# Patient Record
Sex: MALE | Race: BLACK OR AFRICAN AMERICAN | NOT HISPANIC OR LATINO | Employment: FULL TIME | URBAN - METROPOLITAN AREA
[De-identification: names, ages, dates, MRNs, and addresses within clinical notes are randomized per-mention and may not be internally consistent; named-entity substitution may affect disease eponyms.]

---

## 2017-06-02 ENCOUNTER — APPOINTMENT (EMERGENCY)
Dept: RADIOLOGY | Facility: HOSPITAL | Age: 52
End: 2017-06-02
Payer: COMMERCIAL

## 2017-06-02 ENCOUNTER — HOSPITAL ENCOUNTER (EMERGENCY)
Facility: HOSPITAL | Age: 52
Discharge: HOME | End: 2017-06-02
Attending: EMERGENCY MEDICINE | Admitting: EMERGENCY MEDICINE
Payer: COMMERCIAL

## 2017-06-02 VITALS
BODY MASS INDEX: 42.14 KG/M2 | WEIGHT: 301 LBS | TEMPERATURE: 100.1 F | RESPIRATION RATE: 18 BRPM | OXYGEN SATURATION: 95 % | SYSTOLIC BLOOD PRESSURE: 213 MMHG | HEART RATE: 83 BPM | HEIGHT: 71 IN | DIASTOLIC BLOOD PRESSURE: 102 MMHG

## 2017-06-02 DIAGNOSIS — H10.9 CONJUNCTIVITIS: ICD-10-CM

## 2017-06-02 DIAGNOSIS — J20.9 ACUTE BRONCHITIS: Primary | ICD-10-CM

## 2017-06-02 LAB
ALBUMIN SERPL BCP-MCNC: 3.5 G/DL (ref 3.5–5)
ALP SERPL-CCNC: 96 U/L (ref 46–116)
ALT SERPL W P-5'-P-CCNC: 31 U/L (ref 12–78)
ANION GAP SERPL CALCULATED.3IONS-SCNC: 6 MMOL/L (ref 4–13)
APTT PPP: 30 SECONDS (ref 23–35)
AST SERPL W P-5'-P-CCNC: 31 U/L (ref 5–45)
BILIRUB SERPL-MCNC: 0.8 MG/DL (ref 0.2–1)
BUN SERPL-MCNC: 9 MG/DL (ref 5–25)
CALCIUM SERPL-MCNC: 8.9 MG/DL (ref 8.3–10.1)
CHLORIDE SERPL-SCNC: 103 MMOL/L (ref 100–108)
CO2 SERPL-SCNC: 30 MMOL/L (ref 21–32)
CREAT SERPL-MCNC: 1.06 MG/DL (ref 0.6–1.3)
ERYTHROCYTE [DISTWIDTH] IN BLOOD BY AUTOMATED COUNT: 13.1 % (ref 11.6–15.1)
GFR SERPL CREATININE-BSD FRML MDRD: >60 ML/MIN/1.73SQ M
GLUCOSE SERPL-MCNC: 101 MG/DL (ref 65–140)
HCT VFR BLD AUTO: 40 % (ref 42–52)
HGB BLD-MCNC: 13.1 G/DL (ref 14–18)
INR PPP: 1.02 (ref 0.86–1.16)
LYMPHOCYTES # BLD AUTO: 2.38 THOUSAND/UL (ref 0.6–4.47)
LYMPHOCYTES # BLD AUTO: 24 %
MCH RBC QN AUTO: 28.1 PG (ref 27–31)
MCHC RBC AUTO-ENTMCNC: 32.6 G/DL (ref 31.4–37.4)
MCV RBC AUTO: 86 FL (ref 82–98)
MONOCYTES # BLD AUTO: 0.89 THOUSAND/UL (ref 0–1.22)
MONOCYTES NFR BLD AUTO: 9 % (ref 4–12)
NEUTS BAND NFR BLD MANUAL: 0 % (ref 0–8)
NEUTS SEG # BLD: 6.63 THOUSAND/UL (ref 1.81–6.82)
NEUTS SEG NFR BLD AUTO: 67 %
PLATELET # BLD AUTO: 198 THOUSANDS/UL (ref 130–400)
PLATELET BLD QL SMEAR: ADEQUATE
PMV BLD AUTO: 9.7 FL (ref 8.9–12.7)
POTASSIUM SERPL-SCNC: 4.1 MMOL/L (ref 3.5–5.3)
PROT SERPL-MCNC: 8.1 G/DL (ref 6.4–8.2)
PROTHROMBIN TIME: 10.7 SECONDS (ref 9.4–11.7)
RBC # BLD AUTO: 4.64 MILLION/UL (ref 4.7–6.1)
SODIUM SERPL-SCNC: 139 MMOL/L (ref 136–145)
TOTAL CELLS COUNTED SPEC: 100
TROPONIN I SERPL-MCNC: 0.02 NG/ML
WBC # BLD AUTO: 9.9 THOUSAND/UL (ref 4.8–10.8)

## 2017-06-02 PROCEDURE — 36415 COLL VENOUS BLD VENIPUNCTURE: CPT | Performed by: PHYSICIAN ASSISTANT

## 2017-06-02 PROCEDURE — 96361 HYDRATE IV INFUSION ADD-ON: CPT

## 2017-06-02 PROCEDURE — 85007 BL SMEAR W/DIFF WBC COUNT: CPT | Performed by: PHYSICIAN ASSISTANT

## 2017-06-02 PROCEDURE — 93005 ELECTROCARDIOGRAM TRACING: CPT | Performed by: PHYSICIAN ASSISTANT

## 2017-06-02 PROCEDURE — 84484 ASSAY OF TROPONIN QUANT: CPT | Performed by: PHYSICIAN ASSISTANT

## 2017-06-02 PROCEDURE — 85027 COMPLETE CBC AUTOMATED: CPT | Performed by: PHYSICIAN ASSISTANT

## 2017-06-02 PROCEDURE — 80053 COMPREHEN METABOLIC PANEL: CPT | Performed by: PHYSICIAN ASSISTANT

## 2017-06-02 PROCEDURE — A9270 NON-COVERED ITEM OR SERVICE: HCPCS | Performed by: EMERGENCY MEDICINE

## 2017-06-02 PROCEDURE — 85610 PROTHROMBIN TIME: CPT | Performed by: PHYSICIAN ASSISTANT

## 2017-06-02 PROCEDURE — 99284 EMERGENCY DEPT VISIT MOD MDM: CPT

## 2017-06-02 PROCEDURE — A9270 NON-COVERED ITEM OR SERVICE: HCPCS | Performed by: PHYSICIAN ASSISTANT

## 2017-06-02 PROCEDURE — 71020 HB CHEST X-RAY 2VW FRONTAL&LATL: CPT

## 2017-06-02 PROCEDURE — 85730 THROMBOPLASTIN TIME PARTIAL: CPT | Performed by: PHYSICIAN ASSISTANT

## 2017-06-02 PROCEDURE — 96360 HYDRATION IV INFUSION INIT: CPT

## 2017-06-02 RX ORDER — PREDNISONE 20 MG/1
60 TABLET ORAL ONCE
Status: COMPLETED | OUTPATIENT
Start: 2017-06-02 | End: 2017-06-02

## 2017-06-02 RX ORDER — AMLODIPINE BESYLATE 5 MG/1
10 TABLET ORAL ONCE
Status: COMPLETED | OUTPATIENT
Start: 2017-06-02 | End: 2017-06-02

## 2017-06-02 RX ORDER — HYDROCHLOROTHIAZIDE 25 MG/1
25 TABLET ORAL DAILY
Status: DISCONTINUED | OUTPATIENT
Start: 2017-06-02 | End: 2017-06-02 | Stop reason: HOSPADM

## 2017-06-02 RX ORDER — PREDNISONE 20 MG/1
40 TABLET ORAL DAILY
Qty: 8 TABLET | Refills: 0 | Status: SHIPPED | OUTPATIENT
Start: 2017-06-02 | End: 2017-06-06

## 2017-06-02 RX ORDER — HYDROCHLOROTHIAZIDE 25 MG/1
25 TABLET ORAL DAILY
Qty: 30 TABLET | Refills: 0 | Status: SHIPPED | OUTPATIENT
Start: 2017-06-02 | End: 2017-06-02

## 2017-06-02 RX ORDER — HYDROCHLOROTHIAZIDE 25 MG/1
25 TABLET ORAL DAILY
Status: DISCONTINUED | OUTPATIENT
Start: 2017-06-03 | End: 2017-06-02

## 2017-06-02 RX ORDER — AMLODIPINE BESYLATE 10 MG/1
10 TABLET ORAL DAILY
Qty: 7 TABLET | Refills: 0 | Status: SHIPPED | OUTPATIENT
Start: 2017-06-02 | End: 2017-08-10 | Stop reason: ALTCHOICE

## 2017-06-02 RX ORDER — IPRATROPIUM BROMIDE AND ALBUTEROL SULFATE 2.5; .5 MG/3ML; MG/3ML
3 SOLUTION RESPIRATORY (INHALATION)
Status: DISCONTINUED | OUTPATIENT
Start: 2017-06-02 | End: 2017-06-02 | Stop reason: HOSPADM

## 2017-06-02 RX ORDER — TOBRAMYCIN 3 MG/ML
1 SOLUTION/ DROPS OPHTHALMIC
Qty: 1 BOTTLE | Refills: 0 | Status: SHIPPED | OUTPATIENT
Start: 2017-06-02 | End: 2017-06-09

## 2017-06-02 RX ORDER — ALBUTEROL SULFATE 2.5 MG/3ML
2.5 SOLUTION RESPIRATORY (INHALATION) ONCE
Status: COMPLETED | OUTPATIENT
Start: 2017-06-02 | End: 2017-06-02

## 2017-06-02 RX ORDER — HYDROCHLOROTHIAZIDE 12.5 MG/1
12.5 TABLET ORAL DAILY
Qty: 30 TABLET | Refills: 0 | Status: SHIPPED | OUTPATIENT
Start: 2017-06-02 | End: 2017-06-02

## 2017-06-02 RX ORDER — ALBUTEROL SULFATE 90 UG/1
2 AEROSOL, METERED RESPIRATORY (INHALATION) EVERY 6 HOURS PRN
Qty: 1 INHALER | Refills: 0 | Status: SHIPPED | OUTPATIENT
Start: 2017-06-02 | End: 2017-07-02

## 2017-06-02 RX ORDER — AZITHROMYCIN 250 MG/1
TABLET, FILM COATED ORAL
Qty: 6 TABLET | Refills: 0 | Status: SHIPPED | OUTPATIENT
Start: 2017-06-02 | End: 2017-08-10 | Stop reason: ALTCHOICE

## 2017-06-02 RX ADMIN — IPRATROPIUM BROMIDE AND ALBUTEROL SULFATE 3 ML: .5; 3 SOLUTION RESPIRATORY (INHALATION) at 16:46

## 2017-06-02 RX ADMIN — AMLODIPINE BESYLATE 10 MG: 5 TABLET ORAL at 18:49

## 2017-06-02 RX ADMIN — HYDROCHLOROTHIAZIDE 25 MG: 25 TABLET ORAL at 18:17

## 2017-06-02 RX ADMIN — PREDNISONE 60 MG: 20 TABLET ORAL at 15:22

## 2017-06-02 RX ADMIN — SODIUM CHLORIDE 1000 ML: 0.9 INJECTION, SOLUTION INTRAVENOUS at 15:22

## 2017-06-02 RX ADMIN — ALBUTEROL SULFATE 2.5 MG: 2.5 SOLUTION RESPIRATORY (INHALATION) at 16:47

## 2017-06-02 RX ADMIN — IPRATROPIUM BROMIDE AND ALBUTEROL SULFATE 3 ML: .5; 3 SOLUTION RESPIRATORY (INHALATION) at 15:22

## 2017-06-07 LAB
ATRIAL RATE: 78 BPM
P AXIS: 29 DEGREES
PR INTERVAL: 146 MS
QRS AXIS: 31 DEGREES
QRSD INTERVAL: 98 MS
QT INTERVAL: 382 MS
QTC INTERVAL: 435 MS
T WAVE AXIS: 30 DEGREES
VENTRICULAR RATE: 78 BPM

## 2017-08-10 ENCOUNTER — HOSPITAL ENCOUNTER (EMERGENCY)
Facility: HOSPITAL | Age: 52
Discharge: HOME | End: 2017-08-10
Admitting: EMERGENCY MEDICINE
Payer: COMMERCIAL

## 2017-08-10 VITALS
WEIGHT: 280 LBS | RESPIRATION RATE: 16 BRPM | OXYGEN SATURATION: 97 % | TEMPERATURE: 98.2 F | HEIGHT: 71 IN | BODY MASS INDEX: 39.2 KG/M2 | DIASTOLIC BLOOD PRESSURE: 91 MMHG | SYSTOLIC BLOOD PRESSURE: 145 MMHG | HEART RATE: 73 BPM

## 2017-08-10 DIAGNOSIS — J06.9 UPPER RESPIRATORY INFECTION: Primary | ICD-10-CM

## 2017-08-10 PROCEDURE — 99283 EMERGENCY DEPT VISIT LOW MDM: CPT

## 2017-08-10 RX ORDER — DEXAMETHASONE 4 MG/1
10 TABLET ORAL ONCE
Status: COMPLETED | OUTPATIENT
Start: 2017-08-10 | End: 2017-08-10

## 2017-08-10 RX ORDER — TESTOSTERONE 16.2 MG/G
40.5 GEL TRANSDERMAL DAILY
COMMUNITY
End: 2020-11-23 | Stop reason: HOSPADM

## 2017-08-10 RX ORDER — LISINOPRIL AND HYDROCHLOROTHIAZIDE 20; 12.5 MG/1; MG/1
1 TABLET ORAL DAILY
COMMUNITY
End: 2019-11-07 | Stop reason: HOSPADM

## 2017-08-10 RX ADMIN — DEXAMETHASONE 10 MG: 4 TABLET ORAL at 16:25

## 2019-08-25 ENCOUNTER — APPOINTMENT (EMERGENCY)
Dept: RADIOLOGY | Facility: HOSPITAL | Age: 54
End: 2019-08-25
Attending: EMERGENCY MEDICINE
Payer: COMMERCIAL

## 2019-08-25 ENCOUNTER — HOSPITAL ENCOUNTER (EMERGENCY)
Facility: HOSPITAL | Age: 54
Discharge: HOME | End: 2019-08-25
Attending: EMERGENCY MEDICINE | Admitting: EMERGENCY MEDICINE
Payer: COMMERCIAL

## 2019-08-25 ENCOUNTER — APPOINTMENT (EMERGENCY)
Dept: RADIOLOGY | Facility: HOSPITAL | Age: 54
End: 2019-08-25
Payer: COMMERCIAL

## 2019-08-25 VITALS
OXYGEN SATURATION: 97 % | RESPIRATION RATE: 16 BRPM | DIASTOLIC BLOOD PRESSURE: 79 MMHG | SYSTOLIC BLOOD PRESSURE: 176 MMHG | BODY MASS INDEX: 41.42 KG/M2 | TEMPERATURE: 97.9 F | HEART RATE: 48 BPM | WEIGHT: 297 LBS

## 2019-08-25 DIAGNOSIS — R10.13 EPIGASTRIC PAIN: Primary | ICD-10-CM

## 2019-08-25 DIAGNOSIS — R93.89 OPACITY NOTED ON IMAGING STUDY: ICD-10-CM

## 2019-08-25 LAB
ALBUMIN SERPL BCP-MCNC: 3.6 G/DL (ref 3.5–5)
ALP SERPL-CCNC: 71 U/L (ref 46–116)
ALT SERPL W P-5'-P-CCNC: 40 U/L (ref 12–78)
ANION GAP SERPL CALCULATED.3IONS-SCNC: 7 MMOL/L (ref 4–13)
AST SERPL W P-5'-P-CCNC: 26 U/L (ref 5–45)
BASOPHILS # BLD AUTO: 0.05 THOUSANDS/ΜL (ref 0–0.1)
BASOPHILS NFR BLD AUTO: 1 % (ref 0–1)
BILIRUB SERPL-MCNC: 0.5 MG/DL (ref 0.2–1)
BUN SERPL-MCNC: 22 MG/DL (ref 5–25)
CALCIUM SERPL-MCNC: 8.9 MG/DL (ref 8.3–10.1)
CHLORIDE SERPL-SCNC: 101 MMOL/L (ref 100–108)
CO2 SERPL-SCNC: 28 MMOL/L (ref 21–32)
CREAT SERPL-MCNC: 1.25 MG/DL (ref 0.6–1.3)
EOSINOPHIL # BLD AUTO: 0.1 THOUSAND/ΜL (ref 0–0.61)
EOSINOPHIL NFR BLD AUTO: 1 % (ref 0–6)
ERYTHROCYTE [DISTWIDTH] IN BLOOD BY AUTOMATED COUNT: 13.6 % (ref 11.6–15.1)
GFR SERPL CREATININE-BSD FRML MDRD: 74 ML/MIN/1.73SQ M
GLUCOSE SERPL-MCNC: 112 MG/DL (ref 65–140)
HCT VFR BLD AUTO: 47.9 % (ref 36.5–49.3)
HGB BLD-MCNC: 15.6 G/DL (ref 12–17)
IMM GRANULOCYTES # BLD AUTO: 0.03 THOUSAND/UL (ref 0–0.2)
IMM GRANULOCYTES NFR BLD AUTO: 0 % (ref 0–2)
LIPASE SERPL-CCNC: 92 U/L (ref 73–393)
LYMPHOCYTES # BLD AUTO: 2.73 THOUSANDS/ΜL (ref 0.6–4.47)
LYMPHOCYTES NFR BLD AUTO: 32 % (ref 14–44)
MCH RBC QN AUTO: 29.8 PG (ref 26.8–34.3)
MCHC RBC AUTO-ENTMCNC: 32.6 G/DL (ref 31.4–37.4)
MCV RBC AUTO: 92 FL (ref 82–98)
MONOCYTES # BLD AUTO: 0.62 THOUSAND/ΜL (ref 0.17–1.22)
MONOCYTES NFR BLD AUTO: 7 % (ref 4–12)
NEUTROPHILS # BLD AUTO: 4.98 THOUSANDS/ΜL (ref 1.85–7.62)
NEUTS SEG NFR BLD AUTO: 59 % (ref 43–75)
NRBC BLD AUTO-RTO: 0 /100 WBCS
PLATELET # BLD AUTO: 185 THOUSANDS/UL (ref 149–390)
PMV BLD AUTO: 11.6 FL (ref 8.9–12.7)
POTASSIUM SERPL-SCNC: 4.2 MMOL/L (ref 3.5–5.3)
PROT SERPL-MCNC: 7.5 G/DL (ref 6.4–8.2)
RBC # BLD AUTO: 5.23 MILLION/UL (ref 3.88–5.62)
SODIUM SERPL-SCNC: 136 MMOL/L (ref 136–145)
TROPONIN I SERPL-MCNC: <0.02 NG/ML
TROPONIN I SERPL-MCNC: <0.02 NG/ML
WBC # BLD AUTO: 8.51 THOUSAND/UL (ref 4.31–10.16)

## 2019-08-25 PROCEDURE — 99285 EMERGENCY DEPT VISIT HI MDM: CPT

## 2019-08-25 PROCEDURE — 36415 COLL VENOUS BLD VENIPUNCTURE: CPT | Performed by: EMERGENCY MEDICINE

## 2019-08-25 PROCEDURE — 83690 ASSAY OF LIPASE: CPT | Performed by: EMERGENCY MEDICINE

## 2019-08-25 PROCEDURE — 96374 THER/PROPH/DIAG INJ IV PUSH: CPT

## 2019-08-25 PROCEDURE — 74177 CT ABD & PELVIS W/CONTRAST: CPT

## 2019-08-25 PROCEDURE — 71045 X-RAY EXAM CHEST 1 VIEW: CPT

## 2019-08-25 PROCEDURE — 80053 COMPREHEN METABOLIC PANEL: CPT | Performed by: EMERGENCY MEDICINE

## 2019-08-25 PROCEDURE — 96375 TX/PRO/DX INJ NEW DRUG ADDON: CPT

## 2019-08-25 PROCEDURE — 93005 ELECTROCARDIOGRAM TRACING: CPT

## 2019-08-25 PROCEDURE — 71260 CT THORAX DX C+: CPT

## 2019-08-25 PROCEDURE — 85025 COMPLETE CBC W/AUTO DIFF WBC: CPT | Performed by: EMERGENCY MEDICINE

## 2019-08-25 PROCEDURE — 84484 ASSAY OF TROPONIN QUANT: CPT | Performed by: EMERGENCY MEDICINE

## 2019-08-25 RX ORDER — MORPHINE SULFATE 4 MG/ML
4 INJECTION, SOLUTION INTRAMUSCULAR; INTRAVENOUS ONCE
Status: COMPLETED | OUTPATIENT
Start: 2019-08-25 | End: 2019-08-25

## 2019-08-25 RX ORDER — ONDANSETRON 2 MG/ML
4 INJECTION INTRAMUSCULAR; INTRAVENOUS ONCE
Status: COMPLETED | OUTPATIENT
Start: 2019-08-25 | End: 2019-08-25

## 2019-08-25 RX ADMIN — ONDANSETRON 4 MG: 2 INJECTION INTRAMUSCULAR; INTRAVENOUS at 01:48

## 2019-08-25 RX ADMIN — MORPHINE SULFATE 4 MG: 4 INJECTION INTRAVENOUS at 01:48

## 2019-08-25 RX ADMIN — IOHEXOL 100 ML: 350 INJECTION, SOLUTION INTRAVENOUS at 03:26

## 2019-08-25 NOTE — DISCHARGE INSTRUCTIONS
Call to schedule your outpatient stress test  Follow up with your primary care physician and Cardiologist as provided  Return to the ER for further concerns  Follow up with the pulmonologist concerning the opacity found in your right lung  You may require further testing

## 2019-08-25 NOTE — ED PROVIDER NOTES
History  Chief Complaint   Patient presents with    Abdominal Pain     pt states mid upper gastric pain that started last week  Also complains of nausea     Pt in ER with c/o intermittent epigastric/substernal pain x 1wk  He has been taking tylenol for pain relief, without success  Pt describes the pain as a "vice" at this time, and he states that the pain doesn't radiate  Pain is not exacerbated by eating or drinking  Pain is not exacerbated with exertion  He denies n/v/f/c  History provided by:  Patient   used: No        Prior to Admission Medications   Prescriptions Last Dose Informant Patient Reported? Taking? Testosterone (ANDROGEL) 40 5 MG/2 5GM (1 62%) GEL   Yes No   Sig: Place 40 5 mg on the skin daily   lisinopril-hydrochlorothiazide (PRINZIDE,ZESTORETIC) 20-12 5 MG per tablet   Yes No   Sig: Take 1 tablet by mouth daily      Facility-Administered Medications: None       Past Medical History:   Diagnosis Date    Hypertension     stopped his meds when ran out several years ago       History reviewed  No pertinent surgical history  History reviewed  No pertinent family history  I have reviewed and agree with the history as documented  Social History     Tobacco Use    Smoking status: Never Smoker   Substance Use Topics    Alcohol use: No    Drug use: No        Review of Systems   Constitutional: Negative for chills and fever  Respiratory: Negative for cough, shortness of breath and wheezing  Cardiovascular: Positive for chest pain  Negative for palpitations  Gastrointestinal: Positive for abdominal pain  Negative for constipation, diarrhea, nausea and vomiting  Genitourinary: Negative for dysuria, flank pain, hematuria and urgency  Musculoskeletal: Negative for back pain  Skin: Negative for color change and rash  All other systems reviewed and are negative  Physical Exam  Physical Exam   Constitutional: He is oriented to person, place, and time   He appears well-developed and well-nourished  HENT:   Head: Normocephalic and atraumatic  Eyes: Pupils are equal, round, and reactive to light  EOM are normal    Cardiovascular: Normal rate, regular rhythm and normal heart sounds  Pulmonary/Chest: Effort normal and breath sounds normal    Abdominal: Soft  Bowel sounds are normal  He exhibits no distension and no mass  There is tenderness in the epigastric area  There is no rigidity, no rebound, no guarding and negative Montes's sign  No hernia  Neurological: He is alert and oriented to person, place, and time  Skin: Skin is warm and dry  Psychiatric: He has a normal mood and affect  His behavior is normal  Judgment and thought content normal    Nursing note and vitals reviewed        Vital Signs  ED Triage Vitals   Temperature Pulse Respirations Blood Pressure SpO2   08/25/19 0117 08/25/19 0118 08/25/19 0118 08/25/19 0118 08/25/19 0118   97 9 °F (36 6 °C) 62 16 (!) 255/120 100 %      Temp Source Heart Rate Source Patient Position - Orthostatic VS BP Location FiO2 (%)   08/25/19 0117 08/25/19 0118 08/25/19 0118 08/25/19 0118 --   Tympanic Monitor Lying Right arm       Pain Score       08/25/19 0117       7           Vitals:    08/25/19 0118 08/25/19 0145 08/25/19 0230 08/25/19 0433   BP: (!) 255/120 (!) 200/93 (!) 177/86 (!) 176/79   Pulse: 62 (!) 53 (!) 52 (!) 48   Patient Position - Orthostatic VS: Lying            Visual Acuity      ED Medications  Medications   morphine (PF) 4 mg/mL injection 4 mg (4 mg Intravenous Given 8/25/19 0148)   ondansetron (ZOFRAN) injection 4 mg (4 mg Intravenous Given 8/25/19 0148)   iohexol (OMNIPAQUE) 350 MG/ML injection (MULTI-DOSE) 100 mL (100 mL Intravenous Given 8/25/19 0326)       Diagnostic Studies  Results Reviewed     Procedure Component Value Units Date/Time    Troponin I [[id number]]  (Normal) Collected:  08/25/19 0432    Lab Status:  Final result Specimen:  Blood from Arm, Right Updated:  08/25/19 0511     Troponin I <0 02 ng/mL     Lipase [[id number]]  (Normal) Collected:  08/25/19 0130    Lab Status:  Final result Specimen:  Blood from Arm, Right Updated:  08/25/19 0221     Lipase 92 u/L     Troponin I [[id number]]  (Normal) Collected:  08/25/19 0130    Lab Status:  Final result Specimen:  Blood from Arm, Right Updated:  08/25/19 0216     Troponin I <0 02 ng/mL     Comprehensive metabolic panel [[id number]] Collected:  08/25/19 0130    Lab Status:  Final result Specimen:  Blood from Arm, Right Updated:  08/25/19 4713     Sodium 136 mmol/L      Potassium 4 2 mmol/L      Chloride 101 mmol/L      CO2 28 mmol/L      ANION GAP 7 mmol/L      BUN 22 mg/dL      Creatinine 1 25 mg/dL      Glucose 112 mg/dL      Calcium 8 9 mg/dL      AST 26 U/L      ALT 40 U/L      Alkaline Phosphatase 71 U/L      Total Protein 7 5 g/dL      Albumin 3 6 g/dL      Total Bilirubin 0 50 mg/dL      eGFR 74 ml/min/1 73sq m     Narrative:       [place] guidelines for Chronic Kidney Disease (CKD):     Stage 1 with normal or high GFR (GFR > 90 mL/min/1 73 square meters)    Stage 2 Mild CKD (GFR = 60-89 mL/min/1 73 square meters)    Stage 3A Moderate CKD (GFR = 45-59 mL/min/1 73 square meters)    Stage 3B Moderate CKD (GFR = 30-44 mL/min/1 73 square meters)    Stage 4 Severe CKD (GFR = 15-29 mL/min/1 73 square meters)    Stage 5 End Stage CKD (GFR <15 mL/min/1 73 square meters)  Note: GFR calculation is accurate only with a steady state creatinine    CBC and differential [[id number]] Collected:  08/25/19 0130    Lab Status:  Final result Specimen:  Blood from Arm, Right Updated:  08/25/19 0135     WBC 8 51 Thousand/uL      RBC 5 23 Million/uL      Hemoglobin 15 6 g/dL      Hematocrit 47 9 %      MCV 92 fL      MCH 29 8 pg      MCHC 32 6 g/dL      RDW 13 6 %      MPV 11 6 fL      Platelets 100 Thousands/uL      nRBC 0 /100 WBCs      Neutrophils Relative 59 %      Immat GRANS % 0 %      Lymphocytes Relative 32 %      Monocytes Relative 7 %      Eosinophils Relative 1 %      Basophils Relative 1 %      Neutrophils Absolute 4 98 Thousands/µL      Immature Grans Absolute 0 03 Thousand/uL      Lymphocytes Absolute 2 73 Thousands/µL      Monocytes Absolute 0 62 Thousand/µL      Eosinophils Absolute 0 10 Thousand/µL      Basophils Absolute 0 05 Thousands/µL                  CT chest abdomen pelvis w contrast   Final Result by Lainey Wright DO (08/25 1626)      Nodular airspace opacity within the right upper lobe which may represent infectious versus inflammatory causes              Workstation performed: [id number]         XR chest 1 view portable   ED Interpretation by Onur Reveles DO (08/25 0211)   Unchanged from previous                 Procedures  ECG 12 Lead Documentation Only  Date/Time: 8/25/2019 1:12 AM  Performed by: Onur Reveles DO  Authorized by: Onur Reveles DO     Indications / Diagnosis:  Epigastric pain  ECG reviewed by me, the ED Provider: yes    Patient location:  ED  Previous ECG:     Previous ECG:  Unavailable    Comparison to cardiac monitor: Yes    Interpretation:     Interpretation: abnormal    Rate:     ECG rate:  51bpm    ECG rate assessment: normal    Rhythm:     Rhythm comment:  Sinus arrhythmia  Ectopy:     Ectopy: none    QRS:     QRS axis:  Normal  Conduction:     Conduction: normal    ST segments:     ST segments:  Normal           ED Course         HEART Risk Score      Most Recent Value   History  0 Filed at: 08/25/2019 0541   ECG  0 Filed at: 08/25/2019 0541   Age  1 Filed at: 08/25/2019 0541   Risk Factors  1 Filed at: 08/25/2019 0541   Troponin  0 Filed at: 08/25/2019 0541   Heart Score Risk Calculator   History  0 Filed at: 08/25/2019 0541   ECG  0 Filed at: 08/25/2019 0541   Age  1 Filed at: 08/25/2019 0541   Risk Factors  1 Filed at: 08/25/2019 0541   Troponin  0 Filed at: 08/25/2019 0541   HEART Score  2 Filed at: 08/25/2019 0541   HEART Score  2 Filed at: 08/25/2019 2343 MDM  Number of Diagnoses or Management Options  Diagnosis management comments: Pt is pain free after meds  Amount and/or Complexity of Data Reviewed  Clinical lab tests: ordered and reviewed  Tests in the radiology section of CPT®: ordered and reviewed    Risk of Complications, Morbidity, and/or Mortality  Presenting problems: moderate  Diagnostic procedures: moderate  Management options: moderate    Patient Progress  Patient progress: improved      Disposition  Final diagnoses:   Epigastric pain   Opacity noted on imaging study     Time reflects when diagnosis was documented in both MDM as applicable and the Disposition within this note     Time User Action Codes Description Comment    8/25/2019  5:27 AM Charlie DE LOS SANTOS Add [R10 13] Epigastric pain     8/25/2019  5:38 AM Charlie Cramer Add [R93 89] Opacity noted on imaging study       ED Disposition     ED Disposition Condition Date/Time Comment    Discharge Stable Sun Aug 25, 2019  5:27 AM Ulis Gowers discharge to home/self care              Follow-up Information     Follow up With Specialties Details Why Contact Info Additional Information    Niru Patterson DO General Practice Schedule an appointment as soon as possible for a visit in 1 day for follow up [address]       Marquise Dominguez MD Cardiology Schedule an appointment as soon as possible for a visit in 1 day for follow up [address] [facility]  [address] [facility] [address] [facility] Cardiology Schedule an appointment as soon as possible for a visit in 1 day for follow up [address] [facility], [place] [address] [facility], [address] [facility], [place] [address], [place], [address]    Ellyn Day MD Pulmonary Disease Schedule an appointment as soon as possible for a visit in 1 day for follow up concerning lung opacity [address]  [phone]             Patient's Medications   Discharge Prescriptions    No medications on file     Outpatient Discharge Orders   Stress test only, exercise   Standing Status: Future Standing Exp   Date: 08/25/23       ED Provider  Electronically Signed by           Sunshine Vera DO  08/25/19 7177

## 2019-08-26 LAB
ATRIAL RATE: 51 BPM
P AXIS: 27 DEGREES
PR INTERVAL: 156 MS
QRS AXIS: 40 DEGREES
QRSD INTERVAL: 98 MS
QT INTERVAL: 392 MS
QTC INTERVAL: 361 MS
T WAVE AXIS: 20 DEGREES
VENTRICULAR RATE: 51 BPM

## 2019-08-26 PROCEDURE — 93010 ELECTROCARDIOGRAM REPORT: CPT | Performed by: INTERNAL MEDICINE

## 2019-10-27 ENCOUNTER — APPOINTMENT (EMERGENCY)
Dept: RADIOLOGY | Facility: HOSPITAL | Age: 54
End: 2019-10-27
Attending: EMERGENCY MEDICINE
Payer: COMMERCIAL

## 2019-10-27 ENCOUNTER — HOSPITAL ENCOUNTER (EMERGENCY)
Facility: HOSPITAL | Age: 54
Discharge: HOME | End: 2019-10-27
Attending: EMERGENCY MEDICINE | Admitting: EMERGENCY MEDICINE
Payer: COMMERCIAL

## 2019-10-27 VITALS — OXYGEN SATURATION: 98 % | HEART RATE: 89 BPM | TEMPERATURE: 99.9 F | RESPIRATION RATE: 18 BRPM

## 2019-10-27 DIAGNOSIS — S93.402A LEFT ANKLE SPRAIN: Primary | ICD-10-CM

## 2019-10-27 PROCEDURE — 73610 X-RAY EXAM OF ANKLE: CPT

## 2019-10-27 PROCEDURE — 73630 X-RAY EXAM OF FOOT: CPT

## 2019-10-27 PROCEDURE — 99283 EMERGENCY DEPT VISIT LOW MDM: CPT

## 2019-10-27 RX ORDER — TRAMADOL HYDROCHLORIDE 50 MG/1
TABLET ORAL
Qty: 12 TABLET | Refills: 0 | Status: SHIPPED | OUTPATIENT
Start: 2019-10-27 | End: 2019-11-07 | Stop reason: HOSPADM

## 2019-10-27 RX ORDER — NAPROXEN 500 MG/1
500 TABLET ORAL ONCE
Status: COMPLETED | OUTPATIENT
Start: 2019-10-27 | End: 2019-10-27

## 2019-10-27 RX ORDER — NAPROXEN 500 MG/1
500 TABLET ORAL 2 TIMES DAILY WITH MEALS
Qty: 14 TABLET | Refills: 0 | Status: SHIPPED | OUTPATIENT
Start: 2019-10-27 | End: 2019-11-07 | Stop reason: HOSPADM

## 2019-10-27 RX ORDER — OXYCODONE HYDROCHLORIDE AND ACETAMINOPHEN 5; 325 MG/1; MG/1
1 TABLET ORAL ONCE
Status: COMPLETED | OUTPATIENT
Start: 2019-10-27 | End: 2019-10-27

## 2019-10-27 RX ADMIN — NAPROXEN 500 MG: 500 TABLET ORAL at 20:19

## 2019-10-27 RX ADMIN — OXYCODONE HYDROCHLORIDE AND ACETAMINOPHEN 1 TABLET: 5; 325 TABLET ORAL at 20:19

## 2019-10-28 NOTE — ED PROVIDER NOTES
History  Chief Complaint   Patient presents with    Ankle Injury     pt states he was roller skating last night and " pushed it a little too hard" pt presents with a swollen and tender left lower extremity x 1 day  Patient took Tylenol prior to coming to ER  51 yo male was roller skating yesterday which he does often  Afterwards he had a little pain in left foot and ankle  Today pain continued and got worse, especially when driving he says - when asked if he drives with his left foot he said no  Hurts to put any weight on it  No associated symptoms  Non-radiating  History provided by:  Patient   used: No    Ankle Injury       Prior to Admission Medications   Prescriptions Last Dose Informant Patient Reported? Taking? Testosterone (ANDROGEL) 40 5 MG/2 5GM (1 62%) GEL   Yes No   Sig: Place 40 5 mg on the skin daily   lisinopril-hydrochlorothiazide (PRINZIDE,ZESTORETIC) 20-12 5 MG per tablet   Yes No   Sig: Take 1 tablet by mouth daily      Facility-Administered Medications: None       Past Medical History:   Diagnosis Date    Hypertension     stopped his meds when ran out several years ago       History reviewed  No pertinent surgical history  History reviewed  No pertinent family history  I have reviewed and agree with the history as documented  Social History     Tobacco Use    Smoking status: Never Smoker   Substance Use Topics    Alcohol use: No    Drug use: No        Review of Systems    Physical Exam  Physical Exam   Constitutional: He is oriented to person, place, and time  He appears well-developed and well-nourished  No distress  HENT:   Head: Normocephalic and atraumatic  Neck: Neck supple  Pulmonary/Chest: Effort normal    Musculoskeletal: Normal range of motion  He exhibits tenderness  He exhibits no edema or deformity  + lateral mall and bottom of heel ttp  Top of foot not tender    Medial mall not tender; DP palp; achilles mech intact Neurological: He is alert and oriented to person, place, and time  He exhibits normal muscle tone  Skin: Skin is warm and dry  Capillary refill takes less than 2 seconds  He is not diaphoretic  No erythema  No pallor  Psychiatric: He has a normal mood and affect  His behavior is normal    Nursing note and vitals reviewed  Vital Signs  ED Triage Vitals   Temperature Pulse Respirations BP SpO2   10/27/19 1937 10/27/19 1937 10/27/19 1937 -- 10/27/19 1937   99 9 °F (37 7 °C) 89 18  98 %      Temp Source Heart Rate Source Patient Position - Orthostatic VS BP Location FiO2 (%)   10/27/19 1937 10/27/19 1937 -- -- --   Tympanic Monitor         Pain Score       10/27/19 2019       Worst Possible Pain           Vitals:    10/27/19 1937   Pulse: 89         Visual Acuity      ED Medications  Medications   oxyCODONE-acetaminophen (PERCOCET) 5-325 mg per tablet 1 tablet (1 tablet Oral Given 10/27/19 2019)   naproxen (NAPROSYN) tablet 500 mg (500 mg Oral Given 10/27/19 2019)       Diagnostic Studies  Results Reviewed     None                 XR ankle 3+ vw left   ED Interpretation by Elvi Howell MD (31/82 6329)   No fx      XR foot 3+ vw left   ED Interpretation by Evli Howell MD (23/35 6205)   No fx                 Procedures  Procedures       ED Course                               [facility]  Number of Diagnoses or Management Options  Left ankle sprain:   Diagnosis management comments: Advised RICE, ultram and naprosyn for pain, follow up ortho  Disposition  Final diagnoses:   Left ankle sprain     Time reflects when diagnosis was documented in both MDM as applicable and the Disposition within this note     Time User Action Codes Description Comment    62/61/4111  7:15 PM Stevo Ricardo Add [D37 353B] Left ankle sprain       ED Disposition     ED Disposition Condition Date/Time Comment    Discharge Stable Sun Oct 27, 2019  8:14 PM Rich Code discharge to home/self care              Follow-up Information Follow up With Specialties Details Why Contact Info    Eliane Jean Baptiste MD Orthopedic Surgery Schedule an appointment as soon as possible for a visit   [address]  [phone]            Patient's Medications   Discharge Prescriptions    NAPROXEN (NAPROSYN) 500 MG TABLET    Take 1 tablet (500 mg total) by mouth 2 (two) times a day with meals       Start Date: 10/27/2019End Date: --       Order Dose: 500 mg       Quantity: 14 tablet    Refills: 0    TRAMADOL (ULTRAM) 50 MG TABLET    1-2 po q 6 hours prn pain       Start Date: 10/27/2019End Date: --       Order Dose: --       Quantity: 12 tablet    Refills: 0     No discharge procedures on file      ED Provider  Electronically Signed by           Elvi Howell MD  93/61/61 4618

## 2019-11-03 ENCOUNTER — HOSPITAL ENCOUNTER (INPATIENT)
Facility: HOSPITAL | Age: 54
LOS: 4 days | Discharge: HOME | DRG: 377 | End: 2019-11-07
Attending: INTERNAL MEDICINE | Admitting: INTERNAL MEDICINE
Payer: COMMERCIAL

## 2019-11-03 DIAGNOSIS — K92.2 GASTROINTESTINAL HEMORRHAGE, UNSPECIFIED GASTROINTESTINAL HEMORRHAGE TYPE: ICD-10-CM

## 2019-11-03 DIAGNOSIS — M25.572 ACUTE LEFT ANKLE PAIN: ICD-10-CM

## 2019-11-03 DIAGNOSIS — M25.512 ACUTE PAIN OF LEFT SHOULDER: ICD-10-CM

## 2019-11-03 DIAGNOSIS — I21.4 NSTEMI (NON-ST ELEVATED MYOCARDIAL INFARCTION) (HCC): Primary | ICD-10-CM

## 2019-11-03 DIAGNOSIS — S93.402A LEFT ANKLE SPRAIN: ICD-10-CM

## 2019-11-03 PROCEDURE — 93005 ELECTROCARDIOGRAM TRACING: CPT

## 2019-11-03 PROCEDURE — 99223 1ST HOSP IP/OBS HIGH 75: CPT | Performed by: INTERNAL MEDICINE

## 2019-11-04 ENCOUNTER — APPOINTMENT (INPATIENT)
Dept: NON INVASIVE DIAGNOSTICS | Facility: HOSPITAL | Age: 54
DRG: 377 | End: 2019-11-04
Payer: COMMERCIAL

## 2019-11-04 PROBLEM — K92.2 GI BLEED: Status: ACTIVE | Noted: 2019-11-04

## 2019-11-04 PROBLEM — R77.8 ELEVATED TROPONIN: Status: ACTIVE | Noted: 2019-11-04

## 2019-11-04 PROBLEM — R79.89 ELEVATED TROPONIN: Status: ACTIVE | Noted: 2019-11-04

## 2019-11-04 LAB
ABO GROUP BLD: NORMAL
ALBUMIN SERPL BCP-MCNC: 2.4 G/DL (ref 3.5–5)
ALP SERPL-CCNC: 104 U/L (ref 46–116)
ALT SERPL W P-5'-P-CCNC: 37 U/L (ref 12–78)
ANION GAP SERPL CALCULATED.3IONS-SCNC: 6 MMOL/L (ref 4–13)
AST SERPL W P-5'-P-CCNC: 120 U/L (ref 5–45)
ATRIAL RATE: 63 BPM
ATRIAL RATE: 69 BPM
ATRIAL RATE: 74 BPM
BASOPHILS # BLD AUTO: 0.1 THOUSANDS/ΜL (ref 0–0.1)
BASOPHILS # BLD MANUAL: 0 THOUSAND/UL (ref 0–0.1)
BASOPHILS NFR BLD AUTO: 0 % (ref 0–1)
BASOPHILS NFR MAR MANUAL: 0 % (ref 0–1)
BILIRUB SERPL-MCNC: 0.52 MG/DL (ref 0.2–1)
BLD GP AB SCN SERPL QL: NEGATIVE
BUN SERPL-MCNC: 21 MG/DL (ref 5–25)
CALCIUM SERPL-MCNC: 8.6 MG/DL (ref 8.3–10.1)
CHLORIDE SERPL-SCNC: 108 MMOL/L (ref 100–108)
CHOLEST SERPL-MCNC: 107 MG/DL (ref 50–200)
CO2 SERPL-SCNC: 24 MMOL/L (ref 21–32)
CREAT SERPL-MCNC: 1.02 MG/DL (ref 0.6–1.3)
EOSINOPHIL # BLD AUTO: 0.1 THOUSAND/ΜL (ref 0–0.61)
EOSINOPHIL # BLD MANUAL: 0 THOUSAND/UL (ref 0–0.4)
EOSINOPHIL NFR BLD AUTO: 0 % (ref 0–6)
EOSINOPHIL NFR BLD MANUAL: 0 % (ref 0–6)
ERYTHROCYTE [DISTWIDTH] IN BLOOD BY AUTOMATED COUNT: 15.2 % (ref 11.6–15.1)
ERYTHROCYTE [DISTWIDTH] IN BLOOD BY AUTOMATED COUNT: 15.2 % (ref 11.6–15.1)
EST. AVERAGE GLUCOSE BLD GHB EST-MCNC: 111 MG/DL
GFR SERPL CREATININE-BSD FRML MDRD: 95 ML/MIN/1.73SQ M
GLUCOSE SERPL-MCNC: 202 MG/DL (ref 65–140)
HBA1C MFR BLD: 5.5 % (ref 4.2–6.3)
HCT VFR BLD AUTO: 22.7 % (ref 36.5–49.3)
HCT VFR BLD AUTO: 24.9 % (ref 36.5–49.3)
HCT VFR BLD AUTO: 31.3 % (ref 36.5–49.3)
HDLC SERPL-MCNC: 10 MG/DL
HGB BLD-MCNC: 10.4 G/DL (ref 12–17)
HGB BLD-MCNC: 7.4 G/DL (ref 12–17)
HGB BLD-MCNC: 8.3 G/DL (ref 12–17)
HGB BLD-MCNC: 8.3 G/DL (ref 12–17)
IMM GRANULOCYTES # BLD AUTO: >0.5 THOUSAND/UL (ref 0–0.2)
IMM GRANULOCYTES NFR BLD AUTO: 6 % (ref 0–2)
INR PPP: 1.35 (ref 0.84–1.19)
LDLC SERPL CALC-MCNC: 69 MG/DL (ref 0–100)
LYMPHOCYTES # BLD AUTO: 10 % (ref 14–44)
LYMPHOCYTES # BLD AUTO: 2.45 THOUSANDS/ΜL (ref 0.6–4.47)
LYMPHOCYTES # BLD AUTO: 2.73 THOUSAND/UL (ref 0.6–4.47)
LYMPHOCYTES NFR BLD AUTO: 10 % (ref 14–44)
MAGNESIUM SERPL-MCNC: 1.8 MG/DL (ref 1.6–2.6)
MCH RBC QN AUTO: 30 PG (ref 26.8–34.3)
MCH RBC QN AUTO: 30.1 PG (ref 26.8–34.3)
MCHC RBC AUTO-ENTMCNC: 32.6 G/DL (ref 31.4–37.4)
MCHC RBC AUTO-ENTMCNC: 33.3 G/DL (ref 31.4–37.4)
MCV RBC AUTO: 90 FL (ref 82–98)
MCV RBC AUTO: 92 FL (ref 82–98)
MONOCYTES # BLD AUTO: 1.59 THOUSAND/ΜL (ref 0.17–1.22)
MONOCYTES # BLD AUTO: 2.73 THOUSAND/UL (ref 0–1.22)
MONOCYTES NFR BLD AUTO: 7 % (ref 4–12)
MONOCYTES NFR BLD: 10 % (ref 4–12)
NEUTROPHILS # BLD AUTO: 18.66 THOUSANDS/ΜL (ref 1.85–7.62)
NEUTROPHILS # BLD MANUAL: 21.84 THOUSAND/UL (ref 1.85–7.62)
NEUTS SEG NFR BLD AUTO: 77 % (ref 43–75)
NEUTS SEG NFR BLD AUTO: 80 % (ref 43–75)
NRBC BLD AUTO-RTO: 0 /100 WBCS
NRBC BLD AUTO-RTO: 0 /100 WBCS
NRBC BLD AUTO-RTO: 1 /100 WBC (ref 0–2)
NT-PROBNP SERPL-MCNC: 544 PG/ML
P AXIS: -18 DEGREES
P AXIS: 5 DEGREES
P AXIS: 53 DEGREES
PHOSPHATE SERPL-MCNC: 3.3 MG/DL (ref 2.7–4.5)
PLATELET # BLD AUTO: 244 THOUSANDS/UL (ref 149–390)
PLATELET # BLD AUTO: 296 THOUSANDS/UL (ref 149–390)
PLATELET BLD QL SMEAR: ADEQUATE
PMV BLD AUTO: 11.3 FL (ref 8.9–12.7)
PMV BLD AUTO: 9.8 FL (ref 8.9–12.7)
POLYCHROMASIA BLD QL SMEAR: PRESENT
POTASSIUM SERPL-SCNC: 4.6 MMOL/L (ref 3.5–5.3)
PR INTERVAL: 121 MS
PR INTERVAL: 138 MS
PR INTERVAL: 150 MS
PROT SERPL-MCNC: 7.1 G/DL (ref 6.4–8.2)
PROTHROMBIN TIME: 16.2 SECONDS (ref 11.6–14.5)
QRS AXIS: 53 DEGREES
QRS AXIS: 56 DEGREES
QRS AXIS: 65 DEGREES
QRSD INTERVAL: 100 MS
QRSD INTERVAL: 104 MS
QRSD INTERVAL: 96 MS
QT INTERVAL: 371 MS
QT INTERVAL: 392 MS
QT INTERVAL: 408 MS
QTC INTERVAL: 412 MS
QTC INTERVAL: 418 MS
QTC INTERVAL: 420 MS
RBC # BLD AUTO: 2.47 MILLION/UL (ref 3.88–5.62)
RBC # BLD AUTO: 2.76 MILLION/UL (ref 3.88–5.62)
RH BLD: POSITIVE
SODIUM SERPL-SCNC: 138 MMOL/L (ref 136–145)
SPECIMEN EXPIRATION DATE: NORMAL
T WAVE AXIS: 35 DEGREES
T WAVE AXIS: 39 DEGREES
T WAVE AXIS: 60 DEGREES
TOTAL CELLS COUNTED SPEC: 100
TRIGL SERPL-MCNC: 139 MG/DL
TROPONIN I SERPL-MCNC: 19.5 NG/ML
TROPONIN I SERPL-MCNC: 21.1 NG/ML
TROPONIN I SERPL-MCNC: 22.1 NG/ML
VENTRICULAR RATE: 63 BPM
VENTRICULAR RATE: 69 BPM
VENTRICULAR RATE: 74 BPM
WBC # BLD AUTO: 24.35 THOUSAND/UL (ref 4.31–10.16)
WBC # BLD AUTO: 27.3 THOUSAND/UL (ref 4.31–10.16)

## 2019-11-04 PROCEDURE — 86920 COMPATIBILITY TEST SPIN: CPT

## 2019-11-04 PROCEDURE — 83880 ASSAY OF NATRIURETIC PEPTIDE: CPT | Performed by: INTERNAL MEDICINE

## 2019-11-04 PROCEDURE — 85025 COMPLETE CBC W/AUTO DIFF WBC: CPT | Performed by: PHYSICIAN ASSISTANT

## 2019-11-04 PROCEDURE — 85610 PROTHROMBIN TIME: CPT | Performed by: INTERNAL MEDICINE

## 2019-11-04 PROCEDURE — 80061 LIPID PANEL: CPT | Performed by: INTERNAL MEDICINE

## 2019-11-04 PROCEDURE — 93005 ELECTROCARDIOGRAM TRACING: CPT

## 2019-11-04 PROCEDURE — 93010 ELECTROCARDIOGRAM REPORT: CPT | Performed by: INTERNAL MEDICINE

## 2019-11-04 PROCEDURE — 85018 HEMOGLOBIN: CPT | Performed by: PHYSICIAN ASSISTANT

## 2019-11-04 PROCEDURE — C9113 INJ PANTOPRAZOLE SODIUM, VIA: HCPCS | Performed by: INTERNAL MEDICINE

## 2019-11-04 PROCEDURE — 84100 ASSAY OF PHOSPHORUS: CPT | Performed by: INTERNAL MEDICINE

## 2019-11-04 PROCEDURE — 30233N1 TRANSFUSION OF NONAUTOLOGOUS RED BLOOD CELLS INTO PERIPHERAL VEIN, PERCUTANEOUS APPROACH: ICD-10-PCS | Performed by: INTERNAL MEDICINE

## 2019-11-04 PROCEDURE — 0DB68ZX EXCISION OF STOMACH, VIA NATURAL OR ARTIFICIAL OPENING ENDOSCOPIC, DIAGNOSTIC: ICD-10-PCS | Performed by: INTERNAL MEDICINE

## 2019-11-04 PROCEDURE — 85018 HEMOGLOBIN: CPT | Performed by: NURSE PRACTITIONER

## 2019-11-04 PROCEDURE — 93306 TTE W/DOPPLER COMPLETE: CPT

## 2019-11-04 PROCEDURE — 83735 ASSAY OF MAGNESIUM: CPT | Performed by: INTERNAL MEDICINE

## 2019-11-04 PROCEDURE — 80307 DRUG TEST PRSMV CHEM ANLYZR: CPT | Performed by: INTERNAL MEDICINE

## 2019-11-04 PROCEDURE — 86850 RBC ANTIBODY SCREEN: CPT | Performed by: INTERNAL MEDICINE

## 2019-11-04 PROCEDURE — 93306 TTE W/DOPPLER COMPLETE: CPT | Performed by: INTERNAL MEDICINE

## 2019-11-04 PROCEDURE — 99291 CRITICAL CARE FIRST HOUR: CPT | Performed by: INTERNAL MEDICINE

## 2019-11-04 PROCEDURE — 83036 HEMOGLOBIN GLYCOSYLATED A1C: CPT | Performed by: INTERNAL MEDICINE

## 2019-11-04 PROCEDURE — 85014 HEMATOCRIT: CPT | Performed by: NURSE PRACTITIONER

## 2019-11-04 PROCEDURE — 84484 ASSAY OF TROPONIN QUANT: CPT | Performed by: INTERNAL MEDICINE

## 2019-11-04 PROCEDURE — 87040 BLOOD CULTURE FOR BACTERIA: CPT | Performed by: INTERNAL MEDICINE

## 2019-11-04 PROCEDURE — 80053 COMPREHEN METABOLIC PANEL: CPT | Performed by: INTERNAL MEDICINE

## 2019-11-04 PROCEDURE — 99223 1ST HOSP IP/OBS HIGH 75: CPT | Performed by: INTERNAL MEDICINE

## 2019-11-04 PROCEDURE — 86900 BLOOD TYPING SEROLOGIC ABO: CPT | Performed by: INTERNAL MEDICINE

## 2019-11-04 PROCEDURE — C9113 INJ PANTOPRAZOLE SODIUM, VIA: HCPCS | Performed by: NURSE PRACTITIONER

## 2019-11-04 PROCEDURE — 99232 SBSQ HOSP IP/OBS MODERATE 35: CPT | Performed by: INTERNAL MEDICINE

## 2019-11-04 PROCEDURE — P9016 RBC LEUKOCYTES REDUCED: HCPCS

## 2019-11-04 PROCEDURE — 85027 COMPLETE CBC AUTOMATED: CPT | Performed by: INTERNAL MEDICINE

## 2019-11-04 PROCEDURE — 86901 BLOOD TYPING SEROLOGIC RH(D): CPT | Performed by: INTERNAL MEDICINE

## 2019-11-04 PROCEDURE — 0W3P8ZZ CONTROL BLEEDING IN GASTROINTESTINAL TRACT, VIA NATURAL OR ARTIFICIAL OPENING ENDOSCOPIC: ICD-10-PCS | Performed by: INTERNAL MEDICINE

## 2019-11-04 PROCEDURE — 85007 BL SMEAR W/DIFF WBC COUNT: CPT | Performed by: INTERNAL MEDICINE

## 2019-11-04 RX ORDER — MAGNESIUM SULFATE HEPTAHYDRATE 40 MG/ML
2 INJECTION, SOLUTION INTRAVENOUS ONCE
Status: COMPLETED | OUTPATIENT
Start: 2019-11-04 | End: 2019-11-04

## 2019-11-04 RX ORDER — NITROGLYCERIN 20 MG/100ML
5-200 INJECTION INTRAVENOUS
Status: DISCONTINUED | OUTPATIENT
Start: 2019-11-04 | End: 2019-11-04

## 2019-11-04 RX ORDER — CARVEDILOL 3.12 MG/1
3.12 TABLET ORAL 2 TIMES DAILY WITH MEALS
Status: DISCONTINUED | OUTPATIENT
Start: 2019-11-04 | End: 2019-11-04

## 2019-11-04 RX ORDER — ASPIRIN 81 MG/1
324 TABLET, CHEWABLE ORAL ONCE
Status: COMPLETED | OUTPATIENT
Start: 2019-11-04 | End: 2019-11-04

## 2019-11-04 RX ORDER — CARVEDILOL 6.25 MG/1
6.25 TABLET ORAL 2 TIMES DAILY WITH MEALS
Status: DISCONTINUED | OUTPATIENT
Start: 2019-11-04 | End: 2019-11-06

## 2019-11-04 RX ORDER — CARVEDILOL 3.12 MG/1
3.12 TABLET ORAL ONCE
Status: COMPLETED | OUTPATIENT
Start: 2019-11-04 | End: 2019-11-04

## 2019-11-04 RX ORDER — AMLODIPINE BESYLATE 5 MG/1
5 TABLET ORAL DAILY
Status: DISCONTINUED | OUTPATIENT
Start: 2019-11-04 | End: 2019-11-06

## 2019-11-04 RX ORDER — ATORVASTATIN CALCIUM 80 MG/1
80 TABLET, FILM COATED ORAL
Status: DISCONTINUED | OUTPATIENT
Start: 2019-11-04 | End: 2019-11-07 | Stop reason: HOSPADM

## 2019-11-04 RX ORDER — LIDOCAINE 50 MG/G
1 PATCH TOPICAL DAILY
Status: DISCONTINUED | OUTPATIENT
Start: 2019-11-04 | End: 2019-11-07 | Stop reason: HOSPADM

## 2019-11-04 RX ORDER — ASPIRIN 81 MG/1
81 TABLET ORAL DAILY
Status: DISCONTINUED | OUTPATIENT
Start: 2019-11-04 | End: 2019-11-04

## 2019-11-04 RX ORDER — ACETAMINOPHEN 325 MG/1
650 TABLET ORAL EVERY 6 HOURS PRN
Status: DISCONTINUED | OUTPATIENT
Start: 2019-11-04 | End: 2019-11-07 | Stop reason: HOSPADM

## 2019-11-04 RX ORDER — ACETAMINOPHEN 325 MG/1
650 TABLET ORAL EVERY 8 HOURS PRN
Status: DISCONTINUED | OUTPATIENT
Start: 2019-11-04 | End: 2019-11-04

## 2019-11-04 RX ORDER — ACETAMINOPHEN 325 MG/1
650 TABLET ORAL EVERY 6 HOURS PRN
Status: DISCONTINUED | OUTPATIENT
Start: 2019-11-04 | End: 2019-11-04

## 2019-11-04 RX ADMIN — ASPIRIN 81 MG 324 MG: 81 TABLET ORAL at 00:50

## 2019-11-04 RX ADMIN — MAGNESIUM SULFATE HEPTAHYDRATE 2 G: 40 INJECTION, SOLUTION INTRAVENOUS at 02:22

## 2019-11-04 RX ADMIN — CARVEDILOL 3.12 MG: 3.12 TABLET, FILM COATED ORAL at 02:21

## 2019-11-04 RX ADMIN — CARVEDILOL 3.12 MG: 3.12 TABLET, FILM COATED ORAL at 08:11

## 2019-11-04 RX ADMIN — ATORVASTATIN CALCIUM 80 MG: 80 TABLET, FILM COATED ORAL at 16:20

## 2019-11-04 RX ADMIN — CARVEDILOL 3.12 MG: 3.12 TABLET, FILM COATED ORAL at 09:41

## 2019-11-04 RX ADMIN — ACETAMINOPHEN 650 MG: 325 TABLET ORAL at 23:37

## 2019-11-04 RX ADMIN — SODIUM CHLORIDE 8 MG/HR: 9 INJECTION, SOLUTION INTRAVENOUS at 21:09

## 2019-11-04 RX ADMIN — ASPIRIN 81 MG: 81 TABLET, COATED ORAL at 08:11

## 2019-11-04 RX ADMIN — ACETAMINOPHEN 650 MG: 325 TABLET ORAL at 18:00

## 2019-11-04 RX ADMIN — CARVEDILOL 6.25 MG: 6.25 TABLET, FILM COATED ORAL at 16:20

## 2019-11-04 RX ADMIN — AMLODIPINE BESYLATE 5 MG: 5 TABLET ORAL at 12:36

## 2019-11-04 RX ADMIN — SODIUM CHLORIDE 8 MG/HR: 9 INJECTION, SOLUTION INTRAVENOUS at 02:14

## 2019-11-04 RX ADMIN — SODIUM CHLORIDE 8 MG/HR: 9 INJECTION, SOLUTION INTRAVENOUS at 10:59

## 2019-11-04 RX ADMIN — LIDOCAINE 1 PATCH: 50 PATCH TOPICAL at 23:38

## 2019-11-04 NOTE — CONSULTS
Consult Service:  Gastroenterology      PATIENT INFORMATION      Nestor Huerta 52 y o  male MRN: [id number]  Unit/Bed#: [facility] 859-75 Encounter: [id number]  PCP: Vivian Galicia DO  Date of Admission:  11/3/2019  Date of Consultation: 11/04/19  Requesting Physician: Reji Lopez MD       ASSESSMENTS & PLAN   Nestor Huerta is a 52 y o  male with a past medical history of low testosterone, hypertension who transferred from [place] on 11/03/2019 for cardiac catheterization evaluation after initially presenting on 11/02/2019 with pre syncope symptoms and chest pain  Gastroenterology is consulted for melena prior to onset symptoms  1   Melena with acute blood loss normocytic anemia  Suspect upper GI bleed in the setting of one-week history of NSAID use  Patient reports 2 episodes of black, tarry stool on 11/02/2019  Since this time, he has not had a single bowel movement  He is currently hemodynamically stable without any symptoms aside from musculoskeletal pain  LESTER shows liquid melanotic stool  No abdominal tenderness or pain  Not on anticoagulation  No history of EGD/colonoscopy  No previous abdominal surgeries  His hemoglobin at [place] was initially 9 and recieved 2 units of PRBCs but still arrived to [facility] with Hb 7 4  He is currently receiving 1 unit of PRBCs  Previous hemoglobin was 15 6 on 08/25/2019  · Protonix drip  · Plan for EGD tomorrow  Can start clear liquid diet from a GI standpoint  NPO at midnight  · H&H Q 8 hours, transfuse for hemoglobin greater than 9  · Check INR tomorrow AM  · Will need outpt colonoscopy given no hx of previous colonosocopy    2  NSTEMI  Cardiology following  They believe this is suspicious for a type 2 NSTEMI 2/2 GIB  No ischemic changes on EKG  Strong temporal relationship b/w ABLA and chest pain    · Plan for cardiac catheterization after GI evaluation  · On nitro drip but not on heparin drip  · Cont ASA           REASON FOR CONSULTATION Melena      HISTORY OF PRESENT ILLNESS      Geoffrey Shoemaker is a 52 y o  male with a past medical history of low testosterone, hypertension who transferred from [place] on 11/03/2019 for cardiac catheterization evaluation after initially presenting on 11/02/2019 with pre syncope symptoms and chest pain  Gastroenterology is consulted for melena prior to onset symptoms  Patient states early in the a m  Of 11/02/2019 he had 2 episodes of black tarry stools occurring at 1:00 a m  And 3:00 a m  with associated mild lower abdominal discomfort with defecation  For the week prior, patient was taking Naprosyn 4 times per day for musculoskeletal pain  He had no other symptoms at that time until approximately noon when he was walking he experienced pre-syncope type episode with initial lightheadedness, dizziness, diaphoresis and minimal responsiveness  He went to the ED for evaluation and started experiencing substernal chest pain with rapidly rising troponins so was transferred to [facility] for cardiac catheterization evaluation  He has never had a colonoscopy or EGD in his life  He denies any smoking, alcohol history  He is not on any anticoagulation  Vital signs during this admission relatively unremarkable with heart rate between 60s to 85F and systolic blood pressure between 150-200  His hemoglobin at OS was initially 9 and dropped to 8 4  He was then given 2 units of PRBCs as next hemoglobin was approximately 8 3 and then 7 4  He is currently receiving 1 unit of PRBCs  Baseline hemoglobin was 15 6 on 08/25/2019  CMP was relatively unremarkable besides AST of 120  BUN was 21 and creatinine is 1 02   Currently patient is relatively asymptomatic and hemodynamically stable  REVIEW OF SYSTEMS     A thorough 12-point review of systems has been conducted  Pertinent positives and negatives are mentioned in the history of present illness         PAST MEDICAL & SURGICAL HISTORY      Past Medical History:   Diagnosis Date    Hypertension     stopped his meds when ran out several years ago       No past surgical history on file  MEDICATIONS & ALLERGIES       Medications:   Prior to Admission medications    Medication Sig Start Date End Date Taking? Authorizing Provider   lisinopril-hydrochlorothiazide (PRINZIDE,ZESTORETIC) 20-12 5 MG per tablet Take 1 tablet by mouth daily    Historical Provider, MD   naproxen (NAPROSYN) 500 mg tablet Take 1 tablet (500 mg total) by mouth 2 (two) times a day with meals 73/65/48   Lynne Franco MD   Testosterone (ANDROGEL) 40 5 MG/2 5GM (1 62%) GEL Place 40 5 mg on the skin daily     Historical Provider, MD   traMADol Perry Moat) 50 mg tablet 1-2 po q 6 hours prn pain 80/74/91   Lynne Franco MD       Allergies: No Known Allergies      SOCIAL HISTORY      Marital Status: Single    Substance Use History:   Social History     Substance and Sexual Activity   Alcohol Use Never    Alcohol/week: 0 0 standard drinks    Frequency: Never    Drinks per session: 1 or 2    Binge frequency: Never     Social History     Tobacco Use   Smoking Status Never Smoker     Social History     Substance and Sexual Activity   Drug Use No         FAMILY HISTORY      Non-Contributory      PHYSICAL EXAM     Vitals:   Blood Pressure: 161/84 (11/04/19 1100)  Pulse: 75 (11/04/19 1100)  Temperature: 98 4 °F (36 9 °C) (11/04/19 1100)  Temp Source: Oral (11/04/19 1100)  Respirations: 18 (11/04/19 1100)  Height: 5' 11" (180 3 cm) (11/03/19 2310)  Weight - Scale: 123 kg (271 lb 2 7 oz) (11/04/19 0600)  SpO2: 97 % (11/04/19 1100)    Physical Exam:   GENERAL: NAD  HEENT:  NC/AT  CARDIAC:  RRR, +S1/S2, no S3/S4 heard, no m/g/r  PULMONARY:  CTA B/L, no wheezing/rales/rhonci, non-labored breathing  ABDOMEN:  Soft, NT/ND, +BS, no rebound/guarding/rigidity  Extremities:  2+ Pulses in DP/PT  No edema, cyanosis, or clubbing  NEUROLOGIC:  Alert/oriented x3   No motor or sensory deficits  SKIN:  No rashes or erythema          ADDITIONAL DATA     Lab Results:     Results from last 7 days   Lab Units 11/04/19  0556   WBC Thousand/uL 24 35*   HEMOGLOBIN g/dL 7 4*   HEMATOCRIT % 22 7*   PLATELETS Thousands/uL 244   NEUTROS PCT % 77*   LYMPHS PCT % 10*   MONOS PCT % 7   EOS PCT % 0     Results from last 7 days   Lab Units 11/04/19  0008   POTASSIUM mmol/L 4 6   CHLORIDE mmol/L 108   CO2 mmol/L 24   BUN mg/dL 21   CREATININE mg/dL 1 02   CALCIUM mg/dL 8 6   ALK PHOS U/L 104   ALT U/L 37   AST U/L 120*           Imaging:    Xr Ankle 3+ Vw Left    Result Date: 10/28/2019  Narrative: LEFT ANKLE INDICATION:   injury, roller skating, pain, swelling  COMPARISON:  None VIEWS:  XR ANKLE 3+ VW LEFT Images: 3 FINDINGS: There is no acute fracture or dislocation  There is a tiny inferior calcaneal spur present  No lytic or blastic lesions seen  Soft tissue swelling is present  Scattered soft tissue calcifications are present  Impression: No acute osseous abnormality  Soft tissue swelling  Workstation performed: [id number]     Xr Foot 3+ Vw Left    Result Date: 10/28/2019  Narrative: LEFT FOOT INDICATION:   pain, swelling, roller skating  COMPARISON:  None VIEWS:  XR FOOT 3+ VW LEFT Images: 3 FINDINGS: There is no acute fracture or dislocation  No significant degenerative changes  Hammertoe deformity in the 4th toe  No lytic or blastic lesions seen  Soft tissues are unremarkable  Impression: No acute osseous abnormality   Workstation performed: [id number]     Ct Abdomen W Wo Contrast    Result Date: 11/4/2019  Narrative: 2 6 29 [id number] 30 0 [id number]    Xr Outside Images    Result Date: 11/4/2019  Narrative: 1 2 840 [id number] 98 9 2115 8288 [id number] 26 [id number]    Xr Outside Images    Result Date: 11/4/2019  Narrative: 1 2 840 [id number] 98 9 2115 8288 [id number] 26 [id number]    Ct Outside Images    Result Date: 11/4/2019  Narrative: 1  5 40 [id number] 58 8 [id number]      EKG, Pathology, and Other Studies Reviewed on Admission:   · EKG: Reviewed      Counseling / Coordination of Care Time: 30 total mins spent n consult  Greater than 50% of total time spent on patient counseling and coordination of care  MAKAYLA Ferreira  Internal Medicine PGY-3  11/4/2019 11:21 AM         ** Please Note: This note is constructed using a voice recognition dictation system   **

## 2019-11-04 NOTE — OCCUPATIONAL THERAPY NOTE
Occupational Therapy Cancellation Note    Orders received and chart reviewed  OT to hold on initial eval - Pt currently planned for cardiac cath lab this day  Will continue to follow to be seen for OT evaluation as appropriate/when medically cleared           Clary Hernandez MS, OTR/L

## 2019-11-04 NOTE — PLAN OF CARE
Problem: Prexisting or High Potential for Compromised Skin Integrity  Goal: Skin integrity is maintained or improved  Description  INTERVENTIONS:  - Identify patients at risk for skin breakdown  - Assess and monitor skin integrity  - Assess and monitor nutrition and hydration status  - Monitor labs   - Assess for incontinence   - Turn and reposition patient  - Assist with mobility/ambulation  - Relieve pressure over bony prominences  - Avoid friction and shearing  - Provide appropriate hygiene as needed including keeping skin clean and dry  - Evaluate need for skin moisturizer/barrier cream  - Collaborate with interdisciplinary team   - Patient/family teaching  - Consider wound care consult   Outcome: Progressing     Problem: Potential for Falls  Goal: Patient will remain free of falls  Description  INTERVENTIONS:  - Assess patient frequently for physical needs  -  Identify cognitive and physical deficits and behaviors that affect risk of falls    -  [place] fall precautions as indicated by assessment   - Educate patient/family on patient safety including physical limitations  - Instruct patient to call for assistance with activity based on assessment  - Modify environment to reduce risk of injury  - Consider OT/PT consult to assist with strengthening/mobility  Outcome: Progressing     Problem: CARDIOVASCULAR - ADULT  Goal: Maintains optimal cardiac output and hemodynamic stability  Description  INTERVENTIONS:  - Monitor I/O, vital signs and rhythm  - Monitor for S/S and trends of decreased cardiac output  - Administer and titrate ordered vasoactive medications to optimize hemodynamic stability  - Assess quality of pulses, skin color and temperature  - Assess for signs of decreased coronary artery perfusion  - Instruct patient to report change in severity of symptoms  Outcome: Progressing  Goal: Absence of cardiac dysrhythmias or at baseline rhythm  Description  INTERVENTIONS:  - Continuous cardiac monitoring, vital signs, obtain 12 lead EKG if ordered  - Administer antiarrhythmic and heart rate control medications as ordered  - Monitor electrolytes and administer replacement therapy as ordered  Outcome: Progressing     Problem: PAIN - ADULT  Goal: Verbalizes/displays adequate comfort level or baseline comfort level  Description  Interventions:  - Encourage patient to monitor pain and request assistance  - Assess pain using appropriate pain scale  - Administer analgesics based on type and severity of pain and evaluate response  - Implement non-pharmacological measures as appropriate and evaluate response  - Consider cultural and social influences on pain and pain management  - Notify physician/advanced practitioner if interventions unsuccessful or patient reports new pain  Outcome: Progressing     Problem: Knowledge Deficit  Goal: Patient/family/caregiver demonstrates understanding of disease process, treatment plan, medications, and discharge instructions  Description  Complete learning assessment and assess knowledge base    Interventions:  - Provide teaching at level of understanding  - Provide teaching via preferred learning methods  Outcome: Progressing

## 2019-11-04 NOTE — H&P
Cardiology H&P  Suki Turpin 52 y o  male MRN: [id number]  Unit/Bed#: [facility] 515-01 Encounter: [id number]      Physician Requesting Consult: Naman Sosa MD  Reason for transfer: transferred from [facility] for cardiac evaluation    HPI  52 y o  male with PMH of HTN and low testosterone (on testosterone injection managed by outpatient urology) who was transferred from [facility] due to initial presentation with chest pain and troponin trending up  Patient initially had episode of ankle pain for which he went to ED last Monday  Was told no fractures  Then this past Saturday had episode of hematochezia twice at home  No lightheadedness, dizziness, palpitations, chest pain, history of recent travel outside the [place], and no OTC NSAID use  He was going to the bank being driven by his son when he walked to the car and felt sudden onset generalized weakness  Family describes what seems to be convulsive syncope with shaking, weakness, eyes rolling back with minimal responsiveness, but no LOC or loss of bowel or bladder control  Went to ED for evaluation where he experienced his first episode of substernal chest pain, stabbing in nature, lasting roughly 3 seconds, that would come and go every few seconds, with radiation up his jaw  This has never happened before  No known history of MI  Unknown FH given adopted  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  Notes that he has previously been told he has HTN and seems poorly controlled given he states   typically runs in the 170's on lisinopril/HCTZ  Non-smoker, not diabetic  Has LE edema chronic that he says has been worsening over past few years but is stable acutely  Patient also has severe lower back pain and shoulder pain  Paperwork from [facility] indicates Hb of 9 then to 8 4 and given 2 units pRBC and leukocytosis of 21 to 24 9   Prolactin of 20 5  CT head negative  UDS negative  Trop was 1 4 to 5 6  Now 14 tonight  PCP  Wally Middleton,     Prior Cardiac Imaging  - EKG (11/3/19): sinus rhythm, no ischemic changes noted  - EKG (8/25/19): sinus bradycardia, QW and TWI in lead III    Physical exam  Gen: resting comfortably  Psych:AOx3  Skin: intact  Cardiac: S1, S2, regular rate, no S3 or S4 appreciated  No murmurs  +2 PT, radial pulses  No peripheral edema No carotid bruits  Resp: CTABL  No crackles  MSK: 5/5 strength throughout muscle groups  Neuro: CN grossly intact  Sensory to light touch, pain, proprioception intact BL LE, UE  LN: no cervical LAD  Rheum: no joint deformities in UE or LE    A&P  52 y o  male with PMH of HTN and possible low testosterone? who was transferred from [facility] due to chest pain concerning for NSTEMI    1  Cardiac chest pain, likely NSTEMI  - chest pain free on NTg gtt  - f/u TTE, lipid panel, A1C  - aspirin 324mg chewed now  81mg daily, atorvastatin 80mg  - NPO for LHC  - heparin gtt on hold for now given acute GIB  - f/u CBC  Transfuse if <8 given setting of active myocardial ischemia  - beta blocker okay to start given not in cardiogenic shock  Still hypertensive on NTg  - will hold off on ACE for now given plan for LHC with contrast dye exposure  Gentle IVF pre-cath as doesn't appear in heart failure  - DVT ruled out at [facility] on 10/29/19  - SCD's for VTE ppx    2  UGIB  - last Hb at [facility] 7 9  Transfuse to Hb >8 given active ischemia  - s/p 2 units pRBC at [facility]  - PPI IV gtt  - needs EGD after cath  GI consult placed      Please await attending physician final attestation  Pedro Moya MD  - PGY-4 Cardiology Fellow  - Tiger text enabled    ----              Review of Systems  ROS as noted above, otherwise 12 point review of systems was performed and is negative  Historical Information   Past Medical History:   Diagnosis Date    Hypertension     stopped his meds when ran out several years ago     No past surgical history on file    Social History     Substance and Sexual Activity   Alcohol Use No     Social History     Substance and Sexual Activity   Drug Use No     Social History     Tobacco Use   Smoking Status Never Smoker     Family History: No family history on file  Meds/Allergies   Hospital Medications:   No current facility-administered medications for this encounter  Home Medications:   Medications Prior to Admission   Medication    lisinopril-hydrochlorothiazide (PRINZIDE,ZESTORETIC) 20-12 5 MG per tablet    naproxen (NAPROSYN) 500 mg tablet    Testosterone (ANDROGEL) 40 5 MG/2 5GM (1 62%) GEL    traMADol (ULTRAM) 50 mg tablet       No Known Allergies    Objective   Vitals: Blood pressure 139/57, pulse 64, temperature 98 3 °F (36 8 °C), temperature source Oral, resp  rate (!) 30, height 5' 11" (1 803 m), weight 112 kg (246 lb 4 1 oz), SpO2 98 %  Orthostatic Blood Pressures      Most Recent Value   Blood Pressure  139/57 filed at 11/03/2019 2350          No intake or output data in the 24 hours ending 11/04/19 0000    Invasive Devices     Peripheral Intravenous Line            Peripheral IV 11/02/19 Left Hand 1 day    Peripheral IV 11/03/19 Left Forearm less than 1 day                Physical Exam    Lab Results: I have personally reviewed pertinent lab results                Invalid input(s): LABGLOM

## 2019-11-04 NOTE — PROGRESS NOTES
Spoke to [employer] from cardiology about level of care status to MST and diet  Awaiting new orders   Nitro gtt is on hold since 1300, sbp's 130-150's

## 2019-11-04 NOTE — CONSULTS
Yue 52 y o  male MRN: [id number]  Unit/Bed#: [facility] 356-15 Encounter: [id number]      -------------------------------------------------------------------------------------------------------------  Chief Complaint: Left shoulder pain    History of Present Illness   Johnathan Hernandez is a 52 y o  male who presents as a transfer from [facility] for cardiac catheterization  He was brought in to [facility] about 3 days ago after having 2 "black and tarry" bowel movements and a subsequent near syncopal episode associated with shaking, weakness, and minimal responsiveness  He was taken by ambulance to [place], where colonoscopy was planned for today  Patient describes having some chest discomfort yesterday, and subsequently troponin was checked and noted to be elevated  He does state that he has been having some left shoulder/back discomfort for several days  Currently, his only complaint is left shoulder discomfort  He denies shortness of breath, chest pain, abdominal pain, changes in vision, or headache   His last melanotic stool was 3am Saturday at home but he has not had a bowel movement since being admitted to the hospital       History obtained from chart review and the patient   -------------------------------------------------------------------------------------------------------------  Assessment and Plan:    Neuro:    Diagnosis: N/A   Plan:   o Pain control with tylenol prn  o Daily CAM-ICU, delirium precautions      CV:    Diagnosis: NSTEMI, Hypertension   Plan:   o Management per cardiology   o ASA, beta blocker, statin  o Check echo  o Needs cardiac catheterization  o Nitro gtt      Pulm:   Diagnosis: N/A   Plan:   o Encourage incentive spirometry, pulmonary toilet      GI:    Diagnosis: GI bleed, suspect upper   Plan:   o GI consult given need for cardiac catheterization  o Protonix - would dose BID   o Bowel regimen      :    Diagnosis: N/A   Plan: o Monitor I/Os      F/E/N:    Plan:   o Replete electrolytes as needed for goal Mag >2 0, Phos >3 0, K >4 0  o NPO for cath      Heme/Onc:    Diagnosis: ABLA   Plan:   o Check hgb, trend Q6 hours      Endo:    Diagnosis: N/A   Plan:   o Goal -180  o Check Hgb A1C      ID:    Diagnosis: N/A   Plan:   o Trend fever curve/white count      MSK/Skin:    Diagnosis: Back pain, shoulder pain   Plan:   o Suspect cardiac etiology  o Tylenol for pain  o Can offer lidoderm patch if persistent pain    Disposition: Continue Critical Care   Code Status: Level 1 - Full Code  --------------------------------------------------------------------------------------------------------------  Review of Systems   Constitutional: Positive for appetite change and fatigue  Negative for chills, diaphoresis and fever  HENT: Negative for congestion, nosebleeds, postnasal drip, rhinorrhea, sinus pressure, sinus pain, sneezing and sore throat  Eyes: Negative for pain, discharge, redness and itching  Respiratory: Negative for cough, choking, chest tightness, shortness of breath, wheezing and stridor  Cardiovascular: Negative for chest pain, palpitations and leg swelling  Gastrointestinal: Positive for blood in stool  Negative for abdominal distention, abdominal pain, constipation, diarrhea, nausea and vomiting  Genitourinary: Negative for dysuria, enuresis, flank pain and frequency  Musculoskeletal: Positive for back pain and myalgias  Negative for neck pain and neck stiffness  Skin: Negative for color change, pallor, rash and wound  Neurological: Negative for dizziness, tremors, speech difficulty, weakness, light-headedness, numbness and headaches  Physical Exam   Constitutional: He is oriented to person, place, and time  He appears well-developed and well-nourished  No distress  HENT:   Head: Normocephalic and atraumatic  Eyes: Pupils are equal, round, and reactive to light  Neck: Neck supple  Cardiovascular: Normal rate, regular rhythm, normal heart sounds and intact distal pulses  Exam reveals no gallop and no friction rub  No murmur heard  Pulmonary/Chest: Effort normal  No respiratory distress  He has wheezes (slight inspiratory wheeze in RUL)  Abdominal: Soft  He exhibits no distension  There is no tenderness  Musculoskeletal: Normal range of motion  Neurological: He is alert and oriented to person, place, and time  Skin: Skin is warm and dry  He is not diaphoretic  Psychiatric: He has a normal mood and affect      --------------------------------------------------------------------------------------------------------------  Historical Information   Past Medical History:   Diagnosis Date    Hypertension     stopped his meds when ran out several years ago     No past surgical history on file  Social History   Social History     Substance and Sexual Activity   Alcohol Use Never    Alcohol/week: 0 0 standard drinks    Frequency: Never    Drinks per session: 1 or 2    Binge frequency: Never     Social History     Substance and Sexual Activity   Drug Use No     Social History     Tobacco Use   Smoking Status Never Smoker     Family History: I have reviewed this patient's family history and commented on sigificant items within the HPI    Vitals:   Vitals:    11/03/19 2330 11/03/19 2340 11/03/19 2350 11/04/19 0000   BP: 118/57 140/63 139/57 148/63   Pulse: 62 64 64 66   Resp: (!) 23 (!) 37 (!) 30 (!) 31   Temp:       TempSrc:       SpO2: 99% 99% 98% 98%   Weight:       Height:         Temp  Min: 98 3 °F (36 8 °C)  Max: 98 3 °F (36 8 °C)  IBW: 75 3 kg  Height: 5' 11" (180 3 cm)  Body mass index is 34 35 kg/m²      Medications:  Current Facility-Administered Medications   Medication Dose Route Frequency    acetaminophen (TYLENOL) tablet 650 mg  650 mg Oral Q8H PRN    aspirin (ECOTRIN LOW STRENGTH) EC tablet 81 mg  81 mg Oral Daily    aspirin chewable tablet 324 mg  324 mg Oral Once    atorvastatin (LIPITOR) tablet 80 mg  80 mg Oral Daily With Dinner     Home medications:  Prior to Admission Medications   Prescriptions Last Dose Informant Patient Reported? Taking? Testosterone (ANDROGEL) 40 5 MG/2 5GM (1 62%) GEL   Yes No   Sig: Place 40 5 mg on the skin daily    lisinopril-hydrochlorothiazide (PRINZIDE,ZESTORETIC) 20-12 5 MG per tablet   Yes No   Sig: Take 1 tablet by mouth daily   naproxen (NAPROSYN) 500 mg tablet   No No   Sig: Take 1 tablet (500 mg total) by mouth 2 (two) times a day with meals   traMADol (ULTRAM) 50 mg tablet   No No   Si-2 po q 6 hours prn pain      Facility-Administered Medications: None     Allergies:  No Known Allergies      Laboratory and Diagnostics:        Invalid input(s):  EOSPCT                Results from last 7 days   Lab Units 19  0008   TROPONIN I ng/mL 19 50*       ------------------------------------------------------------------------------------------------------------  Advance Directive and Living Will:      Power of :    POLST:    ------------------------------------------------------------------------------------------------------------  Anticipated Length of Stay is > 2 midnights    Counseling / Coordination of Care  Total Critical Care time spent 0 minutes excluding procedures, teaching and family updates          Matt Kuo PA-C

## 2019-11-04 NOTE — PHYSICAL THERAPY NOTE
Pt chart reviewed  PT orders received  Will hold PT due to cardiac cath planned for today  Will continue to follow    Wilfred Barnard, Pt, DPT

## 2019-11-04 NOTE — PROGRESS NOTES
General Cardiology   Progress Note -  [facility] [employer]   Jennie Robledo 52 y o  male MRN: [id number]    Unit/Bed#: [facility] 560-14 Encounter: [id number]    Assessment/ Plan  1  Elevated troponin; represents a type II MI in the setting of GIB/anemia, w/likely underlying CAD  -Currently w/o c/o chest pain  -Troponin elevation: 19 50-21 1-22 1; proBNP 544  -No ischemic changes on 12 Lead ECG (11/3-11/4)  -On statin, and BB    2  GIB, likely upper  -pt report of (x2 dark tarry BM's on 11/2) and near syncopal episodes  -GI consulted; currently NPO  -hgb (8 3---now 7 4)  -On IV Protonix infusion    3  HTN  -avg 156/70, last recorded at 151/64, HR 68  -on coreg 3 125 mg BID    Plan  -NPO, for GI workup (needs EGD)  -Eventual ischemic workup w/cardiac cath once cleared by GI   -Pt to receive 1 unit of PRBC today; closely monitor hgb  -Hold aspirin  -Continue statin and BB----uptitrate coreg to 6 25 mg BID, add Norvasc 5 mg daily  -Wean IV nitroglycerin drip to off  -Follow-up 2D echo  -Continue to monitor volume status, strict I&O's  -Continue to monitor renal function and electrolytes  -Continue to monitor on telemetry      Subjective  Review of Systems   Constitution: Negative for chills, fever and malaise/fatigue  HENT: Negative for congestion  Eyes: Negative for visual disturbance  Cardiovascular: Positive for leg swelling  Negative for chest pain, cyanosis, dyspnea on exertion, irregular heartbeat, near-syncope, orthopnea, palpitations and syncope  Respiratory: Negative for cough and shortness of breath  Musculoskeletal: Positive for back pain, joint pain and joint swelling  Negative for arthritis  Left ankle   Gastrointestinal: Negative for abdominal pain, constipation, diarrhea, nausea and vomiting  Genitourinary: Negative for dysuria  Neurological: Negative for dizziness, focal weakness, headaches, light-headedness and weakness  All other systems reviewed and are negative        Objective:   Vitals: Blood pressure 151/64, pulse 68, temperature 98 7 °F (37 1 °C), temperature source Oral, resp  rate 19, height 5' 11" (1 803 m), weight 123 kg (271 lb 2 7 oz), SpO2 97 %  ,       Body mass index is 37 82 kg/m²  ,     Systolic (77QJA), YCX:477 , Min:118 , ZEK:536     Diastolic (64IRN), AVZ:44, Min:54, Max:99      Intake/Output Summary (Last 24 hours) at 11/4/2019 0844  Last data filed at 11/4/2019 0801  Gross per 24 hour   Intake 259 63 ml   Output 890 ml   Net -630 37 ml     Weight (last 2 days)     Date/Time   Weight    11/04/19 0600   123 (271 17)    11/03/19 2310   112 (246 25)            Telemetry Review: No significant arrhythmias seen on telemetry review    EKG personally reviewed by MAX Snell    Physical Exam   Constitutional: He is oriented to person, place, and time  He appears well-developed and well-nourished  No distress  HENT:   Head: Normocephalic and atraumatic  Mouth/Throat: Oropharynx is clear and moist    Eyes: No scleral icterus  Neck: No JVD present  Cardiovascular: Normal rate, regular rhythm, normal heart sounds and intact distal pulses  No murmur heard  Pulmonary/Chest: Effort normal and breath sounds normal    Abdominal: Soft  Bowel sounds are normal  There is no tenderness  Musculoskeletal: He exhibits edema  Left ankle/foot   Neurological: He is alert and oriented to person, place, and time  Skin: Skin is warm and dry  Capillary refill takes less than 2 seconds  He is not diaphoretic  Psychiatric: He has a normal mood and affect         LABORATORY RESULTS  Results from last 7 days   Lab Units 11/04/19  0556 11/04/19  0317 11/04/19  0008   TROPONIN I ng/mL 22 10* 21 10* 19 50*     CBC with diff: Results from last 7 days   Lab Units 11/04/19  0556 11/04/19  0011   WBC Thousand/uL 24 35* 27 30*   HEMOGLOBIN g/dL 7 4* 8 3*   HEMATOCRIT % 22 7* 24 9*   MCV fL 92 90   PLATELETS Thousands/uL 244 296   MCH pg 30 0 30 1   MCHC g/dL 32 6 33 3   RDW % 15 2* 15 2*   MPV fL 9 8 11 3   NRBC AUTO /100 WBCs 0 0   NRBC /100 WBC  --  1       CMP:  Results from last 7 days   Lab Units 11/04/19  0008   POTASSIUM mmol/L 4 6   CHLORIDE mmol/L 108   CO2 mmol/L 24   BUN mg/dL 21   CREATININE mg/dL 1 02   CALCIUM mg/dL 8 6   AST U/L 120*   ALT U/L 37   ALK PHOS U/L 104   EGFR ml/min/1 73sq m 95       BMP:  Results from last 7 days   Lab Units 11/04/19  0008   POTASSIUM mmol/L 4 6   CHLORIDE mmol/L 108   CO2 mmol/L 24   BUN mg/dL 21   CREATININE mg/dL 1 02   CALCIUM mg/dL 8 6       Lab Results   Component Value Date    NTBNP 544 (H) 11/04/2019             Results from last 7 days   Lab Units 11/04/19  0008   MAGNESIUM mg/dL 1 8          Results from last 7 days   Lab Units 11/04/19  0011   HEMOGLOBIN A1C % 5 5                    Lipid Profile:   No results found for: CHOL  Lab Results   Component Value Date    HDL 10 (L) 11/04/2019     Lab Results   Component Value Date    LDLCALC 69 11/04/2019     Lab Results   Component Value Date    TRIG 139 11/04/2019       Cardiac testing:   No results found for this or any previous visit  No results found for this or any previous visit  No results found for this or any previous visit  No procedure found  No results found for this or any previous visit        Meds/Allergies   all current active meds have been reviewed, current meds:   Current Facility-Administered Medications   Medication Dose Route Frequency    acetaminophen (TYLENOL) tablet 650 mg  650 mg Oral Q8H PRN    aspirin (ECOTRIN LOW STRENGTH) EC tablet 81 mg  81 mg Oral Daily    atorvastatin (LIPITOR) tablet 80 mg  80 mg Oral Daily With Dinner    carvedilol (COREG) tablet 3 125 mg  3 125 mg Oral BID With Meals    nitroGLYcerin (TRIDIL) 50 mg in 250 mL infusion (premix)  5-200 mcg/min Intravenous Titrated    pantoprazole (PROTONIX) 80 mg in sodium chloride 0 9 % 100 mL infusion  8 mg/hr Intravenous Continuous    and PTA meds:   Prior to Admission Medications   Prescriptions Last Dose Informant Patient Reported? Taking? Testosterone (ANDROGEL) 40 5 MG/2 5GM (1 62%) GEL   Yes No   Sig: Place 40 5 mg on the skin daily    lisinopril-hydrochlorothiazide (PRINZIDE,ZESTORETIC) 20-12 5 MG per tablet   Yes No   Sig: Take 1 tablet by mouth daily   naproxen (NAPROSYN) 500 mg tablet   No No   Sig: Take 1 tablet (500 mg total) by mouth 2 (two) times a day with meals   traMADol (ULTRAM) 50 mg tablet   No No   Si-2 po q 6 hours prn pain      Facility-Administered Medications: None     Medications Prior to Admission   Medication    lisinopril-hydrochlorothiazide (PRINZIDE,ZESTORETIC) 20-12 5 MG per tablet    naproxen (NAPROSYN) 500 mg tablet    Testosterone (ANDROGEL) 40 5 MG/2 5GM (1 62%) GEL    traMADol (ULTRAM) 50 mg tablet         nitroGLYcerin 5-200 mcg/min Last Rate: 60 mcg/min (19 0100)   pantoprozole (PROTONIX) infusion (Continuous) 8 mg/hr Last Rate: 8 mg/hr (19 0214)     Assessment:  Principal Problem:    Elevated troponin  Active Problems:    GI bleed    Counseling / Coordination of Care  Total floor / unit time spent today 20 minutes  Greater than 50% of total time was spent with the patient and / or family counseling and / or coordination of care  ** Please Note: Dragon 360 Dictation voice to text software may have been used in the creation of this document   **

## 2019-11-04 NOTE — PROGRESS NOTES
Paged cardiologyx2 to give update on HGB as well as ask for a diet , GI note states ok with them for clear liquid NPO at midnight for EGD tomorrow  Spoke to [employer] from [facility] she put cardiac clear liquid diet in at this time

## 2019-11-05 ENCOUNTER — APPOINTMENT (INPATIENT)
Dept: GASTROENTEROLOGY | Facility: HOSPITAL | Age: 54
DRG: 377 | End: 2019-11-05
Payer: COMMERCIAL

## 2019-11-05 ENCOUNTER — ANESTHESIA (INPATIENT)
Dept: GASTROENTEROLOGY | Facility: HOSPITAL | Age: 54
DRG: 377 | End: 2019-11-05
Payer: COMMERCIAL

## 2019-11-05 ENCOUNTER — ANESTHESIA EVENT (INPATIENT)
Dept: GASTROENTEROLOGY | Facility: HOSPITAL | Age: 54
DRG: 377 | End: 2019-11-05
Payer: COMMERCIAL

## 2019-11-05 LAB
ABO GROUP BLD BPU: NORMAL
ABO GROUP BLD BPU: NORMAL
AMPHETAMINES UR QL SCN: NEGATIVE NG/ML
ANION GAP SERPL CALCULATED.3IONS-SCNC: 7 MMOL/L (ref 4–13)
APTT PPP: 38 SECONDS (ref 23–37)
BACTERIA UR QL AUTO: ABNORMAL /HPF
BARBITURATES UR QL SCN: NEGATIVE NG/ML
BASOPHILS # BLD MANUAL: 0 THOUSAND/UL (ref 0–0.1)
BASOPHILS NFR MAR MANUAL: 0 % (ref 0–1)
BENZODIAZ UR QL: NEGATIVE NG/ML
BILIRUB UR QL STRIP: NEGATIVE
BPU ID: NORMAL
BPU ID: NORMAL
BUN SERPL-MCNC: 15 MG/DL (ref 5–25)
BZE UR QL: NEGATIVE NG/ML
CALCIUM SERPL-MCNC: 9.1 MG/DL (ref 8.3–10.1)
CANNABINOIDS UR QL SCN: NEGATIVE NG/ML
CHLORIDE SERPL-SCNC: 104 MMOL/L (ref 100–108)
CLARITY UR: CLEAR
CO2 SERPL-SCNC: 24 MMOL/L (ref 21–32)
COLOR UR: ABNORMAL
CREAT SERPL-MCNC: 0.96 MG/DL (ref 0.6–1.3)
CROSSMATCH: NORMAL
CROSSMATCH: NORMAL
EOSINOPHIL # BLD MANUAL: 0.21 THOUSAND/UL (ref 0–0.4)
EOSINOPHIL NFR BLD MANUAL: 1 % (ref 0–6)
ERYTHROCYTE [DISTWIDTH] IN BLOOD BY AUTOMATED COUNT: 15.1 % (ref 11.6–15.1)
GFR SERPL CREATININE-BSD FRML MDRD: 102 ML/MIN/1.73SQ M
GLUCOSE SERPL-MCNC: 127 MG/DL (ref 65–140)
GLUCOSE UR STRIP-MCNC: NEGATIVE MG/DL
HCT VFR BLD AUTO: 28.7 % (ref 36.5–49.3)
HGB BLD-MCNC: 9.1 G/DL (ref 12–17)
HGB BLD-MCNC: 9.3 G/DL (ref 12–17)
HGB BLD-MCNC: 9.8 G/DL (ref 12–17)
HGB UR QL STRIP.AUTO: ABNORMAL
HYALINE CASTS #/AREA URNS LPF: ABNORMAL /LPF
INR PPP: 1.25 (ref 0.84–1.19)
KETONES UR STRIP-MCNC: NEGATIVE MG/DL
LEUKOCYTE ESTERASE UR QL STRIP: NEGATIVE
LYMPHOCYTES # BLD AUTO: 1.68 THOUSAND/UL (ref 0.6–4.47)
LYMPHOCYTES # BLD AUTO: 8 % (ref 14–44)
MAGNESIUM SERPL-MCNC: 1.9 MG/DL (ref 1.6–2.6)
MCH RBC QN AUTO: 29.9 PG (ref 26.8–34.3)
MCHC RBC AUTO-ENTMCNC: 32.4 G/DL (ref 31.4–37.4)
MCV RBC AUTO: 92 FL (ref 82–98)
METAMYELOCYTES NFR BLD MANUAL: 1 % (ref 0–1)
METHADONE UR QL SCN: NEGATIVE NG/ML
MONOCYTES # BLD AUTO: 0.84 THOUSAND/UL (ref 0–1.22)
MONOCYTES NFR BLD: 4 % (ref 4–12)
MYELOCYTES NFR BLD MANUAL: 3 % (ref 0–1)
NEUTROPHILS # BLD MANUAL: 17.26 THOUSAND/UL (ref 1.85–7.62)
NEUTS SEG NFR BLD AUTO: 82 % (ref 43–75)
NITRITE UR QL STRIP: NEGATIVE
NON-SQ EPI CELLS URNS QL MICRO: ABNORMAL /HPF
NRBC BLD AUTO-RTO: 0 /100 WBCS
OPIATES UR QL: NEGATIVE NG/ML
PCP UR QL: NEGATIVE NG/ML
PH UR STRIP.AUTO: 6 [PH]
PLATELET # BLD AUTO: 304 THOUSANDS/UL (ref 149–390)
PLATELET BLD QL SMEAR: ADEQUATE
PMV BLD AUTO: 9.9 FL (ref 8.9–12.7)
POLYCHROMASIA BLD QL SMEAR: PRESENT
POTASSIUM SERPL-SCNC: 4.2 MMOL/L (ref 3.5–5.3)
PROPOXYPH UR QL SCN: NEGATIVE NG/ML
PROT UR STRIP-MCNC: NEGATIVE MG/DL
PROTHROMBIN TIME: 15.3 SECONDS (ref 11.6–14.5)
RBC # BLD AUTO: 3.11 MILLION/UL (ref 3.88–5.62)
RBC #/AREA URNS AUTO: ABNORMAL /HPF
RBC MORPH BLD: PRESENT
SODIUM SERPL-SCNC: 135 MMOL/L (ref 136–145)
SP GR UR STRIP.AUTO: 1.02 (ref 1–1.03)
UNIT DISPENSE STATUS: NORMAL
UNIT DISPENSE STATUS: NORMAL
UNIT PRODUCT CODE: NORMAL
UNIT PRODUCT CODE: NORMAL
UNIT RH: NORMAL
UNIT RH: NORMAL
UROBILINOGEN UR QL STRIP.AUTO: 1 E.U./DL
VARIANT LYMPHS # BLD AUTO: 1 %
WBC # BLD AUTO: 21.05 THOUSAND/UL (ref 4.31–10.16)
WBC #/AREA URNS AUTO: ABNORMAL /HPF

## 2019-11-05 PROCEDURE — 81001 URINALYSIS AUTO W/SCOPE: CPT | Performed by: STUDENT IN AN ORGANIZED HEALTH CARE EDUCATION/TRAINING PROGRAM

## 2019-11-05 PROCEDURE — B2111ZZ FLUOROSCOPY OF MULTIPLE CORONARY ARTERIES USING LOW OSMOLAR CONTRAST: ICD-10-PCS | Performed by: INTERNAL MEDICINE

## 2019-11-05 PROCEDURE — 85610 PROTHROMBIN TIME: CPT | Performed by: INTERNAL MEDICINE

## 2019-11-05 PROCEDURE — 80048 BASIC METABOLIC PNL TOTAL CA: CPT | Performed by: NURSE PRACTITIONER

## 2019-11-05 PROCEDURE — 88341 IMHCHEM/IMCYTCHM EA ADD ANTB: CPT | Performed by: PATHOLOGY

## 2019-11-05 PROCEDURE — 88305 TISSUE EXAM BY PATHOLOGIST: CPT | Performed by: PATHOLOGY

## 2019-11-05 PROCEDURE — 85007 BL SMEAR W/DIFF WBC COUNT: CPT | Performed by: NURSE PRACTITIONER

## 2019-11-05 PROCEDURE — 85027 COMPLETE CBC AUTOMATED: CPT | Performed by: NURSE PRACTITIONER

## 2019-11-05 PROCEDURE — 88342 IMHCHEM/IMCYTCHM 1ST ANTB: CPT | Performed by: PATHOLOGY

## 2019-11-05 PROCEDURE — 85730 THROMBOPLASTIN TIME PARTIAL: CPT | Performed by: INTERNAL MEDICINE

## 2019-11-05 PROCEDURE — 83735 ASSAY OF MAGNESIUM: CPT | Performed by: NURSE PRACTITIONER

## 2019-11-05 PROCEDURE — 93005 ELECTROCARDIOGRAM TRACING: CPT

## 2019-11-05 PROCEDURE — C9113 INJ PANTOPRAZOLE SODIUM, VIA: HCPCS | Performed by: NURSE PRACTITIONER

## 2019-11-05 PROCEDURE — 85018 HEMOGLOBIN: CPT | Performed by: NURSE PRACTITIONER

## 2019-11-05 PROCEDURE — NC001 PR NO CHARGE: Performed by: INTERNAL MEDICINE

## 2019-11-05 PROCEDURE — 43239 EGD BIOPSY SINGLE/MULTIPLE: CPT | Performed by: INTERNAL MEDICINE

## 2019-11-05 PROCEDURE — 99232 SBSQ HOSP IP/OBS MODERATE 35: CPT | Performed by: INTERNAL MEDICINE

## 2019-11-05 RX ORDER — SODIUM CHLORIDE 9 MG/ML
INJECTION, SOLUTION INTRAVENOUS CONTINUOUS PRN
Status: DISCONTINUED | OUTPATIENT
Start: 2019-11-05 | End: 2019-11-05 | Stop reason: SURG

## 2019-11-05 RX ORDER — KETAMINE HYDROCHLORIDE 50 MG/ML
INJECTION, SOLUTION, CONCENTRATE INTRAMUSCULAR; INTRAVENOUS AS NEEDED
Status: DISCONTINUED | OUTPATIENT
Start: 2019-11-05 | End: 2019-11-05 | Stop reason: SURG

## 2019-11-05 RX ORDER — PROPOFOL 10 MG/ML
INJECTION, EMULSION INTRAVENOUS CONTINUOUS PRN
Status: DISCONTINUED | OUTPATIENT
Start: 2019-11-05 | End: 2019-11-05 | Stop reason: SURG

## 2019-11-05 RX ORDER — NITROGLYCERIN 0.4 MG/1
0.4 TABLET SUBLINGUAL
Status: DISCONTINUED | OUTPATIENT
Start: 2019-11-05 | End: 2019-11-07 | Stop reason: HOSPADM

## 2019-11-05 RX ORDER — PROPOFOL 10 MG/ML
INJECTION, EMULSION INTRAVENOUS AS NEEDED
Status: DISCONTINUED | OUTPATIENT
Start: 2019-11-05 | End: 2019-11-05 | Stop reason: SURG

## 2019-11-05 RX ADMIN — ACETAMINOPHEN 650 MG: 325 TABLET ORAL at 12:30

## 2019-11-05 RX ADMIN — PROPOFOL 100 MCG/KG/MIN: 10 INJECTION, EMULSION INTRAVENOUS at 10:14

## 2019-11-05 RX ADMIN — KETAMINE HYDROCHLORIDE 30 MG: 50 INJECTION, SOLUTION INTRAMUSCULAR; INTRAVENOUS at 10:08

## 2019-11-05 RX ADMIN — SODIUM CHLORIDE: 0.9 INJECTION, SOLUTION INTRAVENOUS at 09:52

## 2019-11-05 RX ADMIN — PROPOFOL 100 MG: 10 INJECTION, EMULSION INTRAVENOUS at 10:14

## 2019-11-05 RX ADMIN — CARVEDILOL 6.25 MG: 6.25 TABLET, FILM COATED ORAL at 08:17

## 2019-11-05 RX ADMIN — NITROGLYCERIN 0.4 MG: 0.4 TABLET SUBLINGUAL at 16:10

## 2019-11-05 RX ADMIN — CARVEDILOL 6.25 MG: 6.25 TABLET, FILM COATED ORAL at 17:43

## 2019-11-05 RX ADMIN — LIDOCAINE 1 PATCH: 50 PATCH TOPICAL at 08:18

## 2019-11-05 RX ADMIN — PROPOFOL 150 MG: 10 INJECTION, EMULSION INTRAVENOUS at 10:10

## 2019-11-05 RX ADMIN — SODIUM CHLORIDE 8 MG/HR: 9 INJECTION, SOLUTION INTRAVENOUS at 17:43

## 2019-11-05 RX ADMIN — AMLODIPINE BESYLATE 5 MG: 5 TABLET ORAL at 08:17

## 2019-11-05 RX ADMIN — SODIUM CHLORIDE 8 MG/HR: 9 INJECTION, SOLUTION INTRAVENOUS at 06:45

## 2019-11-05 RX ADMIN — ATORVASTATIN CALCIUM 80 MG: 80 TABLET, FILM COATED ORAL at 17:43

## 2019-11-05 RX ADMIN — ACETAMINOPHEN 650 MG: 325 TABLET ORAL at 05:41

## 2019-11-05 NOTE — PROGRESS NOTES
General Cardiology   Progress Note -  Team One   Geoffrey Shoemaker 52 y o  male MRN: [id number]    Unit/Bed#: [facility] 530-01 Encounter: [id number]    Assessment/ Plan  1  Elevated troponin; likely represents a type II MI in the setting of GIB/anemia, w/possible underlying CAD   -Currently w/o c/o chest pain  -Troponin elevation: 19 50-21 1-22 1; proBNP 544  -No ischemic changes on 12 Lead ECG (11/3-11/4)  -2D echo: preserved LVEF, no significant valve abnormalities  -On statin, and BB; aspirin on hold 2/2 #2     2  GIB, likely upper  -pt report of (x2 dark tarry BM's on 11/2 and a near syncopal episode)   -GI following; currently NPO  -hgb baseline 15; 7 4 on admission, s/p x 2 units of PRBC's  -hgb now 9 3--stable, no further abnormal BM's  -On IV Protonix infusion     3  HTN  -avg 149/76, last recorded at 137/62, HR 78  -on coreg 6 125 mg BID + novasc 5 mg daily     Plan  -NPO, for EGD today  -Eventual ischemic workup w/cardiac cath once cleared by GI   -Hold aspirin  -Continue statin and BB (coreg 6 25 mg BID, + norvasc 5 mg daily)--will reassess uptitration of meds tomorrow following pt's procedure today  -Continue to monitor volume status, strict I&O's  -Continue to monitor renal function and electrolytes  -Continue to monitor on telemetry    Subjective  Review of Systems   Constitution: Negative for chills, fever and malaise/fatigue  HENT: Negative for congestion  Eyes: Negative for visual disturbance  Cardiovascular: Negative for chest pain, dyspnea on exertion, irregular heartbeat, leg swelling, orthopnea, palpitations and syncope  Left ankle swelling   Respiratory: Negative for cough and shortness of breath  Musculoskeletal: Positive for back pain, joint pain and joint swelling  Negative for arthritis  Left ankle swelling  Left shoulder pain   Gastrointestinal: Negative for bloating, abdominal pain, constipation, diarrhea, nausea and vomiting  Genitourinary: Negative for dysuria  Neurological: Negative for dizziness, focal weakness, headaches, light-headedness and weakness  All other systems reviewed and are negative  Objective:   Vitals: Blood pressure 137/82, pulse 78, temperature 98 1 °F (36 7 °C), temperature source Oral, resp  rate 20, height 5' 11" (1 803 m), weight 121 kg (267 lb), SpO2 98 %  ,       Body mass index is 37 24 kg/m²  ,     Systolic (51QXA), NKV:285 , Min:136 , BIM:156     Diastolic (14HLM), XVT:55, Min:55, Max:90      Intake/Output Summary (Last 24 hours) at 11/5/2019 0844  Last data filed at 11/5/2019 9739  Gross per 24 hour   Intake 1158 02 ml   Output 2200 ml   Net -1041 98 ml     Weight (last 2 days)     Date/Time   Weight    11/05/19 0600   121 (267)    11/04/19 0600   123 (271 17)    11/03/19 2310   112 (246 25)            Telemetry Review: No significant arrhythmias seen on telemetry review    EKG personally reviewed by MAX Lanza    Physical Exam   Constitutional: He is oriented to person, place, and time  He appears well-developed and well-nourished  No distress  HENT:   Head: Normocephalic and atraumatic  Mouth/Throat: Oropharynx is clear and moist    Eyes: No scleral icterus  Neck: Normal range of motion  Neck supple  No JVD present  Cardiovascular: Normal rate, regular rhythm, normal heart sounds and intact distal pulses  No murmur heard  Pulmonary/Chest: Effort normal and breath sounds normal    Abdominal: Soft  Bowel sounds are normal    Obese   Musculoskeletal: He exhibits edema  Left ankle swelling   Neurological: He is alert and oriented to person, place, and time  Skin: Skin is warm and dry  Capillary refill takes less than 2 seconds  He is not diaphoretic  Psychiatric: He has a normal mood and affect  Nursing note and vitals reviewed        LABORATORY RESULTS  Results from last 7 days   Lab Units 11/04/19  0556 11/04/19  0317 11/04/19  0008   TROPONIN I ng/mL 22 10* 21 10* 19 50*     CBC with diff: Results from last 7 days   Lab Units 11/05/19  0611 11/05/19  0008 11/04/19  1701 11/04/19  1204 11/04/19  0556 11/04/19  0011   WBC Thousand/uL 21 05*  --   --   --  24 35* 27 30*   HEMOGLOBIN g/dL 9 3* 9 1* 10 4* 8 3* 7 4* 8 3*   HEMATOCRIT % 28 7*  --  31 3*  --  22 7* 24 9*   MCV fL 92  --   --   --  92 90   PLATELETS Thousands/uL 304  --   --   --  244 296   MCH pg 29 9  --   --   --  30 0 30 1   MCHC g/dL 32 4  --   --   --  32 6 33 3   RDW % 15 1  --   --   --  15 2* 15 2*   MPV fL 9 9  --   --   --  9 8 11 3   NRBC AUTO /100 WBCs  --   --   --   --  0 0   NRBC /100 WBC  --   --   --   --   --  1       CMP:  Results from last 7 days   Lab Units 11/05/19  0611 11/04/19  0008   POTASSIUM mmol/L 4 2 4 6   CHLORIDE mmol/L 104 108   CO2 mmol/L 24 24   BUN mg/dL 15 21   CREATININE mg/dL 0 96 1 02   CALCIUM mg/dL 9 1 8 6   AST U/L  --  120*   ALT U/L  --  37   ALK PHOS U/L  --  104   EGFR ml/min/1 73sq m 102 95       BMP:  Results from last 7 days   Lab Units 11/05/19  0611 11/04/19  0008   POTASSIUM mmol/L 4 2 4 6   CHLORIDE mmol/L 104 108   CO2 mmol/L 24 24   BUN mg/dL 15 21   CREATININE mg/dL 0 96 1 02   CALCIUM mg/dL 9 1 8 6       Lab Results   Component Value Date    NTBNP 544 (H) 11/04/2019             Results from last 7 days   Lab Units 11/05/19  0611 11/04/19  0008   MAGNESIUM mg/dL 1 9 1 8          Results from last 7 days   Lab Units 11/04/19  0011   HEMOGLOBIN A1C % 5 5              Results from last 7 days   Lab Units 11/05/19  0611 11/04/19  1204   INR  1 25* 1 35*       Lipid Profile:   No results found for: CHOL  Lab Results   Component Value Date    HDL 10 (L) 11/04/2019     Lab Results   Component Value Date    LDLCALC 69 11/04/2019     Lab Results   Component Value Date    TRIG 139 11/04/2019       Cardiac testing:   Results for orders placed during the hospital encounter of 11/03/19   Echo complete with contrast if indicated    [address]  [place], [place] [address]  [phone]    Transthoracic Echocardiogram  2D, M-mode, Doppler, and Color Doppler    Study date:  2019    [facility]  [facility] number: [id number]  Account number: [de-identified]  : 1965  Age: 52 years  Gender: Male  Status: Inpatient  Location: Bedside  Height: 71 in  Weight: 270 4 lb  BP: 152/ 74 mmHg    Indications: Acute MI    Diagnoses: I21 4 - Non-ST elevation (NSTEMI) myocardial infarction    Sonographer:  Je Layton RDCS  Interpreting Physician:  Vera Roman DO  Primary Physician:  Sebastian Haider DO  Referring Physician:  Neha Cleaning MD  Group:  [facility]  Cardiology Fellow:  Amy Gillis MD    SUMMARY    LEFT VENTRICLE:  Systolic function was normal  Ejection fraction was estimated to be 60 %  There were no regional wall motion abnormalities  Wall thickness was mildly increased  The changes were consistent with concentric remodeling (increased wall thickness with normal wall mass)  RIGHT VENTRICLE:  The ventricle was mildly dilated  Systolic function was normal     LEFT ATRIUM:  The atrium was mildly dilated  HISTORY: PRIOR HISTORY: Hypertension    PROCEDURE: The procedure was performed at the bedside  This was a routine study  The transthoracic approach was used  The study included complete 2D imaging, M-mode, complete spectral Doppler, and color Doppler  The heart rate was 67 bpm,  at the start of the study  Images were obtained from the parasternal, apical, subcostal, and suprasternal notch acoustic windows  Image quality was adequate  LEFT VENTRICLE: Size was normal  Systolic function was normal  Ejection fraction was estimated to be 60 %  There were no regional wall motion abnormalities  Wall thickness was mildly increased  The changes were consistent with concentric  remodeling (increased wall thickness with normal wall mass)  DOPPLER: Left ventricular diastolic function parameters were normal for the patient's age      RIGHT VENTRICLE: The ventricle was mildly dilated  Systolic function was normal     LEFT ATRIUM: The atrium was mildly dilated  RIGHT ATRIUM: Size was normal     MITRAL VALVE: Valve structure was normal  There was normal leaflet separation  DOPPLER: The transmitral velocity was within the normal range  There was no evidence for stenosis  There was no significant regurgitation  AORTIC VALVE: The valve was trileaflet  Leaflets exhibited calcification and sclerosis  DOPPLER: Transaortic velocity was within the normal range  There was no evidence for stenosis  There was no significant regurgitation  TRICUSPID VALVE: The valve structure was normal  There was normal leaflet separation  DOPPLER: The transtricuspid velocity was within the normal range  There was no evidence for stenosis  There was trace regurgitation  The tricuspid jet  envelope definition was inadequate for estimation of RV systolic pressure  There are no indirect findings (abnormal RV volume or geometry, altered pulmonary flow velocity profile, or leftward septal displacement) which would suggest  moderate or severe pulmonary hypertension  PULMONIC VALVE: Not well visualized  DOPPLER: The transpulmonic velocity was within the normal range  There was no regurgitation  PERICARDIUM: There was no pericardial effusion  The pericardium was normal in appearance  AORTA: The root exhibited normal size  SYSTEMIC VEINS: IVC: The inferior vena cava was normal in size and course   Respirophasic changes were normal     SYSTEM MEASUREMENT TABLES    2D  %FS: 31 22 %  Ao Diam: 3 05 cm  EDV(Teich): 129 15 ml  EF(Teich): 58 64 %  ESV(Teich): 53 42 ml  IVSd: 1 41 cm  LA Area: 23 36 cm2  LA Diam: 3 6 cm  LVEDV MOD A4C: 223 38 ml  LVEF MOD A4C: 61 25 %  LVESV MOD A4C: 86 56 ml  LVIDd: 5 19 cm  LVIDs: 3 57 cm  LVLd A4C: 10 17 cm  LVLs A4C: 8 7 cm  LVPWd: 1 22 cm  RA Area: 16 69 cm2  RVIDd: 4 67 cm  SV MOD A4C: 136 82 ml  SV(Teich): 75 72 ml    MM  TAPSE: 1 72 cm    PW  E': 0 08 m/s  E/E': 10 68  MV A Srinivasa: 0 76 m/s  MV Dec Hernando: 3 38 m/s2  MV DecT: 252 82 ms  MV E Srinivasa: 0 86 m/s  MV E/A Ratio: 1 13  MV PHT: 73 32 ms  MVA By PHT: 3 cm2    [employer] Accredited Echocardiography Laboratory    Prepared and electronically signed by    Maggy Christensen DO  Signed 2019 13:08:15       No results found for this or any previous visit  No results found for this or any previous visit  No procedure found  No results found for this or any previous visit  Meds/Allergies   all current active meds have been reviewed, current meds:   Current Facility-Administered Medications   Medication Dose Route Frequency    acetaminophen (TYLENOL) tablet 650 mg  650 mg Oral Q6H PRN    amLODIPine (NORVASC) tablet 5 mg  5 mg Oral Daily    atorvastatin (LIPITOR) tablet 80 mg  80 mg Oral Daily With Dinner    carvedilol (COREG) tablet 6 25 mg  6 25 mg Oral BID With Meals    lidocaine (LIDODERM) 5 % patch 1 patch  1 patch Topical Daily    pantoprazole (PROTONIX) 80 mg in sodium chloride 0 9 % 100 mL infusion  8 mg/hr Intravenous Continuous    and PTA meds:   Prior to Admission Medications   Prescriptions Last Dose Informant Patient Reported? Taking?    Testosterone (ANDROGEL) 40 5 MG/2 5GM (1 62%) GEL   Yes No   Sig: Place 40 5 mg on the skin daily    lisinopril-hydrochlorothiazide (PRINZIDE,ZESTORETIC) 20-12 5 MG per tablet   Yes No   Sig: Take 1 tablet by mouth daily   naproxen (NAPROSYN) 500 mg tablet   No No   Sig: Take 1 tablet (500 mg total) by mouth 2 (two) times a day with meals   traMADol (ULTRAM) 50 mg tablet   No No   Si-2 po q 6 hours prn pain      Facility-Administered Medications: None     Medications Prior to Admission   Medication    lisinopril-hydrochlorothiazide (PRINZIDE,ZESTORETIC) 20-12 5 MG per tablet    naproxen (NAPROSYN) 500 mg tablet    Testosterone (ANDROGEL) 40 5 MG/2 5GM (1 62%) GEL    traMADol (ULTRAM) 50 mg tablet       pantoprozole (PROTONIX) infusion (Continuous) 8 mg/hr Last Rate: 8 mg/hr (11/05/19 0645)     Assessment:  Principal Problem:    Elevated troponin  Active Problems:    GI bleed    Counseling / Coordination of Care  Total floor / unit time spent today 20 minutes  Greater than 50% of total time was spent with the patient and / or family counseling and / or coordination of care  ** Please Note: Dragon 360 Dictation voice to text software may have been used in the creation of this document   **

## 2019-11-05 NOTE — RESTORATIVE TECHNICIAN NOTE
Restorative Specialist Mobility Note       Activity: Stand at bedside, Turn, Dangle, Other (Comment)(OOB to strecher stand pivot)     Assistive Device: Other (Comment)(HHA x 2)

## 2019-11-05 NOTE — UTILIZATION REVIEW
Initial Clinical Review    Admission: Date/Time/Statement: Inpatient Admission Orders (From admission, onward)     Ordered        11/04/19 0003  Inpatient Admission  Once                   Orders Placed This Encounter   Procedures    Inpatient Admission     Standing Status:   Standing     Number of Occurrences:   1     Order Specific Question:   Admitting Physician     Answer:   Diane Rascon [[id number]]     Order Specific Question:   Level of Care     Answer:   Critical Care [15]     Order Specific Question:   Estimated length of stay     Answer:   More than 2 Midnights     Order Specific Question:   Certification     Answer:   I certify that inpatient services are medically necessary for this patient for a duration of greater than two midnights  See H&P and MD Progress Notes for additional information about the patient's course of treatment  Assessment/Plan:  52year old male directly admitted to critical care bed from [facility] for evaluation of chest pain, concern for NSTEMI and UGIB  Admitted to inpatient critical care fore chest pain and UGI    3 days prior had 2 episodes of blood in his stool  A day later, he started with weakness, had a syncopal episode and had chest pain  He went to the ED and was found to have increasing troponins, dopping hgb for which he had 2 units PRBCs at other facility for a hgb of 7 9   He was transferred to [address] [place] [address]  Hold IV heparin given GI bleed  Start betablocker, hold on ACE for planned LHC with contrast dye exposure  Getle IVfluids prior to cath  SCds for DVT prophy  Check q 6 hour h/h  Plan for EGD after cardiac cath  He has a slight expiratory wheeze  Currently pain free  Cardiology consult 11/4: Elevated troponin from supply demand mismatch from severe anemia  Transfuse to a goal of 8 5-9  To receive 1 unit PRBCs  Hold aspirin, contine Bb and Statin, wean IV ntg, Check ECHO  He has leg swelling  No EKG changes  GI consult 11/4:   For the week prior, patient was taking Naprosyn 4 times per day for musculoskeletal pain  Upper GI bleeding likely secondary to peptic ulcer disease in the setting of NSAID use -he had significant drop in hemoglobin from baseline of 15 down to 7 4  NPO for EGD, continue protonix gtt  His abdomien is soft, nontender, good bowel sounds     ED Triage Vitals [11/03/19 2310]   Temperature Pulse Respirations Blood Pressure SpO2   98 3 °F (36 8 °C) 76 (!) 8 (!) 201/93 95 %      Temp Source Heart Rate Source Patient Position - Orthostatic VS BP Location FiO2 (%)   Oral Monitor -- -- --      Pain Score       6        Wt Readings from Last 1 Encounters:   11/05/19 121 kg (267 lb)     Additional Vital Signs:  Date/Time Temp Pulse Resp BP MAP (mmHg) SpO2 O2 Device   11/04/19 1430 -- 84 27Abnormal  137/71 92 -- --   11/04/19 1423 98 4 °F (36 9 °C) 80 22 137/71 -- -- --   11/04/19 1300 -- 74 25Abnormal  138/76 -- 100 % --   11/04/19 1230 -- 68 0Abnormal  159/72 93 97 % --   11/04/19 1200 -- 78 14 140/55 75 97 % --   11/04/19 1100 98 4 °F (36 9 °C) 75 18 161/84 -- 97 % None (Room air)   11/04/19 1045 98 6 °F (37 °C) 74 16 154/84 110 99 % --   11/04/19 1000 -- 74 22 152/74 104 97 % --   11/04/19 0915 -- 76 19 157/90 -- 100 % None (Room air)   11/04/19 0904 98 4 °F (36 9 °C) 72 16 162/80 -- -- --   11/04/19 0700 98 7 °F (37 1 °C) 75 17 136/54 -- 99 % --   11/04/19 0300 -- -- 13 -- -- 98 % --   11/04/19 0100 -- 72 22 157/59 87 99 % --   11/04/19 0000 -- 66 31Abnormal  148/63 93 98 % --   11/03/19 2350 -- 64 30Abnormal  139/57 87 98 % --   11/03/19 2340 -- 64 37Abnormal  140/63 89 99 % --   11/03/19 2330 -- 62 23Abnormal  118/57 77 99 % --   11/03/19 2320 -- 72 22 212/97Abnormal  130 100 % --   11/03/19 2315 -- 74 20 -- -- 98 % Nasal cannula   11/03/19 2310 98 3 °F (36 8 °C) 76 8Abnormal  201/93Abnormal  125 95 % None (Room air)        Pertinent Labs/Diagnostic Test Results:   EKG 11/3:  Normal sinus rhythm  Nonspecific ST abnormality  Abnormal ECG  When compared with ECG of 25-AUG-2019 01:11,  Non-specific change in ST segment in Lateral leads  QT has lengthened  ECHO 11/4: SUMMARY     LEFT VENTRICLE:  Systolic function was normal  Ejection fraction was estimated to be 60 %  There were no regional wall motion abnormalities  Wall thickness was mildly increased  The changes were consistent with concentric remodeling (increased wall thickness with normal wall mass)      RIGHT VENTRICLE:  The ventricle was mildly dilated  Systolic function was normal      LEFT ATRIUM:  The atrium was mildly dilated    Outside images not available to add to review including, CT A/ and xrays from 11/4  Results from last 7 days   Lab Units 11/05/19  0611 11/05/19  0008 11/04/19  1701 11/04/19  1204 11/04/19  0556 11/04/19  0011   WBC Thousand/uL 21 05*  --   --   --  24 35* 27 30*   HEMOGLOBIN g/dL 9 3* 9 1* 10 4* 8 3* 7 4* 8 3*   HEMATOCRIT % 28 7*  --  31 3*  --  22 7* 24 9*   PLATELETS Thousands/uL 304  --   --   --  244 296   NEUTROS ABS Thousands/µL  --   --   --   --  18 66*  --          Results from last 7 days   Lab Units 11/05/19  0611 11/04/19  0556 11/04/19  0008   SODIUM mmol/L 135*  --  138   POTASSIUM mmol/L 4 2  --  4 6   CHLORIDE mmol/L 104  --  108   CO2 mmol/L 24  --  24   ANION GAP mmol/L 7  --  6   BUN mg/dL 15  --  21   CREATININE mg/dL 0 96  --  1 02   EGFR ml/min/1 73sq m 102  --  95   CALCIUM mg/dL 9 1  --  8 6   MAGNESIUM mg/dL 1 9  --  1 8   PHOSPHORUS mg/dL  --  3 3  --      Results from last 7 days   Lab Units 11/04/19  0008   AST U/L 120*   ALT U/L 37   ALK PHOS U/L 104   TOTAL PROTEIN g/dL 7 1   ALBUMIN g/dL 2 4*   TOTAL BILIRUBIN mg/dL 0 52         Results from last 7 days   Lab Units 11/05/19  0611 11/04/19  0008   GLUCOSE RANDOM mg/dL 127 202*         Results from last 7 days   Lab Units 11/04/19  0011   HEMOGLOBIN A1C % 5 5   EAG mg/dl 111     Results from last 7 days   Lab Units 11/04/19  0556 11/04/19  0312 11/04/19  0008   TROPONIN I ng/mL 22 10* 21 10* 19 50*         Results from last 7 days   Lab Units 11/05/19  0611 11/04/19  1204   PROTIME seconds 15 3* 16 2*   INR  1 25* 1 35*   PTT seconds 38*  --      Results from last 7 days   Lab Units 11/04/19  0008   NT-PRO BNP pg/mL 544*     Results from last 7 days   Lab Units 11/05/19  0351   CLARITY UA  Clear   COLOR UA  Dk Yellow   SPEC GRAV UA  1 017   PH UA  6 0   GLUCOSE UA mg/dl Negative   KETONES UA mg/dl Negative   BLOOD UA  Small*   PROTEIN UA mg/dl Negative   NITRITE UA  Negative   BILIRUBIN UA  Negative   UROBILINOGEN UA E U /dl 1 0   LEUKOCYTES UA  Negative   WBC UA /hpf 2-4*   RBC UA /hpf 2-4*   BACTERIA UA /hpf Occasional   EPITHELIAL CELLS WET PREP /hpf None Seen     Results from last 7 days   Lab Units 11/04/19  0011   TOTAL COUNTED  100       Past Medical History:   Diagnosis Date    Hypertension     stopped his meds when ran out several years ago     Admitting Diagnosis: Elevated troponin [R79 89]  Age/Sex: 52 y o  male  Admission Orders:  Tele  Hgb every 6 hours  NPO  Card cath    Scheduled Medications:    Medications:  amLODIPine 5 mg Oral Daily   atorvastatin 80 mg Oral Daily With Dinner   carvedilol 6 25 mg Oral BID With Meals   lidocaine 1 patch Topical Daily     Continuous IV Infusions:    pantoprozole (PROTONIX) infusion (Continuous) 8 mg/hr Intravenous Continuous     PRN Meds:    acetaminophen 650 mg Oral Q6H PRN       IP CONSULT TO MEDICAL CRITICAL CARE  IP CONSULT TO CASE MANAGEMENT  IP CONSULT TO GASTROENTEROLOGY    Network Utilization Review Department  [email or web address]  ATTENTION: Please call with any questions or concerns to [phone] and carefully listen to the prompts so that you are directed to the right person   All voicemails are confidential   Jessica Vital all requests for admission clinical reviews, approved or denied determinations and any other requests to dedicated fax number below belonging to the campus where the patient is receiving treatment    FACILITY NAME UR FAX NUMBER   ADMISSION DENIALS (Administrative/Medical Necessity) [address] [facility] (Maternity/NICU/Pediatrics) [phone]   [facility] [address] [facility] [address] [facility] [phone]   [address] [place] [address] [address] [place] [address] [phone]   Colby Padilla [address] [facility] [address] [place] [address] [phone]

## 2019-11-05 NOTE — PHYSICAL THERAPY NOTE
PT orders received  Chart reviewed  Will hold PT due to chest pain  Will continue to follow at a later time    Bessie Hill, Pt, DPT

## 2019-11-05 NOTE — OCCUPATIONAL THERAPY NOTE
Occupational Therapy Cancellation Note    Orders received and chart reviewed  OT attempted to see x2 this day, however refusing OT eval 2' pain on 1st attempt and RN requested to hold on initial eval 2' Pt experiencing chest pain on 2nd attempt  Will continue to follow to be seen for OT evaluation as appropriate/when medically cleared         Ileana Sorto MS, OTR/L

## 2019-11-05 NOTE — PROGRESS NOTES
Pt's IV in right forearm infiltrated  Pt reported no discomfort at the site  IV removed  Warm compress applied  Will continue to monitor site

## 2019-11-05 NOTE — ANESTHESIA PREPROCEDURE EVALUATION
Review of Systems/Medical History  Patient summary reviewed  Chart reviewed  No history of anesthetic complications     Cardiovascular  Exercise tolerance (METS): >4,  Hypertension , Past MI 0-3 months, CAD , Angina with exertion,    Pulmonary       GI/Hepatic  Negative GI/hepatic ROS   GI bleeding , active,        Negative  ROS        Endo/Other  Negative endo/other ROS      GYN  Negative gynecology ROS          Hematology  Anemia acute blood loss anemia,     Musculoskeletal  Negative musculoskeletal ROS        Neurology  Negative neurology ROS      Psychology   Negative psychology ROS          Extensive discussion with GI and cardiology concerning EGD vs  Cardiac Cath at this time  Concern for active bleeding in setting of demand ischemia and need for possible source control prior to anticoagulation for cardiac cath  ECHO 11/4/19:    SUMMARY     LEFT VENTRICLE:  Systolic function was normal  Ejection fraction was estimated to be 60 %  There were no regional wall motion abnormalities  Wall thickness was mildly increased  The changes were consistent with concentric remodeling (increased wall thickness with normal wall mass)      RIGHT VENTRICLE:  The ventricle was mildly dilated  Systolic function was normal      LEFT ATRIUM:  The atrium was mildly dilated      HISTORY: PRIOR HISTORY: Hypertension     PROCEDURE: The procedure was performed at the bedside  This was a routine study  The transthoracic approach was used  The study included complete 2D imaging, M-mode, complete spectral Doppler, and color Doppler  The heart rate was 67 bpm,  at the start of the study  Images were obtained from the parasternal, apical, subcostal, and suprasternal notch acoustic windows  Image quality was adequate      LEFT VENTRICLE: Size was normal  Systolic function was normal  Ejection fraction was estimated to be 60 %  There were no regional wall motion abnormalities  Wall thickness was mildly increased   The changes were consistent with concentric  remodeling (increased wall thickness with normal wall mass)  DOPPLER: Left ventricular diastolic function parameters were normal for the patient's age      RIGHT VENTRICLE: The ventricle was mildly dilated  Systolic function was normal      LEFT ATRIUM: The atrium was mildly dilated      RIGHT ATRIUM: Size was normal      MITRAL VALVE: Valve structure was normal  There was normal leaflet separation  DOPPLER: The transmitral velocity was within the normal range  There was no evidence for stenosis  There was no significant regurgitation      AORTIC VALVE: The valve was trileaflet  Leaflets exhibited calcification and sclerosis  DOPPLER: Transaortic velocity was within the normal range  There was no evidence for stenosis  There was no significant regurgitation      TRICUSPID VALVE: The valve structure was normal  There was normal leaflet separation  DOPPLER: The transtricuspid velocity was within the normal range  There was no evidence for stenosis  There was trace regurgitation  The tricuspid jet  envelope definition was inadequate for estimation of RV systolic pressure  There are no indirect findings (abnormal RV volume or geometry, altered pulmonary flow velocity profile, or leftward septal displacement) which would suggest  moderate or severe pulmonary hypertension      PULMONIC VALVE: Not well visualized  DOPPLER: The transpulmonic velocity was within the normal range  There was no regurgitation      PERICARDIUM: There was no pericardial effusion  The pericardium was normal in appearance      AORTA: The root exhibited normal size      SYSTEMIC VEINS: IVC: The inferior vena cava was normal in size and course   Respirophasic changes were normal      SYSTEM MEASUREMENT TABLES     2D  %FS: 31 22 %  Ao Diam: 3 05 cm  EDV(Teich): 129 15 ml  EF(Teich): 58 64 %  ESV(Teich): 53 42 ml  IVSd: 1 41 cm  LA Area: 23 36 cm2  LA Diam: 3 6 cm  LVEDV MOD A4C: 223 38 ml  LVEF MOD A4C: 61 25 %  LVESV MOD A4C: 86 56 ml  LVIDd: 5 19 cm  LVIDs: 3 57 cm  LVLd A4C: 10 17 cm  LVLs A4C: 8 7 cm  LVPWd: 1 22 cm  RA Area: 16 69 cm2  RVIDd: 4 67 cm  SV MOD A4C: 136 82 ml  SV(Teich): 75 72 ml     MM  TAPSE: 1 72 cm     PW  E': 0 08 m/s  E/E': 10 68  MV A Srinivasa: 0 76 m/s  MV Dec Kern: 3 38 m/s2  MV DecT: 252 82 ms  MV E Srinivasa: 0 86 m/s  MV E/A Ratio: 1 13  MV PHT: 73 32 ms  MVA By PHT: 3 cm2     [facility] Accredited [facility]chocardiography [facility]     Prepared and electronically signed by  Milagros Gill DO  Signed 04-Nov-2019 13:08:15       Physical Exam    Airway    Mallampati score: I  TM Distance: >3 FB  Neck ROM: full     Dental   No notable dental hx     Cardiovascular  Rhythm: regular, Rate: normal, Cardiovascular exam normal    Pulmonary  Pulmonary exam normal Breath sounds clear to auscultation,     Other Findings        Anesthesia Plan  ASA Score- 3     Anesthesia Type- general with ASA Monitors  Additional Monitors:   Airway Plan:         Plan Factors-  Patient did not smoke on day of surgery  Induction- intravenous  Postoperative Plan-     Informed Consent- Anesthetic plan and risks discussed with patient  I personally reviewed this patient with the CRNA  Discussed and agreed on the Anesthesia Plan with the CRNA  Siva Washington

## 2019-11-05 NOTE — PROGRESS NOTES
HAD AN EPISODE OF CHEST PAINS, NOW RESOLVED, GIVEN SUBLINGUAL NITROGLYCERIN  CAN ADD NITROGLYCERIN PASTE IF HE HAS ANY FURTHER CHEST PAINS  CURRENTLY ONLY COMPLAINING OF BACK PAIN  HE IS SET UP FOR CORONARY ANGIOGRAPHY TOMORROW   NPO AFTER MIDNIGHT

## 2019-11-06 ENCOUNTER — APPOINTMENT (INPATIENT)
Dept: NON INVASIVE DIAGNOSTICS | Facility: HOSPITAL | Age: 54
DRG: 377 | End: 2019-11-06
Payer: COMMERCIAL

## 2019-11-06 ENCOUNTER — APPOINTMENT (INPATIENT)
Dept: RADIOLOGY | Facility: HOSPITAL | Age: 54
DRG: 377 | End: 2019-11-06
Payer: COMMERCIAL

## 2019-11-06 ENCOUNTER — TELEPHONE (OUTPATIENT)
Dept: NON INVASIVE DIAGNOSTICS | Facility: HOSPITAL | Age: 54
End: 2019-11-06

## 2019-11-06 PROBLEM — M25.512 ACUTE PAIN OF LEFT SHOULDER: Status: ACTIVE | Noted: 2019-11-06

## 2019-11-06 PROBLEM — M25.572 ACUTE LEFT ANKLE PAIN: Status: ACTIVE | Noted: 2019-11-06

## 2019-11-06 PROBLEM — I10 HTN (HYPERTENSION): Status: ACTIVE | Noted: 2019-11-06

## 2019-11-06 PROBLEM — S93.402A LEFT ANKLE SPRAIN: Status: ACTIVE | Noted: 2019-11-06

## 2019-11-06 LAB
ANION GAP SERPL CALCULATED.3IONS-SCNC: 8 MMOL/L (ref 4–13)
APTT PPP: 37 SECONDS (ref 23–37)
ATRIAL RATE: 76 BPM
ATRIAL RATE: 78 BPM
ATRIAL RATE: 79 BPM
BUN SERPL-MCNC: 19 MG/DL (ref 5–25)
CALCIUM SERPL-MCNC: 9.2 MG/DL (ref 8.3–10.1)
CHLORIDE SERPL-SCNC: 103 MMOL/L (ref 100–108)
CO2 SERPL-SCNC: 24 MMOL/L (ref 21–32)
CREAT SERPL-MCNC: 1.09 MG/DL (ref 0.6–1.3)
ERYTHROCYTE [DISTWIDTH] IN BLOOD BY AUTOMATED COUNT: 15.2 % (ref 11.6–15.1)
GFR SERPL CREATININE-BSD FRML MDRD: 88 ML/MIN/1.73SQ M
GLUCOSE SERPL-MCNC: 139 MG/DL (ref 65–140)
HCT VFR BLD AUTO: 28.8 % (ref 36.5–49.3)
HGB BLD-MCNC: 9.5 G/DL (ref 12–17)
INR PPP: 1.19 (ref 0.84–1.19)
MCH RBC QN AUTO: 30.3 PG (ref 26.8–34.3)
MCHC RBC AUTO-ENTMCNC: 33 G/DL (ref 31.4–37.4)
MCV RBC AUTO: 92 FL (ref 82–98)
P AXIS: -21 DEGREES
P AXIS: -25 DEGREES
P AXIS: -37 DEGREES
PLATELET # BLD AUTO: 350 THOUSANDS/UL (ref 149–390)
PMV BLD AUTO: 9.7 FL (ref 8.9–12.7)
POTASSIUM SERPL-SCNC: 4.2 MMOL/L (ref 3.5–5.3)
PR INTERVAL: 136 MS
PR INTERVAL: 144 MS
PR INTERVAL: 148 MS
PROTHROMBIN TIME: 14.7 SECONDS (ref 11.6–14.5)
QRS AXIS: 17 DEGREES
QRS AXIS: 19 DEGREES
QRS AXIS: 34 DEGREES
QRSD INTERVAL: 94 MS
QT INTERVAL: 364 MS
QT INTERVAL: 372 MS
QT INTERVAL: 374 MS
QTC INTERVAL: 414 MS
QTC INTERVAL: 418 MS
QTC INTERVAL: 428 MS
RBC # BLD AUTO: 3.14 MILLION/UL (ref 3.88–5.62)
SODIUM SERPL-SCNC: 135 MMOL/L (ref 136–145)
T WAVE AXIS: 0 DEGREES
T WAVE AXIS: 1 DEGREES
T WAVE AXIS: 6 DEGREES
VENTRICULAR RATE: 76 BPM
VENTRICULAR RATE: 78 BPM
VENTRICULAR RATE: 79 BPM
WBC # BLD AUTO: 19.81 THOUSAND/UL (ref 4.31–10.16)

## 2019-11-06 PROCEDURE — C9113 INJ PANTOPRAZOLE SODIUM, VIA: HCPCS | Performed by: NURSE PRACTITIONER

## 2019-11-06 PROCEDURE — 93010 ELECTROCARDIOGRAM REPORT: CPT | Performed by: INTERNAL MEDICINE

## 2019-11-06 PROCEDURE — C1769 GUIDE WIRE: HCPCS | Performed by: NURSE PRACTITIONER

## 2019-11-06 PROCEDURE — 93454 CORONARY ARTERY ANGIO S&I: CPT | Performed by: INTERNAL MEDICINE

## 2019-11-06 PROCEDURE — C1894 INTRO/SHEATH, NON-LASER: HCPCS | Performed by: NURSE PRACTITIONER

## 2019-11-06 PROCEDURE — 85730 THROMBOPLASTIN TIME PARTIAL: CPT | Performed by: NURSE PRACTITIONER

## 2019-11-06 PROCEDURE — 85027 COMPLETE CBC AUTOMATED: CPT | Performed by: NURSE PRACTITIONER

## 2019-11-06 PROCEDURE — 73030 X-RAY EXAM OF SHOULDER: CPT

## 2019-11-06 PROCEDURE — C1760 CLOSURE DEV, VASC: HCPCS | Performed by: NURSE PRACTITIONER

## 2019-11-06 PROCEDURE — NC001 PR NO CHARGE: Performed by: INTERNAL MEDICINE

## 2019-11-06 PROCEDURE — 99232 SBSQ HOSP IP/OBS MODERATE 35: CPT | Performed by: INTERNAL MEDICINE

## 2019-11-06 PROCEDURE — 93454 CORONARY ARTERY ANGIO S&I: CPT | Performed by: NURSE PRACTITIONER

## 2019-11-06 PROCEDURE — 99152 MOD SED SAME PHYS/QHP 5/>YRS: CPT | Performed by: INTERNAL MEDICINE

## 2019-11-06 PROCEDURE — 99153 MOD SED SAME PHYS/QHP EA: CPT | Performed by: NURSE PRACTITIONER

## 2019-11-06 PROCEDURE — 85610 PROTHROMBIN TIME: CPT | Performed by: NURSE PRACTITIONER

## 2019-11-06 PROCEDURE — C1887 CATHETER, GUIDING: HCPCS | Performed by: NURSE PRACTITIONER

## 2019-11-06 PROCEDURE — 99152 MOD SED SAME PHYS/QHP 5/>YRS: CPT | Performed by: NURSE PRACTITIONER

## 2019-11-06 PROCEDURE — 93005 ELECTROCARDIOGRAM TRACING: CPT

## 2019-11-06 PROCEDURE — 80048 BASIC METABOLIC PNL TOTAL CA: CPT | Performed by: NURSE PRACTITIONER

## 2019-11-06 RX ORDER — FENTANYL CITRATE 50 UG/ML
INJECTION, SOLUTION INTRAMUSCULAR; INTRAVENOUS CODE/TRAUMA/SEDATION MEDICATION
Status: COMPLETED | OUTPATIENT
Start: 2019-11-06 | End: 2019-11-06

## 2019-11-06 RX ORDER — METHYLPREDNISOLONE SODIUM SUCCINATE 40 MG/ML
40 INJECTION, POWDER, LYOPHILIZED, FOR SOLUTION INTRAMUSCULAR; INTRAVENOUS EVERY 6 HOURS SCHEDULED
Status: DISCONTINUED | OUTPATIENT
Start: 2019-11-06 | End: 2019-11-07

## 2019-11-06 RX ORDER — NITROGLYCERIN 20 MG/100ML
INJECTION INTRAVENOUS CODE/TRAUMA/SEDATION MEDICATION
Status: COMPLETED | OUTPATIENT
Start: 2019-11-06 | End: 2019-11-06

## 2019-11-06 RX ORDER — MIDAZOLAM HYDROCHLORIDE 2 MG/2ML
INJECTION, SOLUTION INTRAMUSCULAR; INTRAVENOUS CODE/TRAUMA/SEDATION MEDICATION
Status: COMPLETED | OUTPATIENT
Start: 2019-11-06 | End: 2019-11-06

## 2019-11-06 RX ORDER — SODIUM CHLORIDE 9 MG/ML
125 INJECTION, SOLUTION INTRAVENOUS CONTINUOUS
Status: DISCONTINUED | OUTPATIENT
Start: 2019-11-06 | End: 2019-11-07

## 2019-11-06 RX ORDER — HEPARIN SODIUM 1000 [USP'U]/ML
INJECTION, SOLUTION INTRAVENOUS; SUBCUTANEOUS CODE/TRAUMA/SEDATION MEDICATION
Status: COMPLETED | OUTPATIENT
Start: 2019-11-06 | End: 2019-11-06

## 2019-11-06 RX ORDER — ASPIRIN 81 MG/1
324 TABLET, CHEWABLE ORAL ONCE
Status: COMPLETED | OUTPATIENT
Start: 2019-11-06 | End: 2019-11-06

## 2019-11-06 RX ORDER — LIDOCAINE 50 MG/G
1 PATCH TOPICAL DAILY
Status: DISCONTINUED | OUTPATIENT
Start: 2019-11-07 | End: 2019-11-07 | Stop reason: HOSPADM

## 2019-11-06 RX ORDER — CARVEDILOL 12.5 MG/1
12.5 TABLET ORAL 2 TIMES DAILY WITH MEALS
Status: DISCONTINUED | OUTPATIENT
Start: 2019-11-06 | End: 2019-11-07

## 2019-11-06 RX ORDER — DIPHENHYDRAMINE HYDROCHLORIDE 50 MG/ML
25 INJECTION INTRAMUSCULAR; INTRAVENOUS ONCE
Status: COMPLETED | OUTPATIENT
Start: 2019-11-06 | End: 2019-11-06

## 2019-11-06 RX ORDER — SODIUM CHLORIDE 9 MG/ML
125 INJECTION, SOLUTION INTRAVENOUS CONTINUOUS
Status: DISCONTINUED | OUTPATIENT
Start: 2019-11-06 | End: 2019-11-06

## 2019-11-06 RX ORDER — METHYLPREDNISOLONE SODIUM SUCCINATE 125 MG/2ML
125 INJECTION, POWDER, LYOPHILIZED, FOR SOLUTION INTRAMUSCULAR; INTRAVENOUS ONCE
Status: COMPLETED | OUTPATIENT
Start: 2019-11-06 | End: 2019-11-06

## 2019-11-06 RX ORDER — ISOSORBIDE MONONITRATE 30 MG/1
30 TABLET, EXTENDED RELEASE ORAL DAILY
Status: DISCONTINUED | OUTPATIENT
Start: 2019-11-06 | End: 2019-11-07 | Stop reason: HOSPADM

## 2019-11-06 RX ORDER — LIDOCAINE HYDROCHLORIDE 10 MG/ML
INJECTION, SOLUTION EPIDURAL; INFILTRATION; INTRACAUDAL; PERINEURAL CODE/TRAUMA/SEDATION MEDICATION
Status: COMPLETED | OUTPATIENT
Start: 2019-11-06 | End: 2019-11-06

## 2019-11-06 RX ORDER — VERAPAMIL HYDROCHLORIDE 2.5 MG/ML
INJECTION, SOLUTION INTRAVENOUS CODE/TRAUMA/SEDATION MEDICATION
Status: COMPLETED | OUTPATIENT
Start: 2019-11-06 | End: 2019-11-06

## 2019-11-06 RX ADMIN — CARVEDILOL 6.25 MG: 6.25 TABLET, FILM COATED ORAL at 09:04

## 2019-11-06 RX ADMIN — SODIUM CHLORIDE 125 ML/HR: 0.9 INJECTION, SOLUTION INTRAVENOUS at 05:19

## 2019-11-06 RX ADMIN — FENTANYL CITRATE 25 MCG: 50 INJECTION, SOLUTION INTRAMUSCULAR; INTRAVENOUS at 15:31

## 2019-11-06 RX ADMIN — METHYLPREDNISOLONE SODIUM SUCCINATE 125 MG: 125 INJECTION, POWDER, FOR SOLUTION INTRAMUSCULAR; INTRAVENOUS at 11:53

## 2019-11-06 RX ADMIN — LIDOCAINE 1 PATCH: 50 PATCH TOPICAL at 09:05

## 2019-11-06 RX ADMIN — MIDAZOLAM 1 MG: 1 INJECTION INTRAMUSCULAR; INTRAVENOUS at 15:31

## 2019-11-06 RX ADMIN — AMLODIPINE BESYLATE 5 MG: 5 TABLET ORAL at 09:04

## 2019-11-06 RX ADMIN — NITROGLYCERIN 0.4 MG: 0.4 TABLET SUBLINGUAL at 13:00

## 2019-11-06 RX ADMIN — NITROGLYCERIN 0.4 MG: 0.4 TABLET SUBLINGUAL at 13:06

## 2019-11-06 RX ADMIN — LIDOCAINE HYDROCHLORIDE 13 ML: 10 INJECTION, SOLUTION EPIDURAL; INFILTRATION; INTRACAUDAL; PERINEURAL at 15:29

## 2019-11-06 RX ADMIN — METHYLPREDNISOLONE SODIUM SUCCINATE 40 MG: 40 INJECTION, POWDER, FOR SOLUTION INTRAMUSCULAR; INTRAVENOUS at 17:28

## 2019-11-06 RX ADMIN — FAMOTIDINE 20 MG: 10 INJECTION, SOLUTION INTRAVENOUS at 11:59

## 2019-11-06 RX ADMIN — DIPHENHYDRAMINE HYDROCHLORIDE 25 MG: 50 INJECTION, SOLUTION INTRAMUSCULAR; INTRAVENOUS at 11:53

## 2019-11-06 RX ADMIN — ASPIRIN 81 MG 324 MG: 81 TABLET ORAL at 05:19

## 2019-11-06 RX ADMIN — NITROGLYCERIN 200 MCG: 20 INJECTION INTRAVENOUS at 15:11

## 2019-11-06 RX ADMIN — VERAPAMIL HYDROCHLORIDE 2.5 MG: 2.5 INJECTION INTRAVENOUS at 15:11

## 2019-11-06 RX ADMIN — HEPARIN SODIUM 4000 UNITS: 1000 INJECTION INTRAVENOUS; SUBCUTANEOUS at 15:10

## 2019-11-06 RX ADMIN — MIDAZOLAM 1 MG: 1 INJECTION INTRAMUSCULAR; INTRAVENOUS at 15:07

## 2019-11-06 RX ADMIN — IOHEXOL 180 ML: 350 INJECTION, SOLUTION INTRAVENOUS at 15:50

## 2019-11-06 RX ADMIN — SODIUM CHLORIDE 8 MG/HR: 9 INJECTION, SOLUTION INTRAVENOUS at 18:25

## 2019-11-06 RX ADMIN — CARVEDILOL 12.5 MG: 12.5 TABLET, FILM COATED ORAL at 17:26

## 2019-11-06 RX ADMIN — FENTANYL CITRATE 25 MCG: 50 INJECTION, SOLUTION INTRAMUSCULAR; INTRAVENOUS at 15:06

## 2019-11-06 RX ADMIN — MIDAZOLAM 1 MG: 1 INJECTION INTRAMUSCULAR; INTRAVENOUS at 15:08

## 2019-11-06 RX ADMIN — ISOSORBIDE MONONITRATE 30 MG: 30 TABLET, EXTENDED RELEASE ORAL at 17:26

## 2019-11-06 RX ADMIN — LIDOCAINE HYDROCHLORIDE 1 ML: 10 INJECTION, SOLUTION EPIDURAL; INFILTRATION; INTRACAUDAL; PERINEURAL at 15:07

## 2019-11-06 RX ADMIN — ATORVASTATIN CALCIUM 80 MG: 80 TABLET, FILM COATED ORAL at 17:27

## 2019-11-06 RX ADMIN — SODIUM CHLORIDE 125 ML/HR: 0.9 INJECTION, SOLUTION INTRAVENOUS at 17:28

## 2019-11-06 RX ADMIN — FENTANYL CITRATE 25 MCG: 50 INJECTION, SOLUTION INTRAMUSCULAR; INTRAVENOUS at 15:08

## 2019-11-06 RX ADMIN — FAMOTIDINE 20 MG: 10 INJECTION, SOLUTION INTRAVENOUS at 20:53

## 2019-11-06 RX ADMIN — MIDAZOLAM 1 MG: 1 INJECTION INTRAMUSCULAR; INTRAVENOUS at 15:44

## 2019-11-06 RX ADMIN — SODIUM CHLORIDE 8 MG/HR: 9 INJECTION, SOLUTION INTRAVENOUS at 04:01

## 2019-11-06 RX ADMIN — FENTANYL CITRATE 25 MCG: 50 INJECTION, SOLUTION INTRAMUSCULAR; INTRAVENOUS at 15:44

## 2019-11-06 NOTE — OCCUPATIONAL THERAPY NOTE
Occupational Therapy Cancellation Note    Orders received and chart reviewed  OT to hold on initial eval as Pt currently planned for cardiac cath this day  Will continue to follow to be seen for OT evaluation as appropriate/when medically cleared           Sarita Sethi MS, OTR/L

## 2019-11-06 NOTE — BRIEF OP NOTE (RAD/CATH)
Cardiac catheterization:  Right radial artery    Angiography:  The coronary anatomy is a right-dominant system  The left main coronary artery is unobstructed  It bifurcates into left anterior descending left circumflex branch  The left anterior descending artery is a large vessel that reaches the apex  It contains luminal irregularity without high-grade obstruction  The circumflex is a large vessel and gives off a number of marginals and a posterolateral branch  The circumflex contains 30% obstruction in its proximal portion  There is a thin 1st marginal that has a 70% ostial obstruction  The right coronary artery is a dominant vessel that bifurcates at the crux into a PDA and right poster lateral segment branch  The right coronary artery has a diffuse 30% obstruction in its mid segment  Comment:  Mr Trinh Bhatia as  Has peptic ulcer disease with GI bleeding and no high-grade obstruction of any of his major epicardial coronary vessels  He does have a marginal branch that has  Borderline obstructive disease  In the setting of his recent GI bleed I feel a trial of medical therapy is warranted and would include the addition of an oral nitrate and up titrate of his beta-blockade  For the degree of disease I would expect this to render him angina free

## 2019-11-06 NOTE — PROGRESS NOTES
Was called to the bedside by the primary RN Meredith Milligan who reported the patient developed left upper lip and left facial swelling  The patient hemodynamically stable, no evidence of respiratory compromise, pt denies complaints of throat swelling or dysphagia  There have been 4 new medications added this admission which include aspirin, Coreg, amlodipine, and protonix  Pt reports no prior food or drug allergies  Received a full-dose aspirin on 11/04 with no evidence of allergic reaction at that time  In discussion with clinical pharmacist rare reports of angioedema from beta-blockers, or on Protonix  This may be an acute hypersensitivity reaction to the amlodipine in which there have been reports of delayed angioedema from calcium channel blocker use  Ordered x1 dose of IV Benadryl 25 mg, IV methylprednisone 125 mg, and IV Pepcid 20 mg x1  Amlodipine was discontinued  Will re-evaluate the patient after he receives these medication interventions  He was informed to notify the nursing staff if he has any acute change in his respiratory status or worsening of his symptoms      [address] [place] [address]

## 2019-11-06 NOTE — PROGRESS NOTES
Was called to the bedside by the primary RN, patient reports new onset of midsternal 7/10 chest pain/pressure after getting out of his chair going back to bed  No other associated symptoms  Patient had received x2 doses of sublingual nitroglycerin with complete resolution in his chest pain  Hemodynamics remained stable  A 12 lead ECG was performed, which showed normal sinus rhythm, with new/developing Q-waves in the inferior leads, and hyperacute T-waves in the septal leads  Pt states his left facial swelling/numbness has improved after prior medication interventions  Still does have evidence of left upper lip swelling  Still denies any respiratory issues, throat swelling, or dysphagia  Will plan for patient to undergo cardiac catheterization this afternoon  Patient is agreeable to this plan discussed this plan with the attending physician

## 2019-11-06 NOTE — PLAN OF CARE
Problem: Prexisting or High Potential for Compromised Skin Integrity  Goal: Skin integrity is maintained or improved  Description  INTERVENTIONS:  - Identify patients at risk for skin breakdown  - Assess and monitor skin integrity  - Assess and monitor nutrition and hydration status  - Monitor labs   - Assess for incontinence   - Turn and reposition patient  - Assist with mobility/ambulation  - Relieve pressure over bony prominences  - Avoid friction and shearing  - Provide appropriate hygiene as needed including keeping skin clean and dry  - Evaluate need for skin moisturizer/barrier cream  - Collaborate with interdisciplinary team   - Patient/family teaching  - Consider wound care consult   Outcome: Progressing     Problem: Potential for Falls  Goal: Patient will remain free of falls  Description  INTERVENTIONS:  - Assess patient frequently for physical needs  -  Identify cognitive and physical deficits and behaviors that affect risk of falls    -  [place] fall precautions as indicated by assessment   - Educate patient/family on patient safety including physical limitations  - Instruct patient to call for assistance with activity based on assessment  - Modify environment to reduce risk of injury  - Consider OT/PT consult to assist with strengthening/mobility  Outcome: Progressing     Problem: CARDIOVASCULAR - ADULT  Goal: Maintains optimal cardiac output and hemodynamic stability  Description  INTERVENTIONS:  - Monitor I/O, vital signs and rhythm  - Monitor for S/S and trends of decreased cardiac output  - Administer and titrate ordered vasoactive medications to optimize hemodynamic stability  - Assess quality of pulses, skin color and temperature  - Assess for signs of decreased coronary artery perfusion  - Instruct patient to report change in severity of symptoms  Outcome: Progressing  Goal: Absence of cardiac dysrhythmias or at baseline rhythm  Description  INTERVENTIONS:  - Continuous cardiac monitoring, vital signs, obtain 12 lead EKG if ordered  - Administer antiarrhythmic and heart rate control medications as ordered  - Monitor electrolytes and administer replacement therapy as ordered  Outcome: Progressing     Problem: PAIN - ADULT  Goal: Verbalizes/displays adequate comfort level or baseline comfort level  Description  Interventions:  - Encourage patient to monitor pain and request assistance  - Assess pain using appropriate pain scale  - Administer analgesics based on type and severity of pain and evaluate response  - Implement non-pharmacological measures as appropriate and evaluate response  - Consider cultural and social influences on pain and pain management  - Notify physician/advanced practitioner if interventions unsuccessful or patient reports new pain  Outcome: Progressing     Problem: Knowledge Deficit  Goal: Patient/family/caregiver demonstrates understanding of disease process, treatment plan, medications, and discharge instructions  Description  Complete learning assessment and assess knowledge base    Interventions:  - Provide teaching at level of understanding  - Provide teaching via preferred learning methods  Outcome: Progressing

## 2019-11-06 NOTE — ORTHOTIC NOTE
Orthotic Note            Date: 11/6/2019      Patient Name: Jean Schultz        Time: 12:00pm    Reason for Consult:  Patient Active Problem List   Diagnosis    Elevated troponin    GI bleed    HTN (hypertension)    Acute left ankle pain    Acute pain of left shoulder    Left ankle sprain   XL Airsport Aircast LLE   Per Orthopedics  Shoe Size 13E+    I measured, fit, and donned XL Airsport Aircast to patient's LLE while he was sitting up in chair at bedside  Patient tolerated well and daughter present during time of fitting and education  My contact information and instructions at bedside  I will continue daily follow up with patient  Recommendations:  Please call Mobility Coordinator at ext  [phone] in regards to bracing instruction and/or adjustment  shelton Spina Mobility Coordinator LCFo, LCOF, ASOP R  O T, O B T

## 2019-11-06 NOTE — PROGRESS NOTES
General Cardiology   Progress Note -  [employer]   Tessy Milligan 52 y o  male MRN: [id number]    Unit/Bed#: [facility] 530-01 Encounter: [id number]    Assessment  1  Elevated troponin; likely represents a type II MI in the setting of GIB/anemia, w/possible underlying CAD   -Currently w/o c/o chest pain  -Troponin elevation: 19 50-21 1-22 1; proBNP 544  -No ischemic changes on 12 Lead ECG (11/3-11/4)  -2D echo 11/4: preserved LVEF, no significant valve abnormalities  -Episode of chest pain EGD postprocedure 11/5; received x1 dose of sublingual nitro, resolution of CP, no acute ischemic changes on ECG  -On aspirin, statin, and BB     2  GIB  -Underwent EGD on 11/04--which revealed multiple small, superficial ulcers in the antrum, duodenal bulb and 1st part of the duodenum, there was a single large crater ulcer in the antrum with no hemorrhage status post clipping x2; multiple biopsies performed in the fundus of the stomach and body of the stomach  -HGB stable at 9 5  -On IV Protonix infusion     3  HTN  -avg 143/73, last recorded at 151/86, HR 71  -on coreg 6 125 mg BID + novasc 5 mg daily    4  Left ankle swelling/pain  -Was previously diagnosed with a left ankle sprain at [address] [place] [address] still having residual discomfort/tenderness and swelling        Plan  -Tentative plan for cardiac catheterization today; maintain NPO status  -Order aspirin 324 mg x1  -Continue statin and BB--will up titrate Coreg to 12 5 mg b i d--for improved BP control  -Will consult Orthopedics in regards to left ankle pain, and left shoulder pain  -Continue to closely monitor volume status, strict I&Os  -Continue to closely monitor renal function and electrolytes--all of which are stable   -Continue to closely monitor on telemetry      Subjective  Review of Systems   Constitution: Negative for chills, fever and malaise/fatigue  HENT: Negative for congestion  Eyes: Negative for visual disturbance     Cardiovascular: Negative for chest pain, Guilherme Breaux was [facility] to [facility] from [facility] via wheelchair accompanied by RN.   Reason for hospitalization is TIA.   Upon arrival, patient is stable.  Patient oriented to bed, call light, , room and unit.  Patient provided with the following educational materials upon admission:safety.   Level of understanding patient verbalized understanding.   Admission orders received at this time.   Charge RN notified of patient arrival.   See Epic documentation for patient individualized nursing care plan.   cyanosis, dyspnea on exertion, irregular heartbeat, leg swelling, near-syncope, orthopnea, palpitations and syncope  Respiratory: Negative for cough and shortness of breath  Musculoskeletal: Positive for back pain, joint pain and joint swelling  Left shoulder pain  Left ankle pain  Left lower back pain improved   Gastrointestinal: Negative for bloating, abdominal pain, constipation, diarrhea, nausea and vomiting  Genitourinary: Negative for dysuria  Neurological: Negative for dizziness, focal weakness, headaches, light-headedness and weakness  All other systems reviewed and are negative  Objective:   Physical Exam   Constitutional: He is oriented to person, place, and time  He appears well-developed and well-nourished  No distress  HENT:   Head: Normocephalic and atraumatic  Mouth/Throat: Oropharynx is clear and moist    Eyes: No scleral icterus  Neck: No JVD present  Cardiovascular: Normal rate, regular rhythm, normal heart sounds and intact distal pulses  No murmur heard  Pulmonary/Chest: Effort normal and breath sounds normal    Abdominal: Soft  Bowel sounds are normal  There is no tenderness  Obese   Musculoskeletal: Normal range of motion  He exhibits edema  Left ankle swelling   Neurological: He is alert and oriented to person, place, and time  Skin: Skin is warm and dry  Capillary refill takes less than 2 seconds  He is not diaphoretic  Psychiatric: He has a normal mood and affect  Nursing note and vitals reviewed  Vitals: Blood pressure 151/86, pulse 71, temperature 98 8 °F (37 1 °C), resp  rate 18, height 5' 11" (1 803 m), weight 120 kg (264 lb 1 8 oz), SpO2 98 %  ,     Body mass index is 36 84 kg/m²  ,   Systolic (43AMX), OV , Min:129 , OEC:331     Diastolic (59IYD), RBE:03, Min:62, Max:86      Intake/Output Summary (Last 24 hours) at 2019 0858  Last data filed at 2019 0700  Gross per 24 hour   Intake 457 58 ml   Output 400 ml   Net 57 58 ml Weight (last 2 days)     Date/Time   Weight    11/06/19 0500   120 (264 11)    11/05/19 0600   121 (267)    11/04/19 0600   123 (271 17)              LABORATORY RESULTS  Results from last 7 days   Lab Units 11/04/19  0556 11/04/19  0317 11/04/19  0008   TROPONIN I ng/mL 22 10* 21 10* 19 50*     CBC with diff: Results from last 7 days   Lab Units 11/06/19  0551 11/05/19  1233 11/05/19  0611 11/05/19  0008 11/04/19  1701 11/04/19  1204 11/04/19  0556 11/04/19  0011   WBC Thousand/uL 19 81*  --  21 05*  --   --   --  24 35* 27 30*   HEMOGLOBIN g/dL 9 5* 9 8* 9 3* 9 1* 10 4* 8 3* 7 4* 8 3*   HEMATOCRIT % 28 8*  --  28 7*  --  31 3*  --  22 7* 24 9*   MCV fL 92  --  92  --   --   --  92 90   PLATELETS Thousands/uL 350  --  304  --   --   --  244 296   MCH pg 30 3  --  29 9  --   --   --  30 0 30 1   MCHC g/dL 33 0  --  32 4  --   --   --  32 6 33 3   RDW % 15 2*  --  15 1  --   --   --  15 2* 15 2*   MPV fL 9 7  --  9 9  --   --   --  9 8 11 3   NRBC AUTO /100 WBCs  --   --  0  --   --   --  0 0   NRBC /100 WBC  --   --   --   --   --   --   --  1       CMP:  Results from last 7 days   Lab Units 11/06/19  0551 11/05/19  0611 11/04/19  0008   POTASSIUM mmol/L 4 2 4 2 4 6   CHLORIDE mmol/L 103 104 108   CO2 mmol/L 24 24 24   BUN mg/dL 19 15 21   CREATININE mg/dL 1 09 0 96 1 02   CALCIUM mg/dL 9 2 9 1 8 6   AST U/L  --   --  120*   ALT U/L  --   --  37   ALK PHOS U/L  --   --  104   EGFR ml/min/1 73sq m 88 102 95       BMP:  Results from last 7 days   Lab Units 11/06/19  0551 11/05/19  0611 11/04/19  0008   POTASSIUM mmol/L 4 2 4 2 4 6   CHLORIDE mmol/L 103 104 108   CO2 mmol/L 24 24 24   BUN mg/dL 19 15 21   CREATININE mg/dL 1 09 0 96 1 02   CALCIUM mg/dL 9 2 9 1 8 6       Lab Results   Component Value Date    NTBNP 544 (H) 11/04/2019        Results from last 7 days   Lab Units 11/05/19  0611 11/04/19  0008   MAGNESIUM mg/dL 1 9 1 8       Results from last 7 days   Lab Units 11/04/19  0011   HEMOGLOBIN A1C % 5 5 Results from last 7 days   Lab Units 19  0551 19  0611 19  1204   INR  1 19 1 25* 1 35*       Lipid Profile:   No results found for: CHOL  Lab Results   Component Value Date    HDL 10 (L) 2019     Lab Results   Component Value Date    LDLCALC 69 2019     Lab Results   Component Value Date    TRIG 139 2019       Cardiac testing:   Results for orders placed during the hospital encounter of 19   Echo complete with contrast if indicated    [address]ias [address]  [address] [facility], [address] [facility]  [phone]    Transthoracic Echocardiogram  2D, M-mode, Doppler, and Color Doppler    Study date:  2019    Patient: Freedom Lind  MR number: [id number]  Account number: [de-identified]  : 1965  Age: 52 years  Gender: Male  Status: Inpatient  Location: Bedside  Height: 71 in  Weight: 270 4 lb  BP: 152/ 74 mmHg    Indications: Acute MI    Diagnoses: I21 4 - Non-ST elevation (NSTEMI) myocardial infarction    Sonographer:  Rach Carlin RDCS  Interpreting Physician:  Guerline Da Silva DO  Primary Physician:  Marti Ramirez DO  Referring Physician:  Maryellen Boyle MD  Group:  [facility]  Cardiology Fellow:  Rob Smart MD    SUMMARY    LEFT VENTRICLE:  Systolic function was normal  Ejection fraction was estimated to be 60 %  There were no regional wall motion abnormalities  Wall thickness was mildly increased  The changes were consistent with concentric remodeling (increased wall thickness with normal wall mass)  RIGHT VENTRICLE:  The ventricle was mildly dilated  Systolic function was normal     LEFT ATRIUM:  The atrium was mildly dilated  HISTORY: PRIOR HISTORY: Hypertension    PROCEDURE: The procedure was performed at the bedside  This was a routine study  The transthoracic approach was used  The study included complete 2D imaging, M-mode, complete spectral Doppler, and color Doppler   The heart rate was 67 bpm,  at the start of the study  Images were obtained from the parasternal, apical, subcostal, and suprasternal notch acoustic windows  Image quality was adequate  LEFT VENTRICLE: Size was normal  Systolic function was normal  Ejection fraction was estimated to be 60 %  There were no regional wall motion abnormalities  Wall thickness was mildly increased  The changes were consistent with concentric  remodeling (increased wall thickness with normal wall mass)  DOPPLER: Left ventricular diastolic function parameters were normal for the patient's age  RIGHT VENTRICLE: The ventricle was mildly dilated  Systolic function was normal     LEFT ATRIUM: The atrium was mildly dilated  RIGHT ATRIUM: Size was normal     MITRAL VALVE: Valve structure was normal  There was normal leaflet separation  DOPPLER: The transmitral velocity was within the normal range  There was no evidence for stenosis  There was no significant regurgitation  AORTIC VALVE: The valve was trileaflet  Leaflets exhibited calcification and sclerosis  DOPPLER: Transaortic velocity was within the normal range  There was no evidence for stenosis  There was no significant regurgitation  TRICUSPID VALVE: The valve structure was normal  There was normal leaflet separation  DOPPLER: The transtricuspid velocity was within the normal range  There was no evidence for stenosis  There was trace regurgitation  The tricuspid jet  envelope definition was inadequate for estimation of RV systolic pressure  There are no indirect findings (abnormal RV volume or geometry, altered pulmonary flow velocity profile, or leftward septal displacement) which would suggest  moderate or severe pulmonary hypertension  PULMONIC VALVE: Not well visualized  DOPPLER: The transpulmonic velocity was within the normal range  There was no regurgitation  PERICARDIUM: There was no pericardial effusion  The pericardium was normal in appearance      AORTA: The root exhibited normal size  SYSTEMIC VEINS: IVC: The inferior vena cava was normal in size and course  Respirophasic changes were normal     SYSTEM MEASUREMENT TABLES    2D  %FS: 31 22 %  Ao Diam: 3 05 cm  EDV(Teich): 129 15 ml  EF(Teich): 58 64 %  ESV(Teich): 53 42 ml  IVSd: 1 41 cm  LA Area: 23 36 cm2  LA Diam: 3 6 cm  LVEDV MOD A4C: 223 38 ml  LVEF MOD A4C: 61 25 %  LVESV MOD A4C: 86 56 ml  LVIDd: 5 19 cm  LVIDs: 3 57 cm  LVLd A4C: 10 17 cm  LVLs A4C: 8 7 cm  LVPWd: 1 22 cm  RA Area: 16 69 cm2  RVIDd: 4 67 cm  SV MOD A4C: 136 82 ml  SV(Teich): 75 72 ml    MM  TAPSE: 1 72 cm    PW  E': 0 08 m/s  E/E': 10 68  MV A Srinivasa: 0 76 m/s  MV Dec [place]: 3 38 m/s2  MV DecT: 252 82 ms  MV E Srinivasa: 0 86 m/s  MV E/A Ratio: 1 13  MV PHT: 73 32 ms  MVA By PHT: 3 cm2    [facility] Accredited Echocardiography Laboratory    Prepared and electronically signed by    Astrid Richards DO  Signed 04-Nov-2019 13:08:15       No results found for this or any previous visit  No results found for this or any previous visit  No procedure found  No results found for this or any previous visit  Meds/Allergies   all current active meds have been reviewed, current meds:   Current Facility-Administered Medications   Medication Dose Route Frequency    acetaminophen (TYLENOL) tablet 650 mg  650 mg Oral Q6H PRN    amLODIPine (NORVASC) tablet 5 mg  5 mg Oral Daily    atorvastatin (LIPITOR) tablet 80 mg  80 mg Oral Daily With Dinner    carvedilol (COREG) tablet 6 25 mg  6 25 mg Oral BID With Meals    lidocaine (LIDODERM) 5 % patch 1 patch  1 patch Topical Daily    nitroglycerin (NITROSTAT) SL tablet 0 4 mg  0 4 mg Sublingual Q5 Min PRN    pantoprazole (PROTONIX) 80 mg in sodium chloride 0 9 % 100 mL infusion  8 mg/hr Intravenous Continuous    sodium chloride 0 9 % infusion  125 mL/hr Intravenous Continuous    and PTA meds:   Prior to Admission Medications   Prescriptions Last Dose Informant Patient Reported? Taking?    Testosterone (ANDROGEL) 40 5 MG/2 5GM (1 62%) GEL   Yes No   Sig: Place 40 5 mg on the skin daily    lisinopril-hydrochlorothiazide (PRINZIDE,ZESTORETIC) 20-12 5 MG per tablet   Yes No   Sig: Take 1 tablet by mouth daily   naproxen (NAPROSYN) 500 mg tablet   No No   Sig: Take 1 tablet (500 mg total) by mouth 2 (two) times a day with meals   traMADol (ULTRAM) 50 mg tablet   No No   Si-2 po q 6 hours prn pain      Facility-Administered Medications: None       pantoprozole (PROTONIX) infusion (Continuous) 8 mg/hr Last Rate: 8 mg/hr (19 0401)   sodium chloride 125 mL/hr Last Rate: 125 mL/hr (19 0519)       Telemetry Review: No significant arrhythmias seen on telemetry review  EKG personally reviewed by MAX Bolton  Assessment:  Principal Problem:    Elevated troponin  Active Problems:    GI bleed    Counseling / Coordination of Care  Total floor / unit time spent today 20 minutes  Greater than 50% of total time was spent with the patient and / or family counseling and / or coordination of care  ** Please Note: Dragon 360 Dictation voice to text software may have been used in the creation of this document   **

## 2019-11-06 NOTE — RESTORATIVE TECHNICIAN NOTE
Restorative Specialist Mobility Note       Activity: Stand at bedside, Ambulate in room, Turn, Other (Comment)(OOB to [employer] in room)     Assistive Device: Other (Comment)(HHA x 2)

## 2019-11-07 ENCOUNTER — HOSPITAL ENCOUNTER (INPATIENT)
Facility: HOSPITAL | Age: 54
LOS: 2 days | Discharge: HOME HEALTH | DRG: 552 | End: 2019-11-10
Attending: EMERGENCY MEDICINE | Admitting: INTERNAL MEDICINE
Payer: COMMERCIAL

## 2019-11-07 ENCOUNTER — APPOINTMENT (EMERGENCY)
Dept: CT IMAGING | Facility: HOSPITAL | Age: 54
DRG: 552 | End: 2019-11-07
Payer: COMMERCIAL

## 2019-11-07 VITALS
OXYGEN SATURATION: 98 % | WEIGHT: 264.99 LBS | HEART RATE: 70 BPM | TEMPERATURE: 98.1 F | RESPIRATION RATE: 18 BRPM | BODY MASS INDEX: 37.1 KG/M2 | HEIGHT: 71 IN | SYSTOLIC BLOOD PRESSURE: 143 MMHG | DIASTOLIC BLOOD PRESSURE: 68 MMHG

## 2019-11-07 DIAGNOSIS — M54.50 ACUTE LEFT-SIDED LOW BACK PAIN WITHOUT SCIATICA: Primary | ICD-10-CM

## 2019-11-07 LAB
ANION GAP SERPL CALCULATED.3IONS-SCNC: 10 MMOL/L (ref 4–13)
ANION GAP SERPL CALCULATED.3IONS-SCNC: 8 MMOL/L (ref 4–13)
BASOPHILS # BLD AUTO: 0.04 THOUSANDS/ΜL (ref 0–0.1)
BASOPHILS NFR BLD AUTO: 0 % (ref 0–1)
BUN SERPL-MCNC: 26 MG/DL (ref 5–25)
BUN SERPL-MCNC: 32 MG/DL (ref 5–25)
CALCIUM SERPL-MCNC: 8.7 MG/DL (ref 8.3–10.1)
CALCIUM SERPL-MCNC: 8.9 MG/DL (ref 8.3–10.1)
CHLORIDE SERPL-SCNC: 100 MMOL/L (ref 100–108)
CHLORIDE SERPL-SCNC: 104 MMOL/L (ref 100–108)
CO2 SERPL-SCNC: 20 MMOL/L (ref 21–32)
CO2 SERPL-SCNC: 25 MMOL/L (ref 21–32)
CREAT SERPL-MCNC: 1.21 MG/DL (ref 0.6–1.3)
CREAT SERPL-MCNC: 1.3 MG/DL (ref 0.6–1.3)
EOSINOPHIL # BLD AUTO: 0.02 THOUSAND/ΜL (ref 0–0.61)
EOSINOPHIL NFR BLD AUTO: 0 % (ref 0–6)
ERYTHROCYTE [DISTWIDTH] IN BLOOD BY AUTOMATED COUNT: 14.6 % (ref 11.6–15.1)
ERYTHROCYTE [DISTWIDTH] IN BLOOD BY AUTOMATED COUNT: 14.9 % (ref 11.6–15.1)
GFR SERPL CREATININE-BSD FRML MDRD: 71 ML/MIN/1.73SQ M
GFR SERPL CREATININE-BSD FRML MDRD: 77 ML/MIN/1.73SQ M
GLUCOSE SERPL-MCNC: 110 MG/DL (ref 65–140)
GLUCOSE SERPL-MCNC: 214 MG/DL (ref 65–140)
HCT VFR BLD AUTO: 26.3 % (ref 36.5–49.3)
HCT VFR BLD AUTO: 27.9 % (ref 36.5–49.3)
HGB BLD-MCNC: 8.6 G/DL (ref 12–17)
HGB BLD-MCNC: 9 G/DL (ref 12–17)
IMM GRANULOCYTES # BLD AUTO: 0.48 THOUSAND/UL (ref 0–0.2)
IMM GRANULOCYTES NFR BLD AUTO: 2 % (ref 0–2)
LYMPHOCYTES # BLD AUTO: 1.84 THOUSANDS/ΜL (ref 0.6–4.47)
LYMPHOCYTES NFR BLD AUTO: 7 % (ref 14–44)
MCH RBC QN AUTO: 30.1 PG (ref 26.8–34.3)
MCH RBC QN AUTO: 30.3 PG (ref 26.8–34.3)
MCHC RBC AUTO-ENTMCNC: 32.3 G/DL (ref 31.4–37.4)
MCHC RBC AUTO-ENTMCNC: 32.7 G/DL (ref 31.4–37.4)
MCV RBC AUTO: 93 FL (ref 82–98)
MCV RBC AUTO: 93 FL (ref 82–98)
MONOCYTES # BLD AUTO: 1.41 THOUSAND/ΜL (ref 0.17–1.22)
MONOCYTES NFR BLD AUTO: 6 % (ref 4–12)
NEUTROPHILS # BLD AUTO: 21.98 THOUSANDS/ΜL (ref 1.85–7.62)
NEUTS SEG NFR BLD AUTO: 85 % (ref 43–75)
NRBC BLD AUTO-RTO: 0 /100 WBCS
PLATELET # BLD AUTO: 404 THOUSANDS/UL (ref 149–390)
PLATELET # BLD AUTO: 450 THOUSANDS/UL (ref 149–390)
PMV BLD AUTO: 10.1 FL (ref 8.9–12.7)
PMV BLD AUTO: 9.6 FL (ref 8.9–12.7)
POTASSIUM SERPL-SCNC: 4 MMOL/L (ref 3.5–5.3)
POTASSIUM SERPL-SCNC: 4.2 MMOL/L (ref 3.5–5.3)
RBC # BLD AUTO: 2.84 MILLION/UL (ref 3.88–5.62)
RBC # BLD AUTO: 2.99 MILLION/UL (ref 3.88–5.62)
SODIUM SERPL-SCNC: 133 MMOL/L (ref 136–145)
SODIUM SERPL-SCNC: 134 MMOL/L (ref 136–145)
WBC # BLD AUTO: 21.08 THOUSAND/UL (ref 4.31–10.16)
WBC # BLD AUTO: 25.77 THOUSAND/UL (ref 4.31–10.16)

## 2019-11-07 PROCEDURE — 85027 COMPLETE CBC AUTOMATED: CPT | Performed by: STUDENT IN AN ORGANIZED HEALTH CARE EDUCATION/TRAINING PROGRAM

## 2019-11-07 PROCEDURE — 85025 COMPLETE CBC W/AUTO DIFF WBC: CPT | Performed by: FAMILY MEDICINE

## 2019-11-07 PROCEDURE — 97166 OT EVAL MOD COMPLEX 45 MIN: CPT

## 2019-11-07 PROCEDURE — 86140 C-REACTIVE PROTEIN: CPT | Performed by: PHYSICIAN ASSISTANT

## 2019-11-07 PROCEDURE — C9113 INJ PANTOPRAZOLE SODIUM, VIA: HCPCS | Performed by: NURSE PRACTITIONER

## 2019-11-07 PROCEDURE — 80048 BASIC METABOLIC PNL TOTAL CA: CPT | Performed by: STUDENT IN AN ORGANIZED HEALTH CARE EDUCATION/TRAINING PROGRAM

## 2019-11-07 PROCEDURE — NC001 PR NO CHARGE: Performed by: INTERNAL MEDICINE

## 2019-11-07 PROCEDURE — 99285 EMERGENCY DEPT VISIT HI MDM: CPT

## 2019-11-07 PROCEDURE — 80048 BASIC METABOLIC PNL TOTAL CA: CPT | Performed by: FAMILY MEDICINE

## 2019-11-07 PROCEDURE — 82550 ASSAY OF CK (CPK): CPT | Performed by: PHYSICIAN ASSISTANT

## 2019-11-07 PROCEDURE — G8987 SELF CARE CURRENT STATUS: HCPCS

## 2019-11-07 PROCEDURE — 36415 COLL VENOUS BLD VENIPUNCTURE: CPT | Performed by: FAMILY MEDICINE

## 2019-11-07 PROCEDURE — 99239 HOSP IP/OBS DSCHRG MGMT >30: CPT | Performed by: INTERNAL MEDICINE

## 2019-11-07 PROCEDURE — G8978 MOBILITY CURRENT STATUS: HCPCS

## 2019-11-07 PROCEDURE — 93005 ELECTROCARDIOGRAM TRACING: CPT

## 2019-11-07 PROCEDURE — G8989 SELF CARE D/C STATUS: HCPCS

## 2019-11-07 PROCEDURE — 72131 CT LUMBAR SPINE W/O DYE: CPT

## 2019-11-07 PROCEDURE — 97163 PT EVAL HIGH COMPLEX 45 MIN: CPT

## 2019-11-07 PROCEDURE — 96374 THER/PROPH/DIAG INJ IV PUSH: CPT

## 2019-11-07 PROCEDURE — G8979 MOBILITY GOAL STATUS: HCPCS

## 2019-11-07 PROCEDURE — G8988 SELF CARE GOAL STATUS: HCPCS

## 2019-11-07 RX ORDER — ISOSORBIDE MONONITRATE 30 MG/1
30 TABLET, EXTENDED RELEASE ORAL DAILY
Qty: 30 TABLET | Refills: 2 | Status: SHIPPED | OUTPATIENT
Start: 2019-11-08 | End: 2020-02-05 | Stop reason: SDUPTHER

## 2019-11-07 RX ORDER — CARVEDILOL 25 MG/1
25 TABLET ORAL 2 TIMES DAILY WITH MEALS
Status: DISCONTINUED | OUTPATIENT
Start: 2019-11-07 | End: 2019-11-07 | Stop reason: HOSPADM

## 2019-11-07 RX ORDER — DIAZEPAM 5 MG/ML
5 INJECTION, SOLUTION INTRAMUSCULAR; INTRAVENOUS ONCE
Status: COMPLETED | OUTPATIENT
Start: 2019-11-07 | End: 2019-11-08

## 2019-11-07 RX ORDER — NITROGLYCERIN 0.4 MG/1
0.4 TABLET SUBLINGUAL
Qty: 30 TABLET | Refills: 0 | Status: SHIPPED | OUTPATIENT
Start: 2019-11-07 | End: 2020-06-29 | Stop reason: HOSPADM

## 2019-11-07 RX ORDER — CLOPIDOGREL BISULFATE 75 MG/1
75 TABLET ORAL DAILY
Status: DISCONTINUED | OUTPATIENT
Start: 2019-11-07 | End: 2019-11-07 | Stop reason: HOSPADM

## 2019-11-07 RX ORDER — MORPHINE SULFATE 10 MG/ML
8 INJECTION, SOLUTION INTRAMUSCULAR; INTRAVENOUS ONCE
Status: COMPLETED | OUTPATIENT
Start: 2019-11-07 | End: 2019-11-07

## 2019-11-07 RX ORDER — LIDOCAINE 50 MG/G
1 PATCH TOPICAL ONCE
Status: COMPLETED | OUTPATIENT
Start: 2019-11-07 | End: 2019-11-08

## 2019-11-07 RX ORDER — FENTANYL CITRATE 50 UG/ML
1 INJECTION, SOLUTION INTRAMUSCULAR; INTRAVENOUS ONCE
Status: COMPLETED | OUTPATIENT
Start: 2019-11-07 | End: 2019-11-07

## 2019-11-07 RX ORDER — CLOPIDOGREL BISULFATE 75 MG/1
75 TABLET ORAL DAILY
Qty: 30 TABLET | Refills: 2 | Status: SHIPPED | OUTPATIENT
Start: 2019-11-08 | End: 2020-02-13 | Stop reason: SDUPTHER

## 2019-11-07 RX ORDER — LIDOCAINE 50 MG/G
1 PATCH TOPICAL DAILY
Qty: 7 PATCH | Refills: 0 | Status: SHIPPED | OUTPATIENT
Start: 2019-11-08 | End: 2020-06-29 | Stop reason: HOSPADM

## 2019-11-07 RX ORDER — CARVEDILOL 25 MG/1
25 TABLET ORAL 2 TIMES DAILY WITH MEALS
Qty: 60 TABLET | Refills: 2 | Status: SHIPPED | OUTPATIENT
Start: 2019-11-07 | End: 2020-02-13 | Stop reason: SDUPTHER

## 2019-11-07 RX ORDER — ATORVASTATIN CALCIUM 80 MG/1
80 TABLET, FILM COATED ORAL
Qty: 30 TABLET | Refills: 2 | Status: SHIPPED | OUTPATIENT
Start: 2019-11-07 | End: 2020-02-13 | Stop reason: SDUPTHER

## 2019-11-07 RX ORDER — PANTOPRAZOLE SODIUM 40 MG/1
40 TABLET, DELAYED RELEASE ORAL
Qty: 120 TABLET | Refills: 0 | Status: SHIPPED | OUTPATIENT
Start: 2019-11-07 | End: 2020-01-20

## 2019-11-07 RX ORDER — ACETAMINOPHEN 325 MG/1
650 TABLET ORAL EVERY 6 HOURS PRN
Qty: 30 TABLET | Refills: 0 | Status: SHIPPED | OUTPATIENT
Start: 2019-11-07 | End: 2019-11-10 | Stop reason: HOSPADM

## 2019-11-07 RX ORDER — PANTOPRAZOLE SODIUM 40 MG/1
40 TABLET, DELAYED RELEASE ORAL
Status: DISCONTINUED | OUTPATIENT
Start: 2019-11-07 | End: 2019-11-07 | Stop reason: HOSPADM

## 2019-11-07 RX ADMIN — MORPHINE SULFATE 8 MG: 10 INJECTION INTRAVENOUS at 23:16

## 2019-11-07 RX ADMIN — FAMOTIDINE 20 MG: 10 INJECTION, SOLUTION INTRAVENOUS at 09:15

## 2019-11-07 RX ADMIN — LIDOCAINE 1 PATCH: 50 PATCH TOPICAL at 23:15

## 2019-11-07 RX ADMIN — ISOSORBIDE MONONITRATE 30 MG: 30 TABLET, EXTENDED RELEASE ORAL at 09:06

## 2019-11-07 RX ADMIN — LIDOCAINE 1 PATCH: 50 PATCH TOPICAL at 09:06

## 2019-11-07 RX ADMIN — METHYLPREDNISOLONE SODIUM SUCCINATE 40 MG: 40 INJECTION, POWDER, FOR SOLUTION INTRAMUSCULAR; INTRAVENOUS at 05:37

## 2019-11-07 RX ADMIN — PANTOPRAZOLE SODIUM 40 MG: 40 TABLET, DELAYED RELEASE ORAL at 09:28

## 2019-11-07 RX ADMIN — CARVEDILOL 12.5 MG: 12.5 TABLET, FILM COATED ORAL at 09:05

## 2019-11-07 RX ADMIN — CLOPIDOGREL 75 MG: 75 TABLET, FILM COATED ORAL at 09:28

## 2019-11-07 RX ADMIN — SODIUM CHLORIDE 8 MG/HR: 9 INJECTION, SOLUTION INTRAVENOUS at 05:37

## 2019-11-07 RX ADMIN — METHYLPREDNISOLONE SODIUM SUCCINATE 40 MG: 40 INJECTION, POWDER, FOR SOLUTION INTRAMUSCULAR; INTRAVENOUS at 00:35

## 2019-11-07 NOTE — DISCHARGE SUMMARY
Discharge Summary - Tessy Milligan 52 y o  male MRN: [id number]    Unit/Bed#: [facility] 530-01 Encounter: [id number]    Admission Date: 11/3/2019   Discharge Date:  11/07/2019    Disposition: Home    Discharge Diagnosis:   1  Type 2 MI  2  CAD  3  Acute blood loss anemia/GIB  4   Acute angioedema    Secondary Diagnoses:  1  Hypertension  2  Left ankle sprain    Consultants:   1  Gastroenterology  2  Orthopedic surgery    HPI and Hospital Course: Tessy Milligan is a 57-year-old gentleman with a past medical history of hypertension, and low testosterone who initially presented to the [facility] on on 11/02/2019 with report of x2 episodes of dark tarry/black stool, chest pain and a near syncopal episode  He was found to have an acute drop in his hemoglobin level to 9  He received x2 units of PRBCs  Additional lab work revealed a troponin level initially at 1 4 to 5 6  Continued to have reports of chest pain  Initial 12 lead ECG with no acute ischemic changes  He was started on a IV nitroglycerin infusion  Decision was made to transfer the patient to [address] on 11/03 for further management by the Cardiology and GI services  He arrived to the [facility] ICU for further management/hemodynamic monitoring  Repeat HGB level of 7 4, he had received an additional x2 units of PRBC's  As his hemoglobin stabilized, he had no additional reports of chest pains  His IV nitroglycerin infusion was weaned off  His troponins continued to trend up reaching a level of 22 1  ECGs still without ischemic changes  Cardiology had evaluated the patient and ultimately recommended cardiac catheterization for for further ischemic eval, however opted for GI to 1st clear the patient with further workup      GI evaluated the patient, recommended EGD, in which the patient had undergone on 11/05/2019, which demonstrated multiple ulcers in the stomach and 1st part of the duodenum, a single large crater ulcer was found in the antrum of the stomach, with no evidence of acute hemorrhage; this ulcer underwent clipping x 2  He remained on a IV Protonix infusion  His hemoglobin level stabilized up to 9 8  Following this procedure on 11/05 the patient had an episode of chest pain which was relieved with x2 doses of sublingual nitroglycerin  Again there were no acute ischemic changes on his ECG  On 11/06, the patient suddenly developed angioedema with left upper lip and left facial swelling/numbness  His cardiac catheterization was placed on hold for the time being  After careful review of his medications it was presumed that newly started amlodipine was likely the cause of this hypersensitivity reaction  This medication was discontinued  He was previously taking lisinopril however had not been taking during this admission, and had aspirin in the past with no problems  He received treatment for his angioedema with IV ppi, Benadryl, and methylprednisone  He had improvement in his symptoms  He developed chest pain the afternoon of 11/6, ECG demonstrated an old Q-wave in lead 3, and peaked T-waves in the septal leads, he received x2 doses of sublingual nitroglycerin with complete resolution of his symptoms  Decision was made for the patient to undergo cardiac catheterization in the afternoon on 11/06 after these complaints of chest pain and new ECG changes  Cardiac catheterization demonstrated right circulation dominant, 70% ostial OM1 stenosis--small vessel, and 30% stenosis of the proximal circumflex and 30% stenosis of the mid RCA  No intervention was performed, recommended medical management of the OM1 lesion  On 11/07, patient had complete resolution of his left upper lip/left facial swelling  In light of recent discovery of gastric ulcers/GIB, decision was made to start the patient on clopidogrel 75 mg daily as the single anti-platelet agent, and not use aspirin    He was continued on carvedilol (started this admission and up titrated  to max dose of 25 mg b i d  He was also continued on high-intensity statin therapy, and initiated on Imdur 30 mg daily  In discussion with the GI service, the patient will continue oral Protonix 40 mg b i d  for the next 30 days  He will be re-evaluated by GI as an outpatient and undergo a repeat EGD in approximately 1 month  During this hospitalization patient was also seen by the orthopedic surgery service for a recent diagnosis of a left ankle sprain  He received a left ankle brace and boot for his left foot  He will follow up with the Sports Medicine Service as an outpatient  He will follow up with Ruben Torres with the cardiology service on 11/20/2019  He will be ordered outpatient lab work including a BMP and CBC for 11/15/2019  A referral for cardiac rehab has been placed  Procedures/cardiac testing  1  2D echocardiogram 11/04/2019:  LVEF 60%, no regional wall motion abnormalities, wall thickness was mildly increased, RV mildly dilated systolic function was normal, LA mildly dilated, RA normal size, trace TR    2  Cardiac catheterization 11/06/2019:  Report per Dr Jacinto Marcos darkened  ---Coronary circulation  -Left main: Normal   -LAD: Angiography showed minor luminal irregularities   -Proximal circumflex: There was a 30 % stenosis  -1st obtuse marginal: There was a 70 % stenosis at the ostium of the vessel segment  -Mid RCA: There was a diffuse 30 % stenosis    3  X-ray left shoulder 11/06/2019:  No acute osseous abnormality    4  X-ray left foot 10/27/2019:  No acute osseous abnormality    Pertinent Labs  Results for Jakub Bledsoe (MRN [id number]) as of 11/7/2019 13:24   Ref   Range 11/7/2019 05:23   Sodium Latest Ref Range: 136 - 145 mmol/L 134 (L)   Potassium Latest Ref Range: 3 5 - 5 3 mmol/L 4 2   Chloride Latest Ref Range: 100 - 108 mmol/L 104   CO2 Latest Ref Range: 21 - 32 mmol/L 20 (L)   Anion Gap Latest Ref Range: 4 - 13 mmol/L 10   BUN Latest Ref Range: 5 - 25 mg/dL 26 (H)   Creatinine Latest Ref Range: 0 60 - 1 30 mg/dL 1 21   Glucose, Random Latest Ref Range: 65 - 140 mg/dL 214 (H)   Calcium Latest Ref Range: 8 3 - 10 1 mg/dL 8 9   eGFR Latest Units: ml/min/1 73sq m 77   WBC Latest Ref Range: 4 31 - 10 16 Thousand/uL 21 08 (H)   Red Blood Cell Count Latest Ref Range: 3 88 - 5 62 Million/uL 2 99 (L)   Hemoglobin Latest Ref Range: 12 0 - 17 0 g/dL 9 0 (L)   HCT Latest Ref Range: 36 5 - 49 3 % 27 9 (L)   MCV Latest Ref Range: 82 - 98 fL 93   MCH Latest Ref Range: 26 8 - 34 3 pg 30 1   MCHC Latest Ref Range: 31 4 - 37 4 g/dL 32 3   RDW Latest Ref Range: 11 6 - 15 1 % 14 9   Platelet Count Latest Ref Range: 149 - 390 Thousands/uL 404 (H)   MPV Latest Ref Range: 8 9 - 12 7 fL 10 1     Results for Zeenat Sosa (MRN [id number]) as of 11/7/2019 13:24   Ref  Range 8/25/2019 01:30 8/25/2019 04:32 11/4/2019 00:08 11/4/2019 03:17 11/4/2019 05:56   Troponin I Latest Ref Range: <=0 04 ng/mL <0 02 <0 02 19 50 (H) 21 10 (H) 22 10 (H)     Results for Zeenat Sosa (MRN [id number]) as of 11/7/2019 13:24   Ref  Range 11/4/2019 00:08   NT-proBNP Latest Ref Range: <125 pg/mL 544 (H)     Results for Zeenat Sosa (MRN [id number]) as of 11/7/2019 13:24   Ref  Range 11/4/2019 05:56   Cholesterol Latest Ref Range: 50 - 200 mg/dL 107   Triglycerides Latest Ref Range: <=150 mg/dL 139   HDL Latest Ref Range: >=40 mg/dL 10 (L)   LDL Direct Latest Ref Range: 0 - 100 mg/dL 69     Results for Zeenat Sosa (MRN [id number]) as of 11/7/2019 13:24   Ref  Range 11/4/2019 00:11   Hemoglobin A1C Latest Ref Range: 4 2 - 6 3 % 5 5     Vital signs:  11/07/19 1043  98 3 °F (36 8 °C)  71  18  133/68  98 %       Condition at Discharge: good     Discharge Medications:  See after visit summary for reconciled discharge medications provided to patient and family        Discharge instructions/Information to patient and family:   See after visit summary for information provided to patient and family  Provisions for Follow-Up Care:  See after visit summary for information related to follow-up care and any pertinent home health orders  Planned Readmission: No    Discharge Statement   I spent 45 minutes minutes discharging the patient  This time was spent on the day of discharge  I had direct contact with the patient on the day of discharge  Additional documentation is required if more than 30 minutes were spent on discharge

## 2019-11-07 NOTE — PHYSICAL THERAPY NOTE
Physical Therapy Evaluation     Patient's Name: Florence Maki    Admitting Diagnosis  Elevated troponin [R79 89]    Problem List  Patient Active Problem List   Diagnosis    Elevated troponin    GI bleed    HTN (hypertension)    Acute left ankle pain    Acute pain of left shoulder    Left ankle sprain       Past Medical History  Past Medical History:   Diagnosis Date    Hypertension     stopped his meds when ran out several years ago       Past Surgical History  Past Surgical History:   Procedure Laterality Date    NO PAST SURGERIES          11/07/19 0936   Note Type   Note type Eval only   Pain Assessment   Pain Assessment 0-10   Pain Score 2   Pain Type Acute pain   Pain Location Ankle   Home Living   Type of [address] [place] [address] One level   Bathroom Shower/Tub Tub/shower unit   Bathroom Toilet Standard   Bathroom Equipment Grab bars in shower   Bathroom Accessibility Accessible   Home Equipment Crutches   Additional Comments  PRIOR TO THIS ADMISSION PATIENT RESIDED ALONE IN ONE LEVEL HOME (2 BURAK, 2 STEPS INSIDE HOUSE) WHERE HE REPORTS PRIOR I WITH MOBILITY (WAS USING CRUTCHES MOST RECENTLY S/P ANKLE INJURY HOWEVER NORMALLY IS I), ADLS, AND IADLS  Prior Function   Level of [place] Independent with ADLs and functional mobility   Lives With Alone   Receives Help From Family   ADL Assistance Independent   IADLs Independent   Falls in the last 6 months 0   Vocational Full time employment   Restrictions/Precautions   Weight Bearing Precautions Per Order No   Braces or Orthoses LE Braces  (ANKLE SPLINT L LE )   Other Precautions Pain; Fall Risk   General   Family/Caregiver Present Yes   Cognition   Overall Cognitive Status WFL   RUE Assessment   RUE Assessment WFL   LUE Assessment   LUE Assessment X  (SHOULDER ROM ONLY TO 90 DEGREES (REPORTS PAIN) )   RLE Assessment   RLE Assessment WFL   LLE Assessment   LLE Assessment WFL   Bed Mobility   Supine to Sit 5  Supervision   Sit to Supine Unable to assess Transfers   Sit to Stand 5  Supervision   Stand to Sit 5  Supervision   Ambulation/Elevation   Gait pattern Excessively slow; Antalgic   Gait Assistance 5  Supervision   Assistive Device Rolling walker   Distance 30 FEET X2   Stair Management Assistance Not tested   Balance   Static Sitting Good   Static Standing Fair +   Ambulatory Fair   Endurance Deficit   Endurance Deficit Yes   Endurance Deficit Description PAIN IN L ANKLE UPON WB   Activity Tolerance   Activity Tolerance Patient tolerated treatment well   Medical Staff Made Aware JANICE CH    Nurse Made Aware GABRIEL TO SEE PER RN DAVID    Assessment   Prognosis Good   Problem List Decreased mobility; Impaired balance;Pain   Assessment PT COMPLETED EVALUATION OF 52YEAR OLD MALE AD[facility]ITTED [facility] ON 11/3/[facility] AS TRANSFER FROM [facility] WITH 2 EPISODES OF DARK/TARRY STOOL, CHEST PAIN, AND SYNCOPAL EPISODE  DIAGNOSIS INCLUDES GIB, TYPE II MI, L SHOULDER PAIN (XRAY L SHOULDER 11/6 NEGATIVE FOR ACUTE ABNORMALITIES), AND L ANKLE SPRAIN (XRAY 10/27/10 NEGATIVE FOR ACUTE ABNORMALITIES, PROVIDED WITH SPLINT THIS ADMISSION, FOLLOW UP OUTPT WITH ORTHOPEDICS)  PATIENT REPORTS HE IS AVID ROLLERSCATING AND LIKELY INJURED ANKLE WHILE SKATING  HE IS S/P CARDIAC CATH ON 11/6/19  CURRENT STATUS INSTABILITIES INCLUDE PAIN, MULTIPLE LINES, FALLS RISK, AND A REGRESSION IN FUNCTIONAL STATUS FROM BASELINE  PMH IS SIGNIFICANT FOR HTN, LE EDEMA, AND BACK PAIN  PRIOR TO THIS ADMISSION PATIENT RESIDED ALONE IN ONE LEVEL HOME (2 BURAK, 2 STEPS INSIDE HOUSE) WHERE HE REPORTS PRIOR I WITH MOBILITY (WAS USING CRUTCHES MOST RECENTLY S/P ANKLE INJURY HOWEVER NORMALLY IS I), ADLS, AND IADLS  CURRENT IMPAIRMENTS INCLUDE PAIN, DECREASED ACTIVITY TOLERANCE, FALLS RISK, AND GAIT DEVIATIONS  DURING PT EVALUATION PATIENT REQUIRED S FOR BED MOBILITY, SIT<-->STAND TRANSFER AND AMBULATION  HE AMBULATED 30 FEET X 2 W/ RW PRESENTING WITH MILDLY ANTALGIC GAIT AND REDUCED SPEED   PT D/C RECOMMENDATION IS FOR HOME WITH FAMILY ASSISTANCE PRN (REPORTS DGHT WILL BE STAYING WITH HIM UPON D/C), USE OF RW PRN, AND FOLLOW UP WITH OUTPATIENT PT TO ADDRESS BACK, L SHOULDER, AND ANKLE PAIN  PATIENT WILL BENEFIT FROM CONTINUED SKILLED PT THIS ADMISSION TO ACHIEVE MAXIMAL FUNCTION AND SAFETY  Goals   Patient Goals TO GO HOME    STG Expiration Date 11/14/19   Short Term Goal #1 1) PERFORM BED MOBILITY MOD-I TO PARTICIPATE IN FREQUENT REPOSITIONING AND IMPROVE SKIN INTEGRITY; 2) PERFORM SIT<-->STAND TRANSFER MOD-I TO PROMOTE I WITH TOILETING AND OOB MOBILITY; 3) AMBULATE 200 FEET MOD-I W/ LEAST RESTRICTIVE DEVICE TO PARTICIPATE IN HOUSEHOLD AND COMMUNITY LEVEL AMBULATION; 4) NAVIGATE 2 STEPS S LEVEL IN ORDER TO SAFELY NAVIGATE MULTIPLE FLOORS AT HOME  PT Treatment Day 0   Plan   Treatment/Interventions Functional transfer training;Elevations; Endurance training;Patient/family training;Equipment eval/education; Bed mobility;Gait training;OT;Spoke to nursing   PT Frequency Other (Comment)  (3-5X/WK)   Recommendation   Recommendation Home with family support; Outpatient PT   Equipment Recommended Walker  (WILL REQUIRE RW FOR D/C HOME)   PT - OK to Discharge Yes  (HOME W/ USE OF RW PRN; FAMILY ASSIST PRN; OUTPT PT )   Barthel Index   Feeding 10   Bathing 0   Grooming Score 5   Dressing Score 5   Bladder Score 10   Bowels Score 10   Toilet Use Score 10   Transfers (Bed/Chair) Score 10   Mobility (Level Surface) Score 10   Stairs Score 0   Barthel Index Score 70       Radha Paz, PT

## 2019-11-07 NOTE — PROGRESS NOTES
General Cardiology   Progress Note -  Team One   Piedad Olsen 52 y o  male MRN: [id number]    Unit/Bed#: [facility] 530-01 Encounter: [id number]    Assessment/ Plan  1  Type II MI--in the setting of anemia/GIB, superimposed on underlying CAD  -Troponin elevation: 19 50-21 1-22 1; proBNP 544  -No ischemic changes on 12 Lead ECG (11/3-11/4)   -2D echo 11/4: preserved LVEF, no significant valve abnormalities  -Episode of chest pain EGD postprocedure 11/5; received x1 dose of sublingual nitro, resolution of CP, no acute ischemic changes on ECG  -Episode of CP with exertion 11/6, relieved w/ x 2 doses of sublingual nitro  -Underwent cardiac catheterization on 11/7:  right dominant, RCA has a diffuse 30% obstruction in its mid segment, 30% proximal circumflex stenosis, thin 1st obtuse marginal 70% ostial stenosis---no intervention was performed  -On statin, and BB     2  Angioedema; resolved  -Acute onset yesterday morning; left upper lip/facial swelling, no hemodynamic or respiratory compromise  -After reviewing medication list, presumed to be r/t amlodipine--which was dc'd;   -Received x 1 dose of IV methlyprednisone 125 mg, 20 mg of IV pepcid, and 25 mg of IV benadry  -Was started on IV methlyprednisone 40 mg q6h, and IV pepcid 20 mg BID    3  GIB  -GI following  -Underwent EGD on 11/04--which revealed multiple small, superficial ulcers in the antrum, duodenal bulb and 1st part of the duodenum, there was a single large crater ulcer in the antrum with no hemorrhage status post clipping x2; multiple biopsies performed in the fundus of the stomach and body of the stomach  -HGB 9 this am (down from 9 5 yesterday)  -On IV Protonix infusion     4  HTN  -Avg 146/75, last recorded at 158/86, HR 73  -On coreg 12 5 mg BID     5  Left ankle sprain  -Orthopedics following  -Ankle brace applied to left ankle    6   Left shoulder pain  -X-ray left shoulder 11/6: No osseous abnormality  -Lidocaine patch   -PRN analgesics     Plan  -Optimize medical therapies:    ---Will order clopidogrel 75 mg daily (as a single anti-platelet agent); continue statin and BB  ---Uptitrate coreg to 12 5 mg BID  ---Continue Imdur 30 mg daily  ---Continue atorvastatin 80 mg daily  -Discussed with GI; will order oral Protonix 40 mg BID, will see pt in office and recommend repeat EGD in 1 month  -Continue to closely monitor volume status, strict I&Os  -Continue to closely monitor renal function and electrolytes--all of which are stable   -Continue to closely monitor on telemetry    Subjective  Review of Systems   Constitution: Negative for chills, fever and malaise/fatigue  HENT: Negative for congestion  Eyes: Negative for visual disturbance  Cardiovascular: Negative for chest pain, dyspnea on exertion, irregular heartbeat, leg swelling, near-syncope, orthopnea, palpitations and syncope  Respiratory: Negative for cough and shortness of breath  Musculoskeletal: Positive for joint pain and joint swelling  Negative for arthritis  Left ankle swelling  Left shoulder pain/stiffness---improved   Gastrointestinal: Negative for bloating, abdominal pain, constipation, diarrhea, nausea and vomiting  Genitourinary: Negative for dysuria  Neurological: Negative for dizziness, focal weakness, headaches, light-headedness and weakness  All other systems reviewed and are negative  Objective:   Vitals: Blood pressure 158/86, pulse 73, temperature 98 3 °F (36 8 °C), resp  rate 18, height 5' 11" (1 803 m), weight 120 kg (264 lb 1 8 oz), SpO2 96 %  ,     Body mass index is 36 84 kg/m²  ,     Systolic (51TVQ), WXN:884 , Min:132 , LUT:741     Diastolic (87RNE), XIW:48, Min:66, Max:87      Intake/Output Summary (Last 24 hours) at 11/7/2019 0857  Last data filed at 11/7/2019 0251  Gross per 24 hour   Intake 1412 92 ml   Output 600 ml   Net 812 92 ml     Weight (last 2 days)     Date/Time   Weight    11/06/19 0500   120 (264 11)    11/05/19 0600   121 (267)            Telemetry Review: No significant arrhythmias seen on telemetry review    EKG personally reviewed by MAX Martines    Physical Exam   Constitutional: He is oriented to person, place, and time  He appears well-developed and well-nourished  No distress  HENT:   Head: Normocephalic and atraumatic  Mouth/Throat: Oropharynx is clear and moist    Eyes: Pupils are equal, round, and reactive to light  No scleral icterus  Neck: Normal range of motion  Neck supple  No JVD present  Cardiovascular: Normal rate, regular rhythm, normal heart sounds and intact distal pulses  Pulmonary/Chest: Effort normal and breath sounds normal    Abdominal: Soft  Bowel sounds are normal    Obese     Musculoskeletal: Normal range of motion  He exhibits edema  Left ankle swelling  Left ankle brace intact   Neurological: He is alert and oriented to person, place, and time  Skin: Skin is warm and dry  Capillary refill takes less than 2 seconds  He is not diaphoretic  Psychiatric: He has a normal mood and affect  Nursing note and vitals reviewed        LABORATORY RESULTS  Results from last 7 days   Lab Units 11/04/19  0556 11/04/19  0317 11/04/19  0008   TROPONIN I ng/mL 22 10* 21 10* 19 50*     CBC with diff: Results from last 7 days   Lab Units 11/07/19  0523 11/06/19  0551 11/05/19  1233 11/05/19  0611 11/05/19  0008 11/04/19  1701 11/04/19  1204 11/04/19  0556 11/04/19  0011   WBC Thousand/uL 21 08* 19 81*  --  21 05*  --   --   --  24 35* 27 30*   HEMOGLOBIN g/dL 9 0* 9 5* 9 8* 9 3* 9 1* 10 4* 8 3* 7 4* 8 3*   HEMATOCRIT % 27 9* 28 8*  --  28 7*  --  31 3*  --  22 7* 24 9*   MCV fL 93 92  --  92  --   --   --  92 90   PLATELETS Thousands/uL 404* 350  --  304  --   --   --  244 296   MCH pg 30 1 30 3  --  29 9  --   --   --  30 0 30 1   MCHC g/dL 32 3 33 0  --  32 4  --   --   --  32 6 33 3   RDW % 14 9 15 2*  --  15 1  --   --   --  15 2* 15 2*   MPV fL 10 1 9 7  --  9 9  --   --   --  9 8 11 3   NRBC AUTO /100 WBCs  --   --   --  0  --   --   --  0 0   NRBC /100 WBC  --   --   --   --   --   --   --   --  1       CMP:  Results from last 7 days   Lab Units 19  0519  0551 19  0611 19  0008   POTASSIUM mmol/L 4 2 4 2 4 2 4 6   CHLORIDE mmol/L 104 103 104 108   CO2 mmol/L 20* 24 24 24   BUN mg/dL 26* 19 15 21   CREATININE mg/dL 1 21 1 09 0 96 1 02   CALCIUM mg/dL 8 9 9 2 9 1 8 6   AST U/L  --   --   --  120*   ALT U/L  --   --   --  37   ALK PHOS U/L  --   --   --  104   EGFR ml/min/1 73sq m 77 88 102 95       BMP:  Results from last 7 days   Lab Units 19  0519  0551 19  0611 19  0008   POTASSIUM mmol/L 4 2 4 2 4 2 4 6   CHLORIDE mmol/L 104 103 104 108   CO2 mmol/L 20* 24 24 24   BUN mg/dL 26* 19 15 21   CREATININE mg/dL 1 21 1 09 0 96 1 02   CALCIUM mg/dL 8 9 9 2 9 1 8 6       Lab Results   Component Value Date    NTBNP 544 (H) 2019             Results from last 7 days   Lab Units 19  0611 19  0008   MAGNESIUM mg/dL 1 9 1 8          Results from last 7 days   Lab Units 19  0011   HEMOGLOBIN A1C % 5 5              Results from last 7 days   Lab Units 19  0551 19  0611 19  1204   INR  1 19 1 25* 1 35*       Lipid Profile:   No results found for: CHOL  Lab Results   Component Value Date    HDL 10 (L) 2019     Lab Results   Component Value Date    LDLCALC 69 2019     Lab Results   Component Value Date    TRIG 139 2019       Cardiac testing:   Results for orders placed during the hospital encounter of 19   Echo complete with contrast if indicated    Narrative [address]  [address], [address] [facility]  [phone]    Transthoracic Echocardiogram  2D, M-mode, Doppler, and Color Doppler    Study date:  2019    Patient: Ibis Parisi  MR number: [id number]  Account number: [de-identified]  : 1965  Age: 52 years  Gender: Male  Status: Inpatient  Location: Bedside  Height: 71 in  Weight: 270 4 lb  BP: 152/ 74 mmHg    Indications: Acute MI    Diagnoses: I21 4 - Non-ST elevation (NSTEMI) myocardial infarction    Sonographer:  Daxa Kapoor RDCS  Interpreting Physician:  Darlin Krause DO  Primary Physician:  Shantal Zarate DO  Referring Physician:  Abram Da Silva MD  Group:  [facility]  Cardiology Fellow:  Edgar Koenig MD    SUMMARY    LEFT VENTRICLE:  Systolic function was normal  Ejection fraction was estimated to be 60 %  There were no regional wall motion abnormalities  Wall thickness was mildly increased  The changes were consistent with concentric remodeling (increased wall thickness with normal wall mass)  RIGHT VENTRICLE:  The ventricle was mildly dilated  Systolic function was normal     LEFT ATRIUM:  The atrium was mildly dilated  HISTORY: PRIOR HISTORY: Hypertension    PROCEDURE: The procedure was performed at the bedside  This was a routine study  The transthoracic approach was used  The study included complete 2D imaging, M-mode, complete spectral Doppler, and color Doppler  The heart rate was 67 bpm,  at the start of the study  Images were obtained from the parasternal, apical, subcostal, and suprasternal notch acoustic windows  Image quality was adequate  LEFT VENTRICLE: Size was normal  Systolic function was normal  Ejection fraction was estimated to be 60 %  There were no regional wall motion abnormalities  Wall thickness was mildly increased  The changes were consistent with concentric  remodeling (increased wall thickness with normal wall mass)  DOPPLER: Left ventricular diastolic function parameters were normal for the patient's age  RIGHT VENTRICLE: The ventricle was mildly dilated  Systolic function was normal     LEFT ATRIUM: The atrium was mildly dilated  RIGHT ATRIUM: Size was normal     MITRAL VALVE: Valve structure was normal  There was normal leaflet separation   DOPPLER: The transmitral velocity was within the normal range  There was no evidence for stenosis  There was no significant regurgitation  AORTIC VALVE: The valve was trileaflet  Leaflets exhibited calcification and sclerosis  DOPPLER: Transaortic velocity was within the normal range  There was no evidence for stenosis  There was no significant regurgitation  TRICUSPID VALVE: The valve structure was normal  There was normal leaflet separation  DOPPLER: The transtricuspid velocity was within the normal range  There was no evidence for stenosis  There was trace regurgitation  The tricuspid jet  envelope definition was inadequate for estimation of RV systolic pressure  There are no indirect findings (abnormal RV volume or geometry, altered pulmonary flow velocity profile, or leftward septal displacement) which would suggest  moderate or severe pulmonary hypertension  PULMONIC VALVE: Not well visualized  DOPPLER: The transpulmonic velocity was within the normal range  There was no regurgitation  PERICARDIUM: There was no pericardial effusion  The pericardium was normal in appearance  AORTA: The root exhibited normal size  SYSTEMIC VEINS: IVC: The inferior vena cava was normal in size and course   Respirophasic changes were normal     SYSTEM MEASUREMENT TABLES    2D  %FS: 31 22 %  Ao Diam: 3 05 cm  EDV(Teich): 129 15 ml  EF(Teich): 58 64 %  ESV(Teich): 53 42 ml  IVSd: 1 41 cm  LA Area: 23 36 cm2  LA Diam: 3 6 cm  LVEDV MOD A4C: 223 38 ml  LVEF MOD A4C: 61 25 %  LVESV MOD A4C: 86 56 ml  LVIDd: 5 19 cm  LVIDs: 3 57 cm  LVLd A4C: 10 17 cm  LVLs A4C: 8 7 cm  LVPWd: 1 22 cm  RA Area: 16 69 cm2  RVIDd: 4 67 cm  SV MOD A4C: 136 82 ml  SV(Teich): 75 72 ml    MM  TAPSE: 1 72 cm    PW  E': 0 08 m/s  E/E': 10 68  MV A Srinivasa: 0 76 m/s  MV Dec [place]: 3 38 m/s2  MV DecT: 252 82 ms  MV E Srinivasa: 0 86 m/s  MV E/A Ratio: 1 13  MV PHT: 73 32 ms  MVA By PHT: 3 cm2    [facility]    Prepared and electronically signed by    Elke Tate DO  Signed 2019 13:08:15       No results found for this or any previous visit  No results found for this or any previous visit  No procedure found  No results found for this or any previous visit  Meds/Allergies   all current active meds have been reviewed, current meds:   Current Facility-Administered Medications   Medication Dose Route Frequency    acetaminophen (TYLENOL) tablet 650 mg  650 mg Oral Q6H PRN    atorvastatin (LIPITOR) tablet 80 mg  80 mg Oral Daily With Dinner    carvedilol (COREG) tablet 12 5 mg  12 5 mg Oral BID With Meals    famotidine (PEPCID) injection 20 mg  20 mg Intravenous Q12H [address]    isosorbide mononitrate (IMDUR) 24 hr tablet 30 mg  30 mg Oral Daily    lidocaine (LIDODERM) 5 % patch 1 patch  1 patch Topical Daily    lidocaine (LIDODERM) 5 % patch 1 patch  1 patch Topical Daily    methylPREDNISolone sodium succinate (Solu-MEDROL) injection 40 mg  40 mg Intravenous Q6H [address]    nitroglycerin (NITROSTAT) SL tablet 0 4 mg  0 4 mg Sublingual Q5 Min PRN    pantoprazole (PROTONIX) 80 mg in sodium chloride 0 9 % 100 mL infusion  8 mg/hr Intravenous Continuous    and PTA meds:   Prior to Admission Medications   Prescriptions Last Dose Informant Patient Reported? Taking?    Testosterone (ANDROGEL) 40 5 MG/2 5GM (1 62%) GEL   Yes No   Sig: Place 40 5 mg on the skin daily    lisinopril-hydrochlorothiazide (PRINZIDE,ZESTORETIC) 20-12 5 MG per tablet   Yes No   Sig: Take 1 tablet by mouth daily   naproxen (NAPROSYN) 500 mg tablet   No No   Sig: Take 1 tablet (500 mg total) by mouth 2 (two) times a day with meals   traMADol (ULTRAM) 50 mg tablet   No No   Si-2 po q 6 hours prn pain      Facility-Administered Medications: None     Medications Prior to Admission   Medication    lisinopril-hydrochlorothiazide (PRINZIDE,ZESTORETIC) 20-12 5 MG per tablet    naproxen (NAPROSYN) 500 mg tablet    Testosterone (ANDROGEL) 40 5 MG/2 5GM (1 62%) GEL    traMADol (ULTRAM) 50 mg tablet         pantoprozole (PROTONIX) infusion (Continuous) 8 mg/hr Last Rate: 8 mg/hr (11/07/19 0537)     Assessment:  Principal Problem:    Elevated troponin  Active Problems:    HTN (hypertension)    Acute left ankle pain    Acute pain of left shoulder    Left ankle sprain    GI bleed    Counseling / Coordination of Care  Total floor / unit time spent today 20 minutes  Greater than 50% of total time was spent with the patient and / or family counseling and / or coordination of care  ** Please Note: Dragon 360 Dictation voice to text software may have been used in the creation of this document   **

## 2019-11-07 NOTE — OCCUPATIONAL THERAPY NOTE
[address] Evaluation     Patient Name: Suki Turpin  Today's Date: 11/7/2019  Problem List  Principal Problem:    Elevated troponin  Active Problems:    GI bleed    HTN (hypertension)    Acute left ankle pain    Acute pain of left shoulder    Left ankle sprain    Past Medical History  Past Medical History:   Diagnosis Date    Hypertension     stopped his meds when ran out several years ago     Past Surgical History  Past Surgical History:   Procedure Laterality Date    NO PAST SURGERIES             11/07/19 0937   Note Type   Note type Eval only   Restrictions/Precautions   Braces or Orthoses LE Braces  (LLE )   Other Precautions Pain; Fall Risk;Multiple lines   Pain Assessment   Pain Assessment 0-10   Pain Score 2   Pain Type Acute pain   Pain Location Ankle   Pain Orientation Left   Hospital Pain Intervention(s) Repositioned; Ambulation/increased activity; Emotional support   Response to Interventions Tolerated   Home Living   Type of [address] One level   Bathroom Shower/Tub Tub/shower unit   Bathroom Toilet Standard   Bathroom Equipment Grab bars in shower;Grab bars around toilet   [address]   Additional Comments Pt lives in a 1st floor apartment with 2 BURAK     Prior Function   Level of [place] Independent with ADLs and functional mobility   Lives With Alone   Receives Help From Family   ADL Assistance Independent   IADLs Independent   Vocational Full time employment   Lifestyle   Autonomy Pt reports being I with ADLS and IADLS PTA   Reciprocal Relationships Pt reports that his daughter will be staying with him upon d/c    Service to Others Pt is a    Intrinsic Gratification Enjoys roller blading    Psychosocial   Psychosocial (WDL) WDL   ADL   Where Assessed Chair   Eating Assistance 7  Independent   Grooming Assistance 5  Supervision/Setup   UB Bathing Assistance 5  Supervision/Setup   LB Bathing Assistance 5  Supervision/Setup   UB Dressing Assistance 5  Supervision/Setup   LB Dressing Assistance 5  Supervision/Setup   Toileting Assistance  5  Supervision/Setup   Bed Mobility   Supine to Sit 5  Supervision   Additional items Assist x 1   Sit to Supine 5  Supervision   Additional items Assist x 1   Transfers   Sit to Stand 5  Supervision   Additional items Assist x 1; Increased time required   Stand to Sit 5  Supervision   Additional items Assist x 1   Functional Mobility   Functional Mobility 5  Supervision   Additional Comments Pt walked a household distance in the forman and back to room with no rest breaks   Additional items Rolling walker   Balance   Static Sitting Good   Dynamic Sitting Fair +   Static Standing Fair +   Dynamic Standing Fair   Ambulatory Fair   Activity Tolerance   Activity Tolerance Patient limited by pain   Medical Staff Made Aware PT Mattie   Nurse Made Aware RN confirmed okay to see pt    RUE Assessment   RUE Assessment WFL   LUE Assessment   LUE Assessment X   Cognition   Overall Cognitive Status WFL   Arousal/Participation Alert; Cooperative   Attention Within functional limits   Orientation Level Oriented X4   Memory Within functional limits   Following Commands Follows one step commands without difficulty   Comments Pt is very pleasant and cooperative to work with therapy  Assessment   Limitation Decreased ADL status; Decreased endurance;Decreased high-level ADLs   Prognosis Good   Assessment Pt is a 52 y o  male admitted to [facility] on 11/3/2019 w/ elevated troponin  Comorbidities include a h/o GI bleed, left ankle sprain, HTN and acute pain of L shoulder  Pt with active OT orders and up and OOB as tolerated orders  Pt resides alone in a first floor apartment with 2 BURAK  Pt reports that his daughter will be staying with him upon d/c and is able to help as needed  Pt was I w/  ADLS and IADLS, (+) drove, & required no use of DME PTA   Currently pt is supervision for functional mobility, functional transfers, and ADLS  Pt is limited at this time 2*: pain, endurance, activity tolerance, functional mobility and decreased I w/ ADLS/IADLS  The following Occupational Performance Areas to address include: bathing/shower, dressing and functional mobility  Pt scored overall  70/100 on the Barthel Index  Based on the aforementioned OT evaluation, functional performance deficits, and assessments, pt has been identified as a moderate complexity evaluation  From OT standpoint, anticipate d/c home with family support  Recommend continued participation in [facility] and functional mobility with staff  No further acute OT needs, d/c OT      Goals   Patient Goals To return home    Recommendation   OT Discharge Recommendation Home with family support   OT - OK to Discharge Yes  (When medically appropriate)   Barthel Index   Feeding 10   Bathing 0   Grooming Score 5   Dressing Score 5   Bladder Score 10   Bowels Score 10   Toilet Use Score 10   Transfers (Bed/Chair) Score 10   Mobility (Level Surface) Score 10   Stairs Score 0   Barthel Index Score 70   Modified Hammond Scale   Modified Mark Scale 3     Brittny Pinto, MOT, OTR/L

## 2019-11-07 NOTE — DISCHARGE INSTRUCTIONS
1  Please see the post cardiac catheterization dishcarge instructions  No heavy lifting, greater than 10 lbs  or strenuous  activity for 48 hrs  2 Remove band aid tomorrow  Shower and wash area- wrist and groin, gently with soap and water- beginning tomorrow  Rinse and pat dry  Apply new water seal band aid  Repeat this process for 5 days  No powders, creams lotions or antibiotic ointments  for 5 days  No tub baths, hot tubs or swimming for 5 days  3  Please call our office ([phone]) if you have any fever, redness, swelling, discharge from your wrist or groin access site  4 No driving for 2 days     Discharge Instructions - Orthopedics  Piedad Olsen 52 y o  male MRN: [id number]  Unit/Bed#: [facility] 530-01    Weight Bearing Status:                                           As tolerated to left leg and left arm    Pain:  Continue analgesics as directed    PT/OT:  Continue PT/OT on outpatient basis as directed    Appt Instructions:    please call the clinic at [phone] to follow up with our non operative sports medicine group as needed     Contact the office sooner if you experience any increased numbness/tingling in the extremities  Miscellaneous:      Hypertension   WHAT YOU NEED TO KNOW:   Hypertension is high blood pressure (BP)  Your BP is the force of your blood moving against the walls of your arteries  Normal BP is less than 120/80  Prehypertension is between 120/80 and 139/89  Hypertension is 140/90 or higher  Hypertension causes your BP to get so high that your heart has to work much harder than normal  This can damage your heart  You can control hypertension with a healthy lifestyle or medicines  A controlled blood pressure helps protect your organs, such as your heart, lungs, brain, and kidneys  DISCHARGE INSTRUCTIONS:   Call 130 for any of the following:   · You have discomfort in your chest that feels like squeezing, pressure, fullness, or pain       · You become confused or have difficulty speaking  · You suddenly feel lightheaded or have trouble breathing  · You have pain or discomfort in your back, neck, jaw, stomach, or arm  Seek care immediately if:   · You have a severe headache or vision loss  · You have weakness in an arm or leg  Contact your healthcare provider if:   · You feel faint, dizzy, confused, or drowsy  · You have been taking your BP medicine and your BP is still higher than your healthcare provider says it should be  · You have questions or concerns about your condition or care  Medicines: You may  need any of the following:  · Medicine  may be used to help lower your BP  You may need more than one type of medicine  Take the medicine exactly as directed  · Diuretics  help decrease extra fluid that collects in your body  This will help lower your BP  You may urinate more often while you take this medicine  · Cholesterol medicine  helps lower your cholesterol level  A low cholesterol level helps prevent heart disease and makes it easier to control your blood pressure  · Take your medicine as directed  Contact your healthcare provider if you think your medicine is not helping or if you have side effects  Tell him or her if you are allergic to any medicine  Keep a list of the medicines, vitamins, and herbs you take  Include the amounts, and when and why you take them  Bring the list or the pill bottles to follow-up visits  Carry your medicine list with you in case of an emergency  Follow up with your healthcare provider as directed: You will need to return to have your BP checked and to have other lab tests done  Write down your questions so you remember to ask them during your visits  Manage hypertension:  Talk with your healthcare provider about these and other ways to manage hypertension:  · Check your BP at home  Sit and rest for 5 minutes before you take your BP  Extend your arm and support it on a flat surface   Your arm should be at the same level as your heart  Follow the directions that came with your BP monitor  If possible, take at least 2 BP readings each time  Take your BP at least twice a day at the same times each day, such as morning and evening  Keep a record of your BP readings and bring it to your follow-up visits  Ask your healthcare provider what your BP should be  · Limit sodium (salt) as directed  Too much sodium can affect your fluid balance  Check labels to find low-sodium or no-salt-added foods  Some low-sodium foods use potassium salts for flavor  Too much potassium can also cause health problems  Your healthcare provider will tell you how much sodium and potassium are safe for you to have in a day  He or she may recommend that you limit sodium to 2,300 mg a day  · Follow the meal plan recommended by your healthcare provider  A dietitian or your provider can give you more information on low-sodium plans or the DASH (Dietary Approaches to Stop Hypertension) eating plan  The DASH plan is low in sodium, unhealthy fats, and total fat  It is high in potassium, calcium, and fiber  · Exercise to maintain a healthy weight  Exercise at least 30 minutes per day, on most days of the week  This will help decrease your blood pressure  Ask your healthcare provider about the best exercise plan for you  · Decrease stress  This may help lower your BP  Learn ways to relax, such as deep breathing or listening to music  · Limit alcohol  Women should limit alcohol to 1 drink a day  Men should limit alcohol to 2 drinks a day  A drink of alcohol is 12 ounces of beer, 5 ounces of wine, or 1½ ounces of liquor  · Do not smoke  Nicotine and other chemicals in cigarettes and cigars can increase your BP and also cause lung damage  Ask your healthcare provider for information if you currently smoke and need help to quit  E-cigarettes or smokeless tobacco still contain nicotine   Talk to your healthcare provider before you use these products  · Manage any other health conditions you have  Health conditions such as diabetes can increase your risk for hypertension  Follow your healthcare provider's instructions and take all your medicines as directed  © 2017 [address]  Information is for End User's use only and may not be sold, redistributed or otherwise used for commercial purposes  All illustrations and images included in CareNotes® are the copyrighted property of [employer]  or [employer]  The above information is an  only  It is not intended as medical advice for individual conditions or treatments  Talk to your doctor, nurse or pharmacist before following any medical regimen to see if it is safe and effective for you  Ankle Sprain   WHAT YOU NEED TO KNOW:   An ankle sprain happens when 1 or more ligaments in your ankle joint stretch or tear  Ligaments are tough tissues that connect bones  Ligaments support your joints and keep your bones in place  DISCHARGE INSTRUCTIONS:   Return to the emergency department if:   · You have severe pain in your ankle  · Your foot or toes are cold or numb  · Your ankle becomes more weak or unstable (wobbly)  · You are unable to put any weight on your ankle or foot  · Your swelling has increased or returned  Contact your healthcare provider if:   · Your pain does not go away, even after treatment  · You have questions or concerns about your condition or care  Medicines: You may need any of the following:  · NSAIDs , such as ibuprofen, help decrease swelling, pain, and fever  This medicine is available with or without a doctor's order  NSAIDs can cause stomach bleeding or kidney problems in certain people  If you take blood thinner medicine, always ask your healthcare provider if NSAIDs are safe for you  Always read the medicine label and follow directions  · Acetaminophen  decreases pain   It is available without a doctor's order  Ask how much to take and how often to take it  Follow directions  Acetaminophen can cause liver damage if not taken correctly  · Prescription pain medicine  may be given  Ask how to take this medicine safely  · Take your medicine as directed  Contact your healthcare provider if you think your medicine is not helping or if you have side effects  Tell him or her if you are allergic to any medicine  Keep a list of the medicines, vitamins, and herbs you take  Include the amounts, and when and why you take them  Bring the list or the pill bottles to follow-up visits  Carry your medicine list with you in case of an emergency  Self care:   · Use support devices,  such as a brace, cast, or splint, may be needed to limit your movement and protect your joint  You may need to use crutches to decrease your pain as you move around  · Go to physical therapy as directed  A physical therapist teaches you exercises to help improve movement and strength, and to decrease pain  · Rest  your ankle so that it can heal  Return to normal activities as directed  · Apply ice on your ankle for 15 to 20 minutes every hour or as directed  Use an ice pack, or put crushed ice in a plastic bag  Cover it with a towel  Ice helps prevent tissue damage and decreases swelling and pain  · Compress  your ankle  Ask if you should wrap an elastic bandage around your injured ligament  An elastic bandage provides support and helps decrease swelling and movement so your joint can heal  Wear as long as directed  · Elevate  your ankle above the level of your heart as often as you can  This will help decrease swelling and pain  Prop your ankle on pillows or blankets to keep it elevated comfortably  Prevent another ankle sprain:   · Let your ankle heal   Find out how long your ligament needs to heal  Do not do any physical activity until your healthcare provider says it is okay   If you start activity too soon, you may develop a more serious injury  · Always warm up and stretch  before you exercise or play sports  · Use the right equipment  Always wear shoes that fit well and are made for the activity that you are doing  You may also need ankle supports, elbow and knee pads, or braces  Follow up with your healthcare provider as directed:  Write down your questions so you remember to ask them during your visits  © 2017 [address] [employer] [employer] Information is for End User's use only and may not be sold, redistributed or otherwise used for commercial purposes  All illustrations and images included in CareNotes® are the copyrighted property of [employer]  or [facility]  The above information is an  only  It is not intended as medical advice for individual conditions or treatments  Talk to your doctor, nurse or pharmacist before following any medical regimen to see if it is safe and effective for you  Gastrointestinal Bleeding   WHAT YOU NEED TO KNOW:   Gastrointestinal (GI) bleeding may occur in any part of your digestive tract  This includes your esophagus, stomach, intestines, rectum, or anus  Bleeding may be mild to severe  Your bleeding may begin suddenly, or start slowly and last for a longer period of time  Bleeding that lasts for a longer period of time is called chronic GI bleeding  DISCHARGE INSTRUCTIONS:   Seek care immediately if:   · Your symptoms return  Contact your healthcare provider if:   · You have nausea or are vomiting  · You have heartburn  · You have questions or concerns about your condition or care  Activity:  Rest as directed  Ask when you can return to your usual activities, such as work  Slowly do more each day  Nutrition:  Ask if you need to be on a special diet  A special diet can help treat GI conditions and prevent problems such as GI bleeding  Eat small meals more often while your digestive system heals   Avoid or limit caffeine and spicy foods  Also avoid foods that cause heartburn, nausea, or diarrhea  Prevent GI bleeding:   · Manage GI conditions as directed  Examples of GI conditions include gastroesophageal reflux, peptic ulcer disease, and ulcerative colitis  Take all medicines for these conditions as directed  · Limit or do not take NSAIDs  Ask your healthcare provider if it is safe for you to take NSAIDs  NSAIDs can increase your risk for ulcers and GI bleeding  · Do not drink alcohol  Alcohol can cause ulcers and esophageal varices  Esophageal varices are swollen blood vessels in your esophagus  Over time the blood vessels become weak and may bleed  · Do not smoke  Nicotine and other chemicals in cigarettes and cigars can increase your risk for ulcers  Ask your healthcare provider for information if you currently smoke and need help to quit  E-cigarettes or smokeless tobacco still contain nicotine  Talk to your healthcare provider before you use these products  Follow up with your healthcare provider as directed: You may need to return for a colonoscopy, endoscopy, or other tests  These tests can make sure you do not have more bleeding  Write down your questions so you remember to ask them during your visits  © 2017 [facility] [facility] Information is for End User's use only and may not be sold, redistributed or otherwise used for commercial purposes  All illustrations and images included in CareNotes® are the copyrighted property of [employer]  or [employer]  The above information is an  only  It is not intended as medical advice for individual conditions or treatments  Talk to your doctor, nurse or pharmacist before following any medical regimen to see if it is safe and effective for you

## 2019-11-08 ENCOUNTER — APPOINTMENT (INPATIENT)
Dept: MRI IMAGING | Facility: HOSPITAL | Age: 54
DRG: 552 | End: 2019-11-08
Payer: COMMERCIAL

## 2019-11-08 PROBLEM — N17.9 AKI (ACUTE KIDNEY INJURY) (HCC): Status: ACTIVE | Noted: 2019-11-08

## 2019-11-08 PROBLEM — K25.9 STOMACH ULCER: Status: ACTIVE | Noted: 2019-11-08

## 2019-11-08 PROBLEM — M54.50 ACUTE LEFT-SIDED LOW BACK PAIN WITHOUT SCIATICA: Status: ACTIVE | Noted: 2019-11-08

## 2019-11-08 PROBLEM — D50.0 IRON DEFICIENCY ANEMIA DUE TO CHRONIC BLOOD LOSS: Status: ACTIVE | Noted: 2019-11-08

## 2019-11-08 PROBLEM — Z98.890 S/P CARDIAC CATH: Status: ACTIVE | Noted: 2019-11-08

## 2019-11-08 LAB
ANION GAP SERPL CALCULATED.3IONS-SCNC: 7 MMOL/L (ref 4–13)
ATRIAL RATE: 73 BPM
BASOPHILS # BLD AUTO: 0.06 THOUSANDS/ΜL (ref 0–0.1)
BASOPHILS NFR BLD AUTO: 0 % (ref 0–1)
BILIRUB UR QL STRIP: NEGATIVE
BUN SERPL-MCNC: 26 MG/DL (ref 5–25)
CALCIUM SERPL-MCNC: 8.5 MG/DL (ref 8.3–10.1)
CHLORIDE SERPL-SCNC: 100 MMOL/L (ref 100–108)
CK SERPL-CCNC: 107 U/L (ref 39–308)
CLARITY UR: CLEAR
CO2 SERPL-SCNC: 26 MMOL/L (ref 21–32)
COLOR UR: YELLOW
CREAT SERPL-MCNC: 1.14 MG/DL (ref 0.6–1.3)
CRP SERPL QL: >90 MG/L
EOSINOPHIL # BLD AUTO: 0.04 THOUSAND/ΜL (ref 0–0.61)
EOSINOPHIL NFR BLD AUTO: 0 % (ref 0–6)
ERYTHROCYTE [DISTWIDTH] IN BLOOD BY AUTOMATED COUNT: 14.6 % (ref 11.6–15.1)
GFR SERPL CREATININE-BSD FRML MDRD: 83 ML/MIN/1.73SQ M
GLUCOSE SERPL-MCNC: 128 MG/DL (ref 65–140)
GLUCOSE UR STRIP-MCNC: NEGATIVE MG/DL
HCT VFR BLD AUTO: 27.4 % (ref 36.5–49.3)
HGB BLD-MCNC: 8.7 G/DL (ref 12–17)
HGB UR QL STRIP.AUTO: NEGATIVE
IMM GRANULOCYTES # BLD AUTO: 0.33 THOUSAND/UL (ref 0–0.2)
IMM GRANULOCYTES NFR BLD AUTO: 2 % (ref 0–2)
KETONES UR STRIP-MCNC: NEGATIVE MG/DL
LEUKOCYTE ESTERASE UR QL STRIP: NEGATIVE
LYMPHOCYTES # BLD AUTO: 1.99 THOUSANDS/ΜL (ref 0.6–4.47)
LYMPHOCYTES NFR BLD AUTO: 9 % (ref 14–44)
MCH RBC QN AUTO: 30 PG (ref 26.8–34.3)
MCHC RBC AUTO-ENTMCNC: 31.8 G/DL (ref 31.4–37.4)
MCV RBC AUTO: 95 FL (ref 82–98)
MONOCYTES # BLD AUTO: 1.17 THOUSAND/ΜL (ref 0.17–1.22)
MONOCYTES NFR BLD AUTO: 5 % (ref 4–12)
NEUTROPHILS # BLD AUTO: 18.79 THOUSANDS/ΜL (ref 1.85–7.62)
NEUTS SEG NFR BLD AUTO: 84 % (ref 43–75)
NITRITE UR QL STRIP: NEGATIVE
NRBC BLD AUTO-RTO: 0 /100 WBCS
P AXIS: 45 DEGREES
PH UR STRIP.AUTO: 5.5 [PH]
PLATELET # BLD AUTO: 465 THOUSANDS/UL (ref 149–390)
PMV BLD AUTO: 9.5 FL (ref 8.9–12.7)
POTASSIUM SERPL-SCNC: 3.9 MMOL/L (ref 3.5–5.3)
PR INTERVAL: 144 MS
PROT UR STRIP-MCNC: NEGATIVE MG/DL
QRS AXIS: 58 DEGREES
QRSD INTERVAL: 92 MS
QT INTERVAL: 370 MS
QTC INTERVAL: 407 MS
RBC # BLD AUTO: 2.9 MILLION/UL (ref 3.88–5.62)
SODIUM SERPL-SCNC: 133 MMOL/L (ref 136–145)
SP GR UR STRIP.AUTO: 1.02 (ref 1–1.03)
T WAVE AXIS: 0 DEGREES
UROBILINOGEN UR QL STRIP.AUTO: 0.2 E.U./DL
VENTRICULAR RATE: 73 BPM
WBC # BLD AUTO: 22.38 THOUSAND/UL (ref 4.31–10.16)

## 2019-11-08 PROCEDURE — 87040 BLOOD CULTURE FOR BACTERIA: CPT | Performed by: FAMILY MEDICINE

## 2019-11-08 PROCEDURE — 81003 URINALYSIS AUTO W/O SCOPE: CPT | Performed by: FAMILY MEDICINE

## 2019-11-08 PROCEDURE — 87040 BLOOD CULTURE FOR BACTERIA: CPT | Performed by: PHYSICIAN ASSISTANT

## 2019-11-08 PROCEDURE — 85025 COMPLETE CBC W/AUTO DIFF WBC: CPT | Performed by: PHYSICIAN ASSISTANT

## 2019-11-08 PROCEDURE — 99223 1ST HOSP IP/OBS HIGH 75: CPT | Performed by: HOSPITALIST

## 2019-11-08 PROCEDURE — 80048 BASIC METABOLIC PNL TOTAL CA: CPT | Performed by: PHYSICIAN ASSISTANT

## 2019-11-08 PROCEDURE — 36415 COLL VENOUS BLD VENIPUNCTURE: CPT | Performed by: FAMILY MEDICINE

## 2019-11-08 PROCEDURE — 93010 ELECTROCARDIOGRAM REPORT: CPT | Performed by: INTERNAL MEDICINE

## 2019-11-08 PROCEDURE — 99285 EMERGENCY DEPT VISIT HI MDM: CPT | Performed by: EMERGENCY MEDICINE

## 2019-11-08 PROCEDURE — 96375 TX/PRO/DX INJ NEW DRUG ADDON: CPT

## 2019-11-08 PROCEDURE — A9585 GADOBUTROL INJECTION: HCPCS | Performed by: PHYSICIAN ASSISTANT

## 2019-11-08 PROCEDURE — 72158 MRI LUMBAR SPINE W/O & W/DYE: CPT

## 2019-11-08 RX ORDER — TRAMADOL HYDROCHLORIDE 50 MG/1
50 TABLET ORAL EVERY 4 HOURS PRN
Status: DISCONTINUED | OUTPATIENT
Start: 2019-11-08 | End: 2019-11-08

## 2019-11-08 RX ORDER — METHOCARBAMOL 750 MG/1
750 TABLET, FILM COATED ORAL EVERY 6 HOURS SCHEDULED
Status: DISCONTINUED | OUTPATIENT
Start: 2019-11-08 | End: 2019-11-10 | Stop reason: HOSPADM

## 2019-11-08 RX ORDER — ACETAMINOPHEN 325 MG/1
650 TABLET ORAL EVERY 6 HOURS PRN
Status: DISCONTINUED | OUTPATIENT
Start: 2019-11-08 | End: 2019-11-10 | Stop reason: HOSPADM

## 2019-11-08 RX ORDER — OXYCODONE HYDROCHLORIDE 5 MG/1
5 TABLET ORAL EVERY 6 HOURS PRN
Status: DISCONTINUED | OUTPATIENT
Start: 2019-11-08 | End: 2019-11-10 | Stop reason: HOSPADM

## 2019-11-08 RX ORDER — HEPARIN SODIUM 5000 [USP'U]/ML
5000 INJECTION, SOLUTION INTRAVENOUS; SUBCUTANEOUS EVERY 8 HOURS SCHEDULED
Status: DISCONTINUED | OUTPATIENT
Start: 2019-11-08 | End: 2019-11-10 | Stop reason: HOSPADM

## 2019-11-08 RX ORDER — OXYCODONE HYDROCHLORIDE 10 MG/1
10 TABLET ORAL EVERY 6 HOURS PRN
Status: DISCONTINUED | OUTPATIENT
Start: 2019-11-08 | End: 2019-11-10 | Stop reason: HOSPADM

## 2019-11-08 RX ORDER — ATORVASTATIN CALCIUM 40 MG/1
80 TABLET, FILM COATED ORAL
Status: DISCONTINUED | OUTPATIENT
Start: 2019-11-08 | End: 2019-11-10 | Stop reason: HOSPADM

## 2019-11-08 RX ORDER — HYDROMORPHONE HCL/PF 1 MG/ML
1 SYRINGE (ML) INJECTION EVERY 4 HOURS PRN
Status: DISCONTINUED | OUTPATIENT
Start: 2019-11-08 | End: 2019-11-08

## 2019-11-08 RX ORDER — LIDOCAINE 50 MG/G
2 PATCH TOPICAL DAILY
Status: DISCONTINUED | OUTPATIENT
Start: 2019-11-08 | End: 2019-11-10 | Stop reason: HOSPADM

## 2019-11-08 RX ORDER — HYDROMORPHONE HCL/PF 1 MG/ML
1 SYRINGE (ML) INJECTION EVERY 6 HOURS PRN
Status: DISCONTINUED | OUTPATIENT
Start: 2019-11-08 | End: 2019-11-10 | Stop reason: HOSPADM

## 2019-11-08 RX ORDER — PANTOPRAZOLE SODIUM 40 MG/1
40 TABLET, DELAYED RELEASE ORAL
Status: DISCONTINUED | OUTPATIENT
Start: 2019-11-08 | End: 2019-11-10 | Stop reason: HOSPADM

## 2019-11-08 RX ORDER — METOPROLOL TARTRATE 5 MG/5ML
5 INJECTION INTRAVENOUS EVERY 6 HOURS PRN
Status: DISCONTINUED | OUTPATIENT
Start: 2019-11-08 | End: 2019-11-10 | Stop reason: HOSPADM

## 2019-11-08 RX ORDER — CLOPIDOGREL BISULFATE 75 MG/1
75 TABLET ORAL DAILY
Status: DISCONTINUED | OUTPATIENT
Start: 2019-11-08 | End: 2019-11-10 | Stop reason: HOSPADM

## 2019-11-08 RX ORDER — CARVEDILOL 12.5 MG/1
25 TABLET ORAL 2 TIMES DAILY WITH MEALS
Status: DISCONTINUED | OUTPATIENT
Start: 2019-11-08 | End: 2019-11-10 | Stop reason: HOSPADM

## 2019-11-08 RX ORDER — METHOCARBAMOL 750 MG/1
TABLET, FILM COATED ORAL
Status: COMPLETED
Start: 2019-11-08 | End: 2019-11-08

## 2019-11-08 RX ORDER — DOCUSATE SODIUM 100 MG/1
100 CAPSULE, LIQUID FILLED ORAL 2 TIMES DAILY
Status: DISCONTINUED | OUTPATIENT
Start: 2019-11-08 | End: 2019-11-10 | Stop reason: HOSPADM

## 2019-11-08 RX ORDER — METHOCARBAMOL 500 MG/1
TABLET, FILM COATED ORAL
Status: DISCONTINUED
Start: 2019-11-08 | End: 2019-11-08 | Stop reason: WASHOUT

## 2019-11-08 RX ORDER — ONDANSETRON 2 MG/ML
4 INJECTION INTRAMUSCULAR; INTRAVENOUS EVERY 6 HOURS PRN
Status: DISCONTINUED | OUTPATIENT
Start: 2019-11-08 | End: 2019-11-10 | Stop reason: HOSPADM

## 2019-11-08 RX ORDER — ISOSORBIDE MONONITRATE 30 MG/1
30 TABLET, EXTENDED RELEASE ORAL DAILY
Status: DISCONTINUED | OUTPATIENT
Start: 2019-11-08 | End: 2019-11-10 | Stop reason: HOSPADM

## 2019-11-08 RX ORDER — ACETAMINOPHEN 325 MG/1
975 TABLET ORAL EVERY 8 HOURS
Status: DISCONTINUED | OUTPATIENT
Start: 2019-11-08 | End: 2019-11-10 | Stop reason: HOSPADM

## 2019-11-08 RX ADMIN — METHOCARBAMOL TABLETS 750 MG: 750 TABLET, COATED ORAL at 13:35

## 2019-11-08 RX ADMIN — SODIUM CHLORIDE 1000 ML: 0.9 INJECTION, SOLUTION INTRAVENOUS at 00:29

## 2019-11-08 RX ADMIN — METHOCARBAMOL 750 MG: 750 TABLET ORAL at 13:35

## 2019-11-08 RX ADMIN — CLOPIDOGREL BISULFATE 75 MG: 75 TABLET ORAL at 08:14

## 2019-11-08 RX ADMIN — HEPARIN SODIUM 5000 UNITS: 5000 INJECTION INTRAVENOUS; SUBCUTANEOUS at 06:22

## 2019-11-08 RX ADMIN — LIDOCAINE 1 PATCH: 50 PATCH TOPICAL at 00:14

## 2019-11-08 RX ADMIN — METHOCARBAMOL TABLETS 750 MG: 750 TABLET, COATED ORAL at 18:13

## 2019-11-08 RX ADMIN — CARVEDILOL 25 MG: 12.5 TABLET, FILM COATED ORAL at 08:14

## 2019-11-08 RX ADMIN — HEPARIN SODIUM 5000 UNITS: 5000 INJECTION INTRAVENOUS; SUBCUTANEOUS at 22:32

## 2019-11-08 RX ADMIN — OXYCODONE HYDROCHLORIDE 5 MG: 5 TABLET ORAL at 20:55

## 2019-11-08 RX ADMIN — CARVEDILOL 25 MG: 12.5 TABLET, FILM COATED ORAL at 18:12

## 2019-11-08 RX ADMIN — GADOBUTROL 12 ML: 604.72 INJECTION INTRAVENOUS at 11:15

## 2019-11-08 RX ADMIN — METHOCARBAMOL TABLETS 750 MG: 750 TABLET, COATED ORAL at 23:53

## 2019-11-08 RX ADMIN — ACETAMINOPHEN 975 MG: 325 TABLET, FILM COATED ORAL at 22:31

## 2019-11-08 RX ADMIN — OXYCODONE HYDROCHLORIDE 5 MG: 5 TABLET ORAL at 02:12

## 2019-11-08 RX ADMIN — HYDROMORPHONE HYDROCHLORIDE 1 MG: 1 INJECTION, SOLUTION INTRAMUSCULAR; INTRAVENOUS; SUBCUTANEOUS at 22:32

## 2019-11-08 RX ADMIN — METHOCARBAMOL TABLETS 750 MG: 750 TABLET, COATED ORAL at 06:22

## 2019-11-08 RX ADMIN — PANTOPRAZOLE SODIUM 40 MG: 40 TABLET, DELAYED RELEASE ORAL at 06:22

## 2019-11-08 RX ADMIN — ISOSORBIDE MONONITRATE 30 MG: 30 TABLET, EXTENDED RELEASE ORAL at 08:14

## 2019-11-08 RX ADMIN — DIAZEPAM 5 MG: 10 INJECTION, SOLUTION INTRAMUSCULAR; INTRAVENOUS at 00:14

## 2019-11-08 RX ADMIN — PANTOPRAZOLE SODIUM 40 MG: 40 TABLET, DELAYED RELEASE ORAL at 18:13

## 2019-11-08 RX ADMIN — METOPROLOL TARTRATE 5 MG: 1 INJECTION, SOLUTION INTRAVENOUS at 02:13

## 2019-11-08 RX ADMIN — HEPARIN SODIUM 5000 UNITS: 5000 INJECTION INTRAVENOUS; SUBCUTANEOUS at 14:15

## 2019-11-08 RX ADMIN — ACETAMINOPHEN 975 MG: 325 TABLET, FILM COATED ORAL at 06:22

## 2019-11-08 RX ADMIN — ACETAMINOPHEN 650 MG: 325 TABLET, FILM COATED ORAL at 12:45

## 2019-11-08 NOTE — ASSESSMENT & PLAN NOTE
· Patient sustained roller blading blading injury 11/1/19  · Was evaluated by Orthopedic service and PT/OT this week during [facility] admission    · Was given left ankle brace and recommendation for outpatient PT

## 2019-11-08 NOTE — ED PROVIDER NOTES
History  Chief Complaint   Patient presents with    Back Pain     pt  comes in via EMS with c/o lower left sided back pain  He states it is a 7/10 pain at this time  Given 100mcg of fentanyl from EMS  He states he has a hard time walking  Recently discharged just this morning from [facility]; was admitted there for an MI and a GI bleed  for a few days  57-year-old male with a past medical history of MI, GI bleed, and radiculopathy presents to ED today with intractable left lower back pain  The back pain began on 10/26/2019 while at home  Patient states he was lying in bed and upon getting out of bed he felt his back lock up    His children took him to [facility]   While there he was diagnosed with radiculopathy and was discharged home  Patient's daughter states that 2 days later, on the 28th, he began to have chest pain and was taken again to [facility], where he was diagnosed with a heart attack    Also while there it was discovered that he had a GI bleed and was treated for that  According to his daughter, on the 29th or 30th patient was transferred to [facility]  During his whole hospital stay he states that he had had back pain  Though it resolved somewhat with lidocaine and steroids patient states that it was still quite painful  Patient was discharged from [facility] today  A short while after arriving home patient began to have significant left lower back pain  The pain has gotten so bad that he asked his chosen to bring him to the emergency department  Patient states pain is pulsating in quality and is a 12/10 on the pain scale  He denies radiation of pain  It has not been getting worse but has been constant  Prior to Admission Medications   Prescriptions Last Dose Informant Patient Reported? Taking?    Testosterone (ANDROGEL) 40 5 MG/2 5GM (1 62%) GEL   Yes No   Sig: Place 40 5 mg on the skin daily    acetaminophen (TYLENOL) 325 mg tablet   No No   Sig: Take 2 tablets (650 mg total) by mouth every 6 (six) hours as needed for mild pain, moderate pain or severe pain   atorvastatin (LIPITOR) 80 mg tablet   No No   Sig: Take 1 tablet (80 mg total) by mouth daily with dinner   carvedilol (COREG) 25 mg tablet   No No   Sig: Take 1 tablet (25 mg total) by mouth 2 (two) times a day with meals   clopidogrel (PLAVIX) 75 mg tablet   No No   Sig: Take 1 tablet (75 mg total) by mouth daily   isosorbide mononitrate (IMDUR) 30 mg 24 hr tablet   No No   Sig: Take 1 tablet (30 mg total) by mouth daily   lidocaine (LIDODERM) 5 %   No No   Sig: Apply 1 patch topically daily for 7 days Apply daily to left shoulder    Remove & Discard patch within 12 hours or as directed by MD   nitroglycerin (NITROSTAT) 0 4 mg SL tablet   No No   Sig: Place 1 tablet (0 4 mg total) under the tongue every 5 (five) minutes as needed for chest pain   pantoprazole (PROTONIX) 40 mg tablet   No No   Sig: Take 1 tablet (40 mg total) by mouth 2 (two) times a day before meals      Facility-Administered Medications: None       Past Medical History:   Diagnosis Date    GI bleed     Hypertension     stopped his meds when ran out several years ago    STEMI (ST elevation myocardial infarction) ([facility] [address] )        Past Surgical History:   Procedure Laterality Date    NO PAST SURGERIES         History reviewed  No pertinent family history  I have reviewed and agree with the history as documented  Social History     Tobacco Use    Smoking status: Never Smoker    Smokeless tobacco: Never Used   Substance Use Topics    Alcohol use: Never     Alcohol/week: 0 0 standard drinks     Frequency: Never     Drinks per session: 1 or 2     Binge frequency: Never    Drug use: No        Review of Systems   Constitutional: Positive for activity change  Negative for appetite change, chills, diaphoresis, fatigue, fever and unexpected weight change  HENT: Negative for trouble swallowing      Eyes: Negative for visual disturbance  Respiratory: Negative for shortness of breath (Though he takes shallow breaths because of the pain)  Cardiovascular: Negative for chest pain  Gastrointestinal: Negative for abdominal pain, constipation, diarrhea, nausea and vomiting  Patient denies bowel incontinence   Genitourinary:        Patient denies bladder incontinence   Musculoskeletal:        Severe lower back pain   Skin: Negative for color change  Neurological: Negative for dizziness, syncope, weakness, light-headedness and headaches  Denies radiation of pain to lower extremities  Denies paresthesias in lower extremities  All other systems reviewed and are negative  Physical Exam  ED Triage Vitals   Temperature Pulse Respirations Blood Pressure SpO2   11/07/19 2124 11/07/19 2124 11/07/19 2124 11/07/19 2124 11/07/19 2124   98 6 °F (37 °C) 76 18 133/84 97 %      Temp Source Heart Rate Source Patient Position - Orthostatic VS BP Location FiO2 (%)   11/07/19 2124 11/07/19 2124 11/07/19 2124 11/07/19 2124 --   Oral Monitor Lying Right arm       Pain Score       11/08/19 0015       6             Orthostatic Vital Signs  Vitals:    11/07/19 2124 11/08/19 0014   BP: 133/84 (!) 178/80   Pulse: 76 86   Patient Position - Orthostatic VS: Lying Lying       Physical Exam   Constitutional: He is oriented to person, place, and time  He appears well-developed and well-nourished  He appears distressed  Examination limited due to pain with change of position  Patient in acute distress due to severe pain  Patient having difficulty speaking due to the back pain   HENT:   Head: Normocephalic and atraumatic  Nose: Nose normal    Eyes: EOM are normal  Right eye exhibits no discharge  Left eye exhibits no discharge  No scleral icterus  Neck: Neck supple  Cardiovascular: Normal rate, regular rhythm, normal heart sounds and intact distal pulses  Exam reveals no gallop and no friction rub     No murmur heard   Pulmonary/Chest: Effort normal and breath sounds normal  No stridor  No respiratory distress  He has no wheezes  He has no rales  He exhibits no tenderness  Patient taking shallow breaths and states it is due to his back pain  Abdominal: Soft  Bowel sounds are normal  He exhibits no distension  There is no tenderness  There is no guarding  Musculoskeletal: He exhibits no edema (No lower extremity edema bilaterally)  Straight leg test positive at approximately 40-45 degrees left leg elevation   Neurological: He is alert and oriented to person, place, and time  No cranial nerve deficit or sensory deficit (in lower extremities)  Skin: Skin is warm and dry  He is not diaphoretic  Psychiatric: He has a normal mood and affect  ED Medications  Medications   sodium chloride 0 9 % bolus 1,000 mL (1,000 mL Intravenous New Bag 11/8/19 0029)   fentanyl citrate (PF) (FOR EMS ONLY) 100 mcg/2 mL injection 100 mcg (0 mcg Does not apply Given to EMS 11/7/19 2138)   morphine (PF) 10 mg/mL injection 8 mg (8 mg Intravenous Given 11/7/19 2316)   diazepam (VALIUM) injection 5 mg (5 mg Intravenous Given 11/8/19 0014)   lidocaine (LIDODERM) 5 % patch 1 patch (1 patch Topical Medication Applied 11/8/19 0014)       Diagnostic Studies  Results Reviewed     Procedure Component Value Units Date/Time    UA w Reflex to Microscopic w Reflex to Culture [[id number]] Collected:  11/08/19 0015    Lab Status:  Final result Specimen:  Urine, Clean Catch Updated:  11/08/19 0032     Color, UA Yellow     Clarity, UA Clear     Specific Gravity, UA 1 025     pH, UA 5 5     Leukocytes, UA Negative     Nitrite, UA Negative     Protein, UA Negative mg/dl      Glucose, UA Negative mg/dl      Ketones, UA Negative mg/dl      Urobilinogen, UA 0 2 E U /dl      Bilirubin, UA Negative     Blood, UA Negative    Blood culture [[id number]] Collected:  11/08/19 0001    Lab Status:   In process Specimen:  Blood from Arm, Right Updated:  11/08/19 0004 Basic metabolic panel [[id number]]  (Abnormal) Collected:  11/07/19 2311    Lab Status:  Final result Specimen:  Blood from Arm, Right Updated:  11/07/19 2327     Sodium 133 mmol/L      Potassium 4 0 mmol/L      Chloride 100 mmol/L      CO2 25 mmol/L      ANION GAP 8 mmol/L      BUN 32 mg/dL      Creatinine 1 30 mg/dL      Glucose 110 mg/dL      Calcium 8 7 mg/dL      eGFR 71 ml/min/1 73sq m     Narrative:       [place] guidelines for Chronic Kidney Disease (CKD):     Stage 1 with normal or high GFR (GFR > 90 mL/min/1 73 square meters)    Stage 2 Mild CKD (GFR = 60-89 mL/min/1 73 square meters)    Stage 3A Moderate CKD (GFR = 45-59 mL/min/1 73 square meters)    Stage 3B Moderate CKD (GFR = 30-44 mL/min/1 73 square meters)    Stage 4 Severe CKD (GFR = 15-29 mL/min/1 73 square meters)    Stage 5 End Stage CKD (GFR <15 mL/min/1 73 square meters)  Note: GFR calculation is accurate only with a steady state creatinine    CBC and differential [[id number]]  (Abnormal) Collected:  11/07/19 2314    Lab Status:  Final result Specimen:  Blood from Arm, Right Updated:  11/07/19 2319     WBC 25 77 Thousand/uL      RBC 2 84 Million/uL      Hemoglobin 8 6 g/dL      Hematocrit 26 3 %      MCV 93 fL      MCH 30 3 pg      MCHC 32 7 g/dL      RDW 14 6 %      MPV 9 6 fL      Platelets 868 Thousands/uL      nRBC 0 /100 WBCs      Neutrophils Relative 85 %      Immat GRANS % 2 %      Lymphocytes Relative 7 %      Monocytes Relative 6 %      Eosinophils Relative 0 %      Basophils Relative 0 %      Neutrophils Absolute 21 98 Thousands/µL      Immature Grans Absolute 0 48 Thousand/uL      Lymphocytes Absolute 1 84 Thousands/µL      Monocytes Absolute 1 41 Thousand/µL      Eosinophils Absolute 0 02 Thousand/µL      Basophils Absolute 0 04 Thousands/µL                  CT lumbar spine without contrast    (Results Pending)         Procedures  ECG 12 Lead Documentation Only  Date/Time: 11/7/2019 11:56 PM  Performed by: Colie Hatchet, MD  Authorized by: Colie Hatchet, MD     ECG reviewed by me, the ED Provider: yes    Patient location:  ED  Previous ECG:     Previous ECG:  Compared to current    Comparison ECG info:  No significant change  Interpretation:     Interpretation: normal    Rate:     ECG rate assessment: normal    Rhythm:     Rhythm: sinus rhythm    QRS:     QRS axis:  Normal  ST segments:     ST segments:  Normal            ED Course                               MDM  Number of Diagnoses or Management Options  Diagnosis management comments: Patient was clearly in distress upon arrival to the ED  Administered a dose of IV morphine and IV Valium  Blood work revealed a white count of 25,000, creatinine of 1 3 up from 0 9   CT, UA, blood culture pending  Discussed with patient these findings and likelihood of admission considering past medical history  Patient agrees and states I would rather be observed for little while longer than after come back again    Patient states after morphine and Valium some improvement in pain but has not resolved  Pain now 7/10, down from 12/10  Disposition  Final diagnoses:   Acute left-sided low back pain without sciatica     Time reflects when diagnosis was documented in both MDM as applicable and the Disposition within this note     Time User Action Codes Description Comment    11/8/2019 12:43 AM Vaughn Barnes Add [M54 5] Acute left-sided low back pain without sciatica       ED Disposition     ED Disposition Condition Date/Time Comment    Admit Stable Fri Nov 8, 2019 12:43 AM Case was discussed with Dav Taylor and the patient's admission status was agreed to be Admission Status: observation status to the service of  Dav Taylor   Follow-up Information    None         Patient's Medications   Discharge Prescriptions    No medications on file     No discharge procedures on file      ED Provider  Attending physically available and hiro Robledo I managed the patient along with the ED Attending      Electronically Signed by         Jf Stinson MD  11/08/19 0889

## 2019-11-08 NOTE — ASSESSMENT & PLAN NOTE
· Patient with anemia 2/2 bleeding ulcer  Discovered on EGD 11/05/2019  Treated with clipping  · Monitor patient's hemoglobin here  · He currently denies hematochezia/melena or other sources of bleeding

## 2019-11-08 NOTE — ASSESSMENT & PLAN NOTE
· POA  Setting of recent cardiac catheterization on 11/06  · Presents with creatinine 1 30  · bsCr appears 0 9-1 1 - may not qualify as true ZAKIA however slightly elevated from baseline  · Recommend avoid further nephrotoxic agents  · Received 1L bolus in ED  · Patient will likely tolerate imaging with contrast if this is necessary, however hydrate before and after

## 2019-11-08 NOTE — H&P
H&P- Piedad Olsen 1965, 52 y o  male MRN: [id number]  Unit/Bed#: S -01 Encounter: [id number]  Primary Care Provider: Pravin Maravilla DO   Date and time admitted to hospital: 11/7/2019  9:22 PM    * Acute left-sided low back pain without sciatica  Assessment & Plan  · Patient "pulled" L ankle roller blading on 10/25/19; the next morning, noticed lower L back soreness  · Presented to [facility] 10/27/2019, diagnosed with radiculopathy and referred for outpatient therapy  · Presented again to [facility] 11/01/19 for unrelated weakness - transferred to [facility] for MI concern  · GI, Cardiology, Orthopedic assessed patient at [facility]  Details delineated below  Patient states his back pain was not addressed as it was masked by medications he received  Discharged from [facility] 11/7/19  Acute worsening of pain  Called EMS  · CT Lumbar spine w/o contrast - possible degenerative change vs septic arthritis at L4-5 facet joint  · Order MRI  · Check CRP, CK, blood cultures  · Patient does have leukocytosis; (did received steroid @ [facility])  He is afebrile  · Low suspicion for aortic dissection given pain is reproducible on neuromuscular exam   Check blood pressure in both arms  · Pain control  Withhold further steroid until infectious process ruled out  · Eventual PT/OT eval    Stomach ulcer  Assessment & Plan  · Patient evaluated by GI service during THE [facility] hospital stay where he required transfusion  · Bleeding stomach ulcer clipped 11/05/2019 EGD  · GI recommends Protonix 40 mg b i d  For 30 days  · Repeat EGD outpatient 1 month  · Non-ulcerogenic, cardiac diet  · Monitor patient's hemoglobin    S/P cardiac cath 11/06/19  Assessment & Plan  · Patient evaluated by Cardiology service during [facility] hospitalization  He was discharged earlier today    · During prior admission, he had elevated troponins likely 2/2 blood loss however cardiology proceeded with catheterization 11/06/2019  · On catheterization done 11/06/2019, patient had multivessel disease however Cardiology opted for medical management  · Continue immature, statin, carvedilol 25 mg b i d   Patient started on Plavix 75 mg  Decision made to discontinue aspirin given concurrent stomach ulcer  · Currently, patient denies cardiac symptoms  ZAKIA (acute kidney injury) ([facility] [address] )  Assessment & Plan  · POA  Setting of recent cardiac catheterization on 11/06  · Presents with creatinine 1 30  · bsCr appears 0 9-1 1 - may not qualify as true ZAKIA however slightly elevated from baseline  · Recommend avoid further nephrotoxic agents  · Received 1L bolus in ED  · Patient will likely tolerate imaging with contrast if this is necessary, however hydrate before and after  Iron deficiency anemia due to chronic blood loss  Assessment & Plan  · Patient with anemia 2/2 bleeding ulcer  Discovered on EGD 11/05/2019  Treated with clipping  · Monitor patient's hemoglobin here  · He currently denies hematochezia/melena or other sources of bleeding  Left ankle sprain  Assessment & Plan  · Patient sustained roller blading blading injury 11/1/19  · Was evaluated by Orthopedic service and PT/OT this week during [facility] admission  · Was given left ankle brace and recommendation for outpatient PT     HTN (hypertension)  Assessment & Plan  Patient takes carvedilol 25 mg b i d   Order p r n  Agent  Elevation here likely 2/2 pain  Of note, he was trialed on amlodipine on prior hospitalization and had angioedema thought 2/2 this  VTE Prophylaxis: Heparin  / sequential compression device   Code Status:  Full code  POLST: There is no POLST form on file for this patient (pre-hospital)  Discussion with family:  Discussed with patient  Daughter at bedside  Anticipated Length of Stay:  Patient will be admitted on an Inpatient basis with an anticipated length of stay of  Greater than 2 midnights     Justification for Hospital Stay:  Rule out septic arthritis    Total Time for Visit, including Counseling / Coordination of Care: 1 hour  Greater than 50% of this total time spent on direct patient counseling and coordination of care  Chief Complaint:   Intractable low back pain    History of Present Illness: Ailin Herrera is a 52 y o  male with a history of recent GI bleed 2/2 stomach ulcer and CAD s/p cath, hypertension, and recent left ankle sprain who presents with intractable low back pain after being discharged from [facility] today  Generally, patient states he is active  His job requires physical labor and his past time hobby is rollerblading  He states he was rollerblading 10/25/2019 when he strained his left ankle  The next morning, he felt tenderness of the left lower back  This was evaluated at [facility] with diagnosis of radiculopathy inpatient discharge for outpatient PT  Shortly after, patient developed with weakness with shakiness apart from the pain and presented again [facility] hospital   He was found to have elevated troponin, however was also found to be anemic  His case was discussed between GI and Cardiology  Decision made to transfer to [facility] for further care  Cardiology hoped to proceed with catheterization given patient's risk factors however recommended patient undergo GI workup 1st as he required transfusion to maintain hemoglobin and some element of troponin elevation thought likely secondary to blood loss  GI performed EGD on 11/05/2019 and found gastric ulcers which were treated with clipping  Patient's catheterization deferred a day further as he had unexpected lip swelling which resolved after discontinuing amlodipine  Catheterization done 11/6 - positive for coronary artery disease however decision made for medical management  His final regimen per Cardiology was carvedilol 25 mg b i d , Imdur 30 mg daily, and high-intensity statin  Patient placed on Plavix 75 mg daily  No aspirin due to stomach ulcers    GI also recommend Protonix 40 mg b i d  X 30 days then outpatient follow-up with repeat EGD  PT/OT assessed patient prior to his discharge on 11/07/2019  He was recommended for outpatient therapy/family support with rolling walker  Patient states his pain was adequately controlled at [facility]  At home, while he waited for his children to fill his prescriptions from the pharmacy, the pain acutely worsened  He took 2 Tylenol with no relief  Due to immobilizing pain, he requested his children call ambulance and presented to [facility]  Patient denies chest pain or shortness of breath  He denies abdominal pain or constitutional symptoms  Upon presentation, patient with leukocytosis  Blood cultures taken  He is afebrile  He is hypertensive  Maintaining saturations on room air  Left lower back acutely tender to palpation  CT lumbar region shows L4-L5 facet joint prominence, question degenerative vs septic arthritis  Patient to be admitted on inpatient status for workup of above finding and pain control  Will need eventual PT/OT evaluation  Review of Systems:    Review of Systems   Constitutional: Negative for activity change, appetite change, chills, fatigue and fever  HENT: Negative for congestion, rhinorrhea, sinus pressure, sinus pain and sore throat  Eyes: Negative for discharge and visual disturbance  Respiratory: Negative for cough, chest tightness, shortness of breath ( denies), wheezing and stridor  Cardiovascular: Negative for chest pain (Denies) and palpitations  Gastrointestinal: Negative for abdominal distention, abdominal pain, constipation, diarrhea, nausea and vomiting  Endocrine: Negative for cold intolerance and heat intolerance  Genitourinary: Negative for difficulty urinating  Musculoskeletal: Positive for arthralgias, back pain, gait problem and myalgias  Negative for joint swelling  Skin: Negative for color change, pallor and rash  Allergic/Immunologic: Negative for environmental allergies and food allergies  Neurological: Negative for dizziness, syncope, facial asymmetry, speech difficulty, weakness, light-headedness, numbness and headaches  Psychiatric/Behavioral: Negative for agitation, behavioral problems and confusion  Past Medical and Surgical History:     Past Medical History:   Diagnosis Date    GI bleed     Hypertension     stopped his meds when ran out several years ago    STEMI (ST elevation myocardial infarction) ([facility] [address] )        Past Surgical History:   Procedure Laterality Date    NO PAST SURGERIES         Meds/Allergies:    Prior to Admission medications    Medication Sig Start Date End Date Taking?  Authorizing Provider   acetaminophen (TYLENOL) 325 mg tablet Take 2 tablets (650 mg total) by mouth every 6 (six) hours as needed for mild pain, moderate pain or severe pain 11/7/19  Yes MAX Doll   atorvastatin (LIPITOR) 80 mg tablet Take 1 tablet (80 mg total) by mouth daily with dinner 11/7/19  Yes MAX Doll   carvedilol (COREG) 25 mg tablet Take 1 tablet (25 mg total) by mouth 2 (two) times a day with meals 11/7/19  Yes MAX Doll   clopidogrel (PLAVIX) 75 mg tablet Take 1 tablet (75 mg total) by mouth daily 11/8/19  Yes MAX Doll   isosorbide mononitrate (IMDUR) 30 mg 24 hr tablet Take 1 tablet (30 mg total) by mouth daily 11/8/19  Yes MAX Doll   lidocaine (LIDODERM) 5 % Apply 1 patch topically daily for 7 days Apply daily to left shoulder    Remove & Discard patch within 12 hours or as directed by MD 11/8/19 11/15/19 Yes MAX Doll   nitroglycerin (NITROSTAT) 0 4 mg SL tablet Place 1 tablet (0 4 mg total) under the tongue every 5 (five) minutes as needed for chest pain 11/7/19  Yes MAX Doll   pantoprazole (PROTONIX) 40 mg tablet Take 1 tablet (40 mg total) by mouth 2 (two) times a day before meals 11/7/19 Yes MAX Guevara   Testosterone (ANDROGEL) 40 5 MG/2 5GM (1 62%) GEL Place 40 5 mg on the skin daily    Yes Historical Provider, MD     I have reviewed home medications with patient personally  Allergies: No Known Allergies    Social History:     Marital Status: Single   Occupation:  Occupation requires physical labor  Patient Pre-hospital Level of Mobility:  Was fully mobile before rollerblading injury    Substance Use History:   Social History     Substance and Sexual Activity   Alcohol Use Never    Alcohol/week: 0 0 standard drinks    Frequency: Never    Drinks per session: 1 or 2    Binge frequency: Never     Social History     Tobacco Use   Smoking Status Never Smoker   Smokeless Tobacco Never Used     Social History     Substance and Sexual Activity   Drug Use No       Family History:    non-contributory    Physical Exam:     Vitals:   Blood Pressure: (!) 178/89 (11/08/19 0123)  Pulse: 68 (11/08/19 0123)  Temperature: 97 5 °F (36 4 °C) (11/08/19 0123)  Temp Source: Oral (11/08/19 0123)  Respirations: 18 (11/08/19 0123)  Height: 5' 11" (180 3 cm) (11/08/19 0123)  Weight - Scale: 120 kg (264 lb) (11/08/19 0123)  SpO2: 97 % (11/08/19 0123)    Physical Exam   Constitutional: He appears well-developed and well-nourished  No distress  Patient is pleasant and fully conversational   Appears uncomfortable, especially with movement  HENT:   Head: Normocephalic and atraumatic  Mouth/Throat: No oropharyngeal exudate  Eyes: Right eye exhibits no discharge  Left eye exhibits no discharge  No scleral icterus  Neck: No JVD present  No tracheal deviation present  No thyromegaly present  Cardiovascular: Regular rhythm  Exam reveals no gallop and no friction rub  No murmur heard  Pulmonary/Chest: Effort normal and breath sounds normal  No respiratory distress  He has no wheezes  He has no rales  He exhibits no tenderness  Abdominal: Soft  Bowel sounds are normal  He exhibits no distension  There is no tenderness  There is no rebound  Musculoskeletal: He exhibits tenderness (Acute tenderness to left low back)  He exhibits no edema or deformity  Skin: Skin is warm and dry  He is not diaphoretic  No erythema  No pallor  Bandage to right groin catheterization site  Psychiatric: He has a normal mood and affect  His behavior is normal    Nursing note and vitals reviewed  Additional Data:     Lab Results: I have personally reviewed pertinent reports  Results from last 7 days   Lab Units 11/07/19  2314   WBC Thousand/uL 25 77*   HEMOGLOBIN g/dL 8 6*   HEMATOCRIT % 26 3*   PLATELETS Thousands/uL 450*   NEUTROS PCT % 85*   LYMPHS PCT % 7*   MONOS PCT % 6   EOS PCT % 0     Results from last 7 days   Lab Units 11/07/19  2311  11/04/19  0008   SODIUM mmol/L 133*   < > 138   POTASSIUM mmol/L 4 0   < > 4 6   CHLORIDE mmol/L 100   < > 108   CO2 mmol/L 25   < > 24   BUN mg/dL 32*   < > 21   CREATININE mg/dL 1 30   < > 1 02   ANION GAP mmol/L 8   < > 6   CALCIUM mg/dL 8 7   < > 8 6   ALBUMIN g/dL  --   --  2 4*   TOTAL BILIRUBIN mg/dL  --   --  0 52   ALK PHOS U/L  --   --  104   ALT U/L  --   --  37   AST U/L  --   --  120*   GLUCOSE RANDOM mg/dL 110   < > 202*    < > = values in this interval not displayed  Results from last 7 days   Lab Units 11/06/19  0551   INR  1 19         Results from last 7 days   Lab Units 11/04/19  0011   HEMOGLOBIN A1C % 5 5           Imaging: I have personally reviewed pertinent reports  CT lumbar spine without contrast   Final Result by Rafa Dos Santos MD (11/08 0050)      Demineralization/osteolysis is noted about left L4-5 facet joint with prominence of the joint capsule; this may be degenerative but considering the history this may be concerning for septic arthritis  Similar but less severe changes are seen in the left    L5-S1 facet joints  Recommend MRI for further evaluation                I personally discussed this study with Dr Lilian Garcia on 11/8/2019 at 12:46 AM                Workstation performed: [id number]         MRI inpatient order    (Results Pending)     Allscripts / Epic Records Reviewed: Yes     ** Please Note: This note has been constructed using a voice recognition system   **

## 2019-11-08 NOTE — PLAN OF CARE
Problem: Potential for Falls  Goal: Patient will remain free of falls  Description  INTERVENTIONS:  - Assess patient frequently for physical needs  -  Identify cognitive and physical deficits and behaviors that affect risk of falls    -  [place] fall precautions as indicated by assessment   - Educate patient/family on patient safety including physical limitations  - Instruct patient to call for assistance with activity based on assessment  - Modify environment to reduce risk of injury  - Consider OT/PT consult to assist with strengthening/mobility  Outcome: Progressing     Problem: PAIN - ADULT  Goal: Verbalizes/displays adequate comfort level or baseline comfort level  Description  Interventions:  - Encourage patient to monitor pain and request assistance  - Assess pain using appropriate pain scale  - Administer analgesics based on type and severity of pain and evaluate response  - Implement non-pharmacological measures as appropriate and evaluate response  - Consider cultural and social influences on pain and pain management  - Notify physician/advanced practitioner if interventions unsuccessful or patient reports new pain  Outcome: Progressing     Problem: INFECTION - ADULT  Goal: Absence or prevention of progression during hospitalization  Description  INTERVENTIONS:  - Assess and monitor for signs and symptoms of infection  - Monitor lab/diagnostic results  - Monitor all insertion sites, i e  indwelling lines, tubes, and drains  - Monitor endotracheal if appropriate and nasal secretions for changes in amount and color  - [place] appropriate cooling/warming therapies per order  - Administer medications as ordered  - Instruct and encourage patient and family to use good hand hygiene technique  - Identify and instruct in appropriate isolation precautions for identified infection/condition  Outcome: Progressing  Goal: Absence of fever/infection during neutropenic period  Description  INTERVENTIONS:  - Monitor WBC    Outcome: Progressing     Problem: SAFETY ADULT  Goal: Patient will remain free of falls  Description  INTERVENTIONS:  - Assess patient frequently for physical needs  -  Identify cognitive and physical deficits and behaviors that affect risk of falls  -  [place] fall precautions as indicated by assessment   - Educate patient/family on patient safety including physical limitations  - Instruct patient to call for assistance with activity based on assessment  - Modify environment to reduce risk of injury  - Consider OT/PT consult to assist with strengthening/mobility  Outcome: Progressing  Goal: Maintain or return to baseline ADL function  Description  INTERVENTIONS:  - Assess patient frequently for physical needs  -  Identify cognitive and physical deficits and behaviors that affect risk of falls    -  [place] fall precautions as indicated by assessment   - Educate patient/family on patient safety including physical limitations  - Instruct patient to call for assistance with activity based on assessment  - Modify environment to reduce risk of injury  - Consider OT/PT consult to assist with strengthening/mobility  Outcome: Progressing  Goal: Maintain or return mobility status to optimal level  Description  INTERVENTIONS:  -  Assess patient's ability to carry out ADLs; assess patient's baseline for ADL function and identify physical deficits which impact ability to perform ADLs (bathing, care of mouth/teeth, toileting, grooming, dressing, etc )  - Assess/evaluate cause of self-care deficits   - Assess range of motion  - Assess patient's mobility; develop plan if impaired  - Assess patient's need for assistive devices and provide as appropriate  - Encourage maximum independence but intervene and supervise when necessary  - Involve family in performance of ADLs  - Assess for home care needs following discharge   - Consider OT consult to assist with ADL evaluation and planning for discharge  - Provide patient education as appropriate  Outcome: Progressing     Problem: DISCHARGE PLANNING  Goal: Discharge to home or other facility with appropriate resources  Description  INTERVENTIONS:  - Identify barriers to discharge w/patient and caregiver  - Arrange for needed discharge resources and transportation as appropriate  - Identify discharge learning needs (meds, wound care, etc )  - Arrange for interpretive services to assist at discharge as needed  - Refer to Case Management Department for coordinating discharge planning if the patient needs post-hospital services based on physician/advanced practitioner order or complex needs related to functional status, cognitive ability, or social support system  Outcome: Progressing     Problem: Knowledge Deficit  Goal: Patient/family/caregiver demonstrates understanding of disease process, treatment plan, medications, and discharge instructions  Description  Complete learning assessment and assess knowledge base    Interventions:  - Provide teaching at level of understanding  - Provide teaching via preferred learning methods  Outcome: Progressing

## 2019-11-08 NOTE — ASSESSMENT & PLAN NOTE
· Patient evaluated by Cardiology service during [facility] hospitalization  He was discharged earlier today  · During prior admission, he had elevated troponins likely 2/2 blood loss however cardiology proceeded with catheterization 11/06/2019  · On catheterization done 11/06/2019, patient had multivessel disease however Cardiology opted for medical management  · Continue immature, statin, carvedilol 25 mg b i d   Patient started on Plavix 75 mg  Decision made to discontinue aspirin given concurrent stomach ulcer  · Currently, patient denies cardiac symptoms

## 2019-11-08 NOTE — ASSESSMENT & PLAN NOTE
· Patient evaluated by GI service during THE [facility] hospital stay where he required transfusion  · Bleeding stomach ulcer clipped 11/05/2019 EGD  · GI recommends Protonix 40 mg b i d  For 30 days  · Repeat EGD outpatient 1 month  · Non-ulcerogenic, cardiac diet    · Monitor patient's hemoglobin

## 2019-11-08 NOTE — ASSESSMENT & PLAN NOTE
Patient takes carvedilol 25 mg b i d   Order p r n  Agent  Elevation here likely 2/2 pain  Of note, he was trialed on amlodipine on prior hospitalization and had angioedema thought 2/2 this

## 2019-11-08 NOTE — UTILIZATION REVIEW
Initial Clinical Review  11/8/2019  0045 OBSERVATION and CHANGED 11/8/2019  0133 INPATIENT RE: anticipate > 2 midnight stay for evaluation of septic arthritis  Admission: Date/Time/Statement: Inpatient Admission Orders (From admission, onward)     Ordered        11/08/19 0133  Inpatient Admission  Once                   Orders Placed This Encounter   Procedures    Inpatient Admission     Standing Status:   Standing     Number of Occurrences:   1     Order Specific Question:   Admitting Physician     Answer:   Reji Hart     Order Specific Question:   Level of Care     Answer:   Med Surg [16]     Order Specific Question:   Estimated length of stay     Answer:   More than 2 Midnights     Order Specific Question:   Certification     Answer:   I certify that inpatient services are medically necessary for this patient for a duration of greater than two midnights  See H&P and MD Progress Notes for additional information about the patient's course of treatment  ED Arrival Information     Expected Arrival Acuity Means of Arrival Escorted By Service Admission Type    - 11/7/2019 21:21 Urgent Ambulance [facility] Hospitalist Urgent    Arrival Complaint    BACK PAIN        Chief Complaint   Patient presents with    Back Pain     pt  comes in via EMS with c/o lower left sided back pain  He states it is a 7/10 pain at this time  Given 100mcg of fentanyl from EMS  He states he has a hard time walking  Recently discharged just this morning from [place]; was admitted there for an MI and a GI bleed  for a few days  Assessment/Plan:  this is a 52year old male from home to ED via EMS admitted inpatient due to acute left sided low back pain, concern for septic arthritis  Recent admit 11/3 to 11/7/2019 due to elevated troponin with type 2 MI, acute blood loss anemia/acute angioedema  Patient presents with worsening low back pain    Initially started 10/26/2019 and during hospital stay resolved somewhat with lidocaine and "steroids"  But after getting home pain became significantly worse, pain 12/10  On exam pain with any change of position  Straight leg test positive at approximately at 40 - 45 degrees left leg elevation  WBC 25 77,  hgb 8 6, hct 26 3  Creatinine 1 3 up from 0 9  Blood cultures done  Ct lumbar spine showed concern for septic arthritis  CRP >90 Pain control in progress  MRI to be done  On 11/9/2019 MRI  Showing synovitis, joint effusion  CRP is elevated  Continue pain control, patient wants to avoid narcotics  creatinine remains elevated at 1 14 and hydrate with imaging  Per PT - home with family support, home PT; outpatient PT    ED Triage Vitals   Temperature Pulse Respirations Blood Pressure SpO2   11/07/19 2124 11/07/19 2124 11/07/19 2124 11/07/19 2124 11/07/19 2124   98 6 °F (37 °C) 76 18 133/84 97 %      Temp Source Heart Rate Source Patient Position - Orthostatic VS BP Location FiO2 (%)   11/07/19 2124 11/07/19 2124 11/07/19 2124 11/07/19 2124 --   Oral Monitor Lying Right arm       Pain Score       11/08/19 0015       6        Wt Readings from Last 1 Encounters:   11/08/19 120 kg (264 lb)     Additional Vital Signs:   11/08/19 0700  98 5 °F (36 9 °C)  64  18  159/84    97 %  None (Room air)  Lying   11/08/19 0123  97 5 °F (36 4 °C)  68  18  178/89Abnormal   126  97 %  None (Room air)         Pertinent Labs/Diagnostic Test Results:   11/7/2019  EKG - sinus  Normal ST and T    11/8/2019 CT lumbar spine - Demineralization/osteolysis is noted about left L4-5 facet joint with prominence of the joint capsule; this may be degenerative but considering the history this may be concerning for septic arthritis   Similar but less severe changes are seen in the left   L5-S1 facet joints      11/8/2019 MRI lumbar spine - There are erosive changes in the left facet joint at L4-5, L5-S1 edema in the posterior back muscles/   This is associated with synovitis, joint effusion at L4-5 and L5-S1 and synovial cyst formation at the level of L4-5   These findings probably sequela  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   However correlate clinically, if there is concern for infection evaluate inflammatory markers including ESR and CRP    Aspiration may be performed as clinically needed  No evidence of discitis  Epidural lipomatosis with the moderate to severe central canal narrowing at L4-5  Results from last 7 days   Lab Units 11/08/19  0557 11/07/19  2314 11/07/19  0523 11/06/19  0551 11/05/19  1233 11/05/19  0611  11/04/19  0556   WBC Thousand/uL 22 38* 25 77* 21 08* 19 81*  --  21 05*  --  24 35*   HEMOGLOBIN g/dL 8 7* 8 6* 9 0* 9 5* 9 8* 9 3*   < > 7 4*   HEMATOCRIT % 27 4* 26 3* 27 9* 28 8*  --  28 7*   < > 22 7*   PLATELETS Thousands/uL 465* 450* 404* 350  --  304  --  244   NEUTROS ABS Thousands/µL 18 79* 21 98*  --   --   --   --   --  18 66*    < > = values in this interval not displayed       Results from last 7 days   Lab Units 11/08/19  0557 11/07/19  2311 11/07/19  0523 11/06/19  0551 11/05/19  0611 11/04/19  0556 11/04/19  0008   SODIUM mmol/L 133* 133* 134* 135* 135*  --  138   POTASSIUM mmol/L 3 9 4 0 4 2 4 2 4 2  --  4 6   CHLORIDE mmol/L 100 100 104 103 104  --  108   CO2 mmol/L 26 25 20* 24 24  --  24   ANION GAP mmol/L 7 8 10 8 7  --  6   BUN mg/dL 26* 32* 26* 19 15  --  21   CREATININE mg/dL 1 14 1 30 1 21 1 09 0 96  --  1 02   EGFR ml/min/1 73sq m 83 71 77 88 102  --  95   CALCIUM mg/dL 8 5 8 7 8 9 9 2 9 1  --  8 6   MAGNESIUM mg/dL  --   --   --   --  1 9  --  1 8   PHOSPHORUS mg/dL  --   --   --   --   --  3 3  --      Results from last 7 days   Lab Units 11/04/19  0008   AST U/L 120*   ALT U/L 37   ALK PHOS U/L 104   TOTAL PROTEIN g/dL 7 1   ALBUMIN g/dL 2 4*   TOTAL BILIRUBIN mg/dL 0 52     Results from last 7 days   Lab Units 11/08/19  0557 11/07/19  2311 11/07/19  0523 11/06/19  0551 11/05/19  0611 11/04/19  0008   GLUCOSE RANDOM mg/dL 128 110 214* 139 127 202*     Results from last 7 days   Lab Units 11/04/19  0011   HEMOGLOBIN A1C % 5 5   EAG mg/dl 111     Results from last 7 days   Lab Units 11/07/19  2311   CK TOTAL U/L 107     Results from last 7 days   Lab Units 11/04/19  0556 11/04/19  0317 11/04/19  0008   TROPONIN I ng/mL 22 10* 21 10* 19 50*     Results from last 7 days   Lab Units 11/06/19  0551 11/05/19  0611 11/04/19  1204   PROTIME seconds 14 7* 15 3* 16 2*   INR  1 19 1 25* 1 35*   PTT seconds 37 38*  --      Results from last 7 days   Lab Units 11/04/19  0008   NT-PRO BNP pg/mL 544*     Results from last 7 days   Lab Units 11/07/19  2311   CRP mg/L >90 0*     Results from last 7 days   Lab Units 11/08/19  0015 11/05/19  0351   CLARITY UA  Clear Clear   COLOR UA  Yellow Dk Yellow   SPEC GRAV UA  1 025 1 017   PH UA  5 5 6 0   GLUCOSE UA mg/dl Negative Negative   KETONES UA mg/dl Negative Negative   BLOOD UA  Negative Small*   PROTEIN UA mg/dl Negative Negative   NITRITE UA  Negative Negative   BILIRUBIN UA  Negative Negative   UROBILINOGEN UA E U /dl 0 2 1 0   LEUKOCYTES UA  Negative Negative   WBC UA /hpf  --  2-4*   RBC UA /hpf  --  2-4*   BACTERIA UA /hpf  --  Occasional   EPITHELIAL CELLS WET PREP /hpf  --  None Seen     Results from last 7 days   Lab Units 11/08/19  0557 11/08/19  0001 11/04/19  0742   BLOOD CULTURE  Received in Microbiology Lab  Culture in Progress  Received in Microbiology Lab  Culture in Progress  No Growth After 4 Days       Results from last 7 days   Lab Units 11/04/19  0011   TOTAL COUNTED  100       ED Treatment:   Medication Administration from 11/07/2019 2121 to 11/08/2019 0123       Date/Time Order Dose Route Action Comments     11/07/2019 2138 fentanyl citrate (PF) (FOR EMS ONLY) 100 mcg/2 mL injection 100 mcg 0 mcg Does not apply Given to EMS      11/07/2019 2316 morphine (PF) 10 mg/mL injection 8 mg 8 mg Intravenous Given      11/08/2019 0014 diazepam (VALIUM) injection 5 mg 5 mg Intravenous Given      11/08/2019 0014 lidocaine (LIDODERM) 5 % patch 1 patch 1 patch Topical Medication Applied      11/07/2019 2335 lidocaine (LIDODERM) 5 % patch 1 patch 0 patch Topical Hold      11/07/2019 2315 lidocaine (LIDODERM) 5 % patch 1 patch 1 patch Topical Medication Applied      11/08/2019 0029 sodium chloride 0 9 % bolus 1,000 mL 1,000 mL Intravenous New Bag         Past Medical History:   Diagnosis Date    GI bleed     Hypertension     stopped his meds when ran out several years ago    STEMI (ST elevation myocardial infarction) ([address] )      Present on Admission:   HTN (hypertension)   Left ankle sprain   ZAKIA (acute kidney injury) ([address] )      Admitting Diagnosis: Back pain [M54 9]  Acute left-sided low back pain without sciatica [M54 5]  Age/Sex: 52 y o  male  Admission Orders: 11/8/2019  0045 OBSERVATION and CHANGED 11/8/2019  0133 INPATIENT   Scheduled Medications:    Medications:  acetaminophen 975 mg Oral Q8H   atorvastatin 80 mg Oral Daily With Dinner   carvedilol 25 mg Oral BID With Meals   clopidogrel 75 mg Oral Daily   docusate sodium 100 mg Oral BID   heparin (porcine) 5,000 Units Subcutaneous Q8H [address]   isosorbide mononitrate 30 mg Oral Daily   lidocaine 2 patch Topical Daily   methocarbamol 750 mg Oral Q6H EROS   pantoprazole 40 mg Oral BID AC     PRN Meds:  Acetaminophen - used x 1  650 mg Oral Q6H PRN   HYDROmorphone - used x 1 (2332) 1 mg Intravenous Q6H PRN   Metoprolol - used x 1 (0213) 5 mg Intravenous Q6H PRN   ondansetron 4 mg Intravenous Q6H PRN   oxyCODONE 10 mg Oral Q6H PRN   oxyCODONE - used x 2 5 mg Oral Q6H PRN       Network Utilization Review Department  [email or web address]  ATTENTION: Please call with any questions or concerns to [phone] and carefully listen to the prompts so that you are directed to the right person   All voicemails are confidential   Marcie Tracy all requests for admission clinical reviews, approved or denied determinations and any other requests to dedicated fax number below belonging to the campus where the patient is receiving treatment    FACILITY NAME UR FAX NUMBER   ADMISSION DENIALS (Administrative/Medical Necessity) [address] [facility] (Maternity/NICU/Pediatrics) [phone]   [facility] [address] [facility] [address] [facility] [phone]   [address] [facility] [address] [place] [address] [phone]   [facility] [address] [phone]

## 2019-11-08 NOTE — ASSESSMENT & PLAN NOTE
· Patient "pulled" L ankle roller blading on 10/25/19; the next morning, noticed lower L back soreness  · Presented to [facility] 10/27/2019, diagnosed with radiculopathy and referred for outpatient therapy  · Presented again to [facility] 11/01/19 for unrelated weakness - transferred to [facility] for MI concern  · GI, Cardiology, Orthopedic assessed patient at [facility]  Details delineated below  Patient states his back pain was not addressed as it was masked by medications he received  Discharged from [facility] 11/7/19  Acute worsening of pain  Called EMS  · CT Lumbar spine w/o contrast - possible degenerative change vs septic arthritis at L4-5 facet joint  · Order MRI  · Check CRP, CK, blood cultures  · Patient does have leukocytosis; (did received steroid @ [facility])  He is afebrile  · Low suspicion for aortic dissection given pain is reproducible on neuromuscular exam   Check blood pressure in both arms  · Pain control  Withhold further steroid until infectious process ruled out    · Eventual PT/OT eval

## 2019-11-09 LAB
BACTERIA BLD CULT: NORMAL
CRP SERPL QL: >90 MG/L
ERYTHROCYTE [SEDIMENTATION RATE] IN BLOOD: 127 MM/HOUR (ref 0–10)

## 2019-11-09 PROCEDURE — G8979 MOBILITY GOAL STATUS: HCPCS

## 2019-11-09 PROCEDURE — 97116 GAIT TRAINING THERAPY: CPT

## 2019-11-09 PROCEDURE — 99232 SBSQ HOSP IP/OBS MODERATE 35: CPT | Performed by: NURSE PRACTITIONER

## 2019-11-09 PROCEDURE — 86140 C-REACTIVE PROTEIN: CPT | Performed by: PHYSICIAN ASSISTANT

## 2019-11-09 PROCEDURE — 97163 PT EVAL HIGH COMPLEX 45 MIN: CPT

## 2019-11-09 PROCEDURE — 97530 THERAPEUTIC ACTIVITIES: CPT

## 2019-11-09 PROCEDURE — G8978 MOBILITY CURRENT STATUS: HCPCS

## 2019-11-09 PROCEDURE — 85652 RBC SED RATE AUTOMATED: CPT | Performed by: PHYSICIAN ASSISTANT

## 2019-11-09 RX ADMIN — OXYCODONE HYDROCHLORIDE 10 MG: 10 TABLET ORAL at 17:58

## 2019-11-09 RX ADMIN — METHOCARBAMOL TABLETS 750 MG: 750 TABLET, COATED ORAL at 23:28

## 2019-11-09 RX ADMIN — HEPARIN SODIUM 5000 UNITS: 5000 INJECTION INTRAVENOUS; SUBCUTANEOUS at 05:48

## 2019-11-09 RX ADMIN — METHOCARBAMOL TABLETS 750 MG: 750 TABLET, COATED ORAL at 17:56

## 2019-11-09 RX ADMIN — ACETAMINOPHEN 975 MG: 325 TABLET, FILM COATED ORAL at 13:35

## 2019-11-09 RX ADMIN — PANTOPRAZOLE SODIUM 40 MG: 40 TABLET, DELAYED RELEASE ORAL at 05:47

## 2019-11-09 RX ADMIN — LIDOCAINE 2 PATCH: 50 PATCH TOPICAL at 08:56

## 2019-11-09 RX ADMIN — METHOCARBAMOL TABLETS 750 MG: 750 TABLET, COATED ORAL at 05:47

## 2019-11-09 RX ADMIN — ACETAMINOPHEN 975 MG: 325 TABLET, FILM COATED ORAL at 23:27

## 2019-11-09 RX ADMIN — CLOPIDOGREL BISULFATE 75 MG: 75 TABLET ORAL at 08:56

## 2019-11-09 RX ADMIN — CARVEDILOL 25 MG: 12.5 TABLET, FILM COATED ORAL at 08:56

## 2019-11-09 RX ADMIN — ISOSORBIDE MONONITRATE 30 MG: 30 TABLET, EXTENDED RELEASE ORAL at 08:56

## 2019-11-09 RX ADMIN — HEPARIN SODIUM 5000 UNITS: 5000 INJECTION INTRAVENOUS; SUBCUTANEOUS at 13:35

## 2019-11-09 RX ADMIN — CARVEDILOL 25 MG: 12.5 TABLET, FILM COATED ORAL at 17:56

## 2019-11-09 RX ADMIN — METHOCARBAMOL TABLETS 750 MG: 750 TABLET, COATED ORAL at 13:34

## 2019-11-09 RX ADMIN — PANTOPRAZOLE SODIUM 40 MG: 40 TABLET, DELAYED RELEASE ORAL at 17:56

## 2019-11-09 RX ADMIN — HEPARIN SODIUM 5000 UNITS: 5000 INJECTION INTRAVENOUS; SUBCUTANEOUS at 23:28

## 2019-11-09 RX ADMIN — ACETAMINOPHEN 975 MG: 325 TABLET, FILM COATED ORAL at 05:47

## 2019-11-09 NOTE — ASSESSMENT & PLAN NOTE
· Patient sustained roller blading blading injury 11/1/19  · Was evaluated by Orthopedic service and PT/OT this week during [facility] admission  · Was given left ankle brace and recommendation for outpatient PT    · Appreciate PT evaluation and treatment in [facility]

## 2019-11-09 NOTE — UTILIZATION REVIEW
Notification of Inpatient Admission/Inpatient Authorization Request   This is a Notification of Inpatient Admission for [address]  Be advised that this patient was admitted to our facility under Inpatient Status  Please contact the Adilene Billy at [phone] for additional admission information  Patient Name:   Lynn Tripathi   YOB: 1965       [address]   [address]:   Chaz Mendoza  Tax ID: [id number]  NPI: [id number] Attending Provider/NPI: Charlena Hodgkins, [address]il[address] [[id number]]   Place of Service Code: 24     Place of Service Name:  [address] [place] [address]   Start Date: 11/8/19 0133     Discharge Date & Time: No discharge date for patient encounter  Type of Admission: Inpatient Status Discharge Disposition   (if discharged): Home/Self Care   Patient Diagnoses: Back pain [M54 9]  Acute left-sided low back pain without sciatica [M54 5]     Orders: Admission Orders (From admission, onward)     Ordered        11/08/19 0133  Inpatient Admission  Once         11/08/19 0045  Place in Observation  Once                    Assigned Utilization Review Contact: Adilene Billy  Utilization   Network Utilization Review Department  Phone: [phone]; Fax [phone]  Email: [email or web address]   ATTENTION PAYERS: Please call the assigned Utilization  directly with any questions or concerns ALL voicemails in the department are confidential  Send all requests for admission clinical reviews, approved or denied determinations and any other requests to dedicated fax number belonging to the campus where the patient is receiving treatment

## 2019-11-09 NOTE — ASSESSMENT & PLAN NOTE
· Patient evaluated by Cardiology service during [facility] hospitalization  He was discharged recently,actualyl on the day of admission to [facility]  · During prior admission, he had elevated troponins likely 2/2 blood loss however cardiology proceeded with catheterization 11/06/2019  · On catheterization done 11/06/2019, patient had multivessel disease however Cardiology opted for medical management  · Continue imdur, statin, carvedilol 25 mg b i d   Patient started on Plavix 75 mg  Decision made to discontinue aspirin given concurrent stomach ulcer  · Currently, patient denies cardiac symptoms

## 2019-11-09 NOTE — PLAN OF CARE
Problem: Potential for Falls  Goal: Patient will remain free of falls  Description  INTERVENTIONS:  - Assess patient frequently for physical needs  -  Identify cognitive and physical deficits and behaviors that affect risk of falls    -  [place] fall precautions as indicated by assessment   - Educate patient/family on patient safety including physical limitations  - Instruct patient to call for assistance with activity based on assessment  - Modify environment to reduce risk of injury  - Consider OT/PT consult to assist with strengthening/mobility  Outcome: Progressing     Problem: PAIN - ADULT  Goal: Verbalizes/displays adequate comfort level or baseline comfort level  Description  Interventions:  - Encourage patient to monitor pain and request assistance  - Assess pain using appropriate pain scale  - Administer analgesics based on type and severity of pain and evaluate response  - Implement non-pharmacological measures as appropriate and evaluate response  - Consider cultural and social influences on pain and pain management  - Notify physician/advanced practitioner if interventions unsuccessful or patient reports new pain  Outcome: Progressing     Problem: INFECTION - ADULT  Goal: Absence or prevention of progression during hospitalization  Description  INTERVENTIONS:  - Assess and monitor for signs and symptoms of infection  - Monitor lab/diagnostic results  - Monitor all insertion sites, i e  indwelling lines, tubes, and drains  - Monitor endotracheal if appropriate and nasal secretions for changes in amount and color  - [place] appropriate cooling/warming therapies per order  - Administer medications as ordered  - Instruct and encourage patient and family to use good hand hygiene technique  - Identify and instruct in appropriate isolation precautions for identified infection/condition  Outcome: Progressing  Goal: Absence of fever/infection during neutropenic period  Description  INTERVENTIONS:  - Monitor WBC    Outcome: Progressing     Problem: SAFETY ADULT  Goal: Patient will remain free of falls  Description  INTERVENTIONS:  - Assess patient frequently for physical needs  -  Identify cognitive and physical deficits and behaviors that affect risk of falls    -  [place] fall precautions as indicated by assessment   - Educate patient/family on patient safety including physical limitations  - Instruct patient to call for assistance with activity based on assessment  - Modify environment to reduce risk of injury  - Consider OT/PT consult to assist with strengthening/mobility  Outcome: Progressing  Goal: Maintain or return to baseline ADL function  Description  INTERVENTIONS:  -  Assess patient's ability to carry out ADLs; assess patient's baseline for ADL function and identify physical deficits which impact ability to perform ADLs (bathing, care of mouth/teeth, toileting, grooming, dressing, etc )  - Assess/evaluate cause of self-care deficits   - Assess range of motion  - Assess patient's mobility; develop plan if impaired  - Assess patient's need for assistive devices and provide as appropriate  - Encourage maximum independence but intervene and supervise when necessary  - Involve family in performance of ADLs  - Assess for home care needs following discharge   - Consider OT consult to assist with ADL evaluation and planning for discharge  - Provide patient education as appropriate  Outcome: Progressing  Goal: Maintain or return mobility status to optimal level  Description  INTERVENTIONS:  - Assess patient's baseline mobility status (ambulation, transfers, stairs, etc )    - Identify cognitive and physical deficits and behaviors that affect mobility  - Identify mobility aids required to assist with transfers and/or ambulation (gait belt, sit-to-stand, lift, walker, cane, etc )  - [place] fall precautions as indicated by assessment  - Record patient progress and toleration of activity level on Mobility SBAR; progress patient to next Phase/Stage  - Instruct patient to call for assistance with activity based on assessment  - Consider rehabilitation consult to assist with strengthening/weightbearing, etc   Outcome: Progressing     Problem: DISCHARGE PLANNING  Goal: Discharge to home or other facility with appropriate resources  Description  INTERVENTIONS:  - Identify barriers to discharge w/patient and caregiver  - Arrange for needed discharge resources and transportation as appropriate  - Identify discharge learning needs (meds, wound care, etc )  - Arrange for interpretive services to assist at discharge as needed  - Refer to Case Management Department for coordinating discharge planning if the patient needs post-hospital services based on physician/advanced practitioner order or complex needs related to functional status, cognitive ability, or social support system  Outcome: Progressing     Problem: Knowledge Deficit  Goal: Patient/family/caregiver demonstrates understanding of disease process, treatment plan, medications, and discharge instructions  Description  Complete learning assessment and assess knowledge base    Interventions:  - Provide teaching at level of understanding  - Provide teaching via preferred learning methods  Outcome: Progressing

## 2019-11-09 NOTE — ASSESSMENT & PLAN NOTE
· Patient with anemia 2/2 bleeding ulcer  Discovered on EGD 11/05/2019  Treated with clipping  · Monitor patient's hemoglobin here  · He currently denies hematochezia/melena or other sources of bleeding    · HD stable

## 2019-11-09 NOTE — PLAN OF CARE
Problem: PHYSICAL THERAPY ADULT  Goal: Performs mobility at highest level of function for planned discharge setting  See evaluation for individualized goals  Description  Treatment/Interventions: Functional transfer training, LE strengthening/ROM, Elevations, Therapeutic exercise, Endurance training, Patient/family training, Equipment eval/education, Bed mobility, Gait training, Cognitive reorientation, Spoke to nursing, Spoke to advanced practitioner  Equipment Recommended: Archie Negrete walker trial)       See flowsheet documentation for full assessment, interventions and recommendations  11/9/2019 1411 by Luiza Mullen PT  Outcome: Adequate for Discharge  Note:   Prognosis: Good  Problem List: Decreased mobility, Impaired balance, Pain  Pt was able to participate with between S and min A for bed mobility, transfers and gait tasks w/ walker; Pt declined stairs performance but verbalized good understanding of technique/sequencing  Goals updated below if pt is not DCed:Goals to be met 11/16/2019[de-identified] Pt will: Perform bed mobility tasks with Supervision to prepare for transfers and reposition in bed  Perform transfers with Supervision to increase Indep in home environment  Perform ambulation with least restrictive device for >50' with  Supervision  to increase Indep in home environment, improve gait quality and promote proper use of assistive device  If not DCed Perform 2 stairs w/DME and /or railing w/Supervision to return to home with BURAK and return to multilevel home  Skilled PT recommended upon DC (more likley HHPT--> progression to OPPT) to progress pt with multiple impairments including @ back, L shoulder , and L ankle  Barriers to Discharge: (BURAK and stairs inside)  Barriers to Discharge Comments: Comorbidities affecting pt's physical performance at time of assessment include: obesity and STEMI, HTN   Personal factors affecting pt at time of IE include: steps to enter environment, multi-level environment, inability to perform current job functions, inability to perform IADLs, inability to perform ADLs, inability to ambulate household distances and inability to navigate community distances  Recommendation: Home with family support, Home PT, Outpatient PT          See flowsheet documentation for full assessment

## 2019-11-09 NOTE — ASSESSMENT & PLAN NOTE
· POA  Setting of recent cardiac catheterization on 11/06  · Presents with creatinine 1 30 and today creatinine 1  14   · bsCr appears 0 9-1 1 - Patient will likely tolerate imaging with contrast if this is necessary, however hydrate before and after

## 2019-11-09 NOTE — PLAN OF CARE
Problem: PHYSICAL THERAPY ADULT  Goal: Performs mobility at highest level of function for planned discharge setting  See evaluation for individualized goals  Description  Treatment/Interventions: Functional transfer training, LE strengthening/ROM, Elevations, Therapeutic exercise, Endurance training, Patient/family training, Equipment eval/education, Bed mobility, Gait training, Cognitive reorientation, Spoke to nursing, Spoke to advanced practitioner  Equipment Recommended: Archie Negrete walker trial)       See flowsheet documentation for full assessment, interventions and recommendations  Note:   Prognosis: Good  Problem List: Decreased mobility, Impaired balance, Pain  Assessment: Pt is a 52 y o  male seen for PT evaluation s/p admit to [facility] on 11/7/2019 w/ Acute left-sided low back pain without sciatica  Pt recently DC from [facility] <24 hrs prior where pt had been fitted for aircast@ LLE per notes, and for sling per pt for LUE Order placed for PT  Upon evaluation: Bedlevel assessment performed until further clarification for mobility, however Pt's clinical presentation is currently unstable/unpredictable given the functional mobility deficits above, especially weakness, decreased ROM, pain, decreased activity tolerance, decreased functional mobility tolerance and orthopedic restrictions, coupled with fall risks including impaired balance and impaired coordination, and combined with medical complications of multiple readmissions  Pt to benefit from continued skilled PT tx while in hospital and upon DC to address deficits as defined above and maximize level of functional mobility  From PT/mobility standpoint, recommendation at time of d/c would be home PT vs OPPT and home with family support pending progress in order to maximize pt's functional independence and consistency w/ mobility in order to facilitate return to PLOF    Recommend trial w/rolling vs platform walker next session and completion of mobility assessment  Barriers to Discharge: (BURAK and stairs inside)  Barriers to Discharge Comments: Comorbidities affecting pt's physical performance at time of assessment include: obesity and STEMI, HTN  Personal factors affecting pt at time of IE include: steps to enter environment, multi-level environment, inability to perform current job functions, inability to perform IADLs, inability to perform ADLs, inability to ambulate household distances and inability to navigate community distances  Recommendation: Home with family support, Home PT, Outpatient PT          See flowsheet documentation for full assessment

## 2019-11-09 NOTE — ASSESSMENT & PLAN NOTE
· Patient "pulled" L ankle roller blading on 10/25/19; the next morning, noticed lower L back soreness  · Presented to [facility] 10/27/2019, diagnosed with radiculopathy and referred for outpatient therapy  · Presented again to [facility] 11/01/19 for unrelated weakness - transferred to [facility] for MI concern  · GI, Cardiology, Orthopedic assessed patient at [facility]  Details delineated below  Patient states his back pain was not addressed as it was masked by medications he received  Discharged from 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

## 2019-11-09 NOTE — PROGRESS NOTES
Progress Note - Lucrecia Sanchez 1965, 52 y o  male MRN: [id number]    Unit/Bed#: S -01 Encounter: [id number]    Primary Care Provider: Aj Valles DO   Date and time admitted to hospital: 11/7/2019  9:22 PM        * Acute left-sided low back pain without sciatica  Assessment & Plan  · Patient "pulled" L ankle roller blading on 10/25/19; the next morning, noticed lower L back soreness  · Presented to [facility] 10/27/2019, diagnosed with radiculopathy and referred for outpatient therapy  · Presented again to [facility] 11/01/19 for unrelated weakness - transferred to [facility] for MI concern  · GI, Cardiology, Orthopedic assessed patient at \[facility]\""  Details delineated below  Patient states his back pain was not addressed as it was masked by medications he received  Discharged from [facility] 11/7/19  Acute worsening of pain  Called EMS  · CT Lumbar spine w/o contrast - possible degenerative change vs septic arthritis at L4-5 facet joint  · MRI noted- There are erosive changes in the left facet joint at L4-5, L5-S1 edema in the posterior back muscles/   This is associated with synovitis, joint effusion at L4-5 and L5-S1 and synovial cyst formation at the level of L4-5   These findings probably sequela 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  No discitis, L4-5 foraminal narrowing  · CRP elevated but blood cultures are negative  · Patient does have leukocytosis; (did received steroid @ B)  He is afebrile and does not appear clinically toxic     · Pain control, patient wants to avoid narcotics  · PT/OT evals pending  · Discussed with physical therapy, okay to proceed to ambulate patient out of bed  ZAKIA (acute kidney injury) ([facility] [address] )  Assessment & Plan  · POA  Setting of recent cardiac catheterization on 11/06  · Presents with creatinine 1 30 and today creatinine 1  14   · bsCr appears 0 9-1 1 - Patient will likely tolerate imaging with contrast if this is necessary, however hydrate before and after  Iron deficiency anemia due to chronic blood loss  Assessment & Plan  · Patient with anemia 2/2 bleeding ulcer  Discovered on EGD 11/05/2019  Treated with clipping  · Monitor patient's hemoglobin here  · He currently denies hematochezia/melena or other sources of bleeding  · HD stable     HTN (hypertension)  Assessment & Plan  · Patient takes carvedilol 25 mg b i d  · Order p r n  Agent  · Elevation here likely 2/2 pain  · Of note, he was trialed on amlodipine on prior hospitalization and had angioedema thought 2/2 this  S/P cardiac cath 11/06/19  Assessment & Plan  · Patient evaluated by Cardiology service during [facility] [place] hospitalization  He was discharged recently,[place]yl on the day of admission to [facility]  · During prior admission, he had elevated troponins likely 2/2 blood loss however cardiology proceeded with catheterization 11/06/2019  · On catheterization done 11/06/2019, patient had multivessel disease however Cardiology opted for medical management  · Continue imdur, statin, carvedilol 25 mg b i d   Patient started on Plavix 75 mg  Decision made to discontinue aspirin given concurrent stomach ulcer  · Currently, patient denies cardiac symptoms  Stomach ulcer  Assessment & Plan  · Patient evaluated by GI service during [facility] [place] hospital stay where he required transfusion  · Bleeding stomach ulcer clipped 11/05/2019 EGD  · GI recommends Protonix 40 mg b i d  For 30 days  · Repeat EGD outpatient 1 month  · Non-ulcerogenic, cardiac diet  · Monitor patient's hemoglobin--stable currently     Left ankle sprain  Assessment & Plan  · Patient sustained roller blading blading injury 11/1/19  · Was evaluated by Orthopedic service and PT/OT this week during [facility] [place] admission  · Was given left ankle brace and recommendation for outpatient PT    · Appreciate PT evaluation and treatment in [facility]         VTE Pharmacologic Prophylaxis:   Pharmacologic: Heparin  Mechanical VTE Prophylaxis in Place: Yes    Patient Centered Rounds: I have performed bedside rounds with nursing staff today  Discussions with Specialists or Other Care Team Provider:  Primary RN    Education and Discussions with Family / Patient:  Patient    Time Spent for Care: 30 minutes  More than 50% of total time spent on counseling and coordination of care as described above  Current Length of Stay: 1 day(s)    Current Patient Status: Inpatient   Certification Statement: The patient will continue to require additional inpatient hospital stay due to Acute back pain    Discharge Plan / Estimated Discharge Date:  Tomorrow if pain continues to improve      Code Status: Level 1 - Full Code      Subjective:   Pain is much improved  He would like to go home however he is concerned about going home today as he required narcotics last night and does not want to go home on narcotics  Discussed with patient that he should get out of bed and ambulate today while taking the prescribed Tylenol and Robaxin and avoid narcotics if possible  He is aware that there there is he is in excruciating pain  He has no incontinence of bowel or bladder and he was able to  the bathroom and wash himself today  He is making arrangements with his family for extra help at home and is reassured that there is no bacteria present in his blood  Objective:     Vitals:   Temp (24hrs), Av 1 °F (36 7 °C), Min:97 7 °F (36 5 °C), Max:98 6 °F (37 °C)    Temp:  [97 7 °F (36 5 °C)-98 6 °F (37 °C)] 97 7 °F (36 5 °C)  HR:  [70-85] 70  Resp:  [18-19] 18  BP: (152-169)/(75-84) 169/84  SpO2:  [95 %-100 %] 100 %  Body mass index is 36 82 kg/m²  Input and Output Summary (last 24 hours):        Intake/Output Summary (Last 24 hours) at 2019 1259  Last data filed at 2019 0539  Gross per 24 hour   Intake 200 ml   Output 1100 ml   Net -900 ml       Physical Exam:     Physical Exam   Constitutional: He is oriented to person, place, and time  He appears well-nourished  No distress  HENT:   Head: Normocephalic  Eyes: Pupils are equal, round, and reactive to light  EOM are normal    Neck: Normal range of motion  Neck supple  Cardiovascular: Normal rate and regular rhythm  Pulmonary/Chest: Effort normal and breath sounds normal    Abdominal: Soft  Bowel sounds are normal    Musculoskeletal: Normal range of motion  Able to move legs and with them in bed,   Equal strength bilaterally   Neurological: He is alert and oriented to person, place, and time  Skin: Skin is warm and dry  Capillary refill takes less than 2 seconds  Psychiatric: He has a normal mood and affect  Nursing note and vitals reviewed  Additional Data:     Labs:    Results from last 7 days   Lab Units 11/08/19  0557   WBC Thousand/uL 22 38*   HEMOGLOBIN g/dL 8 7*   HEMATOCRIT % 27 4*   PLATELETS Thousands/uL 465*   NEUTROS PCT % 84*   LYMPHS PCT % 9*   MONOS PCT % 5   EOS PCT % 0     Results from last 7 days   Lab Units 11/08/19  0557  11/04/19  0008   POTASSIUM mmol/L 3 9   < > 4 6   CHLORIDE mmol/L 100   < > 108   CO2 mmol/L 26   < > 24   BUN mg/dL 26*   < > 21   CREATININE mg/dL 1 14   < > 1 02   CALCIUM mg/dL 8 5   < > 8 6   ALK PHOS U/L  --   --  104   ALT U/L  --   --  37   AST U/L  --   --  120*    < > = values in this interval not displayed  Results from last 7 days   Lab Units 11/06/19  0551   INR  1 19       * I Have Reviewed All Lab Data Listed Above  Imaging:    Imaging Reports Reviewed Today Include:  MRI reviewed and reviewed with patient    Recent Cultures (last 7 days):     Results from last 7 days   Lab Units 11/08/19  0557 11/08/19  0001 11/04/19  0742   BLOOD CULTURE  No Growth at 24 hrs  No Growth at 24 hrs  No Growth After 5 Days         Last 24 Hours Medication List:     Current Facility-Administered Medications:  acetaminophen 650 mg Oral Q6H PRN Kiana Suazo PA-C   acetaminophen 975 mg Oral Q8H Kiana Suazo PA-C atorvastatin 80 mg Oral Daily With [employer] FINESSE Suazo PA-C   carvedilol 25 mg Oral BID With Meals Kiana Suazo PA-C   clopidogrel 75 mg Oral Daily Kiana Suazo PA-C   docusate sodium 100 mg Oral BID Kiana Suazo PA-C   heparin (porcine) 5,000 Units Subcutaneous Q8H [address] Kiana Suazo PA-C   HYDROmorphone 1 mg Intravenous Q6H PRN Lupillo Morelos MD   isosorbide mononitrate 30 mg Oral Daily Kiana Suazo PA-C   lidocaine 2 patch Topical Daily Kiana Suazo PA-C   methocarbamol 750 mg Oral Q6H [address] Kiana Suazo PA-C   metoprolol 5 mg Intravenous Q6H PRN Kiana Suazo PA-C   ondansetron 4 mg Intravenous Q6H PRN Kiana Suazo PA-C   oxyCODONE 10 mg Oral Q6H PRN Lupillo Morelos MD   oxyCODONE 5 mg Oral Q6H PRN Kiana Suazo PA-C   pantoprazole 40 mg Oral BID AC Kiana Suazo PA-C        Today, Patient Was Seen By: MAX Larson    ** Please Note: Dragon 360 Dictation voice to text software may have been used in the creation of this document   **

## 2019-11-09 NOTE — ASSESSMENT & PLAN NOTE
· Patient evaluated by GI service during [facility] hospital stay where he required transfusion  · Bleeding stomach ulcer clipped 11/05/2019 EGD  · GI recommends Protonix 40 mg b i d  For 30 days  · Repeat EGD outpatient 1 month  · Non-ulcerogenic, cardiac diet    · Monitor patient's hemoglobin--stable currently

## 2019-11-09 NOTE — ASSESSMENT & PLAN NOTE
· Patient takes carvedilol 25 mg b i d  · Order p r n  Agent  · Elevation here likely 2/2 pain  · Of note, he was trialed on amlodipine on prior hospitalization and had angioedema thought 2/2 this

## 2019-11-09 NOTE — PHYSICAL THERAPY NOTE
PHYSICAL THERAPY EVALUATION  NAME:  Ailin Herrera  DATE: 11/09/19    AGE:   52 y o  Mrn:   [id number]  ADMIT DX:  Back pain [M54 9]  Acute left-sided low back pain without sciatica [M54 5]    Past Medical History:   Diagnosis Date    GI bleed     Hypertension     stopped his meds when ran out several years ago    STEMI (ST elevation myocardial infarction) ([facility] [address] )      Length Of Stay: 1  Performed at least 2 patient identifiers during session: Name and Birthday  PHYSICAL THERAPY EVALUATION :    11/09/19 1100   Note Type   Note type Eval only   Pain Assessment   Pain Assessment FLACC   Pain Type Acute pain;Chronic pain   Pain Location Back   Pain Orientation Left; Lower   Patient's Stated Pain Goal No pain   Hospital Pain Intervention(s) Repositioned;Cold applied; Ambulation/increased activity; Emotional support   Multiple Pain Sites Yes  (L ankle and L shoulder)   Pain Rating: FLACC (Rest) - Face 1   Pain Rating: FLACC (Rest) - Legs 1   Pain Rating: FLACC (Rest) - Activity 1   Pain Rating: FLACC (Rest) - Cry 1   Pain Rating: FLACC (Rest) - Consolability 1   Score: FLACC (Rest) 5   Pain Rating: FLACC (Activity) - Face 1   Pain Rating: FLACC (Activity) - Legs 1   Pain Rating: FLACC (Activity) - Activity 2   Pain Rating: FLACC (Activity) - Cry 1   Pain Rating: FLACC (Activity) - Consolability 1   Score: FLACC (Activity) 6   Home Living   Type of Home House   Home Layout One level  (few BURAK And steps inside)   Home Equipment Walker;Crutches   Additional Comments Pt lives in a 1st floor apartment with 2 BURAK   Prior Function   Level of [place] Independent with ADLs and functional mobility   Lives With Alone   Receives Help From Family   ADL Assistance Independent   IADLs Independent   Falls in the last 6 months 0   Vocational Full time employment   Comments aircast fitted LLE   Restrictions/Precautions   Weight Bearing Precautions Per Order No   Braces or Orthoses LE Braces  (ANKLE SPLINT L LE )   Other Precautions Pain;Fall Risk   General   Family/Caregiver Present Yes   Cognition   Overall Cognitive Status WFL   Arousal/Participation Alert   Orientation Level Oriented X4   RUE Assessment   RUE Assessment WFL   LUE Assessment   LUE Assessment X  (L shoulder positioned in sh IR/add due to pain)   RLE Assessment   RLE Assessment   (WFL; - SLR)   Strength RLE   R Hip Flexion 4/5   R Knee Extension 4/5   R Ankle Dorsiflexion 4/5   LLE Assessment   LLE Assessment X  (+ SLR)   LLE Overall AROM   L Ankle Dorsiflexion limited AROM/PROM inv/eversion, TTP @ lateral ankle   Strength LLE   L Hip Flexion   (tested to 3)   L Knee Extension   (tested to 3, limited by pain)   L Ankle Dorsiflexion   (tested to 3 limited by ankle pain)   Coordination   Movements are Fluid and Coordinated 0   Coordination and Movement Description antalgic   Sensation   (vision and hearing WFL)   Light Touch   RLE Light Touch Grossly intact   LLE Light Touch Grossly intact   Bed Mobility   Supine to Sit Unable to assess  (awaiting clarification)   Transfers   Sit to Stand   (awaiting clarification)   Ambulation/Elevation   Gait pattern   (awaiting clarification)   Endurance Deficit   Endurance Deficit Yes   Endurance Deficit Description needs rests between mobility assessment trials in bed   Activity Tolerance   Activity Tolerance Patient limited by pain; Patient limited by fatigue   Medical Staff Made Aware jeison Chen from [address] spoke to [facility] RN   Assessment   Prognosis Good   Problem List Decreased mobility; Impaired balance;Pain   Barriers to Discharge   (BURAK and stairs inside)   Goals   Patient Goals to have less pain throughout, and get back to rollerskating eventually   STG Expiration Date 11/10/19   PT Treatment Day 1  (treatment documented in note)   Plan   Treatment/Interventions Functional transfer training;LE strengthening/ROM; Elevations; Therapeutic exercise; Endurance training;Patient/family training;Equipment eval/education; Bed mobility;Gait training;Cognitive reorientation;Spoke to nursing;Spoke to advanced practitioner   PT Frequency Other (Comment)  (BID today, 5x/wk after that PRN)   Recommendation   Recommendation Home with family support;Home PT;Outpatient PT   Equipment Recommended Walker  (vs platform walker trial)   Barthel Index   Feeding 10   Bathing 0   Grooming Score 5   Dressing Score 5   Bladder Score 10   Bowels Score 10   Toilet Use Score 10   Transfers (Bed/Chair) Score 0   Mobility (Level Surface) Score 0   Stairs Score 0   Barthel Index Score 50   Addendum: Pt education in progression of mobility in future including AROM L ankle, benefits /caution for ice @ L neck and L ankle  (Please find full objective findings from PT assessment regarding body systems outlined above)  Assessment: Pt is a 52 y o  male seen for PT evaluation s/p admit to [facility] on 11/7/2019 w/ Acute left-sided low back pain without sciatica  Pt recently DC from [facility] <24 hrs prior where pt had been fitted for aircast@ LLE per notes, and for sling per pt for LUE Order placed for PT  Upon evaluation: Bedlevel assessment performed until further clarification for mobility, however Pt's clinical presentation is currently unstable/unpredictable given the functional mobility deficits above, especially weakness, decreased ROM, pain, decreased activity tolerance, decreased functional mobility tolerance and orthopedic restrictions, coupled with fall risks including impaired balance and impaired coordination, and combined with medical complications of multiple readmissions  Pt to benefit from continued skilled PT tx while in hospital and upon DC to address deficits as defined above and maximize level of functional mobility   From PT/mobility standpoint, recommendation at time of d/c would be home PT vs OPPT and home with family support pending progress in order to maximize pt's functional independence and consistency w/ mobility in order to facilitate return to PLOF  Recommend trial w/rolling vs platofrm walker next session and completion of mobility assessment  Goals: Complete mobility assessment and set goals    Comorbidities affecting pt's physical performance at time of assessment include: obesity and STEMI, HTN  Personal factors affecting pt at time of IE include: steps to enter environment, multi-level environment, inability to perform current job functions, inability to perform IADLs, inability to perform ADLs, inability to ambulate household distances and inability to navigate community distances  PHYSICAL THERAPY TREATMENT NOTE  Time In: 9076  Time [id number]  Total Time:  37 min  S:  Care coordination via phone w/ Jennefer Scheuermann from [facility] re: progression of care  Pt agreeable to participate w/ OOB Mobility, and reports that he was told that he may be [facility] tomorrow  O:  Transfers S w/ increased time, but WB partially on LUE and LLE to perform task  Bed mobility min A supine<>sit w/ taking more then reasonable time  Amb w/ rolling walker for 40' taking more than 5 min to perform task w/verbal instruction for stepping to back 1/2 of walker, sequencing, minimizing twisting  Pt stated that he is not concerned with performing BURAK onto steps inside of home--instructed that based on R sided railing and LUE/LLE pain, would recommend forward ascending and backward descending w/ S/A upon DC  A:  Pt was able to participate with between S and min A for bed mobility, transfers and gait tasks w/ walker; Pt declined stairs performance but verbalized good understanding of technique/sequencing  Goals updated below if pt is not DCed:  Goals to be met 11/16/2019: Pt will: Perform bed mobility tasks with Supervision to prepare for transfers and reposition in bed  Perform transfers with Supervision to increase Indep in home environment   Perform ambulation with least restrictive device for >50' with  Supervision  to increase Indep in home environment, improve gait quality and promote proper use of assistive device  If not DCed Perform 2 stairs w/DME and /or railing w/Supervision to return to home with BURAK and return to multilevel home  Skilled PT recommended upon DC (more priscila HHPT--> progression to OPPT) to progress pt with multiple impairments including @ back, L shoulder , and L ankle      P:  Recommend continued mobility while here with rolling walker     Wyatt Valencia, PT, DPT

## 2019-11-10 VITALS
SYSTOLIC BLOOD PRESSURE: 138 MMHG | HEART RATE: 67 BPM | RESPIRATION RATE: 18 BRPM | DIASTOLIC BLOOD PRESSURE: 66 MMHG | TEMPERATURE: 98.1 F | OXYGEN SATURATION: 100 % | WEIGHT: 264 LBS | BODY MASS INDEX: 36.96 KG/M2 | HEIGHT: 71 IN

## 2019-11-10 PROCEDURE — 99239 HOSP IP/OBS DSCHRG MGMT >30: CPT | Performed by: NURSE PRACTITIONER

## 2019-11-10 RX ORDER — METHOCARBAMOL 750 MG/1
750 TABLET, FILM COATED ORAL EVERY 6 HOURS SCHEDULED
Qty: 120 TABLET | Refills: 0 | Status: SHIPPED | OUTPATIENT
Start: 2019-11-10 | End: 2020-01-16

## 2019-11-10 RX ORDER — ACETAMINOPHEN 325 MG/1
975 TABLET ORAL EVERY 8 HOURS
Qty: 30 TABLET | Refills: 0 | Status: SHIPPED | OUTPATIENT
Start: 2019-11-10 | End: 2020-10-26 | Stop reason: ALTCHOICE

## 2019-11-10 RX ORDER — OXYCODONE HYDROCHLORIDE 10 MG/1
10 TABLET ORAL EVERY 6 HOURS PRN
Qty: 20 TABLET | Refills: 0 | Status: SHIPPED | OUTPATIENT
Start: 2019-11-10 | End: 2019-11-20

## 2019-11-10 RX ORDER — DOCUSATE SODIUM 100 MG/1
100 CAPSULE, LIQUID FILLED ORAL 2 TIMES DAILY
Qty: 10 CAPSULE | Refills: 0 | Status: SHIPPED | OUTPATIENT
Start: 2019-11-10 | End: 2020-06-29 | Stop reason: HOSPADM

## 2019-11-10 RX ADMIN — PANTOPRAZOLE SODIUM 40 MG: 40 TABLET, DELAYED RELEASE ORAL at 06:26

## 2019-11-10 RX ADMIN — ACETAMINOPHEN 975 MG: 325 TABLET, FILM COATED ORAL at 06:26

## 2019-11-10 RX ADMIN — HEPARIN SODIUM 5000 UNITS: 5000 INJECTION INTRAVENOUS; SUBCUTANEOUS at 06:26

## 2019-11-10 RX ADMIN — METHOCARBAMOL TABLETS 750 MG: 750 TABLET, COATED ORAL at 06:26

## 2019-11-10 RX ADMIN — CARVEDILOL 25 MG: 12.5 TABLET, FILM COATED ORAL at 08:43

## 2019-11-10 RX ADMIN — ISOSORBIDE MONONITRATE 30 MG: 30 TABLET, EXTENDED RELEASE ORAL at 08:43

## 2019-11-10 RX ADMIN — LIDOCAINE 2 PATCH: 50 PATCH TOPICAL at 08:43

## 2019-11-10 RX ADMIN — OXYCODONE HYDROCHLORIDE 10 MG: 10 TABLET ORAL at 11:41

## 2019-11-10 RX ADMIN — CLOPIDOGREL BISULFATE 75 MG: 75 TABLET ORAL at 08:43

## 2019-11-10 NOTE — SOCIAL WORK
CM made aware by Shakir Brenner DNP that patient is medically clear for DC today  Patient is recommended for home PT  CM met with patient to discuss recommendation and freedom of choice  [facility]  CM made patient aware there is 5-7 day delay in start of care and patient confirmed that he is still would like the referral  CM made referral per preference  CM will follow up

## 2019-11-10 NOTE — ASSESSMENT & PLAN NOTE
· Patient sustained roller blading blading injury 11/1/19  · Was evaluated by Orthopedic service and PT/OT this week during [facility] admission  · Was given left ankle brace and recommendation for outpatient PT    · Appreciate PT evaluation and treatment in [facility]   · Patient needs to follow up with Orthopedic service

## 2019-11-10 NOTE — ASSESSMENT & PLAN NOTE
· POA  Setting of recent cardiac catheterization on 11/06  · Presents with creatinine 1 30 and today creatinine 1  14   · bsCr appears 0 9-1 1

## 2019-11-10 NOTE — SOCIAL WORK
[facility] stated they are willing to accept pending insurance verification which can not be done until tomorrow, Monday 11/11  CM spoke to patient and provided list of additional home health agencies  Patient feels comfortable going home with the list so he has it but would like to stick with [facility]  Patient aware his acceptance is pending insurance verification and that [facility] would be in touch with him  CM will follow up with [facility] and patient tomorrow as well  CM made Pansy Dials, DNP aware of same

## 2019-11-10 NOTE — SOCIAL WORK
[facility] able to accept for start of care Tuesday  [facility]CI faxed to [phone]  CM called patient and made him aware of same

## 2019-11-10 NOTE — SOCIAL WORK
[facility] unable to accept  CM called and spoke to patient regarding referral, patient requesting referrals to additional [facility] agencies  [facility] referrals made to [facility] and [facility] per preference  CM will follow up

## 2019-11-10 NOTE — DISCHARGE SUMMARY
Discharge- Geoffrey Shoemaker 1965, 52 y o  male MRN: [id number]    Unit/Bed#: S -01 Encounter: [id number]    Primary Care Provider: Jf Shukla DO   Date and time admitted to hospital: 11/7/2019  9:22 PM        * Acute left-sided low back pain without sciatica  Assessment & Plan  · Patient "pulled" L ankle roller blading on 10/25/19; the next morning, noticed lower L back soreness  · Presented to [facility] 10/27/2019, diagnosed with radiculopathy and referred for outpatient therapy  · Presented again to [facility] 11/01/19 for unrelated weakness - transferred to [facility] for MI concern  · GI, Cardiology, Orthopedic assessed patient at [facility]  Details delineated below  Patient states his back pain was not addressed as it was masked by medications he received  Discharged from [facility] 11/7/19  Acute worsening of pain  Called EMS  · CT Lumbar spine w/o contrast - possible degenerative change vs septic arthritis at L4-5 facet joint  · MRI noted- There are erosive changes in the left facet joint at L4-5, L5-S1 edema in the posterior back muscles/   This is associated with synovitis, joint effusion at L4-5 and L5-S1 and synovial cyst formation at the level of L4-5   These findings probably sequela 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     ZAKIA (acute kidney injury) ([facility] [address] )  [address] [place] [address]  · POA  Setting of recent cardiac catheterization on 11/06  · Presents with creatinine 1 30 and today creatinine 1  14   · bsCr appears 0 9-1 1     Iron deficiency anemia due to chronic blood loss  Assessment & Plan  · Patient with anemia 2/2 bleeding ulcer  Discovered on EGD 11/05/2019  Treated with clipping  · Monitor patient's hemoglobin here  · He currently denies hematochezia/melena or other sources of bleeding  · HD stable     HTN (hypertension)  Assessment & Plan  · Patient takes carvedilol 25 mg b i d  · Order p r n  Agent  · Elevation here likely 2/2 pain  · Of note, he was trialed on amlodipine on prior hospitalization and had angioedema thought 2/2 this  S/P cardiac cath 11/06/19  Assessment & Plan  · Patient evaluated by Cardiology service during [facility] hospitalization  He was discharged recently,actualyl on the day of admission to [facility]  · During prior admission, he had elevated troponins likely 2/2 blood loss however cardiology proceeded with catheterization 11/06/2019  · On catheterization done 11/06/2019, patient had multivessel disease however Cardiology opted for medical management  · Continue imdur, statin, carvedilol 25 mg b i d   Patient started on Plavix 75 mg  Decision made to discontinue aspirin given concurrent stomach ulcer  · Currently, patient denies cardiac symptoms  Stomach ulcer  Assessment & Plan  · Patient evaluated by GI service during [facility] hospital stay where he required transfusion  · Bleeding stomach ulcer clipped 11/05/2019 EGD  · GI recommends Protonix 40 mg b i d  For 30 days  · Repeat EGD outpatient 1 month  · Non-ulcerogenic, cardiac diet  · Monitor patient's hemoglobin--stable currently   · GI office will schedule outpatient follow-up    Left ankle sprain  Assessment & Plan  · Patient sustained roller blading blading injury 11/1/19  · Was evaluated by Orthopedic service and PT/OT this week during [facility] admission  · Was given left ankle brace and recommendation for outpatient PT    · Appreciate PT evaluation and treatment in [facility]   · Patient needs to follow up with Orthopedic service        Discharging Physician / Practitioner: Edvin Sanchez, [address]  PCP: Dony Shaw DO  Admission Date:   Admission Orders (From admission, onward)     Ordered        11/08/19 0133  Inpatient Admission  Once         11/08/19 0045  Place in Observation  Once                   Discharge Date: 11/10/19    Resolved Problems  Date Reviewed: 11/10/2019    None          Consultations During Hospital Stay:  · Physical therapy    Procedures Performed:   · MRI of the lumbar spine: There are erosive changes in the left facet joint at L4-5, L5-S1 edema in the posterior back muscles/   This is associated with synovitis, joint effusion at L4-5 and L5-S1 and synovial cyst formation at the level of L4-5   These findings probably sequela   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  No evidence of discitis  Epidural lipomatosis with the moderate to severe central canal narrowing at L4-5    Significant Findings / Test Results:   · Blood cultures remain negative    Incidental Findings:   · None     Test Results Pending at Discharge (will require follow up): · None     Outpatient Tests Requested:  · Non    Complications:  None    Reason for Admission:  Severe back pain    Hospital Course: Stephen Staton is a 52 y o  male patient who originally presented to the hospital on 11/7/2019 due to severe left-sided back pain  Patient fell during a rollerblading accident a couple of weeks ago and sprained his left ankle  He did have back pain at that time however the focus was on his ankle sprain and then soon after the patient started with a massive GI bleed and an MI  He was admitted to the [facility] for several days and stabilized in that regard  He continued to have back pain during that hospitalization however again the focus was on the critical medical issues  Patient was discharged home as he was ambulatory, however, he got home and had such severe pain and difficulty walking he called the ambulance    The ambulance brought him to [facility]  A CT scan of the lumbar spine was performed and due to concern for septic arthritis, an MRI was pursued  The MRI did not confirm an infection  Blood cultures were done were negative  CRP was done was elevated however the patient also had other lab abnormality secondary to his recent critical illness including leukocytosis from recent steroid use, acute kidney injury secondary to massive GI bleed and hypotension, and anemia  Patient remained clinically well  He does continue to have back pain however it is improved greatly since he has been in the hospital   He is ambulating with a roller walker and assistance of 1  Physical therapy did evaluate him and recommended we change him from outpatient physical therapy to home physical therapy which will be done on discharge  Patient is very apprehensive to take narcotics at home however he did require 1 dose of 10 mg of oxycodone last 24 hours for extreme pain  He will be given a 20 tablets supply of oxycodone 10 mg to be taken every 6 hours as needed for pain  He will be given Robaxin take every 6 hours around the clock and was advised to taper that off for stop taking it once his pain is improved  He has a multitude of follow-up appointments being planned from his last hospitalization including Dr Mohit tabor with GI, Millie Dixon with Cardiology, Dr Cherrie Sanabria with sports medicine, and Dr Devon Kowalski his PCP  Advised him to continue this follow-up  Please see above list of diagnoses and related plan for additional information  Condition at Discharge: stable     Discharge Day Visit / Exam:     Subjective:  Patient states he feels much better today  Offers no complaints of chest pain or shortness of breath  Did require oxycodone in the middle of the night and while he is apprehensive to ask for pain medications for home, he thinks he should take some oxycodone in case the pain is so severe    He asked his daughter to go get a mattress overlay to make the bed more firm  He is willing to do physical therapy in the home  He is out of work right now and will resume work once his pain is better  He has been advised to follow up with all of the specialists from his last admission including Cardiology, Gastroenterology and Sports Medicine  He can firmed that he understands those instructions  Vitals: Blood Pressure: 138/66 (11/10/19 0900)  Pulse: 67 (11/10/19 0900)  Temperature: 98 1 °F (36 7 °C) (11/10/19 0900)  Temp Source: Oral (11/10/19 0900)  Respirations: 18 (11/10/19 0900)  Height: 5' 11" (180 3 cm) (11/08/19 0123)  Weight - Scale: 120 kg (264 lb) (11/08/19 0123)  SpO2: 100 % (11/10/19 0900)  Exam:   Physical Exam   Constitutional: He is oriented to person, place, and time  He appears well-nourished  No distress  HENT:   Head: Normocephalic and atraumatic  Eyes: Pupils are equal, round, and reactive to light  EOM are normal    Neck: Normal range of motion  Neck supple  Cardiovascular: Normal rate and regular rhythm  Murmur heard  Pulmonary/Chest: Effort normal and breath sounds normal    Abdominal: Soft  Musculoskeletal: Normal range of motion  Neurological: He is alert and oriented to person, place, and time  Skin: Skin is warm and dry  Psychiatric: He has a normal mood and affect  His behavior is normal    Nursing note reviewed  Discussion with Family:  Offered to call patient's daughter, he declined    Discharge instructions/Information to patient and family:   See after visit summary for information provided to patient and family  Provisions for Follow-Up Care:  See after visit summary for information related to follow-up care and any pertinent home health orders        Disposition:     Home    For Discharges to [facility]F:   · Not Applicable to this Patient - Not Applicable to this Patient    Planned Readmission:  Not anticipated     Discharge Statement:  I spent 42 minutes discharging the patient  This time was spent on the day of discharge  I had direct contact with the patient on the day of discharge  Greater than 50% of the total time was spent examining patient, answering all patient questions, arranging and discussing plan of care with patient as well as directly providing post-discharge instructions  Additional time then spent on discharge activities  Discharge Medications:  See after visit summary for reconciled discharge medications provided to patient and family        ** Please Note: This note has been constructed using a voice recognition system **

## 2019-11-10 NOTE — ASSESSMENT & PLAN NOTE
· Patient evaluated by GI service during [facility] hospital stay where he required transfusion  · Bleeding stomach ulcer clipped 11/05/2019 EGD  · GI recommends Protonix 40 mg b i d  For 30 days  · Repeat EGD outpatient 1 month  · Non-ulcerogenic, cardiac diet    · Monitor patient's hemoglobin--stable currently   · GI office will schedule outpatient follow-up

## 2019-11-10 NOTE — ASSESSMENT & PLAN NOTE
· Patient "pulled" L ankle roller blading on 10/25/19; the next morning, noticed lower L back soreness  · Presented to [facility] 10/27/2019, diagnosed with radiculopathy and referred for outpatient therapy  · Presented again to [facility] 11/01/19 for unrelated weakness - transferred to [facility] for MI concern  · GI, Cardiology, Orthopedic assessed patient at [facility]  Details delineated below  Patient states his back pain was not addressed as it was masked by medications he received  Discharged from [facility] 11/7/19  Acute worsening of pain  Called EMS  · CT Lumbar spine w/o contrast - possible degenerative change vs septic arthritis at L4-5 facet joint  · MRI noted- There are erosive changes in the left facet joint at L4-5, L5-S1 edema in the posterior back muscles/   This is associated with synovitis, joint effusion at L4-5 and L5-S1 and synovial cyst formation at the level of L4-5   These findings probably sequela 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

## 2019-11-10 NOTE — ASSESSMENT & PLAN NOTE
· Patient evaluated by Cardiology service during [facility] hospitalization  He was discharged recently,actualyl on the day of admission to [facility]  · During prior admission, he had elevated troponins likely 2/2 blood loss however cardiology proceeded with catheterization 11/06/2019  · On catheterization done 11/06/2019, patient had multivessel disease however Cardiology opted for medical management  · Continue imdur, statin, carvedilol 25 mg b i d   Patient started on Plavix 75 mg  Decision made to discontinue aspirin given concurrent stomach ulcer  · Currently, patient denies cardiac symptoms

## 2019-11-12 NOTE — UTILIZATION REVIEW
Notification of Discharge  This is a Notification of Discharge from our facility [address]  Please be advised that this patient has been discharge from our facility  Below you will find the admission and discharge date and time including the patients disposition  PRESENTATION DATE: 11/7/2019  9:22 PM  OBS ADMISSION DATE: 11/8/19 1245AM  IP ADMISSION DATE: 11/8/19 0133   DISCHARGE DATE: 11/10/2019  2:32 PM  DISPOSITION: Home with [facility] with [facility]   Admission Orders listed below:  Admission Orders (From admission, onward)     Ordered        11/08/19 0133  Inpatient Admission  Once         11/08/19 0045  Place in Observation  Once                   Please contact the UR Department if additional information is required to close this patient's authorization/case  [address] [facility] Utilization Review Department  Main: [phone] x carefully listen to the prompts  All voicemails are confidential   [email or web address]  Send all requests for admission clinical reviews, approved or denied determinations and any other requests to dedicated fax number below belonging to the campus where the patient is receiving treatment    List of dedicated fax numbers:  [address] [place] [address] DENIALS (Administrative/Medical Necessity) [phone]   [address]  (Maternity/NICU/Pediatrics) [phone]   Alex Mendoza [phone]   Joana Nagy [phone]   [place] Yu [phone]   [address] [phone]   [place] [address] [phone]

## 2019-11-13 DIAGNOSIS — A04.8 H. PYLORI INFECTION: Primary | ICD-10-CM

## 2019-11-13 LAB
BACTERIA BLD CULT: NORMAL
BACTERIA BLD CULT: NORMAL

## 2019-11-13 RX ORDER — CLARITHROMYCIN 500 MG/1
500 TABLET, COATED ORAL EVERY 12 HOURS SCHEDULED
Qty: 28 TABLET | Refills: 0 | Status: SHIPPED | OUTPATIENT
Start: 2019-11-13 | End: 2019-11-27

## 2019-11-13 RX ORDER — AMOXICILLIN 500 MG/1
1000 CAPSULE ORAL EVERY 12 HOURS SCHEDULED
Qty: 56 CAPSULE | Refills: 0 | Status: SHIPPED | OUTPATIENT
Start: 2019-11-13 | End: 2019-11-27

## 2019-11-14 ENCOUNTER — TELEPHONE (OUTPATIENT)
Dept: GASTROENTEROLOGY | Facility: CLINIC | Age: 54
End: 2019-11-14

## 2019-11-14 ENCOUNTER — TELEPHONE (OUTPATIENT)
Dept: CARDIOLOGY CLINIC | Facility: CLINIC | Age: 54
End: 2019-11-14

## 2019-11-14 NOTE — TELEPHONE ENCOUNTER
I usually just have the patient hold the atorvastatin while they are taking the clarithromycin, as long as the patient is okay with that  If not, let me know and we can use quadruple therapy

## 2019-11-14 NOTE — TELEPHONE ENCOUNTER
PRIYANKA Nunes is wanting to know if pt could stop Lipitor 80 mg for 14 days to have H- pylori treated with Biaxin?     You saw pt in the hospital on 11/8/19 for NSTEMI      Please advise

## 2019-11-14 NOTE — TELEPHONE ENCOUNTER
Called patient's cardiologist to request hold- atorvastatin during 14 day treatment for h pylori- clarithromycin

## 2019-11-14 NOTE — TELEPHONE ENCOUNTER
Patients GI provider:  Dr Jacqueline Vidal    Number to return call: [phone]    Reason for call: Pharmacy called stating there is a drug interaction w/clarithromycin   Pharmacist can be reached at the above number    Scheduled procedure/appointment date if applicable: NA

## 2019-11-14 NOTE — TELEPHONE ENCOUNTER
Dr Augustine Other patient EGD 11/5/19- positive h  pylori    Spoke to [facility] pharmacist- clarithromycin increases atorvastatin levels and patient is on highest dose of medication  Recommends ordering alternative

## 2019-11-18 NOTE — TELEPHONE ENCOUNTER
Called patient's cardiologist- Samreen Odonnell cardiology to follow up on my message left on their voice mail 11/14/19 to confirm it is ok to hold atorvastatin for h pylori treatment  Left message on voice mail again for follow up

## 2019-11-18 NOTE — TELEPHONE ENCOUNTER
Called patient to update him -waiting on response from cardiology for atorvastatin hold  He states he started treatment for h pylori Wednesday  He said the doctor called him and told him to hold atorvastatin during treatment and then restart when finished

## 2019-11-29 PROBLEM — Z09 HOSPITAL DISCHARGE FOLLOW-UP: Status: ACTIVE | Noted: 2019-11-29

## 2019-11-29 NOTE — PROGRESS NOTES
Follow Up   Office Visit Note  Dana Fisher   52 y o    male   MRN: [id number]  [address]  [address]  [place] [address]  [address] [address] [phone]    PCP: Geena Hunter DO  Cardiologist: Will be Dr Kee Rodriguez          Assessment/plan  Recent type 2 MI, trop to 25, from demand mismatch secondary to marked anemia, admitted November 7 through 11/10/19  EGD which showed ulcers, nonbleeding, 1 of which was large and clipped  Medical management  CAD;  angiography  11/19 showed 90% occlusion of a long OM 1-long vessel but overall small caliber vessel  --Anti anginal therapy enhanced  Is on Plavix, statin, beta-blocker, nitrate  Recent angioedema, likely secondary to calcium channel blocker-amlodipine  Recent GI bleed, secondary to gastric ulcers,S/P clipping   Hemoglobin stable around 9  On a b i d  proton pump inhibitor  Follow-up GI/EGD  Hypertension  /78 today, on carvedilol 25 b i d  nitrate  Prior to admission was on lisinopril 20/HCTZ 25  These are in separate tablets  He had angioedema on a calcium channel blocker  --> add lisinopril 10 mg daily  -->periodic home blood pressure monitoring with a large cuff  Bring the blood pressure monitor and the readings at the next visit  --> low-sodium diet  -->BMP prior to  --> Avoid NSAIDS  Hyperlipidemia, on atorvastatin 80 mg daily  It was held upon request 14 days per GI, for H pylori bx   Leukocytosis-this may be secondary to his H pylori infection  He will be seeing GI tomorrow  CBC will be done today  Adopted  F Hx unknown  Low testosterone, on a testosterone gel  Cardiac testing  --TTE  11/4/19   EF 60  No RWMA  RV mildly dilated  Normal function  --cardiac catheterization 11/6/19  Left main: Normal   LAD: Angiography showed minor luminal irregularities  Proximal circumflex: There was a 30 % stenosis  1st obtuse marginal: There was a 70 % stenosis at the ostium of the vessel segment    Mid RCA: There was a diffuse 30 % stenosis          Summary of recommendations  CBC, BMP today  Add lisinopril 10 mg daily  Follow up will be scheduled with Dr Ayah Hartley 6-8 weeks; BMP prior to            HPI  Saint Batters is a 57-year-old male who was recently hospitalized at [facility] with chest pain, near syncope and found to have several episodes of dark tarry/black stool  He was found to be anemic, hemoglobin of 9 for which he received packed cells  In this context his troponin was elevated, to 5 6  His EKG suggested no acute ischemic changes  He was subsequently transferred to [facility] November 3rd for further evaluation by Cardiology and GI  His troponin increased to 22; hemoglobin to 7 4 for which he received more packed cells     His EKG was unchanged  Cardiology recommended cardiac catheterization, after GI evaluation  EGD November 5th demonstrated multiple gastric ulcers in the 1st part of the duodenum and a single large greater in the antrum of the stomach  There was no acute hemorrhage  He underwent clipping x2  His hemoglobin improved to 9 8  He then developed angioedema of the left upper lipid left facial swelling and numbness  Cardiac catheterization was delayed  It was felt most likely his  angioedema was related to amlodipine, which was discontinued  Previously he was on lisinopril but had not been taking it during this admission  He apparently had been on aspirin in the past without problems  He was treated with IV steroids for his angioedema  He did develop chest pain on November 6 and underwent cardiac catheterization  This demonstrated nonobstructive disease for which medical management was recommended  Further interventionalist, he has no high-grade obstruction of any major epicardial vessels  He has a marginal branch that has borderline obstructive disease  For the degree of coronary disease, medical therapy would likely render him angina free   Given his recent GI bleed he was placed on Plavix 75 mg daily, as a single antiplatelet agent  His antianginal therapy was enhanced:  He was started on Coreg, and Imdur  He was continued on a high-intensity statin   He was also placed on a b i d  Proton pump inhibitor times 30 days  Repeat EGD was recommended in a month  12/2/19 He presents for hospital follow-up  He denies chest pain or shortness of breath  He does have left ankle swelling from a sprain  He is currently taking no NSAID  His blood pressure is elevated  His medications were changed in the hospital   He was previously on lisinopril 20/HCTZ 25; separate tablets  These were held given an ZAKIA  His white count has been elevated over 20,000  This was suspected to be related to an H pylori infection  Will repeat CBC and BMP given he is on new medications  He be seeing GI tomorrow  Would like to see him back in Cardiology in about 6 weeks a with a close eye on his blood pressure  Currently denies angina  Is on carvedilol which is new, at max dose  Will add back lisinopril 10 mg daily today  Low-sodium diet      Assessment  Diagnoses and all orders for this visit:    Hospital discharge follow-up    Coronary artery disease involving native heart without angina pectoris, unspecified vessel or lesion type  -     Basic metabolic panel; Future  -     CBC and differential; Future    Essential hypertension  -     Basic metabolic panel; Future  -     Basic metabolic panel; Future  -     lisinopril (ZESTRIL) 10 mg tablet; Take 1 tablet (10 mg total) by mouth daily    Hyperlipidemia, unspecified hyperlipidemia type    Other orders  -     Cancel: lisinopril (ZESTRIL) 5 mg tablet; Take 1 tablet (5 mg total) by mouth daily          Past Medical History:   Diagnosis Date    GI bleed     Hypertension     stopped his meds when ran out several years ago    STEMI (ST elevation myocardial infarction) ([facility] [address] )        Review of Systems   Constitution: Negative for chills  Cardiovascular: Negative for chest pain, claudication, cyanosis, dyspnea on exertion, irregular heartbeat, leg swelling, near-syncope, orthopnea, palpitations, paroxysmal nocturnal dyspnea and syncope  Respiratory: Negative for cough and shortness of breath  Gastrointestinal: Negative for heartburn and nausea  Neurological: Negative for dizziness, focal weakness, headaches, light-headedness and weakness  All other systems reviewed and are negative  Allergies   Allergen Reactions    Norvasc [Amlodipine] Angioedema    Lipitor [Atorvastatin] Facial Swelling           Current Outpatient Medications:     acetaminophen (TYLENOL) 325 mg tablet, Take 3 tablets (975 mg total) by mouth every 8 (eight) hours, Disp: 30 tablet, Rfl: 0    atorvastatin (LIPITOR) 80 mg tablet, Take 1 tablet (80 mg total) by mouth daily with dinner, Disp: 30 tablet, Rfl: 2    carvedilol (COREG) 25 mg tablet, Take 1 tablet (25 mg total) by mouth 2 (two) times a day with meals, Disp: 60 tablet, Rfl: 2    clopidogrel (PLAVIX) 75 mg tablet, Take 1 tablet (75 mg total) by mouth daily, Disp: 30 tablet, Rfl: 2    docusate sodium (COLACE) 100 mg capsule, Take 1 capsule (100 mg total) by mouth 2 (two) times a day, Disp: 10 capsule, Rfl: 0    isosorbide mononitrate (IMDUR) 30 mg 24 hr tablet, Take 1 tablet (30 mg total) by mouth daily, Disp: 30 tablet, Rfl: 2    methocarbamol (ROBAXIN) 750 mg tablet, Take 1 tablet (750 mg total) by mouth every 6 (six) hours, Disp: 120 tablet, Rfl: 0    nitroglycerin (NITROSTAT) 0 4 mg SL tablet, Place 1 tablet (0 4 mg total) under the tongue every 5 (five) minutes as needed for chest pain, Disp: 30 tablet, Rfl: 0    pantoprazole (PROTONIX) 40 mg tablet, Take 1 tablet (40 mg total) by mouth 2 (two) times a day before meals, Disp: 120 tablet, Rfl: 0    lidocaine (LIDODERM) 5 %, Apply 1 patch topically daily for 7 days Apply daily to left shoulder  Remove & Discard patch within 12 hours or as directed by MD, Disp: 7 patch, Rfl: 0    lisinopril (ZESTRIL) 10 mg tablet, Take 1 tablet (10 mg total) by mouth daily, Disp: , Rfl:     Testosterone (ANDROGEL) 40 5 MG/2 5GM (1 62%) GEL, Place 40 5 mg on the skin daily , Disp: , Rfl:         Social History     Socioeconomic History    Marital status: Single     Spouse name: Not on file    Number of children: Not on file    Years of education: Not on file    Highest education level: Not on file   Occupational History    Not on file   Social Needs    Financial resource strain: Not on file    Food insecurity:     Worry: Not on file     Inability: Not on file    Transportation needs:     Medical: Not on file     Non-medical: Not on file   Tobacco Use    Smoking status: Never Smoker    Smokeless tobacco: Never Used   Substance and Sexual Activity    Alcohol use: Never     Alcohol/week: 0 0 standard drinks     Frequency: Never     Drinks per session: 1 or 2     Binge frequency: Never    Drug use: No    Sexual activity: Not on file     Comment: not asked   Lifestyle    Physical activity:     Days per week: Not on file     Minutes per session: Not on file    Stress: Not on file   Relationships    Social connections:     Talks on phone: Not on file     Gets together: Not on file     Attends Yarsani service: Not on file     Active member of club or organization: Not on file     Attends meetings of clubs or organizations: Not on file     Relationship status: Not on file    Intimate partner violence:     Fear of current or ex partner: Not on file     Emotionally abused: Not on file     Physically abused: Not on file     Forced sexual activity: Not on file   Other Topics Concern    Not on file   Social History Narrative    Not on file       Family History   Adopted: Yes       Physical Exam   Constitutional: He is oriented to person, place, and time  No distress  HENT:   Head: Normocephalic and atraumatic     Eyes: Conjunctivae and EOM are normal    Neck: Normal range of motion  Neck supple  Cardiovascular: Normal rate, regular rhythm, normal heart sounds and intact distal pulses  Pulmonary/Chest: Effort normal and breath sounds normal    Abdominal: Soft  Bowel sounds are normal    Musculoskeletal: Normal range of motion  He exhibits no edema    + LLE edema-about the ankle with an antalgic gait   Neurological: He is alert and oriented to person, place, and time  Skin: Skin is warm and dry  Psychiatric: He has a normal mood and affect  Nursing note and vitals reviewed  Vitals: Blood pressure 154/78, pulse 63, height 5' 11" (1 803 m), weight 127 kg (281 lb), SpO2 98 %  Wt Readings from Last 3 Encounters:   12/02/19 127 kg (281 lb)   11/08/19 120 kg (264 lb)   11/08/19 120 kg (264 lb)         Labs & Results:  Lab Results   Component Value Date    WBC 22 38 (H) 11/08/2019    HGB 8 7 (L) 11/08/2019    HCT 27 4 (L) 11/08/2019    MCV 95 11/08/2019     (H) 11/08/2019     No results found for: BNP  No components found for: CHEM  Total CK   Date Value Ref Range Status   11/07/2019 107 39 - 308 U/L Final     Troponin I   Date Value Ref Range Status   11/04/2019 22 10 (H) <=0 04 ng/mL Final     Comment:       Siemens Chemistry analyzer 99% cutoff is > 0 04 ng/mL in network labs     o cTnI 99% cutoff is useful only when applied to patients in the clinical setting of myocardial ischemia   o cTnI 99% cutoff should be interpreted in the context of clinical history, ECG findings and possibly cardiac imaging to establish correct diagnosis  o cTnI 99% cutoff may be suggestive but clearly not indicative of a coronary event without the clinical setting of myocardial ischemia       11/04/2019 21 10 (H) <=0 04 ng/mL Final     Comment:       Siemens Chemistry analyzer 99% cutoff is > 0 04 ng/mL in network labs     o cTnI 99% cutoff is useful only when applied to patients in the clinical setting of myocardial ischemia   o cTnI 99% cutoff should be interpreted in the context of clinical history, ECG findings and possibly cardiac imaging to establish correct diagnosis  o cTnI 99% cutoff may be suggestive but clearly not indicative of a coronary event without the clinical setting of myocardial ischemia  2019 19 50 (H) <=0 04 ng/mL Final     Comment:       Siemens Chemistry analyzer 99% cutoff is > 0 04 ng/mL in network labs     o cTnI 99% cutoff is useful only when applied to patients in the clinical setting of myocardial ischemia   o cTnI 99% cutoff should be interpreted in the context of clinical history, ECG findings and possibly cardiac imaging to establish correct diagnosis  o cTnI 99% cutoff may be suggestive but clearly not indicative of a coronary event without the clinical setting of myocardial ischemia  Results for orders placed during the hospital encounter of 19   Echo complete with contrast if indicated    [facility]ative [facility]  [address]  [phone]    Transthoracic Echocardiogram  2D, M-mode, Doppler, and Color Doppler    Study date:  2019    Patient: Stevenson Burton  MR number: [id number]  Account number: [de-identified]  : 1965  Age: 52 years  Gender: Male  Status: Inpatient  Location: Bedside  Height: 71 in  Weight: 270 4 lb  BP: 152/ 74 mmHg    Indications: Acute MI    Diagnoses: I21 4 - Non-ST elevation (NSTEMI) myocardial infarction    Sonographer:  Annie Benavides RDCS  Interpreting Physician:  Mike Donohue DO  Primary Physician:  Ted Cardona DO  Referring Physician:  Arely Recio MD  Group:  [facility]  Cardiology Fellow:  Stacey Cleaning MD    SUMMARY    LEFT VENTRICLE:  Systolic function was normal  Ejection fraction was estimated to be 60 %  There were no regional wall motion abnormalities  Wall thickness was mildly increased  The changes were consistent with concentric remodeling (increased wall thickness with normal wall mass)      RIGHT VENTRICLE:  The ventricle was mildly dilated  Systolic function was normal     LEFT ATRIUM:  The atrium was mildly dilated  HISTORY: PRIOR HISTORY: Hypertension    PROCEDURE: The procedure was performed at the bedside  This was a routine study  The transthoracic approach was used  The study included complete 2D imaging, M-mode, complete spectral Doppler, and color Doppler  The heart rate was 67 bpm,  at the start of the study  Images were obtained from the parasternal, apical, subcostal, and suprasternal notch acoustic windows  Image quality was adequate  LEFT VENTRICLE: Size was normal  Systolic function was normal  Ejection fraction was estimated to be 60 %  There were no regional wall motion abnormalities  Wall thickness was mildly increased  The changes were consistent with concentric  remodeling (increased wall thickness with normal wall mass)  DOPPLER: Left ventricular diastolic function parameters were normal for the patient's age  RIGHT VENTRICLE: The ventricle was mildly dilated  Systolic function was normal     LEFT ATRIUM: The atrium was mildly dilated  RIGHT ATRIUM: Size was normal     MITRAL VALVE: Valve structure was normal  There was normal leaflet separation  DOPPLER: The transmitral velocity was within the normal range  There was no evidence for stenosis  There was no significant regurgitation  AORTIC VALVE: The valve was trileaflet  Leaflets exhibited calcification and sclerosis  DOPPLER: Transaortic velocity was within the normal range  There was no evidence for stenosis  There was no significant regurgitation  TRICUSPID VALVE: The valve structure was normal  There was normal leaflet separation  DOPPLER: The transtricuspid velocity was within the normal range  There was no evidence for stenosis  There was trace regurgitation  The tricuspid jet  envelope definition was inadequate for estimation of RV systolic pressure   There are no indirect findings (abnormal RV volume or geometry, altered pulmonary flow velocity profile, or leftward septal displacement) which would suggest  moderate or severe pulmonary hypertension  PULMONIC VALVE: Not well visualized  DOPPLER: The transpulmonic velocity was within the normal range  There was no regurgitation  PERICARDIUM: There was no pericardial effusion  The pericardium was normal in appearance  AORTA: The root exhibited normal size  SYSTEMIC VEINS: IVC: The inferior vena cava was normal in size and course  Respirophasic changes were normal     SYSTEM MEASUREMENT TABLES    2D  %FS: 31 22 %  Ao Diam: 3 05 cm  EDV(Teich): 129 15 ml  EF(Teich): 58 64 %  ESV(Teich): 53 42 ml  IVSd: 1 41 cm  LA Area: 23 36 cm2  LA Diam: 3 6 cm  LVEDV MOD A4C: 223 38 ml  LVEF MOD A4C: 61 25 %  LVESV MOD A4C: 86 56 ml  LVIDd: 5 19 cm  LVIDs: 3 57 cm  LVLd A4C: 10 17 cm  LVLs A4C: 8 7 cm  LVPWd: 1 22 cm  RA Area: 16 69 cm2  RVIDd: 4 67 cm  SV MOD A4C: 136 82 ml  SV(Teich): 75 72 ml    MM  TAPSE: 1 72 cm    PW  E': 0 08 m/s  E/E': 10 68  MV A Srinivasa: 0 76 m/s  MV Dec [place]: 3 38 m/s2  MV DecT: 252 82 ms  MV E Srinivasa: 0 86 m/s  MV E/A Ratio: 1 13  MV PHT: 73 32 ms  MVA By PHT: 3 cm2    [facility]    Prepared and electronically signed by    David Aleman DO  Signed 04-Nov-2019 13:08:15       No results found for this or any previous visit  This note was completed in part utilizing [employer] fluency direct voice recognition software  Grammatical errors, random word insertion, spelling mistakes, and incomplete sentences may be an occasional consequence of the system secondary to software limitations, ambient noise and hardware issues  At the time of dictation, efforts were made to edit, clarify and /or correct errors  Please read the chart carefully and recognize, using context, where substitutions have occurred    If you have any questions or concerns about the context, text or information contained within the body of this dictation, please contact myself, the provider, for further clarification

## 2019-12-02 ENCOUNTER — APPOINTMENT (OUTPATIENT)
Dept: LAB | Facility: CLINIC | Age: 54
End: 2019-12-02
Payer: COMMERCIAL

## 2019-12-02 ENCOUNTER — OFFICE VISIT (OUTPATIENT)
Dept: CARDIOLOGY CLINIC | Facility: CLINIC | Age: 54
End: 2019-12-02
Payer: COMMERCIAL

## 2019-12-02 ENCOUNTER — TRANSCRIBE ORDERS (OUTPATIENT)
Dept: LAB | Facility: CLINIC | Age: 54
End: 2019-12-02

## 2019-12-02 VITALS
SYSTOLIC BLOOD PRESSURE: 154 MMHG | WEIGHT: 281 LBS | DIASTOLIC BLOOD PRESSURE: 78 MMHG | BODY MASS INDEX: 39.34 KG/M2 | HEIGHT: 71 IN | HEART RATE: 63 BPM | OXYGEN SATURATION: 98 %

## 2019-12-02 DIAGNOSIS — I10 ESSENTIAL HYPERTENSION: ICD-10-CM

## 2019-12-02 DIAGNOSIS — I25.10 CORONARY ARTERY DISEASE INVOLVING NATIVE HEART WITHOUT ANGINA PECTORIS, UNSPECIFIED VESSEL OR LESION TYPE: ICD-10-CM

## 2019-12-02 DIAGNOSIS — E78.5 HYPERLIPIDEMIA, UNSPECIFIED HYPERLIPIDEMIA TYPE: ICD-10-CM

## 2019-12-02 DIAGNOSIS — Z09 HOSPITAL DISCHARGE FOLLOW-UP: Primary | ICD-10-CM

## 2019-12-02 LAB
ANION GAP SERPL CALCULATED.3IONS-SCNC: 7 MMOL/L (ref 4–13)
BASOPHILS # BLD AUTO: 0.08 THOUSANDS/ΜL (ref 0–0.1)
BASOPHILS NFR BLD AUTO: 1 % (ref 0–1)
BUN SERPL-MCNC: 15 MG/DL (ref 5–25)
CALCIUM SERPL-MCNC: 9.2 MG/DL (ref 8.3–10.1)
CHLORIDE SERPL-SCNC: 105 MMOL/L (ref 100–108)
CO2 SERPL-SCNC: 30 MMOL/L (ref 21–32)
CREAT SERPL-MCNC: 1 MG/DL (ref 0.6–1.3)
EOSINOPHIL # BLD AUTO: 0.22 THOUSAND/ΜL (ref 0–0.61)
EOSINOPHIL NFR BLD AUTO: 3 % (ref 0–6)
ERYTHROCYTE [DISTWIDTH] IN BLOOD BY AUTOMATED COUNT: 14 % (ref 11.6–15.1)
GFR SERPL CREATININE-BSD FRML MDRD: 98 ML/MIN/1.73SQ M
GLUCOSE SERPL-MCNC: 88 MG/DL (ref 65–140)
HCT VFR BLD AUTO: 33.7 % (ref 36.5–49.3)
HGB BLD-MCNC: 10.5 G/DL (ref 12–17)
IMM GRANULOCYTES # BLD AUTO: 0.02 THOUSAND/UL (ref 0–0.2)
IMM GRANULOCYTES NFR BLD AUTO: 0 % (ref 0–2)
LYMPHOCYTES # BLD AUTO: 1.93 THOUSANDS/ΜL (ref 0.6–4.47)
LYMPHOCYTES NFR BLD AUTO: 28 % (ref 14–44)
MCH RBC QN AUTO: 29.2 PG (ref 26.8–34.3)
MCHC RBC AUTO-ENTMCNC: 31.2 G/DL (ref 31.4–37.4)
MCV RBC AUTO: 94 FL (ref 82–98)
MONOCYTES # BLD AUTO: 0.83 THOUSAND/ΜL (ref 0.17–1.22)
MONOCYTES NFR BLD AUTO: 12 % (ref 4–12)
NEUTROPHILS # BLD AUTO: 3.79 THOUSANDS/ΜL (ref 1.85–7.62)
NEUTS SEG NFR BLD AUTO: 56 % (ref 43–75)
NRBC BLD AUTO-RTO: 0 /100 WBCS
PLATELET # BLD AUTO: 237 THOUSANDS/UL (ref 149–390)
PMV BLD AUTO: 9.8 FL (ref 8.9–12.7)
POTASSIUM SERPL-SCNC: 4.2 MMOL/L (ref 3.5–5.3)
RBC # BLD AUTO: 3.6 MILLION/UL (ref 3.88–5.62)
SODIUM SERPL-SCNC: 142 MMOL/L (ref 136–145)
WBC # BLD AUTO: 6.87 THOUSAND/UL (ref 4.31–10.16)

## 2019-12-02 PROCEDURE — 85025 COMPLETE CBC W/AUTO DIFF WBC: CPT

## 2019-12-02 PROCEDURE — 99214 OFFICE O/P EST MOD 30 MIN: CPT | Performed by: NURSE PRACTITIONER

## 2019-12-02 PROCEDURE — 36415 COLL VENOUS BLD VENIPUNCTURE: CPT

## 2019-12-02 PROCEDURE — 80048 BASIC METABOLIC PNL TOTAL CA: CPT

## 2019-12-02 RX ORDER — LISINOPRIL 10 MG/1
10 TABLET ORAL DAILY
Start: 2019-12-02 | End: 2020-01-10 | Stop reason: ALTCHOICE

## 2019-12-02 RX ORDER — LISINOPRIL 5 MG/1
5 TABLET ORAL DAILY
Qty: 30 TABLET | Refills: 1 | Status: CANCELLED | OUTPATIENT
Start: 2019-12-02

## 2019-12-02 NOTE — LETTER
December 2, 2019     Jf Shukla, [address]    Patient: Geoffrey Shoemaker   YOB: 1965   Date of Visit: 12/2/2019     Dear Dr May Adjutant      Thank you for referring Geoffrey Shoemaker to me for evaluation  Below are the relevant portions of my assessment and plan of care  If you have questions, please do not hesitate to call me  I look forward to following Moo Ing along with you           Sincerely,        MAX Willis        CC: Prabha Vargas MD    Progress Notes:

## 2019-12-03 ENCOUNTER — OFFICE VISIT (OUTPATIENT)
Dept: GASTROENTEROLOGY | Facility: CLINIC | Age: 54
End: 2019-12-03
Payer: COMMERCIAL

## 2019-12-03 VITALS
BODY MASS INDEX: 38.92 KG/M2 | TEMPERATURE: 97.5 F | DIASTOLIC BLOOD PRESSURE: 71 MMHG | HEART RATE: 51 BPM | WEIGHT: 278 LBS | HEIGHT: 71 IN | SYSTOLIC BLOOD PRESSURE: 142 MMHG

## 2019-12-03 DIAGNOSIS — D50.0 IRON DEFICIENCY ANEMIA DUE TO CHRONIC BLOOD LOSS: ICD-10-CM

## 2019-12-03 DIAGNOSIS — Z12.11 COLON CANCER SCREENING: ICD-10-CM

## 2019-12-03 DIAGNOSIS — K25.0 ACUTE GASTRIC ULCER WITH HEMORRHAGE: Primary | ICD-10-CM

## 2019-12-03 DIAGNOSIS — Z98.890 S/P CARDIAC CATH: ICD-10-CM

## 2019-12-03 PROBLEM — K92.2 GI BLEED: Status: RESOLVED | Noted: 2019-11-04 | Resolved: 2019-12-03

## 2019-12-03 PROCEDURE — 99214 OFFICE O/P EST MOD 30 MIN: CPT | Performed by: INTERNAL MEDICINE

## 2019-12-03 NOTE — PATIENT INSTRUCTIONS
EGD/COLON scheduled with Dr Cordero at [facility] on 1/16/20  Dyllan Thomason gave patient verbal instructions/paper work given  Medication Clearance faxed to [phone] [facility] office    [facility]

## 2019-12-03 NOTE — PROGRESS NOTES
[facility] - Outpatient Follow-up Note  Brock Tesfaye 52 y o  male MRN: [id number]  Encounter: [id number]          ASSESSMENT AND PLAN:  Dawood Dunbar is a 53 yo male currently on Plavix here regarding gastric ulcers and colon cancer screening    1  Gastric Ulcer secondary to H  Pylori - Pt was recently admitted to ED for ankle sprain, chest pain, black/ tarry stools  In the ED, Multiple ulcers in the stomach and 1st part of the duodenum, a single large crater ulcer was found in the antrum of the stomach, with no evidence of acute hemorrhage; this ulcer underwent clipping x 2  Today he is doing well and denies any heart burn, abdominal pain, or any reflux  He is currently on pantoprazole 40 mg for symptom control  He follows up with his cardiologist Kezia Kerr who manages his Plavix  Due to the multiple ulcers I would like to schedule an EGD to see how the ulcers are healing, although due to the Plavix I advised pt to follow up with Dr Olivia Bridges and ask him when it would be safe to stop Plavix for EGD  There is no urgency in procedure and I gave pt EGD prep information  2  Colon cancer screening- Following guidelines pt is currently at age for a colon cancer screening  He is currently asymptomatic without any known GI family malignancy  He is currently on Plavix which would needed to be stopped in order to do the colonoscopy  I will schedule pt for procedure when clearance from Cardiology is given  Risks and benefits of the procedure were discussed  Risks include but are not limited to bleeding, perforation, missed lesion  The patient is agreeable to terms  Bowel prep instructions given         ______________________________________________________________________    SUBJECTIVE:  Brock Tesfaye is a 52 y o  male was recently admitted to ED in November regarding a gastric ulcer secondary to H pylori    Multiple ulcers in the stomach and 1st part of the duodenum, a single large crater ulcer was found in the antrum of the stomach, with no evidence of acute hemorrhage; this ulcer underwent clipping x 2  He was initially hospitalized for black stools and chest pain  He now follows up with his Cardiologist Dr Kee Rodriguez who managed his Plavix  Today patient is doing well and denies any heart burn, abdominal pain, or any reflux  He is currently on pantoprazole 40 mg for symptom control  REVIEW OF SYSTEMS IS OTHERWISE NEGATIVE        Historical Information   Past Medical History:   Diagnosis Date    GI bleed     Hypertension     stopped his meds when ran out several years ago    STEMI (ST elevation myocardial infarction) ([facility] [address] )      Past Surgical History:   Procedure Laterality Date    NO PAST SURGERIES      UPPER GASTROINTESTINAL ENDOSCOPY       Social History   Social History     Substance and Sexual Activity   Alcohol Use Never    Alcohol/week: 0 0 standard drinks    Frequency: Never    Drinks per session: 1 or 2    Binge frequency: Never     Social History     Substance and Sexual Activity   Drug Use No     Social History     Tobacco Use   Smoking Status Never Smoker   Smokeless Tobacco Never Used     Family History   Adopted: Yes       Meds/Allergies       Current Outpatient Medications:     acetaminophen (TYLENOL) 325 mg tablet    atorvastatin (LIPITOR) 80 mg tablet    carvedilol (COREG) 25 mg tablet    clopidogrel (PLAVIX) 75 mg tablet    docusate sodium (COLACE) 100 mg capsule    isosorbide mononitrate (IMDUR) 30 mg 24 hr tablet    lisinopril (ZESTRIL) 10 mg tablet    methocarbamol (ROBAXIN) 750 mg tablet    nitroglycerin (NITROSTAT) 0 4 mg SL tablet    pantoprazole (PROTONIX) 40 mg tablet    Testosterone (ANDROGEL) 40 5 MG/2 5GM (1 62%) GEL    lidocaine (LIDODERM) 5 %    Allergies   Allergen Reactions    Norvasc [Amlodipine] Angioedema    Lipitor [Atorvastatin] Facial Swelling           Objective     Blood pressure 142/71, pulse (!) 51, temperature 97 5 °F (36 4 °C), temperature source Tympanic, height 5' 11" (1 803 m), weight 126 kg (278 lb)  Body mass index is 38 77 kg/m²  PHYSICAL EXAM:      General Appearance:   Alert, cooperative, no distress   HEENT:   Normocephalic, atraumatic, anicteric      Neck:  Supple, symmetrical, trachea midline   Lungs:   Clear to auscultation bilaterally; no rales, rhonchi or wheezing; respirations unlabored    Heart[de-identified]   Regular rate and rhythm; no murmur, rub, or gallop  Abdomen:   Soft, non-tender, non-distended; normal bowel sounds; no masses, no organomegaly    Genitalia:   Deferred    Rectal:   Deferred    Extremities:  No cyanosis, clubbing or edema    Pulses:  2+ and symmetric    Skin:  No jaundice, rashes, or lesions    Lymph nodes:  No palpable cervical lymphadenopathy        Lab Results:   No visits with results within 1 Day(s) from this visit     Latest known visit with results is:   Appointment on 12/02/2019   Component Date Value    Sodium 12/02/2019 142     Potassium 12/02/2019 4 2     Chloride 12/02/2019 105     CO2 12/02/2019 30     ANION GAP 12/02/2019 7     BUN 12/02/2019 15     Creatinine 12/02/2019 1 00     Glucose 12/02/2019 88     Calcium 12/02/2019 9 2     eGFR 12/02/2019 98     WBC 12/02/2019 6 87     RBC 12/02/2019 3 60*    Hemoglobin 12/02/2019 10 5*    Hematocrit 12/02/2019 33 7*    MCV 12/02/2019 94     MCH 12/02/2019 29 2     MCHC 12/02/2019 31 2*    RDW 12/02/2019 14 0     MPV 12/02/2019 9 8     Platelets 43/78/8602 237     nRBC 12/02/2019 0     Neutrophils Relative 12/02/2019 56     Immat GRANS % 12/02/2019 0     Lymphocytes Relative 12/02/2019 28     Monocytes Relative 12/02/2019 12     Eosinophils Relative 12/02/2019 3     Basophils Relative 12/02/2019 1     Neutrophils Absolute 12/02/2019 3 79     Immature Grans Absolute 12/02/2019 0 02     Lymphocytes Absolute 12/02/2019 1 93     Monocytes Absolute 12/02/2019 0 83     Eosinophils Absolute 12/02/2019 0 22     Basophils Absolute 12/02/2019 0 08          Radiology Results:   Xr Shoulder 2+ Vw Left    Result Date: 11/7/2019  Narrative: LEFT SHOULDER INDICATION:   pain  COMPARISON:  None VIEWS:  XR SHOULDER 2+ VW LEFT FINDINGS: There is no acute fracture or dislocation  No significant degenerative changes  No lytic or blastic lesions are seen  Soft tissues are unremarkable  Impression: No acute osseous abnormality  Workstation performed: [id number]     Ct Lumbar Spine Without Contrast    Result Date: 11/8/2019  Narrative: CT LUMBAR SPINE INDICATION:   Back pain or radiculopathy, < 6 wks, uncomplicated  COMPARISON: None  TECHNIQUE:  Contiguous axial images through the lumbar spine were obtained  Sagittal and coronal reconstructions were performed  Radiation dose length product (DLP) for this visit:  2033 mGy-cm   This examination, like all CT scans performed in the [facility], was performed utilizing techniques to minimize radiation dose exposure, including the use of iterative reconstruction and automated exposure control  IMAGE QUALITY:  Diagnostic  FINDINGS: ALIGNMENT:  There are 5 lumbar type vertebral bodies  Normal alignment of the lumbar spine  No spondylolisthesis or spondylolysis  VERTEBRAL BODIES:  No fracture  Demineralization/osteolysis is noted about left L4-5 facet joint with prominence of the joint capsule; this may be degenerative but considering the history this may be concerning for septic arthritis  Similar but less severe changes are seen in the left L5-S1 facet joints  Recommend MRI for further evaluation    DEGENERATIVE CHANGES: Age-appropriate degenerative changes are noted  PARASPINAL SOFT TISSUES:   Normal      Impression: Demineralization/osteolysis is noted about left L4-5 facet joint with prominence of the joint capsule; this may be degenerative but considering the history this may be concerning for septic arthritis    Similar but less severe changes are seen in the left  L5-S1 facet joints  Recommend MRI for further evaluation     I personally discussed this study with Dr Holly Gatica on 11/8/2019 at 12:46 AM  Workstation performed: [id number]     Mri Lumbar Spine W Wo Contrast    Result Date: 11/8/2019  Narrative: MRI LUMBAR SPINE WITH AND WITHOUT CONTRAST INDICATION: septic arthritis  COMPARISON:  None  TECHNIQUE:  Sagittal T1, sagittal T2, sagittal inversion recovery, axial T1 and axial T2, coronal T2  Sagittal and axial T1 postcontrast  IV Contrast:  12 mL of gadobutrol injection (MULTI-DOSE) IMAGE QUALITY:  Diagnostic FINDINGS: VERTEBRAL BODIES:  VERTEBRAL BODIES:  There are 5 lumbar type vertebral bodies  Normal alignment of the lumbar spine  No spondylolysis or spondylolisthesis  No scoliosis  No compression fracture  Normal marrow signal is identified within the visualized bony structures  No discrete marrow lesion  SACRUM:  Normal signal within the sacrum  No evidence of insufficiency or stress fracture  DISTAL CORD AND CONUS:  Normal size and signal within the distal cord and conus  PARASPINAL SOFT TISSUES:  There are erosive changes noted at the left L4-5 facet joint  There is associated edema in the left posterior paraspinous back muscles  There is distention of the capsule with the joint effusion and/synovial cyst which is seen  in the posterior soft tissue  On the postcontrast images enhancing the synovium noted along with the erosive changes  Similar changes are noted within the facet joint at L5-S1 on the left side LOWER THORACIC DISC SPACES:  Normal disc height and signal   No disc herniation, canal stenosis or foraminal narrowing  LUMBAR DISC SPACES: L1-L2:  Normal  L2-L3:  Appears unremarkable L3-L4:  Demonstrates mild disc bulge with no significant central canal narrowing of foraminal narrowing L4-L5:  Demonstrates disc bulge  There is epidural lipomatosis with the Central canal narrowing    There is no significant foraminal narrowing L5-S1:  Demonstrates mild disc bulge with no significant central canal narrowing  There is no significant foraminal narrowing Erosive changes with synovitis and effusion noted in the left facet joint  There are mild degenerative changes are seen within the right facet joint POSTCONTRAST IMAGING:  No abnormal enhancement  Impression: There are erosive changes in the left facet joint at L4-5, L5-S1 edema in the posterior back muscles/   This is associated with synovitis, joint effusion at L4-5 and L5-S1 and synovial cyst formation at the level of L4-5  These findings probably sequela  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  However correlate clinically, if there is concern for infection evaluate inflammatory markers including ESR and CRP   Aspiration may be performed as clinically needed No evidence of discitis Epidural lipomatosis with the moderate to severe central canal narrowing at L4-5  I personally discussed this study with Dr Faustino Sanabria on 11/8/2019 at 1:34 PM  Workstation performed: Gaby Arzate:   By signing my name below, Rock Titi, attest that this documentation has been prepared under the direction and in the presence of German Zendejas MD  Electronically Signed: Anil Perez  12/3/19      I, German Zendejas, personally performed the services described in this documentation  All medical record entries made by the alisiaibsofiya were at my direction and in my presence  I have reviewed the chart and discharge instructions and agree that the record reflects my personal performance and is accurate and complete   German Zendejas MD  12/3/19

## 2019-12-10 ENCOUNTER — TELEPHONE (OUTPATIENT)
Dept: GASTROENTEROLOGY | Facility: AMBULARY SURGERY CENTER | Age: 54
End: 2019-12-10

## 2019-12-10 NOTE — TELEPHONE ENCOUNTER
Patients GI provider:  Elo Geller     Number to return call: [phone] [phone]    Reason for call:  called requesting pt office notes from 12/03 to the above fax#     Scheduled procedure/appointment date if applicable: 81/28/49 procedure

## 2019-12-17 ENCOUNTER — TELEPHONE (OUTPATIENT)
Dept: CARDIOLOGY CLINIC | Facility: CLINIC | Age: 54
End: 2019-12-17

## 2019-12-17 ENCOUNTER — TELEPHONE (OUTPATIENT)
Dept: GASTROENTEROLOGY | Facility: CLINIC | Age: 54
End: 2019-12-17

## 2019-12-17 ENCOUNTER — HOSPITAL ENCOUNTER (EMERGENCY)
Facility: HOSPITAL | Age: 54
Discharge: HOME | End: 2019-12-17
Attending: EMERGENCY MEDICINE | Admitting: EMERGENCY MEDICINE
Payer: COMMERCIAL

## 2019-12-17 VITALS
SYSTOLIC BLOOD PRESSURE: 198 MMHG | OXYGEN SATURATION: 99 % | TEMPERATURE: 98.4 F | BODY MASS INDEX: 38.8 KG/M2 | RESPIRATION RATE: 19 BRPM | HEART RATE: 50 BPM | WEIGHT: 278.2 LBS | DIASTOLIC BLOOD PRESSURE: 89 MMHG

## 2019-12-17 DIAGNOSIS — I10 HYPERTENSION: ICD-10-CM

## 2019-12-17 DIAGNOSIS — R03.0 ELEVATED BLOOD PRESSURE READING: Primary | ICD-10-CM

## 2019-12-17 LAB
ALBUMIN SERPL BCP-MCNC: 3.6 G/DL (ref 3.5–5)
ALP SERPL-CCNC: 104 U/L (ref 46–116)
ALT SERPL W P-5'-P-CCNC: 25 U/L (ref 12–78)
ANION GAP SERPL CALCULATED.3IONS-SCNC: 9 MMOL/L (ref 4–13)
AST SERPL W P-5'-P-CCNC: 22 U/L (ref 5–45)
ATRIAL RATE: 48 BPM
BASOPHILS # BLD AUTO: 0.06 THOUSANDS/ΜL (ref 0–0.1)
BASOPHILS NFR BLD AUTO: 1 % (ref 0–1)
BILIRUB SERPL-MCNC: 0.5 MG/DL (ref 0.2–1)
BUN SERPL-MCNC: 15 MG/DL (ref 5–25)
CALCIUM SERPL-MCNC: 9.1 MG/DL (ref 8.3–10.1)
CHLORIDE SERPL-SCNC: 103 MMOL/L (ref 100–108)
CO2 SERPL-SCNC: 28 MMOL/L (ref 21–32)
CREAT SERPL-MCNC: 0.96 MG/DL (ref 0.6–1.3)
EOSINOPHIL # BLD AUTO: 0.22 THOUSAND/ΜL (ref 0–0.61)
EOSINOPHIL NFR BLD AUTO: 3 % (ref 0–6)
ERYTHROCYTE [DISTWIDTH] IN BLOOD BY AUTOMATED COUNT: 13.7 % (ref 11.6–15.1)
GFR SERPL CREATININE-BSD FRML MDRD: 102 ML/MIN/1.73SQ M
GLUCOSE SERPL-MCNC: 88 MG/DL (ref 65–140)
HCT VFR BLD AUTO: 37.5 % (ref 36.5–49.3)
HGB BLD-MCNC: 11.6 G/DL (ref 12–17)
IMM GRANULOCYTES # BLD AUTO: 0.01 THOUSAND/UL (ref 0–0.2)
IMM GRANULOCYTES NFR BLD AUTO: 0 % (ref 0–2)
LYMPHOCYTES # BLD AUTO: 1.98 THOUSANDS/ΜL (ref 0.6–4.47)
LYMPHOCYTES NFR BLD AUTO: 30 % (ref 14–44)
MCH RBC QN AUTO: 28.9 PG (ref 26.8–34.3)
MCHC RBC AUTO-ENTMCNC: 30.9 G/DL (ref 31.4–37.4)
MCV RBC AUTO: 93 FL (ref 82–98)
MONOCYTES # BLD AUTO: 0.63 THOUSAND/ΜL (ref 0.17–1.22)
MONOCYTES NFR BLD AUTO: 10 % (ref 4–12)
NEUTROPHILS # BLD AUTO: 3.75 THOUSANDS/ΜL (ref 1.85–7.62)
NEUTS SEG NFR BLD AUTO: 56 % (ref 43–75)
NRBC BLD AUTO-RTO: 0 /100 WBCS
P AXIS: 90 DEGREES
PLATELET # BLD AUTO: 262 THOUSANDS/UL (ref 149–390)
PMV BLD AUTO: 10.9 FL (ref 8.9–12.7)
POTASSIUM SERPL-SCNC: 4.1 MMOL/L (ref 3.5–5.3)
PR INTERVAL: 176 MS
PROT SERPL-MCNC: 7.9 G/DL (ref 6.4–8.2)
QRS AXIS: 21 DEGREES
QRSD INTERVAL: 90 MS
QT INTERVAL: 482 MS
QTC INTERVAL: 430 MS
RBC # BLD AUTO: 4.02 MILLION/UL (ref 3.88–5.62)
SODIUM SERPL-SCNC: 140 MMOL/L (ref 136–145)
T WAVE AXIS: 34 DEGREES
TROPONIN I SERPL-MCNC: <0.02 NG/ML
VENTRICULAR RATE: 48 BPM
WBC # BLD AUTO: 6.65 THOUSAND/UL (ref 4.31–10.16)

## 2019-12-17 PROCEDURE — 99283 EMERGENCY DEPT VISIT LOW MDM: CPT

## 2019-12-17 PROCEDURE — 93010 ELECTROCARDIOGRAM REPORT: CPT | Performed by: INTERNAL MEDICINE

## 2019-12-17 PROCEDURE — 84484 ASSAY OF TROPONIN QUANT: CPT | Performed by: EMERGENCY MEDICINE

## 2019-12-17 PROCEDURE — 99283 EMERGENCY DEPT VISIT LOW MDM: CPT | Performed by: EMERGENCY MEDICINE

## 2019-12-17 PROCEDURE — 36415 COLL VENOUS BLD VENIPUNCTURE: CPT | Performed by: EMERGENCY MEDICINE

## 2019-12-17 PROCEDURE — 80053 COMPREHEN METABOLIC PANEL: CPT | Performed by: EMERGENCY MEDICINE

## 2019-12-17 PROCEDURE — 93005 ELECTROCARDIOGRAM TRACING: CPT

## 2019-12-17 PROCEDURE — 85025 COMPLETE CBC W/AUTO DIFF WBC: CPT | Performed by: EMERGENCY MEDICINE

## 2019-12-17 NOTE — ED NOTES
Pt continues to deny CP, SOB, HA, visual disturbances or any complaints at present time       Juan Pablo Diaz RN  12/17/19 [id number]

## 2019-12-17 NOTE — ED PROVIDER NOTES
History  Chief Complaint   Patient presents with    Hypertension     Pt sent to ER by PCP for evaluation/tx of HTN  Pt denies CP, SOB, visual disturbances, dizziness, or any complaints at present time  53-year-old male presents today for evaluation for high blood pressure  He had physical therapy at his house, they took his blood pressure, noted it to be elevated, called his PCP who told him he needs to come to the emergency department for evaluation  Patient has no symptoms  He states that he took his medications later than normal   He is currently on lisinopril 40 mg once a day, Coreg 25 mg b i d , and isosorbide 30 mg once a day  Reports his blood pressure normally runs between 613 and 583 systolic  Also admits to increased salt intake over the last week  History provided by:  Patient  Hypertension   Severity:  Unable to specify  Timing:  Unable to specify  Progression:  Unable to specify  Notable PTA blood pressures:  200/110  Relieved by:  None tried  Worsened by:  Nothing  Ineffective treatments:  None tried  Associated symptoms: no abdominal pain, no blurred vision, no chest pain, no confusion, no dizziness, no fatigue, no fever, no headaches, no loss of consciousness, no peripheral edema and no shortness of breath    Risk factors: cardiac disease and family hx of HTN    Risk factors: no decongestant use and no tobacco use        Prior to Admission Medications   Prescriptions Last Dose Informant Patient Reported? Taking?    Testosterone (ANDROGEL) 40 5 MG/2 5GM (1 62%) GEL  Self Yes No   Sig: Place 40 5 mg on the skin daily    acetaminophen (TYLENOL) 325 mg tablet  Self No No   Sig: Take 3 tablets (975 mg total) by mouth every 8 (eight) hours   atorvastatin (LIPITOR) 80 mg tablet  Self No No   Sig: Take 1 tablet (80 mg total) by mouth daily with dinner   carvedilol (COREG) 25 mg tablet  Self No No   Sig: Take 1 tablet (25 mg total) by mouth 2 (two) times a day with meals   clopidogrel (PLAVIX) 75 mg tablet  Self No No   Sig: Take 1 tablet (75 mg total) by mouth daily   docusate sodium (COLACE) 100 mg capsule  Self No No   Sig: Take 1 capsule (100 mg total) by mouth 2 (two) times a day   isosorbide mononitrate (IMDUR) 30 mg 24 hr tablet  Self No No   Sig: Take 1 tablet (30 mg total) by mouth daily   lidocaine (LIDODERM) 5 %   No No   Sig: Apply 1 patch topically daily for 7 days Apply daily to left shoulder    Remove & Discard patch within 12 hours or as directed by MD   lisinopril (ZESTRIL) 10 mg tablet  Self No No   Sig: Take 1 tablet (10 mg total) by mouth daily   methocarbamol (ROBAXIN) 750 mg tablet  Self No No   Sig: Take 1 tablet (750 mg total) by mouth every 6 (six) hours   nitroglycerin (NITROSTAT) 0 4 mg SL tablet  Self No No   Sig: Place 1 tablet (0 4 mg total) under the tongue every 5 (five) minutes as needed for chest pain   pantoprazole (PROTONIX) 40 mg tablet  Self No No   Sig: Take 1 tablet (40 mg total) by mouth 2 (two) times a day before meals      Facility-Administered Medications: None       Past Medical History:   Diagnosis Date    Angioedema     Cardiac disease     GI bleed     Hypertension     stopped his meds when ran out several years ago    STEMI (ST elevation myocardial infarction) ([facility] [address] )        Past Surgical History:   Procedure Laterality Date    NO PAST SURGERIES      UPPER GASTROINTESTINAL ENDOSCOPY         Family History   Adopted: Yes     I have reviewed and agree with the history as documented  Social History     Tobacco Use    Smoking status: Never Smoker    Smokeless tobacco: Never Used   Substance Use Topics    Alcohol use: Never     Alcohol/week: 0 0 standard drinks     Frequency: Never     Drinks per session: 1 or 2     Binge frequency: Never    Drug use: No        Review of Systems   Constitutional: Negative for chills, fatigue and fever  HENT: Negative for postnasal drip, sore throat and trouble swallowing  Eyes: Negative for blurred vision  Respiratory: Negative for chest tightness and shortness of breath  Cardiovascular: Negative for chest pain  Gastrointestinal: Negative for abdominal pain  Genitourinary: Negative for dysuria  Musculoskeletal: Negative for back pain  Skin: Negative for rash  Allergic/Immunologic: Negative for immunocompromised state  Neurological: Negative for dizziness, loss of consciousness, light-headedness and headaches  Psychiatric/Behavioral: Negative for confusion  Physical Exam  Physical Exam   Constitutional: He is oriented to person, place, and time  He appears well-developed and well-nourished  HENT:   Head: Normocephalic and atraumatic  Mouth/Throat: Uvula is midline, oropharynx is clear and moist and mucous membranes are normal  No tonsillar exudate  Eyes: Pupils are equal, round, and reactive to light  Neck: Normal range of motion  Neck supple  Cardiovascular: Normal rate and regular rhythm  Pulmonary/Chest: Effort normal and breath sounds normal    Abdominal: Soft  Bowel sounds are normal  There is no tenderness  There is no rebound and no guarding  Musculoskeletal: He exhibits no edema, tenderness or deformity  Neurological: He is alert and oriented to person, place, and time  Patient moving all extremities equally, no focal neuro deficits noted  Skin: Skin is warm and dry  Capillary refill takes less than 2 seconds  Psychiatric: He has a normal mood and affect  Nursing note and vitals reviewed        Vital Signs  ED Triage Vitals [12/17/19 1345]   Temperature Pulse Respirations Blood Pressure SpO2   98 4 °F (36 9 °C) (!) 52 20 (!) 235/105 100 %      Temp Source Heart Rate Source Patient Position - Orthostatic VS BP Location FiO2 (%)   Oral Monitor Sitting Left arm --      Pain Score       No Pain           Vitals:    12/17/19 1345 12/17/19 1350 12/17/19 1445   BP: (!) 235/105 (!) 220/91 (!) 198/89   Pulse: (!) 52 (!) 50 (!) 50   Patient Position - Orthostatic VS: Sitting Sitting Sitting         Visual Acuity      ED Medications  Medications - No data to display    Diagnostic Studies  Results Reviewed     Procedure Component Value Units Date/Time    Troponin I [[id number]]  (Normal) Collected:  12/17/19 1601    Lab Status:  Final result Specimen:  Blood from Arm, Left Updated:  12/17/19 1640     Troponin I <0 02 ng/mL     Comprehensive metabolic panel [[id number]] Collected:  12/17/19 1601    Lab Status:  Final result Specimen:  Blood from Arm, Left Updated:  12/17/19 1638     Sodium 140 mmol/L      Potassium 4 1 mmol/L      Chloride 103 mmol/L      CO2 28 mmol/L      ANION GAP 9 mmol/L      BUN 15 mg/dL      Creatinine 0 96 mg/dL      Glucose 88 mg/dL      Calcium 9 1 mg/dL      AST 22 U/L      ALT 25 U/L      Alkaline Phosphatase 104 U/L      Total Protein 7 9 g/dL      Albumin 3 6 g/dL      Total Bilirubin 0 50 mg/dL      eGFR 102 ml/min/1 73sq m     Narrative:       [place] guidelines for Chronic Kidney Disease (CKD):     Stage 1 with normal or high GFR (GFR > 90 mL/min/1 73 square meters)    Stage 2 Mild CKD (GFR = 60-89 mL/min/1 73 square meters)    Stage 3A Moderate CKD (GFR = 45-59 mL/min/1 73 square meters)    Stage 3B Moderate CKD (GFR = 30-44 mL/min/1 73 square meters)    Stage 4 Severe CKD (GFR = 15-29 mL/min/1 73 square meters)    Stage 5 End Stage CKD (GFR <15 mL/min/1 73 square meters)  Note: GFR calculation is accurate only with a steady state creatinine    CBC and differential [[id number]]  (Abnormal) Collected:  12/17/19 1601    Lab Status:  Final result Specimen:  Blood from Arm, Left Updated:  12/17/19 1610     WBC 6 65 Thousand/uL      RBC 4 02 Million/uL      Hemoglobin 11 6 g/dL      Hematocrit 37 5 %      MCV 93 fL      MCH 28 9 pg      MCHC 30 9 g/dL      RDW 13 7 %      MPV 10 9 fL      Platelets 449 Thousands/uL      nRBC 0 /100 WBCs      Neutrophils Relative 56 %      Immat GRANS % 0 %      Lymphocytes Relative 30 % Monocytes Relative 10 %      Eosinophils Relative 3 %      Basophils Relative 1 %      Neutrophils Absolute 3 75 Thousands/µL      Immature Grans Absolute 0 01 Thousand/uL      Lymphocytes Absolute 1 98 Thousands/µL      Monocytes Absolute 0 63 Thousand/µL      Eosinophils Absolute 0 22 Thousand/µL      Basophils Absolute 0 06 Thousands/µL                  No orders to display              Procedures  ECG 12 Lead Documentation Only  Date/Time: 12/17/2019 3:32 PM  Performed by: Kelton Andrade DO  Authorized by: Merian Fabry Dewar, DO     Indications / Diagnosis:  Elevated blood pressure  ECG reviewed by me, the ED Provider: yes    Patient location:  ED  Previous ECG:     Previous ECG:  Compared to current  Comments:      Sinus bradycardia at 48 beats per minute  Normal axis, normal intervals, no ST T wave abnormalities suggestive of ischemia  QTC is normal   Bradycardia is new when compared to prior from 11/07/2019  ED Course                               MDM  Number of Diagnoses or Management Options  Elevated blood pressure reading: new and requires workup  Hypertension:   Diagnosis management comments: No evidence of end organ damage  BP now 180/80  Recommend DASH diet and medication compliance  F/u with PCP for BP check within one week  RTED instructions reviewed          Amount and/or Complexity of Data Reviewed  Clinical lab tests: ordered and reviewed  Tests in the medicine section of CPT®: ordered and reviewed  Review and summarize past medical records: yes  Independent visualization of images, tracings, or specimens: yes    Risk of Complications, Morbidity, and/or Mortality  Presenting problems: high  Diagnostic procedures: high  Management options: moderate    Patient Progress  Patient progress: stable        Disposition  Final diagnoses:   Elevated blood pressure reading   Hypertension     Time reflects when diagnosis was documented in both MDM as applicable and the Disposition within this note     Time User Action Codes Description Comment    12/17/2019  3:32 PM Jessica Galaviz Back Add [R03 0] Elevated blood pressure reading     12/17/2019  5:12 PM Jessica Galaviz Back Add [I10] Hypertension       ED Disposition     ED Disposition Condition Date/Time Comment    Discharge Stable Tue Dec 17, 2019  5:12 PM Jean Schultz discharge to home/self care              Follow-up Information     Follow up With Specialties Details Why Contact Info Additional Information    Mara Moore,  General Practice Schedule an appointment as soon as possible for a visit in 1 week for blood pressure check [address] [employer] [address] [facility] Emergency Department Emergency Medicine  If symptoms worsen [address] [facility] [address], [address], [place], [place], [address]          Discharge Medication List as of 12/17/2019  5:13 PM      CONTINUE these medications which have NOT CHANGED    Details   acetaminophen (TYLENOL) 325 mg tablet Take 3 tablets (975 mg total) by mouth every 8 (eight) hours, Starting Sun 11/10/2019, Normal      atorvastatin (LIPITOR) 80 mg tablet Take 1 tablet (80 mg total) by mouth daily with dinner, Starting Thu 11/7/2019, Normal      carvedilol (COREG) 25 mg tablet Take 1 tablet (25 mg total) by mouth 2 (two) times a day with meals, Starting Thu 11/7/2019, Normal      clopidogrel (PLAVIX) 75 mg tablet Take 1 tablet (75 mg total) by mouth daily, Starting Fri 11/8/2019, Normal      docusate sodium (COLACE) 100 mg capsule Take 1 capsule (100 mg total) by mouth 2 (two) times a day, Starting Sun 11/10/2019, Normal      isosorbide mononitrate (IMDUR) 30 mg 24 hr tablet Take 1 tablet (30 mg total) by mouth daily, Starting Fri 11/8/2019, Normal      lidocaine (LIDODERM) 5 % Apply 1 patch topically daily for 7 days Apply daily to left shoulder    Remove & Discard patch within 12 hours or as directed by MD, Starting 2019, Until Fri 11/15/2019, Print      lisinopril (ZESTRIL) 10 mg tablet Take 1 tablet (10 mg total) by mouth daily, Starting 2019, No Print      methocarbamol (ROBAXIN) 750 mg tablet Take 1 tablet (750 mg total) by mouth every 6 (six) hours, Starting Sun 11/10/2019, Print      nitroglycerin (NITROSTAT) 0 4 mg SL tablet Place 1 tablet (0 4 mg total) under the tongue every 5 (five) minutes as needed for chest pain, Starting u 2019, Normal      pantoprazole (PROTONIX) 40 mg tablet Take 1 tablet (40 mg total) by mouth 2 (two) times a day before meals, Starting Thu 2019, Normal      Testosterone (ANDROGEL) 40 5 MG/2 5GM (1 62%) GEL Place 40 5 mg on the skin daily , Historical Med           No discharge procedures on file      ED Provider  Electronically Signed by           Cuauhtemoc Rivera DO  [phone]

## 2019-12-17 NOTE — TELEPHONE ENCOUNTER
Patients GI provider:  Dr Maira Donnelly    Number to return call: [phone]    Reason for call: Pt calling wanting know if he can now continue to take his naproxen? Pt states when he was admitted to the hospital he was instructed to stop taking his medications   Please advise    Scheduled procedure/appointment date if applicable: NA

## 2019-12-17 NOTE — TELEPHONE ENCOUNTER
Dr David Burgos patient Hx- gastric ulcer with hemorrhage, anemia    Patient states he was told to stop taking naproxen when he was in the hospital in November for bleeding stomach ulcer  He called to ask if he should resume taking naproxen because his left leg and ankle are swollen/ pain subsided  He is currently in the ER for elevated blood pressure  I recommended he continues to avoid NSAIDS and follow up with PCP or orthopedic for left leg /ankle swelling  He will call back with any additional concerns

## 2019-12-17 NOTE — TELEPHONE ENCOUNTER
Patient called and would like to know if it is safe to take naproxin 500mg every 12 hours for his lower back pain       Please advise    Thank you

## 2019-12-18 NOTE — TELEPHONE ENCOUNTER
Try to avoid NSAIDS if possible and try to take Tylenol which is much safer from a heart standpoint

## 2019-12-20 ENCOUNTER — TELEPHONE (OUTPATIENT)
Dept: CARDIOLOGY CLINIC | Facility: CLINIC | Age: 54
End: 2019-12-20

## 2019-12-20 NOTE — TELEPHONE ENCOUNTER
Faxed med hold request to [facility] office   Pt has a hosp f/u appt scheduled for 1/10/20

## 2019-12-31 ENCOUNTER — APPOINTMENT (OUTPATIENT)
Dept: LAB | Facility: CLINIC | Age: 54
End: 2019-12-31
Payer: COMMERCIAL

## 2019-12-31 DIAGNOSIS — I10 ESSENTIAL HYPERTENSION: ICD-10-CM

## 2019-12-31 LAB
ANION GAP SERPL CALCULATED.3IONS-SCNC: 9 MMOL/L (ref 4–13)
BUN SERPL-MCNC: 23 MG/DL (ref 5–25)
CALCIUM SERPL-MCNC: 9.5 MG/DL (ref 8.3–10.1)
CHLORIDE SERPL-SCNC: 104 MMOL/L (ref 100–108)
CO2 SERPL-SCNC: 27 MMOL/L (ref 21–32)
CREAT SERPL-MCNC: 1.17 MG/DL (ref 0.6–1.3)
GFR SERPL CREATININE-BSD FRML MDRD: 80 ML/MIN/1.73SQ M
GLUCOSE P FAST SERPL-MCNC: 96 MG/DL (ref 65–99)
POTASSIUM SERPL-SCNC: 4.3 MMOL/L (ref 3.5–5.3)
SODIUM SERPL-SCNC: 140 MMOL/L (ref 136–145)

## 2019-12-31 PROCEDURE — 80048 BASIC METABOLIC PNL TOTAL CA: CPT

## 2019-12-31 PROCEDURE — 36415 COLL VENOUS BLD VENIPUNCTURE: CPT

## 2020-01-02 ENCOUNTER — ANESTHESIA (OUTPATIENT)
Dept: ANESTHESIOLOGY | Facility: HOSPITAL | Age: 55
End: 2020-01-02

## 2020-01-02 ENCOUNTER — ANESTHESIA EVENT (OUTPATIENT)
Dept: ANESTHESIOLOGY | Facility: HOSPITAL | Age: 55
End: 2020-01-02

## 2020-01-06 ENCOUNTER — ANESTHESIA EVENT (OUTPATIENT)
Dept: GASTROENTEROLOGY | Facility: AMBULARY SURGERY CENTER | Age: 55
End: 2020-01-06

## 2020-01-09 ENCOUNTER — TELEPHONE (OUTPATIENT)
Dept: CARDIOLOGY CLINIC | Facility: CLINIC | Age: 55
End: 2020-01-09

## 2020-01-09 ENCOUNTER — DOCUMENTATION (OUTPATIENT)
Dept: CARDIOLOGY CLINIC | Facility: CLINIC | Age: 55
End: 2020-01-09

## 2020-01-09 NOTE — PROGRESS NOTES
HEART & VASCULAR [address] [facility] [place] [address] [place] [address]      PATIENT NAME: Geoffrey Shoemaker; [id number]    YOB: 1965    DATE LAST EVALUATED:  12/2/2019    DETAILS OF SURGERY:  EGD    DATE OF SURGERY:  To be decided    ANESTHESIA: Choice     CARDIAC RISK ASSESSMENT:    Can proceed at Low risk without further testing which is unlikely to change the management  ANTIPLATELET/ANTICOAGULANT AGENTS:  Okay to discontinue Plavix 5 days prior to the procedure    Please call us with any questions or concerns regarding his management  COMMENTS: Please continue Beta blocker and statin perioperatively

## 2020-01-09 NOTE — TELEPHONE ENCOUNTER
Patient is have EGD on 1/16/2020 and  Gastroenterology is requesting cardiac clearance due to Plavix

## 2020-01-10 ENCOUNTER — OFFICE VISIT (OUTPATIENT)
Dept: CARDIOLOGY CLINIC | Facility: CLINIC | Age: 55
End: 2020-01-10
Payer: COMMERCIAL

## 2020-01-10 VITALS
DIASTOLIC BLOOD PRESSURE: 92 MMHG | HEART RATE: 54 BPM | HEIGHT: 71 IN | BODY MASS INDEX: 38.93 KG/M2 | RESPIRATION RATE: 98 BRPM | WEIGHT: 278.1 LBS | SYSTOLIC BLOOD PRESSURE: 162 MMHG

## 2020-01-10 DIAGNOSIS — E78.5 HYPERLIPIDEMIA, UNSPECIFIED HYPERLIPIDEMIA TYPE: ICD-10-CM

## 2020-01-10 DIAGNOSIS — I21.4 NSTEMI (NON-ST ELEVATED MYOCARDIAL INFARCTION) (HCC): ICD-10-CM

## 2020-01-10 DIAGNOSIS — I10 ESSENTIAL HYPERTENSION: Primary | ICD-10-CM

## 2020-01-10 DIAGNOSIS — I25.10 CORONARY ARTERY DISEASE INVOLVING NATIVE HEART WITHOUT ANGINA PECTORIS, UNSPECIFIED VESSEL OR LESION TYPE: ICD-10-CM

## 2020-01-10 DIAGNOSIS — Z98.890 S/P CARDIAC CATH: ICD-10-CM

## 2020-01-10 DIAGNOSIS — D64.9 ANEMIA, UNSPECIFIED TYPE: ICD-10-CM

## 2020-01-10 PROCEDURE — 99214 OFFICE O/P EST MOD 30 MIN: CPT | Performed by: INTERNAL MEDICINE

## 2020-01-10 RX ORDER — HYDROCHLOROTHIAZIDE 25 MG/1
25 TABLET ORAL DAILY
Qty: 90 TABLET | Refills: 3 | Status: ON HOLD | OUTPATIENT
Start: 2020-01-10 | End: 2020-08-16 | Stop reason: SDUPTHER

## 2020-01-10 NOTE — PROGRESS NOTES
Cardiology Follow Up    Ольга Smith  1965  [id number]  [facility] [address]  [phone]  [phone]    1  Essential hypertension     2  S/P cardiac cath 11/06/19     3  NSTEMI (non-ST elevated myocardial infarction) ([facility] [address] )     4  Hyperlipidemia, unspecified hyperlipidemia type     5  Anemia, unspecified type         Diagnoses and all orders for this visit:    Essential hypertension    S/P cardiac cath 11/06/19    NSTEMI (non-ST elevated myocardial infarction) ([facility] )    Hyperlipidemia, unspecified hyperlipidemia type    Anemia, unspecified type      I had the pleasure of seeing Ольга Smith for a follow up visit  INTERVAL HISTORY: none    History of the presenting illness, Discussion/Summary and My Plan are as follows:::    He is a 15-year-old gentleman with history of hypertension, low testosterone, no history of diabetes or tobacco use, not aware of his family history, 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, also roller skates regularly without any symptoms, presented to [facility] for 2 episodes of melena with a hemoglobin of 7, compared to a baseline around 13-15, subsequently transferred to [facility] due to troponin elevation, peaking at 22  Underwent an EGD that showed multiple small ulcers in the antrum as well as a clean based ulcer in the antrum that was clipped without any active bleeding  Had been having intermittent chest pains and underwent coronary angiography that did not demonstrate any significant obstructive disease, warranting intervention  He was initiated on Plavix and not aspirin due to his gastric ulcers  He was also treated with antibiotics for H pylori  During his hospitalization, also developed angioedema    He was on an ACE-inhibitor at home that had not been continued in the hospital, was started on a calcium channel blocker-amlodipine for hypertension  He denies any symptoms at this visit, has been physically active, has not been back to work but is actively roller skating without any cardiac symptoms  Also has a lot of arthritis issues involving the ankle and the left shoulder  Plan:    NSTEMI type 2:  Peak troponin of 22, occurred in the setting of supply demand mismatch from severe anemia,  EGD showed multiple ulcers one of which was large in clip  Coronary angiography showed moderate disease, for medical management-single antiplatelet agent-Plavix alone, considering also history, no aspirin, continue beta-blocker and statin  Echo with preserved LV systolic function  Dyslipidemia:  Now on high intensity statin  Anemia/GI bleeding:  Had melena, status post EGD with multiple ulcers, 1 of which was clipped, status post treatment for H pylori, on b i d  PPI  For a colonoscopy soon  Can discontinue Plavix 5 days prior and proceed  Clearance h as been faxed rhythm already  Hypertension:  One more presentation to the ER with elevated blood pressures, Still elevated, here as well as at home, will discontinue ACE-inhibitor entirely ( he was on lisinopril at home, which was not continued in the hospital, developed angioedema in the hospital) angioedema often occurs in  [place] to Ace inhibitors and is an idiosyncratic reaction  I will not use an ACE-inhibitor in him or amlodipine anymore  Will restart hydrochlorothiazide at 25 mg  He will continue to keep an eye on it at home  Right ventricle was mildly dilated, also possibly snores, but feels rested in the morning, need to rule out sleep apnea - wants to hold off  No renal artery stenosis-CT chest abdomen pelvis-August 2019  Normal potassium levels    Follow-up in 6 months  Cardiac catheterization 11/06/2019:   ---Coronary circulation  -Left main: Normal   -LAD: Angiography showed minor luminal irregularities   -Proximal circumflex:  There was a 30 % stenosis  -1st obtuse marginal: There was a 70 % stenosis at the ostium of the vessel segment  -Mid RCA: There was a diffuse 30 % stenosis    Results for Adela Verdugo (MRN [id number]) as of 1/10/2020 08:20   Ref   Range 11/4/2019 00:11 11/4/2019 05:56   Cholesterol Latest Ref Range: 50 - 200 mg/dL  107   Triglycerides Latest Ref Range: <=150 mg/dL  139   HDL Latest Ref Range: >=40 mg/dL  10 (L)   LDL Direct Latest Ref Range: 0 - 100 mg/dL  69   Hemoglobin A1C Latest Ref Range: 4 2 - 6 3 % 5 5        Patient Active Problem List   Diagnosis    Elevated troponin    HTN (hypertension)    Acute left ankle pain    Acute pain of left shoulder    Left ankle sprain    Acute left-sided low back pain without sciatica    S/P cardiac cath 11/06/19    Stomach ulcer    Iron deficiency anemia due to chronic blood loss    ZAKIA (acute kidney injury) ([facility] )   [facility] discharge follow-up    Colon cancer screening    NSTEMI (non-ST elevated myocardial infarction) ([facility] )    Hyperlipidemia    Anemia     Past Medical History:   Diagnosis Date    Angioedema     Cardiac disease     GI bleed     Hypertension     stopped his meds when ran out several years ago    STEMI (ST elevation myocardial infarction) ([facility] )      Social History     Socioeconomic History    Marital status: Single     Spouse name: Not on file    Number of children: Not on file    Years of education: Not on file    Highest education level: Not on file   Occupational History    Not on file   Social Needs    Financial resource strain: Not on file    Food insecurity:     Worry: Not on file     Inability: Not on file    Transportation needs:     Medical: Not on file     Non-medical: Not on file   Tobacco Use    Smoking status: Never Smoker    Smokeless tobacco: Never Used   Substance and Sexual Activity    Alcohol use: Never     Alcohol/week: 0 0 standard drinks     Frequency: Never     Drinks per session: 1 or 2     Binge frequency: Never  Drug use: No    Sexual activity: Not on file     Comment: not asked   Lifestyle    Physical activity:     Days per week: Not on file     Minutes per session: Not on file    Stress: Not on file   Relationships    Social connections:     Talks on phone: Not on file     Gets together: Not on file     Attends Jainism service: Not on file     Active member of club or organization: Not on file     Attends meetings of clubs or organizations: Not on file     Relationship status: Not on file    Intimate partner violence:     Fear of current or ex partner: Not on file     Emotionally abused: Not on file     Physically abused: Not on file     Forced sexual activity: Not on file   Other Topics Concern    Not on file   Social History Narrative    Not on file      Family History   Adopted: Yes     Past Surgical History:   Procedure Laterality Date    NO PAST SURGERIES      UPPER GASTROINTESTINAL ENDOSCOPY         Current Outpatient Medications:     acetaminophen (TYLENOL) 325 mg tablet, Take 3 tablets (975 mg total) by mouth every 8 (eight) hours, Disp: 30 tablet, Rfl: 0    atorvastatin (LIPITOR) 80 mg tablet, Take 1 tablet (80 mg total) by mouth daily with dinner, Disp: 30 tablet, Rfl: 2    carvedilol (COREG) 25 mg tablet, Take 1 tablet (25 mg total) by mouth 2 (two) times a day with meals, Disp: 60 tablet, Rfl: 2    clopidogrel (PLAVIX) 75 mg tablet, Take 1 tablet (75 mg total) by mouth daily, Disp: 30 tablet, Rfl: 2    docusate sodium (COLACE) 100 mg capsule, Take 1 capsule (100 mg total) by mouth 2 (two) times a day, Disp: 10 capsule, Rfl: 0    isosorbide mononitrate (IMDUR) 30 mg 24 hr tablet, Take 1 tablet (30 mg total) by mouth daily, Disp: 30 tablet, Rfl: 2    lisinopril (ZESTRIL) 10 mg tablet, Take 1 tablet (10 mg total) by mouth daily, Disp: , Rfl:     methocarbamol (ROBAXIN) 750 mg tablet, Take 1 tablet (750 mg total) by mouth every 6 (six) hours, Disp: 120 tablet, Rfl: 0    nitroglycerin (NITROSTAT) 0 4 mg SL tablet, Place 1 tablet (0 4 mg total) under the tongue every 5 (five) minutes as needed for chest pain, Disp: 30 tablet, Rfl: 0    pantoprazole (PROTONIX) 40 mg tablet, Take 1 tablet (40 mg total) by mouth 2 (two) times a day before meals, Disp: 120 tablet, Rfl: 0    Testosterone (ANDROGEL) 40 5 MG/2 5GM (1 62%) GEL, Place 40 5 mg on the skin daily , Disp: , Rfl:     lidocaine (LIDODERM) 5 %, Apply 1 patch topically daily for 7 days Apply daily to left shoulder  Remove & Discard patch within 12 hours or as directed by MD (Patient not taking: Reported on 1/10/2020), Disp: 7 patch, Rfl: 0  Allergies   Allergen Reactions    Norvasc [Amlodipine] Angioedema    Lipitor [Atorvastatin] Facial Swelling       Imaging: No results found  Review of Systems:  Review of Systems   Constitutional: Negative  HENT: Negative  Eyes: Negative  Respiratory: Negative  Cardiovascular: Negative  Endocrine: Negative  Musculoskeletal: Positive for arthralgias and back pain  Neurological: Negative  Physical Exam:  /92 (BP Location: Left arm, Patient Position: Sitting, Cuff Size: Large)   Pulse (!) 54   Resp (!) 98   Ht 5' 11" (1 803 m)   Wt 126 kg (278 lb 1 6 oz)   BMI 38 79 kg/m²   Physical Exam   Constitutional: He appears well-developed and well-nourished  No distress  HENT:   Head: Normocephalic and atraumatic  Eyes: Pupils are equal, round, and reactive to light  Conjunctivae are normal  Right eye exhibits no discharge  Left eye exhibits no discharge  No scleral icterus  Neck: Normal range of motion  No JVD present  No tracheal deviation present  No thyromegaly present  Cardiovascular: Normal rate and regular rhythm  Exam reveals no friction rub  No murmur heard  Pulmonary/Chest: Effort normal and breath sounds normal  No stridor  No respiratory distress  He has no wheezes  Abdominal: Soft  Bowel sounds are normal  He exhibits no distension   There is no tenderness  There is no guarding  Musculoskeletal: Normal range of motion  Skin: Skin is warm  No rash noted  He is not diaphoretic  No erythema  No pallor

## 2020-01-15 ENCOUNTER — TELEPHONE (OUTPATIENT)
Dept: GASTROENTEROLOGY | Facility: CLINIC | Age: 55
End: 2020-01-15

## 2020-01-15 NOTE — TELEPHONE ENCOUNTER
Pt called our office with prep questions  He stated that he had not yet picked up the prep  I let him know that I would call Skip May in to his pharmacy  He demonstrated understanding  I called Suprep in to his pharmacy

## 2020-01-16 ENCOUNTER — ANESTHESIA (OUTPATIENT)
Dept: GASTROENTEROLOGY | Facility: AMBULARY SURGERY CENTER | Age: 55
End: 2020-01-16

## 2020-01-16 ENCOUNTER — HOSPITAL ENCOUNTER (OUTPATIENT)
Dept: GASTROENTEROLOGY | Facility: AMBULARY SURGERY CENTER | Age: 55
Setting detail: OUTPATIENT SURGERY
Discharge: HOME | End: 2020-01-16
Attending: INTERNAL MEDICINE | Admitting: INTERNAL MEDICINE
Payer: COMMERCIAL

## 2020-01-16 VITALS
OXYGEN SATURATION: 99 % | HEART RATE: 61 BPM | DIASTOLIC BLOOD PRESSURE: 99 MMHG | TEMPERATURE: 97 F | RESPIRATION RATE: 18 BRPM | HEIGHT: 71 IN | WEIGHT: 270 LBS | SYSTOLIC BLOOD PRESSURE: 236 MMHG | BODY MASS INDEX: 37.8 KG/M2

## 2020-01-16 DIAGNOSIS — K92.2 GASTROINTESTINAL HEMORRHAGE, UNSPECIFIED GASTROINTESTINAL HEMORRHAGE TYPE: ICD-10-CM

## 2020-01-16 DIAGNOSIS — D64.9 ANEMIA, UNSPECIFIED TYPE: ICD-10-CM

## 2020-01-16 PROCEDURE — 88305 TISSUE EXAM BY PATHOLOGIST: CPT | Performed by: PATHOLOGY

## 2020-01-16 PROCEDURE — 43239 EGD BIOPSY SINGLE/MULTIPLE: CPT | Performed by: INTERNAL MEDICINE

## 2020-01-16 PROCEDURE — 88313 SPECIAL STAINS GROUP 2: CPT | Performed by: PATHOLOGY

## 2020-01-16 PROCEDURE — 45380 COLONOSCOPY AND BIOPSY: CPT | Performed by: INTERNAL MEDICINE

## 2020-01-16 PROCEDURE — 45385 COLONOSCOPY W/LESION REMOVAL: CPT | Performed by: INTERNAL MEDICINE

## 2020-01-16 RX ORDER — PROPOFOL 10 MG/ML
INJECTION, EMULSION INTRAVENOUS AS NEEDED
Status: DISCONTINUED | OUTPATIENT
Start: 2020-01-16 | End: 2020-01-16 | Stop reason: SURG

## 2020-01-16 RX ORDER — SODIUM CHLORIDE, SODIUM LACTATE, POTASSIUM CHLORIDE, CALCIUM CHLORIDE 600; 310; 30; 20 MG/100ML; MG/100ML; MG/100ML; MG/100ML
100 INJECTION, SOLUTION INTRAVENOUS CONTINUOUS
Status: DISCONTINUED | OUTPATIENT
Start: 2020-01-16 | End: 2020-01-20 | Stop reason: HOSPADM

## 2020-01-16 RX ORDER — GLYCOPYRROLATE 0.2 MG/ML
INJECTION INTRAMUSCULAR; INTRAVENOUS AS NEEDED
Status: DISCONTINUED | OUTPATIENT
Start: 2020-01-16 | End: 2020-01-16 | Stop reason: SURG

## 2020-01-16 RX ORDER — LISINOPRIL 40 MG/1
40 TABLET ORAL DAILY
COMMUNITY
End: 2020-04-28

## 2020-01-16 RX ORDER — LIDOCAINE HYDROCHLORIDE 20 MG/ML
INJECTION, SOLUTION EPIDURAL; INFILTRATION; INTRACAUDAL; PERINEURAL AS NEEDED
Status: DISCONTINUED | OUTPATIENT
Start: 2020-01-16 | End: 2020-01-16 | Stop reason: SURG

## 2020-01-16 RX ADMIN — PROPOFOL 40 MG: 10 INJECTION, EMULSION INTRAVENOUS at 13:39

## 2020-01-16 RX ADMIN — PROPOFOL 40 MG: 10 INJECTION, EMULSION INTRAVENOUS at 13:36

## 2020-01-16 RX ADMIN — PROPOFOL 30 MG: 10 INJECTION, EMULSION INTRAVENOUS at 13:43

## 2020-01-16 RX ADMIN — PROPOFOL 70 MG: 10 INJECTION, EMULSION INTRAVENOUS at 13:31

## 2020-01-16 RX ADMIN — SODIUM CHLORIDE, SODIUM LACTATE, POTASSIUM CHLORIDE, AND CALCIUM CHLORIDE: .6; .31; .03; .02 INJECTION, SOLUTION INTRAVENOUS at 12:35

## 2020-01-16 RX ADMIN — PROPOFOL 40 MG: 10 INJECTION, EMULSION INTRAVENOUS at 13:27

## 2020-01-16 RX ADMIN — LIDOCAINE HYDROCHLORIDE 60 MG: 20 INJECTION, SOLUTION EPIDURAL; INFILTRATION; INTRACAUDAL; PERINEURAL at 13:24

## 2020-01-16 RX ADMIN — PROPOFOL 40 MG: 10 INJECTION, EMULSION INTRAVENOUS at 13:38

## 2020-01-16 RX ADMIN — GLYCOPYRROLATE 0.3 MG: 0.2 INJECTION, SOLUTION INTRAMUSCULAR; INTRAVENOUS at 13:24

## 2020-01-16 RX ADMIN — PROPOFOL 150 MG: 10 INJECTION, EMULSION INTRAVENOUS at 13:24

## 2020-01-16 RX ADMIN — PROPOFOL 50 MG: 10 INJECTION, EMULSION INTRAVENOUS at 13:34

## 2020-01-16 RX ADMIN — PROPOFOL 30 MG: 10 INJECTION, EMULSION INTRAVENOUS at 13:47

## 2020-01-16 RX ADMIN — PROPOFOL 30 MG: 10 INJECTION, EMULSION INTRAVENOUS at 13:52

## 2020-01-16 RX ADMIN — PROPOFOL 40 MG: 10 INJECTION, EMULSION INTRAVENOUS at 13:41

## 2020-01-16 RX ADMIN — PROPOFOL 20 MG: 10 INJECTION, EMULSION INTRAVENOUS at 13:25

## 2020-01-16 RX ADMIN — PROPOFOL 40 MG: 10 INJECTION, EMULSION INTRAVENOUS at 13:29

## 2020-01-16 NOTE — ANESTHESIA PREPROCEDURE EVALUATION
Review of Systems/Medical History  Patient summary reviewed  Chart reviewed  No history of anesthetic complications     Cardiovascular  Exercise tolerance (METS): >4,  Hyperlipidemia, Hypertension , Past MI (T2 NSTEMI 11/2019 in setting of bleeding ulcer; cath with nonobstructive CAD, normal echo) 0-3 months, CAD , CAD status: non-obstructive,    Pulmonary       GI/Hepatic    PUD, Bowel prep       Negative  ROS        Endo/Other    Obesity (BMI 37)    GYN  Negative gynecology ROS          Hematology  Negative hematology ROS      Musculoskeletal  Negative musculoskeletal ROS        Neurology  Negative neurology ROS      Psychology   Negative psychology ROS            Lab Results   Component Value Date    WBC 6 65 12/17/2019    HGB 11 6 (L) 12/17/2019     12/17/2019     Lab Results   Component Value Date    SODIUM 140 12/31/2019    K 4 3 12/31/2019    BUN 23 12/31/2019    CREATININE 1 17 12/31/2019    EGFR 80 12/31/2019     Lab Results   Component Value Date    HGBA1C 5 5 11/04/2019     ECHO 11/4/19:    SUMMARY     LEFT VENTRICLE:  Systolic function was normal  Ejection fraction was estimated to be 60 %  There were no regional wall motion abnormalities  Wall thickness was mildly increased  The changes were consistent with concentric remodeling (increased wall thickness with normal wall mass)      RIGHT VENTRICLE:  The ventricle was mildly dilated  Systolic function was normal      LEFT ATRIUM:  The atrium was mildly dilated      Physical Exam    Airway    Mallampati score: I  TM Distance: >3 FB  Neck ROM: full     Dental   No notable dental hx     Cardiovascular  Rhythm: regular, Rate: normal, Cardiovascular exam normal    Pulmonary  Pulmonary exam normal Breath sounds clear to auscultation,     Other Findings      Anesthesia Plan  ASA Score- 3     Anesthesia Type- IV sedation with anesthesia with ASA Monitors  Additional Monitors:   Airway Plan:         Plan Factors-   Patient did not smoke on day of surgery  Induction- intravenous  Postoperative Plan-     Informed Consent- Anesthetic plan and risks discussed with patient  I personally reviewed this patient with the CRNA  Discussed and agreed on the Anesthesia Plan with the CRNA  Jg Jean Baptiste

## 2020-01-16 NOTE — ANESTHESIA POSTPROCEDURE EVALUATION
Post-Op Assessment Note    CV Status:  Stable  Pain Score: 0    Pain management: adequate     Mental Status:  Sleepy   Hydration Status:  Euvolemic   PONV Controlled:  Controlled   Airway Patency:  Patent   Post Op Vitals Reviewed: Yes      Staff: CRNA, Anesthesiologist           BP (!) 185/117 (01/16/20 1359)    Temp     Pulse 69 (01/16/20 1359)   Resp 18 (01/16/20 1359)    SpO2 98 % (01/16/20 1359)

## 2020-01-16 NOTE — H&P
History and Physical - [facility]  Anum Gandhi 62 y o  male MRN: [id number]    HPI: Anum Gandhi is a 62y o  year old male who presents for EGD and colonoscopy  He had history of H pylori and NSAIDs induced gastric ulcer EGDs to confirm healing  Colonoscopy for colon cancer screening      Review of Systems    Historical Information   Past Medical History:   Diagnosis Date    Angioedema     Cardiac disease     GI bleed     Hypertension     stopped his meds when ran out several years ago    STEMI (ST elevation myocardial infarction) ([facility] [address] )      Past Surgical History:   Procedure Laterality Date    NO PAST SURGERIES      UPPER GASTROINTESTINAL ENDOSCOPY       Social History   Social History     Substance and Sexual Activity   Alcohol Use Never    Alcohol/week: 0 0 standard drinks    Frequency: Never    Drinks per session: 1 or 2    Binge frequency: Never     Social History     Substance and Sexual Activity   Drug Use No     Social History     Tobacco Use   Smoking Status Never Smoker   Smokeless Tobacco Never Used     Family History   Adopted: Yes       Meds/Allergies       (Not in a hospital admission)    Allergies   Allergen Reactions    Norvasc [Amlodipine] Angioedema    Lipitor [Atorvastatin] Facial Swelling       Objective     BP (!) 205/93   Pulse (!) 54   Temp (!) 97 3 °F (36 3 °C) (Temporal)   Resp 18   Ht 5' 11" (1 803 m)   Wt 122 kg (270 lb)   SpO2 98%   BMI 37 66 kg/m²       PHYSICAL EXAM    Gen: NAD  CV: RRR  CHEST: Clear  ABD: soft, NT/ND  EXT: no edema  Neuro: AAO      ASSESSMENT/PLAN:  This is a 62y o  year old male here for EGD and colonoscopy  EGDs to confirm healing of gastric ulcer  Colonoscopy for colon cancer screening      PLAN:   Procedure:  EGD and colonoscopy with biopsy and possible polypectomy

## 2020-01-20 DIAGNOSIS — A04.8 H. PYLORI INFECTION: Primary | ICD-10-CM

## 2020-01-20 DIAGNOSIS — I21.4 NSTEMI (NON-ST ELEVATED MYOCARDIAL INFARCTION) (HCC): ICD-10-CM

## 2020-01-20 RX ORDER — PANTOPRAZOLE SODIUM 40 MG/1
40 TABLET, DELAYED RELEASE ORAL
Qty: 28 TABLET | Refills: 0 | Status: SHIPPED | OUTPATIENT
Start: 2020-01-20 | End: 2020-06-27

## 2020-01-20 NOTE — RESULT ENCOUNTER NOTE
I called patient and discussed results which show H pylori still present  He is agreeable to get treatment again and to get H pylori stool antigen test done at least 2 weeks after completion of treatment  I have ordered Pylera and PPI for 2 weeks and H pylori stool antigen to be done in 4 weeks  Patient will collect the medication from pharmacy later today  Please kindly mail order of lab test to patient  Thank you

## 2020-01-21 ENCOUNTER — TELEPHONE (OUTPATIENT)
Dept: GASTROENTEROLOGY | Facility: CLINIC | Age: 55
End: 2020-01-21

## 2020-01-21 NOTE — TELEPHONE ENCOUNTER
Patients GI provider:  Dr Bee Holley    Number to return call: [phone]    Reason for call: Dio Doran from [facility] called requesting pt's results from his colon/egd of 01/16/20 be faxed to their office      Scheduled procedure/appointment date if applicable: NA

## 2020-02-05 DIAGNOSIS — I21.4 NSTEMI (NON-ST ELEVATED MYOCARDIAL INFARCTION) (HCC): ICD-10-CM

## 2020-02-06 RX ORDER — ISOSORBIDE MONONITRATE 30 MG/1
30 TABLET, EXTENDED RELEASE ORAL DAILY
Qty: 30 TABLET | Refills: 11 | Status: SHIPPED | OUTPATIENT
Start: 2020-02-06 | End: 2020-07-13 | Stop reason: SDUPTHER

## 2020-02-13 DIAGNOSIS — I21.4 NSTEMI (NON-ST ELEVATED MYOCARDIAL INFARCTION) (HCC): ICD-10-CM

## 2020-02-13 DIAGNOSIS — S93.402A LEFT ANKLE SPRAIN: ICD-10-CM

## 2020-02-14 ENCOUNTER — TELEPHONE (OUTPATIENT)
Dept: GASTROENTEROLOGY | Facility: AMBULARY SURGERY CENTER | Age: 55
End: 2020-02-14

## 2020-02-14 RX ORDER — CARVEDILOL 25 MG/1
25 TABLET ORAL 2 TIMES DAILY WITH MEALS
Qty: 60 TABLET | Refills: 11 | Status: SHIPPED | OUTPATIENT
Start: 2020-02-14 | End: 2021-02-17

## 2020-02-14 RX ORDER — CLOPIDOGREL BISULFATE 75 MG/1
75 TABLET ORAL DAILY
Qty: 30 TABLET | Refills: 11 | Status: SHIPPED | OUTPATIENT
Start: 2020-02-14 | End: 2020-07-05 | Stop reason: HOSPADM

## 2020-02-14 RX ORDER — ATORVASTATIN CALCIUM 80 MG/1
80 TABLET, FILM COATED ORAL
Qty: 30 TABLET | Refills: 11 | Status: SHIPPED | OUTPATIENT
Start: 2020-02-14 | End: 2020-06-29 | Stop reason: HOSPADM

## 2020-02-14 NOTE — TELEPHONE ENCOUNTER
Patients GI provider:  Dr Rizo Memory    Number to return call: ( [phone]    Reason for call: Pt calling to have omeprazole called into [facility]  pharmacy    Scheduled procedure/appointment date if applicable: Apt/procedure  na

## 2020-02-14 NOTE — TELEPHONE ENCOUNTER
Giovanna PAC[phone] confirmed patient picked up pylera and pantoprazole script  Unable to leave message for patient to call back

## 2020-04-28 DIAGNOSIS — I10 ESSENTIAL HYPERTENSION: Primary | ICD-10-CM

## 2020-04-28 RX ORDER — LISINOPRIL 40 MG/1
TABLET ORAL
Qty: 30 TABLET | Refills: 1 | Status: SHIPPED | OUTPATIENT
Start: 2020-04-28 | End: 2020-06-01

## 2020-05-31 DIAGNOSIS — I10 ESSENTIAL HYPERTENSION: ICD-10-CM

## 2020-06-01 RX ORDER — LISINOPRIL 40 MG/1
TABLET ORAL
Qty: 30 TABLET | Refills: 1 | Status: SHIPPED | OUTPATIENT
Start: 2020-06-01 | End: 2020-06-27

## 2020-06-02 ENCOUNTER — TELEPHONE (OUTPATIENT)
Dept: FAMILY MEDICINE CLINIC | Facility: CLINIC | Age: 55
End: 2020-06-02

## 2020-06-27 ENCOUNTER — APPOINTMENT (EMERGENCY)
Dept: RADIOLOGY | Facility: HOSPITAL | Age: 55
DRG: 916 | End: 2020-06-27
Payer: COMMERCIAL

## 2020-06-27 ENCOUNTER — HOSPITAL ENCOUNTER (INPATIENT)
Facility: HOSPITAL | Age: 55
LOS: 2 days | Discharge: HOME | DRG: 916 | End: 2020-06-29
Attending: EMERGENCY MEDICINE | Admitting: INTERNAL MEDICINE
Payer: COMMERCIAL

## 2020-06-27 DIAGNOSIS — N17.9 AKI (ACUTE KIDNEY INJURY) (HCC): ICD-10-CM

## 2020-06-27 DIAGNOSIS — T78.3XXA ANGIOEDEMA OF LIPS, INITIAL ENCOUNTER: Primary | ICD-10-CM

## 2020-06-27 LAB
ALBUMIN SERPL BCP-MCNC: 3.7 G/DL (ref 3.5–5)
ALP SERPL-CCNC: 100 U/L (ref 46–116)
ALT SERPL W P-5'-P-CCNC: 30 U/L (ref 12–78)
ANION GAP SERPL CALCULATED.3IONS-SCNC: 7 MMOL/L (ref 4–13)
APTT PPP: 33 SECONDS (ref 23–37)
AST SERPL W P-5'-P-CCNC: 26 U/L (ref 5–45)
BASOPHILS # BLD AUTO: 0.03 THOUSANDS/ΜL (ref 0–0.1)
BASOPHILS NFR BLD AUTO: 1 % (ref 0–1)
BILIRUB SERPL-MCNC: 0.48 MG/DL (ref 0.2–1)
BUN SERPL-MCNC: 31 MG/DL (ref 5–25)
CALCIUM SERPL-MCNC: 8.8 MG/DL (ref 8.3–10.1)
CHLORIDE SERPL-SCNC: 107 MMOL/L (ref 100–108)
CO2 SERPL-SCNC: 26 MMOL/L (ref 21–32)
CREAT SERPL-MCNC: 1.41 MG/DL (ref 0.6–1.3)
EOSINOPHIL # BLD AUTO: 0.16 THOUSAND/ΜL (ref 0–0.61)
EOSINOPHIL NFR BLD AUTO: 3 % (ref 0–6)
ERYTHROCYTE [DISTWIDTH] IN BLOOD BY AUTOMATED COUNT: 13 % (ref 11.6–15.1)
GFR SERPL CREATININE-BSD FRML MDRD: 64 ML/MIN/1.73SQ M
GLUCOSE SERPL-MCNC: 104 MG/DL (ref 65–140)
HCT VFR BLD AUTO: 39 % (ref 36.5–49.3)
HGB BLD-MCNC: 12.8 G/DL (ref 12–17)
IMM GRANULOCYTES # BLD AUTO: 0.02 THOUSAND/UL (ref 0–0.2)
IMM GRANULOCYTES NFR BLD AUTO: 0 % (ref 0–2)
INR PPP: 1.13 (ref 0.84–1.19)
LYMPHOCYTES # BLD AUTO: 1.96 THOUSANDS/ΜL (ref 0.6–4.47)
LYMPHOCYTES NFR BLD AUTO: 31 % (ref 14–44)
MCH RBC QN AUTO: 30.5 PG (ref 26.8–34.3)
MCHC RBC AUTO-ENTMCNC: 32.8 G/DL (ref 31.4–37.4)
MCV RBC AUTO: 93 FL (ref 82–98)
MONOCYTES # BLD AUTO: 0.76 THOUSAND/ΜL (ref 0.17–1.22)
MONOCYTES NFR BLD AUTO: 12 % (ref 4–12)
NEUTROPHILS # BLD AUTO: 3.37 THOUSANDS/ΜL (ref 1.85–7.62)
NEUTS SEG NFR BLD AUTO: 53 % (ref 43–75)
NRBC BLD AUTO-RTO: 0 /100 WBCS
PLATELET # BLD AUTO: 143 THOUSANDS/UL (ref 149–390)
PMV BLD AUTO: 11.6 FL (ref 8.9–12.7)
POTASSIUM SERPL-SCNC: 4.1 MMOL/L (ref 3.5–5.3)
PROT SERPL-MCNC: 7.6 G/DL (ref 6.4–8.2)
PROTHROMBIN TIME: 13.9 SECONDS (ref 11.6–14.5)
RBC # BLD AUTO: 4.2 MILLION/UL (ref 3.88–5.62)
SODIUM SERPL-SCNC: 140 MMOL/L (ref 136–145)
WBC # BLD AUTO: 6.3 THOUSAND/UL (ref 4.31–10.16)

## 2020-06-27 PROCEDURE — 96374 THER/PROPH/DIAG INJ IV PUSH: CPT

## 2020-06-27 PROCEDURE — 36415 COLL VENOUS BLD VENIPUNCTURE: CPT | Performed by: EMERGENCY MEDICINE

## 2020-06-27 PROCEDURE — 99291 CRITICAL CARE FIRST HOUR: CPT | Performed by: INTERNAL MEDICINE

## 2020-06-27 PROCEDURE — 93005 ELECTROCARDIOGRAM TRACING: CPT

## 2020-06-27 PROCEDURE — 99291 CRITICAL CARE FIRST HOUR: CPT | Performed by: EMERGENCY MEDICINE

## 2020-06-27 PROCEDURE — 99285 EMERGENCY DEPT VISIT HI MDM: CPT

## 2020-06-27 PROCEDURE — 85610 PROTHROMBIN TIME: CPT | Performed by: EMERGENCY MEDICINE

## 2020-06-27 PROCEDURE — 96375 TX/PRO/DX INJ NEW DRUG ADDON: CPT

## 2020-06-27 PROCEDURE — 85025 COMPLETE CBC W/AUTO DIFF WBC: CPT | Performed by: EMERGENCY MEDICINE

## 2020-06-27 PROCEDURE — 86160 COMPLEMENT ANTIGEN: CPT | Performed by: NURSE PRACTITIONER

## 2020-06-27 PROCEDURE — 85730 THROMBOPLASTIN TIME PARTIAL: CPT | Performed by: EMERGENCY MEDICINE

## 2020-06-27 PROCEDURE — 86161 COMPLEMENT/FUNCTION ACTIVITY: CPT | Performed by: NURSE PRACTITIONER

## 2020-06-27 PROCEDURE — 71045 X-RAY EXAM CHEST 1 VIEW: CPT

## 2020-06-27 PROCEDURE — 80053 COMPREHEN METABOLIC PANEL: CPT | Performed by: EMERGENCY MEDICINE

## 2020-06-27 RX ORDER — SENNOSIDES 8.6 MG
1 TABLET ORAL
Status: DISCONTINUED | OUTPATIENT
Start: 2020-06-27 | End: 2020-06-29 | Stop reason: HOSPADM

## 2020-06-27 RX ORDER — ONDANSETRON 2 MG/ML
4 INJECTION INTRAMUSCULAR; INTRAVENOUS EVERY 6 HOURS PRN
Status: DISCONTINUED | OUTPATIENT
Start: 2020-06-27 | End: 2020-06-29 | Stop reason: HOSPADM

## 2020-06-27 RX ORDER — LISINOPRIL 40 MG/1
40 TABLET ORAL DAILY
COMMUNITY
End: 2020-06-29 | Stop reason: HOSPADM

## 2020-06-27 RX ORDER — ISOSORBIDE MONONITRATE 30 MG/1
30 TABLET, EXTENDED RELEASE ORAL DAILY
Status: DISCONTINUED | OUTPATIENT
Start: 2020-06-27 | End: 2020-06-28

## 2020-06-27 RX ORDER — CARVEDILOL 12.5 MG/1
25 TABLET ORAL 2 TIMES DAILY WITH MEALS
Status: DISCONTINUED | OUTPATIENT
Start: 2020-06-27 | End: 2020-06-29 | Stop reason: HOSPADM

## 2020-06-27 RX ORDER — DOCUSATE SODIUM 100 MG/1
100 CAPSULE, LIQUID FILLED ORAL 2 TIMES DAILY PRN
Status: DISCONTINUED | OUTPATIENT
Start: 2020-06-27 | End: 2020-06-29 | Stop reason: HOSPADM

## 2020-06-27 RX ORDER — HYDRALAZINE HYDROCHLORIDE 20 MG/ML
10 INJECTION INTRAMUSCULAR; INTRAVENOUS EVERY 6 HOURS PRN
Status: DISCONTINUED | OUTPATIENT
Start: 2020-06-27 | End: 2020-06-29 | Stop reason: HOSPADM

## 2020-06-27 RX ORDER — CLOPIDOGREL BISULFATE 75 MG/1
75 TABLET ORAL DAILY
Status: DISCONTINUED | OUTPATIENT
Start: 2020-06-27 | End: 2020-06-29 | Stop reason: HOSPADM

## 2020-06-27 RX ORDER — DIPHENHYDRAMINE HYDROCHLORIDE 50 MG/ML
50 INJECTION INTRAMUSCULAR; INTRAVENOUS ONCE
Status: COMPLETED | OUTPATIENT
Start: 2020-06-27 | End: 2020-06-27

## 2020-06-27 RX ORDER — HYDROCHLOROTHIAZIDE 25 MG/1
25 TABLET ORAL DAILY
Status: DISCONTINUED | OUTPATIENT
Start: 2020-06-27 | End: 2020-06-28

## 2020-06-27 RX ORDER — METHYLPREDNISOLONE SODIUM SUCCINATE 125 MG/2ML
125 INJECTION, POWDER, LYOPHILIZED, FOR SOLUTION INTRAMUSCULAR; INTRAVENOUS ONCE
Status: COMPLETED | OUTPATIENT
Start: 2020-06-27 | End: 2020-06-27

## 2020-06-27 RX ORDER — ACETAMINOPHEN 325 MG/1
650 TABLET ORAL EVERY 6 HOURS PRN
Status: DISCONTINUED | OUTPATIENT
Start: 2020-06-27 | End: 2020-06-29 | Stop reason: HOSPADM

## 2020-06-27 RX ADMIN — METHYLPREDNISOLONE SODIUM SUCCINATE 125 MG: 125 INJECTION, POWDER, FOR SOLUTION INTRAMUSCULAR; INTRAVENOUS at 04:44

## 2020-06-27 RX ADMIN — HYDRALAZINE HYDROCHLORIDE 10 MG: 20 INJECTION INTRAMUSCULAR; INTRAVENOUS at 10:25

## 2020-06-27 RX ADMIN — ENOXAPARIN SODIUM 40 MG: 40 INJECTION SUBCUTANEOUS at 10:21

## 2020-06-27 RX ADMIN — FAMOTIDINE 20 MG: 10 INJECTION, SOLUTION INTRAVENOUS at 04:50

## 2020-06-27 RX ADMIN — DIPHENHYDRAMINE HYDROCHLORIDE 50 MG: 50 INJECTION, SOLUTION INTRAMUSCULAR; INTRAVENOUS at 04:48

## 2020-06-27 RX ADMIN — CARVEDILOL 25 MG: 12.5 TABLET, FILM COATED ORAL at 18:09

## 2020-06-27 RX ADMIN — HYDROCHLOROTHIAZIDE 25 MG: 25 TABLET ORAL at 10:21

## 2020-06-27 RX ADMIN — CLOPIDOGREL BISULFATE 75 MG: 75 TABLET ORAL at 10:21

## 2020-06-27 RX ADMIN — ISOSORBIDE MONONITRATE 30 MG: 30 TABLET, EXTENDED RELEASE ORAL at 10:21

## 2020-06-27 NOTE — ED PROVIDER NOTES
History  Chief Complaint   Patient presents with    Facial Swelling     Pt felt minor lip swelling last evening and woke up today with major lip and facial swelling down to left neck  Denies sob, hoarseness, no tongue swelling and no problems swallowing  Nausea x 1 week  Patient is a 62year old male with increased lower lip swelling since last night  No fever or cough  No N/V  No sob  Had prior nausea earlier this week  No new foods, soaps, detergents or medications  Patient is on lisinopril  Has had angioedema from norvasc in the past  Was last seen in this ED on 12/17/19 for HTN  [facility] website checked on this patient and last Rx filled was on 4/14/20 for testosterone for 30 day supply  Patient needed my urgent attention  History provided by:  Patient and relative (son)   used: No        Prior to Admission Medications   Prescriptions Last Dose Informant Patient Reported? Taking?    Testosterone (ANDROGEL) 40 5 MG/2 5GM (1 62%) GEL  Self Yes No   Sig: Place 40 5 mg on the skin daily    acetaminophen (TYLENOL) 325 mg tablet  Self No No   Sig: Take 3 tablets (975 mg total) by mouth every 8 (eight) hours   atorvastatin (LIPITOR) 80 mg tablet   No No   Sig: Take 1 tablet (80 mg total) by mouth daily with dinner   bismuth-metronidazole-tetracycline (PYLERA) 140-125-125 MG per capsule   No No   Sig: Take 3 capsules by mouth 4 (four) times a day (before meals and at bedtime) for 14 days   carvedilol (COREG) 25 mg tablet   No No   Sig: Take 1 tablet (25 mg total) by mouth 2 (two) times a day with meals   clopidogrel (PLAVIX) 75 mg tablet   No No   Sig: Take 1 tablet (75 mg total) by mouth daily   docusate sodium (COLACE) 100 mg capsule  Self No No   Sig: Take 1 capsule (100 mg total) by mouth 2 (two) times a day   hydrochlorothiazide (HYDRODIURIL) 25 mg tablet   No No   Sig: Take 1 tablet (25 mg total) by mouth daily   isosorbide mononitrate (IMDUR) 30 mg 24 hr tablet   No No   Sig: Take 1 tablet (30 mg total) by mouth daily   lidocaine (LIDODERM) 5 %   No No   Sig: Apply 1 patch topically daily for 7 days Apply daily to left shoulder    Remove & Discard patch within 12 hours or as directed by MD   Patient not taking: Reported on 1/10/2020   lisinopril (ZESTRIL) 40 mg tablet   No No   Sig: TAKE 1 TABLET BY MOUTH EVERY DAY   nitroglycerin (NITROSTAT) 0 4 mg SL tablet  Self No No   Sig: Place 1 tablet (0 4 mg total) under the tongue every 5 (five) minutes as needed for chest pain   pantoprazole (PROTONIX) 40 mg tablet   No No   Sig: Take 1 tablet (40 mg total) by mouth 2 (two) times a day before meals for 14 days      Facility-Administered Medications: None       Past Medical History:   Diagnosis Date    Angioedema     Cardiac disease     GI bleed     High blood pressure     Hypertension     stopped his meds when ran out several years ago    STEMI (ST elevation myocardial infarction) ([facility] [address] )        Past Surgical History:   Procedure Laterality Date    NO PAST SURGERIES      UPPER GASTROINTESTINAL ENDOSCOPY      Clamped large stomach ulcer       Family History   Adopted: Yes   Family history unknown: Yes     I have reviewed and agree with the history as documented  E-Cigarette/Vaping    E-Cigarette Use Never User      E-Cigarette/Vaping Substances     Social History     Tobacco Use    Smoking status: Never Smoker    Smokeless tobacco: Never Used   Substance Use Topics    Alcohol use: Never     Alcohol/week: 0 0 standard drinks     Frequency: Never     Drinks per session: 1 or 2     Binge frequency: Never    Drug use: No       Review of Systems   Constitutional: Negative for fever  HENT: Positive for facial swelling  Respiratory: Negative for cough and shortness of breath  Cardiovascular: Negative for chest pain  Gastrointestinal: Positive for nausea (prior)  Negative for vomiting  All other systems reviewed and are negative        Physical Exam  Physical Exam   Constitutional: He is oriented to person, place, and time  He appears well-developed and well-nourished  He appears distressed (moderate)  HENT:   Head: Normocephalic and atraumatic  Mouth/Throat: Oropharynx is clear and moist    (+) large angioedema noted of lower lip bilaterally  No tongue or intraoral angioedema noted  Eyes: No scleral icterus  Neck: No JVD present  No tracheal deviation present  Cardiovascular: Regular rhythm and normal heart sounds  No murmur heard  Bradycardia  Pulmonary/Chest: Effort normal and breath sounds normal  No stridor  No respiratory distress  He has no wheezes  He has no rales  Abdominal: Soft  Bowel sounds are normal  There is no tenderness  Musculoskeletal: He exhibits no edema or deformity  Neurological: He is alert and oriented to person, place, and time  Skin: Skin is warm and dry  No rash noted  No erythema  Psychiatric: He has a normal mood and affect  Nursing note and vitals reviewed        Vital Signs  ED Triage Vitals [06/27/20 0414]   Temperature Pulse Respirations Blood Pressure SpO2   97 9 °F (36 6 °C) (!) 47 18 (!) 189/86 98 %      Temp Source Heart Rate Source Patient Position - Orthostatic VS BP Location FiO2 (%)   Oral Monitor Lying Right arm --      Pain Score       No Pain           Vitals:    06/27/20 0414   BP: (!) 189/86   Pulse: (!) 47   Patient Position - Orthostatic VS: Lying         Visual Acuity      ED Medications  Medications   diphenhydrAMINE (BENADRYL) injection 50 mg (50 mg Intravenous Given 6/27/20 0448)   methylPREDNISolone sodium succinate (Solu-MEDROL) injection 125 mg (125 mg Intravenous Given 6/27/20 0444)   famotidine (PEPCID) injection 20 mg (20 mg Intravenous Given 6/27/20 0450)       Diagnostic Studies  Results Reviewed     Procedure Component Value Units Date/Time    C1 esterase inhibitor [[id number]] Collected:  06/27/20 0556    Lab Status:  No result Specimen:  Blood from Arm, Right     C1 esterase inhibitor, functional [[id number]] Collected:  06/27/20 0556    Lab Status:  No result Specimen:  Blood from Arm, Right     Comprehensive metabolic panel [[id number]]  (Abnormal) Collected:  06/27/20 0442    Lab Status:  Final result Specimen:  Blood from Arm, Right Updated:  06/27/20 0533     Sodium 140 mmol/L      Potassium 4 1 mmol/L      Chloride 107 mmol/L      CO2 26 mmol/L      ANION GAP 7 mmol/L      BUN 31 mg/dL      Creatinine 1 41 mg/dL      Glucose 104 mg/dL      Calcium 8 8 mg/dL      AST 26 U/L      ALT 30 U/L      Alkaline Phosphatase 100 U/L      Total Protein 7 6 g/dL      Albumin 3 7 g/dL      Total Bilirubin 0 48 mg/dL      eGFR 64 ml/min/1 73sq m     Narrative:       [place] guidelines for Chronic Kidney Disease (CKD):     Stage 1 with normal or high GFR (GFR > 90 mL/min/1 73 square meters)    Stage 2 Mild CKD (GFR = 60-89 mL/min/1 73 square meters)    Stage 3A Moderate CKD (GFR = 45-59 mL/min/1 73 square meters)    Stage 3B Moderate CKD (GFR = 30-44 mL/min/1 73 square meters)    Stage 4 Severe CKD (GFR = 15-29 mL/min/1 73 square meters)    Stage 5 End Stage CKD (GFR <15 mL/min/1 73 square meters)  Note: GFR calculation is accurate only with a steady state creatinine    Protime-INR [[id number]]  (Normal) Collected:  06/27/20 0442    Lab Status:  Final result Specimen:  Blood from Arm, Right Updated:  06/27/20 0515     Protime 13 9 seconds      INR 1 13    APTT [[id number]]  (Normal) Collected:  06/27/20 0442    Lab Status:  Final result Specimen:  Blood from Arm, Right Updated:  06/27/20 0515     PTT 33 seconds     CBC and differential [[id number]]  (Abnormal) Collected:  06/27/20 0442    Lab Status:  Final result Specimen:  Blood from Arm, Right Updated:  06/27/20 0505     WBC 6 30 Thousand/uL      RBC 4 20 Million/uL      Hemoglobin 12 8 g/dL      Hematocrit 39 0 %      MCV 93 fL      MCH 30 5 pg      MCHC 32 8 g/dL      RDW 13 0 %      MPV 11 6 fL      Platelets 172 Thousands/uL nRBC 0 /100 WBCs      Neutrophils Relative 53 %      Immat GRANS % 0 %      Lymphocytes Relative 31 %      Monocytes Relative 12 %      Eosinophils Relative 3 %      Basophils Relative 1 %      Neutrophils Absolute 3 37 Thousands/µL      Immature Grans Absolute 0 02 Thousand/uL      Lymphocytes Absolute 1 96 Thousands/µL      Monocytes Absolute 0 76 Thousand/µL      Eosinophils Absolute 0 16 Thousand/µL      Basophils Absolute 0 03 Thousands/µL                  XR chest 1 view portable   ED Interpretation by Marcia Dos Santos MD (06/27 9333)   Borderline cardiomegaly read by me  Procedures  ECG 12 Lead Documentation Only  Date/Time: 6/27/2020 4:50 AM  Performed by: Marcia Dos Santos MD  Authorized by: Marcia Dos Santos MD     Indications / Diagnosis:  Angioedema  ECG reviewed by me, the ED Provider: yes    Patient location:  ED  Previous ECG:     Previous ECG:  Compared to current    Comparison ECG info:  12/17/19    Similarity:  Changes noted (no PAC now)  Quality:     Tracing quality:  Limited by artifact  Rate:     ECG rate:  50    ECG rate assessment: bradycardic    Rhythm:     Rhythm: sinus bradycardia      Rhythm comment:  S  arrhythmia  Ectopy:     Ectopy: none    QRS:     QRS axis:  Normal    QRS intervals:  Normal  Conduction:     Conduction: normal    ST segments:     ST segments:  Normal  T waves:     T waves: normal      CriticalCare Time  Performed by: Marcia Dos Santos MD  Authorized by: Marcia Dos Santos MD     Critical care provider statement:     Critical care time (minutes):  35    Critical care time was exclusive of:  Separately billable procedures and treating other patients    Critical care was necessary to treat or prevent imminent or life-threatening deterioration of the following conditions: angioedema      Critical care was time spent personally by me on the following activities:  Obtaining history from patient or surrogate, development of treatment plan with patient or surrogate, evaluation of patient's response to treatment, examination of patient, ordering and performing treatments and interventions, ordering and review of laboratory studies, ordering and review of radiographic studies, re-evaluation of patient's condition and review of old charts    I assumed direction of critical care for this patient from another provider in my specialty: no               ED Course  ED Course as of Jun 27 0556   Sat Jun 27, 2020   0527 Labs, CXR, EKG d/w patient  Angioedema unchanged  0730 D/w critical care AP who will see patient in ED        5464 Patient told to stop lisinopril and never take this again or medications similar to this  7929 D/w critical care AP who wants patient admitted to level one step down under Dr Karla Strickland and critical care AP will contact Dr Karla Strickland  US AUDIT      Most Recent Value   Initial Alcohol Screen: US AUDIT-C    1  How often do you have a drink containing alcohol?  0 Filed at: 06/27/2020 0415   3a  Male UNDER 65: How often do you have five or more drinks on one occasion? 0 Filed at: 06/27/2020 0415   Audit-C Score  0 Filed at: 06/27/2020 0415                  MICHELLE/DAST-10      Most Recent Value   How many times in the past year have you    Used an illegal drug or used a prescription medication for non-medical reasons? Never Filed at: 06/27/2020 0415                                MDM  Number of Diagnoses or Management Options  Diagnosis management comments: DDx including but not limited to: angioedema, allergic reaction, anaphylaxis, airway compromise, cellulitis          Amount and/or Complexity of Data Reviewed  Clinical lab tests: ordered and reviewed  Tests in the radiology section of CPT®: ordered and reviewed  Decide to obtain previous medical records or to obtain history from someone other than the patient: yes  Review and summarize past medical records: yes  Discuss the patient with other providers: yes  Independent visualization of images, tracings, or specimens: yes          Disposition  Final diagnoses:   Angioedema of lips, initial encounter   ZAKIA (acute kidney injury) ([facility] [address] )     Time reflects when diagnosis was documented in both MDM as applicable and the Disposition within this note     Time User Action Codes Description Comment    6/27/2020  5:34 AM ChuckLaura pinto [facility]  3XXA] Angioedema of lips, initial encounter     6/27/2020  5:34 AM Roger Mackey Add [N17 9] ZAKIA (acute kidney injury) [facility])       ED Disposition     ED Disposition Condition Date/Time Comment    Admit Fair Sat Jun 27, 2020  5:56 AM Case was discussed with critical care AP and the patient's admission status was agreed to be Admission Status: inpatient status to the service of Dr Vincenzo Duverney   Follow-up Information    None         Patient's Medications   Discharge Prescriptions    No medications on file     No discharge procedures on file      PDMP Review       Value Time User    PDMP Reviewed  Yes 6/27/2020  4:07 AM Maksim Gerber MD          ED Provider  Electronically Signed by           Maksim Gerber MD  06/27/20 0436

## 2020-06-27 NOTE — H&P
H&P Exam - Critical Care   Anastasiia Mcgill 62 y o  male MRN: [id number]  Unit/Bed#: ICU 04 Encounter: [id number]      -------------------------------------------------------------------------------------------------------------  Chief Complaint: Facial Swelling     History of Present Illness   HX and PE limited by: nothing  Anastasiia Mcgill is a 62 y o  male w/ PMHx hypertension, HLD, and CAD who now presents with likely angioedema  The patient states that he developed R facial swelling last PM that spread across his neck, over the course of the evening he also developed lower lip swelling prompting ED evaluation  Of note, patient had recent admission in December of last year with NSTEMI during which he had an episode of angioedema after ACEi inhibitor therapy  During that admission he was transitioned to imdur, HCTZ and coreg although he states that he has still been taking lisinopril and was not told to stop it  Denies SOB, dysphonia, dysphagia       History obtained from chart review and the patient   -------------------------------------------------------------------------------------------------------------  Assessment and Plan:    Neuro:    Analgesia:PRN tylenol    Sedation: none    Delirium PPX: CAM-ICU, sleep hygiene         CV:     Hx HPTN: continue coreg, HCTZ, imdur as HR allows   o No ACEi, ARB  o Appreciate cards recs  o PRN hydralazine   Hx HLD: statin    Sinus Bradycardia: likely BB induced      Pulm:    Angioedema: on RA, no dysphagia/dysphonia  o c1 esterase inhibitor pending        GI:     NPO sips of clear liquids pending clearance of above   Bowel Reg: PRN colace/senna   GI PPX: not indicated          :     ZAKIA on CKD: baseline Cr appears to be 1 17 to 1 2  o Likely 2/2 poorly controlled hypertension   o Trend, avoid nephrotoxins         F/E/N:    Electrolytes - Monitor/trend - replete based on deficiencies       Heme/Onc:     Hgb stable   DVT PPX: lovenox, SCDs        Endo:  No active issues      ID:    No active issues        MSK/Skin:     OOB to chair as toelrated        Disposition: Admit to Stepdown Level 1  Code Status: Level 1 - Full Code  --------------------------------------------------------------------------------------------------------------  Review of Systems   HENT: Positive for facial swelling  Negative for drooling and trouble swallowing  A 12-point, complete review of systems was reviewed and negative except as stated above     Physical Exam   Constitutional: He is oriented to person, place, and time  He appears well-developed and well-nourished  HENT:   Lower lip edematous, shiny  Full neck   Eyes: Pupils are equal, round, and reactive to light  EOM are normal    Neck: Normal range of motion  Neck supple  No JVD present  No tracheal deviation present  Cardiovascular: Normal rate, regular rhythm and normal heart sounds  Pulmonary/Chest: Effort normal and breath sounds normal    Abdominal: Soft  Bowel sounds are normal  He exhibits no distension  There is no tenderness  Musculoskeletal: Normal range of motion  Neurological: He is alert and oriented to person, place, and time  Skin: Skin is warm and dry  Capillary refill takes less than 2 seconds       --------------------------------------------------------------------------------------------------------------  Historical Information   Past Medical History:   Diagnosis Date    Angioedema     Cardiac disease     GI bleed     High blood pressure     Hypertension     stopped his meds when ran out several years ago    STEMI (ST elevation myocardial infarction) ([facility] [address] )      Past Surgical History:   Procedure Laterality Date    NO PAST SURGERIES      UPPER GASTROINTESTINAL ENDOSCOPY      Clamped large stomach ulcer     Social History   Social History     Substance and Sexual Activity   Alcohol Use Never    Alcohol/week: 0 0 standard drinks    Frequency: Never    Drinks per session: 1 or 2    Binge frequency: Never     Social History     Substance and Sexual Activity   Drug Use No     Social History     Tobacco Use   Smoking Status Never Smoker   Smokeless Tobacco Never Used     Exercise History: active  Family History:   Family History   Adopted: Yes   Family history unknown: Yes     I have reviewed this patient's family history and commented on sigificant items within the HPI    Vitals:   Vitals:    06/27/20 0414 06/27/20 0638 06/27/20 0710   BP: (!) 189/86 (!) 185/89 (!) 187/91   BP Location: Right arm Left arm    Pulse: (!) 47 (!) 46 (!) 48   Resp: 18 18 18   Temp: 97 9 °F (36 6 °C)  97 6 °F (36 4 °C)   TempSrc: Oral  Oral   SpO2: 98% 98% 97%   Weight: 127 kg (280 lb)       Temp  Min: 97 6 °F (36 4 °C)  Max: 97 9 °F (36 6 °C)  IBW: -88 kg     Body mass index is 39 05 kg/m²  N/A    Medications:  Current Facility-Administered Medications   Medication Dose Route Frequency    carvedilol (COREG) tablet 25 mg  25 mg Oral BID With Meals    clopidogrel (PLAVIX) tablet 75 mg  75 mg Oral Daily    enoxaparin (LOVENOX) subcutaneous injection 40 mg  40 mg Subcutaneous Daily    hydrochlorothiazide (HYDRODIURIL) tablet 25 mg  25 mg Oral Daily    isosorbide mononitrate (IMDUR) 24 hr tablet 30 mg  30 mg Oral Daily     Home medications:  Prior to Admission Medications   Prescriptions Last Dose Informant Patient Reported? Taking?    Testosterone (ANDROGEL) 40 5 MG/2 5GM (1 62%) GEL Not Taking at Unknown time Self Yes No   Sig: Place 40 5 mg on the skin daily    acetaminophen (TYLENOL) 325 mg tablet  Self No Yes   Sig: Take 3 tablets (975 mg total) by mouth every 8 (eight) hours   atorvastatin (LIPITOR) 80 mg tablet 6/26/2020 at Unknown time  No Yes   Sig: Take 1 tablet (80 mg total) by mouth daily with dinner   carvedilol (COREG) 25 mg tablet   No Yes   Sig: Take 1 tablet (25 mg total) by mouth 2 (two) times a day with meals   clopidogrel (PLAVIX) 75 mg tablet   No Yes   Sig: Take 1 tablet (75 mg total) by mouth daily   docusate sodium (COLACE) 100 mg capsule Not Taking at Unknown time Self No No   Sig: Take 1 capsule (100 mg total) by mouth 2 (two) times a day   Patient not taking: Reported on 6/27/2020   hydrochlorothiazide (HYDRODIURIL) 25 mg tablet   No Yes   Sig: Take 1 tablet (25 mg total) by mouth daily   isosorbide mononitrate (IMDUR) 30 mg 24 hr tablet   No Yes   Sig: Take 1 tablet (30 mg total) by mouth daily   lidocaine (LIDODERM) 5 %   No No   Sig: Apply 1 patch topically daily for 7 days Apply daily to left shoulder    Remove & Discard patch within 12 hours or as directed by MD   Patient not taking: Reported on 1/10/2020   lisinopril (ZESTRIL) 40 mg tablet 6/26/2020 at Unknown time  Yes Yes   Sig: Take 40 mg by mouth daily   nitroglycerin (NITROSTAT) 0 4 mg SL tablet Not Taking at Unknown time Self No No   Sig: Place 1 tablet (0 4 mg total) under the tongue every 5 (five) minutes as needed for chest pain   Patient not taking: Reported on 6/27/2020      Facility-Administered Medications: None     Allergies:   Allergies   Allergen Reactions    Norvasc [Amlodipine] Angioedema    Lipitor [Atorvastatin] Facial Swelling         Laboratory and Diagnostics:  Results from last 7 days   Lab Units 06/27/20  0442   WBC Thousand/uL 6 30   HEMOGLOBIN g/dL 12 8   HEMATOCRIT % 39 0   PLATELETS Thousands/uL 143*   NEUTROS PCT % 53   MONOS PCT % 12     Results from last 7 days   Lab Units 06/27/20  0442   SODIUM mmol/L 140   POTASSIUM mmol/L 4 1   CHLORIDE mmol/L 107   CO2 mmol/L 26   ANION GAP mmol/L 7   BUN mg/dL 31*   CREATININE mg/dL 1 41*   CALCIUM mg/dL 8 8   GLUCOSE RANDOM mg/dL 104   ALT U/L 30   AST U/L 26   ALK PHOS U/L 100   ALBUMIN g/dL 3 7   TOTAL BILIRUBIN mg/dL 0 48          Results from last 7 days   Lab Units 06/27/20  0442   INR  1 13   PTT seconds 33              ABG:    VBG:          Micro:          EKG: Sinus bradycardia      ------------------------------------------------------------------------------------------------------------  Advance Directive and Living Will:      Power of :    POLST:    ------------------------------------------------------------------------------------------------------------  Anticipated Length of Stay is > 2 midnights    Counseling / Coordination of Care  Total Critical Care time spent 20 minutes excluding procedures, teaching and family updates  Michael Hammond PA-C        Portions of the record may have been created with voice recognition software  Occasional wrong word or "sound a like" substitutions may have occurred due to the inherent limitations of voice recognition software    Read the chart carefully and recognize, using context, where substitutions have occurred

## 2020-06-28 LAB
ANION GAP SERPL CALCULATED.3IONS-SCNC: 9 MMOL/L (ref 4–13)
BASOPHILS # BLD AUTO: 0.01 THOUSANDS/ΜL (ref 0–0.1)
BASOPHILS NFR BLD AUTO: 0 % (ref 0–1)
BUN SERPL-MCNC: 30 MG/DL (ref 5–25)
CALCIUM SERPL-MCNC: 9.4 MG/DL (ref 8.3–10.1)
CHLORIDE SERPL-SCNC: 101 MMOL/L (ref 100–108)
CO2 SERPL-SCNC: 26 MMOL/L (ref 21–32)
CREAT SERPL-MCNC: 1.24 MG/DL (ref 0.6–1.3)
EOSINOPHIL # BLD AUTO: 0 THOUSAND/ΜL (ref 0–0.61)
EOSINOPHIL NFR BLD AUTO: 0 % (ref 0–6)
ERYTHROCYTE [DISTWIDTH] IN BLOOD BY AUTOMATED COUNT: 12.6 % (ref 11.6–15.1)
GFR SERPL CREATININE-BSD FRML MDRD: 75 ML/MIN/1.73SQ M
GLUCOSE SERPL-MCNC: 132 MG/DL (ref 65–140)
HCT VFR BLD AUTO: 40.4 % (ref 36.5–49.3)
HGB BLD-MCNC: 13.8 G/DL (ref 12–17)
IMM GRANULOCYTES # BLD AUTO: 0.03 THOUSAND/UL (ref 0–0.2)
IMM GRANULOCYTES NFR BLD AUTO: 0 % (ref 0–2)
LYMPHOCYTES # BLD AUTO: 0.97 THOUSANDS/ΜL (ref 0.6–4.47)
LYMPHOCYTES NFR BLD AUTO: 12 % (ref 14–44)
MAGNESIUM SERPL-MCNC: 1.7 MG/DL (ref 1.6–2.6)
MCH RBC QN AUTO: 30.9 PG (ref 26.8–34.3)
MCHC RBC AUTO-ENTMCNC: 34.2 G/DL (ref 31.4–37.4)
MCV RBC AUTO: 90 FL (ref 82–98)
MONOCYTES # BLD AUTO: 0.56 THOUSAND/ΜL (ref 0.17–1.22)
MONOCYTES NFR BLD AUTO: 7 % (ref 4–12)
NEUTROPHILS # BLD AUTO: 6.83 THOUSANDS/ΜL (ref 1.85–7.62)
NEUTS SEG NFR BLD AUTO: 81 % (ref 43–75)
NRBC BLD AUTO-RTO: 0 /100 WBCS
PHOSPHATE SERPL-MCNC: 3.8 MG/DL (ref 2.7–4.5)
PLATELET # BLD AUTO: 175 THOUSANDS/UL (ref 149–390)
PMV BLD AUTO: 11.9 FL (ref 8.9–12.7)
POTASSIUM SERPL-SCNC: 4.1 MMOL/L (ref 3.5–5.3)
RBC # BLD AUTO: 4.47 MILLION/UL (ref 3.88–5.62)
SODIUM SERPL-SCNC: 136 MMOL/L (ref 136–145)
WBC # BLD AUTO: 8.4 THOUSAND/UL (ref 4.31–10.16)

## 2020-06-28 PROCEDURE — 80048 BASIC METABOLIC PNL TOTAL CA: CPT | Performed by: NURSE PRACTITIONER

## 2020-06-28 PROCEDURE — 84100 ASSAY OF PHOSPHORUS: CPT | Performed by: NURSE PRACTITIONER

## 2020-06-28 PROCEDURE — 85025 COMPLETE CBC W/AUTO DIFF WBC: CPT | Performed by: NURSE PRACTITIONER

## 2020-06-28 PROCEDURE — 99232 SBSQ HOSP IP/OBS MODERATE 35: CPT | Performed by: INTERNAL MEDICINE

## 2020-06-28 PROCEDURE — 83735 ASSAY OF MAGNESIUM: CPT | Performed by: NURSE PRACTITIONER

## 2020-06-28 RX ORDER — ISOSORBIDE MONONITRATE 60 MG/1
60 TABLET, EXTENDED RELEASE ORAL DAILY
Status: DISCONTINUED | OUTPATIENT
Start: 2020-06-28 | End: 2020-06-29 | Stop reason: HOSPADM

## 2020-06-28 RX ORDER — HYDROCHLOROTHIAZIDE 25 MG/1
50 TABLET ORAL DAILY
Status: DISCONTINUED | OUTPATIENT
Start: 2020-06-28 | End: 2020-06-29 | Stop reason: HOSPADM

## 2020-06-28 RX ORDER — MAGNESIUM SULFATE HEPTAHYDRATE 40 MG/ML
4 INJECTION, SOLUTION INTRAVENOUS ONCE
Status: COMPLETED | OUTPATIENT
Start: 2020-06-28 | End: 2020-06-28

## 2020-06-28 RX ADMIN — ISOSORBIDE MONONITRATE 60 MG: 30 TABLET, EXTENDED RELEASE ORAL at 08:41

## 2020-06-28 RX ADMIN — MAGNESIUM SULFATE IN WATER 4 G: 40 INJECTION, SOLUTION INTRAVENOUS at 06:37

## 2020-06-28 RX ADMIN — CARVEDILOL 25 MG: 12.5 TABLET, FILM COATED ORAL at 08:41

## 2020-06-28 RX ADMIN — HYDROCHLOROTHIAZIDE 50 MG: 25 TABLET ORAL at 08:41

## 2020-06-28 RX ADMIN — CARVEDILOL 25 MG: 12.5 TABLET, FILM COATED ORAL at 16:49

## 2020-06-28 RX ADMIN — CLOPIDOGREL BISULFATE 75 MG: 75 TABLET ORAL at 08:41

## 2020-06-28 RX ADMIN — ENOXAPARIN SODIUM 40 MG: 40 INJECTION SUBCUTANEOUS at 08:41

## 2020-06-28 NOTE — PROGRESS NOTES
ICU transfer note/Progress Note - Norma Jackson 1965, 62 y o  male MRN: [id number]    Unit/Bed#: ICU 04 Encounter: [id number]    Primary Care Provider: MAX Osuna   Date and time admitted to hospital: 6/27/2020  4:11 AM         * Angioedema  Assessment & Plan  Patient is a 63 yo m w/pmhx of HTN, HLD, and CAD who originally presented on 6/27 due to angioedema likely secondary to medication use (lisinopril)  Symptoms resolving  Plan  · Follow up C1 esterase  · Continue to monitor for s/sx recurrence    HTN (hypertension)  Assessment & Plan  Blood pressures continue to be elevated 160-180s/80-90s  Patient asymptomatic denies headaches, visual changes, or chest pain  Plan  · Will continue to monitor blood pressures,and increase home regimen doses for optimal control   · Continue carvedilol 25mg ( home dose)  · Imdur increased to 60mg ( home dose 30mg)  · HCTZ increased to 50 mg ( home dose 25mg)  · Discontinue home lisinopril  · Avoid ACEI/ARB  · PRN hydralazine    Coronary artery disease involving native heart without angina pectoris  Assessment & Plan  · Continue ASA and plavix  · Hold statin    Hyperlipidemia  Assessment & Plan  · Patient reports facial swelling with stain therapy  · Continue to hold for now  · Consider starting alternate therapy, follow up with PCP    ZAKIA (acute kidney injury) ([facility] )  Assessment & Plan  · POA, Cr 1 41  · Now improving, at baseline (1 1-1 2), this am 1 24      Code Status: Level 1 - Full Code  POA:    POLST:      Reason for ICU admission:   Angioedema    Active problems:   Principal Problem:    Angioedema  Active Problems:    HTN (hypertension)    ZAKIA (acute kidney injury) ([facility] )    Hyperlipidemia    Coronary artery disease involving native heart without angina pectoris  Resolved Problems:    * No resolved hospital problems  *      Consultants:   None    History of Present Illness:    Norma Jackson is a 63 yo male with pmhx of CAD, HTN and HLD who originally presented on 06/27 with angioedema secondary likely to lisinopril therapy  The patient reported he had developed R facial swelling, the evening prior which spread to this neck and worsened overnight  The patient reported that he awoke in the am and had also developed lower lip swelling which prompted him to seek ED evaluation  Importantly, the patient had a recent admission in Dec 2019 at [facility] during which he was found to have an NSTEMI, started on ACEI therapy and also developed angioedema at that time  Patient reports, that during that admission his lisinopril was discontinued but believed it was okay to resume after discharge  Patient states he was taking lisinopril, imdur, HCTZ and coreg at home  Patient denied SOB, dysphonia, or dysphagia on presentation  Summary of clinical course:   Upon evaluation in the ED, patient was found to be afebrile, bradycardic 47, hypertensive to 189/86, and satting 98% on RA  Patient was treated with benadryl, methylprednisolone, and pepcid and transferred to the ICU for further management and observation  Overnight, patient was noted to have elevated blood pressures 160-180s/80-90s thus his imdur and HCTZ medications were increased  Discussed with patient that lisinopril and Lipitor will be discontinued at this time  Patient to follow up with PCP and cardiologist regarding lipid lowering therapy      Recent or scheduled procedures:   CXR- No acute cardiopulmonary disease    Outstanding/pending diagnostics:   C1 esterase     Cultures:   None       Mobilization Plan:   OOB to chair    Nutrition Plan:   Cardiac Diet    Invasive Devices Review  Invasive Devices     Peripheral Intravenous Line            Peripheral IV 06/28/20 Right;Ventral (anterior) Forearm less than 1 day                Rationale for remaining devices: N/A    VTE Pharmacologic Prophylaxis: Enoxaparin (Lovenox)  VTE Mechanical Prophylaxis: sequential compression device    Discharge Plan: Patient should be ready for discharge to [address] [place] [address] on 6/28/20 after 12pm    Initial Physical Therapy Recommendations: N/A  Initial Occupational Therapy Recommendations: N/A  Initial /Plan: N/A    Home medications that are not reordered and reason why:   Lisinopril-Angioedema  Lipitor-Facial swelling, not full angioedema      Spoke with Dr Denver Chin regarding transfer  Please call with any questions or concerns  Portions of the record may have been created with voice recognition software  Occasional wrong word or "sound a like" substitutions may have occurred due to the inherent limitations of voice recognition software  Read the chart carefully and recognize, using context, where substitutions have occurred

## 2020-06-28 NOTE — ASSESSMENT & PLAN NOTE
· Patient reports facial swelling with stain therapy  · Continue to hold for now  · Consider starting alternate therapy, follow up with PCP

## 2020-06-28 NOTE — ASSESSMENT & PLAN NOTE
Patient is a 63 yo m w/pmhx of HTN, HLD, and CAD who originally presented on 6/27 due to angioedema likely secondary to medication use (lisinopril)  Symptoms resolving      Plan  · Follow up C1 esterase  · Continue to monitor for s/sx recurrence

## 2020-06-28 NOTE — PROGRESS NOTES
Daily Progress Note - Critical Care   Monty Stagecorine 62 y o  male MRN: [id number]  Unit/Bed#: ICU 04 Encounter: [id number]        ----------------------------------------------------------------------------------------  HPI/24hr events:     Patient is a 63 yo m w/pmhx of HTN, HLD, and CAD who originally presented on 6/27 due to likely angioedema secondary to medication use (lisinopril)  No overnight events    ---------------------------------------------------------------------------------------  SUBJECTIVE  Feeling well this morning  States he has a residual tinginling sensation in his mouth and swelling of this left cheek  Review of Systems   Constitutional: Negative for chills and fatigue  HENT: Positive for facial swelling  Negative for trouble swallowing  Eyes: Negative for visual disturbance  Respiratory: Negative for chest tightness and shortness of breath  Cardiovascular: Negative for chest pain and palpitations  Gastrointestinal: Negative for nausea and vomiting  Skin: Negative for rash  Neurological: Negative for dizziness and headaches       Review of systems was reviewed and negative unless stated above in HPI/24-hour events   ---------------------------------------------------------------------------------------  Assessment and Plan:    Neuro:    Analgesia: PRN tylenol  o CAM-ICU daily      CV:    Diagnosis: Hx HTN, CAD  o Plan: Continue Carvedilol, Hctz, Isosorbide mononitrate  o Avoid ACEI and ARB  o PRN hydralazine  o Sinus bradycardia      Pulm:   Diagnosis: Angioedema  o Plan: C1 esterase inhibitor      GI:     Cardiovascular diet   Continue PRN colace/senna      :    Diagnosis: ZAKIA on CKD  o Plan: Continue to monitor renal indices  o Baseline Cr appears to be 1 1-1 2  o Monitor I/O, UOP      F/E/N:    Plan: Replete lytes as needed      Heme/Onc:    Diagnosis: No active issues  o Plan: Continue DVT ppx w/ lovenox, SCDs      Endo:    Diagnosis: HLD   Patient reports facial swelling with statin therapy, will continue to hold      ID:    Diagnosis: No active issues      MSK/Skin:    Diagnosis: OOB to chair      Disposition: Transfer to Med-Surg   Code Status: Level 1 - Full Code  ---------------------------------------------------------------------------------------  ICU CORE MEASURES    Prophylaxis   VTE Pharmacologic Prophylaxis: Enoxaparin (Lovenox)  VTE Mechanical Prophylaxis: sequential compression device  Stress Ulcer Prophylaxis: Prophylaxis Not Indicated     ABCDE Protocol (if indicated)  Plan to perform spontaneous awakening trial today? Not applicable  Plan to perform spontaneous breathing trial today? Not applicable  Obvious barriers to extubation? Not applicable  CAM-ICU: Negative    Invasive Devices Review  Invasive Devices     Peripheral Intravenous Line            Peripheral IV 20 Right;Ventral (anterior) Forearm less than 1 day              Can any invasive devices be discontinued today? Not applicable  ---------------------------------------------------------------------------------------  OBJECTIVE    Vitals   Vitals:    20 1700 20 1810 20 1841 20   BP: 162/74 (!) 184/87  160/84   BP Location:    Right arm   Pulse: (!) 54 (!) 53 (!) 53    Resp: 16 18 18    Temp:   97 9 °F (36 6 °C)    TempSrc:   Oral    SpO2: 99% 98% 98%    Weight:         Temp (24hrs), Av 7 °F (36 5 °C), Min:97 5 °F (36 4 °C), Max:97 9 °F (36 6 °C)  Current: Temperature: 97 9 °F (36 6 °C)  HR: 53  BP: 160/84  RR: 18  SpO2: 98% on RA    Respiratory:  SpO2 Activity: SpO2 Activity: At Rest       Invasive/non-invasive ventilation settings   Respiratory    Lab Data (Last 4 hours)    None         O2/Vent Data (Last 4 hours)    None                Physical Exam   Constitutional: He is oriented to person, place, and time  He appears well-developed and well-nourished  HENT:   Head: Normocephalic and atraumatic     Mouth/Throat: Oropharynx is clear and moist    Labial swelling    Eyes: Conjunctivae are normal    Neck: Neck supple  Cardiovascular: Normal rate, regular rhythm and normal heart sounds  No murmur heard  Pulmonary/Chest: Effort normal and breath sounds normal  He has no wheezes  Abdominal: Soft  Bowel sounds are normal  He exhibits no distension  Lymphadenopathy:     He has no cervical adenopathy  Neurological: He is alert and oriented to person, place, and time  Skin: Skin is warm  Psychiatric: He has a normal mood and affect  Vitals reviewed  Laboratory and Diagnostics:  Results from last 7 days   Lab Units 06/28/20 0457 06/27/20 0442   WBC Thousand/uL 8 40 6 30   HEMOGLOBIN g/dL 13 8 12 8   HEMATOCRIT % 40 4 39 0   PLATELETS Thousands/uL 175 143*   NEUTROS PCT % 81* 53   MONOS PCT % 7 12     Results from last 7 days   Lab Units 06/28/20 0457 06/27/20  0442   SODIUM mmol/L 136 140   POTASSIUM mmol/L 4 1 4 1   CHLORIDE mmol/L 101 107   CO2 mmol/L 26 26   ANION GAP mmol/L 9 7   BUN mg/dL 30* 31*   CREATININE mg/dL 1 24 1 41*   CALCIUM mg/dL 9 4 8 8   GLUCOSE RANDOM mg/dL 132 104   ALT U/L  --  30   AST U/L  --  26   ALK PHOS U/L  --  100   ALBUMIN g/dL  --  3 7   TOTAL BILIRUBIN mg/dL  --  0 48     Results from last 7 days   Lab Units 06/28/20  0457   MAGNESIUM mg/dL 1 7   PHOSPHORUS mg/dL 3 8      Results from last 7 days   Lab Units 06/27/20  0442   INR  1 13   PTT seconds 33              ABG:    VBG:          Micro        EKG: telemetry  Imaging:  No new imaging    Intake and Output  I/O       06/26 0701 - 06/27 0700 06/27 0701 - 06/28 0700    Urine (mL/kg/hr)  2300 (0 8)    Total Output  2300    Net  -2300              Height and Weights      IBW: -88 kg  Body mass index is 39 05 kg/m²    Weight (last 2 days)     Date/Time   Weight    06/27/20 0414   127 (280)                Nutrition       Diet Orders   (From admission, onward)             Start     Ordered    06/27/20 1326  Diet Cardiovascular; Cardiac  Diet effective now Question Answer Comment   Diet Type Cardiovascular    Cardiac Cardiac    RD to adjust diet per protocol? Yes        06/27/20 1325    06/27/20 0752  Room Service  Once     Question:  Type of Service  Answer:  Room Service - Appropriate with Assistance    06/27/20 0751                  Active Medications  Scheduled Meds:  Current Facility-Administered Medications:  acetaminophen 650 mg Oral Q6H PRN Patrick Izquierdo PA-C   carvedilol 25 mg Oral BID With Meals Merlin Mac, CRNP   clopidogrel 75 mg Oral Daily Merlin Mac, CRNP   docusate sodium 100 mg Oral BID PRN Jay Rosales PA-C   enoxaparin 40 mg Subcutaneous Daily Merlin Mac, CRDESIRE   hydrALAZINE 10 mg Intravenous Q6H PRN Patrick Izquierdo PA-C   hydrochlorothiazide 25 mg Oral Daily Merlin Mac, CRDESIRE   isosorbide mononitrate 30 mg Oral Daily Merlin Mac, CRNP   magnesium sulfate 4 g Intravenous Once , CRNP   ondansetron 4 mg Intravenous Q6H PRN Jay Rosales PA-C   senna 1 tablet Oral HS PRN Jay Rosales PA-C     Continuous Infusions:     PRN Meds:     acetaminophen 650 mg Q6H PRN   docusate sodium 100 mg BID PRN   hydrALAZINE 10 mg Q6H PRN   ondansetron 4 mg Q6H PRN   senna 1 tablet HS PRN       Allergies   Allergies   Allergen Reactions    Lisinopril Angioedema    Norvasc [Amlodipine] Angioedema    Lipitor [Atorvastatin] Facial Swelling     ---------------------------------------------------------------------------------------  Advance Directive and Living Will:      Power of :    POLST:    ---------------------------------------------------------------------------------------  Care Time Delivered:   Upon my evaluation, this patient had a high probability of imminent or life-threatening deterioration due to Angioedema, which required my direct attention, intervention, and personal management    I have personally provided 30 minutes (9 to 9:30) of critical care time, exclusive of procedures, teaching, family meetings, and any prior time recorded by providers other than myself  Melissa Odell MD      Portions of the record may have been created with voice recognition software  Occasional wrong word or "sound a like" substitutions may have occurred due to the inherent limitations of voice recognition software    Read the chart carefully and recognize, using context, where substitutions have occurred

## 2020-06-28 NOTE — UTILIZATION REVIEW
Initial Clinical Review    Admission: Date/Time/Statement: Admission Orders (From admission, onward)     Ordered        06/27/20 0556  Inpatient Admission  Once                   Orders Placed This Encounter   Procedures    Inpatient Admission     Standing Status:   Standing     Number of Occurrences:   1     Order Specific Question:   Admitting Physician     Answer:   Brendon Weiner     Order Specific Question:   Level of Care     Answer:   Level 1 Stepdown [13]     Order Specific Question:   Estimated length of stay     Answer:   More than 2 Midnights     Order Specific Question:   Certification     Answer:   I certify that inpatient services are medically necessary for this patient for a duration of greater than two midnights  See H&P and MD Progress Notes for additional information about the patient's course of treatment  ED Arrival Information     Expected Arrival Acuity Means of Arrival Escorted By Service Admission Type    - 6/27/2020 04:03 Urgent Walk-In Self Hospitalist Urgent    Arrival Complaint    Facial swelling         Chief Complaint   Patient presents with    Facial Swelling     Pt felt minor lip swelling last evening and woke up today with major lip and facial swelling down to left neck  Denies sob, hoarseness, no tongue swelling and no problems swallowing  Nausea x 1 week  Assessment/Plan: 62year old male, presented to the ED @ [facility], from home via walk in  Admitted as Inpatient due to Angioedema  The patient states that he developed R facial swelling last PM that spread across his neck, over the course of the evening he also developed lower lip swelling prompting ED evaluation  Of note, patient had recent admission in December of last year with NSTEMI during which he had an episode of angioedema after ACEi inhibitor therapy   During that admission he was transitioned to imdur, HCTZ and coreg although he states that he has still been taking lisinopril and was not told to stop it  Denies SOB, dysphonia, dysphagia      2020 Angioedema secondary to medication use (lisinopril)  Follow up C1 esterase  Continue to monitor for S/SX recurrence  DC lisinopril, Avoid ACEi/ARB  Continue ASA / plavix  Hold Statin  ED Triage Vitals [20 0414]   Temperature Pulse Respirations Blood Pressure SpO2   97 9 °F (36 6 °C) (!) 47 18 (!) 189/86 98 %      Temp Source Heart Rate Source Patient Position - Orthostatic VS BP Location FiO2 (%)   Oral Monitor Lying Right arm --      Pain Score       No Pain        Wt Readings from Last 1 Encounters:   20 129 kg (284 lb 6 3 oz)     Additional Vital Signs:   Date/Time  Temp  Pulse  Resp  BP  MAP (mmHg)  SpO2  O2 Device  Patient Position - Orthostatic VS   20 1529  97 6 °F (36 4 °C)  49Abnormal   16  140/85  107  100 %       20 0654  97 7 °F (36 5 °C)  46Abnormal   15  166/79  109  99 %  None (Room air)  Lying   20 2000        160/84        Lying   20 1841  97 9 °F (36 6 °C)  53Abnormal   18      98 %  None (Room air)  Lying   20 1810    53Abnormal   18  184/87Abnormal    125  98 %       BP: coreg given at 20 1810   20 1700    54Abnormal   16  162/74  107  99 %       20 1500  97 5 °F (36 4 °C)  59  19  171/79Abnormal   114  100 %  None (Room air)  Lying   20 1030    53Abnormal   25Abnormal   170/81  116  97 %       20 0915    51Abnormal   19  180/87Abnormal   125  98 %       20 0710  97 6 °F (36 4 °C)  48Abnormal   18  187/91Abnormal   128  97 %  None (Room air)     20 0638    46Abnormal   18  185/89Abnormal   128  98 %  None (Room air)  Lying     2020 @ 0716  Chest X:  No acute cardiopulmonary disease    2020 @ 0450  EC S  Bradycardia    Pertinent Labs/Diagnostic Test Results:     Results from last 7 days   Lab Units 20  0457 20  0442   WBC Thousand/uL 8 40 6 30   HEMOGLOBIN g/dL 13 8 12 8   HEMATOCRIT % 40 4 39 0   PLATELETS Thousands/uL 175 143*   NEUTROS ABS Thousands/µL 6 83 3 37     Results from last 7 days   Lab Units 06/28/20  0457 06/27/20  0442   SODIUM mmol/L 136 140   POTASSIUM mmol/L 4 1 4 1   CHLORIDE mmol/L 101 107   CO2 mmol/L 26 26   ANION GAP mmol/L 9 7   BUN mg/dL 30* 31*   CREATININE mg/dL 1 24 1 41*   EGFR ml/min/1 73sq m 75 64   CALCIUM mg/dL 9 4 8 8   MAGNESIUM mg/dL 1 7  --    PHOSPHORUS mg/dL 3 8  --      Results from last 7 days   Lab Units 06/27/20  0442   AST U/L 26   ALT U/L 30   ALK PHOS U/L 100   TOTAL PROTEIN g/dL 7 6   ALBUMIN g/dL 3 7   TOTAL BILIRUBIN mg/dL 0 48     Results from last 7 days   Lab Units 06/28/20  0457 06/27/20  0442   GLUCOSE RANDOM mg/dL 132 104     Results from last 7 days   Lab Units 06/27/20  0442   PROTIME seconds 13 9   INR  1 13   PTT seconds 33     ED Treatment:   Medication Administration from 06/27/2020 0403 to 06/27/2020 5372       Date/Time Order Dose Route Action     06/27/2020 0448 diphenhydrAMINE (BENADRYL) injection 50 mg 50 mg Intravenous Given     06/27/2020 0444 methylPREDNISolone sodium succinate (Solu-MEDROL) injection 125 mg 125 mg Intravenous Given     06/27/2020 0450 famotidine (PEPCID) injection 20 mg 20 mg Intravenous Given        Past Medical History:   Diagnosis Date    Angioedema     Cardiac disease     GI bleed     High blood pressure     Hypertension     stopped his meds when ran out several years ago    STEMI (ST elevation myocardial infarction) ([facility] )      Present on Admission:   HTN (hypertension)   Hyperlipidemia   Coronary artery disease involving native heart without angina pectoris   ZAKIA (acute kidney injury) ([facility] )    Admitting Diagnosis: Facial swelling [R22 0]  ZAKIA (acute kidney injury) ([facility] ) [N17 9]  Angioedema of lips, initial encounter [T78  3XXA]  Age/Sex: 62 y o  male  Admission Orders:  Scheduled Medications:  Medications:  carvedilol 25 mg Oral BID With Meals   clopidogrel 75 mg Oral Daily   enoxaparin 40 mg Subcutaneous Daily   hydrochlorothiazide 50 mg Oral Daily   isosorbide mononitrate 60 mg Oral Daily   Continuous IV Infusions:   PRN Meds:  acetaminophen 650 mg Oral Q6H PRN   docusate sodium 100 mg Oral BID PRN   hydrALAZINE 10 mg Intravenous Q6H PRN   ondansetron 4 mg Intravenous Q6H PRN   senna 1 tablet Oral HS PRN     Jovanny SCDs      Network Utilization Review Department  [email or web address]  ATTENTION: Please call with any questions or concerns to [phone] and carefully listen to the prompts so that you are directed to the right person  All voicemails are confidential   Summer Schofield all requests for admission clinical reviews, approved or denied determinations and any other requests to dedicated fax number below belonging to the campus where the patient is receiving treatment   List of dedicated fax numbers for the Facilities:  [address] DENIALS (Administrative/Medical Necessity) [phone]   [address] (Maternity/NICU/Pediatrics) [phone]   Lacy Ang [phone]   Emy Pleasant [phone]   [address] [place] [address] [phone]   145 [facility]  [phone]   1205 [facility] [address] [place] [address] [phone]   [facility]  [phone]   [address] [facility] [address]  2401 [facility] [address] [facility] [phone]

## 2020-06-28 NOTE — ASSESSMENT & PLAN NOTE
Blood pressures continue to be elevated 160-180s/80-90s  Patient asymptomatic denies headaches, visual changes, or chest pain      Plan  · Will continue to monitor blood pressures,and increase home regimen doses for optimal control   · Continue carvedilol 25mg ( home dose)  · Imdur increased to 60mg ( home dose 30mg)  · HCTZ increased to 50 mg ( home dose 25mg)  · Discontinue home lisinopril  · Avoid ACEI/ARB  · PRN hydralazine

## 2020-06-29 ENCOUNTER — APPOINTMENT (INPATIENT)
Dept: ULTRASOUND IMAGING | Facility: HOSPITAL | Age: 55
DRG: 300 | End: 2020-06-29
Payer: COMMERCIAL

## 2020-06-29 ENCOUNTER — HOSPITAL ENCOUNTER (INPATIENT)
Facility: HOSPITAL | Age: 55
LOS: 2 days | Discharge: HOME | DRG: 300 | End: 2020-07-01
Attending: EMERGENCY MEDICINE | Admitting: INTERNAL MEDICINE
Payer: COMMERCIAL

## 2020-06-29 VITALS
BODY MASS INDEX: 39.76 KG/M2 | SYSTOLIC BLOOD PRESSURE: 147 MMHG | DIASTOLIC BLOOD PRESSURE: 79 MMHG | HEIGHT: 71 IN | OXYGEN SATURATION: 98 % | RESPIRATION RATE: 18 BRPM | TEMPERATURE: 98.2 F | WEIGHT: 284 LBS | HEART RATE: 50 BPM

## 2020-06-29 DIAGNOSIS — L03.115 CELLULITIS OF RIGHT LEG: ICD-10-CM

## 2020-06-29 DIAGNOSIS — I82.441 ACUTE DEEP VEIN THROMBOSIS (DVT) OF TIBIAL VEIN OF RIGHT LOWER EXTREMITY (HCC): ICD-10-CM

## 2020-06-29 DIAGNOSIS — A41.9 SEPSIS, DUE TO UNSPECIFIED ORGANISM, UNSPECIFIED WHETHER ACUTE ORGAN DYSFUNCTION PRESENT (HCC): Primary | ICD-10-CM

## 2020-06-29 PROBLEM — R65.20 SEVERE SEPSIS WITH ACUTE ORGAN DYSFUNCTION (HCC): Status: ACTIVE | Noted: 2020-06-29

## 2020-06-29 PROBLEM — I10 ESSENTIAL HYPERTENSION: Chronic | Status: ACTIVE | Noted: 2019-11-06

## 2020-06-29 LAB
ALBUMIN SERPL BCP-MCNC: 4.2 G/DL (ref 3.5–5)
ALP SERPL-CCNC: 100 U/L (ref 46–116)
ALT SERPL W P-5'-P-CCNC: 26 U/L (ref 12–78)
ANION GAP SERPL CALCULATED.3IONS-SCNC: 4 MMOL/L (ref 4–13)
ANION GAP SERPL CALCULATED.3IONS-SCNC: 7 MMOL/L (ref 4–13)
APTT PPP: 31 SECONDS (ref 23–37)
APTT PPP: 32 SECONDS (ref 23–37)
AST SERPL W P-5'-P-CCNC: 17 U/L (ref 5–45)
BASOPHILS # BLD AUTO: 0.06 THOUSANDS/ΜL (ref 0–0.1)
BASOPHILS NFR BLD AUTO: 0 % (ref 0–1)
BILIRUB SERPL-MCNC: 0.78 MG/DL (ref 0.2–1)
BUN SERPL-MCNC: 26 MG/DL (ref 5–25)
BUN SERPL-MCNC: 27 MG/DL (ref 5–25)
C1INH SERPL-MCNC: 30 MG/DL (ref 21–39)
CALCIUM SERPL-MCNC: 9.4 MG/DL (ref 8.3–10.1)
CALCIUM SERPL-MCNC: 9.5 MG/DL (ref 8.3–10.1)
CHLORIDE SERPL-SCNC: 100 MMOL/L (ref 100–108)
CHLORIDE SERPL-SCNC: 101 MMOL/L (ref 100–108)
CO2 SERPL-SCNC: 30 MMOL/L (ref 21–32)
CO2 SERPL-SCNC: 30 MMOL/L (ref 21–32)
CREAT SERPL-MCNC: 1.37 MG/DL (ref 0.6–1.3)
CREAT SERPL-MCNC: 1.53 MG/DL (ref 0.6–1.3)
EOSINOPHIL # BLD AUTO: 0.03 THOUSAND/ΜL (ref 0–0.61)
EOSINOPHIL NFR BLD AUTO: 0 % (ref 0–6)
ERYTHROCYTE [DISTWIDTH] IN BLOOD BY AUTOMATED COUNT: 12.7 % (ref 11.6–15.1)
ERYTHROCYTE [DISTWIDTH] IN BLOOD BY AUTOMATED COUNT: 12.9 % (ref 11.6–15.1)
GFR SERPL CREATININE-BSD FRML MDRD: 58 ML/MIN/1.73SQ M
GFR SERPL CREATININE-BSD FRML MDRD: 66 ML/MIN/1.73SQ M
GLUCOSE SERPL-MCNC: 101 MG/DL (ref 65–140)
GLUCOSE SERPL-MCNC: 106 MG/DL (ref 65–140)
HCT VFR BLD AUTO: 42.5 % (ref 36.5–49.3)
HCT VFR BLD AUTO: 43.5 % (ref 36.5–49.3)
HGB BLD-MCNC: 14 G/DL (ref 12–17)
HGB BLD-MCNC: 14.6 G/DL (ref 12–17)
IMM GRANULOCYTES # BLD AUTO: 0.06 THOUSAND/UL (ref 0–0.2)
IMM GRANULOCYTES NFR BLD AUTO: 0 % (ref 0–2)
INR PPP: 1.09 (ref 0.84–1.19)
LACTATE SERPL-SCNC: 1.7 MMOL/L (ref 0.5–2)
LYMPHOCYTES # BLD AUTO: 1.13 THOUSANDS/ΜL (ref 0.6–4.47)
LYMPHOCYTES NFR BLD AUTO: 7 % (ref 14–44)
MAGNESIUM SERPL-MCNC: 2 MG/DL (ref 1.6–2.6)
MCH RBC QN AUTO: 29.9 PG (ref 26.8–34.3)
MCH RBC QN AUTO: 30.6 PG (ref 26.8–34.3)
MCHC RBC AUTO-ENTMCNC: 32.9 G/DL (ref 31.4–37.4)
MCHC RBC AUTO-ENTMCNC: 33.6 G/DL (ref 31.4–37.4)
MCV RBC AUTO: 91 FL (ref 82–98)
MCV RBC AUTO: 91 FL (ref 82–98)
MONOCYTES # BLD AUTO: 1.24 THOUSAND/ΜL (ref 0.17–1.22)
MONOCYTES NFR BLD AUTO: 8 % (ref 4–12)
NEUTROPHILS # BLD AUTO: 13.46 THOUSANDS/ΜL (ref 1.85–7.62)
NEUTS SEG NFR BLD AUTO: 85 % (ref 43–75)
NRBC BLD AUTO-RTO: 0 /100 WBCS
PHOSPHATE SERPL-MCNC: 3.3 MG/DL (ref 2.7–4.5)
PLATELET # BLD AUTO: 154 THOUSANDS/UL (ref 149–390)
PLATELET # BLD AUTO: 173 THOUSANDS/UL (ref 149–390)
PMV BLD AUTO: 11.6 FL (ref 8.9–12.7)
PMV BLD AUTO: 11.8 FL (ref 8.9–12.7)
POTASSIUM SERPL-SCNC: 3.6 MMOL/L (ref 3.5–5.3)
POTASSIUM SERPL-SCNC: 4 MMOL/L (ref 3.5–5.3)
PROT SERPL-MCNC: 8.7 G/DL (ref 6.4–8.2)
PROTHROMBIN TIME: 13.5 SECONDS (ref 11.6–14.5)
RBC # BLD AUTO: 4.68 MILLION/UL (ref 3.88–5.62)
RBC # BLD AUTO: 4.77 MILLION/UL (ref 3.88–5.62)
SODIUM SERPL-SCNC: 135 MMOL/L (ref 136–145)
SODIUM SERPL-SCNC: 137 MMOL/L (ref 136–145)
WBC # BLD AUTO: 15.98 THOUSAND/UL (ref 4.31–10.16)
WBC # BLD AUTO: 8.35 THOUSAND/UL (ref 4.31–10.16)

## 2020-06-29 PROCEDURE — 81240 F2 GENE: CPT | Performed by: PHYSICIAN ASSISTANT

## 2020-06-29 PROCEDURE — 86146 BETA-2 GLYCOPROTEIN ANTIBODY: CPT | Performed by: PHYSICIAN ASSISTANT

## 2020-06-29 PROCEDURE — 83735 ASSAY OF MAGNESIUM: CPT | Performed by: FAMILY MEDICINE

## 2020-06-29 PROCEDURE — 85670 THROMBIN TIME PLASMA: CPT | Performed by: PHYSICIAN ASSISTANT

## 2020-06-29 PROCEDURE — 80053 COMPREHEN METABOLIC PANEL: CPT | Performed by: EMERGENCY MEDICINE

## 2020-06-29 PROCEDURE — 96365 THER/PROPH/DIAG IV INF INIT: CPT

## 2020-06-29 PROCEDURE — 99239 HOSP IP/OBS DSCHRG MGMT >30: CPT | Performed by: INTERNAL MEDICINE

## 2020-06-29 PROCEDURE — 86147 CARDIOLIPIN ANTIBODY EA IG: CPT | Performed by: PHYSICIAN ASSISTANT

## 2020-06-29 PROCEDURE — 85027 COMPLETE CBC AUTOMATED: CPT | Performed by: FAMILY MEDICINE

## 2020-06-29 PROCEDURE — 85306 CLOT INHIBIT PROT S FREE: CPT | Performed by: PHYSICIAN ASSISTANT

## 2020-06-29 PROCEDURE — 80048 BASIC METABOLIC PNL TOTAL CA: CPT | Performed by: FAMILY MEDICINE

## 2020-06-29 PROCEDURE — 81241 F5 GENE: CPT | Performed by: PHYSICIAN ASSISTANT

## 2020-06-29 PROCEDURE — 36415 COLL VENOUS BLD VENIPUNCTURE: CPT | Performed by: EMERGENCY MEDICINE

## 2020-06-29 PROCEDURE — 99285 EMERGENCY DEPT VISIT HI MDM: CPT

## 2020-06-29 PROCEDURE — 93970 EXTREMITY STUDY: CPT

## 2020-06-29 PROCEDURE — 85305 CLOT INHIBIT PROT S TOTAL: CPT | Performed by: PHYSICIAN ASSISTANT

## 2020-06-29 PROCEDURE — 85300 ANTITHROMBIN III ACTIVITY: CPT | Performed by: PHYSICIAN ASSISTANT

## 2020-06-29 PROCEDURE — 85730 THROMBOPLASTIN TIME PARTIAL: CPT | Performed by: EMERGENCY MEDICINE

## 2020-06-29 PROCEDURE — 85705 THROMBOPLASTIN INHIBITION: CPT | Performed by: PHYSICIAN ASSISTANT

## 2020-06-29 PROCEDURE — 85730 THROMBOPLASTIN TIME PARTIAL: CPT | Performed by: PHYSICIAN ASSISTANT

## 2020-06-29 PROCEDURE — 83605 ASSAY OF LACTIC ACID: CPT | Performed by: EMERGENCY MEDICINE

## 2020-06-29 PROCEDURE — 96375 TX/PRO/DX INJ NEW DRUG ADDON: CPT

## 2020-06-29 PROCEDURE — 85303 CLOT INHIBIT PROT C ACTIVITY: CPT | Performed by: PHYSICIAN ASSISTANT

## 2020-06-29 PROCEDURE — 99223 1ST HOSP IP/OBS HIGH 75: CPT | Performed by: PHYSICIAN ASSISTANT

## 2020-06-29 PROCEDURE — 84145 PROCALCITONIN (PCT): CPT | Performed by: PHYSICIAN ASSISTANT

## 2020-06-29 PROCEDURE — 84100 ASSAY OF PHOSPHORUS: CPT | Performed by: FAMILY MEDICINE

## 2020-06-29 PROCEDURE — 85610 PROTHROMBIN TIME: CPT | Performed by: EMERGENCY MEDICINE

## 2020-06-29 PROCEDURE — 99285 EMERGENCY DEPT VISIT HI MDM: CPT | Performed by: EMERGENCY MEDICINE

## 2020-06-29 PROCEDURE — 85613 RUSSELL VIPER VENOM DILUTED: CPT | Performed by: PHYSICIAN ASSISTANT

## 2020-06-29 PROCEDURE — 85732 THROMBOPLASTIN TIME PARTIAL: CPT | Performed by: PHYSICIAN ASSISTANT

## 2020-06-29 PROCEDURE — 87040 BLOOD CULTURE FOR BACTERIA: CPT | Performed by: EMERGENCY MEDICINE

## 2020-06-29 PROCEDURE — 85025 COMPLETE CBC W/AUTO DIFF WBC: CPT | Performed by: EMERGENCY MEDICINE

## 2020-06-29 RX ORDER — HEPARIN SODIUM 1000 [USP'U]/ML
10000 INJECTION, SOLUTION INTRAVENOUS; SUBCUTANEOUS ONCE
Status: COMPLETED | OUTPATIENT
Start: 2020-06-29 | End: 2020-06-29

## 2020-06-29 RX ORDER — HEPARIN SODIUM 10000 [USP'U]/100ML
3-30 INJECTION, SOLUTION INTRAVENOUS
Status: DISCONTINUED | OUTPATIENT
Start: 2020-06-29 | End: 2020-06-30

## 2020-06-29 RX ORDER — ACETAMINOPHEN 325 MG/1
650 TABLET ORAL ONCE
Status: COMPLETED | OUTPATIENT
Start: 2020-06-29 | End: 2020-06-29

## 2020-06-29 RX ORDER — ACETAMINOPHEN 325 MG/1
650 TABLET ORAL EVERY 6 HOURS PRN
Status: DISCONTINUED | OUTPATIENT
Start: 2020-06-29 | End: 2020-06-30

## 2020-06-29 RX ORDER — ISOSORBIDE MONONITRATE 30 MG/1
30 TABLET, EXTENDED RELEASE ORAL DAILY
Status: DISCONTINUED | OUTPATIENT
Start: 2020-06-30 | End: 2020-07-01 | Stop reason: HOSPADM

## 2020-06-29 RX ORDER — SODIUM CHLORIDE 9 MG/ML
125 INJECTION, SOLUTION INTRAVENOUS CONTINUOUS
Status: DISCONTINUED | OUTPATIENT
Start: 2020-06-29 | End: 2020-06-30

## 2020-06-29 RX ORDER — CLOPIDOGREL BISULFATE 75 MG/1
75 TABLET ORAL DAILY
Status: DISCONTINUED | OUTPATIENT
Start: 2020-06-30 | End: 2020-07-01 | Stop reason: HOSPADM

## 2020-06-29 RX ORDER — HYDROCHLOROTHIAZIDE 25 MG/1
25 TABLET ORAL DAILY
Status: DISCONTINUED | OUTPATIENT
Start: 2020-06-30 | End: 2020-07-01 | Stop reason: HOSPADM

## 2020-06-29 RX ORDER — CARVEDILOL 12.5 MG/1
25 TABLET ORAL 2 TIMES DAILY WITH MEALS
Status: DISCONTINUED | OUTPATIENT
Start: 2020-06-30 | End: 2020-07-01 | Stop reason: HOSPADM

## 2020-06-29 RX ORDER — ONDANSETRON 2 MG/ML
4 INJECTION INTRAMUSCULAR; INTRAVENOUS EVERY 6 HOURS PRN
Status: DISCONTINUED | OUTPATIENT
Start: 2020-06-29 | End: 2020-07-01 | Stop reason: HOSPADM

## 2020-06-29 RX ORDER — CEFAZOLIN SODIUM 2 G/50ML
2000 SOLUTION INTRAVENOUS EVERY 8 HOURS
Status: DISCONTINUED | OUTPATIENT
Start: 2020-06-30 | End: 2020-06-30

## 2020-06-29 RX ADMIN — HEPARIN SODIUM 18 UNITS/KG/HR: 10000 INJECTION, SOLUTION INTRAVENOUS at 23:41

## 2020-06-29 RX ADMIN — CEFEPIME HYDROCHLORIDE 2000 MG: 2 INJECTION, POWDER, FOR SOLUTION INTRAVENOUS at 21:05

## 2020-06-29 RX ADMIN — ISOSORBIDE MONONITRATE 60 MG: 30 TABLET, EXTENDED RELEASE ORAL at 08:04

## 2020-06-29 RX ADMIN — ENOXAPARIN SODIUM 40 MG: 40 INJECTION SUBCUTANEOUS at 08:04

## 2020-06-29 RX ADMIN — VANCOMYCIN HYDROCHLORIDE 2000 MG: 1 INJECTION, POWDER, LYOPHILIZED, FOR SOLUTION INTRAVENOUS at 21:35

## 2020-06-29 RX ADMIN — HEPARIN SODIUM 10000 UNITS: 1000 INJECTION INTRAVENOUS; SUBCUTANEOUS at 23:44

## 2020-06-29 RX ADMIN — HYDROCHLOROTHIAZIDE 50 MG: 25 TABLET ORAL at 08:04

## 2020-06-29 RX ADMIN — CLOPIDOGREL BISULFATE 75 MG: 75 TABLET ORAL at 08:04

## 2020-06-29 RX ADMIN — SODIUM CHLORIDE 500 ML: 0.9 INJECTION, SOLUTION INTRAVENOUS at 21:05

## 2020-06-29 RX ADMIN — HYDRALAZINE HYDROCHLORIDE 10 MG: 20 INJECTION INTRAMUSCULAR; INTRAVENOUS at 03:18

## 2020-06-29 RX ADMIN — ACETAMINOPHEN 650 MG: 325 TABLET, FILM COATED ORAL at 21:05

## 2020-06-29 RX ADMIN — CARVEDILOL 25 MG: 12.5 TABLET, FILM COATED ORAL at 08:04

## 2020-06-29 NOTE — DISCHARGE SUMMARY
[address]      Discharge- J Carlos Domínguez 1965, 62 y o  male MRN: [id number]    Unit/Bed#: S -01 Encounter: [id number]    Primary Care Provider: MAX Davies   Date and time admitted to hospital: 6/27/2020  4:11 AM      Coronary artery disease involving native heart without angina pectoris  Assessment & Plan  · Continue ASA and plavix  · Hold statin    Hyperlipidemia  Assessment & Plan  · Patient reports facial swelling with stain therapy  · Discontinue prior to discharge  · Consider starting alternate therapy, follow up with PCP    ZAKIA (acute kidney injury) [facility])  Assessment & Plan  Recent Labs     06/27/20  0442 06/28/20  0457 06/29/20  0521   CREATININE 1 41* 1 24 1 37*     · Baseline (1 1-1 2)  · Patient stable for discharge, follow-up with PCP within 1-2 weeks after discharge  · Recommend outpatient BMP and follow-up with PCP  · Discontinue lisinopril    HTN (hypertension)  Assessment & Plan  Blood pressures continue to be elevated 160-180s/80-90s  Patient asymptomatic denies headaches, visual changes, or chest pain  Plan  · Will continue to monitor blood pressures,and increase home regimen doses for optimal control   · Continue carvedilol 25mg ( home dose)  · Imdur increased to 60mg ( home dose 30mg)  · HCTZ increased to 50 mg ( home dose 25mg)  · Discontinue home lisinopril  · Avoid ACEI/ARB    * Angioedema  Assessment & Plan  Patient is a 63 yo m w/pmhx of HTN, HLD, and CAD who originally presented on 6/27 due to angioedema likely secondary to medication use (lisinopril)  Symptoms resolving      Plan  · Follow up C1 esterase for PCP      Discharging Resident Physician: Chidi Espino DO  Attending: No att  providers found  PCP: Edwardo Davies  Admission Date: 6/27/2020  Discharge Date: 06/29/20    Disposition:     Home    Reason for Admission:  Angioedema    Consultations During Hospital Stay:  · None    Procedures Performed:     · None    Significant Findings / Test Results:     · Chest x-ray showed no acute cardiopulmonary disease    Incidental Findings:   · None     Test Results Pending at Discharge (will require follow up):   · C1 esterase     Outpatient Tests Requested:  · BMP within 1 week of discharge    Complications:  None    Hospital Course:     Per the transfer note from Bárbara Bateman MD    Lauren Roman is a 62 y o  male patient with pmhx of CAD, HTN and HLD who originally presented to the hospital on 6/27/2020 due to angioedema secondary likely to lisinopril therapy  The patient reported he had developed R facial swelling, the evening prior which spread to this neck and worsened overnight  The patient reported that he awoke in the am and had also developed lower lip swelling which prompted him to seek ED evaluation  Importantly, the patient had a recent admission in Dec 2019 at [facility] during which he was found to have an NSTEMI, started on ACEI therapy and also developed angioedema at that time  Patient reports, that during that admission his lisinopril was discontinued but believed it was okay to resume after discharge  Patient states he was taking lisinopril, imdur, HCTZ and coreg at home  Patient denied SOB, dysphonia, or dysphagia on presentation      Upon evaluation in the ED, patient was found to be afebrile, bradycardic 47, hypertensive to 189/86, and satting 98% on RA  Patient was treated with benadryl, methylprednisolone, and pepcid and transferred to the ICU for further management and observation  Overnight, patient was noted to have elevated blood pressures 160-180s/80-90s thus his imdur and HCTZ medications were increased  Discussed with patient that lisinopril and Lipitor will be discontinued at this time  Patient to follow up with PCP and cardiologist regarding lipid lowering therapy  Patient stable for discharge  Patient instructed to follow-up with PCP within 1-2 weeks after discharge    Patient also instructed to obtain BMP within discharge to assess for return and normal kidney function  Condition at Discharge: stable     Discharge Day Visit / Exam:     Subjective: Today, patient states he has no complaints  He denies any swelling on his face  He denies any shortness of breath or difficulty breathing  Denies chest pain, palpitations  Patient ready to go home  Vitals: Blood Pressure: 147/79 (06/29/20 0745)  Pulse: (!) 50 (06/29/20 0745)  Temperature: 98 2 °F (36 8 °C) (06/29/20 0745)  Temp Source: Temporal (06/29/20 0745)  Respirations: 18 (06/29/20 0745)  Height: 5' 11" (180 3 cm) (06/29/20 0100)  Weight - Scale: 129 kg (284 lb) (06/29/20 0600)  SpO2: 98 % (06/29/20 0745)  Exam:   Physical Exam   Constitutional: He is oriented to person, place, and time  He appears well-developed and well-nourished  Non-toxic appearance  No distress  HENT:   Head: Normocephalic and atraumatic  Eyes: Conjunctivae are normal  No scleral icterus  Neck: Phonation normal  Neck supple  Cardiovascular: Normal rate, regular rhythm, S1 normal, S2 normal, normal heart sounds and intact distal pulses  Exam reveals no gallop, no distant heart sounds and no friction rub  No murmur heard  Pulmonary/Chest: Effort normal and breath sounds normal  No accessory muscle usage  No respiratory distress  He has no wheezes  He has no rhonchi  He has no rales  He exhibits no mass  Abdominal: Soft  Normal appearance and bowel sounds are normal  He exhibits no distension and no mass  There is no tenderness  Musculoskeletal: He exhibits no edema  Neurological: He is alert and oriented to person, place, and time  Skin: Skin is warm, dry and intact  He is not diaphoretic  Psychiatric: He has a normal mood and affect  His behavior is normal    Nursing note and vitals reviewed        Discussion with Family:  None per the request of patient    Discharge instructions/Information to patient and family:   See after visit summary for information provided to patient and family  Provisions for Follow-Up Care:  See after visit summary for information related to follow-up care and any pertinent home health orders  Planned Readmission:  None     Discharge Medications:  See after visit summary for reconciled discharge medications provided to patient and family        ** Please Note: This note has been constructed using a voice recognition system **

## 2020-06-29 NOTE — ASSESSMENT & PLAN NOTE
Recent Labs     06/27/20  0442 06/28/20  0457 06/29/20  0521   CREATININE 1 41* 1 24 1 37*     · Baseline (1 1-1 2)  · Patient stable for discharge, follow-up with PCP within 1-2 weeks after discharge  · Recommend outpatient BMP and follow-up with PCP  · Discontinue lisinopril

## 2020-06-29 NOTE — LETTER
[place] [address]  Dept: [phone]    July 1, 2020     Patient: Katie Ramos   YOB: 1965   Date of Visit: 6/29/2020       To Whom it May Concern: Rosan Crigler is under my professional care  He was seen in the hospital from 6/29/2020   to 07/01/20  He may return to work on 7/2/2020 without limitations  If you have any questions or concerns, please don't hesitate to call           Sincerely,          Tere Vergara MD

## 2020-06-29 NOTE — ASSESSMENT & PLAN NOTE
· Patient reports facial swelling with stain therapy  · Discontinue prior to discharge  · Consider starting alternate therapy, follow up with PCP

## 2020-06-29 NOTE — ASSESSMENT & PLAN NOTE
Patient is a 63 yo m w/pmhx of HTN, HLD, and CAD who originally presented on 6/27 due to angioedema likely secondary to medication use (lisinopril)  Symptoms resolving      Plan  · Follow up C1 esterase for PCP

## 2020-06-29 NOTE — ASSESSMENT & PLAN NOTE
Blood pressures continue to be elevated 160-180s/80-90s  Patient asymptomatic denies headaches, visual changes, or chest pain      Plan  · Will continue to monitor blood pressures,and increase home regimen doses for optimal control   · Continue carvedilol 25mg ( home dose)  · Imdur increased to 60mg ( home dose 30mg)  · HCTZ increased to 50 mg ( home dose 25mg)  · Discontinue home lisinopril  · Avoid ACEI/ARB

## 2020-06-29 NOTE — DISCHARGE INSTR - AVS FIRST PAGE
Dear Bettina Freedman,     It was our pleasure to care for you here at [facility]  It is our hope that we were always able to exceed the expected standards for your care during your stay  You were hospitalized due to angioedema  You were cared for on the ICU/South 2nd floor by Emelyn Guthrie DO under the service of Amanda Gan MD with the [facility] who covers for your primary care physician (PCP), Josi David, while you were hospitalized  If you have any questions or concerns related to this hospitalization, you may contact us at [phone]  For follow up as well as any medication refills, we recommend that you follow up with your primary care physician  A registered nurse will reach out to you by phone within a few days after your discharge to answer any additional questions that you may have after going home  However, at this time we provide for you here, the most important instructions / recommendations at discharge:     · Notable Medication Adjustments -   · Discontinue Atorvastatin 80 mg  · Discontinue Lisinopril 40 mg  · Testing Required after Discharge -   · BMP blood work to be obtained outpatient within 1 week of discharge and followed up with by PCP to determine changes in kidney function  · Important follow up information -   · Follow-up with primary care physician within 1-2 weeks after discharge  · Other Instructions -   Practice social distancing  Adequate hand washing hygiene  Wear mask in public at all times  · Please review this entire after visit summary as additional general instructions including medication list, appointments, activity, diet, any pertinent wound care, and other additional recommendations from your care team that may be provided for you        Sincerely,     Emelyn Guthrie DO

## 2020-06-30 ENCOUNTER — TELEPHONE (OUTPATIENT)
Dept: FAMILY MEDICINE CLINIC | Facility: CLINIC | Age: 55
End: 2020-06-30

## 2020-06-30 PROBLEM — I82.441 ACUTE DEEP VEIN THROMBOSIS (DVT) OF TIBIAL VEIN OF RIGHT LOWER EXTREMITY (HCC): Status: ACTIVE | Noted: 2020-06-30

## 2020-06-30 PROBLEM — R65.10 SIRS (SYSTEMIC INFLAMMATORY RESPONSE SYNDROME) (HCC): Status: ACTIVE | Noted: 2020-06-29

## 2020-06-30 LAB
ANION GAP SERPL CALCULATED.3IONS-SCNC: 8 MMOL/L (ref 4–13)
APTT PPP: >210 SECONDS (ref 23–37)
ATRIAL RATE: 50 BPM
BASOPHILS # BLD AUTO: 0.05 THOUSANDS/ΜL (ref 0–0.1)
BASOPHILS NFR BLD AUTO: 0 % (ref 0–1)
BUN SERPL-MCNC: 21 MG/DL (ref 5–25)
CALCIUM SERPL-MCNC: 7.8 MG/DL (ref 8.3–10.1)
CHLORIDE SERPL-SCNC: 103 MMOL/L (ref 100–108)
CO2 SERPL-SCNC: 20 MMOL/L (ref 21–32)
CREAT SERPL-MCNC: 1.22 MG/DL (ref 0.6–1.3)
DEPRECATED AT III PPP: 92 % OF NORMAL (ref 92–136)
EOSINOPHIL # BLD AUTO: 0.01 THOUSAND/ΜL (ref 0–0.61)
EOSINOPHIL NFR BLD AUTO: 0 % (ref 0–6)
ERYTHROCYTE [DISTWIDTH] IN BLOOD BY AUTOMATED COUNT: 12.7 % (ref 11.6–15.1)
GFR SERPL CREATININE-BSD FRML MDRD: 76 ML/MIN/1.73SQ M
GLUCOSE SERPL-MCNC: 110 MG/DL (ref 65–140)
HCT VFR BLD AUTO: 39.6 % (ref 36.5–49.3)
HGB BLD-MCNC: 12.9 G/DL (ref 12–17)
IMM GRANULOCYTES # BLD AUTO: 0.07 THOUSAND/UL (ref 0–0.2)
IMM GRANULOCYTES NFR BLD AUTO: 0 % (ref 0–2)
LYMPHOCYTES # BLD AUTO: 1.55 THOUSANDS/ΜL (ref 0.6–4.47)
LYMPHOCYTES NFR BLD AUTO: 10 % (ref 14–44)
MCH RBC QN AUTO: 30.1 PG (ref 26.8–34.3)
MCHC RBC AUTO-ENTMCNC: 32.6 G/DL (ref 31.4–37.4)
MCV RBC AUTO: 93 FL (ref 82–98)
MONOCYTES # BLD AUTO: 1.29 THOUSAND/ΜL (ref 0.17–1.22)
MONOCYTES NFR BLD AUTO: 8 % (ref 4–12)
NEUTROPHILS # BLD AUTO: 13.17 THOUSANDS/ΜL (ref 1.85–7.62)
NEUTS SEG NFR BLD AUTO: 82 % (ref 43–75)
NRBC BLD AUTO-RTO: 0 /100 WBCS
P AXIS: 46 DEGREES
PLATELET # BLD AUTO: 140 THOUSANDS/UL (ref 149–390)
PMV BLD AUTO: 12 FL (ref 8.9–12.7)
POTASSIUM SERPL-SCNC: 3.6 MMOL/L (ref 3.5–5.3)
PR INTERVAL: 188 MS
PROCALCITONIN SERPL-MCNC: 0.09 NG/ML
QRS AXIS: 14 DEGREES
QRSD INTERVAL: 88 MS
QT INTERVAL: 442 MS
QTC INTERVAL: 402 MS
RBC # BLD AUTO: 4.28 MILLION/UL (ref 3.88–5.62)
SODIUM SERPL-SCNC: 131 MMOL/L (ref 136–145)
T WAVE AXIS: 17 DEGREES
VENTRICULAR RATE: 50 BPM
WBC # BLD AUTO: 16.14 THOUSAND/UL (ref 4.31–10.16)

## 2020-06-30 PROCEDURE — 80048 BASIC METABOLIC PNL TOTAL CA: CPT | Performed by: PHYSICIAN ASSISTANT

## 2020-06-30 PROCEDURE — 99232 SBSQ HOSP IP/OBS MODERATE 35: CPT | Performed by: INTERNAL MEDICINE

## 2020-06-30 PROCEDURE — 93010 ELECTROCARDIOGRAM REPORT: CPT | Performed by: INTERNAL MEDICINE

## 2020-06-30 PROCEDURE — 93970 EXTREMITY STUDY: CPT | Performed by: SURGERY

## 2020-06-30 PROCEDURE — 85730 THROMBOPLASTIN TIME PARTIAL: CPT | Performed by: INTERNAL MEDICINE

## 2020-06-30 PROCEDURE — 85025 COMPLETE CBC W/AUTO DIFF WBC: CPT | Performed by: PHYSICIAN ASSISTANT

## 2020-06-30 RX ORDER — ACETAMINOPHEN 325 MG/1
975 TABLET ORAL EVERY 8 HOURS SCHEDULED
Status: DISCONTINUED | OUTPATIENT
Start: 2020-06-30 | End: 2020-07-01 | Stop reason: HOSPADM

## 2020-06-30 RX ADMIN — CARVEDILOL 25 MG: 12.5 TABLET, FILM COATED ORAL at 15:38

## 2020-06-30 RX ADMIN — HYDROCHLOROTHIAZIDE 25 MG: 25 TABLET ORAL at 08:02

## 2020-06-30 RX ADMIN — SODIUM CHLORIDE 125 ML/HR: 0.9 INJECTION, SOLUTION INTRAVENOUS at 08:04

## 2020-06-30 RX ADMIN — CARVEDILOL 25 MG: 12.5 TABLET, FILM COATED ORAL at 08:02

## 2020-06-30 RX ADMIN — ISOSORBIDE MONONITRATE 30 MG: 30 TABLET, EXTENDED RELEASE ORAL at 08:02

## 2020-06-30 RX ADMIN — CEFAZOLIN SODIUM 2000 MG: 2 SOLUTION INTRAVENOUS at 05:32

## 2020-06-30 RX ADMIN — SODIUM CHLORIDE 125 ML/HR: 0.9 INJECTION, SOLUTION INTRAVENOUS at 00:09

## 2020-06-30 RX ADMIN — HEPARIN SODIUM 15 UNITS/KG/HR: 10000 INJECTION, SOLUTION INTRAVENOUS at 13:17

## 2020-06-30 RX ADMIN — CLOPIDOGREL BISULFATE 75 MG: 75 TABLET ORAL at 08:02

## 2020-06-30 RX ADMIN — ACETAMINOPHEN 975 MG: 325 TABLET, FILM COATED ORAL at 14:29

## 2020-06-30 RX ADMIN — ACETAMINOPHEN 975 MG: 325 TABLET, FILM COATED ORAL at 21:08

## 2020-06-30 RX ADMIN — RIVAROXABAN 15 MG: 15 TABLET, FILM COATED ORAL at 14:29

## 2020-06-30 RX ADMIN — CEFAZOLIN SODIUM 2000 MG: 2 SOLUTION INTRAVENOUS at 13:21

## 2020-06-30 NOTE — ASSESSMENT & PLAN NOTE
 SIRS criteria:  Leukocytosis, fever  Likely secondary to right lower extremity DVT   Cellulitis less likely, procalcitonin negative   Monitor off antibiotics    Lactic acid unremarkable

## 2020-06-30 NOTE — ASSESSMENT & PLAN NOTE
· Lower extremity appearing cellulitic, no signs of any trauma or wounds  · Was given IV cefepime and IV vancomycin in the emergency department  · Treat with IV Ancef q 8 hours  · Monitor for improvement of erythema, pain, swelling    · Follow-up on vascular study to rule out DVT

## 2020-06-30 NOTE — ASSESSMENT & PLAN NOTE
· POA, right lower extremity Dopplers revealed "acute occlusive deep vein thrombosis noted in the right paired posterior tibial veins "  · Patient continues with pain to his right lower extremity  · Started on heparin infusion yesterday    Will transition to Xarelto  · Elevated lower extremity, compression wraps to reduce risk of post phlebitic syndrome  · Follow-up on thrombotic panel

## 2020-06-30 NOTE — PLAN OF CARE
Problem: Potential for Falls  Goal: Patient will remain free of falls  Description  INTERVENTIONS:  - Assess patient frequently for physical needs  -  Identify cognitive and physical deficits and behaviors that affect risk of falls    -  Cecil fall precautions as indicated by assessment   - Educate patient/family on patient safety including physical limitations  - Instruct patient to call for assistance with activity based on assessment  - Modify environment to reduce risk of injury  - Consider OT/PT consult to assist with strengthening/mobility  Outcome: Progressing     Problem: PAIN - ADULT  Goal: Verbalizes/displays adequate comfort level or baseline comfort level  Description  Interventions:  - Encourage patient to monitor pain and request assistance  - Assess pain using appropriate pain scale  - Administer analgesics based on type and severity of pain and evaluate response  - Implement non-pharmacological measures as appropriate and evaluate response  - Consider cultural and social influences on pain and pain management  - Notify physician/advanced practitioner if interventions unsuccessful or patient reports new pain  Outcome: Progressing     Problem: SAFETY ADULT  Goal: Maintain or return to baseline ADL function  Description  INTERVENTIONS:  -  Assess patient's ability to carry out ADLs; assess patient's baseline for ADL function and identify physical deficits which impact ability to perform ADLs (bathing, care of mouth/teeth, toileting, grooming, dressing, etc )  - Assess/evaluate cause of self-care deficits   - Assess range of motion  - Assess patient's mobility; develop plan if impaired  - Assess patient's need for assistive devices and provide as appropriate  - Encourage maximum independence but intervene and supervise when necessary  - Involve family in performance of ADLs  - Assess for home care needs following discharge   - Consider OT consult to assist with ADL evaluation and planning for discharge  - Provide patient education as appropriate  Outcome: Progressing  Goal: Maintain or return mobility status to optimal level  Description  INTERVENTIONS:  - Assess patient's baseline mobility status (ambulation, transfers, stairs, etc )    - Identify cognitive and physical deficits and behaviors that affect mobility  - Identify mobility aids required to assist with transfers and/or ambulation (gait belt, sit-to-stand, lift, walker, cane, etc )  - Grethel fall precautions as indicated by assessment  - Record patient progress and toleration of activity level on Mobility SBAR; progress patient to next Phase/Stage  - Instruct patient to call for assistance with activity based on assessment  - Consider rehabilitation consult to assist with strengthening/weightbearing, etc   Outcome: Progressing

## 2020-06-30 NOTE — PROGRESS NOTES
Progress Note - Valencia Farrell 1965, 62 y o  male MRN: [id number]    Unit/Bed#: S -01 Encounter: [id number]    Primary Care Provider: MAX Alvarez   Date and time admitted to hospital: 6/29/2020  8:42 PM    * Acute deep vein thrombosis (DVT) of tibial vein of right lower extremity ([email or web address] 75 )  Assessment & Plan  · POA, right lower extremity Dopplers revealed "acute occlusive deep vein thrombosis noted in the right paired posterior tibial veins "  · Patient continues with pain to his right lower extremity  · Started on heparin infusion yesterday  Will transition to Xarelto  · Elevated lower extremity, compression wraps to reduce risk of post phlebitic syndrome  · Follow-up on thrombotic panel    Cellulitis of right lower extremity  Assessment & Plan  · Suspect right lower extremity findings likely secondary to DVT as above  · Procalcitonin was negative  · Will hold off on further antibiotics at this time and monitor    SIRS (systemic inflammatory response syndrome) ([facility])  Assessment & Plan   SIRS criteria:  Leukocytosis, fever  Likely secondary to right lower extremity DVT   Cellulitis less likely, procalcitonin negative   Monitor off antibiotics  Lactic acid unremarkable    Essential hypertension  Assessment & Plan  · Continue Coreg, hydrochlorothiazide  · Monitor blood pressure  Coronary artery disease involving native heart without angina pectoris  Assessment & Plan  · No acute issues at this time    Continue home medications    ZAKIA (acute kidney injury) ([facility] [[email or web address] 75 )  Assessment & Plan  · Baseline creatinine appears to be around 1  · Creatinine peaked at 1 5, currently improved to 1 2 today  · Likely pre renal   Discontinue IV fluids at this time and encourage increased oral intake  · Continue to monitor renal function closely    VTE Pharmacologic Prophylaxis:   Pharmacologic: Heparin Drip  Mechanical VTE Prophylaxis in Place: No    Patient Centered Rounds: I have performed bedside rounds with nursing staff today  Discussions with Specialists or Other Care Team Provider:  Nursing    Education and Discussions with Family / Patient:  Patient    Current Length of Stay: 1 day(s)    Current Patient Status: Inpatient   Certification Statement: The patient will continue to require additional inpatient hospital stay due to Above diagnosis and care plan    Discharge Plan:  Anticipate home next 24 hours if clinically improved    Code Status: Level 1 - Full Code      Subjective:   Patient seen and evaluated  He offers no complaints of chest pain or shortness of breath this morning  He continues to report right lower extremity pain with tenderness and swelling  He denies any fever this morning  Objective:     Vitals:   Temp (24hrs), Av 4 °F (38 °C), Min:98 9 °F (37 2 °C), Max:101 4 °F (38 6 °C)    Temp:  [98 9 °F (37 2 °C)-101 4 °F (38 6 °C)] 98 9 °F (37 2 °C)  HR:  [59-82] 59  Resp:  [18-20] 18  BP: (152-169)/(75-91) 152/83  SpO2:  [97 %-100 %] 98 %  There is no height or weight on file to calculate BMI  Input and Output Summary (last 24 hours): Intake/Output Summary (Last 24 hours) at 2020 1400  Last data filed at 2020 2135  Gross per 24 hour   Intake 550 ml   Output    Net 550 ml       Physical Exam:  General Appearance:    Alert, cooperative, no distress, appropriately responsive    Head:    Normocephalic, without obvious abnormality, atraumatic, mucous membranes moist    Eyes:    Conjunctiva/corneas clear, EOM's intact   Neck:   Supple   Lungs:     Clear to auscultation bilaterally, respirations unlabored, no crackles or wheeze     Heart:    Regular rate and rhythm, S1 and S2    Abdomen:     Soft, non-tender, bowel sounds active all four quadrants,     no masses, no organomegaly   Extremities:   Right lower extremity swelling with mild erythema, tenderness especially to the calf area, chronic dermatitis changes noted to bilateral lower extremities    No edema left lower extremity   Neurologic:  nonfocal         Additional Data:     Labs:    Results from last 7 days   Lab Units 06/30/20  0531   WBC Thousand/uL 16 14*   HEMOGLOBIN g/dL 12 9   HEMATOCRIT % 39 6   PLATELETS Thousands/uL 140*   NEUTROS PCT % 82*   LYMPHS PCT % 10*   MONOS PCT % 8   EOS PCT % 0     Results from last 7 days   Lab Units 06/30/20  0531 06/29/20  2104   POTASSIUM mmol/L 3 6 4 0   CHLORIDE mmol/L 103 100   CO2 mmol/L 20* 30   BUN mg/dL 21 26*   CREATININE mg/dL 1 22 1 53*   CALCIUM mg/dL 7 8* 9 5   ALK PHOS U/L  --  100   ALT U/L  --  26   AST U/L  --  17     Results from last 7 days   Lab Units 06/29/20  2104   INR  1 09       * I Have Reviewed All Lab Data Listed Above  * Additional Pertinent Lab Tests Reviewed: Marcelino 64 Admission Reviewed    Cultures:   Blood Culture:   Lab Results   Component Value Date    BLOODCX Received in Microbiology Lab  Culture in Progress  06/29/2020    BLOODCX Received in Microbiology Lab  Culture in Progress  06/29/2020    BLOODCX No Growth After 5 Days  11/08/2019    BLOODCX No Growth After 5 Days  11/08/2019    BLOODCX No Growth After 5 Days   11/04/2019     Urine Culture: No results found for: URINECX  Sputum Culture: No components found for: SPUTUMCX  Wound Culture: No results found for: WOUNDCULT    Last 24 Hours Medication List:     Current Facility-Administered Medications:  acetaminophen 650 mg Oral Q6H PRN Christina Flynnagi, PA-C    carvedilol 25 mg Oral BID With Meals Jule Koyanagi, PA-C    cefazolin 2,000 mg Intravenous Q8H Christina Koreginaagi, PA-C Last Rate: 2,000 mg (06/30/20 1321)   clopidogrel 75 mg Oral Daily Jule Koyanagi, PA-C    heparin (porcine) 3-30 Units/kg/hr (Order-Specific) Intravenous Titrated Christina Koyanagi, PA-C Last Rate: 15 Units/kg/hr (06/30/20 1317)   hydrochlorothiazide 25 mg Oral Daily Jule Koyanagi, PA-C    isosorbide mononitrate 30 mg Oral Daily Jule Koyanagi, PA-C    morphine injection 2 mg Intravenous Q4H PRN Carlos Mcdonald JOHANNY Morris    ondansetron 4 mg Intravenous Q6H PRN Marcelino Burciaga PA-C    sodium chloride 125 mL/hr Intravenous Continuous Marcelino Burciaga PA-C Last Rate: Stopped (06/30/20 0385)        Today, Patient Was Seen By: Darlin Smiley MD    ** Please Note: Dragon 360 Dictation voice to text software may have been used in the creation of this document   **

## 2020-06-30 NOTE — H&P
[address] - Internal Medicine Service  H&P- Elvis Casas 1965, 62 y o  male MRN: [id number]  Unit/Bed#: S -01 Encounter: [id number]  Primary Care Provider: MAX Dunne   Date and time admitted to hospital: 6/29/2020  8:42 PM    * Cellulitis of right lower extremity  Assessment & Plan  · Lower extremity appearing cellulitic, no signs of any trauma or wounds  · Was given IV cefepime and IV vancomycin in the emergency department  · Treat with IV Ancef q 8 hours  · Monitor for improvement of erythema, pain, swelling  · Follow-up on vascular study to rule out DVT    Severe sepsis with acute organ dysfunction [facility])  Assessment & Plan   SIRS criteria:  Leukocytosis, fever   Suspected source:  Right lower extremity cellulitis   Lactic acid:  Normal   End organ damage:  Creatinine greater than 1 5   IV Fluids:  Normal saline at 125 mL/hr overnight   IV antibiotics:  IV cefepime and IV vancomycin was given in the emergency department, will switch to IV Ancef q 8 hours   Follow up on culture results   Monitor vital signs, laboratory studies  Coronary artery disease involving native heart without angina pectoris  Assessment & Plan  · Denies any chest pain at this time  · Continue home medications  Essential hypertension  Assessment & Plan  · Blood pressure elevated 162/75  · Continue Coreg, hydrochlorothiazide  · Monitor blood pressure  VTE Prophylaxis: Enoxaparin (Lovenox)  / foot pump applied   Code Status: full code  POLST: POLST form is not discussed and not completed at this time  Discussion with family: patient    Anticipated Length of Stay:  Patient will be admitted on an Inpatient basis with an anticipated length of stay of  > 2 midnights  Justification for Hospital Stay: cellulitis, sepsis    Total Time for Visit, including Counseling / Coordination of Care: 1 hour    Greater than 50% of this total time spent on direct patient counseling and coordination of care  Chief Complaint:   RLE pain and redness, chills and fever    History of Present Illness: Katie Ramos is a 62 y o  male who presents with right lower extremity swelling, chills, fever for 1 day  The patient was recently admitted to the hospital discharge earlier today secondary to lower lip angioedema, likely secondary to lisinopril  His lisinopril was discontinued, he showed no signs of airway distress, and his swelling of his lips had gone away  At the time of discharge the patient did not report any erythema of his right leg, chills, or fever  He did have some mild discomfort of his right leg but did not feel the need to report this  As soon as he got home he ate dinner, was not feeling very well and had chills as well as a fever  He noticed his right leg is very painful, and starting to get red  He reported to the emergency department for evaluation  He had nausea but no vomiting  No reports of changes in stool  Denies any trauma or wounds  No history of blood clots in the past   Pain is under control at this time  Review of Systems:    Review of Systems   Constitutional: Positive for chills and fever  Negative for activity change, appetite change and fatigue  HENT: Negative for congestion, rhinorrhea, sinus pressure and sore throat  Eyes: Negative for photophobia, pain and visual disturbance  Respiratory: Negative for cough, shortness of breath and wheezing  Cardiovascular: Negative for chest pain, palpitations and leg swelling  Gastrointestinal: Negative for abdominal distention, abdominal pain, constipation, diarrhea, nausea and vomiting  Endocrine: Negative for cold intolerance, heat intolerance, polydipsia and polyuria  Genitourinary: Negative for difficulty urinating, dysuria, flank pain, frequency and hematuria  Musculoskeletal: Negative for arthralgias, back pain and joint swelling  Skin: Positive for color change   Negative for pallor and rash  Right leg erythema   Allergic/Immunologic: Negative  Neurological: Negative for dizziness, syncope, weakness, light-headedness and headaches  Hematological: Negative  Psychiatric/Behavioral: Negative  Past Medical and Surgical History:     Past Medical History:   Diagnosis Date    Angioedema     Cardiac disease     GI bleed     High blood pressure     Hypertension     stopped his meds when ran out several years ago    STEMI (ST elevation myocardial infarction) ([facility] [address] )        Past Surgical History:   Procedure Laterality Date    NO PAST SURGERIES      UPPER GASTROINTESTINAL ENDOSCOPY      Clamped large stomach ulcer       Meds/Allergies:    Prior to Admission medications    Medication Sig Start Date End Date Taking?  Authorizing Provider   carvedilol (COREG) 25 mg tablet Take 1 tablet (25 mg total) by mouth 2 (two) times a day with meals 2/14/20  Yes Eliza Coreas MD   clopidogrel (PLAVIX) 75 mg tablet Take 1 tablet (75 mg total) by mouth daily 2/14/20  Yes Eliza Coreas MD   hydrochlorothiazide (HYDRODIURIL) 25 mg tablet Take 1 tablet (25 mg total) by mouth daily 1/10/20  Yes Eliza Coreas MD   isosorbide mononitrate (IMDUR) 30 mg 24 hr tablet Take 1 tablet (30 mg total) by mouth daily 2/6/20  Yes Eliza Coreas MD   acetaminophen (TYLENOL) 325 mg tablet Take 3 tablets (975 mg total) by mouth every 8 (eight) hours 11/10/19   MAX Duque   Testosterone (ANDROGEL) 40 5 MG/2 5GM (1 62%) GEL Place 40 5 mg on the skin daily     Historical Provider, MD   atorvastatin (LIPITOR) 80 mg tablet Take 1 tablet (80 mg total) by mouth daily with dinner 2/14/20 6/29/20  Eliza Coreas MD   docusate sodium (COLACE) 100 mg capsule Take 1 capsule (100 mg total) by mouth 2 (two) times a day  Patient not taking: Reported on 6/27/2020 11/10/19 6/29/20  MAX Duque   lidocaine (LIDODERM) 5 % Apply 1 patch topically daily for 7 days Apply daily to left shoulder    Remove & Discard patch within 12 hours or as directed by MD  Patient not taking: Reported on 1/10/2020 11/8/19 6/29/20  MAX Ivey   lisinopril (ZESTRIL) 40 mg tablet Take 40 mg by mouth daily  6/29/20  Historical Provider, MD   nitroglycerin (NITROSTAT) 0 4 mg SL tablet Place 1 tablet (0 4 mg total) under the tongue every 5 (five) minutes as needed for chest pain  Patient not taking: Reported on 6/27/2020 11/7/19 6/29/20  MAX Friend     I have reviewed home medications with patient personally  Allergies: Allergies   Allergen Reactions    Lisinopril Angioedema    Norvasc [Amlodipine] Angioedema    Lipitor [Atorvastatin] Facial Swelling       Social History:     Marital Status: Single   Occupation: non-contributory  Patient Pre-hospital Living Situation: home  Patient Pre-hospital Level of Mobility: full  Patient Pre-hospital Diet Restrictions: none  Substance Use History:   Social History     Substance and Sexual Activity   Alcohol Use Never    Alcohol/week: 0 0 standard drinks    Frequency: Never    Drinks per session: 1 or 2    Binge frequency: Never     Social History     Tobacco Use   Smoking Status Never Smoker   Smokeless Tobacco Never Used     Social History     Substance and Sexual Activity   Drug Use No       Family History:    Family History   Adopted: Yes   Family history unknown: Yes       Physical Exam:     Vitals:   Blood Pressure: 162/75 (06/29/20 2222)  Pulse: 76 (06/29/20 2222)  Temperature: (!) 100 7 °F (38 2 °C) (06/29/20 2222)  Temp Source: Temporal (06/29/20 2222)  Respirations: 18 (06/29/20 2222)  SpO2: 97 % (06/29/20 2222)    Physical Exam   Constitutional: He is oriented to person, place, and time  He appears well-developed and well-nourished  No distress  HENT:   Head: Normocephalic and atraumatic  Mouth/Throat: Oropharynx is clear and moist    Eyes: Pupils are equal, round, and reactive to light  EOM are normal  No scleral icterus  Neck: Neck supple  Cardiovascular: Normal rate, regular rhythm and normal heart sounds  No murmur heard  Pulmonary/Chest: Effort normal and breath sounds normal  No respiratory distress  He has no wheezes  He has no rales  Abdominal: Soft  Bowel sounds are normal  He exhibits no distension  There is no tenderness  Musculoskeletal: He exhibits no deformity  Erythema of large area on right lower leg  No evidence of abscess  Neurological: He is alert and oriented to person, place, and time  No cranial nerve deficit  Skin: Skin is warm and dry  Capillary refill takes less than 2 seconds  No rash noted  He is not diaphoretic  No erythema  No pallor  Psychiatric: He has a normal mood and affect  Nursing note and vitals reviewed  Additional Data:     Lab Results: I have personally reviewed pertinent reports  Results from last 7 days   Lab Units 06/29/20  2104   WBC Thousand/uL 15 98*   HEMOGLOBIN g/dL 14 6   HEMATOCRIT % 43 5   PLATELETS Thousands/uL 173   NEUTROS PCT % 85*   LYMPHS PCT % 7*   MONOS PCT % 8   EOS PCT % 0     Results from last 7 days   Lab Units 06/29/20  2104   SODIUM mmol/L 137   POTASSIUM mmol/L 4 0   CHLORIDE mmol/L 100   CO2 mmol/L 30   BUN mg/dL 26*   CREATININE mg/dL 1 53*   ANION GAP mmol/L 7   CALCIUM mg/dL 9 5   ALBUMIN g/dL 4 2   TOTAL BILIRUBIN mg/dL 0 78   ALK PHOS U/L 100   ALT U/L 26   AST U/L 17   GLUCOSE RANDOM mg/dL 101     Results from last 7 days   Lab Units 06/29/20  2104   INR  1 09             Results from last 7 days   Lab Units 06/29/20  2104   LACTIC ACID mmol/L 1 7       Imaging: pending US of RLE    VAS lower limb venous duplex study, complete bilateral    (Results Pending)       EKG, Pathology, and Other Studies Reviewed on Admission:   · Prior pertinent studies and records reviewed in [facility] Records Reviewed: Yes     ** Please Note: This note has been constructed using a voice recognition system   **

## 2020-06-30 NOTE — PLAN OF CARE
Problem: Potential for Falls  Goal: Patient will remain free of falls  Description  INTERVENTIONS:  - Assess patient frequently for physical needs  -  Identify cognitive and physical deficits and behaviors that affect risk of falls    -  [place] fall precautions as indicated by assessment   - Educate patient/family on patient safety including physical limitations  - Instruct patient to call for assistance with activity based on assessment  - Modify environment to reduce risk of injury  - Consider OT/PT consult to assist with strengthening/mobility  Outcome: Progressing     Problem: PAIN - ADULT  Goal: Verbalizes/displays adequate comfort level or baseline comfort level  Description  Interventions:  - Encourage patient to monitor pain and request assistance  - Assess pain using appropriate pain scale  - Administer analgesics based on type and severity of pain and evaluate response  - Implement non-pharmacological measures as appropriate and evaluate response  - Consider cultural and social influences on pain and pain management  - Notify physician/advanced practitioner if interventions unsuccessful or patient reports new pain  Outcome: Progressing     Problem: INFECTION - ADULT  Goal: Absence or prevention of progression during hospitalization  Description  INTERVENTIONS:  - Assess and monitor for signs and symptoms of infection  - Monitor lab/diagnostic results  - Monitor all insertion sites, i e  indwelling lines, tubes, and drains  - Monitor endotracheal if appropriate and nasal secretions for changes in amount and color  - [place] appropriate cooling/warming therapies per order  - Administer medications as ordered  - Instruct and encourage patient and family to use good hand hygiene technique  - Identify and instruct in appropriate isolation precautions for identified infection/condition  Outcome: Progressing     Problem: SAFETY ADULT  Goal: Maintain or return to baseline ADL function  Description  INTERVENTIONS:  -  Assess patient's ability to carry out ADLs; assess patient's baseline for ADL function and identify physical deficits which impact ability to perform ADLs (bathing, care of mouth/teeth, toileting, grooming, dressing, etc )  - Assess/evaluate cause of self-care deficits   - Assess range of motion  - Assess patient's mobility; develop plan if impaired  - Assess patient's need for assistive devices and provide as appropriate  - Encourage maximum independence but intervene and supervise when necessary  - Involve family in performance of ADLs  - Assess for home care needs following discharge   - Consider OT consult to assist with ADL evaluation and planning for discharge  - Provide patient education as appropriate  Outcome: Progressing  Goal: Maintain or return mobility status to optimal level  Description  INTERVENTIONS:  - Assess patient's baseline mobility status (ambulation, transfers, stairs, etc )    - Identify cognitive and physical deficits and behaviors that affect mobility  - Identify mobility aids required to assist with transfers and/or ambulation (gait belt, sit-to-stand, lift, walker, cane, etc )  - [place] fall precautions as indicated by assessment  - Record patient progress and toleration of activity level on Mobility SBAR; progress patient to next Phase/Stage  - Instruct patient to call for assistance with activity based on assessment  - Consider rehabilitation consult to assist with strengthening/weightbearing, etc   Outcome: Progressing     Problem: DISCHARGE PLANNING  Goal: Discharge to home or other facility with appropriate resources  Description  INTERVENTIONS:  - Identify barriers to discharge w/patient and caregiver  - Arrange for needed discharge resources and transportation as appropriate  - Identify discharge learning needs (meds, wound care, etc )  - Arrange for interpretive services to assist at discharge as needed  - Refer to Case Management Department for coordinating discharge planning if the patient needs post-hospital services based on physician/advanced practitioner order or complex needs related to functional status, cognitive ability, or social support system  Outcome: Progressing     Problem: Knowledge Deficit  Goal: Patient/family/caregiver demonstrates understanding of disease process, treatment plan, medications, and discharge instructions  Description  Complete learning assessment and assess knowledge base    Interventions:  - Provide teaching at level of understanding  - Provide teaching via preferred learning methods  Outcome: Progressing

## 2020-06-30 NOTE — ASSESSMENT & PLAN NOTE
· Suspect right lower extremity findings likely secondary to DVT as above  · Procalcitonin was negative  · Will hold off on further antibiotics at this time and monitor

## 2020-06-30 NOTE — ASSESSMENT & PLAN NOTE
 SIRS criteria:  Leukocytosis, fever   Suspected source:  Right lower extremity cellulitis   Lactic acid:  Normal   End organ damage:  Creatinine greater than 1 5   IV Fluids:  Normal saline at 125 mL/hr overnight   IV antibiotics:  IV cefepime and IV vancomycin was given in the emergency department, will switch to IV Ancef q 8 hours   Follow up on culture results   Monitor vital signs, laboratory studies

## 2020-06-30 NOTE — UTILIZATION REVIEW
Initial Clinical Review    Admission: Date/Time/Statement: Admission Orders (From admission, onward)     Ordered        06/29/20 2141  Inpatient Admission (expected length of stay for this patient Order details is greater than two midnights)  Once                   Orders Placed This Encounter   Procedures    Inpatient Admission (expected length of stay for this patient Order details is greater than two midnights)     Standing Status:   Standing     Number of Occurrences:   1     Order Specific Question:   Admitting Physician     Answer:   Erick Hernandez [[id number]]     Order Specific Question:   Level of Care     Answer:   Med Surg [16]     Order Specific Question:   Estimated length of stay     Answer:   More than 2 Midnights     Order Specific Question:   Certification     Answer:   I certify that inpatient services are medically necessary for this patient for a duration of greater than two midnights  See H&P and MD Progress Notes for additional information about the patient's course of treatment  ED Arrival Information     Expected Arrival Acuity Means of Arrival Escorted By Service Admission Type    - 6/29/2020 20:37 Urgent Walk-In Family Member Hospitalist Urgent    Arrival Complaint    Leg Pain        Chief Complaint   Patient presents with    Leg Pain     pt was d/c earlier today for similar thing, states after he got home his right pain became progressivly worse and now is the entire right leg  pt denies swelling/redness  pt had stent placed in right groin back in january for blood clots  pt believes he takes blood thinners  Assessment/Plan:   61 yo male to ED from home admitted as Inpatient due to Sepsis with acute organ dysfunction with Cellulitis to right lower extremity in setting of acute DVT  Reports right lower extremity swelling with chills, fever for1 day  Recent admission & DC for lower lip angioedema likely to Lisinopril   At DC patient did not report any erythema of right leg, chills or fever; on this interview admits some mild discomfort of right leg but did not report symptom  When he arrived home, he felt unwell with increased chills & fever with a very painful right leg & the beginning of erythema  IN ED: MD exam; febrile, erythema (Large amount of erythema involving the right lower leg, hot warm, concerning for cellulitis  Inpatient labs obtained, he received IVF, dual IV antibiotics & tylenol  Obtain VAS study  Cont with IV ancef Q8hr; inpat labs with elevated creatinine-cont IVF  6/29  11 PM attending MD   Notified by vascular department shortly after evaluating the duplex ultrasound of his right lower extremity is positive for DVT in his right posterior tibial vein  Begin  IV heparin infusion, draw thrombosis panel & pro calcitonin  6/30 Attending MD:  Reports cont pain to right lower extremity; on heparin yesterday & now transition to Xarelto, follow up on thrombotic pane  Monitor off antibiotics for cellulitis & SIRS  Monitor renal function -DC IVF now & encourage incr PO intake      ED Triage Vitals [06/29/20 2048]   Temperature Pulse Respirations Blood Pressure SpO2   (!) 101 4 °F (38 6 °C) 79 20 169/91 97 %      Temp Source Heart Rate Source Patient Position - Orthostatic VS BP Location FiO2 (%)   Oral Monitor Sitting Right arm --      Pain Score       Worst Possible Pain        Wt Readings from Last 1 Encounters:   06/29/20 129 kg (284 lb)     Additional Vital Signs:   Date/Time  Temp  Pulse  Resp  BP  MAP (mmHg)  SpO2  O2 Device  Patient Position - Orthostatic VS   06/30/20 0900              None (Room air)     06/30/20 0714  98 9 °F (37 2 °C)  59  18  152/83  111  98 %  None (Room air)  Lying   06/29/20 2226              None (Room air)     06/29/20 2222  100 7 °F (38 2 °C)Abnormal   76  18  162/75  108  97 %  None (Room air)  Lying   06/29/20 2153  100 6 °F (38 1 °C)Abnormal   82  20  153/83    100 %  None (Room air)  Lying   06/29/20 2048  101 4 °F (38 6 °C)Abnormal   79  20  169/91    97 %  None (Room air)  Sitting       Pertinent Labs/Diagnostic Test Results:       Results from last 7 days   Lab Units 06/30/20  0531 06/29/20 2104 06/29/20 0521 06/28/20 0457 06/27/20  0442   WBC Thousand/uL 16 14* 15 98* 8 35 8 40 6 30   HEMOGLOBIN g/dL 12 9 14 6 14 0 13 8 12 8   HEMATOCRIT % 39 6 43 5 42 5 40 4 39 0   PLATELETS Thousands/uL 140* 173 154 175 143*   NEUTROS ABS Thousands/µL 13 17* 13 46*  --  6 83 3 37         Results from last 7 days   Lab Units 06/30/20  0531 06/29/20 2104 06/29/20 0521 06/28/20 0457 06/27/20 0442   SODIUM mmol/L 131* 137 135* 136 140   POTASSIUM mmol/L 3 6 4 0 3 6 4 1 4 1   CHLORIDE mmol/L 103 100 101 101 107   CO2 mmol/L 20* 30 30 26 26   ANION GAP mmol/L 8 7 4 9 7   BUN mg/dL 21 26* 27* 30* 31*   CREATININE mg/dL 1 22 1 53* 1 37* 1 24 1 41*   EGFR ml/min/1 73sq m 76 58 66 75 64   CALCIUM mg/dL 7 8* 9 5 9 4 9 4 8 8   MAGNESIUM mg/dL  --   --  2 0 1 7  --    PHOSPHORUS mg/dL  --   --  3 3 3 8  --      Results from last 7 days   Lab Units 06/29/20 2104 06/27/20 0442   AST U/L 17 26   ALT U/L 26 30   ALK PHOS U/L 100 100   TOTAL PROTEIN g/dL 8 7* 7 6   ALBUMIN g/dL 4 2 3 7   TOTAL BILIRUBIN mg/dL 0 78 0 48         Results from last 7 days   Lab Units 06/30/20  0531 06/29/20 2104 06/29/20 0521 06/28/20 0457 06/27/20  0442   GLUCOSE RANDOM mg/dL 110 101 106 132 104         Results from last 7 days   Lab Units 06/30/20  0531 06/29/20 2312 06/29/20 2104 06/27/20 0442   PROTIME seconds  --   --  13 5 13 9   INR   --   --  1 09 1 13   PTT seconds >210* 32 31 33         Results from last 7 days   Lab Units 06/29/20  2312   PROCALCITONIN ng/ml 0 09     Results from last 7 days   Lab Units 06/29/20  2104   LACTIC ACID mmol/L 1 7           Results from last 7 days   Lab Units 06/29/20 2104 06/29/20 2050   BLOOD CULTURE  Received in Microbiology Lab  Culture in Progress  Received in Microbiology Lab  Culture in Progress       6/29 VAS CONCLUSION:  Impression:  RIGHT LOWER LIMB:  There is acute occlusive deep vein thrombosis noted in the right paired  posterior tibial veins  No evidence of superficial thrombophlebitis noted  Doppler evaluation shows a normal response to augmentation maneuvers  Popliteal, posterior tibial and anterior tibial arterial Doppler waveforms are  triphasic  LEFT LOWER LIMB:  No evidence of acute or chronic deep vein thrombosis  No evidence of superficial thrombophlebitis noted  Doppler evaluation shows a normal response to augmentation maneuvers  Popliteal, posterior tibial and anterior tibial arterial Doppler waveforms are  triphasic  6/27 ekg: sinus martha with sinus arrhythmia- otherwise normal      ED Treatment:   Medication Administration from 06/29/2020 2036 to 06/29/2020 2206       Date/Time Order Dose Route Action     06/29/2020 2105 sodium chloride 0 9 % bolus 500 mL 500 mL Intravenous New Bag     06/29/2020 2135 cefepime (MAXIPIME) 2 g/50 mL dextrose IVPB 0 mg Intravenous Stopped     06/29/2020 2105 cefepime (MAXIPIME) 2 g/50 mL dextrose IVPB 2,000 mg Intravenous New Bag     06/29/2020 2135 vancomycin (VANCOCIN) 2,000 mg in sodium chloride 0 9 % 500 mL IVPB 2,000 mg Intravenous New Bag     06/29/2020 2105 acetaminophen (TYLENOL) tablet 650 mg 650 mg Oral Given        Past Medical History:   Diagnosis Date    Angioedema     Cardiac disease     GI bleed     High blood pressure     Hypertension     stopped his meds when ran out several years ago    STEMI [facility] elevation myocardial infarction) [facility]      Present on Admission:   Coronary artery disease involving native heart without angina pectoris   Essential hypertension   Severe sepsis with acute organ dysfunction ([facility] )   Cellulitis of right lower extremity    Admitting Diagnosis: Cellulitis of right leg [L03 115]  Sepsis, due to unspecified organism, unspecified whether acute organ dysfunction present ([facility] ) [A41 9]  Age/Sex: 62 y o  male  Admission Orders:  Scheduled Medications:    Medications:  carvedilol 25 mg Oral BID With Meals   cefazolin 2,000 mg Intravenous Q8H   clopidogrel 75 mg Oral Daily   hydrochlorothiazide 25 mg Oral Daily   isosorbide mononitrate 30 mg Oral Daily     Continuous IV Infusions:    heparin (porcine) 3-30 Units/kg/hr (Order-Specific) Intravenous Titrated   sodium chloride 125 mL/hr Intravenous Continuous     PRN Meds:    acetaminophen 650 mg Oral Q6H PRN   morphine injection 2 mg Intravenous Q4H PRN   ondansetron 4 mg Intravenous Q6H PRN   Up OOB  Bilateral scd  APTT  Network Utilization Review Department  [email or web address]  ATTENTION: Please call with any questions or concerns to [phone] and carefully listen to the prompts so that you are directed to the right person  All voicemails are confidential   Lui Dickerson all requests for admission clinical reviews, approved or denied determinations and any other requests to dedicated fax number below belonging to the campus where the patient is receiving treatment   List of dedicated fax numbers for the Facilities:  [address] [place] [address] DENIALS (Administrative/Medical Necessity) [phone]   [address] (Maternity/NICU/Pediatrics) [phone]   [place] [phone]   Jamas Piggs [phone]   [address] [place] [address] Bl [phone]   Levell Pleasure [phone]   [address] [facility] [address] [facility] [phone]   Ladene Manus [phone]   [address] [facility] [address] [facility]  [address] [facility] [address] [facility] [phone]

## 2020-06-30 NOTE — PROGRESS NOTES
I was notified by vascular department shortly after evaluating the patient that his duplex ultrasound of his right lower extremity is positive for DVT in his right posterior tibial vein  At this point I will place the patient on IV heparin infusion, draw thrombosis panel as well as procalcitonin level  Follow up on case management in the morning to determine anticoagulation options      Joanna Saez PA-C

## 2020-06-30 NOTE — SOCIAL WORK
CM notified that anti-coagulation at discharge  A script for Xarelto was sent to [facility] to verify coverage and cost   CM heard back from Yanira in at [facility] who reported medication would need prior authorization  CM updated [facility] provider who tried sending script for Eliquis to [facility]  CM heard back from [place] at the pharmacy who reported that his also would need prior authorization  Prior auth number [id number]  CM contacted prior authorization number with [employer] and spoke with Rach Rivera  Prior authorization case was built then CM remained on the phone to be transferred to the prior authorization department  CM spoke with Delon Maldonado and completed prior authorization over the phone  Patient will be able to use free 30 day supply coupon for the Starter Pack so a prior auth of a 90 day supply (180 pills of Eliquis) was completed for future fills  Case ID #[id number] was approved from 5/31/20 - 6/30/21  [facility] provider updated  [facility] pharmacy updated on prior auth and need for Eliquis starter pack to be filled prior to discharge

## 2020-06-30 NOTE — ASSESSMENT & PLAN NOTE
· Baseline creatinine appears to be around 1  · Creatinine peaked at 1 5, currently improved to 1 2 today  · Likely pre renal   Discontinue IV fluids at this time and encourage increased oral intake  · Continue to monitor renal function closely

## 2020-06-30 NOTE — DISCHARGE INSTR - AVS FIRST PAGE
Dear Caron Krause,     It was our pleasure to care for you here at [facility], [address] [place] [address] [place]  It is our hope that we were always able to exceed the expected standards for your care during your stay  You were hospitalized due to right lower extremity pain with fever and swelling  You were cared for on the [facility] 2nd floor under the service of Nichol Chaney MD with the [address] [place] [address] [facility] who covers for your primary care physician (PCP), MAX Branch, while you were hospitalized  If you have any questions or concerns related to this hospitalization, you may contact us at [phone]  For follow up as well as medication refills, we recommend that you follow up with your primary care physician  A registered nurse will reach out to you by phone within a few days after your discharge to answer any additional questions that you may have after going home  However, at this time we provide for you here, the most important instructions / recommendations at discharge:       · Notable Medication Adjustments -   · Continue Eliquis 10 mg twice daily for 7 days total and then decrease to 5 mg twice daily as per instructions  · Continue anticoagulation for at least 3-6 months or as otherwise instructed by your primary care provider    · Testing Required after Discharge -   · None    · Important follow up information -   · Follow-up with your primary care physician for results on thrombotic panel that was pending at the time of your discharge    · Other Instructions -   · Continue to use compression stockings to help alleviate and reduce risk of post phlebitic syndrome    · Please review this entire after visit summary as additional general instructions including medication list, appointments, activity, diet, any pertinent wound care, and other additional recommendations from your care team that may be provided for you        Sincerely,     Nichol Chaney MD

## 2020-06-30 NOTE — ED PROVIDER NOTES
History  Chief Complaint   Patient presents with    Leg Pain     pt was d/c earlier today for similar thing, states after he got home his right pain became progressivly worse and now is the entire right leg  pt denies swelling/redness  pt had stent placed in right groin back in january for blood clots  pt believes he takes blood thinners  History provided by:  Patient and relative  Leg Pain   Location:  Leg  Time since incident:  1 day  Injury: no    Leg location:  R leg and R lower leg  Pain details:     Quality:  Aching    Radiates to:  Does not radiate    Severity:  Moderate    Onset quality:  Gradual    Duration:  1 day    Timing:  Constant    Progression:  Worsening  Chronicity:  New  Relieved by:  Rest  Worsened by: Activity and bearing weight  Ineffective treatments:  None tried  Associated symptoms: fever    Associated symptoms: no neck pain    Associated symptoms comment:  Redness affecting the right lower leg      Prior to Admission Medications   Prescriptions Last Dose Informant Patient Reported? Taking?    Testosterone (ANDROGEL) 40 5 MG/2 5GM (1 62%) GEL  Self Yes No   Sig: Place 40 5 mg on the skin daily    acetaminophen (TYLENOL) 325 mg tablet  Self No No   Sig: Take 3 tablets (975 mg total) by mouth every 8 (eight) hours   carvedilol (COREG) 25 mg tablet   No No   Sig: Take 1 tablet (25 mg total) by mouth 2 (two) times a day with meals   clopidogrel (PLAVIX) 75 mg tablet   No No   Sig: Take 1 tablet (75 mg total) by mouth daily   hydrochlorothiazide (HYDRODIURIL) 25 mg tablet   No No   Sig: Take 1 tablet (25 mg total) by mouth daily   isosorbide mononitrate (IMDUR) 30 mg 24 hr tablet   No No   Sig: Take 1 tablet (30 mg total) by mouth daily      Facility-Administered Medications: None       Past Medical History:   Diagnosis Date    Angioedema     Cardiac disease     GI bleed     High blood pressure     Hypertension     stopped his meds when ran out several years ago    STEMI (ST elevation myocardial infarction) [facility])        Past Surgical History:   Procedure Laterality Date    NO PAST SURGERIES      UPPER GASTROINTESTINAL ENDOSCOPY      Clamped large stomach ulcer       Family History   Adopted: Yes   Family history unknown: Yes     I have reviewed and agree with the history as documented  E-Cigarette/Vaping    E-Cigarette Use Never User      E-Cigarette/Vaping Substances     Social History     Tobacco Use    Smoking status: Never Smoker    Smokeless tobacco: Never Used   Substance Use Topics    Alcohol use: Never     Alcohol/week: 0 0 standard drinks     Frequency: Never     Drinks per session: 1 or 2     Binge frequency: Never    Drug use: No       Review of Systems   Constitutional: Positive for chills and fever  Negative for activity change and diaphoresis  HENT: Negative for congestion, sinus pressure and sore throat  Eyes: Negative for pain and visual disturbance  Respiratory: Negative for cough, chest tightness, shortness of breath, wheezing and stridor  Cardiovascular: Negative for chest pain and palpitations  Gastrointestinal: Negative for abdominal distention, abdominal pain, constipation, diarrhea, nausea and vomiting  Genitourinary: Negative for dysuria and frequency  Musculoskeletal: Negative for neck pain and neck stiffness  Skin: Negative for rash  Neurological: Negative for dizziness, speech difficulty, light-headedness, numbness and headaches  Physical Exam  Physical Exam   Constitutional: He is oriented to person, place, and time  He appears well-developed  No distress  HENT:   Head: Normocephalic and atraumatic  Eyes: Pupils are equal, round, and reactive to light  Neck: Normal range of motion  Neck supple  No tracheal deviation present  Cardiovascular: Normal rate, regular rhythm, normal heart sounds and intact distal pulses  No murmur heard  Pulmonary/Chest: Effort normal and breath sounds normal  No stridor   No respiratory distress  Abdominal: Soft  He exhibits no distension  There is no tenderness  There is no rebound and no guarding  Musculoskeletal: Normal range of motion  Neurological: He is alert and oriented to person, place, and time  Skin: Skin is warm and dry  He is not diaphoretic  There is erythema (Large amount of erythema involving the right lower leg, hot warm, concerning for cellulitis no evidence of abscess)  No pallor  Psychiatric: He has a normal mood and affect  Vitals reviewed  Vital Signs  ED Triage Vitals [06/29/20 2048]   Temperature Pulse Respirations Blood Pressure SpO2   (!) 101 4 °F (38 6 °C) 79 20 169/91 97 %      Temp Source Heart Rate Source Patient Position - Orthostatic VS BP Location FiO2 (%)   Oral Monitor Sitting Right arm --      Pain Score       Worst Possible Pain           Vitals:    06/29/20 2048   BP: 169/91   Pulse: 79   Patient Position - Orthostatic VS: Sitting         Visual Acuity      ED Medications  Medications   vancomycin (VANCOCIN) 2,000 mg in sodium chloride 0 9 % 500 mL IVPB (2,000 mg Intravenous New Bag 6/29/20 2135)   sodium chloride 0 9 % bolus 500 mL (0 mL Intravenous Stopped 6/29/20 2131)   cefepime (MAXIPIME) 2 g/50 mL dextrose IVPB (0 mg Intravenous Stopped 6/29/20 2135)   acetaminophen (TYLENOL) tablet 650 mg (650 mg Oral Given 6/29/20 2105)       Diagnostic Studies  Results Reviewed     Procedure Component Value Units Date/Time    Lactic acid [[id number]]  (Normal) Collected:  06/29/20 2104    Lab Status:  Final result Specimen:  Blood from Arm, Right Updated:  06/29/20 2136     LACTIC ACID 1 7 mmol/L     Narrative:       Result may be elevated if tourniquet was used during collection      Rafa Rebolledo [[id number]]  (Normal) Collected:  06/29/20 2104    Lab Status:  Final result Specimen:  Blood from Arm, Right Updated:  06/29/20 2131     Protime 13 5 seconds      INR 1 09    APTT [[id number]]  (Normal) Collected:  06/29/20 2104    Lab Status:  Final result Specimen:  Blood from Arm, Right Updated:  06/29/20 2131     PTT 31 seconds     CBC and differential [[id number]]  (Abnormal) Collected:  06/29/20 2104    Lab Status:  Final result Specimen:  Blood from Arm, Right Updated:  06/29/20 2123     WBC 15 98 Thousand/uL      RBC 4 77 Million/uL      Hemoglobin 14 6 g/dL      Hematocrit 43 5 %      MCV 91 fL      MCH 30 6 pg      MCHC 33 6 g/dL      RDW 12 7 %      MPV 11 8 fL      Platelets 267 Thousands/uL      nRBC 0 /100 WBCs      Neutrophils Relative 85 %      Immat GRANS % 0 %      Lymphocytes Relative 7 %      Monocytes Relative 8 %      Eosinophils Relative 0 %      Basophils Relative 0 %      Neutrophils Absolute 13 46 Thousands/µL      Immature Grans Absolute 0 06 Thousand/uL      Lymphocytes Absolute 1 13 Thousands/µL      Monocytes Absolute 1 24 Thousand/µL      Eosinophils Absolute 0 03 Thousand/µL      Basophils Absolute 0 06 Thousands/µL     Blood culture #1 [[id number]] Collected:  06/29/20 2050    Lab Status: In process Specimen:  Blood from Arm, Left Updated:  06/29/20 2115    Blood culture #2 [[id number]] Collected:  06/29/20 2104    Lab Status: In process Specimen:  Blood from Arm, Right Updated:  06/29/20 2115    Comprehensive metabolic panel [[id number]] Collected:  06/29/20 2104    Lab Status: In process Specimen:  Blood from Arm, Right Updated:  06/29/20 2114                 No orders to display              Procedures  Procedures         ED Course                           Initial Sepsis Screening     Row Name 06/29/20 2131                Is the patient's history suggestive of a new or worsening infection? (!) Yes (Proceed)  -DA        Suspected source of infection  soft tissue  -DA        Are two or more of the following signs & symptoms of infection both present and new to the patient? (!) Yes (Proceed)  -DA        Indicate SIRS criteria  Hyperthemia > 38 3C (100 9F); Leukocytosis (WBC > 86915 IJL)  -DA        If the answer is yes to both questions, suspicion of sepsis is present          If severe sepsis is present AND tissue hypoperfusion perists in the hour after fluid resuscitation or lactate > 4, the patient meets criteria for SEPTIC SHOCK          Are any of the following organ dysfunction criteria present within 6 hours of suspected infection and SIRS criteria that are NOT considered to be chronic conditions?         Organ dysfunction          Date of presentation of severe sepsis          Time of presentation of severe sepsis          Tissue hypoperfusion persists in the hour after crystalloid fluid administration, evidenced, by either:          Was hypotension present within one hour of the conclusion of crystalloid fluid administration?         Date of presentation of septic shock          Time of presentation of septic shock            User Key  (r) = Recorded By, (t) = Taken By, (c) = Cosigned By    [address] Name Provider Type    NIRANJAN Brown DO Physician                        MDM  Number of Diagnoses or Management Options  Cellulitis of right leg: new and requires workup  Sepsis, due to unspecified organism, unspecified whether acute organ dysfunction present [facility]): new and requires workup  Diagnosis management comments: Red hot right lower extremity, febrile, consistent with cellulitis    As patient was just discharged, will cover with cefepime and vancomycin       Amount and/or Complexity of Data Reviewed  Clinical lab tests: ordered and reviewed  Decide to obtain previous medical records or to obtain history from someone other than the patient: yes  Obtain history from someone other than the patient: yes  Review and summarize past medical records: yes  Discuss the patient with other providers: yes          Disposition  Final diagnoses:   Sepsis, due to unspecified organism, unspecified whether acute organ dysfunction present ([facility] [address] )   Cellulitis of right leg     Time reflects when diagnosis was documented in both MDM as applicable and the Disposition within this note     Time User Action Codes Description Comment    6/29/2020  9:40 PM Catrachita Clifford Add [A41 9] Sepsis, due to unspecified organism, unspecified whether acute organ dysfunction present ([facility] [address] )     6/29/2020  9:41 PM Catrachita Clifford Add [L03 [id number]] Cellulitis of right leg       ED Disposition     ED Disposition Condition Date/Time Comment    Admit Stable Mon Jun 29, 2020  9:40 PM Case was discussed with [facility] and the patient's admission status was agreed to be Admission Status: inpatient status to the service of Dr Caron Ny   Follow-up Information    None         Patient's Medications   Discharge Prescriptions    No medications on file     No discharge procedures on file      PDMP Review       Value Time User    PDMP Reviewed  Yes 6/27/2020  4:07 AM Jose Alejandro Chiu MD          ED Provider  Electronically Signed by           Chad Rolon DO  06/29/20 1122

## 2020-07-01 VITALS
OXYGEN SATURATION: 98 % | HEART RATE: 56 BPM | RESPIRATION RATE: 18 BRPM | TEMPERATURE: 98.6 F | SYSTOLIC BLOOD PRESSURE: 165 MMHG | DIASTOLIC BLOOD PRESSURE: 89 MMHG

## 2020-07-01 PROBLEM — R65.10 SIRS (SYSTEMIC INFLAMMATORY RESPONSE SYNDROME) (HCC): Status: RESOLVED | Noted: 2020-06-29 | Resolved: 2020-07-01

## 2020-07-01 PROBLEM — L03.115 CELLULITIS OF RIGHT LOWER EXTREMITY: Status: RESOLVED | Noted: 2020-06-29 | Resolved: 2020-07-01

## 2020-07-01 PROBLEM — N17.9 AKI (ACUTE KIDNEY INJURY) (HCC): Status: RESOLVED | Noted: 2019-11-08 | Resolved: 2020-07-01

## 2020-07-01 LAB
ANION GAP SERPL CALCULATED.3IONS-SCNC: 8 MMOL/L (ref 4–13)
BASOPHILS # BLD AUTO: 0.06 THOUSANDS/ΜL (ref 0–0.1)
BASOPHILS NFR BLD AUTO: 1 % (ref 0–1)
BUN SERPL-MCNC: 20 MG/DL (ref 5–25)
C1INH ACT/NOR SERPL: 93 %MEAN NORMAL
CALCIUM SERPL-MCNC: 9.1 MG/DL (ref 8.3–10.1)
CARDIOLIPIN IGA SER IA-ACNC: <9 APL U/ML (ref 0–11)
CARDIOLIPIN IGG SER IA-ACNC: <9 GPL U/ML (ref 0–14)
CARDIOLIPIN IGM SER IA-ACNC: <9 MPL U/ML (ref 0–12)
CHLORIDE SERPL-SCNC: 100 MMOL/L (ref 100–108)
CO2 SERPL-SCNC: 26 MMOL/L (ref 21–32)
CREAT SERPL-MCNC: 1.25 MG/DL (ref 0.6–1.3)
EOSINOPHIL # BLD AUTO: 0.08 THOUSAND/ΜL (ref 0–0.61)
EOSINOPHIL NFR BLD AUTO: 1 % (ref 0–6)
ERYTHROCYTE [DISTWIDTH] IN BLOOD BY AUTOMATED COUNT: 12.6 % (ref 11.6–15.1)
GFR SERPL CREATININE-BSD FRML MDRD: 74 ML/MIN/1.73SQ M
GLUCOSE SERPL-MCNC: 121 MG/DL (ref 65–140)
HCT VFR BLD AUTO: 41.4 % (ref 36.5–49.3)
HGB BLD-MCNC: 13.9 G/DL (ref 12–17)
IMM GRANULOCYTES # BLD AUTO: 0.06 THOUSAND/UL (ref 0–0.2)
IMM GRANULOCYTES NFR BLD AUTO: 1 % (ref 0–2)
LYMPHOCYTES # BLD AUTO: 1.45 THOUSANDS/ΜL (ref 0.6–4.47)
LYMPHOCYTES NFR BLD AUTO: 12 % (ref 14–44)
MCH RBC QN AUTO: 30.6 PG (ref 26.8–34.3)
MCHC RBC AUTO-ENTMCNC: 33.6 G/DL (ref 31.4–37.4)
MCV RBC AUTO: 91 FL (ref 82–98)
MONOCYTES # BLD AUTO: 1.14 THOUSAND/ΜL (ref 0.17–1.22)
MONOCYTES NFR BLD AUTO: 9 % (ref 4–12)
NEUTROPHILS # BLD AUTO: 9.32 THOUSANDS/ΜL (ref 1.85–7.62)
NEUTS SEG NFR BLD AUTO: 76 % (ref 43–75)
NRBC BLD AUTO-RTO: 0 /100 WBCS
PLATELET # BLD AUTO: 146 THOUSANDS/UL (ref 149–390)
PMV BLD AUTO: 11.9 FL (ref 8.9–12.7)
POTASSIUM SERPL-SCNC: 3.8 MMOL/L (ref 3.5–5.3)
PROCALCITONIN SERPL-MCNC: 0.1 NG/ML
RBC # BLD AUTO: 4.54 MILLION/UL (ref 3.88–5.62)
SODIUM SERPL-SCNC: 134 MMOL/L (ref 136–145)
WBC # BLD AUTO: 12.11 THOUSAND/UL (ref 4.31–10.16)

## 2020-07-01 PROCEDURE — 85025 COMPLETE CBC W/AUTO DIFF WBC: CPT | Performed by: INTERNAL MEDICINE

## 2020-07-01 PROCEDURE — 99239 HOSP IP/OBS DSCHRG MGMT >30: CPT | Performed by: INTERNAL MEDICINE

## 2020-07-01 PROCEDURE — 80048 BASIC METABOLIC PNL TOTAL CA: CPT | Performed by: INTERNAL MEDICINE

## 2020-07-01 PROCEDURE — 84145 PROCALCITONIN (PCT): CPT | Performed by: INTERNAL MEDICINE

## 2020-07-01 RX ADMIN — CARVEDILOL 25 MG: 12.5 TABLET, FILM COATED ORAL at 08:24

## 2020-07-01 RX ADMIN — CLOPIDOGREL BISULFATE 75 MG: 75 TABLET ORAL at 08:24

## 2020-07-01 RX ADMIN — APIXABAN 10 MG: 5 TABLET, FILM COATED ORAL at 08:24

## 2020-07-01 RX ADMIN — ACETAMINOPHEN 975 MG: 325 TABLET, FILM COATED ORAL at 05:10

## 2020-07-01 RX ADMIN — ISOSORBIDE MONONITRATE 30 MG: 30 TABLET, EXTENDED RELEASE ORAL at 08:24

## 2020-07-01 RX ADMIN — HYDROCHLOROTHIAZIDE 25 MG: 25 TABLET ORAL at 08:24

## 2020-07-01 NOTE — PLAN OF CARE
Problem: Potential for Falls  Goal: Patient will remain free of falls  Description  INTERVENTIONS:  - Assess patient frequently for physical needs  -  Identify cognitive and physical deficits and behaviors that affect risk of falls    -  [place] fall precautions as indicated by assessment   - Educate patient/family on patient safety including physical limitations  - Instruct patient to call for assistance with activity based on assessment  - Modify environment to reduce risk of injury  - Consider OT/PT consult to assist with strengthening/mobility  Outcome: Progressing     Problem: PAIN - ADULT  Goal: Verbalizes/displays adequate comfort level or baseline comfort level  Description  Interventions:  - Encourage patient to monitor pain and request assistance  - Assess pain using appropriate pain scale  - Administer analgesics based on type and severity of pain and evaluate response  - Implement non-pharmacological measures as appropriate and evaluate response  - Consider cultural and social influences on pain and pain management  - Notify physician/advanced practitioner if interventions unsuccessful or patient reports new pain  Outcome: Progressing     Problem: INFECTION - ADULT  Goal: Absence or prevention of progression during hospitalization  Description  INTERVENTIONS:  - Assess and monitor for signs and symptoms of infection  - Monitor lab/diagnostic results  - Monitor all insertion sites, i e  indwelling lines, tubes, and drains  - Monitor endotracheal if appropriate and nasal secretions for changes in amount and color  - [place] appropriate cooling/warming therapies per order  - Administer medications as ordered  - Instruct and encourage patient and family to use good hand hygiene technique  - Identify and instruct in appropriate isolation precautions for identified infection/condition  Outcome: Progressing     Problem: SAFETY ADULT  Goal: Maintain or return to baseline ADL function  Description  INTERVENTIONS:  -  Assess patient's ability to carry out ADLs; assess patient's baseline for ADL function and identify physical deficits which impact ability to perform ADLs (bathing, care of mouth/teeth, toileting, grooming, dressing, etc )  - Assess/evaluate cause of self-care deficits   - Assess range of motion  - Assess patient's mobility; develop plan if impaired  - Assess patient's need for assistive devices and provide as appropriate  - Encourage maximum independence but intervene and supervise when necessary  - Involve family in performance of ADLs  - Assess for home care needs following discharge   - Consider OT consult to assist with ADL evaluation and planning for discharge  - Provide patient education as appropriate  Outcome: Progressing  Goal: Maintain or return mobility status to optimal level  Description  INTERVENTIONS:  - Assess patient's baseline mobility status (ambulation, transfers, stairs, etc )    - Identify cognitive and physical deficits and behaviors that affect mobility  - Identify mobility aids required to assist with transfers and/or ambulation (gait belt, sit-to-stand, lift, walker, cane, etc )  - [place] fall precautions as indicated by assessment  - Record patient progress and toleration of activity level on Mobility SBAR; progress patient to next Phase/Stage  - Instruct patient to call for assistance with activity based on assessment  - Consider rehabilitation consult to assist with strengthening/weightbearing, etc   Outcome: Progressing     Problem: Knowledge Deficit  Goal: Patient/family/caregiver demonstrates understanding of disease process, treatment plan, medications, and discharge instructions  Description  Complete learning assessment and assess knowledge base    Interventions:  - Provide teaching at level of understanding  - Provide teaching via preferred learning methods  Outcome: Progressing

## 2020-07-01 NOTE — UTILIZATION REVIEW
Notification of Inpatient Admission/Inpatient Authorization Request   This is a Notification of Inpatient Admission for [address]  Be advised that this patient was admitted to our facility under Inpatient Status  Contact Lobo Estrada at [phone] for additional admission information  [facility] DEPT  DEDICATED [phone]  Patient Name:   Savana Flores   YOB: 1965       [address]   [address]:   [address]  Tax ID: [id number]  NPI: [id number] Attending Provider/NPI:  Address:  Phone: Mohit Grijalva Md [[id number]]  Same as the facility  [phone]   Place of Service Code: 24 Place of Service Name:  [address] [place] [address]   Start Date: 6/27/20 0556     Discharge Date & Time: 6/29/2020 12:50 PM    Type of Admission: Inpatient Status Discharge Disposition   (if discharged): Home/Self Care   Patient Diagnoses: Facial swelling [R22 0]  ZAKIA (acute kidney injury) ([facility] [address] ) [N17 9]  Angioedema of lips, initial encounter Matthew Harrell  3XXA]     Orders: Admission Orders (From admission, onward)     Ordered        06/27/20 0556  Inpatient Admission  Once                    Assigned Utilization Review Contact: Lobo Estrada  Utilization   Network Utilization Review Department  Phone: [phone]; Fax [phone]  Email: [email or web address]   ATTENTION PAYERS: Please call the assigned Utilization  directly with any questions or concerns ALL voicemails in the department are confidential  Send all requests for admission clinical reviews, approved or denied determinations and any other requests to dedicated fax number belonging to the campus where the patient is receiving treatment        Initial Clinical Review    Admission: Date/Time/Statement:   Admission Orders (From admission, onward)     Ordered        06/27/20 0556  Inpatient Admission  Once                   Orders Placed This Encounter   Procedures    Inpatient Admission     Standing Status:   Standing     Number of Occurrences:   1     Order Specific Question:   Admitting Physician     Answer:   Micaela Gamino     Order Specific Question:   Level of Care     Answer:   Level 1 Stepdown [13]     Order Specific Question:   Estimated length of stay     Answer:   More than 2 Midnights     Order Specific Question:   Certification     Answer:   I certify that inpatient services are medically necessary for this patient for a duration of greater than two midnights  See H&P and MD Progress Notes for additional information about the patient's course of treatment  ED Arrival Information     Expected Arrival Acuity Means of Arrival Escorted By Service Admission Type    - 6/27/2020 04:03 Urgent Walk-In Self Hospitalist Urgent    Arrival Complaint    Facial swelling         Chief Complaint   Patient presents with    Facial Swelling     Pt felt minor lip swelling last evening and woke up today with major lip and facial swelling down to left neck  Denies sob, hoarseness, no tongue swelling and no problems swallowing  Nausea x 1 week  Assessment/Plan: 62year old male, presented to the ED @ [facility], from home via walk in  Admitted as Inpatient due to Angioedema  The patient states that he developed R facial swelling last PM that spread across his neck, over the course of the evening he also developed lower lip swelling prompting ED evaluation  Of note, patient had recent admission in December of last year with NSTEMI during which he had an episode of angioedema after ACEi inhibitor therapy  During that admission he was transitioned to imdur, HCTZ and coreg although he states that he has still been taking lisinopril and was not told to stop it  Denies SOB, dysphonia, dysphagia      06/28/2020 Angioedema secondary to medication use (lisinopril)  Follow up C1 esterase  Continue to monitor for S/SX recurrence    DC lisinopril, Avoid ACEi/ARB  Continue ASA / plavix  Hold Statin  ED Triage Vitals [20 0414]   Temperature Pulse Respirations Blood Pressure SpO2   97 9 °F (36 6 °C) (!) 47 18 (!) 189/86 98 %      Temp Source Heart Rate Source Patient Position - Orthostatic VS BP Location FiO2 (%)   Oral Monitor Lying Right arm --      Pain Score       No Pain          Wt Readings from Last 1 Encounters:   20 129 kg (284 lb)     Additional Vital Signs:   Date/Time  Temp  Pulse  Resp  BP  MAP (mmHg)  SpO2  O2 Device  Patient Position - Orthostatic VS   20 1529  97 6 °F (36 4 °C)  49Abnormal   16  140/85  107  100 %       20 0654  97 7 °F (36 5 °C)  46Abnormal   15  166/79  109  99 %  None (Room air)  Lying   20 2000        160/84        Lying   20 1841  97 9 °F (36 6 °C)  53Abnormal   18      98 %  None (Room air)  Lying   20 1810    53Abnormal   18  184/87Abnormal    125  98 %       BP: coreg given at 20 1810   20 1700    54Abnormal   16  162/74  107  99 %       20 1500  97 5 °F (36 4 °C)  59  19  171/79Abnormal   114  100 %  None (Room air)  Lying   20 1030    53Abnormal   25Abnormal   170/81  116  97 %       20 0915    51Abnormal   19  180/87Abnormal   125  98 %       20 0710  97 6 °F (36 4 °C)  48Abnormal   18  187/91Abnormal   128  97 %  None (Room air)     20 0638    46Abnormal   18  185/89Abnormal   128  98 %  None (Room air)  Lying     2020 @ 0716  Chest X:  No acute cardiopulmonary disease    2020 @ 0450  EC S  Bradycardia    Pertinent Labs/Diagnostic Test Results:     Results from last 7 days   Lab Units 20  0514 20  0531 20  2104 20  0521 20  0457   WBC Thousand/uL 12 11* 16 14* 15 98* 8 35 8 40   HEMOGLOBIN g/dL 13 9 12 9 14 6 14 0 13 8   HEMATOCRIT % 41 4 39 6 43 5 42 5 40 4   PLATELETS Thousands/uL 146* 140* 173 154 175   NEUTROS ABS Thousands/µL 9 32* 13 17* 13 46*  --  6 83     Results from last 7 days   Lab Units 07/01/20  0514 06/30/20  0531 06/29/20  2104 06/29/20  0521 06/28/20  0457   SODIUM mmol/L 134* 131* 137 135* 136   POTASSIUM mmol/L 3 8 3 6 4 0 3 6 4 1   CHLORIDE mmol/L 100 103 100 101 101   CO2 mmol/L 26 20* 30 30 26   ANION GAP mmol/L 8 8 7 4 9   BUN mg/dL 20 21 26* 27* 30*   CREATININE mg/dL 1 25 1 22 1 53* 1 37* 1 24   EGFR ml/min/1 73sq m 74 76 58 66 75   CALCIUM mg/dL 9 1 7 8* 9 5 9 4 9 4   MAGNESIUM mg/dL  --   --   --  2 0 1 7   PHOSPHORUS mg/dL  --   --   --  3 3 3 8     Results from last 7 days   Lab Units 06/29/20 2104 06/27/20  0442   AST U/L 17 26   ALT U/L 26 30   ALK PHOS U/L 100 100   TOTAL PROTEIN g/dL 8 7* 7 6   ALBUMIN g/dL 4 2 3 7   TOTAL BILIRUBIN mg/dL 0 78 0 48     Results from last 7 days   Lab Units 07/01/20  0514 06/30/20  0531 06/29/20 2104 06/29/20  0521 06/28/20  0457 06/27/20  0442   GLUCOSE RANDOM mg/dL 121 110 101 106 132 104     Results from last 7 days   Lab Units 06/30/20  0531 06/29/20  2312 06/29/20 2104 06/27/20  0442   PROTIME seconds  --   --  13 5 13 9   INR   --   --  1 09 1 13   PTT seconds >210* 32 31 33     ED Treatment:   Medication Administration from 06/27/2020 0403 to 06/27/2020 5090       Date/Time Order Dose Route Action     06/27/2020 0448 diphenhydrAMINE (BENADRYL) injection 50 mg 50 mg Intravenous Given     06/27/2020 0444 methylPREDNISolone sodium succinate (Solu-MEDROL) injection 125 mg 125 mg Intravenous Given     06/27/2020 0450 famotidine (PEPCID) injection 20 mg 20 mg Intravenous Given        Past Medical History:   Diagnosis Date    Angioedema     Coronary artery disease     GI bleed     Hypertension     stopped his meds when ran out several years ago    STEMI (ST elevation myocardial infarction) ([facility] [address] )      Present on Admission:   Essential hypertension   Hyperlipidemia   Coronary artery disease involving native heart without angina pectoris   (Resolved) ZAKIA (acute kidney injury) [facility])    Admitting Diagnosis: Facial swelling [R22 0]  ZAKIA (acute kidney injury) ([facility] [address] ) [N17 9]  Angioedema of lips, initial encounter [T78  3XXA]  Age/Sex: 62 y o  male  Admission Orders:  Scheduled Medications:  No current facility-administered medications for this encounter  Continuous IV Infusions:  No current facility-administered medications for this encounter  PRN Meds:  No current facility-administered medications for this encounter  Jovanny SCDs      Network Utilization Review Department  [email or web address]  ATTENTION: Please call with any questions or concerns to [phone] and carefully listen to the prompts so that you are directed to the right person  All voicemails are confidential   Ty Delude all requests for admission clinical reviews, approved or denied determinations and any other requests to dedicated fax number below belonging to the campus where the patient is receiving treatment  List of dedicated fax numbers for the Facilities:  [address] DENIALS (Administrative/Medical Necessity) [phone]   [address] (Maternity/NICU/Pediatrics) [phone]   Kayy Durand [phone]   [facility] [phone]   [place]  [phone]   [address]  [phone]   [facility] [facility] [phone]   [facility]  [phone]   [address] [place] [address]  [phone]   [address] [place] [address] [address] [facility] [phone]       Notification of Discharge  This is a Notification of Discharge from our facility [address]  Please be advised that this patient has been discharge from our facility  Below you will find the admission and discharge date and time including the patients disposition      PRESENTATION DATE: 6/27/2020  4:11 AM  OBS ADMISSION DATE:   Chaim Tubbs ADMISSION DATE: 6/27/20 0556   DISCHARGE DATE: 6/29/2020 12:50 PM  DISPOSITION: Home/Self Care Home/Self Care   Admission Orders listed below:  Admission Orders (From admission, onward)     Ordered        06/27/20 0556  Inpatient Admission  Once                   Please contact the UR Department if additional information is required to close this patient's authorization/case  [address] Utilization Review Department  Main: [phone] x carefully listen to the prompts  All voicemails are confidential   [email or web address]  Send all requests for admission clinical reviews, approved or denied determinations and any other requests to dedicated fax number below belonging to the campus where the patient is receiving treatment   List of dedicated fax numbers:  [address] DENIALS (Administrative/Medical Necessity) [phone]   [address] (Maternity/NICU/Pediatrics) [phone]   Zenaida Edwards [phone]   Leisa Mai [phone]   Cullen Opitz [phone]   145 [facility] 1525 [facility] [phone]   [facility]  [phone]   [address] [facility] [address] [facility]  [address]01 [facility] 1000 [address] [facility] [phone]

## 2020-07-01 NOTE — DISCHARGE SUMMARY
Discharge Summary - [facility] Internal Medicine    Patient Information: Savana Flores 62 y o  male MRN: [id number]  Unit/Bed#: S -01 Encounter: [id number]    Discharging Physician / Practitioner: Juan Pablo Verdugo MD  PCP: Edwardo Ruff  Admission Date: 6/29/2020  Discharge Date: 07/01/20    Reason for Admission:  Acute DVT right lower extremity    Discharge Diagnoses:     Principal Problem:    Acute deep vein thrombosis (DVT) of tibial vein of right lower extremity ([facility] [address] )  Active Problems:   Cellulitis ruled out    [facility]ic [facility] response [facility] )    Essential hypertension    Coronary artery disease involving native heart without angina pectoris    ZAKIA (acute kidney injury) ([facility] )    Consultations During Hospital Stay:  · None    Procedures Performed:   · None    Significant findings:  · Lower extremity venous duplex  RIGHT LOWER LIMB:  There is acute occlusive deep vein thrombosis noted in the right paired  posterior tibial veins  No evidence of superficial thrombophlebitis noted  Doppler evaluation shows a normal response to augmentation maneuvers  Popliteal, posterior tibial and anterior tibial arterial Doppler waveforms are  triphasic  LEFT LOWER LIMB:  No evidence of acute or chronic deep vein thrombosis  No evidence of superficial thrombophlebitis noted  Doppler evaluation shows a normal response to augmentation maneuvers  Popliteal, posterior tibial and anterior tibial arterial Doppler waveforms are  triphasic  Hospital Course: Savana Flores is a 62 y o  male patient who originally presented to the hospital on 6/29/2020 due to right lower extremity pain with redness, chills and fever  Patient was admitted with an initial diagnosis of right lower extremity cellulitis  Further workup however showed evidence of acute DVT in the right lower extremity  He was started on heparin infusion and subsequently transition to oral Eliquis    Antibiotics were discontinued  A thrombotic panel was drawn and results pending at the time of his discharge  He will follow-up with his primary care physician for further review  Patient was recommended for compression stockings  He had resolution of pain and significant improvement with extremity swelling  Patient remained in stable condition and was cleared for discharge home on 7/1/20  Condition at Discharge: stable     Discharge Day Visit / Exam:     Subjective:  No new complaints or acute overnight events  He is eager for discharge home today  He reports right lower extremity pain is completely resolved  Vitals: Blood Pressure: 165/89 (07/01/20 0718)  Pulse: 56 (07/01/20 0718)  Temperature: 98 6 °F (37 °C) (07/01/20 0718)  Temp Source: Temporal (07/01/20 0718)  Respirations: 18 (07/01/20 0718)  SpO2: 98 % (07/01/20 0718)    General Appearance:    Alert, cooperative, no distress, appropriately responsive    Head:    Normocephalic, mucous membranes moist    Eyes:    Conjunctiva/corneas clear   Neck:   Supple   Lungs:    Nonlabored, no accessory muscle use   Abdomen:     Nondistended   Extremities: Decreased right lower extremity edema and swelling  Nontender   Neurologic:  Alert and oriented x3, moves all extremities, speech is clear, no facial droop or asymmetry      Discharge instructions/Information to patient and family:   See after visit summary for information provided to patient and family  Provisions for Follow-Up Care:  See after visit summary for information related to follow-up care and any pertinent home health orders  Disposition: Home    Discharge Statement:  I spent >30 minutes discharging the patient  This time was spent on the day of discharge  I had direct contact with the patient on the day of discharge   Greater than 50% of the total time was spent examining patient, answering all patient questions, arranging and discussing plan of care with patient as well as directly providing post-discharge instructions  Additional time then spent on discharge activities  Discharge Medications:  See after visit summary for reconciled discharge medications provided to patient and family  ** Please Note: Dragon 360 Dictation voice to text software may have been used in the creation of this document   **

## 2020-07-01 NOTE — SOCIAL WORK
CM notified that patient is medically stable for discharge today  CM contacted [address] and Eliquis starter pack was tubed to [facility] 2   CM took medication and a $10 co-pay card to patient and reviewed everything  CM explained that the $10 co-pay card would need to be activated and that which ever provider continues to follow patient for future fills of the Eliquis should write the script out for 90 day supply as that is what the prior authorization was made for  No further CM discharge needs identified at this time

## 2020-07-02 LAB
B2 GLYCOPROT1 IGA SER-ACNC: >150 GPI IGA UNITS (ref 0–25)
B2 GLYCOPROT1 IGG SER-ACNC: 107 GPI IGG UNITS (ref 0–20)
B2 GLYCOPROT1 IGM SER-ACNC: <9 GPI IGM UNITS (ref 0–32)
PROT C AG ACT/NOR PPP IA: 85 % OF NORMAL (ref 60–150)

## 2020-07-03 ENCOUNTER — HOSPITAL ENCOUNTER (OUTPATIENT)
Facility: HOSPITAL | Age: 55
Setting detail: OBSERVATION
Discharge: HOME | End: 2020-07-05
Attending: EMERGENCY MEDICINE | Admitting: EMERGENCY MEDICINE
Payer: COMMERCIAL

## 2020-07-03 ENCOUNTER — HOSPITAL ENCOUNTER (OUTPATIENT)
Dept: ULTRASOUND IMAGING | Facility: HOSPITAL | Age: 55
Discharge: HOME | End: 2020-07-03
Payer: COMMERCIAL

## 2020-07-03 ENCOUNTER — NURSE TRIAGE (OUTPATIENT)
Dept: OTHER | Facility: OTHER | Age: 55
End: 2020-07-03

## 2020-07-03 DIAGNOSIS — T78.3XXA ANGIOEDEMA, INITIAL ENCOUNTER: Primary | ICD-10-CM

## 2020-07-03 DIAGNOSIS — I82.441 ACUTE DEEP VEIN THROMBOSIS (DVT) OF TIBIAL VEIN OF RIGHT LOWER EXTREMITY (HCC): ICD-10-CM

## 2020-07-03 LAB
APTT SCREEN TO CONFIRM RATIO: 0.91 RATIO (ref 0–1.4)
CONFIRM APTT/NORMAL: 37.3 SEC (ref 0–55)
LA PPP-IMP: NORMAL
PROT S ACT/NOR PPP: 59 % (ref 63–140)
SCREEN APTT: 37.6 SEC (ref 0–51.9)
SCREEN DRVVT: 35.9 SEC (ref 0–47)
THROMBIN TIME: 16.6 SEC (ref 0–23)

## 2020-07-03 PROCEDURE — 96374 THER/PROPH/DIAG INJ IV PUSH: CPT

## 2020-07-03 PROCEDURE — 99284 EMERGENCY DEPT VISIT MOD MDM: CPT

## 2020-07-03 PROCEDURE — 93971 EXTREMITY STUDY: CPT

## 2020-07-03 PROCEDURE — 99220 PR INITIAL OBSERVATION CARE/DAY 70 MINUTES: CPT | Performed by: HOSPITALIST

## 2020-07-03 PROCEDURE — 99285 EMERGENCY DEPT VISIT HI MDM: CPT | Performed by: EMERGENCY MEDICINE

## 2020-07-03 PROCEDURE — 96375 TX/PRO/DX INJ NEW DRUG ADDON: CPT

## 2020-07-03 PROCEDURE — 96372 THER/PROPH/DIAG INJ SC/IM: CPT

## 2020-07-03 RX ORDER — METHYLPREDNISOLONE SODIUM SUCCINATE 125 MG/2ML
125 INJECTION, POWDER, LYOPHILIZED, FOR SOLUTION INTRAMUSCULAR; INTRAVENOUS ONCE
Status: COMPLETED | OUTPATIENT
Start: 2020-07-03 | End: 2020-07-03

## 2020-07-03 RX ORDER — DIPHENHYDRAMINE HYDROCHLORIDE 50 MG/ML
25 INJECTION INTRAMUSCULAR; INTRAVENOUS EVERY 6 HOURS PRN
Status: DISCONTINUED | OUTPATIENT
Start: 2020-07-03 | End: 2020-07-05 | Stop reason: HOSPADM

## 2020-07-03 RX ORDER — DABIGATRAN ETEXILATE 150 MG/1
150 CAPSULE, COATED PELLETS ORAL EVERY 12 HOURS SCHEDULED
Status: DISCONTINUED | OUTPATIENT
Start: 2020-07-03 | End: 2020-07-04

## 2020-07-03 RX ORDER — EPINEPHRINE 1 MG/ML
0.3 INJECTION, SOLUTION, CONCENTRATE INTRAVENOUS ONCE
Status: COMPLETED | OUTPATIENT
Start: 2020-07-03 | End: 2020-07-03

## 2020-07-03 RX ORDER — DIPHENHYDRAMINE HYDROCHLORIDE 50 MG/ML
50 INJECTION INTRAMUSCULAR; INTRAVENOUS ONCE
Status: COMPLETED | OUTPATIENT
Start: 2020-07-03 | End: 2020-07-03

## 2020-07-03 RX ORDER — ISOSORBIDE MONONITRATE 30 MG/1
30 TABLET, EXTENDED RELEASE ORAL DAILY
Status: DISCONTINUED | OUTPATIENT
Start: 2020-07-03 | End: 2020-07-05 | Stop reason: HOSPADM

## 2020-07-03 RX ORDER — METHYLPREDNISOLONE SODIUM SUCCINATE 40 MG/ML
40 INJECTION, POWDER, LYOPHILIZED, FOR SOLUTION INTRAMUSCULAR; INTRAVENOUS EVERY 8 HOURS SCHEDULED
Status: DISCONTINUED | OUTPATIENT
Start: 2020-07-03 | End: 2020-07-04

## 2020-07-03 RX ORDER — HYDROCHLOROTHIAZIDE 25 MG/1
25 TABLET ORAL DAILY
Status: DISCONTINUED | OUTPATIENT
Start: 2020-07-03 | End: 2020-07-04

## 2020-07-03 RX ORDER — CARVEDILOL 12.5 MG/1
25 TABLET ORAL 2 TIMES DAILY WITH MEALS
Status: DISCONTINUED | OUTPATIENT
Start: 2020-07-03 | End: 2020-07-05 | Stop reason: HOSPADM

## 2020-07-03 RX ORDER — CLOPIDOGREL BISULFATE 75 MG/1
75 TABLET ORAL DAILY
Status: DISCONTINUED | OUTPATIENT
Start: 2020-07-03 | End: 2020-07-04

## 2020-07-03 RX ADMIN — METHYLPREDNISOLONE SODIUM SUCCINATE 125 MG: 125 INJECTION, POWDER, FOR SOLUTION INTRAMUSCULAR; INTRAVENOUS at 07:01

## 2020-07-03 RX ADMIN — ISOSORBIDE MONONITRATE 30 MG: 30 TABLET, EXTENDED RELEASE ORAL at 13:36

## 2020-07-03 RX ADMIN — FAMOTIDINE 20 MG: 10 INJECTION, SOLUTION INTRAVENOUS at 21:16

## 2020-07-03 RX ADMIN — FAMOTIDINE 20 MG: 10 INJECTION, SOLUTION INTRAVENOUS at 07:14

## 2020-07-03 RX ADMIN — EPINEPHRINE 0.3 MG: 1 INJECTION, SOLUTION, CONCENTRATE INTRAVENOUS at 07:01

## 2020-07-03 RX ADMIN — CLOPIDOGREL BISULFATE 75 MG: 75 TABLET ORAL at 13:36

## 2020-07-03 RX ADMIN — DABIGATRAN ETEXILATE MESYLATE 150 MG: 150 CAPSULE ORAL at 21:15

## 2020-07-03 RX ADMIN — DIPHENHYDRAMINE HYDROCHLORIDE 50 MG: 50 INJECTION, SOLUTION INTRAMUSCULAR; INTRAVENOUS at 07:01

## 2020-07-03 RX ADMIN — HYDROCHLOROTHIAZIDE 25 MG: 25 TABLET ORAL at 13:36

## 2020-07-03 RX ADMIN — METHYLPREDNISOLONE SODIUM SUCCINATE 40 MG: 40 INJECTION, POWDER, FOR SOLUTION INTRAMUSCULAR; INTRAVENOUS at 13:41

## 2020-07-03 RX ADMIN — METHYLPREDNISOLONE SODIUM SUCCINATE 40 MG: 40 INJECTION, POWDER, FOR SOLUTION INTRAMUSCULAR; INTRAVENOUS at 21:16

## 2020-07-03 RX ADMIN — CARVEDILOL 25 MG: 12.5 TABLET, FILM COATED ORAL at 15:57

## 2020-07-03 NOTE — ASSESSMENT & PLAN NOTE
· Recent admission for RLE pain and swelling, found to have acute DVT  · Started on heparin prior to transitioning to eliquis  · Will switch to pradaxa to change NOAC class  · Will need case management input to check cost; if prohibitively expensive pt would require either heparin to warfarin bridge in the hospital vs lovenox to warfarin bridge as outpt

## 2020-07-03 NOTE — TELEPHONE ENCOUNTER
Regarding: medication side effect/swelling throat/swelling tongue  ----- Message from [facility] sent at 7/3/2020  5:54 AM EDT -----  " I was giving the medicatioapixaban (ELIQUIS) 5 mg  I believe this medication may be causing my tongue to swell twice the size and my throat is swelling up to   This is suppose to be my second day but I am going to hold off on taking it "

## 2020-07-03 NOTE — ED PROVIDER NOTES
History  Chief Complaint   Patient presents with    Allergic Reaction     Pt presents to ED c/o swelling of tongue that started last night but got worse this morning  Pt also reports feeling like "something is stuck in my throat"  Pt started eliquis on 7/1 for DVT in right leg  Patient is a 24-year-old male with a history of coronary artery disease, DVT and hypertension who presents with angioedema  Patient states that he was started on Eliquis for a DVT that was diagnosed during a recent hospitalization  He was initially started on a heparin infusion and was transition to Eliquis and discharged on 07/01/2020  He has been taking Eliquis twice daily since discharge  Yesterday he noticed mild swelling of his right elbow and right axillary region  He thought it may be secondary to a bug bite  Last evening he developed an abnormal sensation in his throat which progressed to swelling of his tongue  He describes worsening swelling this morning as well as difficulty with his speech  It is slightly difficult to swallow but he denies any difficulty breathing  He denies rash  He had a previous reaction to lisinopril which she is no longer taking  He has no other complaints at this time  History provided by:  Patient  Allergic Reaction   Presenting symptoms: difficulty swallowing and swelling    Presenting symptoms: no rash    Severity:  Moderate  Duration:  1 day  Prior allergic episodes: Allergies to medications  Context: medications    Ineffective treatments:  None tried      Prior to Admission Medications   Prescriptions Last Dose Informant Patient Reported? Taking?    Testosterone (ANDROGEL) 40 5 MG/2 5GM (1 62%) GEL Not Taking at Unknown time Self Yes No   Sig: Place 40 5 mg on the skin daily    acetaminophen (TYLENOL) 325 mg tablet  Self No No   Sig: Take 3 tablets (975 mg total) by mouth every 8 (eight) hours   apixaban (ELIQUIS) 5 mg Not Taking at Unknown time  No No   Sig: Take 2 tablets (10 mg total) by mouth 2 (two) times a day for 7 days, THEN 1 tablet (5 mg total) 2 (two) times a day for 23 days  Patient not taking: Reported on 7/3/2020   carvedilol (COREG) 25 mg tablet   No No   Sig: Take 1 tablet (25 mg total) by mouth 2 (two) times a day with meals   clopidogrel (PLAVIX) 75 mg tablet   No No   Sig: Take 1 tablet (75 mg total) by mouth daily   hydrochlorothiazide (HYDRODIURIL) 25 mg tablet   No No   Sig: Take 1 tablet (25 mg total) by mouth daily   isosorbide mononitrate (IMDUR) 30 mg 24 hr tablet   No No   Sig: Take 1 tablet (30 mg total) by mouth daily      Facility-Administered Medications: None       Past Medical History:   Diagnosis Date    Angioedema     Coronary artery disease     GI bleed     Hypertension     stopped his meds when ran out several years ago    STEMI (ST elevation myocardial infarction) ([facility] [address] )        Past Surgical History:   Procedure Laterality Date    NO PAST SURGERIES      UPPER GASTROINTESTINAL ENDOSCOPY      Clamped large stomach ulcer       Family History   Adopted: Yes   Family history unknown: Yes     I have reviewed and agree with the history as documented  E-Cigarette/Vaping    E-Cigarette Use Never User      E-Cigarette/Vaping Substances     Social History     Tobacco Use    Smoking status: Never Smoker    Smokeless tobacco: Never Used   Substance Use Topics    Alcohol use: Never     Alcohol/week: 0 0 standard drinks     Frequency: Never     Drinks per session: 1 or 2     Binge frequency: Never    Drug use: No       Review of Systems   Constitutional: Negative for chills, diaphoresis and fever  HENT: Positive for trouble swallowing  Negative for nosebleeds and sore throat  Eyes: Negative for photophobia, pain and visual disturbance  Respiratory: Negative for cough, chest tightness and shortness of breath  Cardiovascular: Negative for chest pain, palpitations and leg swelling     Gastrointestinal: Negative for abdominal pain, constipation, diarrhea, nausea and vomiting  Endocrine: Negative for polydipsia and polyuria  Genitourinary: Negative for difficulty urinating, dysuria and hematuria  Musculoskeletal: Negative for back pain, neck pain and neck stiffness  Skin: Negative for pallor and rash  Neurological: Negative for dizziness, syncope, light-headedness and headaches  All other systems reviewed and are negative  Physical Exam  Physical Exam   Constitutional: He is oriented to person, place, and time  He appears well-developed and well-nourished  No distress  HENT:   Head: Normocephalic and atraumatic  Mouth/Throat: Oropharynx is clear and moist and mucous membranes are normal  No trismus in the jaw  Uvula swelling (Mild swelling of the uvula) present  Moderate edema of tongue  Eyes: Pupils are equal, round, and reactive to light  EOM are normal    Neck: Normal range of motion  Neck supple  Cardiovascular: Normal rate, regular rhythm, normal heart sounds, intact distal pulses and normal pulses  Pulmonary/Chest: Effort normal and breath sounds normal  No respiratory distress  He has no wheezes  Abdominal: Soft  He exhibits no distension  There is no tenderness  There is no rigidity, no rebound and no guarding  Musculoskeletal: Normal range of motion  He exhibits no edema or tenderness  Lymphadenopathy:     He has no cervical adenopathy  Neurological: He is alert and oriented to person, place, and time  He has normal strength  No cranial nerve deficit or sensory deficit  Skin: Skin is warm and dry  Capillary refill takes less than 2 seconds  Psychiatric: He has a normal mood and affect  Nursing note and vitals reviewed        Vital Signs  ED Triage Vitals   Temperature Pulse Respirations Blood Pressure SpO2   07/03/20 0643 07/03/20 0643 07/03/20 0643 07/03/20 0643 07/03/20 0643   98 3 °F (36 8 °C) 64 20 (!) 177/92 98 %      Temp Source Heart Rate Source Patient Position - Orthostatic VS BP Location FiO2 (%) 07/03/20 6670 07/03/20 0643 07/03/20 0643 07/03/20 0643 --   Oral Monitor Sitting Right arm       Pain Score       07/03/20 1500       No Pain           Vitals:    07/03/20 1032 07/03/20 1200 07/03/20 1327 07/03/20 1500   BP: 159/80  (!) 176/100 166/83   Pulse: 56 56  59   Patient Position - Orthostatic VS: Sitting Sitting Lying Lying         Visual Acuity      ED Medications  Medications   carvedilol (COREG) tablet 25 mg (25 mg Oral Given 7/3/20 1557)   clopidogrel (PLAVIX) tablet 75 mg (75 mg Oral Given 7/3/20 1336)   hydrochlorothiazide (HYDRODIURIL) tablet 25 mg (25 mg Oral Given 7/3/20 1336)   isosorbide mononitrate (IMDUR) 24 hr tablet 30 mg (30 mg Oral Given 7/3/20 1336)   methylPREDNISolone sodium succinate (Solu-MEDROL) injection 40 mg (40 mg Intravenous Given 7/3/20 1341)   famotidine (PEPCID) injection 20 mg (has no administration in time range)   diphenhydrAMINE (BENADRYL) injection 25 mg (has no administration in time range)   dabigatran etexilate (PRADAXA) capsule 150 mg (has no administration in time range)   diphenhydrAMINE (BENADRYL) injection 50 mg (50 mg Intravenous Given 7/3/20 0701)   methylPREDNISolone sodium succinate (Solu-MEDROL) injection 125 mg (125 mg Intravenous Given 7/3/20 0701)   famotidine (PEPCID) injection 20 mg (20 mg Intravenous Given 7/3/20 0714)   EPINEPHrine PF (ADRENALIN) 1 mg/mL injection 0 3 mg (0 3 mg Intramuscular Given 7/3/20 0701)       Diagnostic Studies  Results Reviewed     None                 VAS upper limb venous duplex scan, unilateral/limited    (Results Pending)              Procedures  Procedures         ED Course  ED Course as of Jul 03 1647 Fri Jul 03, 2020   9718 DVT study negative  I re-evaluated patient who states that his tongue feels slightly better but is not at baseline  US AUDIT      Most Recent Value   Initial Alcohol Screen: US AUDIT-C    1   How often do you have a drink containing alcohol?  0 Filed at: 07/03/2020 0648   Audit-C Score 0 Filed at: 07/03/2020 7069                                            [facility]  Number of Diagnoses or Management Options  Angioedema, initial encounter: new and requires workup  Diagnosis management comments: Patient presents with angioedema  He has a history of similar angioedema related to ACE-inhibitor use  However he discontinued his lisinopril but did recently start Eliquis  He was treated with antihistamines, steroids and IM epinephrine  He experienced partial relief symptoms but did not return to baseline  He continues to have swelling of the tongue and slight voice change  Will hospitalize for continued observation  Patient will also require alternate anticoagulation  Airway intact and patient is stable upon admission  Amount and/or Complexity of Data Reviewed  Review and summarize past medical records: yes  Discuss the patient with other providers: yes    Risk of Complications, Morbidity, and/or Mortality  Presenting problems: high  Diagnostic procedures: low  Management options: high    Patient Progress  Patient progress: stable        Disposition  Final diagnoses:   Angioedema, initial encounter     Time reflects when diagnosis was documented in both MDM as applicable and the Disposition within this note     Time User Action Codes Description Comment    7/3/2020 10:50 AM Faith Ford Add [T78  [id number]] Angioedema, initial encounter       ED Disposition     ED Disposition Condition Date/Time Comment    Admit Stable Fri Jul 3, 2020 10:50 AM Case was discussed with ANEUDY and the patient's admission status was agreed to be Admission Status: observation status to the service of Dr Lesa Bateman           Follow-up Information    None         Current Discharge Medication List      CONTINUE these medications which have NOT CHANGED    Details   acetaminophen (TYLENOL) 325 mg tablet Take 3 tablets (975 mg total) by mouth every 8 (eight) hours  Qty: 30 tablet, Refills: 0    Associated Diagnoses: Acute left-sided low back pain without sciatica      apixaban (ELIQUIS) 5 mg Take 2 tablets (10 mg total) by mouth 2 (two) times a day for 7 days, THEN 1 tablet (5 mg total) 2 (two) times a day for 23 days  Qty: 74 tablet, Refills: 0    Comments: Eliquis Starter Pack  Associated Diagnoses: Acute deep vein thrombosis (DVT) of tibial vein of right lower extremity (HCC)      carvedilol (COREG) 25 mg tablet Take 1 tablet (25 mg total) by mouth 2 (two) times a day with meals  Qty: 60 tablet, Refills: 11    Associated Diagnoses: NSTEMI (non-ST elevated myocardial infarction) (HCC)      clopidogrel (PLAVIX) 75 mg tablet Take 1 tablet (75 mg total) by mouth daily  Qty: 30 tablet, Refills: 11    Associated Diagnoses: NSTEMI (non-ST elevated myocardial infarction) (HCC)      hydrochlorothiazide (HYDRODIURIL) 25 mg tablet Take 1 tablet (25 mg total) by mouth daily  Qty: 90 tablet, Refills: 3    Associated Diagnoses: Essential hypertension      isosorbide mononitrate (IMDUR) 30 mg 24 hr tablet Take 1 tablet (30 mg total) by mouth daily  Qty: 30 tablet, Refills: 11    Associated Diagnoses: NSTEMI (non-ST elevated myocardial infarction) ([facility])      Testosterone (ANDROGEL) 40 5 MG/2 5GM (1 62%) GEL Place 40 5 mg on the skin daily            No discharge procedures on file      PDMP Review       Value Time User    PDMP Reviewed  Yes 6/27/2020  4:07 AM Georgie Bejarano MD          ED Provider  Electronically Signed by           Linda Valles DO  07/03/20 3394

## 2020-07-03 NOTE — TELEPHONE ENCOUNTER
Reason for Disposition   All other adults with swollen tongue   (Exception: tongue swelling is a recurrent problem AND NO swelling at present)    Additional Information   Swelling of tongue   Negative: Neck swelling   Negative: Can't swallow normal secretions (e g , drooling or spitting)    Answer Assessment - Initial Assessment Questions  1  SYMPTOM: "Are you having difficulty swallowing liquids, solids, or both?"      Have not tried, no difficulty liquids  2  ONSET: "When did the swallowing problems begin?"       Throat felt dry on 7/2  3  CAUSE: "What do you think is causing the problem?"       New medication, Eliquis  4  CHRONIC/RECURRENT: "Is this a new problem for you?"  If no, ask: "How long have you had this problem?" (e g , days, weeks, months)       No  5  OTHER SYMPTOMS: "Do you have any other symptoms?" (e g , difficulty breathing, sore throat, swollen tongue, chest pain)      Tongue swelling    Answer Assessment - Initial Assessment Questions  1  ONSET: "When did the swelling start?" (e g , minutes, hours, days)      Yesterday, 7/2  2  LOCATION: "What part of the tongue is swollen?"      Whole tongue  3  SEVERITY: "How swollen is it?"      Tongue is thicker and wider; feels 2x its size; alters speech and swallowing  4  CAUSE: "What do you think is causing the tongue swelling?" (e g , hx of angioedema, allergies)      New medication  5  RECURRENT SYMPTOM: "Have you had tongue swelling before?" If so, ask: "When was the last time?" "What happened that time?"      Denies  6   OTHER SYMPTOMS: "Do you have any other symptoms?" (e g , difficulty breathing, facial swelling)      Throat swelling, and bump on elbow    Protocols used: TONGUE SWELLING-ADULT-, SWALLOWING DIFFICULTY-ADULT-

## 2020-07-03 NOTE — PLAN OF CARE
Problem: PAIN - ADULT  Goal: Verbalizes/displays adequate comfort level or baseline comfort level  Description  Interventions:  - Encourage patient to monitor pain and request assistance  - Assess pain using appropriate pain scale  - Administer analgesics based on type and severity of pain and evaluate response  - Implement non-pharmacological measures as appropriate and evaluate response  - Consider cultural and social influences on pain and pain management  - Notify physician/advanced practitioner if interventions unsuccessful or patient reports new pain  Outcome: Progressing     Problem: INFECTION - ADULT  Goal: Absence or prevention of progression during hospitalization  Description  INTERVENTIONS:  - Assess and monitor for signs and symptoms of infection  - Monitor lab/diagnostic results  - Monitor all insertion sites, i e  indwelling lines, tubes, and drains  - Monitor endotracheal if appropriate and nasal secretions for changes in amount and color  - [place] appropriate cooling/warming therapies per order  - Administer medications as ordered  - Instruct and encourage patient and family to use good hand hygiene technique  - Identify and instruct in appropriate isolation precautions for identified infection/condition  Outcome: Progressing  Goal: Absence of fever/infection during neutropenic period  Description  INTERVENTIONS:  - Monitor WBC    Outcome: Progressing     Problem: SAFETY ADULT  Goal: Patient will remain free of falls  Description  INTERVENTIONS:  - Assess patient frequently for physical needs  -  Identify cognitive and physical deficits and behaviors that affect risk of falls    -  [place] fall precautions as indicated by assessment   - Educate patient/family on patient safety including physical limitations  - Instruct patient to call for assistance with activity based on assessment  - Modify environment to reduce risk of injury  - Consider OT/PT consult to assist with strengthening/mobility  Outcome: Progressing  Goal: Maintain or return to baseline ADL function  Description  INTERVENTIONS:  -  Assess patient's ability to carry out ADLs; assess patient's baseline for ADL function and identify physical deficits which impact ability to perform ADLs (bathing, care of mouth/teeth, toileting, grooming, dressing, etc )  - Assess/evaluate cause of self-care deficits   - Assess range of motion  - Assess patient's mobility; develop plan if impaired  - Assess patient's need for assistive devices and provide as appropriate  - Encourage maximum independence but intervene and supervise when necessary  - Involve family in performance of ADLs  - Assess for home care needs following discharge   - Consider OT consult to assist with ADL evaluation and planning for discharge  - Provide patient education as appropriate  Outcome: Progressing  Goal: Maintain or return mobility status to optimal level  Description  INTERVENTIONS:  - Assess patient's baseline mobility status (ambulation, transfers, stairs, etc )    - Identify cognitive and physical deficits and behaviors that affect mobility  - Identify mobility aids required to assist with transfers and/or ambulation (gait belt, sit-to-stand, lift, walker, cane, etc )  - [place] fall precautions as indicated by assessment  - Record patient progress and toleration of activity level on Mobility SBAR; progress patient to next Phase/Stage  - Instruct patient to call for assistance with activity based on assessment  - Consider rehabilitation consult to assist with strengthening/weightbearing, etc   Outcome: Progressing     Problem: DISCHARGE PLANNING  Goal: Discharge to home or other facility with appropriate resources  Description  INTERVENTIONS:  - Identify barriers to discharge w/patient and caregiver  - Arrange for needed discharge resources and transportation as appropriate  - Identify discharge learning needs (meds, wound care, etc )  - Arrange for interpretive services to assist at discharge as needed  - Refer to Case Management Department for coordinating discharge planning if the patient needs post-hospital services based on physician/advanced practitioner order or complex needs related to functional status, cognitive ability, or social support system  Outcome: Progressing     Problem: Knowledge Deficit  Goal: Patient/family/caregiver demonstrates understanding of disease process, treatment plan, medications, and discharge instructions  Description  Complete learning assessment and assess knowledge base    Interventions:  - Provide teaching at level of understanding  - Provide teaching via preferred learning methods  Outcome: Progressing

## 2020-07-03 NOTE — H&P
H&P- Gurwinder Heaton 1965, 62 y o  male MRN: [id number]    Unit/Bed#: S -01 Encounter: [id number]    Primary Care Provider: MAX Palacios   Date and time admitted to hospital: 7/3/2020  6:40 AM      * Angioedema  Assessment & Plan  · POA with tongue and uvula swelling  No trouble swallowing or breathing  · H/o angioedema related to lisinopril and amlodipine  · Recently started on eliquis, so will need to dc (see below)  · Received steroids, pepcid and benadryl and reports some improvement  · Will continue  · Monitor airway/breathing closely  Acute deep vein thrombosis (DVT) of tibial vein of right lower extremity (HCC)  Assessment & Plan  · Recent admission for RLE pain and swelling, found to have acute DVT  · Started on heparin prior to transitioning to eliquis  · Will switch to pradaxa to change NOAC class  · Will need case management input to check cost; if prohibitively expensive pt would require either heparin to warfarin bridge in the hospital vs lovenox to warfarin bridge as outpt  Coronary artery disease involving native heart without angina pectoris  Assessment & Plan  · Resume plavix and statin    Essential hypertension  Assessment & Plan  · Resume home meds     VTE Prophylaxis: Dabigatran (Pradaxa)    Code Status: FULL   POLST: POLST form is not discussed and not completed at this time  Discussion with family: family at bedside     Anticipated Length of Stay:  Patient will be admitted on an Observation basis with an anticipated length of stay of  < 2 midnights  Justification for Hospital Stay: angioedema possibly due to new apixaban exposure     Total Time for Visit, including Counseling / Coordination of Care: 1 hour  Greater than 50% of this total time spent on direct patient counseling and coordination of care  Chief Complaint:   Tongue swelling     History of Present Illness:     Gurwinder Heaton is a 62 y o  male history of angioedema, CAD, hypertension, recent acute right lower extremity DVT who presents with tongue swelling  Patient reports after his recent discharge for acute DVT he has been compliant with his medications  Yesterday afternoon began to notice a sensation in his throat to felt like a dry spot " This did not improve and throughout the day started noticing worsening sensation of tongue heaviness  Patient was able to eat dinner and go to bed  However he woke up in the middle of the night with worsening tongue swelling and a sensation of fullness in his neck  Denies shortness of breath  Denies trouble swallowing  Patient received IV steroids in the ED reports some improvement in his symptoms  The only new medication is Eliquis after his recent hospital discharge  Patient has a history of angioedema due to lisinopril as well as amlodipine  Review of Systems:    Review of Systems   HENT: Positive for facial swelling (tongue swelling )  Negative for trouble swallowing  Respiratory: Negative for shortness of breath and stridor  All other systems reviewed and are negative  Past Medical and Surgical History:     Past Medical History:   Diagnosis Date    Angioedema     Coronary artery disease     GI bleed     Hypertension     stopped his meds when ran out several years ago    STEMI (ST elevation myocardial infarction) ([facility] [address] )        Past Surgical History:   Procedure Laterality Date    NO PAST SURGERIES      UPPER GASTROINTESTINAL ENDOSCOPY      Clamped large stomach ulcer       Meds/Allergies:    Prior to Admission medications    Medication Sig Start Date End Date Taking? Authorizing Provider   acetaminophen (TYLENOL) 325 mg tablet Take 3 tablets (975 mg total) by mouth every 8 (eight) hours 11/10/19   MAX Henley   apixaban (ELIQUIS) 5 mg Take 2 tablets (10 mg total) by mouth 2 (two) times a day for 7 days, THEN 1 tablet (5 mg total) 2 (two) times a day for 23 days    Patient not taking: Reported on 7/3/2020 6/30/20 7/30/20  Tracy Lopez MD   carvedilol (COREG) 25 mg tablet Take 1 tablet (25 mg total) by mouth 2 (two) times a day with meals 2/14/20   Roseann Carrington MD   clopidogrel (PLAVIX) 75 mg tablet Take 1 tablet (75 mg total) by mouth daily 2/14/20   Roseann Carrington MD   hydrochlorothiazide (HYDRODIURIL) 25 mg tablet Take 1 tablet (25 mg total) by mouth daily 1/10/20   Roseann Carrington MD   isosorbide mononitrate (IMDUR) 30 mg 24 hr tablet Take 1 tablet (30 mg total) by mouth daily 2/6/20   Roseann Carrington MD   Testosterone (ANDROGEL) 40 5 MG/2 5GM (1 62%) GEL Place 40 5 mg on the skin daily     Historical Provider, MD     I have reviewed home medications using allscripts  Allergies: Allergies   Allergen Reactions    Lisinopril Angioedema    Norvasc [Amlodipine] Angioedema    Eliquis [Apixaban] Angioedema    Lipitor [Atorvastatin] Facial Swelling       Social History:     Marital Status: Single   Occupation:   Patient Pre-hospital Living Situation: independent   Patient Pre-hospital Level of Mobility: independent   Patient Pre-hospital Diet Restrictions: none   Substance Use History:   Social History     Substance and Sexual Activity   Alcohol Use Never    Alcohol/week: 0 0 standard drinks    Frequency: Never    Drinks per session: 1 or 2    Binge frequency: Never     Social History     Tobacco Use   Smoking Status Never Smoker   Smokeless Tobacco Never Used     Social History     Substance and Sexual Activity   Drug Use No       Family History:    Family History   Adopted: Yes   Family history unknown: Yes       Physical Exam:     Vitals:   Blood Pressure: (!) 176/100 (07/03/20 1327)  Pulse: 56 (07/03/20 1200)  Temperature: 97 8 °F (36 6 °C) (07/03/20 1200)  Temp Source: Oral (07/03/20 1200)  Respirations: 18 (07/03/20 1200)  Weight - Scale: 128 kg (283 lb 1 1 oz) (07/03/20 0643)  SpO2: 98 % (07/03/20 1200)    Physical Exam   Constitutional: He is oriented to person, place, and time  No distress     HENT:   Head: Normocephalic and atraumatic  Mouth/Throat: Uvula swelling present  Tongue swelling noted  Cardiovascular: Normal rate and regular rhythm  Exam reveals no friction rub  No murmur heard  Pulmonary/Chest: Effort normal  No stridor  No respiratory distress  Abdominal: Soft  Bowel sounds are normal  He exhibits no distension  There is no tenderness  Musculoskeletal: Normal range of motion  Neurological: He is alert and oriented to person, place, and time  Skin: He is not diaphoretic  Nursing note and vitals reviewed  Additional Data:     Lab Results: I have personally reviewed pertinent reports  Results from last 7 days   Lab Units 07/01/20  0514   WBC Thousand/uL 12 11*   HEMOGLOBIN g/dL 13 9   HEMATOCRIT % 41 4   PLATELETS Thousands/uL 146*   NEUTROS PCT % 76*   LYMPHS PCT % 12*   MONOS PCT % 9   EOS PCT % 1     Results from last 7 days   Lab Units 07/01/20  0514  06/29/20  2104   SODIUM mmol/L 134*   < > 137   POTASSIUM mmol/L 3 8   < > 4 0   CHLORIDE mmol/L 100   < > 100   CO2 mmol/L 26   < > 30   BUN mg/dL 20   < > 26*   CREATININE mg/dL 1 25   < > 1 53*   ANION GAP mmol/L 8   < > 7   CALCIUM mg/dL 9 1   < > 9 5   ALBUMIN g/dL  --   --  4 2   TOTAL BILIRUBIN mg/dL  --   --  0 78   ALK PHOS U/L  --   --  100   ALT U/L  --   --  26   AST U/L  --   --  17   GLUCOSE RANDOM mg/dL 121   < > 101    < > = values in this interval not displayed  Results from last 7 days   Lab Units 06/29/20  2104   INR  1 09             Results from last 7 days   Lab Units 07/01/20  0514 06/29/20  2312 06/29/20  2104   LACTIC ACID mmol/L  --   --  1 7   PROCALCITONIN ng/ml 0 10 0 09  --        Imaging: I have personally reviewed pertinent reports  VAS upper limb venous duplex scan, unilateral/limited    (Results Pending)     [facility] Yes     ** Please Note: This note has been constructed using a voice recognition system   **

## 2020-07-03 NOTE — ASSESSMENT & PLAN NOTE
· POA with tongue and uvula swelling  No trouble swallowing or breathing  · H/o angioedema related to lisinopril and amlodipine  · Recently started on eliquis, so will need to dc (see below)  · Received steroids, pepcid and benadryl and reports some improvement  · Will continue  · Monitor airway/breathing closely

## 2020-07-04 LAB
ALBUMIN SERPL BCP-MCNC: 3.7 G/DL (ref 3.5–5)
ALP SERPL-CCNC: 104 U/L (ref 46–116)
ALT SERPL W P-5'-P-CCNC: 28 U/L (ref 12–78)
ANION GAP SERPL CALCULATED.3IONS-SCNC: 7 MMOL/L (ref 4–13)
AST SERPL W P-5'-P-CCNC: 14 U/L (ref 5–45)
BASOPHILS # BLD AUTO: 0.03 THOUSANDS/ΜL (ref 0–0.1)
BASOPHILS NFR BLD AUTO: 0 % (ref 0–1)
BILIRUB SERPL-MCNC: 0.4 MG/DL (ref 0.2–1)
BUN SERPL-MCNC: 26 MG/DL (ref 5–25)
CALCIUM SERPL-MCNC: 9.2 MG/DL (ref 8.3–10.1)
CHLORIDE SERPL-SCNC: 96 MMOL/L (ref 100–108)
CO2 SERPL-SCNC: 28 MMOL/L (ref 21–32)
CREAT SERPL-MCNC: 1.31 MG/DL (ref 0.6–1.3)
EOSINOPHIL # BLD AUTO: 0 THOUSAND/ΜL (ref 0–0.61)
EOSINOPHIL NFR BLD AUTO: 0 % (ref 0–6)
ERYTHROCYTE [DISTWIDTH] IN BLOOD BY AUTOMATED COUNT: 12.6 % (ref 11.6–15.1)
GFR SERPL CREATININE-BSD FRML MDRD: 70 ML/MIN/1.73SQ M
GLUCOSE SERPL-MCNC: 138 MG/DL (ref 65–140)
HCT VFR BLD AUTO: 43.3 % (ref 36.5–49.3)
HGB BLD-MCNC: 14.4 G/DL (ref 12–17)
IMM GRANULOCYTES # BLD AUTO: 0.14 THOUSAND/UL (ref 0–0.2)
IMM GRANULOCYTES NFR BLD AUTO: 1 % (ref 0–2)
INR PPP: 1.2 (ref 0.84–1.19)
LYMPHOCYTES # BLD AUTO: 1.25 THOUSANDS/ΜL (ref 0.6–4.47)
LYMPHOCYTES NFR BLD AUTO: 9 % (ref 14–44)
MCH RBC QN AUTO: 30.3 PG (ref 26.8–34.3)
MCHC RBC AUTO-ENTMCNC: 33.3 G/DL (ref 31.4–37.4)
MCV RBC AUTO: 91 FL (ref 82–98)
MONOCYTES # BLD AUTO: 0.48 THOUSAND/ΜL (ref 0.17–1.22)
MONOCYTES NFR BLD AUTO: 3 % (ref 4–12)
NEUTROPHILS # BLD AUTO: 12.22 THOUSANDS/ΜL (ref 1.85–7.62)
NEUTS SEG NFR BLD AUTO: 87 % (ref 43–75)
NRBC BLD AUTO-RTO: 0 /100 WBCS
PLATELET # BLD AUTO: 235 THOUSANDS/UL (ref 149–390)
PMV BLD AUTO: 11.2 FL (ref 8.9–12.7)
POTASSIUM SERPL-SCNC: 4 MMOL/L (ref 3.5–5.3)
PROT S ACT/NOR PPP: 58 % (ref 57–157)
PROT S PPP-ACNC: 74 % (ref 60–150)
PROT SERPL-MCNC: 9 G/DL (ref 6.4–8.2)
PROTHROMBIN TIME: 14.6 SECONDS (ref 11.6–14.5)
RBC # BLD AUTO: 4.75 MILLION/UL (ref 3.88–5.62)
SODIUM SERPL-SCNC: 131 MMOL/L (ref 136–145)
WBC # BLD AUTO: 14.12 THOUSAND/UL (ref 4.31–10.16)

## 2020-07-04 PROCEDURE — 93971 EXTREMITY STUDY: CPT | Performed by: SURGERY

## 2020-07-04 PROCEDURE — 99225 PR SBSQ OBSERVATION CARE/DAY 25 MINUTES: CPT | Performed by: PHYSICIAN ASSISTANT

## 2020-07-04 PROCEDURE — 85610 PROTHROMBIN TIME: CPT | Performed by: PHYSICIAN ASSISTANT

## 2020-07-04 PROCEDURE — 80053 COMPREHEN METABOLIC PANEL: CPT | Performed by: HOSPITALIST

## 2020-07-04 PROCEDURE — 85025 COMPLETE CBC W/AUTO DIFF WBC: CPT | Performed by: HOSPITALIST

## 2020-07-04 RX ORDER — WARFARIN SODIUM 5 MG/1
5 TABLET ORAL
Status: DISCONTINUED | OUTPATIENT
Start: 2020-07-04 | End: 2020-07-05 | Stop reason: HOSPADM

## 2020-07-04 RX ORDER — FAMOTIDINE 20 MG/1
20 TABLET, FILM COATED ORAL 2 TIMES DAILY
Status: DISCONTINUED | OUTPATIENT
Start: 2020-07-04 | End: 2020-07-05 | Stop reason: HOSPADM

## 2020-07-04 RX ORDER — HYDRALAZINE HYDROCHLORIDE 20 MG/ML
5 INJECTION INTRAMUSCULAR; INTRAVENOUS EVERY 6 HOURS PRN
Status: DISCONTINUED | OUTPATIENT
Start: 2020-07-04 | End: 2020-07-05 | Stop reason: HOSPADM

## 2020-07-04 RX ORDER — HYDROCHLOROTHIAZIDE 25 MG/1
25 TABLET ORAL DAILY
Status: DISCONTINUED | OUTPATIENT
Start: 2020-07-05 | End: 2020-07-05 | Stop reason: HOSPADM

## 2020-07-04 RX ORDER — WARFARIN SODIUM 5 MG/1
TABLET ORAL
Qty: 30 TABLET | Refills: 0 | Status: SHIPPED | OUTPATIENT
Start: 2020-07-04 | End: 2020-07-04 | Stop reason: SDUPTHER

## 2020-07-04 RX ORDER — WARFARIN SODIUM 5 MG/1
TABLET ORAL
Qty: 30 TABLET | Refills: 0 | Status: SHIPPED | OUTPATIENT
Start: 2020-07-04 | End: 2020-09-01 | Stop reason: SDUPTHER

## 2020-07-04 RX ADMIN — ISOSORBIDE MONONITRATE 30 MG: 30 TABLET, EXTENDED RELEASE ORAL at 09:25

## 2020-07-04 RX ADMIN — DABIGATRAN ETEXILATE MESYLATE 150 MG: 150 CAPSULE ORAL at 09:26

## 2020-07-04 RX ADMIN — CLOPIDOGREL BISULFATE 75 MG: 75 TABLET ORAL at 09:25

## 2020-07-04 RX ADMIN — WARFARIN SODIUM 5 MG: 5 TABLET ORAL at 17:39

## 2020-07-04 RX ADMIN — CARVEDILOL 25 MG: 12.5 TABLET, FILM COATED ORAL at 17:39

## 2020-07-04 RX ADMIN — METHYLPREDNISOLONE SODIUM SUCCINATE 40 MG: 40 INJECTION, POWDER, FOR SOLUTION INTRAMUSCULAR; INTRAVENOUS at 07:00

## 2020-07-04 RX ADMIN — CARVEDILOL 25 MG: 12.5 TABLET, FILM COATED ORAL at 09:25

## 2020-07-04 RX ADMIN — FAMOTIDINE 20 MG: 20 TABLET, FILM COATED ORAL at 17:39

## 2020-07-04 RX ADMIN — ENOXAPARIN SODIUM 120 MG: 120 INJECTION SUBCUTANEOUS at 21:04

## 2020-07-04 RX ADMIN — FAMOTIDINE 20 MG: 10 INJECTION, SOLUTION INTRAVENOUS at 09:26

## 2020-07-04 NOTE — UTILIZATION REVIEW
Initial Clinical Review    Admission: Date/Time/Statement: Admission Orders (From admission, onward)     Ordered        07/03/20 1051  Place in Observation  Once                   Orders Placed This Encounter   Procedures    Place in Observation     Standing Status:   Standing     Number of Occurrences:   1     Order Specific Question:   Admitting Physician     Answer:   Chandrika Zarate [[id number]]     Order Specific Question:   Level of Care     Answer:   Med Surg [[id number]]     ED Arrival Information     Expected Arrival Acuity Means of Arrival Escorted By Service Admission Type    7/3/2020 07:30 7/3/2020 06:33 Emergent Walk-In Self Hospitalist Emergency    Arrival Complaint    Medication Reaction- swollen tongue and throat swelling  Chief Complaint   Patient presents with    Allergic Reaction     Pt presents to ED c/o swelling of tongue that started last night but got worse this morning  Pt also reports feeling like "something is stuck in my throat"  Pt started eliquis on 7/1 for DVT in right leg  Assessment/Plan: this is a 62year old male from home to ED admitted to observation due to angioedema  Recent admission 6/29/2020 - 7/1/2020 DVT RLE, eliza on Eliquis  History of angioedema with ace  Presented with abnormal sensation in throat with swelling of tongue starting last night and today  Worsen and has difficulty with speech, difficult to swallow  On 7/2 noticed swelling of right elbow and right axillary region  On exam has swelling of uvula, edema of tongue  Venous doppler RUE negative  In ED treated with antihistamines, steroids and IM epinephrine  Did not return to baseline  Eliquis dc  Pradaxa started  IV steroids in progress  On 7/4/2020    Observation continues: tongue and uvula swelling resolved  Recent admit DVT, unable to now take Eliquis with angioedema, Pradaxa is non formulary, started on Lovenox bridge to coumadin  Will stay today for instruction, observation after dosing       ED Triage Vitals   Temperature Pulse Respirations Blood Pressure SpO2   07/03/20 0643 07/03/20 0643 07/03/20 0643 07/03/20 0643 07/03/20 0643   98 3 °F (36 8 °C) 64 20 (!) 177/92 98 %      Temp Source Heart Rate Source Patient Position - Orthostatic VS BP Location FiO2 (%)   07/03/20 0643 07/03/20 0643 07/03/20 0643 07/03/20 0643 --   Oral Monitor Sitting Right arm       Pain Score       07/03/20 1500       No Pain        Wt Readings from Last 1 Encounters:   07/03/20 128 kg (283 lb 1 1 oz)     Additional Vital Signs:   07/03/20 2300  97 5 °F (36 4 °C)  51Abnormal   18  170/82    97 %  None (Room air)  Lying   07/03/20 1500  98 2 °F (36 8 °C)  59  18  166/83    98 %  None (Room air)  Lying   07/03/20 1327        176/100Abnormal         Lying   07/03/20 1200  97 8 °F (36 6 °C)  56  18      98 %  None (Room air)  Sitting   07/03/20 1032    56  16  159/80    97 %  None (Room air         Pertinent Labs/Diagnostic Test Results:   7/2/2020 venous duplex RUE - RIGHT UPPER LIMB:No evidence of acute or chronic deep vein thrombosis  No evidence of superficial thrombophlebitis noted  Doppler evaluation shows a normal response to augmentation maneuvers  LEFT UPPER LIMB LIMITED:Evaluation shows no evidence of thrombus in the internal jugular vein,  subclavian vein, and the brachiocephalic vein        Results from last 7 days   Lab Units 07/04/20  0556 07/01/20  0514 06/30/20  0531 06/29/20 2104 06/29/20  0521   WBC Thousand/uL 14 12* 12 11* 16 14* 15 98* 8 35   HEMOGLOBIN g/dL 14 4 13 9 12 9 14 6 14 0   HEMATOCRIT % 43 3 41 4 39 6 43 5 42 5   PLATELETS Thousands/uL 235 146* 140* 173 154   NEUTROS ABS Thousands/µL 12 22* 9 32* 13 17* 13 46*  --          Results from last 7 days   Lab Units 07/01/20  0514 06/30/20  0531 06/29/20 2104 06/29/20  0521 06/28/20  0457   SODIUM mmol/L 134* 131* 137 135* 136   POTASSIUM mmol/L 3 8 3 6 4 0 3 6 4 1   CHLORIDE mmol/L 100 103 100 101 101   CO2 mmol/L 26 20* 30 30 26   ANION GAP mmol/L 8 8 7 4 9   BUN mg/dL 20 21 26* 27* 30*   CREATININE mg/dL 1 25 1 22 1 53* 1 37* 1 24   EGFR ml/min/1 73sq m 74 76 58 66 75   CALCIUM mg/dL 9 1 7 8* 9 5 9 4 9 4   MAGNESIUM mg/dL  --   --   --  2 0 1 7   PHOSPHORUS mg/dL  --   --   --  3 3 3 8     Results from last 7 days   Lab Units 06/29/20  2312 06/29/20  2104   AST U/L  --  17   ALT U/L  --  26   ALK PHOS U/L  --  100   TOTAL PROTEIN g/dL  --  8 7*   ALBUMIN g/dL  --  4 2   TOTAL BILIRUBIN mg/dL  --  0 78   BETA 2 GLYCO 1 IGA GPI IgA units >150*  --    BETA 2 GLYCO 1 IGG GPI IgG units 107*  --    BETA 2 GLYCO 1 IGM GPI IgM units <9  --      Results from last 7 days   Lab Units 07/01/20  0514 06/30/20  0531 06/29/20 2104 06/29/20  0521 06/28/20  0457   GLUCOSE RANDOM mg/dL 121 110 101 106 132     Results from last 7 days   Lab Units 06/30/20  0531 06/29/20  2312 06/29/20  2104   PROTIME seconds  --   --  13 5   INR   --   --  1 09   PTT seconds >210* 32 31     Results from last 7 days   Lab Units 07/01/20  0514 06/29/20  2312   PROCALCITONIN ng/ml 0 10 0 09     Results from last 7 days   Lab Units 06/29/20  2104   LACTIC ACID mmol/L 1 7     Results from last 7 days   Lab Units 06/29/20 2104 06/29/20  2050   BLOOD CULTURE  No Growth After 4 Days  No Growth After 4 Days       ED Treatment:   Medication Administration from 07/03/2020 0611 to 07/03/2020 1200       Date/Time Order Dose Route Action Comments     07/03/2020 0701 diphenhydrAMINE (BENADRYL) injection 50 mg 50 mg Intravenous Given      07/03/2020 0701 methylPREDNISolone sodium succinate (Solu-MEDROL) injection 125 mg 125 mg Intravenous Given      07/03/2020 0714 famotidine (PEPCID) injection 20 mg 20 mg Intravenous Given      07/03/2020 0701 EPINEPHrine PF (ADRENALIN) 1 mg/mL injection 0 3 mg 0 3 mg Intramuscular Given         Past Medical History:   Diagnosis Date    Angioedema     Coronary artery disease     GI bleed     Hypertension     stopped his meds when ran out several years ago   [address] [place] [address] STEMI (ST elevation myocardial infarction) ([facility] [address] )      Present on Admission:   Angioedema   Acute deep vein thrombosis (DVT) of tibial vein of right lower extremity (HCC)   Essential hypertension   Coronary artery disease involving native heart without angina pectoris      Admitting Diagnosis: Allergic reaction [T78 40XA]  Angioedema, initial encounter [T78  3XXA]  Age/Sex: 62 y o  male  Admission Orders: 7/3/2020 1051 Observation   Scheduled Medications:  Medications:  carvedilol 25 mg Oral BID With Meals   clopidogrel 75 mg Oral Daily   dabigatran etexilate dc 7/4/2020  150 mg Oral Q12H [facility]   famotidine 20 mg Intravenous Q12H [facility]   hydrochlorothiazide 25 mg Oral Daily   isosorbide mononitrate 30 mg Oral Daily   methylPREDNISolone sodium succinate 40 mg Intravenous Q8H [facility]   enoxaparin (LOVENOX) subcutaneous injection 135 mg   Dose: 1 mg/kg  Weight Dosing Info: 128 kg  Freq: Every 12 hours scheduled Route: SC  Start: 07/04/20 2100    warfarin (COUMADIN) tablet 5 mg   Dose: 5 mg  Freq: Daily (warfarin) Route: PO  Start: 07/04/20 1800    Continuous IV Infusions: none     PRN Meds: not used   diphenhydrAMINE 25 mg Intravenous Q6H PRN         Network Utilization Review Department  [email or web address]  ATTENTION: Please call with any questions or concerns to [phone] and carefully listen to the prompts so that you are directed to the right person  All voicemails are confidential   [facility] all requests for admission clinical reviews, approved or denied determinations and any other requests to dedicated fax number below belonging to the campus where the patient is receiving treatment   List of dedicated fax numbers for the Facilities:  FACILITY NAME UR FAX NUMBER   ADMISSION DENIALS (Administrative/Medical Necessity) [phone]   [address] (Maternity/NICU/Pediatrics) [phone]   Gabriela Bull [address] [phone]   Jason Leavitt Torsten Seen [phone]   Emily [address] [place] [address] [facility] [phone]   [facility]  [phone]   [address] [facility]  [phone]   [address] [place] [address] [address] [facility] [phone]

## 2020-07-04 NOTE — PROGRESS NOTES
Progress Note - Elías Damico 1965, 62 y o  male MRN: [id number]  Unit/Bed#: S -01 Encounter: [id number]  Primary Care Provider: MAX Mcdermott   Date and time admitted to hospital: 7/3/2020  6:40 AM    * Angioedema  Assessment & Plan  · POA with tongue and uvula swelling  No trouble swallowing or breathing  · H/o angioedema related to lisinopril and amlodipine  · Recently started on eliquis, so discontinued on admission   · Received steroids, pepcid and benadryl and reports improvement    Resolution tongue and uvula swelling  · Airway intact    Acute deep vein thrombosis (DVT) of tibial vein of right lower extremity (HCC)  Assessment & Plan  · Recent admission for RLE pain and swelling, found to have acute DVT  · Started on heparin prior to transitioning to eliquis and patient was discharged on Eliquis, presented with angioedema therefore Eliquis discontinued on admission  · Started on Pradaxa on admission to change NOAC class however Pradaxa is non formulary  · Will start patient on Lovenox bridge to Coumadin  · Ran script for Lovenox with patient's pharmacy and will be $15   · Discussed medication administration with patient and he would prefer to stay overnight to receive education and to receive first doses of Lovenox and Coumadin this evening which I am in agreement with the patient understands Lovenox injection prior to discharge  · Check baseline PT/INR and will check PT/INR in the morning  · Plan to check PT/INR on Monday after discharge  · Patient will need close follow-up with his PCP, I will send a message to his PCP today for close follow-up  · Patient was also educated to call his primary care doctor on Monday to make sure that he is not lost to follow-up and to follow-up on INR      Essential hypertension  Assessment & Plan  · Continue home medication regimen  · Patient with episodes of accelerated hypertension while here  · Blood pressure currently 153/74  · Continue close monitoring  · IV hydralazine p r n  Coronary artery disease involving native heart without angina pectoris  Assessment & Plan  · Continue statin  · As patient will be started on Coumadin will discontinue Plavix        VTE Pharmacologic Prophylaxis:   Pharmacologic: Warfarin (Coumadin)  Mechanical VTE Prophylaxis in Place: No    Patient Centered Rounds: I have performed bedside rounds with nursing staff today  Discussions with Specialists or Other Care Team Provider:  Spoke with attending, case management, spoke with patient's home pharmacy [facility]    Education and Discussions with Family / Patient:  Spoke with patient at the bedside, he decline:  Family to update    Time Spent for Care: 20 minutes  More than 50% of total time spent on counseling and coordination of care as described above  Current Length of Stay: 0 day(s)    Current Patient Status: Observation   Certification Statement: The patient will continue to require additional inpatient hospital stay due to Angioedema    Discharge Plan:  Discharge tomorrow    Code Status: Level 1 - Full Code      Subjective:   Patient has no acute complaints today  He states his tongue and uvula swelling has resolved  No shortness of breath or chest pain  No itching  No difficulty swallowing or breathing  Objective:     Vitals:   Temp (24hrs), Av °F (36 7 °C), Min:97 5 °F (36 4 °C), Max:98 3 °F (36 8 °C)    Temp:  [97 5 °F (36 4 °C)-98 3 °F (36 8 °C)] 98 3 °F (36 8 °C)  HR:  [51-59] 53  Resp:  [18] 18  BP: (153-176)/() 153/74  SpO2:  [97 %-98 %] 98 %  Body mass index is 39 48 kg/m²  Input and Output Summary (last 24 hours): Intake/Output Summary (Last 24 hours) at 2020 1102  Last data filed at 2020 0800  Gross per 24 hour   Intake 240 ml   Output 0 ml   Net 240 ml       Physical Exam:     Physical Exam   Constitutional: No distress  Very pleasant   HENT:   Head: Normocephalic     Airway intact, no tongue or uvula swelling   Eyes: Conjunctivae are normal    Neck: Normal range of motion  Cardiovascular: Normal rate and regular rhythm  Pulmonary/Chest: Effort normal and breath sounds normal    Abdominal: Soft  Bowel sounds are normal    Neurological: He is alert  Psychiatric: He has a normal mood and affect  Nursing note and vitals reviewed  Additional Data:     Labs:    Results from last 7 days   Lab Units 07/04/20  0556   WBC Thousand/uL 14 12*   HEMOGLOBIN g/dL 14 4   HEMATOCRIT % 43 3   PLATELETS Thousands/uL 235   NEUTROS PCT % 87*   LYMPHS PCT % 9*   MONOS PCT % 3*   EOS PCT % 0     Results from last 7 days   Lab Units 07/04/20  0556   SODIUM mmol/L 131*   POTASSIUM mmol/L 4 0   CHLORIDE mmol/L 96*   CO2 mmol/L 28   BUN mg/dL 26*   CREATININE mg/dL 1 31*   ANION GAP mmol/L 7   CALCIUM mg/dL 9 2   ALBUMIN g/dL 3 7   TOTAL BILIRUBIN mg/dL 0 40   ALK PHOS U/L 104   ALT U/L 28   AST U/L 14   GLUCOSE RANDOM mg/dL 138     Results from last 7 days   Lab Units 06/29/20  2104   INR  1 09             Results from last 7 days   Lab Units 07/01/20  0514 06/29/20  2312 06/29/20 2104   LACTIC ACID mmol/L  --   --  1 7   PROCALCITONIN ng/ml 0 10 0 09  --            * I Have Reviewed All Lab Data Listed Above  * Additional Pertinent Lab Tests Reviewed: All Labs Within Last 24 Hours Reviewed    Imaging:    Imaging Reports Reviewed Today Include:   Imaging Personally Reviewed by Myself Includes:      Recent Cultures (last 7 days):     Results from last 7 days   Lab Units 06/29/20 2104 06/29/20 2050   BLOOD CULTURE  No Growth After 4 Days  No Growth After 4 Days         Last 24 Hours Medication List:     Current Facility-Administered Medications:  carvedilol 25 mg Oral BID With Meals Amrit Hernandez MD   dabigatran etexilate 150 mg Oral Q12H [facility] Agnes Lee PA-C   diphenhydrAMINE 25 mg Intravenous Q6H PRN Amrit Hernandez MD   enoxaparin 1 mg/kg Subcutaneous Q12H [facility] Agnes Lee PA-C   famotidine 20 mg Oral BID Audrey Plasencia PA-C hydrALAZINE 5 mg Intravenous Q6H PRN Gabo Mcneal PA-C   [START ON 7/5/2020] hydrochlorothiazide 25 mg Oral Daily Agnes Lee PA-C   isosorbide mononitrate 30 mg Oral Daily Camilo Mitchell MD   warfarin 5 mg Oral Daily (warfarin) Gabo Mcneal PA-C        Today, Patient Was Seen By: Gabo Mcneal PA-C    ** Please Note: Dictation voice to text software may have been used in the creation of this document   **

## 2020-07-04 NOTE — ASSESSMENT & PLAN NOTE
· Continue home medication regimen  · Patient with episodes of accelerated hypertension while here  · Blood pressure currently 153/74  · Continue close monitoring  · IV hydralazine p r n

## 2020-07-04 NOTE — ASSESSMENT & PLAN NOTE
· POA with tongue and uvula swelling  No trouble swallowing or breathing  · H/o angioedema related to lisinopril and amlodipine  · Recently started on eliquis, so discontinued on admission   · Received steroids, pepcid and benadryl and reports improvement    Resolution tongue and uvula swelling  · Airway intact

## 2020-07-04 NOTE — ASSESSMENT & PLAN NOTE
· Recent admission for RLE pain and swelling, found to have acute DVT  · Started on heparin prior to transitioning to eliquis and patient was discharged on Eliquis, presented with angioedema therefore Eliquis discontinued on admission  · Started on Pradaxa on admission to change NOAC class however Pradaxa is non formulary  · Will start patient on Lovenox bridge to Coumadin  · Ran script for Lovenox with patient's pharmacy and will be $15   · Discussed medication administration with patient and he would prefer to stay overnight to receive education and to receive first doses of Lovenox and Coumadin this evening which I am in agreement with the patient understands Lovenox injection prior to discharge  · Check baseline PT/INR and will check PT/INR in the morning  · Plan to check PT/INR on Monday after discharge  · Patient will need close follow-up with his PCP, I will send a message to his PCP today for close follow-up  · Patient was also educated to call his primary care doctor on Monday to make sure that he is not lost to follow-up and to follow-up on INR

## 2020-07-05 VITALS
BODY MASS INDEX: 39.48 KG/M2 | TEMPERATURE: 97.5 F | SYSTOLIC BLOOD PRESSURE: 154 MMHG | WEIGHT: 283.07 LBS | DIASTOLIC BLOOD PRESSURE: 97 MMHG | OXYGEN SATURATION: 98 % | RESPIRATION RATE: 18 BRPM | HEART RATE: 55 BPM

## 2020-07-05 PROBLEM — T78.3XXA ANGIOEDEMA: Status: RESOLVED | Noted: 2020-06-27 | Resolved: 2020-07-05

## 2020-07-05 LAB
BACTERIA BLD CULT: NORMAL
BACTERIA BLD CULT: NORMAL
INR PPP: 1.17 (ref 0.84–1.19)
PROTHROMBIN TIME: 14.2 SECONDS (ref 11.6–14.5)

## 2020-07-05 PROCEDURE — 99217 PR OBSERVATION CARE DISCHARGE MANAGEMENT: CPT | Performed by: PHYSICIAN ASSISTANT

## 2020-07-05 PROCEDURE — 85610 PROTHROMBIN TIME: CPT | Performed by: PHYSICIAN ASSISTANT

## 2020-07-05 RX ADMIN — ISOSORBIDE MONONITRATE 30 MG: 30 TABLET, EXTENDED RELEASE ORAL at 09:32

## 2020-07-05 RX ADMIN — ENOXAPARIN SODIUM 120 MG: 120 INJECTION SUBCUTANEOUS at 09:32

## 2020-07-05 RX ADMIN — FAMOTIDINE 20 MG: 20 TABLET, FILM COATED ORAL at 09:31

## 2020-07-05 RX ADMIN — HYDROCHLOROTHIAZIDE 25 MG: 25 TABLET ORAL at 09:31

## 2020-07-05 RX ADMIN — CARVEDILOL 25 MG: 12.5 TABLET, FILM COATED ORAL at 09:31

## 2020-07-05 NOTE — PLAN OF CARE
Problem: PAIN - ADULT  Goal: Verbalizes/displays adequate comfort level or baseline comfort level  Description  Interventions:  - Encourage patient to monitor pain and request assistance  - Assess pain using appropriate pain scale  - Administer analgesics based on type and severity of pain and evaluate response  - Implement non-pharmacological measures as appropriate and evaluate response  - Consider cultural and social influences on pain and pain management  - Notify physician/advanced practitioner if interventions unsuccessful or patient reports new pain  Outcome: Progressing     Problem: INFECTION - ADULT  Goal: Absence or prevention of progression during hospitalization  Description  INTERVENTIONS:  - Assess and monitor for signs and symptoms of infection  - Monitor lab/diagnostic results  - Monitor all insertion sites, i e  indwelling lines, tubes, and drains  - Monitor endotracheal if appropriate and nasal secretions for changes in amount and color  - [place] appropriate cooling/warming therapies per order  - Administer medications as ordered  - Instruct and encourage patient and family to use good hand hygiene technique  - Identify and instruct in appropriate isolation precautions for identified infection/condition  Outcome: Progressing  Goal: Absence of fever/infection during neutropenic period  Description  INTERVENTIONS:  - Monitor WBC    Outcome: Progressing     Problem: SAFETY ADULT  Goal: Patient will remain free of falls  Description  INTERVENTIONS:  - Assess patient frequently for physical needs  -  Identify cognitive and physical deficits and behaviors that affect risk of falls    -  [place] fall precautions as indicated by assessment   - Educate patient/family on patient safety including physical limitations  - Instruct patient to call for assistance with activity based on assessment  - Modify environment to reduce risk of injury  - Consider OT/PT consult to assist with strengthening/mobility  Outcome: Progressing  Goal: Maintain or return to baseline ADL function  Description  INTERVENTIONS:  -  Assess patient's ability to carry out ADLs; assess patient's baseline for ADL function and identify physical deficits which impact ability to perform ADLs (bathing, care of mouth/teeth, toileting, grooming, dressing, etc )  - Assess/evaluate cause of self-care deficits   - Assess range of motion  - Assess patient's mobility; develop plan if impaired  - Assess patient's need for assistive devices and provide as appropriate  - Encourage maximum independence but intervene and supervise when necessary  - Involve family in performance of ADLs  - Assess for home care needs following discharge   - Consider OT consult to assist with ADL evaluation and planning for discharge  - Provide patient education as appropriate  Outcome: Progressing  Goal: Maintain or return mobility status to optimal level  Description  INTERVENTIONS:  - Assess patient's baseline mobility status (ambulation, transfers, stairs, etc )    - Identify cognitive and physical deficits and behaviors that affect mobility  - Identify mobility aids required to assist with transfers and/or ambulation (gait belt, sit-to-stand, lift, walker, cane, etc )  - [place] fall precautions as indicated by assessment  - Record patient progress and toleration of activity level on Mobility SBAR; progress patient to next Phase/Stage  - Instruct patient to call for assistance with activity based on assessment  - Consider rehabilitation consult to assist with strengthening/weightbearing, etc   Outcome: Progressing     Problem: DISCHARGE PLANNING  Goal: Discharge to home or other facility with appropriate resources  Description  INTERVENTIONS:  - Identify barriers to discharge w/patient and caregiver  - Arrange for needed discharge resources and transportation as appropriate  - Identify discharge learning needs (meds, wound care, etc )  - Arrange for interpretive services to assist at discharge as needed  - Refer to Case Management Department for coordinating discharge planning if the patient needs post-hospital services based on physician/advanced practitioner order or complex needs related to functional status, cognitive ability, or social support system  Outcome: Progressing     Problem: Knowledge Deficit  Goal: Patient/family/caregiver demonstrates understanding of disease process, treatment plan, medications, and discharge instructions  Description  Complete learning assessment and assess knowledge base  Interventions:  - Provide teaching at level of understanding  - Provide teaching via preferred learning methods  Outcome: Progressing     Problem: Potential for Falls  Goal: Patient will remain free of falls  Description  INTERVENTIONS:  - Assess patient frequently for physical needs  -  Identify cognitive and physical deficits and behaviors that affect risk of falls    -  [place] fall precautions as indicated by assessment   - Educate patient/family on patient safety including physical limitations  - Instruct patient to call for assistance with activity based on assessment  - Modify environment to reduce risk of injury  - Consider OT/PT consult to assist with strengthening/mobility  Outcome: Progressing

## 2020-07-05 NOTE — ASSESSMENT & PLAN NOTE
· Recent admission for RLE pain and swelling, found to have acute DVT  · Started on heparin prior to transitioning to eliquis and patient was discharged on Eliquis, presented with angioedema therefore Eliquis discontinued on admission  · Started on Pradaxa on admission to change NOAC class however Pradaxa is non formulary  · Started patient on Lovenox bridge to Coumadin  · Ran script for Lovenox with patient's pharmacy and will be $15   · Patient remain in-house overnight for Lovenox injection education and patient feels comfortable with injection and medication administration prior to discharge  · I discussed and reiterated the importance close follow-up with his PCP and he will call his PCP on Monday or Tuesday to review his PT/INR which will be obtained tomorrow 07/06/2020 for further recommendations on continuing Lovenox until INR is therapeutic, patient demonstrated understanding, he will receive his morning Lovenox injection and will administer it himself under nurse or provision prior to discharge  · Patient will need close follow-up with his PCP, I sent a message to his PCP for continuity of care and close follow-up outpatient

## 2020-07-05 NOTE — DISCHARGE INSTRUCTIONS
Please repeat lab work on Monday 07/06/2020 to monitor your INR (the lab test that will monitor therapeutic levels of your Coumadin)  Please call your primary care provider on Monday or Tuesday to follow-up INR levels and for further recommendations on continuing Lovenox injections  Warfarin (By mouth)   Warfarin (WAR-far-in)  Prevents and treats blood clots  May lower the risk of serious complications after a heart attack  This medicine is a blood thinner  Brand Name(s): Coumadin, Jantoven   There may be other brand names for this medicine  When This Medicine Should Not Be Used: This medicine is not right for everyone  Do not use it if you had an allergic reaction to warfarin, if you are pregnant, or if you have health problems that could cause bleeding  How to Use This Medicine:   Tablet  · Take your medicine as directed  Your dose may need to be changed several times to find what works best for you  · This medicine should come with a Medication Guide  Ask your pharmacist for a copy if you do not have one  · Missed dose: Take a dose as soon as you remember  If it is almost time for your next dose, wait until then and take a regular dose  Do not take extra medicine to make up for a missed dose  · Store the medicine in a closed container at room temperature, away from heat, moisture, and direct light  Drugs and Foods to Avoid:   Ask your doctor or pharmacist before using any other medicine, including over-the-counter medicines, vitamins, and herbal products  · Many medicines and foods can affect how warfarin works and may affect the PT/INR test results   Tell your doctor before you start or stop any medicine, especially the following:   ¨ Ginkgo, echinacea, goldenseal, or Leslee's wort  ¨ Another blood thinner, including apixaban, argatroban, bivalirudin, cilostazol, clopidogrel, dabigatran, desirudin, dipyridamole, heparin, lepirudin, prasugrel, rivaroxaban, ticlopidine  ¨ Medicine to treat depression or anxiety, including citalopram, desvenlafaxine, duloxetine, escitalopram, fluoxetine, fluvoxamine, milnacipran, paroxetine, sertraline, venlafaxine, vilazodone  ¨ Medicine to treat an infection  ¨ NSAID pain or arthritis medicine, including aspirin, celecoxib, diclofenac, diflunisal, fenoprofen, ibuprofen, indomethacin, ketoprofen, ketorolac, mefenamic acid, naproxen, oxaprozin, piroxicam, sulindac  Check labels for over-the-counter medicines to find out if they contain an NSAID  ¨ Steroid medicine, including dexamethasone, hydrocortisone, methylprednisolone, prednisolone, prednisone  · Warfarin works best if you eat about the same amount of vitamin K every day  Foods high in vitamin K include asparagus, broccoli, brussels sprouts, cabbage, green leafy vegetables, plums, rhubarb, and canola oil  Talk to your doctor before you make changes to your normal diet  · Do not eat grapefruit or drink grapefruit juice while you are using this medicine  Warnings While Using This Medicine:   · It is not safe to take this medicine during pregnancy  It could harm an unborn baby  Tell your doctor right away if you become pregnant  Use an effective form of birth control to keep from getting pregnant during treatment and for at least 1 month after your last dose  · Tell your doctor if you are breastfeeding, or if you have kidney disease, liver disease, diabetes, heart disease, heart failure, high blood pressure, an infection, a stomach ulcer, or protein C deficiency  Also tell your doctor if you had recent surgery or an injury, or a history of stroke, anemia, severe bleeding or bruising, or problems caused by heparin use    · This medicine may cause the following problems:   ¨ Bleeding, which may be life-threatening  ¨ Gangrene (skin or tissue damage)  ¨ Calciphylaxis or calcium uremic arteriolopathy  ¨ Purple toes syndrome  · You must have a PT/INR blood test while you use this medicine to check how well your blood is clotting  Your doctor will use the test results to make sure the medicine is working properly  Keep all appointments for the PT/INR blood tests  · You may bleed or bruise more easily with warfarin  To prevent injury or cuts, do not play rough sports, be careful with sharp objects, and brush and floss your teeth gently  Kendal Yanni your nose gently, and do not pick your nose  · Carry an ID card or wear a medical alert bracelet to let emergency caregivers know that you use warfarin  · Tell any doctor or dentist who treats you that you are using this medicine  You may need to stop using this medicine several days before you have surgery or medical tests  · Keep all medicine out of the reach of children  Never share your medicine with anyone  Possible Side Effects While Using This Medicine:   Call your doctor right away if you notice any of these side effects:  · Allergic reaction: Itching or hives, swelling in your face or hands, swelling or tingling in your mouth or throat, chest tightness, trouble breathing  · Bleeding from your gums or nose, coughing up blood, heavy monthly periods  · Bleeding that does not stop, bruising, or weakness  · Dizziness, fainting, lightheadedness  · Pain, brown or black skin, or skin that is cool to the touch  · Purple toes or feet, or new pain in your leg, foot, or toes  · Purplish red, net-like, blotchy spots on the skin  · Red or dark brown urine, or red or black, tarry stools  · Vomiting blood or material that looks like coffee grounds  If you notice other side effects that you think are caused by this medicine, tell your doctor  Call your doctor for medical advice about side effects  You may report side effects to FDA at 9-046-FDA-9257  © 2017 [employer] Information is for End User's use only and may not be sold, redistributed or otherwise used for commercial purposes  The above information is an  only   It is not intended as medical advice for individual conditions or treatments  Talk to your doctor, nurse or pharmacist before following any medical regimen to see if it is safe and effective for you

## 2020-07-05 NOTE — NURSING NOTE
Pt discharged home  Reviewed all the discharged instructions with the pt  Educated pt how to give himself Sub Q injections  Pt was successful at giving himself an injection  Gathered all the belongings  Pt acknowledged all the discharged instructions  RN walked pt downstairs at the ED entrance  Pt left the floor in a stable condition

## 2020-07-05 NOTE — DISCHARGE SUMMARY
Discharge- [facility] 1965, 62 y o  male MRN: [id number]  Unit/Bed#: S -01 Encounter: [id number]  Primary Care Provider: MAX Osuna   Date and time admitted to hospital: 7/3/2020  6:40 AM    * Angioedemaresolved as of 7/5/2020  Assessment & Plan  · POA with tongue and uvula swelling  No trouble swallowing or breathing  · H/o angioedema related to lisinopril and amlodipine  · Recently started on eliquis, so discontinued on admission   · Received steroids, pepcid and benadryl and reports improvement    Resolution tongue and uvula swelling  · Airway intact    Acute deep vein thrombosis (DVT) of tibial vein of right lower extremity (HCC)  Assessment & Plan  · Recent admission for RLE pain and swelling, found to have acute DVT  · Started on heparin prior to transitioning to eliquis and patient was discharged on Eliquis, presented with angioedema therefore Eliquis discontinued on admission  · Started on Pradaxa on admission to change NOAC class however Pradaxa is non formulary  · Started patient on Lovenox bridge to Coumadin  · Ran script for Lovenox with patient's pharmacy and will be $15   · Patient remain in-house overnight for Lovenox injection education and patient feels comfortable with injection and medication administration prior to discharge  · I discussed and reiterated the importance close follow-up with his PCP and he will call his PCP on Monday or Tuesday to review his PT/INR which will be obtained tomorrow 07/06/2020 for further recommendations on continuing Lovenox until INR is therapeutic, patient demonstrated understanding, he will receive his morning Lovenox injection and will administer it himself under nurse or provision prior to discharge  · Patient will need close follow-up with his PCP, I sent a message to his PCP for continuity of care and close follow-up outpatient        Essential hypertension  Assessment & Plan  · Continue home medication regimen      Coronary artery disease involving native heart without angina pectoris  Assessment & Plan  · Continue statin  · As patient will be started on Coumadin will discontinue Plavix      Discharging Physician / Practitioner: Santhosh Morales PA-C  PCP: Edwardo Ayala  Admission Date:   Admission Orders (From admission, onward)     Ordered        07/03/20 1051  Place in Observation  Once                   Discharge Date: 07/05/20    Resolved Problems  Date Reviewed: 7/5/2020          Resolved    * (Principal) Angioedema 7/5/2020     Resolved by  Santhosh Morales PA-C          Consultations During Hospital Stay:  · none    Procedures Performed:   · none    Significant Findings / Test Results:   · none    Incidental Findings:   · none     Test Results Pending at Discharge (will require follow up):   · PT/INR on 7/6/20      Outpatient Tests Requested:  · Follow-up closely with your primary care provider outpatient within 3-5 days  · He will need to call your primary care provider on Monday if they do not reach out to you to review your INR lab results for further instructions on continuing Lovenox injections and further lab monitoring with PT/INR    Complications:  none    Reason for Admission:  Angioedema    Hospital Course: Katie Ramos is a 62 y o  male patient who originally presented to the hospital on 7/3/2020 due to tongue swelling  Patient was recently discharged for acute DVT and was started on Eliquis  He presents with angioedema with tongue and uvula swelling  He did not exhibit any trouble swallowing or breathing and airway remained intact  Of note, patient has history of angioedema related to lisinopril and amlodipine  Due to patient recently being started on Eliquis this was discontinued indefinitely on admission and patient will need to avoid all factor Xa inhibitors in the future  Patient was started on Pradaxa on admission however this is non formulary for the patient    Therefore, patient was started on Lovenox bridge to Coumadin  He remained in-house to receive appropriate Education Lovenox injections and received extensive education on PT/INR monitoring and Coumadin administration  Patient felt comfortable prior to discharge with Lovenox injection and medication administration  I discussed and reiterated the importance close follow-up with his PCP and he will call his PCP on Monday or Tuesday to review his PT/INR which will be obtained tomorrow 07/06/2020 for further recommendations on continuing Lovenox until INR is therapeutic  Patient demonstrated understanding prior to discharge  A message was sent to his primary care provider prior to discharge for close follow-up in continuity of care  Please see above list of diagnoses and related plan for additional information  Condition at Discharge: stable     Discharge Day Visit / Exam:     Subjective:  Patient without any acute complaints  No events overnight  No chest pain or shortness of breath  No swelling  Resolution of tongue and uvula swelling  Airway intact  No trouble swallowing or breathing  All questions were answered  Vitals: Blood Pressure: 168/98 (07/04/20 2300)  Pulse: (!) 52 (07/04/20 2300)  Temperature: 97 7 °F (36 5 °C) (07/04/20 2300)  Temp Source: Oral (07/04/20 2300)  Respirations: 18 (07/04/20 2300)  Weight - Scale: 128 kg (283 lb 1 1 oz) (07/03/20 0643)  SpO2: 97 % (07/04/20 2300)  Exam:   Physical Exam   Constitutional: No distress  HENT:   Head: Normocephalic  Eyes: Conjunctivae are normal    Neck: Neck supple  Cardiovascular: Normal rate and regular rhythm  Pulmonary/Chest: Effort normal and breath sounds normal    Abdominal: Soft  Bowel sounds are normal    Neurological: He is alert  Psychiatric: He has a normal mood and affect  Nursing note and vitals reviewed  Discharge instructions/Information to patient and family:   See after visit summary for information provided to patient and family  Provisions for Follow-Up Care:  See after visit summary for information related to follow-up care and any pertinent home health orders  Disposition:     Home    For Discharges to [facility] SNF:   · Not Applicable to this Patient - Not Applicable to this Patient    Planned Readmission: none     Discharge Statement:  I spent 40 minutes discharging the patient  This time was spent on the day of discharge  I had direct contact with the patient on the day of discharge  Greater than 50% of the total time was spent examining patient, answering all patient questions, arranging and discussing plan of care with patient as well as directly providing post-discharge instructions  Additional time then spent on discharge activities  Discharge Medications:  See after visit summary for reconciled discharge medications provided to patient and family        ** Please Note: This note has been constructed using a voice recognition system **

## 2020-07-07 ENCOUNTER — TRANSITIONAL CARE MANAGEMENT (OUTPATIENT)
Dept: FAMILY MEDICINE CLINIC | Facility: CLINIC | Age: 55
End: 2020-07-07

## 2020-07-07 LAB — F5 GENE MUT ANL BLD/T: NORMAL

## 2020-07-07 NOTE — TELEPHONE ENCOUNTER
This was a [facility] Triage on 7/3  Per the patient's chart he was seen at [facility] ED on 7/3 and was admitted

## 2020-07-08 PROBLEM — Z09 ENCOUNTER FOR FOLLOW-UP: Status: ACTIVE | Noted: 2019-12-03

## 2020-07-08 LAB — F2 GENE MUT ANL BLD/T: NORMAL

## 2020-07-09 ENCOUNTER — OFFICE VISIT (OUTPATIENT)
Dept: FAMILY MEDICINE CLINIC | Facility: CLINIC | Age: 55
End: 2020-07-09
Payer: COMMERCIAL

## 2020-07-09 ENCOUNTER — TELEPHONE (OUTPATIENT)
Dept: FAMILY MEDICINE CLINIC | Facility: CLINIC | Age: 55
End: 2020-07-09

## 2020-07-09 DIAGNOSIS — Z51.81 ANTICOAGULATION GOAL OF INR 2 TO 3: Primary | ICD-10-CM

## 2020-07-09 DIAGNOSIS — I82.449 ACUTE DEEP VEIN THROMBOSIS (DVT) OF TIBIAL VEIN, UNSPECIFIED LATERALITY (HCC): ICD-10-CM

## 2020-07-09 DIAGNOSIS — Z76.89 ENCOUNTER FOR SUPPORT AND COORDINATION OF TRANSITION OF CARE: ICD-10-CM

## 2020-07-09 DIAGNOSIS — Z79.01 ANTICOAGULATION GOAL OF INR 2 TO 3: Primary | ICD-10-CM

## 2020-07-09 DIAGNOSIS — T46.4X5S ACE INHIBITOR-AGGRAVATED ANGIOEDEMA, SEQUELA: ICD-10-CM

## 2020-07-09 DIAGNOSIS — T78.3XXS ACE INHIBITOR-AGGRAVATED ANGIOEDEMA, SEQUELA: ICD-10-CM

## 2020-07-09 PROCEDURE — 99496 TRANSJ CARE MGMT HIGH F2F 7D: CPT | Performed by: FAMILY MEDICINE

## 2020-07-09 RX ORDER — SILDENAFIL 100 MG/1
TABLET, FILM COATED ORAL
COMMUNITY
Start: 2020-06-11 | End: 2020-07-13

## 2020-07-09 RX ORDER — HYALURONATE SODIUM, STABILIZED 88 MG/4 ML
SYRINGE (ML) INTRAARTICULAR
COMMUNITY
Start: 2020-05-01 | End: 2020-08-26 | Stop reason: CLARIF

## 2020-07-09 NOTE — PROGRESS NOTES
Assessment/Plan:        Problem List Items Addressed This Visit        Musculoskeletal and Integument    ACE inhibitor-aggravated angioedema, sequela       Other    Encounter for support and coordination of transition of care    Anticoagulation goal of INR 2 to 3 - Primary    Relevant Orders    Protime-INR             Reason for visit is patient had been admitted to the hospital for angioedema 06/27/2020 secondary to use of Ace and arbs  His medications had been adjusted and he was discharged  However he was readmitted on 06/29/2020 for DVT of the lower extremity and started on Coumadin  He currently requires follow-up transitional care management and anticoagulation therapy  Encounter provider Tung Barba, Edwardo Garcia       Provider located at [address] [place] [address],[address]  [address] [address] [phone]      Recent Visits  No visits were found meeting these conditions  Showing recent visits within past 7 days and meeting all other requirements     Today's Visits  Date Type Provider Dept   07/09/20 Office Visit MAX Johnson [address]   07/09/20 Telephone González Carr [address]   Showing today's visits and meeting all other requirements     Future Appointments  Date Type Provider Dept   07/09/20 Office Visit Walter Johnson [facility]   Showing future appointments within next 150 days and meeting all other requirements        After connecting through [employer], the patient was identified by name and date of birth  Osmin Roca was informed that this is a telemedicine visit and that the visit is being conducted through telephone  My office door was closed  No one else was in the room  He acknowledged consent and understanding of privacy and security of the video platform  The patient has agreed to participate and understands they can discontinue the visit at any time      Patient is aware this is a billable service  Subjective:     Patient ID: Lauren Roman is a 62 y o  male  Review of Systems   Constitutional: Negative for appetite change, chills, fatigue and fever  HENT: Negative for congestion, ear pain, postnasal drip, rhinorrhea, sinus pain and sore throat  Eyes: Negative for pain, redness and visual disturbance  Respiratory: Negative for cough and shortness of breath  Cardiovascular: Negative for chest pain  Gastrointestinal: Negative for abdominal pain, diarrhea, nausea and vomiting  Genitourinary: Negative for dysuria and hematuria  Musculoskeletal: Negative for arthralgias  Skin: Negative  Allergic/Immunologic: Negative for environmental allergies and food allergies  Neurological: Negative for dizziness, weakness, light-headedness, numbness and headaches  Objective: There were no vitals filed for this visit  Physical Exam   Constitutional: He is oriented to person, place, and time  Cardiovascular:   No murmur heard  Neurological: He is alert and oriented to person, place, and time  Psychiatric: He has a normal mood and affect  Nursing note and vitals reviewed  Transitional Care Management Review: Lauren Roman is a 62 y o  male here for TCM follow up  During the TCM phone call patient stated:    TCM Call (since 6/8/2020)     Date and time call was made  7/9/2020  3:56 PM    Hospital care reviewed  Records reviewed    Patient was hospitialized at  [address] [place] [address]    Date of Admission  06/27/20    Date of discharge  06/29/20    Diagnosis  angioedema of lips, kwaku, dvt of lower R leg    Disposition  Home    Were the patients medications reviewed and updated  Yes    Current Symptoms  None      TCM Call (since 6/8/2020)     Post hospital issues  None    Should patient be enrolled in anticoag monitoring? Yes    Scheduled for follow up?   Yes    Referrals needed  no referrals per hospital    Did you obtain your prescribed medications  Yes Do you need help managing your prescriptions or medications  No    Is transportation to your appointment needed  No    I have advised the patient to call PCP with any new or worsening symptoms  [address]; Family; Friends    The type of support provided  Emotional; Physical    Do you have social support  Yes, as much as I need    Are you recieving any outpatient services  No    Are you recieving home care services  No    Are you using any community resources  No    Current waiver services  No    Have you fallen in the last 12 months  No    Interperter language line needed  No    Counseling  Patient    Counseling topics  Prognosis; Risk factor reduction; instructions for management    Comments  PATIENT WILL ROBERTA REFILL ON MEDICATONS          I spent 20 minutes with the patient during this visit      MAX Radford

## 2020-07-09 NOTE — ASSESSMENT & PLAN NOTE
Patient ordered for PT INR to be done currently on Coumadin 5 mg p o  Daily and on transitional Lovenox subcutaneous  He will stop the Lovenox once we receive the PT INR to achieve therapeutic levels between 2 and 3

## 2020-07-09 NOTE — TELEPHONE ENCOUNTER
Tried calling patient to schedule follow up visit from recent ER visit  No answer, left message for patient to call our office    Watson Benavidez

## 2020-07-13 ENCOUNTER — TRANSCRIBE ORDERS (OUTPATIENT)
Dept: LAB | Facility: CLINIC | Age: 55
End: 2020-07-13

## 2020-07-13 ENCOUNTER — OFFICE VISIT (OUTPATIENT)
Dept: FAMILY MEDICINE CLINIC | Facility: CLINIC | Age: 55
End: 2020-07-13
Payer: COMMERCIAL

## 2020-07-13 ENCOUNTER — APPOINTMENT (OUTPATIENT)
Dept: LAB | Facility: CLINIC | Age: 55
End: 2020-07-13
Payer: COMMERCIAL

## 2020-07-13 VITALS
TEMPERATURE: 98.7 F | OXYGEN SATURATION: 97 % | DIASTOLIC BLOOD PRESSURE: 120 MMHG | RESPIRATION RATE: 12 BRPM | HEIGHT: 71 IN | WEIGHT: 287 LBS | BODY MASS INDEX: 40.18 KG/M2 | HEART RATE: 65 BPM | SYSTOLIC BLOOD PRESSURE: 180 MMHG

## 2020-07-13 DIAGNOSIS — Z79.01 ANTICOAGULATION GOAL OF INR 2 TO 3: ICD-10-CM

## 2020-07-13 DIAGNOSIS — I10 ESSENTIAL HYPERTENSION: Primary | Chronic | ICD-10-CM

## 2020-07-13 DIAGNOSIS — Z51.81 ANTICOAGULATION GOAL OF INR 2 TO 3: ICD-10-CM

## 2020-07-13 DIAGNOSIS — I21.4 NSTEMI (NON-ST ELEVATED MYOCARDIAL INFARCTION) (HCC): ICD-10-CM

## 2020-07-13 DIAGNOSIS — I82.501 LEG DVT (DEEP VENOUS THROMBOEMBOLISM), CHRONIC, RIGHT (HCC): ICD-10-CM

## 2020-07-13 DIAGNOSIS — Z51.81 ENCOUNTER FOR THERAPEUTIC DRUG MONITORING: Primary | ICD-10-CM

## 2020-07-13 LAB
INR PPP: 1.47 (ref 0.84–1.19)
PROTHROMBIN TIME: 17.1 SECONDS (ref 11.6–14.5)

## 2020-07-13 PROCEDURE — 85610 PROTHROMBIN TIME: CPT

## 2020-07-13 PROCEDURE — 1036F TOBACCO NON-USER: CPT | Performed by: FAMILY MEDICINE

## 2020-07-13 PROCEDURE — 36415 COLL VENOUS BLD VENIPUNCTURE: CPT

## 2020-07-13 PROCEDURE — 3008F BODY MASS INDEX DOCD: CPT | Performed by: FAMILY MEDICINE

## 2020-07-13 PROCEDURE — 99213 OFFICE O/P EST LOW 20 MIN: CPT | Performed by: FAMILY MEDICINE

## 2020-07-13 PROCEDURE — 3077F SYST BP >= 140 MM HG: CPT | Performed by: FAMILY MEDICINE

## 2020-07-13 PROCEDURE — 3080F DIAST BP >= 90 MM HG: CPT | Performed by: FAMILY MEDICINE

## 2020-07-13 PROCEDURE — 1111F DSCHRG MED/CURRENT MED MERGE: CPT | Performed by: FAMILY MEDICINE

## 2020-07-13 RX ORDER — ISOSORBIDE MONONITRATE 30 MG/1
60 TABLET, EXTENDED RELEASE ORAL DAILY
Qty: 30 TABLET | Refills: 11 | Status: SHIPPED | OUTPATIENT
Start: 2020-07-13 | End: 2021-02-12 | Stop reason: SDUPTHER

## 2020-07-13 NOTE — PROGRESS NOTES
Patient is a 61-year-old male initially seen in the emergency room for angioedema of the face secondary to his lisinopril  He had been stopped on the medication and discharge and the readmitted due to DVT of the lower extremity  He had been bridged with Lovenox to Coumadin is currently being seen in follow-up for management of his anticoagulation therapy  BMI Counseling: Body mass index is 40 03 kg/m²  The BMI is above normal  Nutrition recommendations include decreasing portion sizes, encouraging healthy choices of fruits and vegetables, decreasing fast food intake, consuming healthier snacks, limiting drinks that contain sugar, moderation in carbohydrate intake and increasing intake of lean protein  Exercise recommendations include vigorous physical activity 75 minutes/week, exercising 3-5 times per week, obtaining a gym membership and strength training exercises  Pharmacotherapy was ordered to help aid in weight loss  Depression Screening and Follow-up Plan: Patient not screened for depression due to patient refusal         Assessment/Plan:         Problem List Items Addressed This Visit     None            Subjective:      Patient ID: Monty Gibson is a 62 y o  male  Patient is a 61-year-old male initially seen in the emergency room for angioedema secondary to his antihypertensive medication lisinopril  The medication had been stopped and he was discharged and then readmitted for DVT of the lower extremity  He had been virtual Lovenox over to warfarin is currently being managed for anticoagulation therapy  He has been sent for PT INR for monitoring of therapeutic levels  Currently is being seen in follow-up  The following portions of the patient's history were reviewed and updated as appropriate:   He has a past medical history of Angioedema, Coronary artery disease, GI bleed, Hypertension, and STEMI (ST elevation myocardial infarction) ([address] )  ,  does not have any pertinent problems on file ,   has a past surgical history that includes No past surgeries and Upper gastrointestinal endoscopy ,  He was adopted  Family history is unknown by patient  ,   reports that he has never smoked  He has never used smokeless tobacco  He reports that he does not drink alcohol or use drugs  ,  is allergic to lisinopril; norvasc [amlodipine]; eliquis [apixaban]; and lipitor [atorvastatin]     Current Outpatient Medications   Medication Sig Dispense Refill    acetaminophen (TYLENOL) 325 mg tablet Take 3 tablets (975 mg total) by mouth every 8 (eight) hours 30 tablet 0    carvedilol (COREG) 25 mg tablet Take 1 tablet (25 mg total) by mouth 2 (two) times a day with meals 60 tablet 11    enoxaparin (LOVENOX) 120 mg/0 8 mL Inject 0 8 mL (120 mg total) under the skin every 12 (twelve) hours for 5 days 8 mL 0    hydrochlorothiazide (HYDRODIURIL) 25 mg tablet Take 1 tablet (25 mg total) by mouth daily 90 tablet 3    isosorbide mononitrate (IMDUR) 30 mg 24 hr tablet Take 1 tablet (30 mg total) by mouth daily 30 tablet 11    MONOVISC injection       sildenafil (VIAGRA) 100 mg tablet TAKE 1 TABLET ONCE A DAY AS NEEDED      Testosterone (ANDROGEL) 40 5 MG/2 5GM (1 62%) GEL Place 40 5 mg on the skin daily       warfarin (COUMADIN) 5 mg tablet Take one table (5mg) by mouth once daily for goal INR 2-3 30 tablet 0     No current facility-administered medications for this visit  Review of Systems   Constitutional: Negative for appetite change, chills, fatigue and fever  HENT: Negative for congestion, ear pain, postnasal drip, rhinorrhea, sinus pain and sore throat  Eyes: Negative for pain, redness and visual disturbance  Respiratory: Negative for cough and shortness of breath  Cardiovascular: Negative for chest pain  Gastrointestinal: Negative for abdominal pain, diarrhea, nausea and vomiting  Genitourinary: Negative for dysuria and hematuria  Musculoskeletal: Negative for arthralgias  Skin: Negative  Allergic/Immunologic: Negative for environmental allergies and food allergies  Neurological: Positive for dizziness, weakness, light-headedness and headaches  Objective: There were no vitals filed for this visit  There is no height or weight on file to calculate BMI  Physical Exam   Constitutional: He is oriented to person, place, and time  He appears well-developed and well-nourished  HENT:   Head: Normocephalic and atraumatic  Eyes: Pupils are equal, round, and reactive to light  Neck: Normal range of motion  Cardiovascular: Normal rate and regular rhythm  No murmur heard  Pulmonary/Chest: Effort normal and breath sounds normal    Abdominal: Soft  Bowel sounds are normal    Musculoskeletal: Normal range of motion  Neurological: He is alert and oriented to person, place, and time  Skin: Skin is warm  Psychiatric: He has a normal mood and affect  Nursing note and vitals reviewed

## 2020-07-13 NOTE — ASSESSMENT & PLAN NOTE
Patient's blood pressure running 180/120 when checked manually on the left arm  He is currently on carvedilol 25 mg p o  Daily, hydrochlorothiazide 25 mg p o  Daily, Isorbid mononitrate 30 mg p o  Daily  We have increased his Isorbid mononitrate to 60 mg daily  He has been instructed on adjusting the medication in to monitor his blood pressure routinely  Consideration may be to increase his Coreg to 25 mg p o  B i d   No distress noted at this time patient has just recently gotten of were contributed from [place]

## 2020-07-13 NOTE — PATIENT INSTRUCTIONS
Warfarin (By mouth)   Warfarin (WAR-far-in)  Prevents and treats blood clots  May lower the risk of serious complications after a heart attack  This medicine is a blood thinner  Brand Name(s): Coumadin, Jantoven   There may be other brand names for this medicine  When This Medicine Should Not Be Used: This medicine is not right for everyone  Do not use it if you had an allergic reaction to warfarin, if you are pregnant, or if you have health problems that could cause bleeding  How to Use This Medicine:   Tablet  · Take your medicine as directed  Your dose may need to be changed several times to find what works best for you  · This medicine should come with a Medication Guide  Ask your pharmacist for a copy if you do not have one  · Missed dose: Take a dose as soon as you remember  If it is almost time for your next dose, wait until then and take a regular dose  Do not take extra medicine to make up for a missed dose  · Store the medicine in a closed container at room temperature, away from heat, moisture, and direct light  Drugs and Foods to Avoid:   Ask your doctor or pharmacist before using any other medicine, including over-the-counter medicines, vitamins, and herbal products  · Many medicines and foods can affect how warfarin works and may affect the PT/INR test results   Tell your doctor before you start or stop any medicine, especially the following:   ¨ Ginkgo, echinacea, goldenseal, or Leslee's wort  ¨ Another blood thinner, including apixaban, argatroban, bivalirudin, cilostazol, clopidogrel, dabigatran, desirudin, dipyridamole, heparin, lepirudin, prasugrel, rivaroxaban, ticlopidine  ¨ Medicine to treat depression or anxiety, including citalopram, desvenlafaxine, duloxetine, escitalopram, fluoxetine, fluvoxamine, milnacipran, paroxetine, sertraline, venlafaxine, vilazodone  ¨ Medicine to treat an infection  ¨ NSAID pain or arthritis medicine, including aspirin, celecoxib, diclofenac, diflunisal, fenoprofen, ibuprofen, indomethacin, ketoprofen, ketorolac, mefenamic acid, naproxen, oxaprozin, piroxicam, sulindac  Check labels for over-the-counter medicines to find out if they contain an NSAID  ¨ Steroid medicine, including dexamethasone, hydrocortisone, methylprednisolone, prednisolone, prednisone  · Warfarin works best if you eat about the same amount of vitamin K every day  Foods high in vitamin K include asparagus, broccoli, brussels sprouts, cabbage, green leafy vegetables, plums, rhubarb, and canola oil  Talk to your doctor before you make changes to your normal diet  · Do not eat grapefruit or drink grapefruit juice while you are using this medicine  Warnings While Using This Medicine:   · It is not safe to take this medicine during pregnancy  It could harm an unborn baby  Tell your doctor right away if you become pregnant  Use an effective form of birth control to keep from getting pregnant during treatment and for at least 1 month after your last dose  · Tell your doctor if you are breastfeeding, or if you have kidney disease, liver disease, diabetes, heart disease, heart failure, high blood pressure, an infection, a stomach ulcer, or protein C deficiency  Also tell your doctor if you had recent surgery or an injury, or a history of stroke, anemia, severe bleeding or bruising, or problems caused by heparin use  · This medicine may cause the following problems:   ¨ Bleeding, which may be life-threatening  ¨ Gangrene (skin or tissue damage)  ¨ Calciphylaxis or calcium uremic arteriolopathy  ¨ Purple toes syndrome  · You must have a PT/INR blood test while you use this medicine to check how well your blood is clotting  Your doctor will use the test results to make sure the medicine is working properly  Keep all appointments for the PT/INR blood tests  · You may bleed or bruise more easily with warfarin   To prevent injury or cuts, do not play rough sports, be careful with sharp objects, and brush and floss your teeth gently  Silvestre Gins your nose gently, and do not pick your nose  · Carry an ID card or wear a medical alert bracelet to let emergency caregivers know that you use warfarin  · Tell any doctor or dentist who treats you that you are using this medicine  You may need to stop using this medicine several days before you have surgery or medical tests  · Keep all medicine out of the reach of children  Never share your medicine with anyone  Possible Side Effects While Using This Medicine:   Call your doctor right away if you notice any of these side effects:  · Allergic reaction: Itching or hives, swelling in your face or hands, swelling or tingling in your mouth or throat, chest tightness, trouble breathing  · Bleeding from your gums or nose, coughing up blood, heavy monthly periods  · Bleeding that does not stop, bruising, or weakness  · Dizziness, fainting, lightheadedness  · Pain, brown or black skin, or skin that is cool to the touch  · Purple toes or feet, or new pain in your leg, foot, or toes  · Purplish red, net-like, blotchy spots on the skin  · Red or dark brown urine, or red or black, tarry stools  · Vomiting blood or material that looks like coffee grounds  If you notice other side effects that you think are caused by this medicine, tell your doctor  Call your doctor for medical advice about side effects  You may report side effects to FDA at 2-832-FDA-9580  © 2017 [facility] Information is for End User's use only and may not be sold, redistributed or otherwise used for commercial purposes  The above information is an  only  It is not intended as medical advice for individual conditions or treatments  Talk to your doctor, nurse or pharmacist before following any medical regimen to see if it is safe and effective for you  Hypertension   WHAT YOU NEED TO KNOW:   What is hypertension? Hypertension is high blood pressure (BP)   Your BP is the force of your blood moving against the walls of your arteries  Normal BP is less than 120/80  Prehypertension is between 120/80 and 139/89  Hypertension is 140/90 or higher  Hypertension causes your BP to get so high that your heart has to work much harder than normal  This can damage your heart  You can control hypertension with a healthy lifestyle or medicines  A controlled blood pressure helps protect your organs, such as your heart, lungs, brain, and kidneys  What causes hypertension? The cause of hypertension may not be known  This type of hypertension is called essential or primary hypertension  Hypertension can sometimes be caused by other medical conditions, such as kidney disease  This type of hypertension is called secondary hypertension  What increases my risk for hypertension? · Age older than 54 years (men)    · Age older than 72 years (women)    · A family history of hypertension or heart disease     · Obesity or lack of exercise     · Too many high-sodium foods    · Stress     · Use of tobacco, alcohol, or illegal drugs     · A medical condition, such as diabetes, kidney disease, thyroid disease, or adrenal gland disorder     · Certain medicines, such as steroids or birth control pills  What are the signs and symptoms of hypertension? You may have no signs or symptoms, or you may have any of the following:  · Headache     · Blurred vision     · Chest pain     · Dizziness or weakness     · Trouble breathing    · Nosebleeds  How is hypertension diagnosed? Your healthcare provider will ask about your symptoms and the medicines you take  He or she will also ask if you have a family history of high blood pressure and about any health conditions you have  He or she will also check your blood pressure and weight and examine your heart, lungs, and eyes  You may need any of the following tests:  · Blood tests  may help healthcare providers find the cause of your hypertension   Blood tests can also help find other health problems caused by hypertension  · Urine tests  will be done to check your kidney function  Kidney problems can increase your risk for hypertension  How is hypertension treated? Your healthcare provider will recommend lifestyle changes to lower your BP  You may also need the following medicines:  · Medicine  may be used to help lower your BP  You may need more than one type of medicine  Take the medicine exactly as directed  · Diuretics  help decrease extra fluid that collects in your body  This will help lower your BP  You may urinate more often while you take this medicine  · Cholesterol medicine  helps lower your cholesterol level  A low cholesterol level helps prevent heart disease and makes it easier to control your blood pressure  What can I do to manage hypertension? Talk with your healthcare provider about these and other ways to manage hypertension:  · Check your BP at home  Sit and rest for 5 minutes before you take your BP  Extend your arm and support it on a flat surface  Your arm should be at the same level as your heart  Follow the directions that came with your BP monitor  If possible, take at least 2 BP readings each time  Take your BP at least twice a day at the same times each day, such as morning and evening  Keep a record of your BP readings and bring it to your follow-up visits  Ask your healthcare provider what your BP should be  · Limit sodium (salt) as directed  Too much sodium can affect your fluid balance  Check labels to find low-sodium or no-salt-added foods  Some low-sodium foods use potassium salts for flavor  Too much potassium can also cause health problems  Your healthcare provider will tell you how much sodium and potassium are safe for you to have in a day  He or she may recommend that you limit sodium to 2,300 mg a day  · Follow the meal plan recommended by your healthcare provider    A dietitian or your provider can give you more information on low-sodium plans or the DASH (Dietary Approaches to Stop Hypertension) eating plan  The DASH plan is low in sodium, unhealthy fats, and total fat  It is high in potassium, calcium, and fiber  · Exercise to maintain a healthy weight  Exercise at least 30 minutes per day, on most days of the week  This will help decrease your blood pressure  Ask your healthcare provider about the best exercise plan for you  · Decrease stress  This may help lower your BP  Learn ways to relax, such as deep breathing or listening to music  · Limit alcohol  Women should limit alcohol to 1 drink a day  Men should limit alcohol to 2 drinks a day  A drink of alcohol is 12 ounces of beer, 5 ounces of wine, or 1½ ounces of liquor  · Do not smoke  Nicotine and other chemicals in cigarettes and cigars can increase your BP and also cause lung damage  Ask your healthcare provider for information if you currently smoke and need help to quit  E-cigarettes or smokeless tobacco still contain nicotine  Talk to your healthcare provider before you use these products  · Manage any other health conditions you have  Health conditions such as diabetes can increase your risk for hypertension  Follow your healthcare provider's instructions and take all your medicines as directed  Call 911 for any of the following:   · You have discomfort in your chest that feels like squeezing, pressure, fullness, or pain  · You become confused or have difficulty speaking  · You suddenly feel lightheaded or have trouble breathing  · You have pain or discomfort in your back, neck, jaw, stomach, or arm  When should I seek immediate care? · You have a severe headache or vision loss  · You have weakness in an arm or leg  When should I contact my healthcare provider? · You feel faint, dizzy, confused, or drowsy      · You have been taking your BP medicine and your BP is still higher than your healthcare provider says it should be     · You have questions or concerns about your condition or care  CARE AGREEMENT:   You have the right to help plan your care  Learn about your health condition and how it may be treated  Discuss treatment options with your caregivers to decide what care you want to receive  You always have the right to refuse treatment  The above information is an  only  It is not intended as medical advice for individual conditions or treatments  Talk to your doctor, nurse or pharmacist before following any medical regimen to see if it is safe and effective for you  © 2017 [facility] Information is for End User's use only and may not be sold, redistributed or otherwise used for commercial purposes  All illustrations and images included in CareNotes® are the copyrighted property of [employer] M [employer]  or [employer]  Deep Vein Thrombosis Prevention   WHAT YOU NEED TO KNOW:   Deep vein thrombosis (DVT) is a blood clot that forms in a deep vein of the body  The deep veins in the legs, thighs, and hips are the most common sites for DVT  DVT can also occur in your arms  The clot prevents the normal flow of blood in the vein  The blood backs up and causes pain and swelling  The DVT can break into smaller pieces and travel to your lungs and cause a blockage called a pulmonary embolism  A pulmonary embolism can become life-threatening  DISCHARGE INSTRUCTIONS:   Call 911 for any of the following:   · You feel lightheaded, short of breath, and have chest pain  · You cough up blood  Return to the emergency department if:   · Your arm or leg feels warm, tender, and painful  It may look swollen and red  Contact your healthcare provider if:   · You have questions or concerns about your condition or care  Risk factors for DVT:  A DVT can happen to anybody, but certain things can increase your risk   You may be at higher risk if you have had DVT in the past  You may also be at risk if you have a family member who has had blood clots  The following conditions also increase your risk:  · Limited activity caused by bed rest, a leg cast, or sitting for long periods    · Injury to a deep vein, or surgery    · A blood disorder that makes your blood clot faster than normal, such as factor V Leiden mutation    · Age older than 60 years    · Use of hormone replacement therapy or some types of birth control medicine    · Pregnancy, and for 6 weeks after childbirth     · Cancer or heart failure     · A catheter placed in a large vein    · Smoking    · Obesity or varicose veins  Prevent DVT:   · Guidelines for everyone:      ¨ Maintain a healthy weight  Ask your healthcare provider how much you should weigh  Ask him to help you create a weight loss plan if you are overweight  ¨ Do not smoke  Nicotine and other chemicals in cigarettes and cigars can damage blood vessels and increase your risk for a DVT  Ask your healthcare provider for information if you currently smoke and need help to quit  E-cigarettes or smokeless tobacco still contain nicotine  Talk to your healthcare provider before you use these products  ¨ Move regularly if you sit for long periods of time  If you travel by car or work at a desk, move and stretch in your seat several times each hour  In an airplane, get up and walk every hour  Exercise your legs while sitting by raising and lowering your heels  Keep your toes on the floor while you do this  You can also raise and lower your toes while keeping your heels on the floor  Also tighten and release your leg muscles while sitting  ¨ Exercise regularly  to help increase your blood flow  Walking is a good low-impact exercise  Talk to your healthcare provider about the best exercise plan for you  · Guidelines for people at high risk for DVT:      ¨ Take blood thinner medicines as directed    Your healthcare provider may recommend blood thinners and other medicines to help prevent blood clots  ¨ Wear pressure stockings as directed  The stockings are tight and put pressure on your legs  This improves blood flow and helps prevent clots  Wear the stockings during the day  Do not wear them when you sleep  ¨ Elevate your legs  above the level of your heart as often as you can  This will help decrease swelling and pain  Prop your legs on pillows or blankets to keep them elevated comfortably  ¨ Get up and move as directed after surgery or an injury, or during an illness  Early and regular movement can help decrease your risk for DVT by helping to increase your blood flow  Ask your healthcare provider what type of activity you need and how often you should do it  ¨ Change body positions often if you are bedridden  Ask for help to change your position every 1 to 2 hours  Follow up with your healthcare provider as directed:  Write down your questions so you remember to ask them during your visits  © 2017 [address] [employer] Information is for End User's use only and may not be sold, redistributed or otherwise used for commercial purposes  All illustrations and images included in CareNotes® are the copyrighted property of [employer] M [employer]  or [employer]  The above information is an  only  It is not intended as medical advice for individual conditions or treatments  Talk to your doctor, nurse or pharmacist before following any medical regimen to see if it is safe and effective for you

## 2020-07-13 NOTE — ASSESSMENT & PLAN NOTE
Patient reports mild tenderness to the right calf at this time  Pain scale 1/10  He continues to take warfarin  5 mg p o  Daily  He has been instructed to go to [facility] to have a PT INR level drawn  Will follow-up in just medications in accordance with anticoagulation

## 2020-07-14 ENCOUNTER — ANTICOAG VISIT (OUTPATIENT)
Dept: FAMILY MEDICINE CLINIC | Facility: CLINIC | Age: 55
End: 2020-07-14

## 2020-07-14 ENCOUNTER — TELEPHONE (OUTPATIENT)
Dept: FAMILY MEDICINE CLINIC | Facility: CLINIC | Age: 55
End: 2020-07-14

## 2020-07-14 LAB — INR PPP: 1.47 (ref 0.84–1.19)

## 2020-07-14 NOTE — TELEPHONE ENCOUNTER
Patient returned call in regards to warfarin/PTINR  Confirmed with patient he is taking 5 mg daily  Per provider he is to continue 5 mg and recheck in 1 week  Patient agreed    Sridhar Cole

## 2020-07-14 NOTE — PROGRESS NOTES
Called pt in regards to his PT/INR-Per Arian continue 5mg and recheck in one week   Pt does not have voicemail set up unable to L/M  [facility]

## 2020-07-17 NOTE — UTILIZATION REVIEW
May we have an update on this case please? Notification of Discharge  This is a Notification of Discharge from our facility [address]  Please be advised that this patient has been discharge from our facility  Below you will find the admission and discharge date and time including the patients disposition  PRESENTATION DATE: 6/29/2020  8:42 PM  OBS ADMISSION DATE:   IP ADMISSION DATE: 6/29/20 2141   DISCHARGE DATE: 7/1/2020 11:42 AM  DISPOSITION: Home/Self Care Home/Self Care   Admission Orders listed below:  Admission Orders (From admission, onward)     Ordered        06/29/20 2141  Inpatient Admission (expected length of stay for this patient Order details is greater than two midnights)  Once                   Please contact the UR Department if additional information is required to close this patient's authorization/case  [address] [facility] Utilization Review Department  Main: [phone] x carefully listen to the prompts  All voicemails are confidential   [email or web address]  Send all requests for admission clinical reviews, approved or denied determinations and any other requests to dedicated fax number below belonging to the campus where the patient is receiving treatment   List of dedicated fax numbers:  [address] [place] [address] DENIALS (Administrative/Medical Necessity) [phone]   [address] (Maternity/NICU/Pediatrics) [phone]   Jose Luis [phone]   Maureen Hopper [phone]   Madeleine Hong [phone]   [address] [phone]   [facility]  [phone]   [address] [facility] [address] [facility]  [address] [facility] [phone]

## 2020-07-21 ENCOUNTER — TELEPHONE (OUTPATIENT)
Dept: FAMILY MEDICINE CLINIC | Facility: CLINIC | Age: 55
End: 2020-07-21

## 2020-07-21 NOTE — TELEPHONE ENCOUNTER
Tried calling patient to see if he went for his PT/INR but his phone was not on, did not ring went directly to voicemail  Was unable to leave message, mailbox was full     Mary Umaña

## 2020-07-23 ENCOUNTER — APPOINTMENT (OUTPATIENT)
Dept: LAB | Facility: CLINIC | Age: 55
End: 2020-07-23
Payer: COMMERCIAL

## 2020-07-23 DIAGNOSIS — Z51.81 ENCOUNTER FOR THERAPEUTIC DRUG MONITORING: ICD-10-CM

## 2020-07-23 LAB
INR PPP: 1.6 (ref 0.84–1.19)
INR PPP: 1.6 (ref 0.84–1.19)
PROTHROMBIN TIME: 18.3 SECONDS (ref 11.6–14.5)

## 2020-07-23 PROCEDURE — 36415 COLL VENOUS BLD VENIPUNCTURE: CPT

## 2020-07-23 PROCEDURE — 85610 PROTHROMBIN TIME: CPT

## 2020-07-24 ENCOUNTER — ANTICOAG VISIT (OUTPATIENT)
Dept: FAMILY MEDICINE CLINIC | Facility: CLINIC | Age: 55
End: 2020-07-24

## 2020-07-24 ENCOUNTER — TELEPHONE (OUTPATIENT)
Dept: FAMILY MEDICINE CLINIC | Facility: CLINIC | Age: 55
End: 2020-07-24

## 2020-07-24 NOTE — PROGRESS NOTES
7/24/2020 Patient was called and made aware to take 7 5 mg and recheck in 1 week per [facility]

## 2020-07-24 NOTE — TELEPHONE ENCOUNTER
As per DARREN OK to call in Coumadin 5 mg #30 One daily and Coumadin 2 5 mg #30 One daily  Did call into pharmacy and spoke to [facility] and will get them filled  Did call patient to notify medication was called in     [facility]

## 2020-07-31 DIAGNOSIS — I82.501 LEG DVT (DEEP VENOUS THROMBOEMBOLISM), CHRONIC, RIGHT (HCC): Primary | ICD-10-CM

## 2020-08-04 ENCOUNTER — APPOINTMENT (OUTPATIENT)
Dept: LAB | Facility: HOSPITAL | Age: 55
End: 2020-08-04
Payer: COMMERCIAL

## 2020-08-04 LAB
INR PPP: 2.1 (ref 0.84–1.19)
INR PPP: 2.1 (ref 0.84–1.19)
PROTHROMBIN TIME: 23.2 SECONDS (ref 11.6–14.5)

## 2020-08-04 PROCEDURE — 36415 COLL VENOUS BLD VENIPUNCTURE: CPT

## 2020-08-04 PROCEDURE — 85610 PROTHROMBIN TIME: CPT

## 2020-08-05 ENCOUNTER — ANTICOAG VISIT (OUTPATIENT)
Dept: FAMILY MEDICINE CLINIC | Facility: CLINIC | Age: 55
End: 2020-08-05

## 2020-08-05 ENCOUNTER — TELEPHONE (OUTPATIENT)
Dept: FAMILY MEDICINE CLINIC | Facility: CLINIC | Age: 55
End: 2020-08-05

## 2020-08-05 LAB — INR PPP: 2.1 (ref 0.84–1.19)

## 2020-08-05 NOTE — PROGRESS NOTES
Left message for patient to continue his 7 5 mg and recheck in 1 week per [facility]    Vilma Logan

## 2020-08-05 NOTE — TELEPHONE ENCOUNTER
Patient called and stated he has his INR done yesterday and was looking for results  Can you let me know what his next instructions are so I can call him back?   Tamiko Dunaway

## 2020-08-11 ENCOUNTER — ANTICOAG VISIT (OUTPATIENT)
Dept: FAMILY MEDICINE CLINIC | Facility: CLINIC | Age: 55
End: 2020-08-11

## 2020-08-11 DIAGNOSIS — I82.501 LEG DVT (DEEP VENOUS THROMBOEMBOLISM), CHRONIC, RIGHT (HCC): Primary | ICD-10-CM

## 2020-08-13 ENCOUNTER — APPOINTMENT (EMERGENCY)
Dept: CT IMAGING | Facility: HOSPITAL | Age: 55
DRG: 872 | End: 2020-08-13
Payer: COMMERCIAL

## 2020-08-13 ENCOUNTER — HOSPITAL ENCOUNTER (INPATIENT)
Facility: HOSPITAL | Age: 55
LOS: 3 days | Discharge: HOME | DRG: 872 | End: 2020-08-16
Attending: EMERGENCY MEDICINE | Admitting: INTERNAL MEDICINE
Payer: COMMERCIAL

## 2020-08-13 ENCOUNTER — APPOINTMENT (INPATIENT)
Dept: ULTRASOUND IMAGING | Facility: HOSPITAL | Age: 55
DRG: 872 | End: 2020-08-13
Payer: COMMERCIAL

## 2020-08-13 ENCOUNTER — APPOINTMENT (INPATIENT)
Dept: CT IMAGING | Facility: HOSPITAL | Age: 55
DRG: 872 | End: 2020-08-13
Payer: COMMERCIAL

## 2020-08-13 ENCOUNTER — APPOINTMENT (EMERGENCY)
Dept: RADIOLOGY | Facility: HOSPITAL | Age: 55
DRG: 872 | End: 2020-08-13
Payer: COMMERCIAL

## 2020-08-13 DIAGNOSIS — J18.9 PNEUMONIA: Primary | ICD-10-CM

## 2020-08-13 DIAGNOSIS — I10 ESSENTIAL HYPERTENSION: ICD-10-CM

## 2020-08-13 DIAGNOSIS — R11.0 NAUSEA: ICD-10-CM

## 2020-08-13 DIAGNOSIS — L03.90 CELLULITIS: ICD-10-CM

## 2020-08-13 DIAGNOSIS — A41.9 SEPSIS (HCC): ICD-10-CM

## 2020-08-13 DIAGNOSIS — R60.0 BILATERAL LOWER EXTREMITY EDEMA: ICD-10-CM

## 2020-08-13 DIAGNOSIS — R50.9 FEVER: ICD-10-CM

## 2020-08-13 LAB
ALBUMIN SERPL BCP-MCNC: 3.7 G/DL (ref 3.5–5)
ALP SERPL-CCNC: 78 U/L (ref 46–116)
ALT SERPL W P-5'-P-CCNC: 23 U/L (ref 12–78)
ANION GAP SERPL CALCULATED.3IONS-SCNC: 9 MMOL/L (ref 4–13)
APTT PPP: 40 SECONDS (ref 23–37)
AST SERPL W P-5'-P-CCNC: 48 U/L (ref 5–45)
BASOPHILS # BLD AUTO: 0.06 THOUSANDS/ΜL (ref 0–0.1)
BASOPHILS NFR BLD AUTO: 0 % (ref 0–1)
BILIRUB SERPL-MCNC: 0.61 MG/DL (ref 0.2–1)
BILIRUB UR QL STRIP: NEGATIVE
BUN SERPL-MCNC: 20 MG/DL (ref 5–25)
CALCIUM SERPL-MCNC: 8.9 MG/DL (ref 8.3–10.1)
CHLORIDE SERPL-SCNC: 102 MMOL/L (ref 100–108)
CLARITY UR: CLEAR
CO2 SERPL-SCNC: 29 MMOL/L (ref 21–32)
COLOR UR: YELLOW
CREAT SERPL-MCNC: 1.35 MG/DL (ref 0.6–1.3)
CRP SERPL HS-MCNC: 14.1 MG/L
CRP SERPL QL: 130.8 MG/L
EOSINOPHIL # BLD AUTO: 0.12 THOUSAND/ΜL (ref 0–0.61)
EOSINOPHIL NFR BLD AUTO: 1 % (ref 0–6)
ERYTHROCYTE [DISTWIDTH] IN BLOOD BY AUTOMATED COUNT: 13.5 % (ref 11.6–15.1)
FERRITIN SERPL-MCNC: 93 NG/ML (ref 8–388)
GFR SERPL CREATININE-BSD FRML MDRD: 67 ML/MIN/1.73SQ M
GLUCOSE SERPL-MCNC: 101 MG/DL (ref 65–140)
GLUCOSE UR STRIP-MCNC: NEGATIVE MG/DL
HCT VFR BLD AUTO: 40.9 % (ref 36.5–49.3)
HGB BLD-MCNC: 13.3 G/DL (ref 12–17)
HGB UR QL STRIP.AUTO: NEGATIVE
IMM GRANULOCYTES # BLD AUTO: 0.06 THOUSAND/UL (ref 0–0.2)
IMM GRANULOCYTES NFR BLD AUTO: 0 % (ref 0–2)
INR PPP: 2.1 (ref 0.84–1.19)
INR PPP: 2.22 (ref 0.84–1.19)
KETONES UR STRIP-MCNC: NEGATIVE MG/DL
LACTATE SERPL-SCNC: 1.1 MMOL/L (ref 0.5–2)
LACTATE SERPL-SCNC: 1.4 MMOL/L (ref 0.5–2)
LEUKOCYTE ESTERASE UR QL STRIP: NEGATIVE
LIPASE SERPL-CCNC: 41 U/L (ref 73–393)
LIPASE SERPL-CCNC: 50 U/L (ref 73–393)
LYMPHOCYTES # BLD AUTO: 0.81 THOUSANDS/ΜL (ref 0.6–4.47)
LYMPHOCYTES NFR BLD AUTO: 6 % (ref 14–44)
MCH RBC QN AUTO: 29.4 PG (ref 26.8–34.3)
MCHC RBC AUTO-ENTMCNC: 32.5 G/DL (ref 31.4–37.4)
MCV RBC AUTO: 91 FL (ref 82–98)
MONOCYTES # BLD AUTO: 0.78 THOUSAND/ΜL (ref 0.17–1.22)
MONOCYTES NFR BLD AUTO: 6 % (ref 4–12)
NEUTROPHILS # BLD AUTO: 11.55 THOUSANDS/ΜL (ref 1.85–7.62)
NEUTS SEG NFR BLD AUTO: 87 % (ref 43–75)
NITRITE UR QL STRIP: NEGATIVE
NRBC BLD AUTO-RTO: 0 /100 WBCS
PH UR STRIP.AUTO: 5 [PH]
PLATELET # BLD AUTO: 172 THOUSANDS/UL (ref 149–390)
PMV BLD AUTO: 11.4 FL (ref 8.9–12.7)
POTASSIUM SERPL-SCNC: 3.7 MMOL/L (ref 3.5–5.3)
PROCALCITONIN SERPL-MCNC: 2.31 NG/ML
PROCALCITONIN SERPL-MCNC: 2.43 NG/ML
PROT SERPL-MCNC: 8.3 G/DL (ref 6.4–8.2)
PROT UR STRIP-MCNC: NEGATIVE MG/DL
PROTHROMBIN TIME: 23.6 SECONDS (ref 11.6–14.5)
PROTHROMBIN TIME: 24.6 SECONDS (ref 11.6–14.5)
RBC # BLD AUTO: 4.52 MILLION/UL (ref 3.88–5.62)
SARS-COV-2 RNA RESP QL NAA+PROBE: NEGATIVE
SODIUM SERPL-SCNC: 140 MMOL/L (ref 136–145)
SP GR UR STRIP.AUTO: 1.02 (ref 1–1.03)
UROBILINOGEN UR QL STRIP.AUTO: 0.2 E.U./DL
WBC # BLD AUTO: 13.38 THOUSAND/UL (ref 4.31–10.16)

## 2020-08-13 PROCEDURE — 84145 PROCALCITONIN (PCT): CPT | Performed by: INTERNAL MEDICINE

## 2020-08-13 PROCEDURE — 87040 BLOOD CULTURE FOR BACTERIA: CPT | Performed by: EMERGENCY MEDICINE

## 2020-08-13 PROCEDURE — 86141 C-REACTIVE PROTEIN HS: CPT | Performed by: EMERGENCY MEDICINE

## 2020-08-13 PROCEDURE — 93970 EXTREMITY STUDY: CPT

## 2020-08-13 PROCEDURE — 99223 1ST HOSP IP/OBS HIGH 75: CPT | Performed by: INTERNAL MEDICINE

## 2020-08-13 PROCEDURE — 80053 COMPREHEN METABOLIC PANEL: CPT | Performed by: EMERGENCY MEDICINE

## 2020-08-13 PROCEDURE — 83690 ASSAY OF LIPASE: CPT | Performed by: EMERGENCY MEDICINE

## 2020-08-13 PROCEDURE — 71045 X-RAY EXAM CHEST 1 VIEW: CPT

## 2020-08-13 PROCEDURE — 81003 URINALYSIS AUTO W/O SCOPE: CPT | Performed by: EMERGENCY MEDICINE

## 2020-08-13 PROCEDURE — 85730 THROMBOPLASTIN TIME PARTIAL: CPT | Performed by: EMERGENCY MEDICINE

## 2020-08-13 PROCEDURE — 85610 PROTHROMBIN TIME: CPT | Performed by: INTERNAL MEDICINE

## 2020-08-13 PROCEDURE — 99285 EMERGENCY DEPT VISIT HI MDM: CPT

## 2020-08-13 PROCEDURE — 85610 PROTHROMBIN TIME: CPT | Performed by: EMERGENCY MEDICINE

## 2020-08-13 PROCEDURE — 83605 ASSAY OF LACTIC ACID: CPT | Performed by: INTERNAL MEDICINE

## 2020-08-13 PROCEDURE — 71260 CT THORAX DX C+: CPT

## 2020-08-13 PROCEDURE — 93005 ELECTROCARDIOGRAM TRACING: CPT

## 2020-08-13 PROCEDURE — 36415 COLL VENOUS BLD VENIPUNCTURE: CPT | Performed by: EMERGENCY MEDICINE

## 2020-08-13 PROCEDURE — 82728 ASSAY OF FERRITIN: CPT | Performed by: INTERNAL MEDICINE

## 2020-08-13 PROCEDURE — 96374 THER/PROPH/DIAG INJ IV PUSH: CPT

## 2020-08-13 PROCEDURE — 83690 ASSAY OF LIPASE: CPT | Performed by: INTERNAL MEDICINE

## 2020-08-13 PROCEDURE — 96365 THER/PROPH/DIAG IV INF INIT: CPT

## 2020-08-13 PROCEDURE — 87635 SARS-COV-2 COVID-19 AMP PRB: CPT | Performed by: EMERGENCY MEDICINE

## 2020-08-13 PROCEDURE — 99285 EMERGENCY DEPT VISIT HI MDM: CPT | Performed by: EMERGENCY MEDICINE

## 2020-08-13 PROCEDURE — 87086 URINE CULTURE/COLONY COUNT: CPT | Performed by: EMERGENCY MEDICINE

## 2020-08-13 PROCEDURE — G1004 CDSM NDSC: HCPCS

## 2020-08-13 PROCEDURE — 83605 ASSAY OF LACTIC ACID: CPT | Performed by: EMERGENCY MEDICINE

## 2020-08-13 PROCEDURE — 85025 COMPLETE CBC W/AUTO DIFF WBC: CPT | Performed by: EMERGENCY MEDICINE

## 2020-08-13 PROCEDURE — 86140 C-REACTIVE PROTEIN: CPT | Performed by: INTERNAL MEDICINE

## 2020-08-13 PROCEDURE — 96361 HYDRATE IV INFUSION ADD-ON: CPT

## 2020-08-13 RX ORDER — ONDANSETRON 2 MG/ML
4 INJECTION INTRAMUSCULAR; INTRAVENOUS ONCE
Status: COMPLETED | OUTPATIENT
Start: 2020-08-13 | End: 2020-08-13

## 2020-08-13 RX ORDER — LEVOFLOXACIN 5 MG/ML
750 INJECTION, SOLUTION INTRAVENOUS ONCE
Status: COMPLETED | OUTPATIENT
Start: 2020-08-13 | End: 2020-08-13

## 2020-08-13 RX ORDER — ACETAMINOPHEN 325 MG/1
650 TABLET ORAL ONCE
Status: COMPLETED | OUTPATIENT
Start: 2020-08-13 | End: 2020-08-13

## 2020-08-13 RX ORDER — HYDROCHLOROTHIAZIDE 25 MG/1
25 TABLET ORAL DAILY
Status: DISCONTINUED | OUTPATIENT
Start: 2020-08-13 | End: 2020-08-16

## 2020-08-13 RX ORDER — WARFARIN SODIUM 2.5 MG/1
7.5 TABLET ORAL
Status: DISCONTINUED | OUTPATIENT
Start: 2020-08-13 | End: 2020-08-16 | Stop reason: HOSPADM

## 2020-08-13 RX ORDER — ISOSORBIDE MONONITRATE 60 MG/1
60 TABLET, EXTENDED RELEASE ORAL DAILY
Status: DISCONTINUED | OUTPATIENT
Start: 2020-08-13 | End: 2020-08-16 | Stop reason: HOSPADM

## 2020-08-13 RX ORDER — SODIUM CHLORIDE 9 MG/ML
50 INJECTION, SOLUTION INTRAVENOUS CONTINUOUS
Status: CANCELLED | OUTPATIENT
Start: 2020-08-13

## 2020-08-13 RX ORDER — ACETAMINOPHEN 325 MG/1
650 TABLET ORAL EVERY 6 HOURS PRN
Status: DISCONTINUED | OUTPATIENT
Start: 2020-08-13 | End: 2020-08-16 | Stop reason: HOSPADM

## 2020-08-13 RX ORDER — CARVEDILOL 12.5 MG/1
25 TABLET ORAL 2 TIMES DAILY WITH MEALS
Status: DISCONTINUED | OUTPATIENT
Start: 2020-08-13 | End: 2020-08-16 | Stop reason: HOSPADM

## 2020-08-13 RX ORDER — SODIUM CHLORIDE 9 MG/ML
125 INJECTION, SOLUTION INTRAVENOUS CONTINUOUS
Status: DISCONTINUED | OUTPATIENT
Start: 2020-08-13 | End: 2020-08-15

## 2020-08-13 RX ADMIN — VANCOMYCIN HYDROCHLORIDE 1750 MG: 1 INJECTION, POWDER, LYOPHILIZED, FOR SOLUTION INTRAVENOUS at 21:36

## 2020-08-13 RX ADMIN — ACETAMINOPHEN 650 MG: 325 TABLET, FILM COATED ORAL at 06:21

## 2020-08-13 RX ADMIN — ACETAMINOPHEN 650 MG: 325 TABLET, FILM COATED ORAL at 13:08

## 2020-08-13 RX ADMIN — WARFARIN SODIUM 7.5 MG: 2.5 TABLET ORAL at 18:19

## 2020-08-13 RX ADMIN — SODIUM CHLORIDE 125 ML/HR: 0.9 INJECTION, SOLUTION INTRAVENOUS at 18:23

## 2020-08-13 RX ADMIN — ISOSORBIDE MONONITRATE 60 MG: 60 TABLET, EXTENDED RELEASE ORAL at 13:08

## 2020-08-13 RX ADMIN — ONDANSETRON 4 MG: 2 INJECTION INTRAMUSCULAR; INTRAVENOUS at 06:21

## 2020-08-13 RX ADMIN — IOHEXOL 100 ML: 350 INJECTION, SOLUTION INTRAVENOUS at 12:16

## 2020-08-13 RX ADMIN — HYDROCHLOROTHIAZIDE 25 MG: 25 TABLET ORAL at 13:08

## 2020-08-13 RX ADMIN — LEVOFLOXACIN 750 MG: 5 INJECTION, SOLUTION INTRAVENOUS at 07:14

## 2020-08-13 RX ADMIN — CEFTRIAXONE 1000 MG: 1 INJECTION, POWDER, FOR SOLUTION INTRAMUSCULAR; INTRAVENOUS at 12:21

## 2020-08-13 RX ADMIN — CEFEPIME HYDROCHLORIDE 2000 MG: 2 INJECTION, POWDER, FOR SOLUTION INTRAVENOUS at 20:13

## 2020-08-13 RX ADMIN — ACETAMINOPHEN 650 MG: 325 TABLET, FILM COATED ORAL at 23:50

## 2020-08-13 RX ADMIN — SODIUM CHLORIDE 125 ML/HR: 0.9 INJECTION, SOLUTION INTRAVENOUS at 08:41

## 2020-08-13 RX ADMIN — SODIUM CHLORIDE 1000 ML: 0.9 INJECTION, SOLUTION INTRAVENOUS at 06:21

## 2020-08-13 RX ADMIN — CARVEDILOL 25 MG: 12.5 TABLET, FILM COATED ORAL at 18:18

## 2020-08-13 RX ADMIN — VANCOMYCIN HYDROCHLORIDE 2000 MG: 1 INJECTION, POWDER, LYOPHILIZED, FOR SOLUTION INTRAVENOUS at 08:41

## 2020-08-13 NOTE — ASSESSMENT & PLAN NOTE
Patient reports mild tenderness in both legs  patient states he takes 7 5 mg of warfarin daily, INR in ED was 2 1  Plan:    1  Will continue patient on 7 5 mg of warfarin during his inpatient stay

## 2020-08-13 NOTE — PLAN OF CARE
Problem: Potential for Falls  Goal: Patient will remain free of falls  Description: INTERVENTIONS:  - Assess patient frequently for physical needs  -  Identify cognitive and physical deficits and behaviors that affect risk of falls    -  [place] fall precautions as indicated by assessment   - Educate patient/family on patient safety including physical limitations  - Instruct patient to call for assistance with activity based on assessment  - Modify environment to reduce risk of injury  - Consider OT/PT consult to assist with strengthening/mobility  8/13/2020 1431 by Asad Menezes RN  Outcome: Progressing  8/13/2020 1430 by Asad Menezes RN  Outcome: Progressing     Problem: CARDIOVASCULAR - ADULT  Goal: Maintains optimal cardiac output and hemodynamic stability  Description: INTERVENTIONS:  - Monitor I/O, vital signs and rhythm  - Monitor for S/S and trends of decreased cardiac output  - Administer and titrate ordered vasoactive medications to optimize hemodynamic stability  - Assess quality of pulses, skin color and temperature  - Assess for signs of decreased coronary artery perfusion  - Instruct patient to report change in severity of symptoms  Outcome: Progressing  Goal: Absence of cardiac dysrhythmias or at baseline rhythm  Description: INTERVENTIONS:  - Continuous cardiac monitoring, vital signs, obtain 12 lead EKG if ordered  - Administer antiarrhythmic and heart rate control medications as ordered  - Monitor electrolytes and administer replacement therapy as ordered  Outcome: Progressing     Problem: RESPIRATORY - ADULT  Goal: Achieves optimal ventilation and oxygenation  Description: INTERVENTIONS:  - Assess for changes in respiratory status  - Assess for changes in mentation and behavior  - Position to facilitate oxygenation and minimize respiratory effort  - Oxygen administered by appropriate delivery if ordered  - Initiate smoking cessation education as indicated  - Encourage broncho-pulmonary hygiene including cough, deep breathe, Incentive Spirometry  - Assess the need for suctioning and aspirate as needed  - Assess and instruct to report SOB or any respiratory difficulty  - Respiratory Therapy support as indicated  Outcome: Progressing     Problem: PAIN - ADULT  Goal: Verbalizes/displays adequate comfort level or baseline comfort level  Description: Interventions:  - Encourage patient to monitor pain and request assistance  - Assess pain using appropriate pain scale  - Administer analgesics based on type and severity of pain and evaluate response  - Implement non-pharmacological measures as appropriate and evaluate response  - Consider cultural and social influences on pain and pain management  - Notify physician/advanced practitioner if interventions unsuccessful or patient reports new pain  Outcome: Progressing     Problem: INFECTION - ADULT  Goal: Absence or prevention of progression during hospitalization  Description: INTERVENTIONS:  - Assess and monitor for signs and symptoms of infection  - Monitor lab/diagnostic results  - Monitor all insertion sites, i e  indwelling lines, tubes, and drains  - Monitor endotracheal if appropriate and nasal secretions for changes in amount and color  - [place] appropriate cooling/warming therapies per order  - Administer medications as ordered  - Instruct and encourage patient and family to use good hand hygiene technique  - Identify and instruct in appropriate isolation precautions for identified infection/condition  Outcome: Progressing     Problem: DISCHARGE PLANNING  Goal: Discharge to home or other facility with appropriate resources  Description: INTERVENTIONS:  - Identify barriers to discharge w/patient and caregiver  - Arrange for needed discharge resources and transportation as appropriate  - Identify discharge learning needs (meds, wound care, etc )  - Arrange for interpretive services to assist at discharge as needed  - Refer to Case Management Department for coordinating discharge planning if the patient needs post-hospital services based on physician/advanced practitioner order or complex needs related to functional status, cognitive ability, or social support system  Outcome: Progressing     Problem: Knowledge Deficit  Goal: Patient/family/caregiver demonstrates understanding of disease process, treatment plan, medications, and discharge instructions  Description: Complete learning assessment and assess knowledge base    Interventions:  - Provide teaching at level of understanding  - Provide teaching via preferred learning methods  Outcome: Progressing

## 2020-08-13 NOTE — ASSESSMENT & PLAN NOTE
The patient's blood pressure is 175/87 which has trended down to 148/68  1  Continue patient on Coreg 25 mg b i d   2  Continue patient on hydrochlorothiazide 25 mg q d

## 2020-08-13 NOTE — ASSESSMENT & PLAN NOTE
On presentation patient has temperature of 102 5°, white count of 13 3, respiration of 21, and bilateral lower leg swelling  Patient will be admitted for sepsis  Cellulitis vs DVT vs other infectious process    · It is less likely this is another infectious process such as pneumonia, given that the patient's lactic acid level is within normal limits  Patient also denies having any difficulties breathing, chest x-ray does not show any acute processes indicative of pneumonia  The patient is covered 23 test is also negative  · It is likely that this is cellulitis, however it is very unlikely to have bilateral lower extremity cellulitis that started at the same time  However given patient's temperature, leukocytosis, tachypnea this cannot be ruled out yet  · It is likely that this could be a DVT again the patient has had a similar episode on a prior to admission  Patient also states that he had identical symptoms on prior admission  Patient currently on warfarin 7 5 mg, present on admission with an INR of 2 1  Plan:     1  Obtain procal, CRP  2  Start patient on Rocephin  3  Obtained blood cultures  4   Monitor vitals

## 2020-08-13 NOTE — ED NOTES
Pt resting comfortably at this time  Pt denies any pain or discomfort  Pt is requesting water to drink  Provider is at bedside        Denae Adams RN  08/13/20 1002

## 2020-08-13 NOTE — ASSESSMENT & PLAN NOTE
Patient has bilateral lower extremity edema is likely secondary to DVT vs cellulitis vs chronic venous stasis  Plan:    1  Continue patient on warfarin  2  Follow-up blood cultures  3   Will consider obtaining a lower extremity venous doppler

## 2020-08-13 NOTE — ASSESSMENT & PLAN NOTE
Given patient's symptoms of nausea, chills, fevers, leg swelling and leukocytosis  This is concerning for infectious process  However it is also possible that these symptoms could be due to a DVT or PE  It is also possible the patient has pneumonia, even though patient denies having any sick contacts, patient does travel to [place] and he did for work  Plan:    1  Patient's chest x-ray did not show any acute cardiopulmonary process  2  CT of the chest to rule out PE  3   Will monitor vitals and follow up blood cultures and lab studies

## 2020-08-13 NOTE — PROGRESS NOTES
Vancomycin Assessment    Gurwinder Heaton is a 62 y o  male who is currently receiving vancomycin for SSTI  Relevant clinical data and objective history reviewed:  Creatinine   Date Value Ref Range Status   08/13/2020 1 35 (H) 0 60 - 1 30 mg/dL Final     Comment:     Standardized to IDMS reference method   07/04/2020 1 31 (H) 0 60 - 1 30 mg/dL Final     Comment:     Standardized to IDMS reference method   07/01/2020 1 25 0 60 - 1 30 mg/dL Final     Comment:     Standardized to IDMS reference method     /66 (BP Location: Left arm)   Pulse 96   Temp 99 2 °F (37 3 °C) (Oral)   Resp 18   Ht 5' 11" (1 803 m)   Wt 135 kg (297 lb 2 2 oz)   SpO2 95%   BMI 41 44 kg/m²   No intake/output data recorded  Lab Results   Component Value Date/Time    BUN 20 08/13/2020 06:23 AM    WBC 13 38 (H) 08/13/2020 06:23 AM    HGB 13 3 08/13/2020 06:23 AM    HCT 40 9 08/13/2020 06:23 AM    MCV 91 08/13/2020 06:23 AM     08/13/2020 06:23 AM     Temp Readings from Last 3 Encounters:   08/13/20 99 2 °F (37 3 °C) (Oral)   07/13/20 98 7 °F (37 1 °C) (Tympanic)   07/05/20 97 5 °F (36 4 °C) (Oral)     Vancomycin Days of Therapy: 1    Assessment/Plan  The patient is currently on vancomycin utilizing scheduled dosing based on adjusted body weight (due to obesity)  Baseline risks associated with therapy include: pre-existing renal impairment, concomitant nephrotoxic medications, advanced age, and dehydration  The patient received a one time 2 g gram dose  earlier today  He will be continued on 17 5 mg/kg (adj weight) every 12 hours  Pharmacy will also follow closely for s/sx of nephrotoxicity, infusion reactions, and appropriateness of therapy  BMP and CBC will be ordered per protocol  Plan for trough as patient approaches steady state, prior to the 4th  dose at approximately 2030 on 08/14/20  Due to infection severity, will target a trough of 15-20    Pharmacy will continue to follow the patients culture results and clinical progress daily      Kia Ramey, Pharmacist

## 2020-08-13 NOTE — H&P
H&P- Manish Geronimo 1965, 62 y o  male MRN: [id number]    Unit/Bed#: S -01 Encounter: [id number]    Primary Care Provider: MAX Daly   Date and time admitted to hospital: 8/13/2020  5:56 AM        Sepsis [facility])  Assessment & Plan  On presentation patient has temperature of 102 5°, white count of 13 3, respiration of 21, and bilateral lower leg swelling  Patient will be admitted for sepsis  Cellulitis vs DVT vs other infectious process    · It is less likely this is another infectious process such as pneumonia, given that the patient's lactic acid level is within normal limits  Patient also denies having any difficulties breathing, chest x-ray does not show any acute processes indicative of pneumonia  The patient is covered 23 test is also negative  · It is likely that this is cellulitis, however it is very unlikely to have bilateral lower extremity cellulitis that started at the same time  However given patient's temperature, leukocytosis, tachypnea this cannot be ruled out yet  · It is likely that this could be a DVT again the patient has had a similar episode on a prior to admission  Patient also states that he had identical symptoms on prior admission  Patient currently on warfarin 7 5 mg, present on admission with an INR of 2 1  Plan:     1  Obtain procal, CRP  2  Start patient on Rocephin  3  Obtained blood cultures  4  Monitor vitals    Febrile illness, acute  Assessment & Plan  Given patient's symptoms of nausea, chills, fevers, leg swelling and leukocytosis  This is concerning for infectious process  However it is also possible that these symptoms could be due to a DVT or PE  It is also possible the patient has pneumonia, even though patient denies having any sick contacts, patient does travel to [place] and he did for work  Plan:    4  Patient's chest x-ray did not show any acute cardiopulmonary process  5  CT of the chest to rule out PE  6   Will monitor vitals and follow up blood cultures and lab studies    Bilateral lower extremity edema  Assessment & Plan  Patient has bilateral lower extremity edema is likely secondary to DVT vs cellulitis vs chronic venous stasis  Plan:    7  Continue patient on warfarin  8  Follow-up blood cultures  9  Will consider obtaining a lower extremity venous doppler    Leg DVT (deep venous thromboembolism), chronic, right [facility])  Assessment & Plan  Patient reports mild tenderness in both legs  patient states he takes 7 5 mg of warfarin daily, INR in ED was 2 1  Plan:    10  Will continue patient on 7 5 mg of warfarin during his inpatient stay  Essential hypertension  Assessment & Plan  The patient's blood pressure is 175/87 which has trended down to 148/68  11  Continue patient on Coreg 25 mg b i d   12  Continue patient on hydrochlorothiazide 25 mg q d  VTE Prophylaxis: Warfarin (Coumadin)  / sequential compression device   Code Status:  Full  POLST: There is no POLST form on file for this patient (pre-hospital)    Anticipated Length of Stay:  Patient will be admitted on an Inpatient basis with an anticipated length of stay of   > 2 midnights  Justification for Hospital Stay:  Sepsis    Chief Complaint:   Nausea and chills    History of Present Illness: Anastasiia Mcgill is a 62 y o  male with past medical history of hypertension, CAD, DVT on warfrin who presents with complaints of nausea, chills, generalized weakness, and bilateral lower extremity swelling  Patient states that when he was working yesterday, he started feeling nauseous, getting chills and weakness  The patient was in usual state of health until he was at work yesterday when he started feeling nauseous  Patient states that he started having the same symptoms prior to previous admission fluid he was diagnosed with DVT  Patient states the legs also started swelling over the past day and have been mildly tender    Patient currently is denying any chest pain, shortness of breath  Patient states that he has not had any recent travel history, states that he drives 5 8-1 hours back and forth from work every day  Patient denies having any sick contacts  The patient denies having any fevers, nausea, or vomiting  Review of Systems:    Review of Systems   Constitutional: Positive for chills, diaphoresis and fatigue  Negative for activity change and appetite change  Respiratory: Negative for cough, chest tightness, shortness of breath and wheezing  Cardiovascular: Positive for leg swelling  Negative for chest pain and palpitations  Gastrointestinal: Positive for nausea  Negative for abdominal distention, abdominal pain, blood in stool, constipation, diarrhea and vomiting  Genitourinary: Negative for dysuria  Skin: Positive for color change (Right lower extremity)  Neurological: Positive for weakness  Negative for dizziness, numbness and headaches  Past Medical and Surgical History:     Past Medical History:   Diagnosis Date    Angioedema     Coronary artery disease     GI bleed     Hypertension     stopped his meds when ran out several years ago    STEMI (ST elevation myocardial infarction) ([facility] [address] )        Past Surgical History:   Procedure Laterality Date    NO PAST SURGERIES      UPPER GASTROINTESTINAL ENDOSCOPY      Clamped large stomach ulcer       Meds/Allergies:    Prior to Admission medications    Medication Sig Start Date End Date Taking?  Authorizing Provider   warfarin (COUMADIN) 5 mg tablet Take one table (5mg) by mouth once daily for goal INR 2-3  Patient taking differently: Take 7 5 mg by mouth Take one table (5mg) by mouth once daily for goal INR 2-3 7/4/20  Yes Kristina Frye PA-C   acetaminophen (TYLENOL) 325 mg tablet Take 3 tablets (975 mg total) by mouth every 8 (eight) hours 11/10/19   MAX Fortune   carvedilol (COREG) 25 mg tablet Take 1 tablet (25 mg total) by mouth 2 (two) times a day with meals 2/14/20   Isabell Brian MD   hydrochlorothiazide (HYDRODIURIL) 25 mg tablet Take 1 tablet (25 mg total) by mouth daily 1/10/20   Isabell Brian MD   isosorbide mononitrate (IMDUR) 30 mg 24 hr tablet Take 2 tablets (60 mg total) by mouth daily 7/13/20   MAX Ferris   MONOVISC injection  5/1/20   Historical Provider, MD   Testosterone (ANDROGEL) 40 5 MG/2 5GM (1 62%) GEL Place 40 5 mg on the skin daily     Historical Provider, MD   enoxaparin (LOVENOX) 120 mg/0 8 mL Inject 0 8 mL (120 mg total) under the skin every 12 (twelve) hours for 5 days 7/4/20 8/13/20  Damon Schilder, PA-C     I have reviewed home medications with patient personally  Allergies:    Allergies   Allergen Reactions    Lisinopril Angioedema    Norvasc [Amlodipine] Angioedema    Eliquis [Apixaban] Angioedema    Lipitor [Atorvastatin] Facial Swelling       Social History:     Marital Status: Single   Occupation:    Patient Pre-hospital Living Situation:  Lives with family  Patient Pre-hospital Level of Mobility:  Fully mobile, is very active at work  Patient Pre-hospital Diet Restrictions:  None  Substance Use History:   Social History     Substance and Sexual Activity   Alcohol Use Never    Alcohol/week: 0 0 standard drinks    Frequency: Never    Drinks per session: 1 or 2    Binge frequency: Never     Social History     Tobacco Use   Smoking Status Never Smoker   Smokeless Tobacco Never Used     Social History     Substance and Sexual Activity   Drug Use No       Family History:    non-contributory    Physical Exam:     Vitals:   Blood Pressure: 138/66 (08/13/20 1513)  Pulse: 96 (08/13/20 1513)  Temperature: 99 2 °F (37 3 °C) (08/13/20 1513)  Temp Source: Oral (08/13/20 1513)  Respirations: 18 (08/13/20 1513)  Height: 5' 11" (180 3 cm) (08/13/20 1243)  Weight - Scale: 135 kg (297 lb 2 2 oz) (08/13/20 0600)  SpO2: 95 % (08/13/20 1513)    Physical Exam  Constitutional:       General: He is in acute distress  Appearance: He is obese  He is diaphoretic  HENT:      Head: Normocephalic and atraumatic  Eyes:      General:         Right eye: No discharge  Left eye: No discharge  Conjunctiva/sclera: Conjunctivae normal       Pupils: Pupils are equal, round, and reactive to light  Cardiovascular:      Rate and Rhythm: Normal rate and regular rhythm  Pulses: Normal pulses  Heart sounds: Normal heart sounds  No murmur  Pulmonary:      Effort: Pulmonary effort is normal  No respiratory distress  Breath sounds: Normal breath sounds  Chest:      Chest wall: No tenderness  Abdominal:      General: Abdomen is flat  Bowel sounds are normal  There is no distension  Palpations: Abdomen is soft  Tenderness: There is no abdominal tenderness  Musculoskeletal:      Right lower leg: Edema present  Left lower leg: Edema present  Skin:     Findings: Erythema and rash (Chronic venous stasis changes apparent right lower extremity ) present  Neurological:      General: No focal deficit present  Mental Status: He is alert and oriented to person, place, and time  Additional Data:     Lab Results: I have personally reviewed pertinent reports  Results from last 7 days   Lab Units 08/13/20  0623   WBC Thousand/uL 13 38*   HEMOGLOBIN g/dL 13 3   HEMATOCRIT % 40 9   PLATELETS Thousands/uL 172   NEUTROS PCT % 87*   LYMPHS PCT % 6*   MONOS PCT % 6   EOS PCT % 1     Results from last 7 days   Lab Units 08/13/20  0623   POTASSIUM mmol/L 3 7   CHLORIDE mmol/L 102   CO2 mmol/L 29   BUN mg/dL 20   CREATININE mg/dL 1 35*   CALCIUM mg/dL 8 9   ALK PHOS U/L 78   ALT U/L 23   AST U/L 48*     Results from last 7 days   Lab Units 08/13/20  1420   INR  2 22*       Imaging: I have personally reviewed pertinent reports  Xr Chest 1 View Portable    Result Date: 8/13/2020  Narrative: CHEST INDICATION:   cough, fever   COMPARISON:  Chest radiograph from 6/27/2020 and 8/25/2019  Chest CT from 8/25/2019  EXAM PERFORMED/VIEWS:  XR CHEST PORTABLE FINDINGS: Cardiomediastinal silhouette appears unremarkable  Prominent right pulmonary artery, unchanged  Calcified AP window nodes indicating old granulomatous disease  The lungs are clear  No pneumothorax or pleural effusion  Osseous structures appear within normal limits for patient age  Impression: No acute cardiopulmonary disease  Workstation performed: [id number]     Ct Chest W Contrast    Result Date: 8/13/2020  Narrative: CT CHEST WITH IV CONTRAST INDICATION:   Pneumonia  COMPARISON:  CT 8/25/2019  TECHNIQUE: CT examination of the chest was performed  Axial, sagittal, and coronal 2D reformatted images were created from the source data and submitted for interpretation  Radiation dose length product (DLP) for this visit:  867 mGy-cm   This examination, like all CT scans performed in the [facility], was performed utilizing techniques to minimize radiation dose exposure, including the use of iterative reconstruction and automated exposure control  IV Contrast:  100 mL of iohexol (OMNIPAQUE) FINDINGS: LUNGS:  Lungs are clear  There is no tracheal or endobronchial lesion  PLEURA:  Unremarkable  HEART/GREAT VESSELS:  Unremarkable for patient's age  MEDIASTINUM AND BHAVNA:  No adenopathy  Calcified mediastinal and left hilar lymph nodes compatible with prior granulomatous disease again identified  CHEST WALL AND LOWER NECK:   Unremarkable  VISUALIZED STRUCTURES IN THE UPPER ABDOMEN:  Unremarkable  OSSEOUS STRUCTURES:  No acute fracture or destructive osseous lesion  Impression: Unremarkable examination of the chest  Workstation performed: [id number]       EKG, Pathology, and Other Studies Reviewed on Admission:   · EKG: NSR    Epic / Care Everywhere Records Reviewed: Yes     ** Please Note: This note has been constructed using a voice recognition system   **

## 2020-08-13 NOTE — ED PROVIDER NOTES
History  Chief Complaint   Patient presents with    Nausea     Pt presents by ems from home d/t nausea since yesterday, -vomiting -diarrhea +chills Denies SOB or CP  Pt states last time he had these symptoms, he had a DVT      Patient is a 62year old male with nausea and chills and dry cough since yesterday  No travel  No known ill contacts  No fever  No vomiting or diarrhea  No urinary sx  Is on coumadin for DVT  Was last seen in this ED on 7/3/20 for angioedema  [facility] website checked on this patient and last Rx filled was on 4/14/20 for testosterone for 30 day supply  No abnormal taste or smell  No sob or chest pain  History provided by:  Patient and EMS personnel  Nausea   The primary symptoms include nausea  Primary symptoms do not include fever, vomiting or diarrhea  The illness is also significant for chills  Prior to Admission Medications   Prescriptions Last Dose Informant Patient Reported? Taking?    MONOVISC injection   Yes No   Testosterone (ANDROGEL) 40 5 MG/2 5GM (1 62%) GEL  Self Yes No   Sig: Place 40 5 mg on the skin daily    acetaminophen (TYLENOL) 325 mg tablet  Self No No   Sig: Take 3 tablets (975 mg total) by mouth every 8 (eight) hours   carvedilol (COREG) 25 mg tablet   No No   Sig: Take 1 tablet (25 mg total) by mouth 2 (two) times a day with meals   hydrochlorothiazide (HYDRODIURIL) 25 mg tablet   No No   Sig: Take 1 tablet (25 mg total) by mouth daily   isosorbide mononitrate (IMDUR) 30 mg 24 hr tablet   No No   Sig: Take 2 tablets (60 mg total) by mouth daily   warfarin (COUMADIN) 5 mg tablet 8/12/2020 at Unknown time  No Yes   Sig: Take one table (5mg) by mouth once daily for goal INR 2-3   Patient taking differently: Take 7 5 mg by mouth Take one table (5mg) by mouth once daily for goal INR 2-3      Facility-Administered Medications: None       Past Medical History:   Diagnosis Date    Angioedema     Coronary artery disease     GI bleed     Hypertension stopped his meds when ran out several years ago    [facility]I [facility])        Past Surgical History:   Procedure Laterality Date    NO PAST SURGERIES      UPPER GASTROINTESTINAL ENDOSCOPY      Clamped large stomach ulcer       Family History   Adopted: Yes   Family history unknown: Yes     I have reviewed and agree with the history as documented  E-Cigarette/Vaping    E-Cigarette Use Never User      E-Cigarette/Vaping Substances    Nicotine No     THC No     CBD No     Flavoring No     Other No     Unknown No      Social History     Tobacco Use    Smoking status: Never Smoker    Smokeless tobacco: Never Used   Substance Use Topics    Alcohol use: Never     Alcohol/week: 0 0 standard drinks     Frequency: Never     Drinks per session: 1 or 2     Binge frequency: Never    Drug use: No       Review of Systems   Constitutional: Positive for chills  Negative for fever  Respiratory: Positive for cough  Negative for shortness of breath  Cardiovascular: Negative for chest pain  Gastrointestinal: Positive for nausea  Negative for diarrhea and vomiting  Genitourinary: Negative for difficulty urinating  All other systems reviewed and are negative  Physical Exam  Physical Exam  Vitals signs and nursing note reviewed  Constitutional:       General: He is in acute distress (moderate)  HENT:      Head: Normocephalic and atraumatic  Right Ear: Tympanic membrane, ear canal and external ear normal       Left Ear: Tympanic membrane, ear canal and external ear normal       Nose: No rhinorrhea  Mouth/Throat:      Mouth: Mucous membranes are moist       Pharynx: No posterior oropharyngeal erythema  Eyes:      General: No scleral icterus  Right eye: No discharge  Left eye: No discharge  Pupils: Pupils are equal, round, and reactive to light  Neck:      Musculoskeletal: Normal range of motion and neck supple   No neck rigidity or muscular tenderness  Cardiovascular:      Rate and Rhythm: Normal rate and regular rhythm  Heart sounds: Normal heart sounds  No murmur  Pulmonary:      Effort: Pulmonary effort is normal  No respiratory distress  Breath sounds: Normal breath sounds  No stridor  No wheezing, rhonchi or rales  Abdominal:      General: Bowel sounds are normal  There is no distension  Palpations: Abdomen is soft  Tenderness: There is no abdominal tenderness  There is no guarding  Musculoskeletal:         General: No tenderness (No calf tenderness)  Right lower leg: Edema present  Left lower leg: Edema present  Skin:     General: Skin is warm and dry  Findings: No erythema or rash  Neurological:      General: No focal deficit present  Mental Status: He is alert and oriented to person, place, and time     Psychiatric:         Mood and Affect: Mood normal          Vital Signs  ED Triage Vitals   Temperature Pulse Respirations Blood Pressure SpO2   08/13/20 0600 08/13/20 0600 08/13/20 0600 08/13/20 0600 08/13/20 0600   (!) 102 2 °F (39 °C) 74 20 (!) 175/87 96 %      Temp Source Heart Rate Source Patient Position - Orthostatic VS BP Location FiO2 (%)   08/13/20 0600 08/13/20 0600 08/13/20 0600 08/13/20 0600 --   Oral Monitor Sitting Right arm       Pain Score       08/13/20 0621       Med Not Given for Pain - for MAR use only           Vitals:    08/13/20 0600 08/13/20 0735   BP: (!) 175/87 (!) 172/81   Pulse: 74 77   Patient Position - Orthostatic VS: Sitting Lying         Visual Acuity      ED Medications  Medications   sodium chloride 0 9 % infusion (has no administration in time range)   levofloxacin (LEVAQUIN) IVPB (premix) 750 mg 150 mL (750 mg Intravenous New Bag 8/13/20 0714)   vancomycin (VANCOCIN) 2,000 mg in sodium chloride 0 9 % 500 mL IVPB (has no administration in time range)   sodium chloride 0 9 % bolus 1,000 mL (0 mL Intravenous Stopped 8/13/20 0716)   ondansetron (ZOFRAN) injection 4 mg (4 mg Intravenous Given 8/13/20 0621)   acetaminophen (TYLENOL) tablet 650 mg (650 mg Oral Given 8/13/20 0621)       Diagnostic Studies  Results Reviewed     Procedure Component Value Units Date/Time    Novel Coronavirus [facility] [[id number]]  (Normal) Collected:  08/13/20 0626    Lab Status:  Final result Specimen:  Throat from Nose Updated:  08/13/20 0742     SARS-CoV-2 Negative    Narrative: The specimen collection materials, transport medium, and/or testing methodology utilized in the production of these test results have been proven to be reliable in a limited validation with an abbreviated program under the Emergency Utilization Authorization provided by the FDA  Testing reported as "Presumptive positive" will be confirmed with secondary testing with a reference laboratory to ensure result accuracy  Clinical caution and judgement should be used with the interpretation of these results with consideration of the clinical impression and other laboratory testing  Testing reported as "Positive" or "Negative" has been proven to be accurate according to standard laboratory validation requirements  All testing is performed with control materials showing appropriate reactivity at standard intervals        UA (URINE) with reflex to Scope [[id number]] Collected:  08/13/20 0711    Lab Status:  Final result Specimen:  Urine, Clean Catch Updated:  08/13/20 0736     Color, UA Yellow     Clarity, UA Clear     Specific Gravity, UA 1 025     pH, UA 5 0     Leukocytes, UA Negative     Nitrite, UA Negative     Protein, UA Negative mg/dl      Glucose, UA Negative mg/dl      Ketones, UA Negative mg/dl      Urobilinogen, UA 0 2 E U /dl      Bilirubin, UA Negative     Blood, UA Negative    Protime-INR [[id number]]  (Abnormal) Collected:  08/13/20 0623    Lab Status:  Final result Specimen:  Blood from Arm, Right Updated:  08/13/20 0726     Protime 23 6 seconds      INR 2 10    APTT [[id number]]  (Abnormal) Collected:  08/13/20 0623    Lab Status:  Final result Specimen:  Blood from Arm, Right Updated:  08/13/20 0726     PTT 40 seconds     Comprehensive metabolic panel [[id number]]  (Abnormal) Collected:  08/13/20 0623    Lab Status:  Final result Specimen:  Blood from Arm, Right Updated:  08/13/20 0725     Sodium 140 mmol/L      Potassium 3 7 mmol/L      Chloride 102 mmol/L      CO2 29 mmol/L      ANION GAP 9 mmol/L      BUN 20 mg/dL      Creatinine 1 35 mg/dL      Glucose 101 mg/dL      Calcium 8 9 mg/dL      AST 48 U/L      ALT 23 U/L      Alkaline Phosphatase 78 U/L      Total Protein 8 3 g/dL      Albumin 3 7 g/dL      Total Bilirubin 0 61 mg/dL      eGFR 67 ml/min/1 73sq m     Narrative:       [place] guidelines for Chronic Kidney Disease (CKD):     Stage 1 with normal or high GFR (GFR > 90 mL/min/1 73 square meters)    Stage 2 Mild CKD (GFR = 60-89 mL/min/1 73 square meters)    Stage 3A Moderate CKD (GFR = 45-59 mL/min/1 73 square meters)    Stage 3B Moderate CKD (GFR = 30-44 mL/min/1 73 square meters)    Stage 4 Severe CKD (GFR = 15-29 mL/min/1 73 square meters)    Stage 5 End Stage CKD (GFR <15 mL/min/1 73 square meters)  Note: GFR calculation is accurate only with a steady state creatinine    High sensitivity CRP [[id number]] Collected:  08/13/20 0623    Lab Status:  Final result Specimen:  Blood from Arm, Right Updated:  08/13/20 0722     CRP, High Sensitivity 14 10 mg/L     Narrative:             HsCRP Level       Relative Risk           <1 0 mg/L          Low           1 0 to 3 0 mg/L    Average           >3 0 mg/L          High        Lipase [[id number]]  (Abnormal) Collected:  08/13/20 0623    Lab Status:  Final result Specimen:  Blood from Arm, Right Updated:  08/13/20 0720     Lipase 50 u/L     Urine culture [[id number]] Collected:  08/13/20 0711    Lab Status:   In process Specimen:  Urine, Clean Catch Updated:  08/13/20 0718    Lactic acid [[id number]]  (Normal) Collected: 08/13/20 1982    Lab Status:  Final result Specimen:  Blood from Arm, Right Updated:  08/13/20 2700     LACTIC ACID 1 1 mmol/L     Narrative:       Result may be elevated if tourniquet was used during collection  CBC and differential [[id number]]  (Abnormal) Collected:  08/13/20 0623    Lab Status:  Final result Specimen:  Blood from Arm, Right Updated:  08/13/20 0658     WBC 13 38 Thousand/uL      RBC 4 52 Million/uL      Hemoglobin 13 3 g/dL      Hematocrit 40 9 %      MCV 91 fL      MCH 29 4 pg      MCHC 32 5 g/dL      RDW 13 5 %      MPV 11 4 fL      Platelets 207 Thousands/uL      nRBC 0 /100 WBCs      Neutrophils Relative 87 %      Immat GRANS % 0 %      Lymphocytes Relative 6 %      Monocytes Relative 6 %      Eosinophils Relative 1 %      Basophils Relative 0 %      Neutrophils Absolute 11 55 Thousands/µL      Immature Grans Absolute 0 06 Thousand/uL      Lymphocytes Absolute 0 81 Thousands/µL      Monocytes Absolute 0 78 Thousand/µL      Eosinophils Absolute 0 12 Thousand/µL      Basophils Absolute 0 06 Thousands/µL     Blood culture #2 [[id number]] Collected:  08/13/20 9800    Lab Status: In process Specimen:  Blood from Arm, Right Updated:  08/13/20 0647    Blood culture #1 [[id number]] Collected:  08/13/20 0641    Lab Status: In process Specimen:  Blood from Arm, Right Updated:  08/13/20 0647                 XR chest 1 view portable   ED Interpretation by Koko Vasquez MD (08/13 0732)   R perihilar infiltrate read by me                    Procedures  ECG 12 Lead Documentation Only    Date/Time: 8/13/2020 6:10 AM  Performed by: Kook aVsquez MD  Authorized by: Koko Vasquez MD     Indications / Diagnosis:  Sepsis  ECG reviewed by me, the ED Provider: yes    Patient location:  ED  Previous ECG:     Previous ECG:  Compared to current    Comparison ECG info:  6/27/20    Similarity:  Changes noted (not s  martha now)  Quality:     Tracing quality:  Limited by artifact  Rate:     ECG rate: 72    ECG rate assessment: normal    Rhythm:     Rhythm: sinus rhythm    Ectopy:     Ectopy: none    QRS:     QRS axis:  Normal    QRS intervals:  Normal  Conduction:     Conduction: normal    ST segments:     ST segments:  Normal  T waves:     T waves: normal    Comments:      I do not agree with computer reading of possible ectopic atrial rhythm  ED Course  ED Course as of Aug 13 0745   Thu Aug 13, 2020   3439 CXR and EKG d/w patient  IV abx ordered for pneumonia  0732 Labs d/w patient  4173 D/w patient  EXT SARS-COV-2: Negative       US AUDIT      Most Recent Value   Initial Alcohol Screen: US AUDIT-C    1  How often do you have a drink containing alcohol?  0 Filed at: 08/13/2020 0559   2  How many drinks containing alcohol do you have on a typical day you are drinking? 0 Filed at: 08/13/2020 0559   3a  Male UNDER 65: How often do you have five or more drinks on one occasion? 0 Filed at: 08/13/2020 0559   3b  FEMALE Any Age, or MALE 65+: How often do you have 4 or more drinks on one occassion? 0 Filed at: 08/13/2020 0559   Audit-C Score  0 Filed at: 08/13/2020 0559                  MICHELLE/DAST-10      Most Recent Value   How many times in the past year have you    Used an illegal drug or used a prescription medication for non-medical reasons? Never Filed at: 08/13/2020 0559              Initial Sepsis Screening     Row Name 08/13/20 0845                Is the patient's history suggestive of a new or worsening infection? (!) Yes (Proceed)  -AO        Suspected source of infection  pneumonia  -AO        Are two or more of the following signs & symptoms of infection both present and new to the patient? (!) Yes (Proceed)  -AO        Indicate SIRS criteria  Leukocytosis (WBC > 61322 IJL); Hyperthemia > 38 3C (100 9F)  -AO        If the answer is yes to both questions, suspicion of sepsis is present          If severe sepsis is present AND tissue hypoperfusion perists in the hour after fluid resuscitation or lactate > 4, the patient meets criteria for SEPTIC SHOCK          Are any of the following organ dysfunction criteria present within 6 hours of suspected infection and SIRS criteria that are NOT considered to be chronic conditions? No  -AO        Organ dysfunction          Date of presentation of severe sepsis          Time of presentation of severe sepsis          Tissue hypoperfusion persists in the hour after crystalloid fluid administration, evidenced, by either:          Was hypotension present within one hour of the conclusion of crystalloid fluid administration?         Date of presentation of septic shock          Time of presentation of septic shock            User Key  (r) = Recorded By, (t) = Taken By, (c) = Cosigned By    [address] Name Provider Yumiko Parson MD Physician                        MDM  Number of Diagnoses or Management Options  Diagnosis management comments: DDx including but not limited to: sepsis, severe sepsis, UTI, pneumonia, viral illness, cellulitis, doubt sinusitis; doubt COVID 23, meningitis, or other intracranial process          Amount and/or Complexity of Data Reviewed  Clinical lab tests: ordered and reviewed  Tests in the radiology section of CPT®: ordered and reviewed  Decide to obtain previous medical records or to obtain history from someone other than the patient: yes  Obtain history from someone other than the patient: yes  Review and summarize past medical records: yes  Discuss the patient with other providers: yes  Independent visualization of images, tracings, or specimens: yes          Disposition  Final diagnoses:   Pneumonia   Fever   Nausea   Sepsis ([facility] [address] )     Time reflects when diagnosis was documented in both MDM as applicable and the Disposition within this note     Time User Action Codes Description Comment    8/13/2020  6:53 AM Paula Shirley [J18 9] Pneumonia     8/13/2020  6:53 AM Krystin Zamora S Add [R50 9] Fever     8/13/2020  6:53 AM Adedav Arnett Add [R11 0] Nausea     8/13/2020  6:59 AM Anamaria Arnett Add [A41 9] Sepsis [facility])       ED Disposition     ED Disposition Condition Date/Time Comment    Admit Stable Thu Aug 13, 2020  7:33 AM Case was discussed with ANEUDY Doyle  and the patient's admission status was agreed to be Admission Status: inpatient status to the service of Dr Fernando Raphael   Follow-up Information    None         Patient's Medications   Discharge Prescriptions    No medications on file     No discharge procedures on file      PDMP Review       Value Time User    PDMP Reviewed  Yes 8/13/2020  5:59 AM Priti Aldridge MD          ED Provider  Electronically Signed by           Priti Aldridge MD  08/13/20 9799       Priti Aldridge MD  08/13/20 4840

## 2020-08-14 PROBLEM — E66.01 MORBID OBESITY WITH BMI OF 40.0-44.9, ADULT (HCC): Status: ACTIVE | Noted: 2020-08-14

## 2020-08-14 LAB
ANION GAP SERPL CALCULATED.3IONS-SCNC: 8 MMOL/L (ref 4–13)
ATRIAL RATE: 72 BPM
BACTERIA UR CULT: NORMAL
BUN SERPL-MCNC: 15 MG/DL (ref 5–25)
CALCIUM SERPL-MCNC: 8.1 MG/DL (ref 8.3–10.1)
CHLORIDE SERPL-SCNC: 103 MMOL/L (ref 100–108)
CK MB SERPL-MCNC: <1 % (ref 0–2.5)
CK MB SERPL-MCNC: <1 NG/ML (ref 0–5)
CK SERPL-CCNC: 201 U/L (ref 39–308)
CO2 SERPL-SCNC: 27 MMOL/L (ref 21–32)
CREAT SERPL-MCNC: 1.4 MG/DL (ref 0.6–1.3)
ERYTHROCYTE [DISTWIDTH] IN BLOOD BY AUTOMATED COUNT: 13.8 % (ref 11.6–15.1)
GFR SERPL CREATININE-BSD FRML MDRD: 64 ML/MIN/1.73SQ M
GLUCOSE SERPL-MCNC: 98 MG/DL (ref 65–140)
HCT VFR BLD AUTO: 35.7 % (ref 36.5–49.3)
HGB BLD-MCNC: 11.5 G/DL (ref 12–17)
INR PPP: 2.52 (ref 0.84–1.19)
MCH RBC QN AUTO: 29.4 PG (ref 26.8–34.3)
MCHC RBC AUTO-ENTMCNC: 32.2 G/DL (ref 31.4–37.4)
MCV RBC AUTO: 91 FL (ref 82–98)
P AXIS: -31 DEGREES
PLATELET # BLD AUTO: 128 THOUSANDS/UL (ref 149–390)
PMV BLD AUTO: 11.1 FL (ref 8.9–12.7)
POTASSIUM SERPL-SCNC: 3.5 MMOL/L (ref 3.5–5.3)
PR INTERVAL: 152 MS
PROCALCITONIN SERPL-MCNC: 2.13 NG/ML
PROTHROMBIN TIME: 27.2 SECONDS (ref 11.6–14.5)
QRS AXIS: 23 DEGREES
QRSD INTERVAL: 94 MS
QT INTERVAL: 356 MS
QTC INTERVAL: 389 MS
RBC # BLD AUTO: 3.91 MILLION/UL (ref 3.88–5.62)
SODIUM SERPL-SCNC: 138 MMOL/L (ref 136–145)
T WAVE AXIS: 20 DEGREES
VENTRICULAR RATE: 72 BPM
WBC # BLD AUTO: 13.03 THOUSAND/UL (ref 4.31–10.16)

## 2020-08-14 PROCEDURE — 86038 ANTINUCLEAR ANTIBODIES: CPT | Performed by: INTERNAL MEDICINE

## 2020-08-14 PROCEDURE — 84145 PROCALCITONIN (PCT): CPT | Performed by: INTERNAL MEDICINE

## 2020-08-14 PROCEDURE — 85027 COMPLETE CBC AUTOMATED: CPT | Performed by: INTERNAL MEDICINE

## 2020-08-14 PROCEDURE — 87207 SMEAR SPECIAL STAIN: CPT | Performed by: INTERNAL MEDICINE

## 2020-08-14 PROCEDURE — 82550 ASSAY OF CK (CPK): CPT | Performed by: INTERNAL MEDICINE

## 2020-08-14 PROCEDURE — 86430 RHEUMATOID FACTOR TEST QUAL: CPT | Performed by: INTERNAL MEDICINE

## 2020-08-14 PROCEDURE — 93970 EXTREMITY STUDY: CPT | Performed by: SURGERY

## 2020-08-14 PROCEDURE — 82553 CREATINE MB FRACTION: CPT | Performed by: INTERNAL MEDICINE

## 2020-08-14 PROCEDURE — 83520 IMMUNOASSAY QUANT NOS NONAB: CPT | Performed by: INTERNAL MEDICINE

## 2020-08-14 PROCEDURE — 87798 DETECT AGENT NOS DNA AMP: CPT | Performed by: INTERNAL MEDICINE

## 2020-08-14 PROCEDURE — 86618 LYME DISEASE ANTIBODY: CPT | Performed by: INTERNAL MEDICINE

## 2020-08-14 PROCEDURE — 93010 ELECTROCARDIOGRAM REPORT: CPT | Performed by: INTERNAL MEDICINE

## 2020-08-14 PROCEDURE — 99232 SBSQ HOSP IP/OBS MODERATE 35: CPT | Performed by: INTERNAL MEDICINE

## 2020-08-14 PROCEDURE — 99255 IP/OBS CONSLTJ NEW/EST HI 80: CPT | Performed by: INTERNAL MEDICINE

## 2020-08-14 PROCEDURE — 80048 BASIC METABOLIC PNL TOTAL CA: CPT | Performed by: INTERNAL MEDICINE

## 2020-08-14 PROCEDURE — 85610 PROTHROMBIN TIME: CPT | Performed by: INTERNAL MEDICINE

## 2020-08-14 RX ORDER — DOXYCYCLINE HYCLATE 100 MG/1
100 CAPSULE ORAL EVERY 12 HOURS SCHEDULED
Status: DISCONTINUED | OUTPATIENT
Start: 2020-08-14 | End: 2020-08-16 | Stop reason: HOSPADM

## 2020-08-14 RX ORDER — ONDANSETRON 2 MG/ML
4 INJECTION INTRAMUSCULAR; INTRAVENOUS EVERY 6 HOURS PRN
Status: DISCONTINUED | OUTPATIENT
Start: 2020-08-14 | End: 2020-08-16 | Stop reason: HOSPADM

## 2020-08-14 RX ADMIN — HYDROCHLOROTHIAZIDE 25 MG: 25 TABLET ORAL at 08:10

## 2020-08-14 RX ADMIN — ISOSORBIDE MONONITRATE 60 MG: 60 TABLET, EXTENDED RELEASE ORAL at 08:10

## 2020-08-14 RX ADMIN — CARVEDILOL 25 MG: 12.5 TABLET, FILM COATED ORAL at 08:09

## 2020-08-14 RX ADMIN — DOXYCYCLINE 100 MG: 100 CAPSULE ORAL at 20:11

## 2020-08-14 RX ADMIN — VANCOMYCIN HYDROCHLORIDE 1750 MG: 1 INJECTION, POWDER, LYOPHILIZED, FOR SOLUTION INTRAVENOUS at 08:10

## 2020-08-14 RX ADMIN — WARFARIN SODIUM 7.5 MG: 2.5 TABLET ORAL at 17:13

## 2020-08-14 RX ADMIN — CEFTRIAXONE SODIUM 2000 MG: 10 INJECTION, POWDER, FOR SOLUTION INTRAVENOUS at 17:17

## 2020-08-14 RX ADMIN — SODIUM CHLORIDE 125 ML/HR: 0.9 INJECTION, SOLUTION INTRAVENOUS at 05:34

## 2020-08-14 RX ADMIN — CARVEDILOL 25 MG: 12.5 TABLET, FILM COATED ORAL at 17:13

## 2020-08-14 RX ADMIN — DOXYCYCLINE 100 MG: 100 CAPSULE ORAL at 14:19

## 2020-08-14 NOTE — MALNUTRITION/BMI
This medical record reflects one or more clinical indicators suggestive of malnutrition and/or morbid obesity              BMI Findings:  BMI Classifications: Morbid Obesity 40-44 9     Body mass index is 41 44 kg/m²  See Nutrition note dated 8/14/2020 for additional details  Completed nutrition assessment is viewable in the nutrition documentation

## 2020-08-14 NOTE — ASSESSMENT & PLAN NOTE
Given patient's symptoms of nausea, chills, fevers, leg swelling and leukocytosis  This is concerning for infectious process  However it is also possible that these symptoms could be due to a DVT or PE  It is also possible the patient has pneumonia, even though patient denies having any sick contacts, patient does travel to [place] and he did for work  Plan:    1  Patient's chest x-ray did not show any acute cardiopulmonary process  2  CT of the chest to rule out PE  3  Patient's procalcitonin and CRP are elevated  1  Clinical evidence for bacterial infection is low at this time, but will continue to monitor

## 2020-08-14 NOTE — CONSULTS
Consultation - Infectious Disease   Rhina Zhao 62 y o  male MRN: [id number]  Unit/Bed#: S -01 Encounter: [id number]      IMPRESSION & RECOMMENDATIONS:   Impression/Recommendations:  1  Sepsis, POA  Fever, leukocytosis  Unclear explanation  Consider viral etiology given recent concurrent mild URI symptoms  Consider tick-borne infection as patient is frequently outdoors  Other infectious workup is negative thus far including blood cultures, UA/urine culture, CT chest, COVID PCR  Consider early LE cellulitis given complaint of leg swelling and pain, but no clear evidence on exam   No meningeal signs  Fevers seem to be subsiding on current antibiotics     -discontinue vancomycin/cefepime  -start IV ceftriaxone  -add doxycycline 100 mg b i d   -check Lyme antibody, Anaplasma PCR, parasite smear x2  -monitor temperatures and hemodynamics  -recheck CBC in a m   -follow-up blood cultures  -recheck procalcitonin level in a m   -supportive care    2  Bilateral lower extremity pain and swelling  RLE symptoms appear to be worse  No clear evidence of cellulitis on exam   No joint swelling  Lower extremity duplex is negative  -IV ceftriaxone, as above  -serial exams  -frequent leg elevation    3  Thrombocytopenia  Platelet count lower at 128  Consider viral etiology, as above  Also consideration for tick-borne infection     -antibiotic plan as above  -ID workup as above  -monitor platelet count    4  Elevated AST  AST is mildly elevated at 48  Could be indicative of tick-borne infection or viral process  Remainder of LFTs are normal   Abdominal exam is benign with no GI related complaints     -workup as above  -monitor LFTs  -serial abdominal exams    5  Obesity  Antibiotics:  Vancomycin/cefepime    I discussed above plan with Dr Bernard Stern from primary service, and with patient  I personally reviewed prior hospitalization records  Thank you for this consultation    We will follow along with you         HISTORY OF PRESENT ILLNESS:  Reason for Consult:  Fevers    HPI: Gael Wright is a 62 y o  male with prior acute DVT, recent hospitalization in early July 2020 secondary to angioedema due to recent initiation of Eliquis who now presents to ER on 08/13 with complaint of acute onset high fever and chills, generally unwell feeling, bilateral lower extremity pain and swelling  Also recently had mild runny nose  No sore throat  No cough, shortness of breath, chest pain, nausea, vomiting, diarrhea, urinary symptoms, joint pains or swelling  Patient lives by himself in [place]  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  No known tick bites  No known sick contacts  No recent travel  On presentation, had high fever of 102 5 with mild leukocytosis of 13 3  CT chest was negative  Patient was started empirically on vancomycin and cefepime  He does feel better today with no fevers or chills  No new developing symptoms  REVIEW OF SYSTEMS:  A complete system-based review of systems is otherwise negative      PAST MEDICAL HISTORY:  Past Medical History:   Diagnosis Date    Angioedema     Coronary artery disease     GI bleed     Hypertension     stopped his meds when ran out several years ago    [facility]I [facility]ST elevation myocardial infarction) ([facility] [address] )      Past Surgical History:   Procedure Laterality Date    NO PAST SURGERIES      UPPER GASTROINTESTINAL ENDOSCOPY      Clamped large stomach ulcer       FAMILY HISTORY:  Non-contributory    SOCIAL HISTORY:  Social History     Substance and Sexual Activity   Alcohol Use Never    Alcohol/week: 0 0 standard drinks    Frequency: Never    Drinks per session: 1 or 2    Binge frequency: Never     Social History     Substance and Sexual Activity   Drug Use No     Social History     Tobacco Use   Smoking Status Never Smoker   Smokeless Tobacco Never Used       ALLERGIES:  Allergies   Allergen Reactions    Lisinopril Angioedema    Norvasc [Amlodipine] Angioedema    Eliquis [Apixaban] Angioedema    Lipitor [Atorvastatin] Facial Swelling       MEDICATIONS:  All current active medications have been reviewed  PHYSICAL EXAM:  Vitals:  Temp:  [98 1 °F (36 7 °C)-101 2 °F (38 4 °C)] 98 1 °F (36 7 °C)  HR:  [60-96] 60  Resp:  [17-21] 17  BP: (138-144)/(66-76) 140/76  SpO2:  [95 %-98 %] 98 %  Temp (24hrs), Av 5 °F (37 5 °C), Min:98 1 °F (36 7 °C), Max:101 2 °F (38 4 °C)  Current: Temperature: 98 1 °F (36 7 °C)     Physical Exam:  General:  Awake, alert, resting comfortably, nontoxic  Eyes:  Conjunctive clear with no hemorrhages or effusions  Oropharynx:  No ulcers, no lesions  Neck:  Supple, no lymphadenopathy  Lungs:  Clear to auscultation bilaterally, no accessory muscle use  Cardiac:  Regular rate and rhythm, no murmurs  Abdomen:  Soft, non-tender, non-distended, obese abdomen  Extremities:  Bilateral lower extremity edema  RLE mildly warm and tender  No notable erythema  Skin:  No rashes, no ulcers  Neurological:  Moves all four extremities spontaneously, no meningeal signs      LABS, IMAGING, & OTHER STUDIES:  Lab Results:  I have personally reviewed pertinent labs  Results from last 7 days   Lab Units 20  0652 20  0623   POTASSIUM mmol/L 3 5 3 7   CHLORIDE mmol/L 103 102   CO2 mmol/L 27 29   BUN mg/dL 15 20   CREATININE mg/dL 1 40* 1 35*   EGFR ml/min/1 73sq m 64 67   CALCIUM mg/dL 8 1* 8 9   AST U/L  --  48*   ALT U/L  --  23   ALK PHOS U/L  --  78     Results from last 7 days   Lab Units 20  0652 20  0623   WBC Thousand/uL 13 03* 13 38*   HEMOGLOBIN g/dL 11 5* 13 3   PLATELETS Thousands/uL 128* 172     Results from last 7 days   Lab Units 20  0711 20  0641 20  9886   BLOOD CULTURE   --  No Growth at 24 hrs  No Growth at 24 hrs  URINE CULTURE  No Growth <1000 cfu/mL  --   --        Imaging Studies:   I have personally reviewed pertinent imaging study reports and images in PACS      Chest x-ray shows no acute cardiopulmonary disease  CT chest is negative  EKG, Pathology, and Other Studies:   I have personally reviewed pertinent reports

## 2020-08-14 NOTE — ASSESSMENT & PLAN NOTE
Encouraged weight loss to patient, will continue to educate patient regarding lifestyle modification  Will also have patient see a dietitian during hospitalization

## 2020-08-14 NOTE — PLAN OF CARE
Problem: Potential for Falls  Goal: Patient will remain free of falls  Description: INTERVENTIONS:  - Assess patient frequently for physical needs  -  Identify cognitive and physical deficits and behaviors that affect risk of falls    -  [place] fall precautions as indicated by assessment   - Educate patient/family on patient safety including physical limitations  - Instruct patient to call for assistance with activity based on assessment  - Modify environment to reduce risk of injury  - Consider OT/PT consult to assist with strengthening/mobility  Outcome: Progressing     Problem: CARDIOVASCULAR - ADULT  Goal: Maintains optimal cardiac output and hemodynamic stability  Description: INTERVENTIONS:  - Monitor I/O, vital signs and rhythm  - Monitor for S/S and trends of decreased cardiac output  - Administer and titrate ordered vasoactive medications to optimize hemodynamic stability  - Assess quality of pulses, skin color and temperature  - Assess for signs of decreased coronary artery perfusion  - Instruct patient to report change in severity of symptoms  Outcome: Progressing  Goal: Absence of cardiac dysrhythmias or at baseline rhythm  Description: INTERVENTIONS:  - Continuous cardiac monitoring, vital signs, obtain 12 lead EKG if ordered  - Administer antiarrhythmic and heart rate control medications as ordered  - Monitor electrolytes and administer replacement therapy as ordered  Outcome: Progressing     Problem: RESPIRATORY - ADULT  Goal: Achieves optimal ventilation and oxygenation  Description: INTERVENTIONS:  - Assess for changes in respiratory status  - Assess for changes in mentation and behavior  - Position to facilitate oxygenation and minimize respiratory effort  - Oxygen administered by appropriate delivery if ordered  - Initiate smoking cessation education as indicated  - Encourage broncho-pulmonary hygiene including cough, deep breathe, Incentive Spirometry  - Assess the need for suctioning and aspirate as needed  - Assess and instruct to report SOB or any respiratory difficulty  - Respiratory Therapy support as indicated  Outcome: Progressing     Problem: PAIN - ADULT  Goal: Verbalizes/displays adequate comfort level or baseline comfort level  Description: Interventions:  - Encourage patient to monitor pain and request assistance  - Assess pain using appropriate pain scale  - Administer analgesics based on type and severity of pain and evaluate response  - Implement non-pharmacological measures as appropriate and evaluate response  - Consider cultural and social influences on pain and pain management  - Notify physician/advanced practitioner if interventions unsuccessful or patient reports new pain  Outcome: Progressing     Problem: INFECTION - ADULT  Goal: Absence or prevention of progression during hospitalization  Description: INTERVENTIONS:  - Assess and monitor for signs and symptoms of infection  - Monitor lab/diagnostic results  - Monitor all insertion sites, i e  indwelling lines, tubes, and drains  - Monitor endotracheal if appropriate and nasal secretions for changes in amount and color  - [place] appropriate cooling/warming therapies per order  - Administer medications as ordered  - Instruct and encourage patient and family to use good hand hygiene technique  - Identify and instruct in appropriate isolation precautions for identified infection/condition  Outcome: Progressing     Problem: DISCHARGE PLANNING  Goal: Discharge to home or other facility with appropriate resources  Description: INTERVENTIONS:  - Identify barriers to discharge w/patient and caregiver  - Arrange for needed discharge resources and transportation as appropriate  - Identify discharge learning needs (meds, wound care, etc )  - Arrange for interpretive services to assist at discharge as needed  - Refer to Case Management Department for coordinating discharge planning if the patient needs post-hospital services based on physician/advanced practitioner order or complex needs related to functional status, cognitive ability, or social support system  Outcome: Progressing     Problem: Knowledge Deficit  Goal: Patient/family/caregiver demonstrates understanding of disease process, treatment plan, medications, and discharge instructions  Description: Complete learning assessment and assess knowledge base    Interventions:  - Provide teaching at level of understanding  - Provide teaching via preferred learning methods  Outcome: Progressing

## 2020-08-14 NOTE — UTILIZATION REVIEW
Initial Clinical Review    Admission: Date/Time/Statement:   Admission Orders (From admission, onward)     Ordered        08/13/20 0735  Inpatient Admission  Once                   Orders Placed This Encounter   Procedures    Inpatient Admission     Standing Status:   Standing     Number of Occurrences:   1     Order Specific Question:   Admitting Physician     Answer:   Warner Merlin     Order Specific Question:   Level of Care     Answer:   Med Surg [16]     Order Specific Question:   Estimated length of stay     Answer:   More than 2 Midnights     Order Specific Question:   Certification     Answer:   I certify that inpatient services are medically necessary for this patient for a duration of greater than two midnights  See H&P and MD Progress Notes for additional information about the patient's course of treatment  ED Arrival Information     Expected Arrival Acuity Means of Arrival Escorted By Service Admission Type    - 8/13/2020 05:56 [facility]gent [facility] Emergency    Arrival Complaint    Nausea,chills        Chief Complaint   Patient presents with    Nausea     Pt presents by ems from home d/t nausea since yesterday, -vomiting -diarrhea +chills Denies SOB or CP  Pt states last time he had these symptoms, he had a DVT      Assessment/Plan: 63 yo male to ED from home w/ complaints of nausea, chills, generalized weakness, and bilateral lower extremity swelling  Patient states that when he was working yesterday, he started feeling nauseous, getting chills and weakness  In Ed found to have temp 102 5 , WBC 13 3, resp rate 21 and BLE swelling   Admitted IP status w/ cellulitis vs DVT vs other infectious process  CXR neg   Plan to check pct , CRP , start on rocephin , check BC and monitor vitals  Cont coreg and HCTZ for HTN   Pt on coumadin for chronic R DVT L leg        PE : RLE erythema and rash     8/14 ID consult   Sepsis fever leukocytosis , consider viral etiology , recent mild URI sx  Consider tick borne infection , freq outdoors   Consider early LE cellulitis w/ leg swelling and pain   DC vanco and cefepime  Start IV ceftriaxone   Add doxycycline , check lyme antibody, Anaplasma PCR, parasite smear x2  Recheck cbc , monitor temp , f/u BC , recheck pct , supportive care  ED Triage Vitals   Temperature Pulse Respirations Blood Pressure SpO2   08/13/20 0600 08/13/20 0600 08/13/20 0600 08/13/20 0600 08/13/20 0600   (!) 102 2 °F (39 °C) 74 20 (!) 175/87 96 %      Temp Source Heart Rate Source Patient Position - Orthostatic VS BP Location FiO2 (%)   08/13/20 0600 08/13/20 0600 08/13/20 0600 08/13/20 0600 --   Oral Monitor Sitting Right arm       Pain Score       08/13/20 0621       Med Not Given for Pain - for MAR use only          Wt Readings from Last 1 Encounters:   08/13/20 135 kg (297 lb 2 2 oz)     Additional Vital Signs:   08/14/20 0642   98 1 °F (36 7 °C)   60   17   140/76   102   98 %   None (Room air)   Lying    08/13/20 2352                     None (Room air)       08/13/20 2247   101 2 °F (38 4 °C)Abnormal     67   21   144/68      96 %   None (Room air)   Lying    08/13/20 1600                     None (Room air)       08/13/20 1513   99 2 °F (37 3 °C)   96   18   138/66      95 %   None (Room air)   Lying    08/13/20 1243   101 5 °F (38 6 °C)Abnormal     79   18   170/90      95 %   None (Room air)   Lying    08/13/20 1126      75   18   148/68      97 %   None (Room air)   Lying    08/13/20 1040   100 8 °F (38 2 °C)Abnormal                           08/13/20 1000      76      159/71   102   99 %          08/13/20 0735   102 5 °F (39 2 °C)Abnormal     77   22   172/81Abnormal     117   98 %   None (Room air)         Pertinent Labs/Diagnostic Test Results:   8/13 PCXR No acute cardiopulmonary disease    8/13 CT chest Unremarkable examination of the chest      8/13 Venous duplex   Impression:  RIGHT LOWER LIMB:  No evidence of acute or chronic deep vein thrombosis  No evidence of superficial thrombophlebitis noted  Doppler evaluation shows a normal response to augmentation maneuvers  Popliteal, posterior tibial and anterior tibial arterial Doppler waveforms are  triphasic  LEFT LOWER LIMB:  No evidence of acute or chronic deep vein thrombosis  No evidence of superficial thrombophlebitis noted  Doppler evaluation shows a normal response to augmentation maneuvers  Popliteal, posterior tibial and anterior tibial arterial Doppler waveforms are  triphasic    8/13 EKG NSR   Results from last 7 days   Lab Units 08/13/20  0626   SARS-COV-2  Negative     Results from last 7 days   Lab Units 08/14/20  0652 08/13/20  0623   WBC Thousand/uL 13 03* 13 38*   HEMOGLOBIN g/dL 11 5* 13 3   HEMATOCRIT % 35 7* 40 9   PLATELETS Thousands/uL 128* 172   NEUTROS ABS Thousands/µL  --  11 55*     Results from last 7 days   Lab Units 08/14/20  0652 08/13/20  0623   SODIUM mmol/L 138 140   POTASSIUM mmol/L 3 5 3 7   CHLORIDE mmol/L 103 102   CO2 mmol/L 27 29   ANION GAP mmol/L 8 9   BUN mg/dL 15 20   CREATININE mg/dL 1 40* 1 35*   EGFR ml/min/1 73sq m 64 67   CALCIUM mg/dL 8 1* 8 9     Results from last 7 days   Lab Units 08/13/20  0623   AST U/L 48*   ALT U/L 23   ALK PHOS U/L 78   TOTAL PROTEIN g/dL 8 3*   ALBUMIN g/dL 3 7   TOTAL BILIRUBIN mg/dL 0 61     Results from last 7 days   Lab Units 08/14/20  0652 08/13/20  0623   GLUCOSE RANDOM mg/dL 98 101     Results from last 7 days   Lab Units 08/14/20  0652 08/13/20  1420 08/13/20  0623   PROTIME seconds 27 2* 24 6* 23 6*   INR  2 52* 2 22* 2 10*   PTT seconds  --   --  40*     Results from last 7 days   Lab Units 08/14/20  0652 08/13/20  1807 08/13/20  1420   PROCALCITONIN ng/ml 2 13* 2 43* 2 31*     Results from last 7 days   Lab Units 08/13/20  1807 08/13/20  0623   LACTIC ACID mmol/L 1 4 1 1     Results from last 7 days   Lab Units 08/13/20  1807   FERRITIN ng/mL 93     Results from last 7 days   Lab Units 08/13/20  1807 08/13/20  0623   LIPASE u/L 41* 50*     Results from last 7 days   Lab Units 08/13/20  1807   CRP mg/L 130 8*     Results from last 7 days   Lab Units 08/13/20  0711   CLARITY UA  Clear   COLOR UA  Yellow   SPEC GRAV UA  1 025   PH UA  5 0   GLUCOSE UA mg/dl Negative   KETONES UA mg/dl Negative   BLOOD UA  Negative   PROTEIN UA mg/dl Negative   NITRITE UA  Negative   BILIRUBIN UA  Negative   UROBILINOGEN UA E U /dl 0 2   LEUKOCYTES UA  Negative     Results from last 7 days   Lab Units 08/13/20  0711 08/13/20  0641 08/13/20  0692   BLOOD CULTURE   --  No Growth at 24 hrs  No Growth at 24 hrs     URINE CULTURE  No Growth <1000 cfu/mL  --   --      ED Treatment:   Medication Administration from 08/13/2020 0555 to 08/13/2020 1240       Date/Time Order Dose Route Action     08/13/2020 0621 sodium chloride 0 9 % bolus 1,000 mL 1,000 mL Intravenous New Bag     08/13/2020 0841 sodium chloride 0 9 % infusion 125 mL/hr Intravenous New Bag     08/13/2020 0621 ondansetron (ZOFRAN) injection 4 mg 4 mg Intravenous Given     08/13/2020 6047 acetaminophen (TYLENOL) tablet 650 mg 650 mg Oral Given     08/13/2020 0714 levofloxacin (LEVAQUIN) IVPB (premix) 750 mg 150 mL 750 mg Intravenous New Bag     08/13/2020 0841 vancomycin (VANCOCIN) 2,000 mg in sodium chloride 0 9 % 500 mL IVPB 2,000 mg Intravenous New Bag     08/13/2020 1221 ceftriaxone (ROCEPHIN) 1 g/50 mL in dextrose IVPB 1,000 mg Intravenous New Bag        Past Medical History:   Diagnosis Date    Angioedema     Coronary artery disease     GI bleed     Hypertension     stopped his meds when ran out several years ago    STEMI (ST elevation myocardial infarction) ([facility] [address] )      Present on Admission:   Essential hypertension   Leg DVT (deep venous thromboembolism), chronic, right (HCC)      Admitting Diagnosis: Nausea [R11 0]  Pneumonia [J18 9]  Fever [R50 9]  Sepsis ([facility] [address] ) [A41 9]  Age/Sex: 62 y o  male  Admission Orders:  Scheduled Medications:  carvedilol, 25 mg, Oral, BID With Meals  cefepime, 2,000 mg, Intravenous, Q24H  hydrochlorothiazide, 25 mg, Oral, Daily  isosorbide mononitrate, 60 mg, Oral, Daily  vancomycin, 17 5 mg/kg (Adjusted), Intravenous, Q12H  warfarin, 7 5 mg, Oral, Daily (warfarin)    Continuous IV Infusions:  sodium chloride, 125 mL/hr, Intravenous, Continuous    PRN Meds:  acetaminophen, 650 mg, Oral, Q6H PRN  ondansetron, 4 mg, Intravenous, Q6H PRN    Neuro vascular checks   Tele   Cardiac diet       IP CONSULT TO CASE MANAGEMENT  IP CONSULT TO INFECTIOUS DISEASES  IP CONSULT TO PHARMACY    Network Utilization Review Department  [email or web address]  ATTENTION: Please call with any questions or concerns to [phone] and carefully listen to the prompts so that you are directed to the right person  All voicemails are confidential   Franck Quintero all requests for admission clinical reviews, approved or denied determinations and any other requests to dedicated fax number below belonging to the campus where the patient is receiving treatment   List of dedicated fax numbers for the Facilities:  [address] [place] [address] DENIALS (Administrative/Medical Necessity) [phone]   [address]  (Maternity/NICU/Pediatrics) [phone]   Emiliano Salas [phone]   [facility] [phone]   [place] [phone]   [address]  [phone]   Daisy Sethi [address] [facility] [phone]   [facility] [phone]   [address] [facility]  [address] [facility][note 3] [address] [facility] [phone]

## 2020-08-14 NOTE — ASSESSMENT & PLAN NOTE
· Baseline creatinine appears to be around 1  · Patient's creatinine is 1 4 today up from 1 35 yesterday  ·  We will continue to hydrate patient    · Continue to monitor renal function closely

## 2020-08-14 NOTE — PLAN OF CARE
Problem: Potential for Falls  Goal: Patient will remain free of falls  Description: INTERVENTIONS:  - Assess patient frequently for physical needs  -  Identify cognitive and physical deficits and behaviors that affect risk of falls    -  [place] fall precautions as indicated by assessment   - Educate patient/family on patient safety including physical limitations  - Instruct patient to call for assistance with activity based on assessment  - Modify environment to reduce risk of injury  - Consider OT/PT consult to assist with strengthening/mobility  Outcome: Progressing     Problem: CARDIOVASCULAR - ADULT  Goal: Maintains optimal cardiac output and hemodynamic stability  Description: INTERVENTIONS:  - Monitor I/O, vital signs and rhythm  - Monitor for S/S and trends of decreased cardiac output  - Administer and titrate ordered vasoactive medications to optimize hemodynamic stability  - Assess quality of pulses, skin color and temperature  - Assess for signs of decreased coronary artery perfusion  - Instruct patient to report change in severity of symptoms  Outcome: Progressing  Goal: Absence of cardiac dysrhythmias or at baseline rhythm  Description: INTERVENTIONS:  - Continuous cardiac monitoring, vital signs, obtain 12 lead EKG if ordered  - Administer antiarrhythmic and heart rate control medications as ordered  - Monitor electrolytes and administer replacement therapy as ordered  Outcome: Progressing     Problem: RESPIRATORY - ADULT  Goal: Achieves optimal ventilation and oxygenation  Description: INTERVENTIONS:  - Assess for changes in respiratory status  - Assess for changes in mentation and behavior  - Position to facilitate oxygenation and minimize respiratory effort  - Oxygen administered by appropriate delivery if ordered  - Initiate smoking cessation education as indicated  - Encourage broncho-pulmonary hygiene including cough, deep breathe, Incentive Spirometry  - Assess the need for suctioning and aspirate as needed  - Assess and instruct to report SOB or any respiratory difficulty  - Respiratory Therapy support as indicated  Outcome: Progressing     Problem: PAIN - ADULT  Goal: Verbalizes/displays adequate comfort level or baseline comfort level  Description: Interventions:  - Encourage patient to monitor pain and request assistance  - Assess pain using appropriate pain scale  - Administer analgesics based on type and severity of pain and evaluate response  - Implement non-pharmacological measures as appropriate and evaluate response  - Consider cultural and social influences on pain and pain management  - Notify physician/advanced practitioner if interventions unsuccessful or patient reports new pain  Outcome: Progressing     Problem: INFECTION - ADULT  Goal: Absence or prevention of progression during hospitalization  Description: INTERVENTIONS:  - Assess and monitor for signs and symptoms of infection  - Monitor lab/diagnostic results  - Monitor all insertion sites, i e  indwelling lines, tubes, and drains  - Monitor endotracheal if appropriate and nasal secretions for changes in amount and color  - [place] appropriate cooling/warming therapies per order  - Administer medications as ordered  - Instruct and encourage patient and family to use good hand hygiene technique  - Identify and instruct in appropriate isolation precautions for identified infection/condition  Outcome: Progressing     Problem: DISCHARGE PLANNING  Goal: Discharge to home or other facility with appropriate resources  Description: INTERVENTIONS:  - Identify barriers to discharge w/patient and caregiver  - Arrange for needed discharge resources and transportation as appropriate  - Identify discharge learning needs (meds, wound care, etc )  - Arrange for interpretive services to assist at discharge as needed  - Refer to Case Management Department for coordinating discharge planning if the patient needs post-hospital services based on physician/advanced practitioner order or complex needs related to functional status, cognitive ability, or social support system  Outcome: Progressing     Problem: Knowledge Deficit  Goal: Patient/family/caregiver demonstrates understanding of disease process, treatment plan, medications, and discharge instructions  Description: Complete learning assessment and assess knowledge base    Interventions:  - Provide teaching at level of understanding  - Provide teaching via preferred learning methods  Outcome: Progressing

## 2020-08-14 NOTE — PROGRESS NOTES
Progress Note - Pauline Arvizu 1965, 62 y o  male MRN: [id number]    Unit/Bed#: S -01 Encounter: [id number]    Primary Care Provider: MAX Galaviz   Date and time admitted to hospital: 8/13/2020  5:56 AM        * Sepsis [facility])  Assessment & Plan  On presentation patient has temperature of 102 5°, white count of 13 3, respiration of 21, and bilateral lower leg swelling  Patient will be admitted for sepsis  Cellulitis vs DVT vs other infectious process    · It is less likely this is another infectious process such as pneumonia, given that the patient's lactic acid level is within normal limits  Patient also denies having any difficulties breathing, chest x-ray does not show any acute processes indicative of pneumonia  The patient is covered 23 test is also negative  · It is likely that this is cellulitis, however it is very unlikely to have bilateral lower extremity cellulitis that started at the same time  However given patient's temperature, leukocytosis, tachypnea this cannot be ruled out yet  · It is likely that this could be a DVT again the patient has had a similar episode on a prior to admission  Patient also states that he had identical symptoms on prior admission  Patient currently on warfarin 7 5 mg, present on admission with an INR of 2 1  Plan:     1  Obtain procal, CRP  2  Start patient on Rocephin  3  Obtained blood cultures  4  Monitor vitals    Febrile illness, acute  Assessment & Plan  Given patient's symptoms of nausea, chills, fevers, leg swelling and leukocytosis  This is concerning for infectious process  However it is also possible that these symptoms could be due to a DVT or PE  It is also possible the patient has pneumonia, even though patient denies having any sick contacts, patient does travel to [place] and he did for work  Plan:    4  Patient's chest x-ray did not show any acute cardiopulmonary process  5   CT of the chest to rule out PE  6  Patient's procalcitonin and CRP are elevated  1  Clinical evidence for bacterial infection is low at this time, but will continue to monitor  Leg DVT (deep venous thromboembolism), chronic, right [facility])  Assessment & Plan  Patient reports mild tenderness in both legs  patient states he takes 7 5 mg of warfarin daily, INR in ED was 2 1  Plan:    7  Will continue patient on 7 5 mg of warfarin during his inpatient stay  Essential hypertension  Assessment & Plan  The patient's blood pressure is 175/87 which has trended down to 148/68  8  Continue patient on Coreg 25 mg b i d   9  Continue patient on hydrochlorothiazide 25 mg q d  Morbid obesity with BMI of 40 0-44 9, adult [facility])  Assessment & Plan  Encouraged weight loss to patient, will continue to educate patient regarding lifestyle modification  Will also have patient see a dietitian during hospitalization  ZAKIA (acute kidney injury) ([facility] [address] )  Assessment & Plan  · Baseline creatinine appears to be around 1  · Patient's creatinine is 1 4 today up from 1 35 yesterday  ·  We will continue to hydrate patient  · Continue to monitor renal function closely          VTE Pharmacologic Prophylaxis:   Pharmacologic: Warfarin (Coumadin)  Mechanical VTE Prophylaxis in Place: No    Discussions with Specialists or Other Care Team Provider:     Education and Discussions with Family / Patient:  Spoke with patient and daughter, who was on the phone, at bedside  Current Length of Stay: 1 day(s)    Current Patient Status: Inpatient     Discharge Plan / Estimated Discharge Date:  24-48 hours pending resolution of symptoms  Code Status: Level 1 - Full Code      Subjective:   Patient still feeling some soreness in his legs, and having nausea  Patient also endorses some mild cough with productive sputum  Denies shortness of breath, chest pain, or palpitation  Patient denies having any more chills      Objective:     Vitals:   Temp (24hrs), Av 1 °F (37 3 °C), Min:98 1 °F (36 7 °C), Max:101 2 °F (38 4 °C)    Temp:  [98 1 °F (36 7 °C)-101 2 °F (38 4 °C)] 98 1 °F (36 7 °C)  HR:  [60-67] 64  Resp:  [17-21] 17  BP: (140-164)/(68-80) 164/80  SpO2:  [96 %-98 %] 98 %  Body mass index is 41 44 kg/m²  Input and Output Summary (last 24 hours): Intake/Output Summary (Last 24 hours) at 8/14/2020 1645  Last data filed at 8/14/2020 0534  Gross per 24 hour   Intake 2000 ml   Output    Net 2000 ml       Physical Exam:     Physical Exam  Constitutional:       General: He is not in acute distress  Appearance: Normal appearance  He is not ill-appearing  HENT:      Head: Normocephalic and atraumatic  Eyes:      General:         Right eye: No discharge  Left eye: No discharge  Conjunctiva/sclera: Conjunctivae normal       Pupils: Pupils are equal, round, and reactive to light  Cardiovascular:      Rate and Rhythm: Normal rate and regular rhythm  Pulses: Normal pulses  Heart sounds: No murmur  No gallop  Pulmonary:      Effort: Pulmonary effort is normal  No respiratory distress  Breath sounds: Normal breath sounds  No wheezing  Abdominal:      General: Abdomen is flat  Bowel sounds are normal  There is no distension  Palpations: Abdomen is soft  Tenderness: There is no abdominal tenderness  Musculoskeletal:         General: Swelling present  No signs of injury  Right lower leg: Edema present  Left lower leg: Edema present  Skin:     General: Skin is warm and dry  Findings: Rash (Scaly gray rash on left lower extremity, likely psoriasis  ) present  Neurological:      General: No focal deficit present  Mental Status: He is alert and oriented to person, place, and time             Additional Data:     Labs:    Results from last 7 days   Lab Units 08/14/20  0652 08/13/20  0623   WBC Thousand/uL 13 03* 13 38*   HEMOGLOBIN g/dL 11 5* 13 3   HEMATOCRIT % 35 7* 40 9   PLATELETS Thousands/uL 128* 172   NEUTROS PCT %  --  87*   LYMPHS PCT %  --  6*   MONOS PCT %  --  6   EOS PCT %  --  1     Results from last 7 days   Lab Units 08/14/20  0652 08/13/20  0623   POTASSIUM mmol/L 3 5 3 7   CHLORIDE mmol/L 103 102   CO2 mmol/L 27 29   BUN mg/dL 15 20   CREATININE mg/dL 1 40* 1 35*   CALCIUM mg/dL 8 1* 8 9   ALK PHOS U/L  --  78   ALT U/L  --  23   AST U/L  --  48*     Results from last 7 days   Lab Units 08/14/20  0652   INR  2 52*       * I Have Reviewed All Lab Data Listed Above  * Additional Pertinent Lab Tests Reviewed: All Labs Within Last 24 Hours Reviewed    Imaging:    Imaging Reports Reviewed Today Include:   Imaging Personally Reviewed by Myself Includes:      Recent Cultures (last 7 days):     Results from last 7 days   Lab Units 08/13/20  0711 08/13/20  0641 08/13/20  2235   BLOOD CULTURE   --  No Growth at 24 hrs  No Growth at 24 hrs  URINE CULTURE  No Growth <1000 cfu/mL  --   --        Last 24 Hours Medication List:   Current Facility-Administered Medications   Medication Dose Route Frequency Provider Last Rate    acetaminophen  650 mg Oral Q6H PRN Ryan Corona MD      carvedilol  25 mg Oral BID With Meals Ryan Corona MD      cefTRIAXone  2,000 mg Intravenous Q24H Radha Aldea, DO      doxycycline hyclate  100 mg Oral Q12H [facility] Radha Aldea, DO      hydrochlorothiazide  25 mg Oral Daily Ryan Corona MD      isosorbide mononitrate  60 mg Oral Daily Ryan Corona MD      ondansetron  4 mg Intravenous Q6H PRN Joanne Dickens MD      sodium chloride  125 mL/hr Intravenous Continuous Yelitza Caicedo  mL/hr (08/14/20 0534)   Kenyon Goddard warfarin  7 5 mg Oral Daily (warfarin) Savannah Mcnair MD          Today, Patient Was Seen By: Ryan Corona MD    ** Please Note: This note has been constructed using a voice recognition system   **

## 2020-08-15 LAB
ANION GAP SERPL CALCULATED.3IONS-SCNC: 7 MMOL/L (ref 4–13)
B BURGDOR IGG+IGM SER-ACNC: <0.91 ISR (ref 0–0.9)
BUN SERPL-MCNC: 16 MG/DL (ref 5–25)
CALCIUM SERPL-MCNC: 8.2 MG/DL (ref 8.3–10.1)
CHLORIDE SERPL-SCNC: 105 MMOL/L (ref 100–108)
CO2 SERPL-SCNC: 27 MMOL/L (ref 21–32)
CREAT SERPL-MCNC: 1.26 MG/DL (ref 0.6–1.3)
ERYTHROCYTE [DISTWIDTH] IN BLOOD BY AUTOMATED COUNT: 13.8 % (ref 11.6–15.1)
GFR SERPL CREATININE-BSD FRML MDRD: 73 ML/MIN/1.73SQ M
GLUCOSE SERPL-MCNC: 107 MG/DL (ref 65–140)
HCT VFR BLD AUTO: 35.5 % (ref 36.5–49.3)
HGB BLD-MCNC: 11.4 G/DL (ref 12–17)
INR PPP: 2.31 (ref 0.84–1.19)
MCH RBC QN AUTO: 29.2 PG (ref 26.8–34.3)
MCHC RBC AUTO-ENTMCNC: 32.1 G/DL (ref 31.4–37.4)
MCV RBC AUTO: 91 FL (ref 82–98)
PLATELET # BLD AUTO: 144 THOUSANDS/UL (ref 149–390)
PMV BLD AUTO: 11.5 FL (ref 8.9–12.7)
POTASSIUM SERPL-SCNC: 3.6 MMOL/L (ref 3.5–5.3)
PROCALCITONIN SERPL-MCNC: 1.18 NG/ML
PROTHROMBIN TIME: 25.5 SECONDS (ref 11.6–14.5)
RBC # BLD AUTO: 3.91 MILLION/UL (ref 3.88–5.62)
SODIUM SERPL-SCNC: 139 MMOL/L (ref 136–145)
WBC # BLD AUTO: 11.06 THOUSAND/UL (ref 4.31–10.16)

## 2020-08-15 PROCEDURE — 99233 SBSQ HOSP IP/OBS HIGH 50: CPT | Performed by: INTERNAL MEDICINE

## 2020-08-15 PROCEDURE — 85610 PROTHROMBIN TIME: CPT | Performed by: INTERNAL MEDICINE

## 2020-08-15 PROCEDURE — 99232 SBSQ HOSP IP/OBS MODERATE 35: CPT | Performed by: INTERNAL MEDICINE

## 2020-08-15 PROCEDURE — 87207 SMEAR SPECIAL STAIN: CPT | Performed by: INTERNAL MEDICINE

## 2020-08-15 PROCEDURE — 80048 BASIC METABOLIC PNL TOTAL CA: CPT | Performed by: INTERNAL MEDICINE

## 2020-08-15 PROCEDURE — 84145 PROCALCITONIN (PCT): CPT | Performed by: INTERNAL MEDICINE

## 2020-08-15 PROCEDURE — 85027 COMPLETE CBC AUTOMATED: CPT | Performed by: INTERNAL MEDICINE

## 2020-08-15 RX ADMIN — DOXYCYCLINE 100 MG: 100 CAPSULE ORAL at 08:20

## 2020-08-15 RX ADMIN — WARFARIN SODIUM 7.5 MG: 2.5 TABLET ORAL at 17:00

## 2020-08-15 RX ADMIN — CARVEDILOL 25 MG: 12.5 TABLET, FILM COATED ORAL at 08:20

## 2020-08-15 RX ADMIN — HYDROCHLOROTHIAZIDE 25 MG: 25 TABLET ORAL at 08:20

## 2020-08-15 RX ADMIN — CEFTRIAXONE SODIUM 2000 MG: 10 INJECTION, POWDER, FOR SOLUTION INTRAVENOUS at 16:13

## 2020-08-15 RX ADMIN — CARVEDILOL 25 MG: 12.5 TABLET, FILM COATED ORAL at 16:14

## 2020-08-15 RX ADMIN — SODIUM CHLORIDE 125 ML/HR: 0.9 INJECTION, SOLUTION INTRAVENOUS at 07:12

## 2020-08-15 RX ADMIN — DOXYCYCLINE 100 MG: 100 CAPSULE ORAL at 20:36

## 2020-08-15 RX ADMIN — ISOSORBIDE MONONITRATE 60 MG: 60 TABLET, EXTENDED RELEASE ORAL at 08:20

## 2020-08-15 NOTE — ASSESSMENT & PLAN NOTE
· Baseline creatinine appears to be around 1  · Patient's creatinine is down to 1 26 today  ·  We will continue gentle hydration for the rest of the day    · Continue to monitor renal function closely

## 2020-08-15 NOTE — DISCHARGE INSTRUCTIONS
Sepsis   WHAT YOU NEED TO KNOW:   Sepsis happens when an infection spreads and causes your body to react strongly to germs  Your body's defense system normally releases chemicals to fight off infection at the infected area  When infection spreads, chemicals are released throughout your body  The chemicals cause inflammation and clotting in small blood vessels that is difficult to control  Inflammation and clotting decrease blood flow and oxygen to your organs  This may cause your organs to stop working correctly  Sepsis is a life-threatening emergency  WHILE YOU ARE HERE:   Informed consent  is a legal document that explains the tests, treatments, or procedures that you may need  Informed consent means you understand what will be done and can make decisions about what you want  You give your permission when you sign the consent form  You can have someone sign this form for you if you are not able to sign it  You have the right to understand your medical care in words you know  Before you sign the consent form, understand the risks and benefits of what will be done  Make sure all your questions are answered  Monitoring:  Healthcare providers will closely monitor your blood pressure, heart rate, breathing rate, and temperature  They will also ask you about your pain  You may need any of the following:  · A central venous access device  is also called a central line  It is an IV catheter  It is put into a large blood vessel near your collarbone, in your neck, chest, or in your groin  The central line may be used to give medicines or IV fluids, and to draw blood samples  It may also be hooked up to a monitor to take pressure readings near your heart  This information helps healthcare providers monitor your heart and how well you are hydrated  · An arterial line  is an IV that is inserted into an artery in your wrist, arm, or groin  It records your blood pressure  The readings are displayed on a monitor   The tube is attached to a monitor that keeps track of your blood pressure  An arterial line may also be used to take blood samples  · A heart monitor  is an EKG that stays on to record your heart's electrical activity  · A pulse oximeter  is a device that measures the amount of oxygen in your blood  A cord with a clip or sticky strip is placed on your finger, ear, or toe  The other end of the cord is hooked to a machine  An alarm will sound if your oxygen level is low or cannot be read  Never turn the pulse oximeter or alarm off  · A neurological exam  is also called neuro signs, neuro checks, or neuro status  A neurologic exam can show healthcare providers how well your brain is working during your illness  Healthcare providers may check your neuro signs every hour  · Intake and output  may be measured  Healthcare providers will keep track of the amount of liquid you are getting in your IV  They also need to know how much you are urinating  They may keep track of your urine output by inserting a urinary catheter  A urinary catheter is a small, flexible tube that is inserted into your bladder to drain your urine  Ask healthcare providers if they need to measure or collect your urine  Medicines:   · Blood pressure medicine  increases your blood pressure  This medicine will also increase blood flow to your organs  · Heart medicine  controls or lowers your heart rate  · Glucose  increases your blood sugar level  · Insulin  decreases your blood sugar level  · Acid-lowering medicine  helps prevent stomach ulcers  · Blood thinner medicine  helps prevent blood clots  · Antibiotics  treat a bacterial infection  You may be given more than 1 type of antibiotic  · Antifungals  treat a fungal infection  · Sedatives  help you relax and feel calm  These may be given if you are on a ventilator  · Pain medicine  may be given   Do not wait until the pain is severe before you ask for more medicine  Treatment:   · You may need extra oxygen  if your blood oxygen level is lower than it should be  You may get oxygen through a mask placed over your nose and mouth or through small tubes placed in your nostrils  Ask your healthcare provider before you take off the mask or oxygen tubing  · Removal or change of a catheter or drain  may be needed to get rid of the infection  · Mechanical ventilation  may be needed if sepsis prevents your lungs from working correctly  A breathing tube or endotracheal tube (ET tube) is a hollow plastic tube that is placed in your trachea through your mouth  The trachea is also called the windpipe or airway  The ET tube is attached to a machine called a respirator  A respirator gives you oxygen and breathes for you when you cannot breathe on your own  You will be given medicine to make you sleepy and more comfortable  · Nutrition support  may be needed if you cannot eat normally  During treatment you may not be awake, or you may have an ET tube that prevents you from eating  A feeding tube may be inserted  A feeding tube is a thin tube that is inserted through your nose or mouth into your stomach or small intestine  Formula can be injected through the feeding tube  You may instead need nutrition through your IV  · Pneumatic boots  may be applied to your legs  The boots have an air pump that tightens and loosens different areas of the boots  This improves blood flow and helps prevent clots  Sepsis can increase your risk for blood clots  · A blood transfusion  may be needed if bleeding occurs or platelet levels drop  This can happen in severe sepsis  · Dialysis  may be needed if your kidneys stop working correctly or are damaged during sepsis  Dialysis is a procedure to remove chemicals, wastes, and extra fluid from your blood  · Surgery  may be needed to treat problems causing sepsis   This may include removing an abscess or infected tissue  Tests:   · An x-ray, ultrasound, CT, or MRI  may show the source of infection  You may be given contrast liquid to swallow or in your IV to help the infection show up better in the pictures  Tell the healthcare provider if you have ever had an allergic reaction to any type of contrast liquid  Do not enter the MRI room with anything metal  Metal can cause serious injury  Tell the healthcare provider if you have any metal in or on your body  · Blood and urine tests  will show infection, organ function, and give information about your overall health  A blood test may also show what germ is causing your illness  · Blood gases  will show how much oxygen and carbon dioxide is in your blood  The results will tell healthcare providers how well your lungs, heart, and kidneys are working  · An echocardiogram  is a type of ultrasound  Sound waves are used to show the structure and function of your heart  The test will also check for damage to your heart valves that may be caused by infection  · A lumbar puncture (spinal tap)  may be done to test your cerebrospinal fluid (CSF) for germs  It can also check for signs of infection in or around your brain  CSF is the fluid that surrounds your spinal column and your brain  RISKS:   Without treatment, sepsis may develop into septic shock (sepsis with low blood pressure)  Multiple organs may shut down  These problems can be life-threatening  Your organs may be permanently damaged by septic shock  CARE AGREEMENT:   You have the right to help plan your care  Learn about your health condition and how it may be treated  Discuss treatment options with your caregivers to decide what care you want to receive  You always have the right to refuse treatment  © 2017 [employer] Information is for End User's use only and may not be sold, redistributed or otherwise used for commercial purposes   All illustrations and images included in CareNotes® are the copyrighted property of [employer]  or [employer]  The above information is an  only  It is not intended as medical advice for individual conditions or treatments  Talk to your doctor, nurse or pharmacist before following any medical regimen to see if it is safe and effective for you

## 2020-08-15 NOTE — ASSESSMENT & PLAN NOTE
Patient has bilateral lower extremity edema is decreased compared to yesterday, is also less erythematous, patient is not feeling as sore in his legs as well  Plan:    1  Patient has bilateral lower extremity duplex is negative  2  Blood cultures are negative  3  Will continue patient on current antibiotic regimen  4  Patient will also continue on Coumadin 7 5 mg p o

## 2020-08-15 NOTE — PROGRESS NOTES
Progress Note - [facility] 1965, 62 y o  male MRN: [id number]    Unit/Bed#: S -01 Encounter: [id number]    Primary Care Provider: MAX Woody   Date and time admitted to hospital: 8/13/2020  5:56 AM        * Sepsis [facility])  Assessment & Plan  On presentation patient has temperature of 102 5°, white count of 13 3, respiration of 21, and bilateral lower leg swelling  Patient will be admitted for sepsis  Cellulitis vs DVT vs other infectious process    Patient did not have any fevers overnight, however, patient did endorse night sweats  Patient's physical examination of legs, shows significantly decreased erythema and swelling from prior days  Patient's white count is down to 11 from 13 yesterday  Given patient's clinical improvement will continue on current antibiotic regimen  Plan:     1  Patient currently being treated with ceftriaxone and p o  Doxycycline  2  Lyme, Anaplasma titers pending  3  Parasite smear  4  Blood cultures negative x2  5  Rheumatology titers pending  Febrile illness, acute  Assessment & Plan  Patient is afebrile overnight  Plan:    1  Patient's chest x-ray did not show any acute cardiopulmonary process  2  CT of the chest to rule out PE  3  Patient's procalcitonin and CRP are elevated  1  Will continue to trend procalcitonin today  Bilateral lower extremity edema  Assessment & Plan  Patient has bilateral lower extremity edema is decreased compared to yesterday, is also less erythematous, patient is not feeling as sore in his legs as well  Plan:    4  Patient has bilateral lower extremity duplex is negative  5  Blood cultures are negative  6  Will continue patient on current antibiotic regimen  7  Patient will also continue on Coumadin 7 5 mg p o  Leg DVT (deep venous thromboembolism), chronic, right [facility])  Assessment & Plan  Patient reports mild tenderness in both legs    patient states he takes 7 5 mg of warfarin daily, INR in ED was 2 1     Plan:    8  Will continue patient on 7 5 mg of warfarin during his inpatient stay  Essential hypertension  Assessment & Plan  The patient's blood pressure is 175/87 which has trended down to 148/68  9  Continue patient on Coreg 25 mg b i d  10  Continue patient on hydrochlorothiazide 25 mg q d  ZAKIA (acute kidney injury) ([facility] [address] )  Assessment & Plan  · Baseline creatinine appears to be around 1  · Patient's creatinine is down to 1 26 today  ·  We will continue gentle hydration for the rest of the day  · Continue to monitor renal function closely        VTE Pharmacologic Prophylaxis:   Pharmacologic: Warfarin (Coumadin)  Mechanical VTE Prophylaxis in Place: No    Discussions with Specialists or Other Care Team Provider:  Infectious disease    Education and Discussions with Family / Patient: Will speak with patient's family at bedside this afternoon once they are here  Current Length of Stay: 2 day(s)    Current Patient Status: Inpatient     Discharge Plan / Estimated Discharge Date:  12:48 p m  Pending continued improvement  Code Status: Level 1 - Full Code      Subjective:   Patient states he is feeling a lot better, sitting up on examination  States that his legs no longer feel as sores yesterday  Patient does endorse having some night sweats, denies chills  Patient also denies nausea, vomiting, diarrhea  Objective:     Vitals:   Temp (24hrs), Av 1 °F (36 7 °C), Min:97 9 °F (36 6 °C), Max:98 2 °F (36 8 °C)    Temp:  [97 9 °F (36 6 °C)-98 2 °F (36 8 °C)] 97 9 °F (36 6 °C)  HR:  [58-67] 58  Resp:  [17-20] 20  BP: (160-176)/(80-96) 176/96  SpO2:  [96 %-98 %] 97 %  Body mass index is 41 44 kg/m²  Input and Output Summary (last 24 hours): Intake/Output Summary (Last 24 hours) at 8/15/2020 1114  Last data filed at 8/15/2020 0900  Gross per 24 hour   Intake 720 ml   Output    Net 720 ml       Physical Exam:     Physical Exam  Constitutional:       Appearance: Normal appearance  He is obese  Eyes:      General:         Right eye: No discharge  Left eye: No discharge  Conjunctiva/sclera: Conjunctivae normal       Pupils: Pupils are equal, round, and reactive to light  Cardiovascular:      Rate and Rhythm: Normal rate and regular rhythm  Pulses: Normal pulses  Heart sounds: No murmur  No friction rub  No gallop  Pulmonary:      Effort: Pulmonary effort is normal  No respiratory distress  Breath sounds: Wheezing (Mild expiratory wheeze in the upper right lung field  Likely secondary to patient's chronic sleep apnea ) present  Chest:      Chest wall: No tenderness  Abdominal:      General: Abdomen is flat  Bowel sounds are normal  There is no distension  Palpations: Abdomen is soft  Tenderness: There is no abdominal tenderness  Skin:     General: Skin is warm and dry  Capillary Refill: Capillary refill takes less than 2 seconds  Findings: Rash present  No erythema  Neurological:      General: No focal deficit present  Mental Status: He is alert and oriented to person, place, and time  Psychiatric:         Mood and Affect: Mood normal          Behavior: Behavior normal          Thought Content: Thought content normal          Judgment: Judgment normal            Additional Data:     Labs:    Results from last 7 days   Lab Units 08/15/20  0428  08/13/20  0623   WBC Thousand/uL 11 06*   < > 13 38*   HEMOGLOBIN g/dL 11 4*   < > 13 3   HEMATOCRIT % 35 5*   < > 40 9   PLATELETS Thousands/uL 144*   < > 172   NEUTROS PCT %  --   --  87*   LYMPHS PCT %  --   --  6*   MONOS PCT %  --   --  6   EOS PCT %  --   --  1    < > = values in this interval not displayed       Results from last 7 days   Lab Units 08/15/20  0428  08/13/20  0623   POTASSIUM mmol/L 3 6   < > 3 7   CHLORIDE mmol/L 105   < > 102   CO2 mmol/L 27   < > 29   BUN mg/dL 16   < > 20   CREATININE mg/dL 1 26   < > 1 35*   CALCIUM mg/dL 8 2*   < > 8 9   ALK PHOS U/L  --   -- 78   ALT U/L  --   --  23   AST U/L  --   --  48*    < > = values in this interval not displayed  Results from last 7 days   Lab Units 08/15/20  0428   INR  2 31*       * I Have Reviewed All Lab Data Listed Above  * Additional Pertinent Lab Tests Reviewed: All Labs Within Last 24 Hours Reviewed    Imaging:    Imaging Reports Reviewed Today Include:   Imaging Personally Reviewed by Myself Includes:      Recent Cultures (last 7 days):     Results from last 7 days   Lab Units 08/13/20  0711 08/13/20  0641 08/13/20  0367   BLOOD CULTURE   --  No Growth at 48 hrs  No Growth at 48 hrs  URINE CULTURE  No Growth <1000 cfu/mL  --   --        Last 24 Hours Medication List:   Current Facility-Administered Medications   Medication Dose Route Frequency Provider Last Rate    acetaminophen  650 mg Oral Q6H PRN Kwadwo Mcnair MD      carvedilol  25 mg Oral BID With Meals Kwadwo Mcnair MD      cefTRIAXone  2,000 mg Intravenous Q24H Radha Aldea, DO 2,000 mg (08/14/20 1717)    doxycycline hyclate  100 mg Oral Q12H [address] Radha Aldea, DO      hydrochlorothiazide  25 mg Oral Daily Kwadwo Mcnair MD      isosorbide mononitrate  60 mg Oral Daily Kwadwo Mcnair MD      ondansetron  4 mg Intravenous Q6H PRN Josep Amezquita MD      sodium chloride  125 mL/hr Intravenous Continuous Lili Anderson  mL/hr (08/15/20 0677)    warfarin  7 5 mg Oral Daily (warfarin) Faraz Kaur MD          Today, Patient Was Seen By: Kwadwo Mcnair MD    ** Please Note: This note has been constructed using a voice recognition system   **]

## 2020-08-15 NOTE — ASSESSMENT & PLAN NOTE
Patient is afebrile overnight  Plan:    1  Patient's chest x-ray did not show any acute cardiopulmonary process  2  CT of the chest to rule out PE  3  Patient's procalcitonin and CRP are elevated  1  Will continue to trend procalcitonin today

## 2020-08-15 NOTE — PLAN OF CARE
Problem: Potential for Falls  Goal: Patient will remain free of falls  Description: INTERVENTIONS:  - Assess patient frequently for physical needs  -  Identify cognitive and physical deficits and behaviors that affect risk of falls    -  Palmer fall precautions as indicated by assessment   - Educate patient/family on patient safety including physical limitations  - Instruct patient to call for assistance with activity based on assessment  - Modify environment to reduce risk of injury  - Consider OT/PT consult to assist with strengthening/mobility  Outcome: Progressing     Problem: PAIN - ADULT  Goal: Verbalizes/displays adequate comfort level or baseline comfort level  Description: Interventions:  - Encourage patient to monitor pain and request assistance  - Assess pain using appropriate pain scale  - Administer analgesics based on type and severity of pain and evaluate response  - Implement non-pharmacological measures as appropriate and evaluate response  - Consider cultural and social influences on pain and pain management  - Notify physician/advanced practitioner if interventions unsuccessful or patient reports new pain  Outcome: Progressing     Problem: INFECTION - ADULT  Goal: Absence or prevention of progression during hospitalization  Description: INTERVENTIONS:  - Assess and monitor for signs and symptoms of infection  - Monitor lab/diagnostic results  - Monitor all insertion sites, i e  indwelling lines, tubes, and drains  - Monitor endotracheal if appropriate and nasal secretions for changes in amount and color  - Palmer appropriate cooling/warming therapies per order  - Administer medications as ordered  - Instruct and encourage patient and family to use good hand hygiene technique  - Identify and instruct in appropriate isolation precautions for identified infection/condition  Outcome: Progressing

## 2020-08-15 NOTE — PROGRESS NOTES
Progress Note - Infectious Disease   Manish Geronimo 62 y o  male MRN: [id number]  Unit/Bed#: S -01 Encounter: [id number]      Impression/Recommendations:  1  Sepsis, POA  Fever, leukocytosis  Source is not entirely clear  Consider leg cellulitis  Consider tick related infection  No evidence of pneumonia clinically or radiologically  Abdominal exam is benign  Blood in urine cultures have no growth  Patient is clinically improved  Fevers resolved  WBC decreasing  Procalcitonin was elevated, with repeat level pending      -continue IV ceftriaxone/doxycycline  -follow-up Lyme antibody, Anaplasma PCR, parasite smear x2  -monitor temperatures and hemodynamics  -monitor WBC  -follow-up blood cultures  -follow-up procalcitonin level from this morning  -supportive care     2  Bilateral lower extremity pain and swelling  RLE symptoms appear to be worse  No clear evidence of cellulitis on exam   No joint swelling  Lower extremity duplex is negative  Swelling is improved and patient is still without pain/tenderness      -ceftriaxone/doxycycline, as above  -serial exams  -frequent leg elevation     3  Thrombocytopenia  Platelet count lower at 128  Consider viral etiology, as above  Also consideration for tick-borne infection  Clinically is improved      -antibiotic plan as above  -ID workup as above  -monitor platelet count     4  Elevated AST  AST is mildly elevated at 48  Could be indicative of tick-borne infection or viral process  Remainder of LFTs are normal   Abdominal exam is benign with no GI related complaints      -workup as above  -monitor LFTs  -serial abdominal exams     5  Obesity  Discussed with patient in detail regarding the above plan  Discussed with Dr Renuka Beltran from [facility] service       Antibiotics:  Ceftriaxone/doxycycline 2  Antibiotic 3      Subjective:  Patient states leg swelling is much improved  No pain in leg  Temperature is also down  No chills    He is tolerating antibiotics well  No nausea, vomiting or diarrhea  Objective:  Vitals:  Temp:  [97 9 °F (36 6 °C)-98 2 °F (36 8 °C)] 97 9 °F (36 6 °C)  HR:  [58-67] 58  Resp:  [17-20] 20  BP: (160-176)/(80-96) 176/96  SpO2:  [96 %-98 %] 97 %  Temp (24hrs), Av 1 °F (36 7 °C), Min:97 9 °F (36 6 °C), Max:98 2 °F (36 8 °C)  Current: Temperature: 97 9 °F (36 6 °C)    Physical Exam:     General: Awake, alert, cooperative, no distress  Neck:  Supple  No mass  No lymphadenopathy  Lungs: Expansion symmetric, no rales, no wheezing, respirations unlabored  Heart:  Regular rate and rhythm, S1 and S2 normal, no murmur  Abdomen: Soft, nondistended, non-tender, bowel sounds active all four quadrants, no masses, no organomegaly  Extremities: 2+ leg edema  No open ulcer  Mild warmth but no erythema  No tenderness  Skin:  No rash  Neuro: Moves all extremities  Invasive Devices     Peripheral Intravenous Line            Peripheral IV 20 Right Antecubital 2 days    Peripheral IV 20 Right Forearm 2 days                Labs studies:   I have personally reviewed pertinent labs  Results from last 7 days   Lab Units 08/15/20  0428 08/14/20  0652 20  0623   POTASSIUM mmol/L 3 6 3 5 3 7   CHLORIDE mmol/L 105 103 102   CO2 mmol/L 27 27 29   BUN mg/dL 16 15 20   CREATININE mg/dL 1 26 1 40* 1 35*   EGFR ml/min/1 73sq m 73 64 67   CALCIUM mg/dL 8 2* 8 1* 8 9   AST U/L  --   --  48*   ALT U/L  --   --  23   ALK PHOS U/L  --   --  78     Results from last 7 days   Lab Units 08/15/20  0428 08/14/20  0652 20  0623   WBC Thousand/uL 11 06* 13 03* 13 38*   HEMOGLOBIN g/dL 11 4* 11 5* 13 3   PLATELETS Thousands/uL 144* 128* 172     Results from last 7 days   Lab Units 20  0711 20  0641 20  4405   BLOOD CULTURE   --  No Growth at 24 hrs  No Growth at 24 hrs     URINE CULTURE  No Growth <1000 cfu/mL  --   --        Imaging Studies:   I have personally reviewed pertinent imaging study reports and images in PACS  EKG, Pathology, and Other Studies:   I have personally reviewed pertinent reports

## 2020-08-15 NOTE — DISCHARGE SUMMARY
Discharge- David Ulloa 1965, 62 y o  male MRN: [id number]    Unit/Bed#: S -01 Encounter: [id number]    Primary Care Provider: MAX Iverson   Date and time admitted to hospital: 8/13/2020  5:56 AM        * Sepsis [facility])  Assessment & Plan  On presentation patient has temperature of 102 5°, white count of 13 3, respiration of 21, and bilateral lower leg swelling  Patient will be admitted for sepsis  Cellulitis vs DVT vs other infectious process    Patient did not have any fevers overnight, however, patient did endorse night sweats  Patient's physical examination of legs, shows significantly decreased erythema and swelling from prior days  Patient's white count is down to 11 from 13 yesterday  Given patient's clinical improvement will continue on current antibiotic regimen  Plan:     1  Patient currently being treated with ceftriaxone and p o  Doxycycline  2  Lyme, Anaplasma titers pending  3  Parasite smear  4  Blood cultures negative x2  5  Rheumatology titers pending  Febrile illness, acute  Assessment & Plan  Patient is afebrile overnight  Plan:    1  Patient's chest x-ray did not show any acute cardiopulmonary process  2  CT of the chest to rule out PE  3  Patient's procalcitonin and CRP are elevated  1  Will continue to trend procalcitonin today  Bilateral lower extremity edema  Assessment & Plan  Patient has bilateral lower extremity edema is decreased compared to yesterday, is also less erythematous, patient is not feeling as sore in his legs as well  Plan:    4  Patient has bilateral lower extremity duplex is negative  5  Blood cultures are negative  6  Will continue patient on current antibiotic regimen  7  Patient will also continue on Coumadin 7 5 mg p o  Leg DVT (deep venous thromboembolism), chronic, right [facility])  Assessment & Plan  Patient reports mild tenderness in both legs    patient states he takes 7 5 mg of warfarin daily, INR in ED was 2 1     Plan:    8  Will continue patient on 7 5 mg of warfarin during his inpatient stay  Essential hypertension  Assessment & Plan  The patient's blood pressure is 175/87 which has trended down to 148/68  9  Continue patient on Coreg 25 mg b i d  10  Continue patient on hydrochlorothiazide 25 mg q d  Morbid obesity with BMI of 40 0-44 9, adult [facility])  Assessment & Plan  Encouraged weight loss to patient, will continue to educate patient regarding lifestyle modification  Will also have patient see a dietitian during hospitalization  ZAKIA (acute kidney injury) ([facility] [address] )  Assessment & Plan  · Baseline creatinine appears to be around 1  · Patient's creatinine is down to 1 26 today  ·  We will continue gentle hydration for the rest of the day  · Continue to monitor renal function closely          Discharging Resident Physician: Poli Benavides MD  Attending: Tamy Ji MD  PCP: Edwardo Martinez  Admission Date: 8/13/2020  Discharge Date: 08/16/20    Disposition:     Home    Reason for Admission: Sepsis    Consultations During Hospital Stay:  · ID    Procedures Performed:     ·     Significant Findings / Test Results:     · Rheumatological titers pending    Incidental Findings:   ·      Test Results Pending at Discharge (will require follow up):   · YOMAIRA, RF, ANCA, Parasite smears     Outpatient Tests Requested:  ·     Complications:  none    Hospital Course: Christine Garcia is a 62 y o  male patient who originally presented to the hospital on 8/13/2020 due to complaints of nausea, chills, generalized weakness, and bilateral lower extremity swelling  Patient states that when he was working yesterday, he started feeling nauseous, getting chills and weakness  Patient states that he started having the same symptoms prior to previous admission fluid he was diagnosed with DVT  Patient states the legs also started swelling over the past day and have been mildly tender    Patient was admitted for sepsis probably secondary to cellulitis or other infectious processes  On presentation patient had a temperature of 102 5° and a white count of 13 3 along with bilateral leg swelling  Patient's pro count trended down, patient was started on Rocephin, and in infectious disease was consulted  Blood cultures were also obtained, and did not show any growth at 48 hours  Given that the patient had stated that [place] [employer], infectious disease endorses switched him to doxycycline b i d , and ceftriaxone  Patient improved clinically on the combination of doxycycline and ceftriaxone, and patient was discharged on Keflex and doxycycline               Discharge instructions/Information to patient and family:   See after visit summary for information provided to patient and family  Provisions for Follow-Up Care:  See after visit summary for information related to follow-up care and any pertinent home health orders  Planned Readmission: no     Discharge Medications:  See after visit summary for reconciled discharge medications provided to patient and family        ** Please Note: This note has been constructed using a voice recognition system **

## 2020-08-15 NOTE — DISCHARGE INSTR - AVS FIRST PAGE
Dear Gurwinder Heaton,     It was our pleasure to care for you here at [facility]  It is our hope that we were always able to exceed the expected standards for your care during your stay  You were hospitalized due to Infection of your legs  You were cared for on the 4S floor by Mickey Doherty MD under the service of Trini Fu MD with the [facility] who covers for your primary care physician (PCP), MAX Palacios, while you were hospitalized  If you have any questions or concerns related to this hospitalization, you may contact us at [phone]  For follow up as well as any medication refills, we recommend that you follow up with your primary care physician  A registered nurse will reach out to you by phone within a few days after your discharge to answer any additional questions that you may have after going home  However, at this time we provide for you here, the most important instructions / recommendations at discharge:     · Notable Medication Adjustments -   · Continue to take  Doxycyline 100 mg BID for 6 more days  ]  · Take Keflex for 3 more days  · Testing Required after Discharge -   · You should check your INR in 2 days time and regularly following this to insure that your INR is within the desired range  · Antibiotics can interfere with the metabolism of coumadin  · Important follow up information -   · Please f/u with your PCP regarding your pending lab tests  · Other Instructions -   · If you start experiencing fevers, chills or worsening leg pain please seek urgent medical attention  · Please take a higher dose of hydrochlorothiazide going forward and if your pressures are consistently higher than 357 systolic and 90 diastolic call your PCP or come in to ER     · Please review this entire after visit summary as additional general instructions including medication list, appointments, activity, diet, any pertinent wound care, and other additional recommendations from your care team that may be provided for you        Sincerely,     Carrie Berg MD

## 2020-08-15 NOTE — ASSESSMENT & PLAN NOTE
On presentation patient has temperature of 102 5°, white count of 13 3, respiration of 21, and bilateral lower leg swelling  Patient will be admitted for sepsis  Cellulitis vs DVT vs other infectious process    Patient did not have any fevers overnight, however, patient did endorse night sweats  Patient's physical examination of legs, shows significantly decreased erythema and swelling from prior days  Patient's white count is down to 11 from 13 yesterday  Given patient's clinical improvement will continue on current antibiotic regimen  Plan:     1  Patient currently being treated with ceftriaxone and p o  Doxycycline  2  Lyme, Anaplasma titers pending  3  Parasite smear  4  Blood cultures negative x2  5  Rheumatology titers pending

## 2020-08-16 VITALS
WEIGHT: 297.14 LBS | DIASTOLIC BLOOD PRESSURE: 86 MMHG | TEMPERATURE: 97.9 F | HEIGHT: 71 IN | RESPIRATION RATE: 20 BRPM | HEART RATE: 55 BPM | OXYGEN SATURATION: 96 % | SYSTOLIC BLOOD PRESSURE: 156 MMHG | BODY MASS INDEX: 41.6 KG/M2

## 2020-08-16 LAB
INR PPP: 2.32 (ref 0.84–1.19)
PROTHROMBIN TIME: 25.5 SECONDS (ref 11.6–14.5)

## 2020-08-16 PROCEDURE — 99232 SBSQ HOSP IP/OBS MODERATE 35: CPT | Performed by: INTERNAL MEDICINE

## 2020-08-16 PROCEDURE — 99239 HOSP IP/OBS DSCHRG MGMT >30: CPT | Performed by: INTERNAL MEDICINE

## 2020-08-16 PROCEDURE — 85610 PROTHROMBIN TIME: CPT | Performed by: INTERNAL MEDICINE

## 2020-08-16 RX ORDER — HYDROCHLOROTHIAZIDE 25 MG/1
50 TABLET ORAL DAILY
Qty: 90 TABLET | Refills: 0 | Status: SHIPPED | OUTPATIENT
Start: 2020-08-16 | End: 2020-09-17 | Stop reason: SDUPTHER

## 2020-08-16 RX ORDER — LABETALOL 20 MG/4 ML (5 MG/ML) INTRAVENOUS SYRINGE
10 EVERY 6 HOURS PRN
Status: DISCONTINUED | OUTPATIENT
Start: 2020-08-16 | End: 2020-08-16 | Stop reason: HOSPADM

## 2020-08-16 RX ORDER — HYDROCHLOROTHIAZIDE 25 MG/1
25 TABLET ORAL ONCE
Status: COMPLETED | OUTPATIENT
Start: 2020-08-16 | End: 2020-08-16

## 2020-08-16 RX ORDER — CEPHALEXIN 500 MG/1
500 CAPSULE ORAL EVERY 6 HOURS SCHEDULED
Qty: 12 CAPSULE | Refills: 0 | Status: SHIPPED | OUTPATIENT
Start: 2020-08-16 | End: 2020-08-19

## 2020-08-16 RX ORDER — DOXYCYCLINE HYCLATE 100 MG/1
100 CAPSULE ORAL EVERY 12 HOURS SCHEDULED
Qty: 12 CAPSULE | Refills: 0 | Status: SHIPPED | OUTPATIENT
Start: 2020-08-16 | End: 2020-08-22

## 2020-08-16 RX ORDER — HYDROCHLOROTHIAZIDE 25 MG/1
50 TABLET ORAL DAILY
Status: DISCONTINUED | OUTPATIENT
Start: 2020-08-17 | End: 2020-08-16 | Stop reason: HOSPADM

## 2020-08-16 RX ORDER — CEPHALEXIN 500 MG/1
500 CAPSULE ORAL EVERY 6 HOURS SCHEDULED
Status: DISCONTINUED | OUTPATIENT
Start: 2020-08-16 | End: 2020-08-16 | Stop reason: HOSPADM

## 2020-08-16 RX ADMIN — CEPHALEXIN 500 MG: 500 CAPSULE ORAL at 13:14

## 2020-08-16 RX ADMIN — ISOSORBIDE MONONITRATE 60 MG: 60 TABLET, EXTENDED RELEASE ORAL at 08:06

## 2020-08-16 RX ADMIN — CEPHALEXIN 500 MG: 500 CAPSULE ORAL at 17:08

## 2020-08-16 RX ADMIN — CARVEDILOL 25 MG: 12.5 TABLET, FILM COATED ORAL at 15:45

## 2020-08-16 RX ADMIN — HYDROCHLOROTHIAZIDE 25 MG: 25 TABLET ORAL at 08:07

## 2020-08-16 RX ADMIN — WARFARIN SODIUM 7.5 MG: 2.5 TABLET ORAL at 17:10

## 2020-08-16 RX ADMIN — LABETALOL 20 MG/4 ML (5 MG/ML) INTRAVENOUS SYRINGE 10 MG: at 16:46

## 2020-08-16 RX ADMIN — DOXYCYCLINE 100 MG: 100 CAPSULE ORAL at 08:07

## 2020-08-16 RX ADMIN — HYDROCHLOROTHIAZIDE 25 MG: 25 TABLET ORAL at 13:14

## 2020-08-16 RX ADMIN — CARVEDILOL 25 MG: 12.5 TABLET, FILM COATED ORAL at 08:06

## 2020-08-16 NOTE — PROGRESS NOTES
Progress Note - Infectious Disease   Lauren Jessie 62 y o  male MRN: [id number]  Unit/Bed#: S -01 Encounter: [id number]      Impression/Recommendations:  1  Sepsis, POA   Fever, leukocytosis    Source is not entirely clear  Consider leg cellulitis  Consider tick related infection  No evidence of pneumonia clinically or radiologically  Abdominal exam is benign  Blood in urine cultures have no growth  Patient is clinically improved on current antibiotic regimen  Fever resolved  WBC decreasing  Procalcitonin was elevated, but improved  Will treat patient for both cellulitis and tick related infection, especially anaplasmosis  Babesiosis unlikely given response to current antibiotic regimen     -change antibiotic to p o  Keflex/doxycycline  Continue Keflex x 7 days total   Continue doxycycline x 10 days total   -follow-up Lyme antibody, Anaplasma PCR, parasite smear x2  -monitor temperatures and hemodynamics  -monitor WBC  -follow-up blood cultures  -supportive care     2  Bilateral lower extremity pain and swelling   RLE symptoms appear to be worse   No clear evidence of cellulitis on exam   No joint swelling   Lower extremity duplex is negative  Swelling is improved and patient is still without pain/tenderness      -Keflex/doxycycline, as above  -serial exams  -frequent leg elevation     3  Thrombocytopenia   Platelet count lower at 128  Consider viral etiology, as above   Also consideration for tick-borne infection  Platelet count improving      -antibiotic plan as above  -ID workup as above  -monitor platelet count     4  Elevated AST   AST is mildly elevated at 48  Could be indicative of tick-borne infection or viral process   Remainder of LFTs are normal   Abdominal exam is benign with no GI related complaints      -workup as above  -monitor LFTs  -serial abdominal exams     5  Obesity      Discussed with patient in detail regarding the above plan    Discussed with   [facility] from [facility] service    Okay for discharge from ID viewpoint      Antibiotics:  Ceftriaxone/doxycycline 3  Antibiotic 4     Subjective:  Patient states leg swelling continues to improve  No pain in leg  Temperature stays down  No chills  He is tolerating antibiotics well  No nausea, vomiting or diarrhea  Objective:  Vitals:  Temp:  [97 9 °F (36 6 °C)-98 4 °F (36 9 °C)] 97 9 °F (36 6 °C)  HR:  [55-74] 55  Resp:  [18-20] 18  BP: (161-180)/(77-98) 180/98  SpO2:  [94 %-97 %] 97 %  Temp (24hrs), Av 1 °F (36 7 °C), Min:97 9 °F (36 6 °C), Max:98 4 °F (36 9 °C)  Current: Temperature: 97 9 °F (36 6 °C)    Physical Exam:     General: Awake, alert, cooperative, no distress  Neck:  Supple  No mass  No lymphadenopathy  Lungs: Expansion symmetric, no rales, no wheezing, respirations unlabored  Heart:  Regular rate and rhythm, S1 and S2 normal, no murmur  Abdomen: Soft, nondistended, non-tender, bowel sounds active all four quadrants, no masses, no organomegaly  Extremities: Much improved leg edema  Mild warmth  No erythema  No ulcer  Nontender to palpation  Skin:  No rash  Neuro: Moves all extremities  Invasive Devices     Peripheral Intravenous Line            Peripheral IV 20 Right Antecubital 3 days    Peripheral IV 20 Right Forearm 3 days                Labs studies:   I have personally reviewed pertinent labs    Results from last 7 days   Lab Units 08/15/20  0428 08/14/20  0652 20  0623   POTASSIUM mmol/L 3 6 3 5 3 7   CHLORIDE mmol/L 105 103 102   CO2 mmol/L 27 27 29   BUN mg/dL 16 15 20   CREATININE mg/dL 1 26 1 40* 1 35*   EGFR ml/min/1 73sq m 73 64 67   CALCIUM mg/dL 8 2* 8 1* 8 9   AST U/L  --   --  48*   ALT U/L  --   --  23   ALK PHOS U/L  --   --  78     Results from last 7 days   Lab Units 08/15/20  0428 08/14/20  0652 20  0623   WBC Thousand/uL 11 06* 13 03* 13 38*   HEMOGLOBIN g/dL 11 4* 11 5* 13 3   PLATELETS Thousands/uL 144* 128* 172     Results from last 7 days   Lab Units 08/13/20  0711 08/13/20  0641 08/13/20  7640   BLOOD CULTURE   --  No Growth at 72 hrs  No Growth at 72 hrs  URINE CULTURE  No Growth <1000 cfu/mL  --   --        Imaging Studies:   I have personally reviewed pertinent imaging study reports and images in PACS  EKG, Pathology, and Other Studies:   I have personally reviewed pertinent reports

## 2020-08-16 NOTE — UTILIZATION REVIEW
Continued Stay Review    Date: 8/16/2020                          Current Patient Class: inpatient  Current Level of Care: med surg    HPI:54 y o  male initially admitted on 8/13/2020 inpatient due to sepsis/ZAKIA  history of angioedema acute DVT started on Eliquis which was discontinued secondary to angioedema I July  Presented due to lower extremity pain and nausea, febrile to 102 2  Plan is treat as cellulitis and Rocephin started  ID consulted  Per ID- Patient with sepsis, unclear etiology, could be viral, vs tick borne  May have early LE cellulitis and recommend IV ceftriaxone and doxycycline added  Assessment/Plan:  On 8/16/2020:  Still unclear source of sepsis, leg cellulitis vs tick related infection  Antibiotics changed to Keflex and doxycycline  Follow up on lyme and parasite cultures  On exam leg swelling improved, mild warmth  Pertinent Labs/Diagnostic Results:   8/13/2020 CxR No acute cardiopulmonary disease  8/13/2020  Ct  Chest Unremarkable examination of the chest     8/13/2020 Venous duplexRIGHT LOWER LIMB:  No evidence of acute or chronic deep vein thrombosis  No evidence of superficial thrombophlebitis noted  Doppler evaluation shows a normal response to augmentation maneuvers  Popliteal, posterior tibial and anterior tibial arterial Doppler waveforms are  triphasic  LEFT LOWER LIMB:  No evidence of acute or chronic deep vein thrombosis  No evidence of superficial thrombophlebitis noted  Doppler evaluation shows a normal response to augmentation maneuvers  Popliteal, posterior tibial and anterior tibial arterial Doppler waveforms are  triphasic    Results from last 7 days   Lab Units 08/13/20  0626   SARS-COV-2  Negative     Results from last 7 days   Lab Units 08/15/20  0428 08/14/20  0652 08/13/20  0623   WBC Thousand/uL 11 06* 13 03* 13 38*   HEMOGLOBIN g/dL 11 4* 11 5* 13 3   HEMATOCRIT % 35 5* 35 7* 40 9   PLATELETS Thousands/uL 144* 128* 172   NEUTROS ABS Thousands/µL  --   --  11 55*     Results from last 7 days   Lab Units 08/15/20  0428 08/14/20  0652 08/13/20  0623   SODIUM mmol/L 139 138 140   POTASSIUM mmol/L 3 6 3 5 3 7   CHLORIDE mmol/L 105 103 102   CO2 mmol/L 27 27 29   ANION GAP mmol/L 7 8 9   BUN mg/dL 16 15 20   CREATININE mg/dL 1 26 1 40* 1 35*   EGFR ml/min/1 73sq m 73 64 67   CALCIUM mg/dL 8 2* 8 1* 8 9     Results from last 7 days   Lab Units 08/13/20  0623   AST U/L 48*   ALT U/L 23   ALK PHOS U/L 78   TOTAL PROTEIN g/dL 8 3*   ALBUMIN g/dL 3 7   TOTAL BILIRUBIN mg/dL 0 61     Results from last 7 days   Lab Units 08/15/20  0428 08/14/20  0652 08/13/20  0623   GLUCOSE RANDOM mg/dL 107 98 101     Results from last 7 days   Lab Units 08/14/20  0652   CK TOTAL U/L 201   CK MB INDEX % <1 0   CK MB ng/mL <1 0     Results from last 7 days   Lab Units 08/16/20  0645 08/15/20  0428 08/14/20  0652  08/13/20  0623   PROTIME seconds 25 5* 25 5* 27 2*   < > 23 6*   INR  2 32* 2 31* 2 52*   < > 2 10*   PTT seconds  --   --   --   --  40*    < > = values in this interval not displayed       Results from last 7 days   Lab Units 08/15/20  0428 08/14/20  0652 08/13/20  1807 08/13/20  1420   PROCALCITONIN ng/ml 1 18* 2 13* 2 43* 2 31*     Results from last 7 days   Lab Units 08/13/20  1807 08/13/20  0623   LACTIC ACID mmol/L 1 4 1 1     Results from last 7 days   Lab Units 08/13/20  1807   FERRITIN ng/mL 93     Results from last 7 days   Lab Units 08/13/20  1807 08/13/20  0623   LIPASE u/L 41* 50*     Results from last 7 days   Lab Units 08/13/20  1807   CRP mg/L 130 8*     Results from last 7 days   Lab Units 08/13/20  0711   CLARITY UA  Clear   COLOR UA  Yellow   SPEC GRAV UA  1 025   PH UA  5 0   GLUCOSE UA mg/dl Negative   KETONES UA mg/dl Negative   BLOOD UA  Negative   PROTEIN UA mg/dl Negative   NITRITE UA  Negative   BILIRUBIN UA  Negative   UROBILINOGEN UA E U /dl 0 2   LEUKOCYTES UA  Negative     Results from last 7 days   Lab Units 08/13/20  0766 08/13/20  0641 08/13/20  0327   BLOOD CULTURE   --  No Growth at 72 hrs  No Growth at 72 hrs  URINE CULTURE  No Growth <1000 cfu/mL  --   --      Vital Signs:   /16/20 1300            178/86Abnormal                  08/16/20 0700   97 9 °F (36 6 °C)   55   18   180/98Abnormal        97 %   None (Room air)   Lying    08/15/20 2202   98 4 °F (36 9 °C)   59   20   161/81   115   95 %   None (Room air)   Lying    08/15/20 1458   98 1 °F (36 7 °C)   74   19   168/77   111   94 %   None (Room air)   Lying    08/15/20 0820   97 9 °F (36 6 °C)   58   20   176/96Abnormal        97 %   None (Room air)            Medications:   Scheduled Medications:  carvedilol, 25 mg, Oral, BID With Meals  cephalexin, 500 mg, Oral, Q6H EROS  doxycycline hyclate, 100 mg, Oral, Q12H [facility]  [START ON 8/17/2020] hydrochlorothiazide, 50 mg, Oral, Daily  isosorbide mononitrate, 60 mg, Oral, Daily  warfarin, 7 5 mg, Oral, Daily (warfarin)  cefTRIAXone (ROCEPHIN) 2,000 mg in dextrose 5 % 50 mL IVPB    Dose: 2,000 mg  Freq: Every 24 hours Route: IV  Last Dose: 2,000 mg (08/15/20 1613)  Start: 08/14/20 1600 End: 08/16/20 1131    Continuous IV Infusions: dc pm 8/15/2020      PRN Meds: not used   acetaminophen, 650 mg, Oral, Q6H PRN  Labetalol HCl, 10 mg, Intravenous, Q6H PRN  ondansetron, 4 mg, Intravenous, Q6H PRN        Discharge Plan: To be determined  Network Utilization Review Department  [email or web address]  ATTENTION: Please call with any questions or concerns to [phone] and carefully listen to the prompts so that you are directed to the right person  All voicemails are confidential   Laura Lacy all requests for admission clinical reviews, approved or denied determinations and any other requests to dedicated fax number below belonging to the campus where the patient is receiving treatment   List of dedicated fax numbers for the Facilities:  FACILITY NAME UR FAX NUMBER   ADMISSION DENIALS (Administrative/Medical Necessity) [address] [facility] (Maternity/NICU/Pediatrics) [place] [phone]   Tulio Bryson [phone]   Lucila Stinson [phone]   [address] [phone]   [facility]  [phone]   [address] [facility], [facility]  [address] [place] [address] And [address] [phone]

## 2020-08-17 ENCOUNTER — TELEPHONE (OUTPATIENT)
Dept: FAMILY MEDICINE CLINIC | Facility: CLINIC | Age: 55
End: 2020-08-17

## 2020-08-17 DIAGNOSIS — I82.501 LEG DVT (DEEP VENOUS THROMBOEMBOLISM), CHRONIC, RIGHT (HCC): Primary | ICD-10-CM

## 2020-08-17 DIAGNOSIS — Z71.89 COMPLEX CARE COORDINATION: Primary | ICD-10-CM

## 2020-08-17 LAB
% PARASITEMIA: 0
% PARASITEMIA: 0
MYELOPEROXIDASE AB SER IA-ACNC: <9 U/ML (ref 0–9)
PARASITE BLD: NO
PARASITE BLD: NO
PATHOLOGIST INTERPRETATION: NORMAL
PATHOLOGIST INTERPRETATION: NORMAL
PROTEINASE3 AB SER IA-ACNC: 8.8 U/ML (ref 0–3.5)
RHEUMATOID FACT SER QL LA: NEGATIVE
RYE IGE QN: NEGATIVE

## 2020-08-17 RX ORDER — WARFARIN SODIUM 2.5 MG/1
2.5 TABLET ORAL DAILY
Qty: 30 TABLET | Refills: 2 | Status: SHIPPED | OUTPATIENT
Start: 2020-08-17 | End: 2020-09-01 | Stop reason: SDUPTHER

## 2020-08-17 RX ORDER — WARFARIN SODIUM 2.5 MG/1
2.5 TABLET ORAL DAILY
COMMUNITY
Start: 2020-07-24 | End: 2020-08-17 | Stop reason: SDUPTHER

## 2020-08-17 NOTE — TELEPHONE ENCOUNTER
Pharmacy left voicemail for refill of Warfarin 2 5 mg tablets  Patient takes one tablet daily  [facility] Jessika Sibley [address]  KALIA Vargas

## 2020-08-17 NOTE — TELEPHONE ENCOUNTER
I saw Mr Christine Reid was up 5 mg p o   Daily was or change in the hospital of his dose of warfarin

## 2020-08-17 NOTE — TELEPHONE ENCOUNTER
Pt called in regards to his hospital visit  He stated that his L leg is terribly swollen and was not that way when he was released  Called Arian to confirm that pt should go back to the hospital to make sure he doesn't have another clot  I made him aware he should call and we silvestre;l do his TCM and get him scheduled    Sydni Parrish

## 2020-08-18 ENCOUNTER — APPOINTMENT (EMERGENCY)
Dept: ULTRASOUND IMAGING | Facility: HOSPITAL | Age: 55
End: 2020-08-18
Payer: COMMERCIAL

## 2020-08-18 ENCOUNTER — TELEPHONE (OUTPATIENT)
Dept: FAMILY MEDICINE CLINIC | Facility: CLINIC | Age: 55
End: 2020-08-18

## 2020-08-18 ENCOUNTER — HOSPITAL ENCOUNTER (EMERGENCY)
Facility: HOSPITAL | Age: 55
Discharge: HOME | End: 2020-08-18
Attending: EMERGENCY MEDICINE | Admitting: EMERGENCY MEDICINE
Payer: COMMERCIAL

## 2020-08-18 ENCOUNTER — TRANSITIONAL CARE MANAGEMENT (OUTPATIENT)
Dept: FAMILY MEDICINE CLINIC | Facility: CLINIC | Age: 55
End: 2020-08-18

## 2020-08-18 VITALS
HEART RATE: 52 BPM | RESPIRATION RATE: 18 BRPM | OXYGEN SATURATION: 98 % | TEMPERATURE: 98.1 F | DIASTOLIC BLOOD PRESSURE: 100 MMHG | SYSTOLIC BLOOD PRESSURE: 153 MMHG | WEIGHT: 297 LBS | BODY MASS INDEX: 41.42 KG/M2

## 2020-08-18 DIAGNOSIS — R60.0 BILATERAL LOWER EXTREMITY EDEMA: Primary | ICD-10-CM

## 2020-08-18 DIAGNOSIS — I10 ELEVATED BLOOD PRESSURE READING WITH DIAGNOSIS OF HYPERTENSION: ICD-10-CM

## 2020-08-18 LAB
A PHAGOCYTOPH DNA BLD QL NAA+PROBE: NEGATIVE
ALBUMIN SERPL BCP-MCNC: 3.7 G/DL (ref 3.5–5)
ALP SERPL-CCNC: 84 U/L (ref 46–116)
ALT SERPL W P-5'-P-CCNC: 23 U/L (ref 12–78)
ANION GAP SERPL CALCULATED.3IONS-SCNC: 3 MMOL/L (ref 4–13)
AST SERPL W P-5'-P-CCNC: 18 U/L (ref 5–45)
BACTERIA BLD CULT: NORMAL
BACTERIA BLD CULT: NORMAL
BASOPHILS # BLD AUTO: 0.07 THOUSANDS/ΜL (ref 0–0.1)
BASOPHILS NFR BLD AUTO: 1 % (ref 0–1)
BILIRUB SERPL-MCNC: 0.53 MG/DL (ref 0.2–1)
BUN SERPL-MCNC: 18 MG/DL (ref 5–25)
CALCIUM SERPL-MCNC: 10.1 MG/DL (ref 8.3–10.1)
CHLORIDE SERPL-SCNC: 101 MMOL/L (ref 100–108)
CO2 SERPL-SCNC: 32 MMOL/L (ref 21–32)
CREAT SERPL-MCNC: 1.31 MG/DL (ref 0.6–1.3)
EOSINOPHIL # BLD AUTO: 0.23 THOUSAND/ΜL (ref 0–0.61)
EOSINOPHIL NFR BLD AUTO: 2 % (ref 0–6)
ERYTHROCYTE [DISTWIDTH] IN BLOOD BY AUTOMATED COUNT: 13.4 % (ref 11.6–15.1)
GFR SERPL CREATININE-BSD FRML MDRD: 70 ML/MIN/1.73SQ M
GLUCOSE SERPL-MCNC: 84 MG/DL (ref 65–140)
HCT VFR BLD AUTO: 39.1 % (ref 36.5–49.3)
HGB BLD-MCNC: 12.8 G/DL (ref 12–17)
IMM GRANULOCYTES # BLD AUTO: 0.17 THOUSAND/UL (ref 0–0.2)
IMM GRANULOCYTES NFR BLD AUTO: 2 % (ref 0–2)
LYMPHOCYTES # BLD AUTO: 2.19 THOUSANDS/ΜL (ref 0.6–4.47)
LYMPHOCYTES NFR BLD AUTO: 22 % (ref 14–44)
MCH RBC QN AUTO: 29.4 PG (ref 26.8–34.3)
MCHC RBC AUTO-ENTMCNC: 32.7 G/DL (ref 31.4–37.4)
MCV RBC AUTO: 90 FL (ref 82–98)
MONOCYTES # BLD AUTO: 0.94 THOUSAND/ΜL (ref 0.17–1.22)
MONOCYTES NFR BLD AUTO: 10 % (ref 4–12)
NEUTROPHILS # BLD AUTO: 6.2 THOUSANDS/ΜL (ref 1.85–7.62)
NEUTS SEG NFR BLD AUTO: 63 % (ref 43–75)
NRBC BLD AUTO-RTO: 0 /100 WBCS
NT-PROBNP SERPL-MCNC: 197 PG/ML
PLATELET # BLD AUTO: 228 THOUSANDS/UL (ref 149–390)
PMV BLD AUTO: 10.3 FL (ref 8.9–12.7)
POTASSIUM SERPL-SCNC: 3.5 MMOL/L (ref 3.5–5.3)
PROT SERPL-MCNC: 8.7 G/DL (ref 6.4–8.2)
RBC # BLD AUTO: 4.35 MILLION/UL (ref 3.88–5.62)
SODIUM SERPL-SCNC: 136 MMOL/L (ref 136–145)
TROPONIN I SERPL-MCNC: <0.02 NG/ML
WBC # BLD AUTO: 9.8 THOUSAND/UL (ref 4.31–10.16)

## 2020-08-18 PROCEDURE — 93971 EXTREMITY STUDY: CPT | Performed by: SURGERY

## 2020-08-18 PROCEDURE — 84484 ASSAY OF TROPONIN QUANT: CPT | Performed by: PHYSICIAN ASSISTANT

## 2020-08-18 PROCEDURE — 99285 EMERGENCY DEPT VISIT HI MDM: CPT | Performed by: PHYSICIAN ASSISTANT

## 2020-08-18 PROCEDURE — 99284 EMERGENCY DEPT VISIT MOD MDM: CPT

## 2020-08-18 PROCEDURE — 93971 EXTREMITY STUDY: CPT

## 2020-08-18 PROCEDURE — 85025 COMPLETE CBC W/AUTO DIFF WBC: CPT | Performed by: PHYSICIAN ASSISTANT

## 2020-08-18 PROCEDURE — 83880 ASSAY OF NATRIURETIC PEPTIDE: CPT | Performed by: PHYSICIAN ASSISTANT

## 2020-08-18 PROCEDURE — 93005 ELECTROCARDIOGRAM TRACING: CPT

## 2020-08-18 PROCEDURE — 80053 COMPREHEN METABOLIC PANEL: CPT | Performed by: PHYSICIAN ASSISTANT

## 2020-08-18 PROCEDURE — 36415 COLL VENOUS BLD VENIPUNCTURE: CPT | Performed by: PHYSICIAN ASSISTANT

## 2020-08-18 PROCEDURE — 96374 THER/PROPH/DIAG INJ IV PUSH: CPT

## 2020-08-18 RX ORDER — FUROSEMIDE 10 MG/ML
20 INJECTION INTRAMUSCULAR; INTRAVENOUS ONCE
Status: COMPLETED | OUTPATIENT
Start: 2020-08-18 | End: 2020-08-18

## 2020-08-18 RX ADMIN — FUROSEMIDE 20 MG: 10 INJECTION, SOLUTION INTRAMUSCULAR; INTRAVENOUS at 20:52

## 2020-08-18 NOTE — QUICK NOTE
Called patient to inform him about his ANCA test results  Patient was not reachable  Results were forwarded to his PCP

## 2020-08-18 NOTE — UTILIZATION REVIEW
Initial Clinical Review    Admission: Date/Time/Statement:   Admission Orders (From admission, onward)     Ordered        08/13/20 0735  Inpatient Admission  Once                   Orders Placed This Encounter   Procedures    Inpatient Admission     Standing Status:   Standing     Number of Occurrences:   1     Order Specific Question:   Admitting Physician     Answer:   Debbi Mayes     Order Specific Question:   Level of Care     Answer:   Med Surg [16]     Order Specific Question:   Estimated length of stay     Answer:   More than 2 Midnights     Order Specific Question:   Certification     Answer:   I certify that inpatient services are medically necessary for this patient for a duration of greater than two midnights  See H&P and MD Progress Notes for additional information about the patient's course of treatment  ED Arrival Information     Expected Arrival Acuity Means of Arrival Escorted By Service Admission Type    - 8/13/2020 05:56 Emergent Ambulance [facility] Emergency    Arrival Complaint    Nausea,chills        Chief Complaint   Patient presents with    Nausea     Pt presents by ems from home d/t nausea since yesterday, -vomiting -diarrhea +chills Denies SOB or CP  Pt states last time he had these symptoms, he had a DVT      Assessment/Plan: 61 yo male to ED from home w/ complaints of nausea, chills, generalized weakness, and bilateral lower extremity swelling  Patient states that when he was working yesterday, he started feeling nauseous, getting chills and weakness  In Ed found to have temp 102 5 , WBC 13 3, resp rate 21 and BLE swelling   Admitted IP status w/ cellulitis vs DVT vs other infectious process  CXR neg   Plan to check pct , CRP , start on rocephin , check BC and monitor vitals  Cont coreg and HCTZ for HTN   Pt on coumadin for chronic R DVT L leg        PE : RLE erythema and rash     8/14 ID consult   Sepsis fever leukocytosis , consider viral etiology , recent mild URI sx  Consider tick borne infection , freq outdoors   Consider early LE cellulitis w/ leg swelling and pain   DC vanco and cefepime  Start IV ceftriaxone   Add doxycycline , check lyme antibody, Anaplasma PCR, parasite smear x2  Recheck cbc , monitor temp , f/u BC , recheck pct , supportive care  ED Triage Vitals   Temperature Pulse Respirations Blood Pressure SpO2   08/13/20 0600 08/13/20 0600 08/13/20 0600 08/13/20 0600 08/13/20 0600   (!) 102 2 °F (39 °C) 74 20 (!) 175/87 96 %      Temp Source Heart Rate Source Patient Position - Orthostatic VS BP Location FiO2 (%)   08/13/20 0600 08/13/20 0600 08/13/20 0600 08/13/20 0600 --   Oral Monitor Sitting Right arm       Pain Score       08/13/20 0621       Med Not Given for Pain - for MAR use only          Wt Readings from Last 1 Encounters:   08/13/20 135 kg (297 lb 2 2 oz)     Additional Vital Signs:   08/14/20 0642   98 1 °F (36 7 °C)   60   17   140/76   102   98 %   None (Room air)   Lying    08/13/20 2352                     None (Room air)       08/13/20 2247   101 2 °F (38 4 °C)Abnormal     67   21   144/68      96 %   None (Room air)   Lying    08/13/20 1600                     None (Room air)       08/13/20 1513   99 2 °F (37 3 °C)   96   18   138/66      95 %   None (Room air)   Lying    08/13/20 1243   101 5 °F (38 6 °C)Abnormal     79   18   170/90      95 %   None (Room air)   Lying    08/13/20 1126      75   18   148/68      97 %   None (Room air)   Lying    08/13/20 1040   100 8 °F (38 2 °C)Abnormal                           08/13/20 1000      76      159/71   102   99 %          08/13/20 0735   102 5 °F (39 2 °C)Abnormal     77   22   172/81Abnormal     117   98 %   None (Room air)         Pertinent Labs/Diagnostic Test Results:   8/13 PCXR No acute cardiopulmonary disease    8/13 CT chest Unremarkable examination of the chest      8/13 Venous duplex   Impression:  RIGHT LOWER LIMB:  No evidence of acute or chronic deep vein thrombosis  No evidence of superficial thrombophlebitis noted  Doppler evaluation shows a normal response to augmentation maneuvers  Popliteal, posterior tibial and anterior tibial arterial Doppler waveforms are  triphasic  LEFT LOWER LIMB:  No evidence of acute or chronic deep vein thrombosis  No evidence of superficial thrombophlebitis noted  Doppler evaluation shows a normal response to augmentation maneuvers  Popliteal, posterior tibial and anterior tibial arterial Doppler waveforms are  triphasic  8/13 EKG NSR   Results from last 7 days   Lab Units 08/13/20  0626   SARS-COV-2  Negative     Results from last 7 days   Lab Units 08/15/20  0428 08/14/20  0652 08/13/20  0623   WBC Thousand/uL 11 06* 13 03* 13 38*   HEMOGLOBIN g/dL 11 4* 11 5* 13 3   HEMATOCRIT % 35 5* 35 7* 40 9   PLATELETS Thousands/uL 144* 128* 172   NEUTROS ABS Thousands/µL  --   --  11 55*     Results from last 7 days   Lab Units 08/15/20  0428 08/14/20  0652 08/13/20  0623   SODIUM mmol/L 139 138 140   POTASSIUM mmol/L 3 6 3 5 3 7   CHLORIDE mmol/L 105 103 102   CO2 mmol/L 27 27 29   ANION GAP mmol/L 7 8 9   BUN mg/dL 16 15 20   CREATININE mg/dL 1 26 1 40* 1 35*   EGFR ml/min/1 73sq m 73 64 67   CALCIUM mg/dL 8 2* 8 1* 8 9     Results from last 7 days   Lab Units 08/13/20  0623   AST U/L 48*   ALT U/L 23   ALK PHOS U/L 78   TOTAL PROTEIN g/dL 8 3*   ALBUMIN g/dL 3 7   TOTAL BILIRUBIN mg/dL 0 61     Results from last 7 days   Lab Units 08/15/20  0428 08/14/20  0652 08/13/20  0623   GLUCOSE RANDOM mg/dL 107 98 101     Results from last 7 days   Lab Units 08/16/20  0645 08/15/20  0428 08/14/20  0652  08/13/20  0623   PROTIME seconds 25 5* 25 5* 27 2*   < > 23 6*   INR  2 32* 2 31* 2 52*   < > 2 10*   PTT seconds  --   --   --   --  40*    < > = values in this interval not displayed       Results from last 7 days   Lab Units 08/15/20  0428 08/14/20  0652 08/13/20  1807 08/13/20  1420   PROCALCITONIN ng/ml 1 18* 2 13* 2 43* 2 31*     Results from last 7 days   Lab Units 08/13/20  1807 08/13/20  0623   LACTIC ACID mmol/L 1 4 1 1     Results from last 7 days   Lab Units 08/13/20  1807   FERRITIN ng/mL 93     Results from last 7 days   Lab Units 08/13/20  1807 08/13/20  0623   LIPASE u/L 41* 50*     Results from last 7 days   Lab Units 08/13/20  1807   CRP mg/L 130 8*     Results from last 7 days   Lab Units 08/13/20  0711   CLARITY UA  Clear   COLOR UA  Yellow   SPEC GRAV UA  1 025   PH UA  5 0   GLUCOSE UA mg/dl Negative   KETONES UA mg/dl Negative   BLOOD UA  Negative   PROTEIN UA mg/dl Negative   NITRITE UA  Negative   BILIRUBIN UA  Negative   UROBILINOGEN UA E U /dl 0 2   LEUKOCYTES UA  Negative     Results from last 7 days   Lab Units 08/13/20  0711 08/13/20  0641 08/13/20  0332   BLOOD CULTURE   --  No Growth After 5 Days  No Growth After 5 Days     URINE CULTURE  No Growth <1000 cfu/mL  --   --      ED Treatment:   Medication Administration from 08/13/2020 0555 to 08/13/2020 1240       Date/Time Order Dose Route Action     08/13/2020 0621 sodium chloride 0 9 % bolus 1,000 mL 1,000 mL Intravenous New Bag     08/13/2020 0841 sodium chloride 0 9 % infusion 125 mL/hr Intravenous New Bag     08/13/2020 0621 ondansetron (ZOFRAN) injection 4 mg 4 mg Intravenous Given     08/13/2020 0962 acetaminophen (TYLENOL) tablet 650 mg 650 mg Oral Given     08/13/2020 0714 levofloxacin (LEVAQUIN) IVPB (premix) 750 mg 150 mL 750 mg Intravenous New Bag     08/13/2020 0841 vancomycin (VANCOCIN) 2,000 mg in sodium chloride 0 9 % 500 mL IVPB 2,000 mg Intravenous New Bag     08/13/2020 1221 ceftriaxone (ROCEPHIN) 1 g/50 mL in dextrose IVPB 1,000 mg Intravenous New Bag        Past Medical History:   Diagnosis Date    Angioedema     Coronary artery disease     GI bleed     Hypertension     stopped his meds when ran out several years ago    STEMI (ST elevation myocardial infarction) ([facility] [address] )      Present on Admission:   Essential hypertension   Leg DVT (deep venous thromboembolism), chronic, right (HCC)      Admitting Diagnosis: Nausea [R11 0]  Pneumonia [J18 9]  Fever [R50 9]  Sepsis ([address] ) [A41 9]  Age/Sex: 62 y o  male  Admission Orders:  Scheduled Medications:  carvedilol, 25 mg, Oral, BID With Meals  cefepime, 2,000 mg, Intravenous, Q24H  hydrochlorothiazide, 25 mg, Oral, Daily  isosorbide mononitrate, 60 mg, Oral, Daily  vancomycin, 17 5 mg/kg (Adjusted), Intravenous, Q12H  warfarin, 7 5 mg, Oral, Daily (warfarin)    Continuous IV Infusions:  sodium chloride, 125 mL/hr, Intravenous, Continuous    PRN Meds:  acetaminophen, 650 mg, Oral, Q6H PRN  ondansetron, 4 mg, Intravenous, Q6H PRN    Neuro vascular checks   Tele   Cardiac diet       IP CONSULT TO INFECTIOUS DISEASES    Network Utilization Review Department  [email or web address]  ATTENTION: Please call with any questions or concerns to [phone] and carefully listen to the prompts so that you are directed to the right person  All voicemails are confidential   Jose Sanchez all requests for admission clinical reviews, approved or denied determinations and any other requests to dedicated fax number below belonging to the campus where the patient is receiving treatment   List of dedicated fax numbers for the Facilities:  FACILITY NAME UR FAX NUMBER   ADMISSION DENIALS (Administrative/Medical Necessity) [phone]   [address]  (Maternity/NICU/Pediatrics) [phone]   Cory Purcell [phone]   Chepe Zamudio [phone]   Eliza Valdovinos [phone]   Analy Hager [phone]   [address] [facility] [address] [place] [address] [phone]   [facility] [address] [address] [address] [facility] [phone]        Zahra Doherty RN    Registered Nurse    Specialty: Utilization Review    Utilization Review    Signed    Date of Service:  8/16/2020  2:08 PM                Signed              Show:Clear all  [x]Manual[x]Template[x]Copied    Added by:  Mino Pritchard RN    []Radha for details  Continued Stay Review     Date: 8/16/2020                           Current Patient Class: inpatient       Current Level of Care: med surg     HPI:54 y o  male initially admitted on 8/13/2020 inpatient due to sepsis/ZAKIA  history of angioedema acute DVT started on Eliquis which was discontinued secondary to angioedema I July  Presented due to lower extremity pain and nausea, febrile to 102 2  Plan is treat as cellulitis and Rocephin started  ID consulted  Per ID- Patient with sepsis, unclear etiology, could be viral, vs tick borne  May have early LE cellulitis and recommend IV ceftriaxone and doxycycline added       Assessment/Plan:  On 8/16/2020:  Still unclear source of sepsis, leg cellulitis vs tick related infection  Antibiotics changed to Keflex and doxycycline  Follow up on lyme and parasite cultures  On exam leg swelling improved, mild warmth       Pertinent Labs/Diagnostic Results:   8/13/2020 CxR No acute cardiopulmonary disease      8/13/2020  Ct  Chest Unremarkable examination of the chest      8/13/2020 Venous duplexRIGHT LOWER LIMB:  No evidence of acute or chronic deep vein thrombosis  No evidence of superficial thrombophlebitis noted  Doppler evaluation shows a normal response to augmentation maneuvers  Popliteal, posterior tibial and anterior tibial arterial Doppler waveforms are  triphasic      LEFT LOWER LIMB:  No evidence of acute or chronic deep vein thrombosis  No evidence of superficial thrombophlebitis noted  Doppler evaluation shows a normal response to augmentation maneuvers  Popliteal, posterior tibial and anterior tibial arterial Doppler waveforms are  triphasic         Results from last 7 days   Lab Units 08/13/20  0626   SARS-COV-2   Negative Results from last 7 days   Lab Units 08/15/20  0428 08/14/20  0652 08/13/20  0623   WBC Thousand/uL 11 06* 13 03* 13 38*   HEMOGLOBIN g/dL 11 4* 11 5* 13 3   HEMATOCRIT % 35 5* 35 7* 40 9   PLATELETS Thousands/uL 144* 128* 172   NEUTROS ABS Thousands/µL  --   --  11 55*            Results from last 7 days   Lab Units 08/15/20  0428 08/14/20  0652 08/13/20  0623   SODIUM mmol/L 139 138 140   POTASSIUM mmol/L 3 6 3 5 3 7   CHLORIDE mmol/L 105 103 102   CO2 mmol/L 27 27 29   ANION GAP mmol/L 7 8 9   BUN mg/dL 16 15 20   CREATININE mg/dL 1 26 1 40* 1 35*   EGFR ml/min/1 73sq m 73 64 67   CALCIUM mg/dL 8 2* 8 1* 8 9          Results from last 7 days   Lab Units 08/13/20  0623   AST U/L 48*   ALT U/L 23   ALK PHOS U/L 78   TOTAL PROTEIN g/dL 8 3*   ALBUMIN g/dL 3 7   TOTAL BILIRUBIN mg/dL 0 61            Results from last 7 days   Lab Units 08/15/20  0428 08/14/20  0652 08/13/20  0623   GLUCOSE RANDOM mg/dL 107 98 101          Results from last 7 days   Lab Units 08/14/20  0652   CK TOTAL U/L 201   CK MB INDEX % <1 0   CK MB ng/mL <1 0              Results from last 7 days   Lab Units 08/16/20  0645 08/15/20  0428 08/14/20  0652   08/13/20  0623   PROTIME seconds 25 5* 25 5* 27 2*   < > 23 6*   INR   2 32* 2 31* 2 52*   < > 2 10*   PTT seconds  --   --   --   --  40*    < > = values in this interval not displayed               Results from last 7 days   Lab Units 08/15/20  0428 08/14/20  0652 08/13/20  1807 08/13/20  1420   PROCALCITONIN ng/ml 1 18* 2 13* 2 43* 2 31*           Results from last 7 days   Lab Units 08/13/20  1807 08/13/20  0623   LACTIC ACID mmol/L 1 4 1 1          Results from last 7 days   Lab Units 08/13/20  1807   FERRITIN ng/mL 93           Results from last 7 days   Lab Units 08/13/20  1807 08/13/20  0623   LIPASE u/L 41* 50*          Results from last 7 days   Lab Units 08/13/20  1807   CRP mg/L 130 8*          Results from last 7 days   Lab Units 08/13/20  0711   CLARITY UA   Clear   COLOR UA   Yellow   SPEC GRAV UA   1 025   PH UA   5 0   GLUCOSE UA mg/dl Negative   KETONES UA mg/dl Negative   BLOOD UA   Negative   PROTEIN UA mg/dl Negative   NITRITE UA   Negative   BILIRUBIN UA   Negative   UROBILINOGEN UA E U /dl 0 2   LEUKOCYTES UA   Negative            Results from last 7 days   Lab Units 08/13/20  0711 08/13/20  0641 08/13/20  1526   BLOOD CULTURE    --  No Growth at 72 hrs  No Growth at 72 hrs  URINE CULTURE   No Growth <1000 cfu/mL  --   --      Vital Signs:   /16/20 1300                178/86Abnormal                      08/16/20 0700    97 9 °F (36 6 °C)    55    18    180/98Abnormal          97 %    None (Room air)    Lying    08/15/20 2202    98 4 °F (36 9 °C)    59    20    161/81    115    95 %    None (Room air)    Lying    08/15/20 1458    98 1 °F (36 7 °C)    74    19    168/77    111    94 %    None (Room air)    Lying    08/15/20 0820    97 9 °F (36 6 °C)    58    20    176/96Abnormal          97 %    None (Room air)                 Medications:   Scheduled Medications:  carvedilol, 25 mg, Oral, BID With Meals  cephalexin, 500 mg, Oral, Q6H EROS  doxycycline hyclate, 100 mg, Oral, Q12H [address]  [START ON 8/17/2020] hydrochlorothiazide, 50 mg, Oral, Daily  isosorbide mononitrate, 60 mg, Oral, Daily  warfarin, 7 5 mg, Oral, Daily (warfarin)  cefTRIAXone (ROCEPHIN) 2,000 mg in dextrose 5 % 50 mL IVPB    Dose: 2,000 mg  Freq: Every 24 hours Route: IV  Last Dose: 2,000 mg (08/15/20 1613)  Start: 08/14/20 1600 End: 08/16/20 1131     Continuous IV Infusions: dc pm 8/15/2020      PRN Meds: not used   acetaminophen, 650 mg, Oral, Q6H PRN  Labetalol HCl, 10 mg, Intravenous, Q6H PRN  ondansetron, 4 mg, Intravenous, Q6H PRN          Discharge Plan: To be determined       Network Utilization Review Department  [email or web address]  ATTENTION: Please call with any questions or concerns to [phone] and carefully listen to the prompts so that you are directed to the right person  All voicemails are confidential   Jose Caicedo all requests for admission clinical reviews, approved or denied determinations and any other requests to dedicated fax number below belonging to the campus where the patient is receiving treatment  List of dedicated fax numbers for the Facilities:  [address] [facility] [address] DENIALS (Administrative/Medical Necessity) [phone]   [address] (Maternity/NICU/Pediatrics) [phone]   [facility] [phone]   Kyler Gather [phone]   Jonathan Hardy [phone]   Julianne Bateman [phone]   [address] [facility] [address] [facility] [phone]   [facility]  [phone]   [address] [facility]  [phone]   [address] [place] [address] [address] [facility] [phone]                  Notification of Discharge  This is a Notification of Discharge from our facility [address]  Please be advised that this patient has been discharge from our facility  Below you will find the admission and discharge date and time including the patients disposition  PRESENTATION DATE: 8/13/2020  5:56 AM  OBS ADMISSION DATE:   IP ADMISSION DATE: 8/13/20 0734   DISCHARGE DATE: 8/16/2020  6:10 PM  DISPOSITION: Home/Self Care Home/Self Care   Admission Orders listed below:  Admission Orders (From admission, onward)     Ordered        08/13/20 0735  Inpatient Admission  Once                   Please contact the UR Department if additional information is required to close this patient's authorization/case  [address] [facility] Utilization Review Department  Main: [phone] x carefully listen to the prompts  All voicemails are confidential   [email or web address]  Send all requests for admission clinical reviews, approved or denied determinations and any other requests to dedicated fax number below belonging to the campus where the patient is receiving treatment   List of dedicated fax numbers:  [address] [facility] [address] DENIALS (Administrative/Medical Necessity) [phone]   [address]  (Maternity/NICU/Pediatrics) [phone]   Gabriela Bull [phone]   [facility] [phone]   Radha W[place]asha [phone]   Karenann Deal [facility] [address] [facility] [phone]   [facility]  [phone]   [address] [place] [address], [address]  [address] [facility] [address] [facility] [phone]

## 2020-08-18 NOTE — TELEPHONE ENCOUNTER
Leelee Ellison from Medical Management called wanting to let you know she has left several messages on patients phone but patient has not returned any calls  Leeele Ellison can be reached at [phone] ext [phone]

## 2020-08-18 NOTE — ED PROVIDER NOTES
History  Chief Complaint   Patient presents with    Leg Swelling     pt reports RLE edema, denies pain  recently inpatient for pna  MARI VILLANUEVA  is a 62year old male with a pmh of chronic dvt in RLE on coumadin, NSTEMI with cardiac cath on 11/6/19, HTN, morbid obesity, sepsis and PNA who is presenting to the ED with LLE swelling which began Monday  He has a chronic DVT in his RLE but presented to the ED because his LLE is more swollen than his right which is unusual for him  Patient was hospitalized on 08/13/2020 for PNA, sespsis, ZAKIA, cellulitis and BLE edema  He was discharged on 8/15  Since discharge, he has been noting increased LLE edema  He denies pain in his leg  Patient has been elevating his legs while sleeping but states there is no decrease in swelling  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  He also roller skates 3 times per week and reports he had been roller-skating last week  Patient has been experiencing intermittent bilateral lower extremity edema x 4-5 months  He has been instructed to wear compression socks in the past but has not been wearing them  He denies hx of CHF  He also denies headache, vision changes, dizziness, lightheadedness, fevers, chills, shortness of breath, chest pain, abdominal pain, nausea, vomiting and diarrhea  Patient reports he is taking the Keflex and doxycycline he was prescribed at hospital discharge  He has also been taking his blood pressure medication and Coumadin as prescribed  Prior to Admission Medications   Prescriptions Last Dose Informant Patient Reported? Taking?    MONOVISC injection   Yes No   Testosterone (ANDROGEL) 40 5 MG/2 5GM (1 62%) GEL  Self Yes No   Sig: Place 40 5 mg on the skin daily    acetaminophen (TYLENOL) 325 mg tablet  Self No No   Sig: Take 3 tablets (975 mg total) by mouth every 8 (eight) hours   carvedilol (COREG) 25 mg tablet   No No   Sig: Take 1 tablet (25 mg total) by mouth 2 (two) times a day with meals   cephalexin (KEFLEX) 500 mg capsule   No No   Sig: Take 1 capsule (500 mg total) by mouth every 6 (six) hours for 3 days   doxycycline hyclate (VIBRAMYCIN) 100 mg capsule   No No   Sig: Take 1 capsule (100 mg total) by mouth every 12 (twelve) hours for 6 days   hydrochlorothiazide (HYDRODIURIL) 25 mg tablet   No No   Sig: Take 2 tablets (50 mg total) by mouth daily   isosorbide mononitrate (IMDUR) 30 mg 24 hr tablet   No No   Sig: Take 2 tablets (60 mg total) by mouth daily   warfarin (COUMADIN) 2 5 mg tablet   No No   Sig: Take 1 tablet (2 5 mg total) by mouth daily   warfarin (COUMADIN) 5 mg tablet   No No   Sig: Take one table (5mg) by mouth once daily for goal INR 2-3   Patient taking differently: Take 7 5 mg by mouth Take one table (5mg) by mouth once daily for goal INR 2-3      Facility-Administered Medications: None       Past Medical History:   Diagnosis Date    Angioedema     Coronary artery disease     GI bleed     Hypertension     stopped his meds when ran out several years ago    STEMI (ST elevation myocardial infarction) ([facility] [address] )        Past Surgical History:   Procedure Laterality Date    NO PAST SURGERIES      UPPER GASTROINTESTINAL ENDOSCOPY      Clamped large stomach ulcer       Family History   Adopted: Yes   Family history unknown: Yes     I have reviewed and agree with the history as documented  E-Cigarette/Vaping    E-Cigarette Use Never User      E-Cigarette/Vaping Substances    Nicotine No     THC No     CBD No     Flavoring No     Other No     Unknown No      Social History     Tobacco Use    Smoking status: Never Smoker    Smokeless tobacco: Never Used   Substance Use Topics    Alcohol use: Never     Alcohol/week: 0 0 standard drinks     Frequency: Never     Drinks per session: 1 or 2     Binge frequency: Never    Drug use: No       Review of Systems   Constitutional: Negative for chills, diaphoresis and fever     HENT: Negative for congestion and sore throat  Eyes: Negative for photophobia and visual disturbance  Respiratory: Negative for cough and shortness of breath  Cardiovascular: Positive for leg swelling  Negative for chest pain and palpitations  Gastrointestinal: Negative for abdominal pain, diarrhea, nausea and vomiting  Genitourinary: Negative for difficulty urinating and dysuria  Musculoskeletal: Negative for back pain, myalgias and neck pain  Skin: Positive for color change  Neurological: Negative for dizziness, light-headedness, numbness and headaches  Physical Exam  Physical Exam  Vitals signs and nursing note reviewed  Constitutional:       General: He is not in acute distress  Appearance: Normal appearance  He is obese  He is not ill-appearing, toxic-appearing or diaphoretic  Comments: Friendly, obese male lying on exam table in no acute distress  HENT:      Head: Normocephalic and atraumatic  Right Ear: External ear normal       Left Ear: External ear normal    Eyes:      Extraocular Movements: Extraocular movements intact  Conjunctiva/sclera: Conjunctivae normal    Neck:      Musculoskeletal: Normal range of motion and neck supple  Cardiovascular:      Rate and Rhythm: Normal rate and regular rhythm  Pulses: Normal pulses  Heart sounds: Normal heart sounds  No murmur  No friction rub  No gallop  Pulmonary:      Effort: Pulmonary effort is normal  No respiratory distress  Breath sounds: Normal breath sounds  No wheezing, rhonchi or rales  Comments: Breathing without difficulty  Normal breath sounds  Abdominal:      General: Bowel sounds are normal  There is no distension  Palpations: Abdomen is soft  Tenderness: There is no abdominal tenderness  Musculoskeletal:         General: Swelling and tenderness present  Right lower leg: Edema present  Left lower leg: Edema present        Comments: 1+ pitting edema RLE  - Scooter's sign RLE  2+ pitting edema LLE  +Scooter's sign LLE  Mild anterior tibial erythema bilaterally  Dorsalis pedis pulse 1+, posterior tibial and popliteal pulses 2+ bilaterally  (Photo of legs included in this chart )   Skin:     General: Skin is warm and dry  Capillary Refill: Capillary refill takes less than 2 seconds  Neurological:      General: No focal deficit present  Mental Status: He is alert and oriented to person, place, and time  Sensory: No sensory deficit  Motor: No weakness  Gait: Gait normal    Psychiatric:         Mood and Affect: Mood normal          Behavior: Behavior normal          Thought Content:  Thought content normal          Judgment: Judgment normal          Vital Signs  ED Triage Vitals   Temperature Pulse Respirations Blood Pressure SpO2   08/18/20 1835 08/18/20 1835 08/18/20 1835 08/18/20 1835 08/18/20 1835   98 1 °F (36 7 °C) 59 18 (!) 238/113 100 %      Temp src Heart Rate Source Patient Position - Orthostatic VS BP Location FiO2 (%)   -- 08/18/20 1835 08/18/20 1835 08/18/20 1835 --    Monitor Sitting Left arm       Pain Score       08/18/20 1957       No Pain           Vitals:    08/18/20 1957 08/18/20 2037 08/18/20 2128 08/18/20 2129   BP: (!) 203/101 (!) 200/110  153/100   Pulse: 57  (!) 5 (!) 52   Patient Position - Orthostatic VS: Sitting Sitting  Sitting         Visual Acuity      ED Medications  Medications   furosemide (LASIX) injection 20 mg (20 mg Intravenous Given 8/18/20 2052)       Diagnostic Studies  Results Reviewed     Procedure Component Value Units Date/Time    Comprehensive metabolic panel [[id number]]  (Abnormal) Collected:  08/18/20 2006    Lab Status:  Final result Specimen:  Blood from Arm, Right Updated:  08/18/20 2048     Sodium 136 mmol/L      Potassium 3 5 mmol/L      Chloride 101 mmol/L      CO2 32 mmol/L      ANION GAP 3 mmol/L      BUN 18 mg/dL      Creatinine 1 31 mg/dL      Glucose 84 mg/dL      Calcium 10 1 mg/dL      AST 18 U/L      ALT 23 U/L Alkaline Phosphatase 84 U/L      Total Protein 8 7 g/dL      Albumin 3 7 g/dL      Total Bilirubin 0 53 mg/dL      eGFR 70 ml/min/1 73sq m     Narrative:       National Kidney Disease Foundation guidelines for Chronic Kidney Disease (CKD):     Stage 1 with normal or high GFR (GFR > 90 mL/min/1 73 square meters)    Stage 2 Mild CKD (GFR = 60-89 mL/min/1 73 square meters)    Stage 3A Moderate CKD (GFR = 45-59 mL/min/1 73 square meters)    Stage 3B Moderate CKD (GFR = 30-44 mL/min/1 73 square meters)    Stage 4 Severe CKD (GFR = 15-29 mL/min/1 73 square meters)    Stage 5 End Stage CKD (GFR <15 mL/min/1 73 square meters)  Note: GFR calculation is accurate only with a steady state creatinine    NT-BNP PRO [[id number]]  (Abnormal) Collected:  08/18/20 2006    Lab Status:  Final result Specimen:  Blood from Arm, Right Updated:  08/18/20 2036     NT-proBNP 197 pg/mL     Troponin I [[id number]]  (Normal) Collected:  08/18/20 2006    Lab Status:  Final result Specimen:  Blood from Arm, Right Updated:  08/18/20 2032     Troponin I <0 02 ng/mL     CBC and differential [[id number]] Collected:  08/18/20 2006    Lab Status:  Final result Specimen:  Blood from Arm, Right Updated:  08/18/20 2012     WBC 9 80 Thousand/uL      RBC 4 35 Million/uL      Hemoglobin 12 8 g/dL      Hematocrit 39 1 %      MCV 90 fL      MCH 29 4 pg      MCHC 32 7 g/dL      RDW 13 4 %      MPV 10 3 fL      Platelets 912 Thousands/uL      nRBC 0 /100 WBCs      Neutrophils Relative 63 %      Immat GRANS % 2 %      Lymphocytes Relative 22 %      Monocytes Relative 10 %      Eosinophils Relative 2 %      Basophils Relative 1 %      Neutrophils Absolute 6 20 Thousands/µL      Immature Grans Absolute 0 17 Thousand/uL      Lymphocytes Absolute 2 19 Thousands/µL      Monocytes Absolute 0 94 Thousand/µL      Eosinophils Absolute 0 23 Thousand/µL      Basophils Absolute 0 07 Thousands/µL                  VAS lower limb venous duplex study, unilateral/limited (Results Pending)              Procedures  ECG 12 Lead Documentation Only    Date/Time: 8/18/2020 8:45 PM  Performed by: Parker Olsen PA-C  Authorized by: Parker Olsen PA-C     ECG reviewed by me, the ED Provider: yes    Patient location:  ED  Previous ECG:     Previous ECG:  Compared to current  Interpretation:     Interpretation: normal    Rate:     ECG rate:  53    ECG rate assessment: normal    Rhythm:     Rhythm: sinus bradycardia    Ectopy:     Ectopy: none    QRS:     QRS axis:  Normal  Conduction:     Conduction: normal    ST segments:     ST segments:  Normal  T waves:     T waves: normal               ED Course       US AUDIT      Most Recent Value   Initial Alcohol Screen: US AUDIT-C    1  How often do you have a drink containing alcohol?  0 Filed at: 08/18/2020 2100   2  How many drinks containing alcohol do you have on a typical day you are drinking? 0 Filed at: 08/18/2020 2100   3a  Male UNDER 65: How often do you have five or more drinks on one occasion? 0 Filed at: 08/18/2020 2100   Audit-C Score  0 Filed at: 08/18/2020 2100            HEART Risk Score      Most Recent Value   Heart Score Risk Calculator   History  1 Filed at: 08/18/2020 2012   ECG  0 Filed at: 08/18/2020 2012   Age  1 Filed at: 08/18/2020 2012   Risk Factors  2 Filed at: 08/18/2020 2012   Troponin  0 Filed at: 08/18/2020 2012   HEART Score  4 Filed at: 08/18/2020 2012            MICHELLE/DAST-10      Most Recent Value   How many times in the past year have you    Used an illegal drug or used a prescription medication for non-medical reasons? Never Filed at: 08/18/2020 2100                                MDM  Number of Diagnoses or Management Options  Bilateral lower extremity edema:   Elevated blood pressure reading with diagnosis of hypertension:   Diagnosis management comments: -Venous duplex negative for dvt  -WBC 9 8  Patient also denies systemic symptoms   -Troponin I <0 02, EKG NSR   No STEMI  -Creatinine 1 31 was 1 26 on discharge  -NT-bnp pro 197  Patient received fluids while inpatient for ZAKIA  His symptoms are likely due to third spacing  Patient reports he has been experiencing intermittent bilateral lower extremity edema x 4-5 months, likely due to chronic venous stasis  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  He also roller skates 3x a week  He reports he was roller skating last week  Patient was discharged from the hospital with compression socks which he has not been wearing  He was instructed to begin wearing compression stockings  Patient verbalized understanding   -Initial BP in ED was 238/113 recheck was 203/101  Manual 200/110  Patient states he took his carvedilol and hydrochlorothiazide medications today  Lasix administered in the ED  /100 after lasix  Patient stable for discharge without complaints  Instructed to follow up with PCP this week  -The management plan was discussed in detail with the patient at bedside and all questions were answered  Prior to discharge, he was provided both verbal and written instructions  Discussed signs and symptoms for which to return to the emergency department  All questions were answered and patient was comfortable with the plan of care and discharged to home  Instructed the patient to follow up with his PCP as provided in his written instructions  Patient verbalized understanding of our discussion and plan of care, and agrees to return to the Emergency Department for concerns and progression of illness           Amount and/or Complexity of Data Reviewed  Clinical lab tests: ordered and reviewed  Tests in the radiology section of CPT®: ordered and reviewed          Disposition  Final diagnoses:   Bilateral lower extremity edema   Elevated blood pressure reading with diagnosis of hypertension     Time reflects when diagnosis was documented in both MDM as applicable and the Disposition within this note     Time User Action Codes Description Comment    8/18/2020  9:31 PM Eriberto Mckeon Add [R60 0] Bilateral lower extremity edema     8/18/2020  9:31 PM Eriberto Mckeon Add [I10] Elevated blood pressure reading with diagnosis of hypertension       ED Disposition     ED Disposition Condition Date/Time Comment    Discharge Stable Tue Aug 18, 2020  9:31 PM Anthony Cano discharge to home/self care  Follow-up Information     Follow up With Specialties Details Why Contact Info    Roscoe Costa Nurse Practitioner, Family Medicine Schedule an appointment as soon as possible for a visit   [facility] [id number]  [phone]            Patient's Medications   Discharge Prescriptions    No medications on file     No discharge procedures on file      PDMP Review       Value Time User    PDMP Reviewed  Yes 8/13/2020  5:59 AM Fara Cuenca MD          ED Provider  Electronically Signed by           Vilma Dennis PA-C  08/18/20 2140

## 2020-08-18 NOTE — TELEPHONE ENCOUNTER
Patient had been instructed to go back to the emergency room if he is having left leg swelling for further evaluation

## 2020-08-19 ENCOUNTER — PATIENT OUTREACH (OUTPATIENT)
Dept: CASE MANAGEMENT | Facility: OTHER | Age: 55
End: 2020-08-19

## 2020-08-19 ENCOUNTER — ANTICOAG VISIT (OUTPATIENT)
Dept: FAMILY MEDICINE CLINIC | Facility: CLINIC | Age: 55
End: 2020-08-19

## 2020-08-19 DIAGNOSIS — M31.30 WEGENER'S GRANULOMATOSIS WITH VASCULITIS: Primary | ICD-10-CM

## 2020-08-19 DIAGNOSIS — I77.6 WEGENER'S GRANULOMATOSIS WITH VASCULITIS: Primary | ICD-10-CM

## 2020-08-19 LAB
ATRIAL RATE: 53 BPM
P AXIS: -28 DEGREES
PR INTERVAL: 184 MS
QRS AXIS: 13 DEGREES
QRSD INTERVAL: 88 MS
QT INTERVAL: 440 MS
QTC INTERVAL: 412 MS
T WAVE AXIS: 17 DEGREES
VENTRICULAR RATE: 53 BPM

## 2020-08-19 PROCEDURE — 93010 ELECTROCARDIOGRAM REPORT: CPT | Performed by: INTERNAL MEDICINE

## 2020-08-19 RX ORDER — SILDENAFIL 100 MG/1
TABLET, FILM COATED ORAL
COMMUNITY
Start: 2020-07-29 | End: 2021-02-12 | Stop reason: CLARIF

## 2020-08-19 NOTE — TELEPHONE ENCOUNTER
I have contacted the patient this morning and left a message to call our office to follow-up on how he is doing and review current lab work done at the hospital   Have also reached out to care left for medical management to make her aware

## 2020-08-19 NOTE — DISCHARGE INSTRUCTIONS
Wear compression stockings throughout the day  Follow-up with your primary care physician this week  Return to the emergency department if you develop worsening symptoms such as worsening lower extremity swelling, lower extremity pain, numbness, vision changes, shortness of breath or chest pain

## 2020-08-19 NOTE — PROGRESS NOTES
In basket notification of patient who is a high risk for readmission  Chart reviewed  Outreach attempted  I left a message on patient's voicemail with my contact information

## 2020-08-24 ENCOUNTER — PATIENT OUTREACH (OUTPATIENT)
Dept: CASE MANAGEMENT | Facility: OTHER | Age: 55
End: 2020-08-24

## 2020-08-25 DIAGNOSIS — I77.6 VASCULITIS (HCC): Primary | ICD-10-CM

## 2020-08-25 NOTE — PROGRESS NOTES
Anti proteinase-3 antibody elevated 8 8 patient requires a rheumatology consult at this point  He has been notified and referral placed

## 2020-08-26 ENCOUNTER — PATIENT OUTREACH (OUTPATIENT)
Dept: CASE MANAGEMENT | Facility: OTHER | Age: 55
End: 2020-08-26

## 2020-08-26 ENCOUNTER — OFFICE VISIT (OUTPATIENT)
Dept: FAMILY MEDICINE CLINIC | Facility: CLINIC | Age: 55
End: 2020-08-26
Payer: COMMERCIAL

## 2020-08-26 VITALS
BODY MASS INDEX: 40.46 KG/M2 | WEIGHT: 289 LBS | OXYGEN SATURATION: 98 % | HEIGHT: 71 IN | RESPIRATION RATE: 16 BRPM | SYSTOLIC BLOOD PRESSURE: 150 MMHG | DIASTOLIC BLOOD PRESSURE: 110 MMHG | TEMPERATURE: 98.7 F | HEART RATE: 78 BPM

## 2020-08-26 DIAGNOSIS — L29.9 ITCHY SKIN: Primary | ICD-10-CM

## 2020-08-26 DIAGNOSIS — Z11.59 ENCOUNTER FOR HEPATITIS C SCREENING TEST FOR LOW RISK PATIENT: ICD-10-CM

## 2020-08-26 DIAGNOSIS — L03.116 CELLULITIS OF LEFT LOWER EXTREMITY: ICD-10-CM

## 2020-08-26 DIAGNOSIS — R60.0 BILATERAL LOWER EXTREMITY EDEMA: ICD-10-CM

## 2020-08-26 DIAGNOSIS — I10 ESSENTIAL HYPERTENSION: Chronic | ICD-10-CM

## 2020-08-26 DIAGNOSIS — Z76.89 ENCOUNTER FOR SUPPORT AND COORDINATION OF TRANSITION OF CARE: ICD-10-CM

## 2020-08-26 DIAGNOSIS — Z11.4 ENCOUNTER FOR SCREENING FOR HIV: ICD-10-CM

## 2020-08-26 DIAGNOSIS — I77.6 VASCULITIS (HCC): ICD-10-CM

## 2020-08-26 PROCEDURE — 99495 TRANSJ CARE MGMT MOD F2F 14D: CPT | Performed by: FAMILY MEDICINE

## 2020-08-26 PROCEDURE — 3080F DIAST BP >= 90 MM HG: CPT | Performed by: FAMILY MEDICINE

## 2020-08-26 PROCEDURE — 1111F DSCHRG MED/CURRENT MED MERGE: CPT | Performed by: FAMILY MEDICINE

## 2020-08-26 PROCEDURE — 3008F BODY MASS INDEX DOCD: CPT | Performed by: FAMILY MEDICINE

## 2020-08-26 PROCEDURE — 1036F TOBACCO NON-USER: CPT | Performed by: FAMILY MEDICINE

## 2020-08-26 PROCEDURE — 3077F SYST BP >= 140 MM HG: CPT | Performed by: FAMILY MEDICINE

## 2020-08-26 RX ORDER — FUROSEMIDE 20 MG/1
20 TABLET ORAL DAILY
Qty: 5 TABLET | Refills: 0 | Status: SHIPPED | OUTPATIENT
Start: 2020-08-26 | End: 2020-10-15 | Stop reason: ALTCHOICE

## 2020-08-26 RX ORDER — AMOXICILLIN AND CLAVULANATE POTASSIUM 875; 125 MG/1; MG/1
1 TABLET, FILM COATED ORAL EVERY 12 HOURS SCHEDULED
Qty: 20 TABLET | Refills: 0 | Status: SHIPPED | OUTPATIENT
Start: 2020-08-26 | End: 2020-09-05

## 2020-08-26 RX ORDER — DIPHENHYDRAMINE HYDROCHLORIDE, ZINC ACETATE 2; .1 G/100G; G/100G
CREAM TOPICAL 3 TIMES DAILY PRN
Qty: 28.4 G | Refills: 0 | Status: SHIPPED | OUTPATIENT
Start: 2020-08-26 | End: 2020-10-26 | Stop reason: HOSPADM

## 2020-08-26 NOTE — PROGRESS NOTES
Third outreach attempt  I introduced myself and role  He  is on his way to PCP office appointment  He stated he already has a  with [employer]  He stated "everything is going well"  He declined further outreach

## 2020-08-26 NOTE — ASSESSMENT & PLAN NOTE
Patient provided consultation with Rheumatology at this time due to elevated rheumatological tests in the hospital   He also has a cellulitis of his left lower extremity

## 2020-08-26 NOTE — ASSESSMENT & PLAN NOTE
Patient order for PT INR at this time  He is currently on Coumadin 7 5 mg p o  Daily  Negative Homans sign at this time

## 2020-08-26 NOTE — ASSESSMENT & PLAN NOTE
Patient has been ordered a topical cream for itching of his skin of bilateral lower extremities  He has also been wearing the compression stockings and night after he comes home  We will continue to monitor his edema and place him on diuretics at this time

## 2020-08-26 NOTE — ASSESSMENT & PLAN NOTE
Current blood pressure is 150/110  We will evaluate upon follow-up next week to see if his medications are keeping his blood pressure controlled

## 2020-08-26 NOTE — ASSESSMENT & PLAN NOTE
Patient has swelling, heat, and redness of the left lower extremity below the knee to the ankle  He has been prescribed antibiotics of Augmentin p o  B i d  For the next 10 days  No fever temperature chills noted  Peripheral pulses positive lower extremity  Capillary refill 3 seconds

## 2020-08-26 NOTE — ASSESSMENT & PLAN NOTE
Patient order for a short course of Lasix 20 mg p o  Daily the next 5 days  We will keep an eye on his creatinine to make sure it does not elevate  He has been instructed to take it every day for reduction and edema

## 2020-08-26 NOTE — PATIENT INSTRUCTIONS
Connective Tissue Disorders   WHAT YOU NEED TO KNOW:   What is a connective tissue disorder? A connective tissue disorder can affect any connective tissue in your body  Connective tissues support your organs, attach muscles to bones, and create scar tissue after an injury  Cartilage is an example of a connective tissue  There are many types of connective tissue disorders, such as rheumatoid arthritis, lupus, and scleroderma  The most common affected areas are joints, muscles, and skin  Your organs, eyes, nervous system, and blood vessels can also be affected  What increases my risk for a connective tissue disorder? You might have been born with the disorder, or it may develop from any of the following:  · Healthy cells in your body are attacked by your immune system by mistake    · An injury that causes scar tissue to form    · A family history of a connective tissue disorder     · A lack of vitamin C, causing a connective tissue disorder called scurvy  What are the signs and symptoms of a connective tissue disorder? Signs and symptoms depend on the type of connective tissue disorder and if it is severe  Symptoms may be mild or severe, and may come and go:  · Fever or fatigue    · Skin rash or thickening, blisters, or sensitivity to sunlight    · Rash on your cheeks that goes across your nose    · Joint pain, swelling, or warmth    · Deformed joints, or limited range of motion    · Cold, numb, or swollen fingers    · Loss of appetite, or weight loss without trying    · Dry mouth or eyes, vision problems, or an eye infection such as conjunctivitis    · Hair loss  How is a connective tissue disorder diagnosed? You may have symptoms of several types of connective tissue disorders  This can make diagnosis difficult  Over time, you may develop one type of connective tissue disorder  · Blood tests  may be used to measure the amount of inflammation in your body or to check organ function   Blood tests may also be used to check for specific antibodies that are attacking healthy cells by mistake  · An x-ray, CT, or MRI  may be used to check your joints or organs for damage  Do not enter the MRI room with any metal  Metal can cause serious damage  Tell the healthcare provider if you have any metal in or on your body  · A biopsy  is a procedure used to take a sample of tissue to be tested  How is a connective tissue disorder treated? · Medicines  may be given to prevent your immune system from attacking healthy cells  You may also need medicines to stop the disease from getting worse  You may need to use topical creams or lotions to control a rash or other symptoms that affect your skin  · Prescription pain medicine  may be given  Ask your healthcare provider how to take this medicine safely  Some prescription pain medicines contain acetaminophen  Do not take other medicines that contain acetaminophen without talking to your healthcare provider  Too much acetaminophen may cause liver damage  Prescription pain medicine may cause constipation  Ask your healthcare provider how to prevent or treat constipation  · NSAIDs , such as ibuprofen, help decrease swelling, pain, and fever  This medicine is available with or without a doctor's order  NSAIDs can cause stomach bleeding or kidney problems in certain people  If you take blood thinner medicine, always ask your healthcare provider if NSAIDs are safe for you  Always read the medicine label and follow directions  · Acetaminophen  helps reduce pain and fever  Acetaminophen is available without a doctor's order  Ask how much to take and how often to take it  Follow directions  Acetaminophen can cause liver problems if not taken correctly  · Steroids  may be given to reduce swelling and pain  What can I do to manage my connective tissue disorder? · Rest as needed    Talk to your healthcare provider if you are having trouble sleeping because of pain or other symptoms  Rest your joints if they are stiff or painful  Your healthcare provider may suggest support devices such as crutches or splints to help your joints rest      · Eat a variety of healthy foods  Healthy foods include fruits, vegetables, lean meats, fish, and low-fat dairy products  Work with your healthcare provider or dietitian to create healthy meal plans  · Go to physical or occupational therapy as directed  A physical therapist can help you create an exercise plan  Exercise may help increase your energy  Exercise can also help keep stiff joints flexible and increase range of motion  An occupational therapist can help you learn to do your daily activities when you have pain or swelling  · Talk to your healthcare provider about pregnancy  If you are a woman and want to get pregnant, talk to your healthcare provider  You or your baby might be at risk for complications  You may need to wait until your disease is controlled or your medications are finished before you get pregnant  You may also have trouble getting pregnant because of your disease  Your healthcare provider may be able to suggest ways to improve your ability to become pregnant  · Do not smoke  Nicotine and other chemicals in cigarettes and cigars can cause blood vessel and lung damage  Ask your healthcare provider for information if you currently smoke and need help to quit  E-cigarettes or smokeless tobacco still contain nicotine  Talk to your healthcare provider before you use these products  · Manage stress  Stress may slow healing and lead to illness  Learn ways to control stress, such as relaxation, deep breathing, or listening to music  What can I do to manage flares? A flare means something triggered your symptoms  Stress, cold weather, and sunlight are examples of triggers  Your healthcare provider can help you create a management plan that includes what to do if you have a flare   Treat flares quickly to help prevent serious illness  · Apply ice or heat as directed  Ice helps reduce pain and swelling, and may help prevent tissue damage  Use a cold compress, or put crushed ice in a bag  Cover it with a towel and apply to the painful area for 15 to 20 minutes every hour, or as directed  Heat helps reduce pain and muscle spasms  Apply a warm compress to the area for 20 minutes every 2 hours, or as directed  · Elevate the area above the level of your heart  Elevation can help reduce swelling and pain, especially in your joints  Elevate the area as often as possible  · Keep your hands and feet warm  Certain connective tissue disorders can cause your hands and feet to become cold and painful  Over time, ulcers or gangrene (tissue death) may develop if frequent or severe attacks are not prevented  Dress warmly in cold weather, including gloves and thick socks  It may help to wiggle your fingers or toes to improve circulation  Call 911 for any of the following:   · You have trouble breathing, chest pain or pressure, or a fast heartbeat  · You are sweating, and your lips are pale or blue  · You are vomiting blood  · You have a high fever  When should I seek immediate care? · You lose feeling in your hands or feet  · You lose feeling on one side of your body  · You have sudden pain in your eyes and vision problems  When should I contact my healthcare provider? · You have trouble urinating, or you urinate less than usual     · You have trouble having a bowel movement, or you lose control of your bowel movements  · Your muscle or joint tightness worsens, or your fingers begin to curl  · Your symptoms get worse, even after treatment  · Your skin is itchy, swollen, or has a rash  · You have questions or concerns about your condition or care  CARE AGREEMENT:   You have the right to help plan your care  Learn about your health condition and how it may be treated   Discuss treatment options with your caregivers to decide what care you want to receive  You always have the right to refuse treatment  The above information is an  only  It is not intended as medical advice for individual conditions or treatments  Talk to your doctor, nurse or pharmacist before following any medical regimen to see if it is safe and effective for you  © 2017 [employer] Information is for End User's use only and may not be sold, redistributed or otherwise used for commercial purposes  All illustrations and images included in CareNotes® are the copyrighted property of [employer]  or [employer]   WHAT YOU NEED TO KNOW:   What is edema? Edema is swelling throughout your body  Edema is usually a sign that you are retaining fluid  The swelling may be caused by heart failure or kidney, thyroid, or liver disease  It may also be caused by medicines such as antidepressants, blood pressure medicines, or hormones  Sudden swelling around the lips or face may be a sign of a severe allergic reaction  Swelling of an arm or leg may be caused by blockage of your veins  What other signs and symptoms may occur with edema? · Discomfort or tenderness in the swollen areas    · Tight and shiny skin over the swollen areas    · Weight gain  How is edema diagnosed? Your healthcare provider will ask about your symptoms and any other symptoms you have  He may also ask about any medical conditions you have  Your healthcare provider will examine your skin over the swollen areas  He may gently push on the swollen area for a short time to see if this leaves a dimple  He may also order tests to find the cause of your edema  How is edema treated and managed? Treatment for edema depends on the cause  Depending on your medical condition, you may be given medicine to help get rid of extra body fluid   Your healthcare provider may suggest that you do any of the following to help manage edema:  · Elevate your arms or legs as directed  Raise them above the level of your heart as often as you can  This will help decrease swelling and pain  Prop them on pillows or blankets to keep them elevated comfortably  · Wear pressure stockings as directed  The stockings are tight and put pressure on your legs  This helps to keep fluid from collecting in your legs or ankles  · Limit your salt intake  Salt causes your body to hold water  Ask about any other changes to your diet  · Stay active  Do not stand or sit for long periods of time  Ask your healthcare provider about the best exercise plan for you  · Keep your skin moist  using lotion, cream, or ointment  Ask your healthcare provider what to use and how often to use it  When should I contact my healthcare provider? · The swollen area feels cold and is pale or blue in color  · The swollen area feels warm, painful, and is red in color  · You have increased swelling or swelling in other parts of your body  · You have questions or concerns about your condition or care  When should I seek immediate care? · You have shortness of breath at rest, especially when you lie down  · You cough up pink, foamy sputum  · You have chest pain  · Your heartbeat is fast or uneven  CARE AGREEMENT:   You have the right to help plan your care  Learn about your health condition and how it may be treated  Discuss treatment options with your caregivers to decide what care you want to receive  You always have the right to refuse treatment  The above information is an  only  It is not intended as medical advice for individual conditions or treatments  Talk to your doctor, nurse or pharmacist before following any medical regimen to see if it is safe and effective for you  © 2017 [employer] Information is for End User's use only and may not be sold, redistributed or otherwise used for commercial purposes   All illustrations and images included in CareNotes® are the copyrighted property of [employer]  or [employer]  Cellulitis   WHAT YOU NEED TO KNOW:   What is cellulitis? Cellulitis is a skin infection caused by bacteria  Cellulitis usually appears on the legs and feet, arms and hands, or face  What increases my risk for cellulitis? · An injury that breaks the skin, such as a bite, scratch, or cut    · Sores or injuries exposed to hot tub water or water in ponds, streams, or oceans    · Shared belongings, such as towels or exercise equipment    · Drugs that are injected    · A weak immune system or diabetes    · Lymphedema, chronic venous insufficiency, peripheral vascular disease, or deep vein thrombosis  What are the signs and symptoms of cellulitis? · A red, warm, swollen area on your skin    · Pain when the area is touched    · Bumps or blisters (abscess) that may drain pus    · Bumpy, raised skin that feels like an orange peel  How is cellulitis diagnosed? Your healthcare provider may know you have cellulitis by looking at and feeling your skin  Tell him or her how long you have had symptoms, and if anything helps decrease your symptoms  Tell your healthcare provider if you have ever had a cellulitis infection  He or she may not know which kind of bacteria caused your cellulitis  You may  need any of the following tests:  · Blood tests  may show which kind of bacteria is causing your infection  Blood tests may also show if the infection is in your blood  · A sample of tissue or fluid from your infected skin  may show what is causing your infection  The sample may also show if your infection is caused by another kind of skin disorder  · An x-ray, ultrasound, CT, or MRI  may show if the infection has spread  You may be given contrast liquid to help the infection show up better in the pictures  Tell the healthcare provider if you have ever had an allergic reaction to contrast liquid   Do not enter the MRI room with anything metal  Metal can cause serious injury  Tell the healthcare provider if you have any metal in or on your body  How is cellulitis treated? Treatment may decrease symptoms, stop the infection from spreading, and cure the infection  Treatment depends on how severe your cellulitis is  Cellulitis may go away on its own  You may  instead need antibiotics to help treat the bacterial infection  Your healthcare provider may draw a [employer] [place] [employer] around the edges of your cellulitis  If your cellulitis spreads, your healthcare provider will see it outside of the [place] [employer]  How can I manage my symptoms? · Elevate the area above the level of your heart  as often as you can  This will help decrease swelling and pain  Prop the area on pillows or blankets to keep it elevated comfortably  · Clean the area daily until the wound scabs over  Gently wash the area with soap and water  Pat dry  Use dressings as directed  · Place cool or warm, wet cloths on the area as directed  Use clean cloths and clean water  Leave it on the area until the cloth is room temperature  Pat the area dry with a clean, dry cloth  The cloths may help decrease pain  How can I prevent cellulitis? · Do not scratch bug bites or areas of injury  You increase your risk for cellulitis by scratching these areas  · Do not share personal items, such as towels, clothing, and razors  · Clean exercise equipment  with germ-killing  before and after you use it  · Wash your hands often  Use soap and water  Wash your hands after you use the bathroom, change a child's diapers, or sneeze  Wash your hands before you prepare or eat food  Use lotion to prevent dry, cracked skin  · Wear pressure stockings as directed  You may be told to wear the stockings if you have peripheral edema  Peripheral edema is swelling in your legs  The stockings improve blood flow and decrease swelling  · Treat athlete's foot    This can help prevent the spread of a bacterial skin infection  Call 911 if:   · You have sudden trouble breathing or chest pain  When should I seek immediate care? · Your wound gets larger and more painful  · You feel a crackling under your skin when you touch it  · You have purple dots or bumps on your skin, or you see bleeding under your skin  · You have new swelling and pain in your legs  · The red, warm, swollen area gets larger  · You see red streaks coming from the infected area  When should I contact my healthcare provider? · You have a fever  · Your fever or pain does not go away or gets worse  · The area does not get smaller after 2 days of antibiotics  · You have questions or concerns about your condition or care  CARE AGREEMENT:   You have the right to help plan your care  Learn about your health condition and how it may be treated  Discuss treatment options with your caregivers to decide what care you want to receive  You always have the right to refuse treatment  The above information is an  only  It is not intended as medical advice for individual conditions or treatments  Talk to your doctor, nurse or pharmacist before following any medical regimen to see if it is safe and effective for you  © 2017 [employer] Information is for End User's use only and may not be sold, redistributed or otherwise used for commercial purposes  All illustrations and images included in CareNotes® are the copyrighted property of [employer]  or [employer]

## 2020-08-26 NOTE — PROGRESS NOTES
BMI Counseling: Body mass index is 40 31 kg/m²  The BMI is above normal  Nutrition recommendations include decreasing portion sizes, encouraging healthy choices of fruits and vegetables, decreasing fast food intake, consuming healthier snacks, limiting drinks that contain sugar, moderation in carbohydrate intake, increasing intake of lean protein, reducing intake of saturated and trans fat and reducing intake of cholesterol  Exercise recommendations include vigorous physical activity 75 minutes/week, exercising 3-5 times per week, obtaining a gym membership and strength training exercises  No pharmacotherapy was ordered  Patient referred to nutritionist due to patient being overweight  Depression Screening and Follow-up Plan: Clincally patient does not have depression  No treatment is required  Assessment/Plan:         Problem List Items Addressed This Visit        Cardiovascular and Mediastinum    Essential hypertension (Chronic)     Current blood pressure is 150/110  We will evaluate upon follow-up next week to see if his medications are keeping his blood pressure controlled  Relevant Medications    furosemide (LASIX) 20 mg tablet    Vasculitis ([address] )     Patient provided consultation with Rheumatology at this time due to elevated rheumatological tests in the hospital   He also has a cellulitis of his left lower extremity  Musculoskeletal and Integument    Itchy skin - Primary     Patient has been ordered a topical cream for itching of his skin of bilateral lower extremities  He has also been wearing the compression stockings and night after he comes home  We will continue to monitor his edema and place him on diuretics at this time           Relevant Medications    diphenhydrAMINE-zinc acetate (BENADRYL) cream       Other    Encounter for hepatitis C screening test for low risk patient    Relevant Orders    Hepatitis C antibody    Bilateral lower extremity edema     Patient order for a short course of Lasix 20 mg p o  Daily the next 5 days  We will keep an eye on his creatinine to make sure it does not elevate  He has been instructed to take it every day for reduction and edema  Relevant Medications    furosemide (LASIX) 20 mg tablet    Cellulitis of left lower extremity     Patient has swelling, heat, and redness of the left lower extremity below the knee to the ankle  He has been prescribed antibiotics of Augmentin p o  B i d  For the next 10 days  No fever temperature chills noted  Peripheral pulses positive lower extremity  Capillary refill 3 seconds  Relevant Medications    amoxicillin-clavulanate (Augmentin) 875-125 mg per tablet            Subjective:      Patient ID: David Ulloa is a 62 y o  male  Patient is a 62year old male present for transition of care management follow-up visit  He was recently admitted to the hospital for lower extremity swelling and pain  The following portions of the patient's history were reviewed and updated as appropriate:   He has a past medical history of Angioedema, Coronary artery disease, GI bleed, Hypertension, and STEMI (ST elevation myocardial infarction) ([facility] [address] )  ,  does not have any pertinent problems on file  ,   has a past surgical history that includes No past surgeries and Upper gastrointestinal endoscopy ,  He was adopted  Family history is unknown by patient  ,   reports that he has never smoked  He has never used smokeless tobacco  He reports that he does not drink alcohol or use drugs  ,  is allergic to lisinopril; norvasc [amlodipine]; eliquis [apixaban]; and lipitor [atorvastatin]     Current Outpatient Medications   Medication Sig Dispense Refill    acetaminophen (TYLENOL) 325 mg tablet Take 3 tablets (975 mg total) by mouth every 8 (eight) hours 30 tablet 0    carvedilol (COREG) 25 mg tablet Take 1 tablet (25 mg total) by mouth 2 (two) times a day with meals 60 tablet 11    hydrochlorothiazide (HYDRODIURIL) 25 mg tablet Take 2 tablets (50 mg total) by mouth daily 90 tablet 0    isosorbide mononitrate (IMDUR) 30 mg 24 hr tablet Take 2 tablets (60 mg total) by mouth daily 30 tablet 11    sildenafil (VIAGRA) 100 mg tablet TAKE ONE TABLET PRIOR TO SEX AS NEEDED      warfarin (COUMADIN) 2 5 mg tablet Take 1 tablet (2 5 mg total) by mouth daily 30 tablet 2    warfarin (COUMADIN) 5 mg tablet Take one table (5mg) by mouth once daily for goal INR 2-3 (Patient taking differently: Take 7 5 mg by mouth Take one table (5mg) by mouth once daily for goal INR 2-3) 30 tablet 0    amoxicillin-clavulanate (Augmentin) 875-125 mg per tablet Take 1 tablet by mouth every 12 (twelve) hours for 10 days 20 tablet 0    diphenhydrAMINE-zinc acetate (BENADRYL) cream Apply topically 3 (three) times a day as needed for itching 28 4 g 0    furosemide (LASIX) 20 mg tablet Take 1 tablet (20 mg total) by mouth daily for 5 days 5 tablet 0    Testosterone (ANDROGEL) 40 5 MG/2 5GM (1 62%) GEL Place 40 5 mg on the skin daily        No current facility-administered medications for this visit  Review of Systems   Constitutional: Positive for fatigue  Negative for activity change, appetite change, chills, diaphoresis, fever and unexpected weight change  HENT: Negative for congestion, ear pain, facial swelling, nosebleeds, postnasal drip, rhinorrhea, sinus pressure, sinus pain, sneezing, sore throat and voice change  Eyes: Negative for pain, redness and visual disturbance  Respiratory: Negative for cough, chest tightness, shortness of breath and wheezing  Cardiovascular: Positive for leg swelling  Negative for chest pain  Gastrointestinal: Negative for abdominal distention, abdominal pain, constipation, diarrhea, nausea and vomiting  Genitourinary: Negative for dysuria and hematuria  Musculoskeletal: Positive for myalgias  Negative for arthralgias, back pain, gait problem and joint swelling          Fatigue from knees down    Skin: Negative  Neurological: Positive for weakness and numbness  Negative for dizziness, tremors, seizures, syncope, speech difficulty, light-headedness and headaches  Right hand tingling in the fingers  Psychiatric/Behavioral: Negative  Objective:  Vitals:    08/26/20 1546 08/26/20 1553   BP: 140/100 (!) 150/110   BP Location: Right arm Left arm   Patient Position: Sitting Sitting   Cuff Size: Large Large   Pulse: 78    Resp: 16    Temp: 98 7 °F (37 1 °C)    TempSrc: Temporal    SpO2: 98%    Weight: 131 kg (289 lb)    Height: 5' 11" (1 803 m)      Body mass index is 40 31 kg/m²  Physical Exam  Vitals signs and nursing note reviewed  Constitutional:       Appearance: Normal appearance  He is well-developed and normal weight  HENT:      Head: Normocephalic and atraumatic  Right Ear: Tympanic membrane, ear canal and external ear normal       Left Ear: Tympanic membrane, ear canal and external ear normal       Nose: Nose normal       Mouth/Throat:      Mouth: Mucous membranes are moist    Eyes:      Extraocular Movements: Extraocular movements intact  Conjunctiva/sclera: Conjunctivae normal       Pupils: Pupils are equal, round, and reactive to light  Neck:      Musculoskeletal: Normal range of motion and neck supple  Cardiovascular:      Rate and Rhythm: Normal rate and regular rhythm  Pulses: Normal pulses  Heart sounds: Normal heart sounds  No murmur  Pulmonary:      Effort: Pulmonary effort is normal       Breath sounds: Normal breath sounds  Abdominal:      General: Bowel sounds are normal       Palpations: Abdomen is soft  Musculoskeletal: Normal range of motion  General: Swelling and tenderness present  No deformity or signs of injury  Right lower leg: Edema present  Left lower leg: Edema present  Comments: Left lower leg cellulitis  Patient to continue antibiotics at this time  Bilateral lower extremities dry and exoriated with + 2 edema  Skin:     General: Skin is warm  Capillary Refill: Capillary refill takes less than 2 seconds  Findings: Erythema present  Neurological:      General: No focal deficit present  Mental Status: He is alert and oriented to person, place, and time     Psychiatric:         Mood and Affect: Mood normal          Behavior: Behavior normal

## 2020-08-26 NOTE — ASSESSMENT & PLAN NOTE
Patient's creatinine is 1 3 at this time has been instructed to hydrate and we will continue to monitor his creatinine

## 2020-08-31 ENCOUNTER — TRANSCRIBE ORDERS (OUTPATIENT)
Dept: LAB | Facility: CLINIC | Age: 55
End: 2020-08-31

## 2020-08-31 ENCOUNTER — APPOINTMENT (OUTPATIENT)
Dept: LAB | Facility: CLINIC | Age: 55
End: 2020-08-31
Payer: COMMERCIAL

## 2020-08-31 LAB — INR PPP: 2.57 (ref 0.84–1.19)

## 2020-09-01 ENCOUNTER — ANTICOAG VISIT (OUTPATIENT)
Dept: FAMILY MEDICINE CLINIC | Facility: CLINIC | Age: 55
End: 2020-09-01

## 2020-09-01 DIAGNOSIS — I82.441 ACUTE DEEP VEIN THROMBOSIS (DVT) OF TIBIAL VEIN OF RIGHT LOWER EXTREMITY (HCC): ICD-10-CM

## 2020-09-01 DIAGNOSIS — I82.501 LEG DVT (DEEP VENOUS THROMBOEMBOLISM), CHRONIC, RIGHT (HCC): ICD-10-CM

## 2020-09-01 RX ORDER — WARFARIN SODIUM 5 MG/1
TABLET ORAL
Qty: 30 TABLET | Refills: 2 | Status: SHIPPED | OUTPATIENT
Start: 2020-09-01 | End: 2020-12-21

## 2020-09-01 RX ORDER — WARFARIN SODIUM 5 MG/1
TABLET ORAL
Qty: 30 TABLET | Refills: 0 | Status: CANCELLED | OUTPATIENT
Start: 2020-09-01

## 2020-09-01 NOTE — TELEPHONE ENCOUNTER
Patient needs refill on his coumadin  He is taking 7 5 mg daily   Would like to sent to [facility] on [address] [place]

## 2020-09-01 NOTE — TELEPHONE ENCOUNTER
Patient called to reschedule appointment for today (he forgot) he will be coming in 9/8/20  He also asked about his Furosemide tablets you recently prescribed for him  States he has  5 days worth and is now out but was unsure if he is supposed to continue? If so, he will need refill  Please let me know so I can call patient back    Mary Umaña

## 2020-09-01 NOTE — PROGRESS NOTES
Called and spoke to patient and informed to remain on 7 5 mg of coumadin and will recheck in 2 weeks    Lizzy Jasmine

## 2020-09-02 ENCOUNTER — TELEPHONE (OUTPATIENT)
Dept: FAMILY MEDICINE CLINIC | Facility: CLINIC | Age: 55
End: 2020-09-02

## 2020-09-02 NOTE — TELEPHONE ENCOUNTER
Pt called to inquire about the Furosemide and whether he should continue to take it  Per Arian he wanted to assess his edema before making a  Decision so I got pt scheduled for Friday at 3:30  He also stated he is seeing Dr Ildefonso Meyer on 09/10/20    [facility]

## 2020-09-02 NOTE — TELEPHONE ENCOUNTER
We were going to evaluate his edema at the time of his last visit at this time I would not reorder his furosemide until seeing him in the office

## 2020-09-03 ENCOUNTER — TELEPHONE (OUTPATIENT)
Dept: FAMILY MEDICINE CLINIC | Facility: CLINIC | Age: 55
End: 2020-09-03

## 2020-09-03 NOTE — TELEPHONE ENCOUNTER
COVID Pre-Visit Screening     1  Is this a family member screening? No  2  Have you traveled outside of your state in the past 2 weeks? No  3  Do you presently have a fever or flu-like symptoms? No  4  Do you have symptoms of an upper respiratory infection like runny nose, sore throat, or cough? No  5  Are you suffering from new headache that you have not had in the past?  No  6  Do you have/have you experienced any new shortness of breath recently? No  7  Do you have any new diarrhea, nausea or vomiting? No  8  Have you been in contact with anyone who has been sick or diagnosed with COVID-19? No  9  Do you have any new loss of taste or smell? No  10  Are you able to wear a mask without a valve for the entire visit? Yes    Called to confirm appt and made aware of check in process    [employer]

## 2020-09-04 ENCOUNTER — OFFICE VISIT (OUTPATIENT)
Dept: FAMILY MEDICINE CLINIC | Facility: CLINIC | Age: 55
End: 2020-09-04
Payer: COMMERCIAL

## 2020-09-04 VITALS
TEMPERATURE: 98.6 F | HEIGHT: 71 IN | DIASTOLIC BLOOD PRESSURE: 100 MMHG | OXYGEN SATURATION: 95 % | WEIGHT: 289 LBS | HEART RATE: 76 BPM | BODY MASS INDEX: 40.46 KG/M2 | RESPIRATION RATE: 16 BRPM | SYSTOLIC BLOOD PRESSURE: 150 MMHG

## 2020-09-04 DIAGNOSIS — R79.89 ELEVATED SERUM CREATININE: ICD-10-CM

## 2020-09-04 DIAGNOSIS — I77.6 VASCULITIS (HCC): ICD-10-CM

## 2020-09-04 DIAGNOSIS — R60.0 BILATERAL LOWER EXTREMITY EDEMA: ICD-10-CM

## 2020-09-04 DIAGNOSIS — R79.89 ELEVATED BRAIN NATRIURETIC PEPTIDE (BNP) LEVEL: Primary | ICD-10-CM

## 2020-09-04 DIAGNOSIS — L03.116 CELLULITIS OF LEFT LOWER EXTREMITY: ICD-10-CM

## 2020-09-04 PROCEDURE — 1111F DSCHRG MED/CURRENT MED MERGE: CPT | Performed by: FAMILY MEDICINE

## 2020-09-04 PROCEDURE — 99213 OFFICE O/P EST LOW 20 MIN: CPT | Performed by: FAMILY MEDICINE

## 2020-09-04 NOTE — PROGRESS NOTES
BMI Counseling: Body mass index is 40 31 kg/m²  The BMI is above normal  Nutrition recommendations include decreasing portion sizes, encouraging healthy choices of fruits and vegetables, decreasing fast food intake, consuming healthier snacks, limiting drinks that contain sugar, moderation in carbohydrate intake, increasing intake of lean protein, reducing intake of saturated and trans fat and reducing intake of cholesterol  Exercise recommendations include obtaining a gym membership and strength training exercises  No pharmacotherapy was ordered  Depression Screening and Follow-up Plan: Clincally patient does not have depression  No treatment is required  Assessment/Plan:         Problem List Items Addressed This Visit        Cardiovascular and Mediastinum    Vasculitis ([facility] )       Other    Bilateral lower extremity edema    Cellulitis of left lower extremity    Elevated serum creatinine    Relevant Orders    Basic metabolic panel    Elevated brain natriuretic peptide (BNP) level - Primary    Relevant Orders    NT-BNP PRO            Subjective:      Patient ID: Jonathan Thomas is a 62 y o  male  Patient is a 62year old male present for follow-up evaluation of lower extremity edema and cellulitis  The following portions of the patient's history were reviewed and updated as appropriate:   He has a past medical history of Angioedema, Coronary artery disease, GI bleed, Hypertension, and STEMI (ST elevation myocardial infarction) ([facility] )  ,  does not have any pertinent problems on file  ,   has a past surgical history that includes No past surgeries and Upper gastrointestinal endoscopy ,  He was adopted  Family history is unknown by patient  ,   reports that he has never smoked  He has never used smokeless tobacco  He reports that he does not drink alcohol or use drugs  ,  is allergic to lisinopril; norvasc [amlodipine]; eliquis [apixaban]; and lipitor [atorvastatin]     Current Outpatient Medications Medication Sig Dispense Refill    acetaminophen (TYLENOL) 325 mg tablet Take 3 tablets (975 mg total) by mouth every 8 (eight) hours 30 tablet 0    amoxicillin-clavulanate (Augmentin) 875-125 mg per tablet Take 1 tablet by mouth every 12 (twelve) hours for 10 days 20 tablet 0    carvedilol (COREG) 25 mg tablet Take 1 tablet (25 mg total) by mouth 2 (two) times a day with meals 60 tablet 11    diphenhydrAMINE-zinc acetate (BENADRYL) cream Apply topically 3 (three) times a day as needed for itching 28 4 g 0    hydrochlorothiazide (HYDRODIURIL) 25 mg tablet Take 2 tablets (50 mg total) by mouth daily 90 tablet 0    isosorbide mononitrate (IMDUR) 30 mg 24 hr tablet Take 2 tablets (60 mg total) by mouth daily 30 tablet 11    sildenafil (VIAGRA) 100 mg tablet TAKE ONE TABLET PRIOR TO SEX AS NEEDED      warfarin (COUMADIN) 5 mg tablet Take a total of 7 5 mg p o  Daily  Patient has already been ordered 2 5 mg  Refill on 5 mg provided at this time  30 tablet 2    furosemide (LASIX) 20 mg tablet Take 1 tablet (20 mg total) by mouth daily for 5 days 5 tablet 0    Testosterone (ANDROGEL) 40 5 MG/2 5GM (1 62%) GEL Place 40 5 mg on the skin daily        No current facility-administered medications for this visit  Review of Systems   Constitutional: Negative for appetite change, chills, fatigue and fever  HENT: Negative for congestion, ear pain, postnasal drip, rhinorrhea, sinus pain and sore throat  Eyes: Negative for pain, redness and visual disturbance  Respiratory: Negative for cough and shortness of breath  Cardiovascular: Negative for chest pain  Gastrointestinal: Negative for abdominal pain, diarrhea, nausea and vomiting  Genitourinary: Negative for dysuria and hematuria  Musculoskeletal: Negative for arthralgias  Skin: Negative  Allergic/Immunologic: Negative  Negative for environmental allergies and food allergies  Neurological: Negative  Hematological: Negative  Psychiatric/Behavioral: Negative  Objective:  Vitals:    09/04/20 1511   BP: 150/100   BP Location: Right arm   Patient Position: Sitting   Cuff Size: Large   Pulse: 76   Resp: 16   Temp: 98 6 °F (37 °C)   TempSrc: Temporal   SpO2: 95%   Weight: 131 kg (289 lb)   Height: 5' 11" (1 803 m)     Body mass index is 40 31 kg/m²  Physical Exam  Vitals signs and nursing note reviewed  Constitutional:       Appearance: Normal appearance  He is well-developed  He is obese  HENT:      Head: Normocephalic and atraumatic  Right Ear: Tympanic membrane, ear canal and external ear normal       Left Ear: Tympanic membrane, ear canal and external ear normal       Nose: Nose normal       Mouth/Throat:      Mouth: Mucous membranes are moist    Eyes:      Extraocular Movements: Extraocular movements intact  Conjunctiva/sclera: Conjunctivae normal       Pupils: Pupils are equal, round, and reactive to light  Neck:      Musculoskeletal: Normal range of motion and neck supple  Cardiovascular:      Rate and Rhythm: Normal rate and regular rhythm  Heart sounds: No murmur  Pulmonary:      Effort: Pulmonary effort is normal       Breath sounds: Normal breath sounds  Abdominal:      General: Bowel sounds are normal       Palpations: Abdomen is soft  Musculoskeletal: Normal range of motion  General: No swelling, tenderness, deformity or signs of injury  Right lower leg: No edema  Left lower leg: No edema  Comments: Lower extremity edema resolved, slight hardness of skin note, no redness, swelling or drainage  Skin:     General: Skin is warm  Capillary Refill: Capillary refill takes 2 to 3 seconds  Neurological:      Mental Status: He is alert and oriented to person, place, and time     Psychiatric:         Mood and Affect: Mood normal          Behavior: Behavior normal

## 2020-09-04 NOTE — PATIENT INSTRUCTIONS
Deep Vein Thrombosis Prevention   WHAT YOU NEED TO KNOW:   Deep vein thrombosis (DVT) is a blood clot that forms in a deep vein of the body  The deep veins in the legs, thighs, and hips are the most common sites for DVT  DVT can also occur in your arms  The clot prevents the normal flow of blood in the vein  The blood backs up and causes pain and swelling  The DVT can break into smaller pieces and travel to your lungs and cause a blockage called a pulmonary embolism  A pulmonary embolism can become life-threatening  DISCHARGE INSTRUCTIONS:   Call 911 for any of the following:   · You feel lightheaded, short of breath, and have chest pain  · You cough up blood  Return to the emergency department if:   · Your arm or leg feels warm, tender, and painful  It may look swollen and red  Contact your healthcare provider if:   · You have questions or concerns about your condition or care  Risk factors for DVT:  A DVT can happen to anybody, but certain things can increase your risk  You may be at higher risk if you have had DVT in the past  You may also be at risk if you have a family member who has had blood clots  The following conditions also increase your risk:  · Limited activity caused by bed rest, a leg cast, or sitting for long periods    · Injury to a deep vein, or surgery    · A blood disorder that makes your blood clot faster than normal, such as factor V Leiden mutation    · Age older than 60 years    · Use of hormone replacement therapy or some types of birth control medicine    · Pregnancy, and for 6 weeks after childbirth     · Cancer or heart failure     · A catheter placed in a large vein    · Smoking    · Obesity or varicose veins  Prevent DVT:   · Guidelines for everyone:      ¨ Maintain a healthy weight  Ask your healthcare provider how much you should weigh  Ask him to help you create a weight loss plan if you are overweight  ¨ Do not smoke    Nicotine and other chemicals in cigarettes and cigars can damage blood vessels and increase your risk for a DVT  Ask your healthcare provider for information if you currently smoke and need help to quit  E-cigarettes or smokeless tobacco still contain nicotine  Talk to your healthcare provider before you use these products  ¨ Move regularly if you sit for long periods of time  If you travel by car or work at a desk, move and stretch in your seat several times each hour  In an airplane, get up and walk every hour  Exercise your legs while sitting by raising and lowering your heels  Keep your toes on the floor while you do this  You can also raise and lower your toes while keeping your heels on the floor  Also tighten and release your leg muscles while sitting  ¨ Exercise regularly  to help increase your blood flow  Walking is a good low-impact exercise  Talk to your healthcare provider about the best exercise plan for you  · Guidelines for people at high risk for DVT:      ¨ Take blood thinner medicines as directed  Your healthcare provider may recommend blood thinners and other medicines to help prevent blood clots  ¨ Wear pressure stockings as directed  The stockings are tight and put pressure on your legs  This improves blood flow and helps prevent clots  Wear the stockings during the day  Do not wear them when you sleep  ¨ Elevate your legs  above the level of your heart as often as you can  This will help decrease swelling and pain  Prop your legs on pillows or blankets to keep them elevated comfortably  ¨ Get up and move as directed after surgery or an injury, or during an illness  Early and regular movement can help decrease your risk for DVT by helping to increase your blood flow  Ask your healthcare provider what type of activity you need and how often you should do it  ¨ Change body positions often if you are bedridden  Ask for help to change your position every 1 to 2 hours    Follow up with your healthcare provider as directed:  Write down your questions so you remember to ask them during your visits  © 2017 [employer] Information is for End User's use only and may not be sold, redistributed or otherwise used for commercial purposes  All illustrations and images included in CareNotes® are the copyrighted property of [employer]  or [employer]  The above information is an  only  It is not intended as medical advice for individual conditions or treatments  Talk to your doctor, nurse or pharmacist before following any medical regimen to see if it is safe and effective for you

## 2020-09-04 NOTE — LETTER
September 4, 2020     Patient: Osmin Roca   YOB: 1965   Date of Visit: 9/4/2020       To Whom it May Concern: Ricarda Joey is under my professional care  He was seen in my office on 9/4/2020  He may return to work on 9/9/2020  If you have any questions or concerns, please don't hesitate to call           Sincerely,          MAX Johnson        CC: No Recipients

## 2020-09-08 ENCOUNTER — APPOINTMENT (OUTPATIENT)
Dept: LAB | Facility: CLINIC | Age: 55
End: 2020-09-08
Payer: COMMERCIAL

## 2020-09-08 DIAGNOSIS — R79.89 ELEVATED SERUM CREATININE: ICD-10-CM

## 2020-09-08 DIAGNOSIS — R79.89 ELEVATED BRAIN NATRIURETIC PEPTIDE (BNP) LEVEL: ICD-10-CM

## 2020-09-08 LAB
ANION GAP SERPL CALCULATED.3IONS-SCNC: 6 MMOL/L (ref 4–13)
BUN SERPL-MCNC: 19 MG/DL (ref 5–25)
CALCIUM SERPL-MCNC: 9.7 MG/DL (ref 8.3–10.1)
CHLORIDE SERPL-SCNC: 103 MMOL/L (ref 100–108)
CO2 SERPL-SCNC: 29 MMOL/L (ref 21–32)
CREAT SERPL-MCNC: 1.26 MG/DL (ref 0.6–1.3)
GFR SERPL CREATININE-BSD FRML MDRD: 73 ML/MIN/1.73SQ M
GLUCOSE P FAST SERPL-MCNC: 85 MG/DL (ref 65–99)
INR PPP: 3.17 (ref 0.84–1.19)
NT-PROBNP SERPL-MCNC: 82 PG/ML
POTASSIUM SERPL-SCNC: 4.1 MMOL/L (ref 3.5–5.3)
SODIUM SERPL-SCNC: 138 MMOL/L (ref 136–145)

## 2020-09-08 PROCEDURE — 83880 ASSAY OF NATRIURETIC PEPTIDE: CPT

## 2020-09-08 PROCEDURE — 80048 BASIC METABOLIC PNL TOTAL CA: CPT

## 2020-09-09 ENCOUNTER — ANTICOAG VISIT (OUTPATIENT)
Dept: FAMILY MEDICINE CLINIC | Facility: CLINIC | Age: 55
End: 2020-09-09

## 2020-09-09 NOTE — PROGRESS NOTES
Called and spoke to patient and informed to remain 7 5 mg and recheck in 1 week as per DARREN Mullins

## 2020-09-12 ENCOUNTER — TRANSCRIBE ORDERS (OUTPATIENT)
Dept: LAB | Facility: CLINIC | Age: 55
End: 2020-09-12

## 2020-09-12 ENCOUNTER — APPOINTMENT (OUTPATIENT)
Dept: LAB | Facility: CLINIC | Age: 55
End: 2020-09-12
Payer: COMMERCIAL

## 2020-09-12 DIAGNOSIS — I82.501 LEG DVT (DEEP VENOUS THROMBOEMBOLISM), CHRONIC, RIGHT (HCC): ICD-10-CM

## 2020-09-12 DIAGNOSIS — R79.89 HYPOURICEMIA: ICD-10-CM

## 2020-09-12 DIAGNOSIS — E78.5 HYPERLIPIDEMIA, UNSPECIFIED HYPERLIPIDEMIA TYPE: ICD-10-CM

## 2020-09-12 DIAGNOSIS — Z11.4 ENCOUNTER FOR SCREENING FOR HIV: ICD-10-CM

## 2020-09-12 DIAGNOSIS — I77.6 ARTERITIS, UNSPECIFIED (HCC): Primary | ICD-10-CM

## 2020-09-12 DIAGNOSIS — Z79.899 ENCOUNTER FOR LONG-TERM (CURRENT) USE OF OTHER MEDICATIONS: ICD-10-CM

## 2020-09-12 DIAGNOSIS — Z11.59 ENCOUNTER FOR HEPATITIS C SCREENING TEST FOR LOW RISK PATIENT: ICD-10-CM

## 2020-09-12 DIAGNOSIS — R76.8 FALSE POSITIVE SEROLOGICAL TEST FOR SYPHILIS: ICD-10-CM

## 2020-09-12 DIAGNOSIS — I25.10 CORONARY ARTERY DISEASE INVOLVING NATIVE HEART WITHOUT ANGINA PECTORIS, UNSPECIFIED VESSEL OR LESION TYPE: ICD-10-CM

## 2020-09-12 DIAGNOSIS — I77.6 ARTERITIS, UNSPECIFIED (HCC): ICD-10-CM

## 2020-09-12 DIAGNOSIS — Z98.890 S/P CARDIAC CATH: ICD-10-CM

## 2020-09-12 LAB
BILIRUB UR QL STRIP: NEGATIVE
C3 SERPL-MCNC: 128 MG/DL (ref 90–180)
C4 SERPL-MCNC: 25 MG/DL (ref 10–40)
CHOLEST SERPL-MCNC: 174 MG/DL (ref 50–200)
CLARITY UR: CLEAR
COLOR UR: YELLOW
CREAT UR-MCNC: 145 MG/DL
CRP SERPL QL: 4.4 MG/L
ERYTHROCYTE [SEDIMENTATION RATE] IN BLOOD: 48 MM/HOUR (ref 0–19)
GLUCOSE UR STRIP-MCNC: NEGATIVE MG/DL
HCV AB SER QL: NORMAL
HDLC SERPL-MCNC: 47 MG/DL
HGB UR QL STRIP.AUTO: NEGATIVE
KETONES UR STRIP-MCNC: NEGATIVE MG/DL
LDLC SERPL CALC-MCNC: 111 MG/DL (ref 0–100)
LEUKOCYTE ESTERASE UR QL STRIP: NEGATIVE
NITRITE UR QL STRIP: NEGATIVE
PH UR STRIP.AUTO: 5.5 [PH]
PROT UR STRIP-MCNC: NEGATIVE MG/DL
PROT UR-MCNC: 16 MG/DL
PROT/CREAT UR: 0.11 MG/G{CREAT} (ref 0–0.1)
SP GR UR STRIP.AUTO: >=1.03 (ref 1–1.03)
TRIGL SERPL-MCNC: 78 MG/DL
UROBILINOGEN UR QL STRIP.AUTO: 0.2 E.U./DL

## 2020-09-12 PROCEDURE — 85652 RBC SED RATE AUTOMATED: CPT

## 2020-09-12 PROCEDURE — 87389 HIV-1 AG W/HIV-1&-2 AB AG IA: CPT

## 2020-09-12 PROCEDURE — 82570 ASSAY OF URINE CREATININE: CPT | Performed by: INTERNAL MEDICINE

## 2020-09-12 PROCEDURE — 81003 URINALYSIS AUTO W/O SCOPE: CPT | Performed by: INTERNAL MEDICINE

## 2020-09-12 PROCEDURE — 86255 FLUORESCENT ANTIBODY SCREEN: CPT

## 2020-09-12 PROCEDURE — 80061 LIPID PANEL: CPT

## 2020-09-12 PROCEDURE — 36415 COLL VENOUS BLD VENIPUNCTURE: CPT

## 2020-09-12 PROCEDURE — 86140 C-REACTIVE PROTEIN: CPT

## 2020-09-12 PROCEDURE — 86160 COMPLEMENT ANTIGEN: CPT

## 2020-09-12 PROCEDURE — 83520 IMMUNOASSAY QUANT NOS NONAB: CPT

## 2020-09-12 PROCEDURE — 86803 HEPATITIS C AB TEST: CPT

## 2020-09-12 PROCEDURE — 84156 ASSAY OF PROTEIN URINE: CPT | Performed by: INTERNAL MEDICINE

## 2020-09-14 LAB
GBM AB SER IA-ACNC: 6 UNITS (ref 0–20)
HIV 1+2 AB+HIV1 P24 AG SERPL QL IA: NORMAL

## 2020-09-15 LAB
C-ANCA TITR SER IF: ABNORMAL TITER
MYELOPEROXIDASE AB SER IA-ACNC: <9 U/ML (ref 0–9)
P-ANCA ATYPICAL TITR SER IF: ABNORMAL TITER
P-ANCA TITR SER IF: ABNORMAL TITER
PROTEINASE3 AB SER IA-ACNC: 5.4 U/ML (ref 0–3.5)

## 2020-09-16 ENCOUNTER — TELEPHONE (OUTPATIENT)
Dept: CARDIOLOGY CLINIC | Facility: CLINIC | Age: 55
End: 2020-09-16

## 2020-09-16 NOTE — TELEPHONE ENCOUNTER
----- Message from Dixie Márquez MD sent at 9/16/2020 11:08 AM EDT -----  Cholesterol levels are higher than before, LDL at 111, was 69, for now continue to follow, not sure if he has missed any medications, either way, continue to take atorvastatin 80 mg

## 2020-09-17 ENCOUNTER — OFFICE VISIT (OUTPATIENT)
Dept: FAMILY MEDICINE CLINIC | Facility: CLINIC | Age: 55
End: 2020-09-17
Payer: COMMERCIAL

## 2020-09-17 VITALS
HEIGHT: 71 IN | RESPIRATION RATE: 12 BRPM | TEMPERATURE: 98.4 F | DIASTOLIC BLOOD PRESSURE: 80 MMHG | WEIGHT: 289 LBS | BODY MASS INDEX: 40.46 KG/M2 | SYSTOLIC BLOOD PRESSURE: 152 MMHG | HEART RATE: 64 BPM | OXYGEN SATURATION: 98 %

## 2020-09-17 DIAGNOSIS — I10 ESSENTIAL HYPERTENSION: ICD-10-CM

## 2020-09-17 DIAGNOSIS — Z09 ENCOUNTER FOR FOLLOW-UP: Primary | ICD-10-CM

## 2020-09-17 PROCEDURE — 99213 OFFICE O/P EST LOW 20 MIN: CPT | Performed by: FAMILY MEDICINE

## 2020-09-17 PROCEDURE — 1036F TOBACCO NON-USER: CPT | Performed by: FAMILY MEDICINE

## 2020-09-17 PROCEDURE — 3725F SCREEN DEPRESSION PERFORMED: CPT | Performed by: FAMILY MEDICINE

## 2020-09-17 RX ORDER — HYDROCHLOROTHIAZIDE 25 MG/1
25 TABLET ORAL 2 TIMES DAILY
Qty: 60 TABLET | Refills: 2 | Status: SHIPPED | OUTPATIENT
Start: 2020-09-17 | End: 2020-10-16 | Stop reason: ALTCHOICE

## 2020-09-17 NOTE — PROGRESS NOTES
BMI Counseling: Body mass index is 40 31 kg/m²  The BMI is above normal  Nutrition recommendations include decreasing portion sizes, encouraging healthy choices of fruits and vegetables, decreasing fast food intake, consuming healthier snacks, limiting drinks that contain sugar, moderation in carbohydrate intake, increasing intake of lean protein, reducing intake of saturated and trans fat and reducing intake of cholesterol  Exercise recommendations include vigorous physical activity 75 minutes/week, exercising 3-5 times per week, obtaining a gym membership and strength training exercises  No pharmacotherapy was ordered  Depression Screening and Follow-up Plan: Patient's depression screening was positive with a PHQ-2 score of 0  Clincally patient does not have depression  No treatment is required  Assessment/Plan:         Problem List Items Addressed This Visit        Cardiovascular and Mediastinum    Essential hypertension (Chronic)    Relevant Medications    hydrochlorothiazide (HYDRODIURIL) 25 mg tablet       Other    Encounter for follow-up - Primary            Subjective:      Patient ID: Anthony Cano is a 62 y o  male  Patient is a 68-year-old male presents for follow-up evaluation sub post rheumatology consult and recent lab work  He is also being seen for evaluation of left leg lower extremity cellulitis and edema of bilateral lower extremities  Patient has seen Dr Ar Gongora on 9/10/2020 and had recent diagnostic testing done with follow-up appointment with Rheumatology on 9/18/2020  Currently he has mild swelling, negative redness, negative weeping and decreased pain of bilateral lower extremities  He reports feeling top heavy and feeling like his legs don't want to move  Mr Dane Rey is planning to have knee surgery in December if cleared to do so by Rheumatology  Current Pt/Inr results need to be collected and anticoagulation therapy adjustment of Warfarin made   He has showed significant improvement with in the last few weeks, however still has to follow-up with other specialties before cleared for Surgery  We will follow-up with him for a virtual visit after he sees Rheumatology and has his pt/inr drawn  He has been instructed to follow-up with Vitaly De Luna if he requires any further assistance from Social work  The following portions of the patient's history were reviewed and updated as appropriate:   He has a past medical history of Angioedema, Coronary artery disease, GI bleed, Hypertension, and STEMI (ST elevation myocardial infarction) ([address] )  ,  does not have any pertinent problems on file  ,   has a past surgical history that includes No past surgeries and Upper gastrointestinal endoscopy ,  He was adopted  Family history is unknown by patient  ,   reports that he has never smoked  He has never used smokeless tobacco  He reports that he does not drink alcohol or use drugs  ,  is allergic to lisinopril; norvasc [amlodipine]; eliquis [apixaban]; and lipitor [atorvastatin]     Current Outpatient Medications   Medication Sig Dispense Refill    carvedilol (COREG) 25 mg tablet Take 1 tablet (25 mg total) by mouth 2 (two) times a day with meals 60 tablet 11    diphenhydrAMINE-zinc acetate (BENADRYL) cream Apply topically 3 (three) times a day as needed for itching 28 4 g 0    hydrochlorothiazide (HYDRODIURIL) 25 mg tablet Take 1 tablet (25 mg total) by mouth 2 (two) times a day 60 tablet 2    isosorbide mononitrate (IMDUR) 30 mg 24 hr tablet Take 2 tablets (60 mg total) by mouth daily 30 tablet 11    sildenafil (VIAGRA) 100 mg tablet TAKE ONE TABLET PRIOR TO SEX AS NEEDED      warfarin (COUMADIN) 5 mg tablet Take a total of 7 5 mg p o  Daily  Patient has already been ordered 2 5 mg  Refill on 5 mg provided at this time   30 tablet 2    acetaminophen (TYLENOL) 325 mg tablet Take 3 tablets (975 mg total) by mouth every 8 (eight) hours (Patient not taking: Reported on 9/17/2020) 30 tablet 0    furosemide (LASIX) 20 mg tablet Take 1 tablet (20 mg total) by mouth daily for 5 days 5 tablet 0    Testosterone (ANDROGEL) 40 5 MG/2 5GM (1 62%) GEL Place 40 5 mg on the skin daily        No current facility-administered medications for this visit  Review of Systems   Constitutional: Negative for appetite change, chills, fatigue and fever  HENT: Negative for congestion, ear pain, postnasal drip, rhinorrhea, sinus pain and sore throat  Eyes: Negative for pain, redness and visual disturbance  Respiratory: Negative for cough, chest tightness, shortness of breath and wheezing  Cardiovascular: Positive for leg swelling  Negative for chest pain and palpitations  Gastrointestinal: Negative for abdominal pain, diarrhea, nausea and vomiting  Genitourinary: Negative for dysuria and hematuria  Musculoskeletal: Positive for arthralgias, back pain, gait problem and myalgias  Skin: Negative  Allergic/Immunologic: Negative  Neurological: Negative for dizziness, seizures, weakness, light-headedness, numbness and headaches  Hematological: Negative  Psychiatric/Behavioral: The patient is nervous/anxious  Objective:  Vitals:    09/17/20 1601   BP: 152/80   BP Location: Left arm   Patient Position: Sitting   Cuff Size: Large   Pulse: 64   Resp: 12   Temp: 98 4 °F (36 9 °C)   TempSrc: Temporal   SpO2: 98%   Weight: 131 kg (289 lb)   Height: 5' 11" (1 803 m)     Body mass index is 40 31 kg/m²  Physical Exam  Vitals signs and nursing note reviewed  Constitutional:       Appearance: Normal appearance  He is well-developed  He is obese  HENT:      Head: Normocephalic and atraumatic  Right Ear: Tympanic membrane, ear canal and external ear normal       Left Ear: Tympanic membrane, ear canal and external ear normal       Nose: Nose normal       Mouth/Throat:      Mouth: Mucous membranes are moist    Eyes:      Extraocular Movements: Extraocular movements intact  Conjunctiva/sclera: Conjunctivae normal       Pupils: Pupils are equal, round, and reactive to light  Neck:      Musculoskeletal: Normal range of motion  Cardiovascular:      Rate and Rhythm: Normal rate and regular rhythm  Pulses: Normal pulses  Heart sounds: Normal heart sounds  No murmur  Pulmonary:      Effort: Pulmonary effort is normal       Breath sounds: Normal breath sounds  Abdominal:      General: Bowel sounds are normal       Palpations: Abdomen is soft  Musculoskeletal: Normal range of motion  General: Swelling present  No tenderness, deformity or signs of injury  Right lower leg: Edema present  Left lower leg: Edema present  Comments: Patient has some mild swelling of the anterior shin that is nonpitting in nature  No redness, odor or drainage  He does have some mild ankle edema bilaterally however has significantly improved since his last visit  Skin:     General: Skin is warm  Capillary Refill: Capillary refill takes 2 to 3 seconds  Neurological:      General: No focal deficit present  Mental Status: He is alert and oriented to person, place, and time     Psychiatric:         Mood and Affect: Mood normal          Behavior: Behavior normal

## 2020-09-17 NOTE — PATIENT INSTRUCTIONS
Connective Tissue Disorders   WHAT YOU NEED TO KNOW:   What is a connective tissue disorder? A connective tissue disorder can affect any connective tissue in your body  Connective tissues support your organs, attach muscles to bones, and create scar tissue after an injury  Cartilage is an example of a connective tissue  There are many types of connective tissue disorders, such as rheumatoid arthritis, lupus, and scleroderma  The most common affected areas are joints, muscles, and skin  Your organs, eyes, nervous system, and blood vessels can also be affected  What increases my risk for a connective tissue disorder? You might have been born with the disorder, or it may develop from any of the following:  · Healthy cells in your body are attacked by your immune system by mistake    · An injury that causes scar tissue to form    · A family history of a connective tissue disorder     · A lack of vitamin C, causing a connective tissue disorder called scurvy  What are the signs and symptoms of a connective tissue disorder? Signs and symptoms depend on the type of connective tissue disorder and if it is severe  Symptoms may be mild or severe, and may come and go:  · Fever or fatigue    · Skin rash or thickening, blisters, or sensitivity to sunlight    · Rash on your cheeks that goes across your nose    · Joint pain, swelling, or warmth    · Deformed joints, or limited range of motion    · Cold, numb, or swollen fingers    · Loss of appetite, or weight loss without trying    · Dry mouth or eyes, vision problems, or an eye infection such as conjunctivitis    · Hair loss  How is a connective tissue disorder diagnosed? You may have symptoms of several types of connective tissue disorders  This can make diagnosis difficult  Over time, you may develop one type of connective tissue disorder  · Blood tests  may be used to measure the amount of inflammation in your body or to check organ function   Blood tests may also be used to check for specific antibodies that are attacking healthy cells by mistake  · An x-ray, CT, or MRI  may be used to check your joints or organs for damage  Do not enter the MRI room with any metal  Metal can cause serious damage  Tell the healthcare provider if you have any metal in or on your body  · A biopsy  is a procedure used to take a sample of tissue to be tested  How is a connective tissue disorder treated? · Medicines  may be given to prevent your immune system from attacking healthy cells  You may also need medicines to stop the disease from getting worse  You may need to use topical creams or lotions to control a rash or other symptoms that affect your skin  · Prescription pain medicine  may be given  Ask your healthcare provider how to take this medicine safely  Some prescription pain medicines contain acetaminophen  Do not take other medicines that contain acetaminophen without talking to your healthcare provider  Too much acetaminophen may cause liver damage  Prescription pain medicine may cause constipation  Ask your healthcare provider how to prevent or treat constipation  · NSAIDs , such as ibuprofen, help decrease swelling, pain, and fever  This medicine is available with or without a doctor's order  NSAIDs can cause stomach bleeding or kidney problems in certain people  If you take blood thinner medicine, always ask your healthcare provider if NSAIDs are safe for you  Always read the medicine label and follow directions  · Acetaminophen  helps reduce pain and fever  Acetaminophen is available without a doctor's order  Ask how much to take and how often to take it  Follow directions  Acetaminophen can cause liver problems if not taken correctly  · Steroids  may be given to reduce swelling and pain  What can I do to manage my connective tissue disorder? · Rest as needed    Talk to your healthcare provider if you are having trouble sleeping because of pain or other symptoms  Rest your joints if they are stiff or painful  Your healthcare provider may suggest support devices such as crutches or splints to help your joints rest      · Eat a variety of healthy foods  Healthy foods include fruits, vegetables, lean meats, fish, and low-fat dairy products  Work with your healthcare provider or dietitian to create healthy meal plans  · Go to physical or occupational therapy as directed  A physical therapist can help you create an exercise plan  Exercise may help increase your energy  Exercise can also help keep stiff joints flexible and increase range of motion  An occupational therapist can help you learn to do your daily activities when you have pain or swelling  · Talk to your healthcare provider about pregnancy  If you are a woman and want to get pregnant, talk to your healthcare provider  You or your baby might be at risk for complications  You may need to wait until your disease is controlled or your medications are finished before you get pregnant  You may also have trouble getting pregnant because of your disease  Your healthcare provider may be able to suggest ways to improve your ability to become pregnant  · Do not smoke  Nicotine and other chemicals in cigarettes and cigars can cause blood vessel and lung damage  Ask your healthcare provider for information if you currently smoke and need help to quit  E-cigarettes or smokeless tobacco still contain nicotine  Talk to your healthcare provider before you use these products  · Manage stress  Stress may slow healing and lead to illness  Learn ways to control stress, such as relaxation, deep breathing, or listening to music  What can I do to manage flares? A flare means something triggered your symptoms  Stress, cold weather, and sunlight are examples of triggers  Your healthcare provider can help you create a management plan that includes what to do if you have a flare   Treat flares quickly to help prevent serious illness  · Apply ice or heat as directed  Ice helps reduce pain and swelling, and may help prevent tissue damage  Use a cold compress, or put crushed ice in a bag  Cover it with a towel and apply to the painful area for 15 to 20 minutes every hour, or as directed  Heat helps reduce pain and muscle spasms  Apply a warm compress to the area for 20 minutes every 2 hours, or as directed  · Elevate the area above the level of your heart  Elevation can help reduce swelling and pain, especially in your joints  Elevate the area as often as possible  · Keep your hands and feet warm  Certain connective tissue disorders can cause your hands and feet to become cold and painful  Over time, ulcers or gangrene (tissue death) may develop if frequent or severe attacks are not prevented  Dress warmly in cold weather, including gloves and thick socks  It may help to wiggle your fingers or toes to improve circulation  Call 911 for any of the following:   · You have trouble breathing, chest pain or pressure, or a fast heartbeat  · You are sweating, and your lips are pale or blue  · You are vomiting blood  · You have a high fever  When should I seek immediate care? · You lose feeling in your hands or feet  · You lose feeling on one side of your body  · You have sudden pain in your eyes and vision problems  When should I contact my healthcare provider? · You have trouble urinating, or you urinate less than usual     · You have trouble having a bowel movement, or you lose control of your bowel movements  · Your muscle or joint tightness worsens, or your fingers begin to curl  · Your symptoms get worse, even after treatment  · Your skin is itchy, swollen, or has a rash  · You have questions or concerns about your condition or care  CARE AGREEMENT:   You have the right to help plan your care  Learn about your health condition and how it may be treated   Discuss treatment options with your caregivers to decide what care you want to receive  You always have the right to refuse treatment  The above information is an  only  It is not intended as medical advice for individual conditions or treatments  Talk to your doctor, nurse or pharmacist before following any medical regimen to see if it is safe and effective for you  © 2017 [employer] Information is for End User's use only and may not be sold, redistributed or otherwise used for commercial purposes  All illustrations and images included in CareNotes® are the copyrighted property of [employer]  or [employer]   WHAT YOU NEED TO KNOW:   What is cellulitis? Cellulitis is a skin infection caused by bacteria  Cellulitis usually appears on the legs and feet, arms and hands, or face  What increases my risk for cellulitis? · An injury that breaks the skin, such as a bite, scratch, or cut    · Sores or injuries exposed to hot tub water or water in ponds, streams, or oceans    · Shared belongings, such as towels or exercise equipment    · Drugs that are injected    · A weak immune system or diabetes    · Lymphedema, chronic venous insufficiency, peripheral vascular disease, or deep vein thrombosis  What are the signs and symptoms of cellulitis? · A red, warm, swollen area on your skin    · Pain when the area is touched    · Bumps or blisters (abscess) that may drain pus    · Bumpy, raised skin that feels like an orange peel  How is cellulitis diagnosed? Your healthcare provider may know you have cellulitis by looking at and feeling your skin  Tell him or her how long you have had symptoms, and if anything helps decrease your symptoms  Tell your healthcare provider if you have ever had a cellulitis infection  He or she may not know which kind of bacteria caused your cellulitis   You may  need any of the following tests:  · Blood tests  may show which kind of bacteria is causing your infection  Blood tests may also show if the infection is in your blood  · A sample of tissue or fluid from your infected skin  may show what is causing your infection  The sample may also show if your infection is caused by another kind of skin disorder  · An x-ray, ultrasound, CT, or MRI  may show if the infection has spread  You may be given contrast liquid to help the infection show up better in the pictures  Tell the healthcare provider if you have ever had an allergic reaction to contrast liquid  Do not enter the MRI room with anything metal  Metal can cause serious injury  Tell the healthcare provider if you have any metal in or on your body  How is cellulitis treated? Treatment may decrease symptoms, stop the infection from spreading, and cure the infection  Treatment depends on how severe your cellulitis is  Cellulitis may go away on its own  You may  instead need antibiotics to help treat the bacterial infection  Your healthcare provider may draw a [place] around the edges of your cellulitis  If your cellulitis spreads, your healthcare provider will see it outside of the [place]  How can I manage my symptoms? · Elevate the area above the level of your heart  as often as you can  This will help decrease swelling and pain  Prop the area on pillows or blankets to keep it elevated comfortably  · Clean the area daily until the wound scabs over  Gently wash the area with soap and water  Pat dry  Use dressings as directed  · Place cool or warm, wet cloths on the area as directed  Use clean cloths and clean water  Leave it on the area until the cloth is room temperature  Pat the area dry with a clean, dry cloth  The cloths may help decrease pain  How can I prevent cellulitis? · Do not scratch bug bites or areas of injury  You increase your risk for cellulitis by scratching these areas  · Do not share personal items, such as towels, clothing, and razors       · Clean exercise equipment with germ-killing  before and after you use it  · Wash your hands often  Use soap and water  Wash your hands after you use the bathroom, change a child's diapers, or sneeze  Wash your hands before you prepare or eat food  Use lotion to prevent dry, cracked skin  · Wear pressure stockings as directed  You may be told to wear the stockings if you have peripheral edema  Peripheral edema is swelling in your legs  The stockings improve blood flow and decrease swelling  · Treat athlete's foot  This can help prevent the spread of a bacterial skin infection  Call 911 if:   · You have sudden trouble breathing or chest pain  When should I seek immediate care? · Your wound gets larger and more painful  · You feel a crackling under your skin when you touch it  · You have purple dots or bumps on your skin, or you see bleeding under your skin  · You have new swelling and pain in your legs  · The red, warm, swollen area gets larger  · You see red streaks coming from the infected area  When should I contact my healthcare provider? · You have a fever  · Your fever or pain does not go away or gets worse  · The area does not get smaller after 2 days of antibiotics  · You have questions or concerns about your condition or care  CARE AGREEMENT:   You have the right to help plan your care  Learn about your health condition and how it may be treated  Discuss treatment options with your caregivers to decide what care you want to receive  You always have the right to refuse treatment  The above information is an  only  It is not intended as medical advice for individual conditions or treatments  Talk to your doctor, nurse or pharmacist before following any medical regimen to see if it is safe and effective for you  © 2017 [employer] Information is for End User's use only and may not be sold, redistributed or otherwise used for commercial purposes  All illustrations and images included in CareNotes® are the copyrighted property of [employer]  or [employer]  Leg Edema   WHAT YOU NEED TO KNOW:   Leg edema is swelling caused by fluid buildup  Your legs may swell if you sit or stand for long periods of time, are pregnant, or are injured  Swelling may also occur if you have heart failure or circulation problems  This means that your heart does not pump blood through your body as it should  DISCHARGE INSTRUCTIONS:   Self-care:   · Elevate your legs:  Raise your legs above the level of your heart as often as you can  This will help decrease swelling and pain  Prop your legs on pillows or blankets to keep them elevated comfortably  · Wear pressure stockings: These tight stockings put pressure on your legs to promote blood flow and prevent blood clots  Wear the stockings during the day  Do not wear them while you sleep  · Apply heat:  Heat helps decrease pain and swelling  Apply heat on the area for 20 to 30 minutes every 2 hours for as many days as directed  · Stay active:  Do not stand or sit for long periods of time  Ask your healthcare provider about the best exercise plan for you  · Eat healthy foods:  Healthy foods include fruits, vegetables, whole-grain breads, low-fat dairy products, beans, lean meats, and fish  Ask if you need to be on a special diet  Limit salt  Salt will make your body hold even more fluid  Follow up with your healthcare provider as directed:  Write down your questions so you remember to ask them during your visits  Contact your healthcare provider if:   · You have a fever or feel more tired than usual     · The veins in your legs look larger than usual  They may look full or bulging  · Your legs itch or feel heavy  · You have red or white areas or sores on your legs  The skin may also appear dimpled or have indentations  · You are gaining weight  · You have trouble moving your ankles      · The swelling does not go away, or other parts of your body swell  · You have questions or concerns about your condition or care  Return to the emergency department if:   · You cannot walk  · You feel faint or confused  · Your skin turns blue or gray  · Your leg feels warm, tender, and painful  It may be swollen and red  · You have chest pain or trouble breathing that is worse when you lie down  · You suddenly feel lightheaded and have trouble breathing  · You have new and sudden chest pain  You may have more pain when you take deep breaths or cough  You may also cough up blood  © 2017 [address] [employer]  Information is for End User's use only and may not be sold, redistributed or otherwise used for commercial purposes  All illustrations and images included in CareNotes® are the copyrighted property of [employer]  or [employer]  The above information is an  only  It is not intended as medical advice for individual conditions or treatments  Talk to your doctor, nurse or pharmacist before following any medical regimen to see if it is safe and effective for you

## 2020-09-18 ENCOUNTER — APPOINTMENT (OUTPATIENT)
Dept: LAB | Facility: CLINIC | Age: 55
End: 2020-09-18
Payer: COMMERCIAL

## 2020-09-18 LAB — INR PPP: 3.79 (ref 0.84–1.19)

## 2020-09-21 ENCOUNTER — ANTICOAG VISIT (OUTPATIENT)
Dept: FAMILY MEDICINE CLINIC | Facility: CLINIC | Age: 55
End: 2020-09-21

## 2020-09-21 NOTE — PROGRESS NOTES
Patient had PT/INR on 9- As per DARREN remain on same dose and recheck in one week  Called and left message on patients voicemail and if any questions is to call office     Shani Chris

## 2020-09-22 ENCOUNTER — TELEPHONE (OUTPATIENT)
Dept: FAMILY MEDICINE CLINIC | Facility: CLINIC | Age: 55
End: 2020-09-22

## 2020-09-22 NOTE — TELEPHONE ENCOUNTER
Pt called in regards to his coumadin and instructions  He is to continue same dose of 7 5mg and recheck in one week per Arian  Pt verbalized understanding    [facility]

## 2020-10-03 ENCOUNTER — APPOINTMENT (OUTPATIENT)
Dept: LAB | Facility: CLINIC | Age: 55
End: 2020-10-03
Payer: COMMERCIAL

## 2020-10-03 LAB — INR PPP: 1.89 (ref 0.84–1.19)

## 2020-10-06 ENCOUNTER — ANTICOAG VISIT (OUTPATIENT)
Dept: FAMILY MEDICINE CLINIC | Facility: CLINIC | Age: 55
End: 2020-10-06

## 2020-10-07 ENCOUNTER — TELEPHONE (OUTPATIENT)
Dept: FAMILY MEDICINE CLINIC | Facility: CLINIC | Age: 55
End: 2020-10-07

## 2020-10-15 ENCOUNTER — APPOINTMENT (OUTPATIENT)
Dept: LAB | Facility: CLINIC | Age: 55
End: 2020-10-15
Payer: COMMERCIAL

## 2020-10-15 ENCOUNTER — OFFICE VISIT (OUTPATIENT)
Dept: CARDIOLOGY CLINIC | Facility: CLINIC | Age: 55
End: 2020-10-15
Payer: COMMERCIAL

## 2020-10-15 ENCOUNTER — TRANSCRIBE ORDERS (OUTPATIENT)
Dept: LAB | Facility: CLINIC | Age: 55
End: 2020-10-15

## 2020-10-15 VITALS
OXYGEN SATURATION: 97 % | DIASTOLIC BLOOD PRESSURE: 90 MMHG | HEART RATE: 57 BPM | BODY MASS INDEX: 40.7 KG/M2 | WEIGHT: 290.7 LBS | SYSTOLIC BLOOD PRESSURE: 152 MMHG | HEIGHT: 71 IN

## 2020-10-15 DIAGNOSIS — I25.10 CORONARY ARTERY DISEASE INVOLVING NATIVE HEART WITHOUT ANGINA PECTORIS, UNSPECIFIED VESSEL OR LESION TYPE: ICD-10-CM

## 2020-10-15 DIAGNOSIS — E66.01 MORBID OBESITY WITH BMI OF 40.0-44.9, ADULT (HCC): ICD-10-CM

## 2020-10-15 DIAGNOSIS — E78.5 HYPERLIPIDEMIA, UNSPECIFIED HYPERLIPIDEMIA TYPE: ICD-10-CM

## 2020-10-15 DIAGNOSIS — I10 ESSENTIAL HYPERTENSION: ICD-10-CM

## 2020-10-15 DIAGNOSIS — Z01.810 PREOP CARDIOVASCULAR EXAM: ICD-10-CM

## 2020-10-15 DIAGNOSIS — I25.10 CORONARY ARTERY DISEASE INVOLVING NATIVE HEART WITHOUT ANGINA PECTORIS, UNSPECIFIED VESSEL OR LESION TYPE: Primary | ICD-10-CM

## 2020-10-15 DIAGNOSIS — T78.3XXA ANGIOEDEMA, INITIAL ENCOUNTER: ICD-10-CM

## 2020-10-15 LAB
ANION GAP SERPL CALCULATED.3IONS-SCNC: 7 MMOL/L (ref 4–13)
BUN SERPL-MCNC: 28 MG/DL (ref 5–25)
CALCIUM SERPL-MCNC: 9.6 MG/DL (ref 8.3–10.1)
CHLORIDE SERPL-SCNC: 103 MMOL/L (ref 100–108)
CO2 SERPL-SCNC: 29 MMOL/L (ref 21–32)
CREAT SERPL-MCNC: 1.48 MG/DL (ref 0.6–1.3)
ERYTHROCYTE [DISTWIDTH] IN BLOOD BY AUTOMATED COUNT: 14.4 % (ref 11.6–15.1)
GFR SERPL CREATININE-BSD FRML MDRD: 60 ML/MIN/1.73SQ M
GLUCOSE SERPL-MCNC: 88 MG/DL (ref 65–140)
HCT VFR BLD AUTO: 40.8 % (ref 36.5–49.3)
HGB BLD-MCNC: 13 G/DL (ref 12–17)
INR PPP: 3.59 (ref 0.84–1.19)
MCH RBC QN AUTO: 28.3 PG (ref 26.8–34.3)
MCHC RBC AUTO-ENTMCNC: 31.9 G/DL (ref 31.4–37.4)
MCV RBC AUTO: 89 FL (ref 82–98)
PLATELET # BLD AUTO: 206 THOUSANDS/UL (ref 149–390)
PMV BLD AUTO: 11 FL (ref 8.9–12.7)
POTASSIUM SERPL-SCNC: 4.2 MMOL/L (ref 3.5–5.3)
RBC # BLD AUTO: 4.59 MILLION/UL (ref 3.88–5.62)
SODIUM SERPL-SCNC: 139 MMOL/L (ref 136–145)
WBC # BLD AUTO: 7.23 THOUSAND/UL (ref 4.31–10.16)

## 2020-10-15 PROCEDURE — 80048 BASIC METABOLIC PNL TOTAL CA: CPT

## 2020-10-15 PROCEDURE — 99214 OFFICE O/P EST MOD 30 MIN: CPT | Performed by: NURSE PRACTITIONER

## 2020-10-15 PROCEDURE — 85027 COMPLETE CBC AUTOMATED: CPT

## 2020-10-15 PROCEDURE — 93000 ELECTROCARDIOGRAM COMPLETE: CPT | Performed by: NURSE PRACTITIONER

## 2020-10-16 ENCOUNTER — ANTICOAG VISIT (OUTPATIENT)
Dept: FAMILY MEDICINE CLINIC | Facility: CLINIC | Age: 55
End: 2020-10-16

## 2020-10-16 RX ORDER — SPIRONOLACTONE 25 MG/1
25 TABLET ORAL DAILY
Qty: 30 TABLET | Refills: 3 | Status: SHIPPED | OUTPATIENT
Start: 2020-10-16 | End: 2021-02-11 | Stop reason: SDUPTHER

## 2020-10-19 ENCOUNTER — TELEPHONE (OUTPATIENT)
Dept: CARDIOLOGY CLINIC | Facility: CLINIC | Age: 55
End: 2020-10-19

## 2020-10-26 ENCOUNTER — TELEPHONE (OUTPATIENT)
Dept: OTHER | Facility: OTHER | Age: 55
End: 2020-10-26

## 2020-10-26 ENCOUNTER — OFFICE VISIT (OUTPATIENT)
Dept: FAMILY MEDICINE CLINIC | Facility: CLINIC | Age: 55
End: 2020-10-26
Payer: COMMERCIAL

## 2020-10-26 VITALS
BODY MASS INDEX: 40.6 KG/M2 | RESPIRATION RATE: 16 BRPM | HEART RATE: 63 BPM | DIASTOLIC BLOOD PRESSURE: 100 MMHG | SYSTOLIC BLOOD PRESSURE: 190 MMHG | TEMPERATURE: 97.6 F | WEIGHT: 290 LBS | HEIGHT: 71 IN | OXYGEN SATURATION: 97 %

## 2020-10-26 DIAGNOSIS — Z09 FOLLOW-UP EXAMINATION: Primary | ICD-10-CM

## 2020-10-26 PROCEDURE — 99213 OFFICE O/P EST LOW 20 MIN: CPT | Performed by: NURSE PRACTITIONER

## 2020-10-26 RX ORDER — ACETAMINOPHEN 500 MG
1000 TABLET ORAL DAILY
COMMUNITY

## 2020-10-26 RX ORDER — WARFARIN SODIUM 2.5 MG/1
2.5 TABLET ORAL DAILY
COMMUNITY
Start: 2020-10-15 | End: 2020-11-02

## 2020-10-29 ENCOUNTER — APPOINTMENT (OUTPATIENT)
Dept: LAB | Facility: CLINIC | Age: 55
End: 2020-10-29
Payer: COMMERCIAL

## 2020-10-29 ENCOUNTER — OFFICE VISIT (OUTPATIENT)
Dept: CARDIOLOGY CLINIC | Facility: CLINIC | Age: 55
End: 2020-10-29
Payer: COMMERCIAL

## 2020-10-29 VITALS
HEIGHT: 71 IN | TEMPERATURE: 97.7 F | BODY MASS INDEX: 41.03 KG/M2 | HEART RATE: 50 BPM | WEIGHT: 293.1 LBS | SYSTOLIC BLOOD PRESSURE: 178 MMHG | DIASTOLIC BLOOD PRESSURE: 104 MMHG

## 2020-10-29 DIAGNOSIS — I82.501 LEG DVT (DEEP VENOUS THROMBOEMBOLISM), CHRONIC, RIGHT (HCC): Primary | ICD-10-CM

## 2020-10-29 DIAGNOSIS — N18.9 CHRONIC KIDNEY DISEASE, UNSPECIFIED CKD STAGE: ICD-10-CM

## 2020-10-29 DIAGNOSIS — E78.5 HYPERLIPIDEMIA, UNSPECIFIED HYPERLIPIDEMIA TYPE: ICD-10-CM

## 2020-10-29 DIAGNOSIS — I10 ESSENTIAL HYPERTENSION: Chronic | ICD-10-CM

## 2020-10-29 DIAGNOSIS — I25.10 CORONARY ARTERY DISEASE INVOLVING NATIVE HEART WITHOUT ANGINA PECTORIS, UNSPECIFIED VESSEL OR LESION TYPE: ICD-10-CM

## 2020-10-29 LAB
ANION GAP SERPL CALCULATED.3IONS-SCNC: 6 MMOL/L (ref 4–13)
BUN SERPL-MCNC: 26 MG/DL (ref 5–25)
CALCIUM SERPL-MCNC: 9.4 MG/DL (ref 8.3–10.1)
CHLORIDE SERPL-SCNC: 106 MMOL/L (ref 100–108)
CO2 SERPL-SCNC: 29 MMOL/L (ref 21–32)
CREAT SERPL-MCNC: 1.32 MG/DL (ref 0.6–1.3)
GFR SERPL CREATININE-BSD FRML MDRD: 69 ML/MIN/1.73SQ M
GLUCOSE SERPL-MCNC: 90 MG/DL (ref 65–140)
POTASSIUM SERPL-SCNC: 4.6 MMOL/L (ref 3.5–5.3)
SODIUM SERPL-SCNC: 141 MMOL/L (ref 136–145)

## 2020-10-29 PROCEDURE — 99214 OFFICE O/P EST MOD 30 MIN: CPT | Performed by: INTERNAL MEDICINE

## 2020-10-29 PROCEDURE — 80048 BASIC METABOLIC PNL TOTAL CA: CPT

## 2020-10-29 RX ORDER — HYDROCHLOROTHIAZIDE 25 MG/1
25 TABLET ORAL DAILY
Qty: 90 TABLET | Refills: 3 | Status: SHIPPED | OUTPATIENT
Start: 2020-10-29 | End: 2022-06-07 | Stop reason: ALTCHOICE

## 2020-10-30 ENCOUNTER — TELEPHONE (OUTPATIENT)
Dept: CARDIOLOGY CLINIC | Facility: CLINIC | Age: 55
End: 2020-10-30

## 2020-11-01 DIAGNOSIS — I82.501 CHRONIC EMBOLISM AND THROMBOSIS OF UNSPECIFIED DEEP VEINS OF RIGHT LOWER EXTREMITY (HCC): ICD-10-CM

## 2020-11-02 ENCOUNTER — TELEPHONE (OUTPATIENT)
Dept: CARDIOLOGY CLINIC | Facility: CLINIC | Age: 55
End: 2020-11-02

## 2020-11-02 RX ORDER — WARFARIN SODIUM 2.5 MG/1
TABLET ORAL
Qty: 30 TABLET | Refills: 2 | Status: SHIPPED | OUTPATIENT
Start: 2020-11-02 | End: 2021-11-05 | Stop reason: SDUPTHER

## 2020-11-06 ENCOUNTER — TELEPHONE (OUTPATIENT)
Dept: CARDIOLOGY CLINIC | Facility: CLINIC | Age: 55
End: 2020-11-06

## 2020-11-06 ENCOUNTER — TELEPHONE (OUTPATIENT)
Dept: SLEEP CENTER | Facility: CLINIC | Age: 55
End: 2020-11-06

## 2020-11-09 ENCOUNTER — TELEPHONE (OUTPATIENT)
Dept: CARDIOLOGY CLINIC | Facility: CLINIC | Age: 55
End: 2020-11-09

## 2020-11-11 ENCOUNTER — TELEPHONE (OUTPATIENT)
Dept: CARDIOLOGY CLINIC | Facility: CLINIC | Age: 55
End: 2020-11-11

## 2020-11-11 ENCOUNTER — HOSPITAL ENCOUNTER (OUTPATIENT)
Dept: NON INVASIVE DIAGNOSTICS | Facility: HOSPITAL | Age: 55
Discharge: HOME | End: 2020-11-11
Payer: COMMERCIAL

## 2020-11-11 ENCOUNTER — APPOINTMENT (OUTPATIENT)
Dept: LAB | Facility: HOSPITAL | Age: 55
End: 2020-11-11
Payer: COMMERCIAL

## 2020-11-11 DIAGNOSIS — I25.10 CORONARY ARTERY DISEASE INVOLVING NATIVE HEART WITHOUT ANGINA PECTORIS, UNSPECIFIED VESSEL OR LESION TYPE: ICD-10-CM

## 2020-11-11 DIAGNOSIS — Z01.810 PREOP CARDIOVASCULAR EXAM: ICD-10-CM

## 2020-11-11 DIAGNOSIS — I10 ESSENTIAL HYPERTENSION: ICD-10-CM

## 2020-11-11 PROCEDURE — 93306 TTE W/DOPPLER COMPLETE: CPT | Performed by: INTERNAL MEDICINE

## 2020-11-11 PROCEDURE — 93306 TTE W/DOPPLER COMPLETE: CPT

## 2020-11-11 PROCEDURE — 93356 MYOCRD STRAIN IMG SPCKL TRCK: CPT

## 2020-11-12 ENCOUNTER — OFFICE VISIT (OUTPATIENT)
Dept: FAMILY MEDICINE CLINIC | Facility: CLINIC | Age: 55
End: 2020-11-12
Payer: COMMERCIAL

## 2020-11-12 VITALS
TEMPERATURE: 98 F | BODY MASS INDEX: 41.02 KG/M2 | HEART RATE: 50 BPM | HEIGHT: 71 IN | SYSTOLIC BLOOD PRESSURE: 158 MMHG | DIASTOLIC BLOOD PRESSURE: 92 MMHG | RESPIRATION RATE: 16 BRPM | OXYGEN SATURATION: 98 % | WEIGHT: 293 LBS

## 2020-11-12 DIAGNOSIS — Z01.818 PREOPERATIVE CLEARANCE: ICD-10-CM

## 2020-11-12 DIAGNOSIS — I82.501 LEG DVT (DEEP VENOUS THROMBOEMBOLISM), CHRONIC, RIGHT (HCC): Primary | ICD-10-CM

## 2020-11-12 PROCEDURE — 1036F TOBACCO NON-USER: CPT | Performed by: NURSE PRACTITIONER

## 2020-11-12 PROCEDURE — 99213 OFFICE O/P EST LOW 20 MIN: CPT | Performed by: NURSE PRACTITIONER

## 2020-11-13 ENCOUNTER — ANTICOAG VISIT (OUTPATIENT)
Dept: FAMILY MEDICINE CLINIC | Facility: CLINIC | Age: 55
End: 2020-11-13

## 2020-11-17 ENCOUNTER — APPOINTMENT (EMERGENCY)
Dept: RADIOLOGY | Facility: HOSPITAL | Age: 55
DRG: 872 | End: 2020-11-17
Payer: COMMERCIAL

## 2020-11-17 ENCOUNTER — APPOINTMENT (EMERGENCY)
Dept: CT IMAGING | Facility: HOSPITAL | Age: 55
DRG: 872 | End: 2020-11-17
Payer: COMMERCIAL

## 2020-11-17 ENCOUNTER — HOSPITAL ENCOUNTER (INPATIENT)
Facility: HOSPITAL | Age: 55
LOS: 3 days | Discharge: HOME | DRG: 872 | End: 2020-11-20
Attending: EMERGENCY MEDICINE | Admitting: INTERNAL MEDICINE
Payer: COMMERCIAL

## 2020-11-17 DIAGNOSIS — L03.116 CELLULITIS OF LEFT LEG: ICD-10-CM

## 2020-11-17 DIAGNOSIS — R50.9 FEVER: Primary | ICD-10-CM

## 2020-11-17 DIAGNOSIS — A41.9 SEPSIS, DUE TO UNSPECIFIED ORGANISM, UNSPECIFIED WHETHER ACUTE ORGAN DYSFUNCTION PRESENT (HCC): ICD-10-CM

## 2020-11-17 DIAGNOSIS — R65.10 SIRS (SYSTEMIC INFLAMMATORY RESPONSE SYNDROME) (HCC): ICD-10-CM

## 2020-11-17 DIAGNOSIS — L03.116 CELLULITIS OF LEFT LOWER EXTREMITY: ICD-10-CM

## 2020-11-17 DIAGNOSIS — L85.3 DRY SKIN: ICD-10-CM

## 2020-11-17 LAB
ALBUMIN SERPL BCP-MCNC: 4.5 G/DL (ref 3.4–4.8)
ALP SERPL-CCNC: 58.4 U/L (ref 10–129)
ALT SERPL W P-5'-P-CCNC: 17 U/L (ref 5–63)
ANION GAP SERPL CALCULATED.3IONS-SCNC: 9 MMOL/L (ref 4–13)
APTT PPP: 36 SECONDS (ref 23–31)
AST SERPL W P-5'-P-CCNC: 27 U/L (ref 15–41)
BACTERIA UR QL AUTO: NORMAL /HPF
BASOPHILS # BLD AUTO: 0.01 THOUSANDS/ΜL (ref 0–0.1)
BASOPHILS NFR BLD AUTO: 0 % (ref 0–1)
BILIRUB SERPL-MCNC: 1.23 MG/DL (ref 0.3–1.2)
BILIRUB UR QL STRIP: NEGATIVE
BUN SERPL-MCNC: 24 MG/DL (ref 6–20)
CALCIUM SERPL-MCNC: 9.7 MG/DL (ref 8.4–10.2)
CHLORIDE SERPL-SCNC: 97 MMOL/L (ref 96–108)
CLARITY UR: CLEAR
CO2 SERPL-SCNC: 28 MMOL/L (ref 22–33)
COLOR UR: YELLOW
CREAT SERPL-MCNC: 1.56 MG/DL (ref 0.5–1.2)
EOSINOPHIL # BLD AUTO: 0 THOUSAND/ΜL (ref 0–0.61)
EOSINOPHIL NFR BLD AUTO: 0 % (ref 0–6)
ERYTHROCYTE [DISTWIDTH] IN BLOOD BY AUTOMATED COUNT: 14.4 % (ref 11.6–15.1)
EXT SARS-COV-2: NEGATIVE
FLUAV RNA RESP QL NAA+PROBE: NEGATIVE
FLUBV RNA RESP QL NAA+PROBE: NEGATIVE
GFR SERPL CREATININE-BSD FRML MDRD: 57 ML/MIN/1.73SQ M
GLUCOSE SERPL-MCNC: 98 MG/DL (ref 65–140)
GLUCOSE UR STRIP-MCNC: NEGATIVE MG/DL
HCT VFR BLD AUTO: 40.3 % (ref 36.5–49.3)
HGB BLD-MCNC: 13.7 G/DL (ref 12–17)
HGB UR QL STRIP.AUTO: ABNORMAL
IMM GRANULOCYTES # BLD AUTO: 0.07 THOUSAND/UL (ref 0–0.2)
IMM GRANULOCYTES NFR BLD AUTO: 0 % (ref 0–2)
INR PPP: 1.57 (ref 0.9–1.1)
KETONES UR STRIP-MCNC: NEGATIVE MG/DL
LACTATE SERPL-SCNC: 1.7 MMOL/L (ref 0–2)
LEUKOCYTE ESTERASE UR QL STRIP: NEGATIVE
LIPASE SERPL-CCNC: <6 U/L (ref 13–60)
LYMPHOCYTES # BLD AUTO: 0.73 THOUSANDS/ΜL (ref 0.6–4.47)
LYMPHOCYTES NFR BLD AUTO: 4 % (ref 14–44)
MCH RBC QN AUTO: 29 PG (ref 26.8–34.3)
MCHC RBC AUTO-ENTMCNC: 34 G/DL (ref 31.4–37.4)
MCV RBC AUTO: 85 FL (ref 82–98)
MONOCYTES # BLD AUTO: 0.74 THOUSAND/ΜL (ref 0.17–1.22)
MONOCYTES NFR BLD AUTO: 4 % (ref 4–12)
NEUTROPHILS # BLD AUTO: 17.53 THOUSANDS/ΜL (ref 1.85–7.62)
NEUTS SEG NFR BLD AUTO: 92 % (ref 43–75)
NITRITE UR QL STRIP: NEGATIVE
NON-SQ EPI CELLS URNS QL MICRO: NORMAL /HPF
PH UR STRIP.AUTO: 6 [PH]
PLATELET # BLD AUTO: 155 THOUSANDS/UL (ref 149–390)
PMV BLD AUTO: 10.7 FL (ref 8.9–12.7)
POTASSIUM SERPL-SCNC: 3.8 MMOL/L (ref 3.5–5)
PROT SERPL-MCNC: 8.3 G/DL (ref 6.4–8.3)
PROT UR STRIP-MCNC: ABNORMAL MG/DL
PROTHROMBIN TIME: 17.4 SECONDS (ref 9.5–12.1)
RBC # BLD AUTO: 4.72 MILLION/UL (ref 3.88–5.62)
RBC #/AREA URNS AUTO: NORMAL /HPF
RSV RNA RESP QL NAA+PROBE: NEGATIVE
SARS-COV-2 RNA RESP QL NAA+PROBE: NEGATIVE
SODIUM SERPL-SCNC: 134 MMOL/L (ref 133–145)
SP GR UR STRIP.AUTO: <=1.005 (ref 1–1.03)
TROPONIN I SERPL-MCNC: <0.03 NG/ML (ref 0–0.07)
UROBILINOGEN UR QL STRIP.AUTO: 0.2 E.U./DL
WBC # BLD AUTO: 19.08 THOUSAND/UL (ref 4.31–10.16)
WBC #/AREA URNS AUTO: NORMAL /HPF

## 2020-11-17 PROCEDURE — 36415 COLL VENOUS BLD VENIPUNCTURE: CPT | Performed by: PHYSICIAN ASSISTANT

## 2020-11-17 PROCEDURE — 84145 PROCALCITONIN (PCT): CPT | Performed by: PHYSICIAN ASSISTANT

## 2020-11-17 PROCEDURE — 0241U HB NFCT DS VIR RESP RNA 4 TRGT: CPT | Performed by: PHYSICIAN ASSISTANT

## 2020-11-17 PROCEDURE — 99285 EMERGENCY DEPT VISIT HI MDM: CPT | Performed by: PHYSICIAN ASSISTANT

## 2020-11-17 PROCEDURE — 96375 TX/PRO/DX INJ NEW DRUG ADDON: CPT

## 2020-11-17 PROCEDURE — 83605 ASSAY OF LACTIC ACID: CPT | Performed by: PHYSICIAN ASSISTANT

## 2020-11-17 PROCEDURE — 85025 COMPLETE CBC W/AUTO DIFF WBC: CPT | Performed by: PHYSICIAN ASSISTANT

## 2020-11-17 PROCEDURE — 85610 PROTHROMBIN TIME: CPT | Performed by: PHYSICIAN ASSISTANT

## 2020-11-17 PROCEDURE — 96368 THER/DIAG CONCURRENT INF: CPT

## 2020-11-17 PROCEDURE — 96367 TX/PROPH/DG ADDL SEQ IV INF: CPT

## 2020-11-17 PROCEDURE — 71045 X-RAY EXAM CHEST 1 VIEW: CPT

## 2020-11-17 PROCEDURE — 87040 BLOOD CULTURE FOR BACTERIA: CPT | Performed by: PHYSICIAN ASSISTANT

## 2020-11-17 PROCEDURE — 85730 THROMBOPLASTIN TIME PARTIAL: CPT | Performed by: PHYSICIAN ASSISTANT

## 2020-11-17 PROCEDURE — 74177 CT ABD & PELVIS W/CONTRAST: CPT

## 2020-11-17 PROCEDURE — 73700 CT LOWER EXTREMITY W/O DYE: CPT

## 2020-11-17 PROCEDURE — G1004 CDSM NDSC: HCPCS

## 2020-11-17 PROCEDURE — 96365 THER/PROPH/DIAG IV INF INIT: CPT

## 2020-11-17 PROCEDURE — 96366 THER/PROPH/DIAG IV INF ADDON: CPT

## 2020-11-17 PROCEDURE — 80053 COMPREHEN METABOLIC PANEL: CPT | Performed by: PHYSICIAN ASSISTANT

## 2020-11-17 PROCEDURE — 81001 URINALYSIS AUTO W/SCOPE: CPT | Performed by: PHYSICIAN ASSISTANT

## 2020-11-17 PROCEDURE — 99285 EMERGENCY DEPT VISIT HI MDM: CPT

## 2020-11-17 PROCEDURE — 83690 ASSAY OF LIPASE: CPT | Performed by: PHYSICIAN ASSISTANT

## 2020-11-17 PROCEDURE — 84484 ASSAY OF TROPONIN QUANT: CPT | Performed by: PHYSICIAN ASSISTANT

## 2020-11-17 RX ORDER — SPIRONOLACTONE 25 MG/1
25 TABLET ORAL DAILY
Status: DISCONTINUED | OUTPATIENT
Start: 2020-11-18 | End: 2020-11-20 | Stop reason: HOSPADM

## 2020-11-17 RX ORDER — CEFEPIME HYDROCHLORIDE 2 G/50ML
2000 INJECTION, SOLUTION INTRAVENOUS ONCE
Status: COMPLETED | OUTPATIENT
Start: 2020-11-17 | End: 2020-11-17

## 2020-11-17 RX ORDER — CEFAZOLIN SODIUM 2 G/50ML
2000 SOLUTION INTRAVENOUS EVERY 8 HOURS
Status: DISCONTINUED | OUTPATIENT
Start: 2020-11-18 | End: 2020-11-19

## 2020-11-17 RX ORDER — IBUPROFEN 600 MG/1
600 TABLET ORAL ONCE
Status: COMPLETED | OUTPATIENT
Start: 2020-11-17 | End: 2020-11-17

## 2020-11-17 RX ORDER — HYDROCHLOROTHIAZIDE 25 MG/1
25 TABLET ORAL DAILY
Status: DISCONTINUED | OUTPATIENT
Start: 2020-11-18 | End: 2020-11-20 | Stop reason: HOSPADM

## 2020-11-17 RX ORDER — ISOSORBIDE MONONITRATE 30 MG/1
30 TABLET, EXTENDED RELEASE ORAL 2 TIMES DAILY
Status: DISCONTINUED | OUTPATIENT
Start: 2020-11-17 | End: 2020-11-20 | Stop reason: HOSPADM

## 2020-11-17 RX ORDER — CARVEDILOL 12.5 MG/1
25 TABLET ORAL 2 TIMES DAILY WITH MEALS
Status: DISCONTINUED | OUTPATIENT
Start: 2020-11-18 | End: 2020-11-20 | Stop reason: HOSPADM

## 2020-11-17 RX ORDER — CEFEPIME HYDROCHLORIDE 2 G/50ML
2000 INJECTION, SOLUTION INTRAVENOUS ONCE
Status: CANCELLED | OUTPATIENT
Start: 2020-11-17 | End: 2020-11-17

## 2020-11-17 RX ORDER — SODIUM CHLORIDE 9 MG/ML
75 INJECTION, SOLUTION INTRAVENOUS CONTINUOUS
Status: DISCONTINUED | OUTPATIENT
Start: 2020-11-17 | End: 2020-11-20 | Stop reason: HOSPADM

## 2020-11-17 RX ORDER — SODIUM CHLORIDE, SODIUM LACTATE, POTASSIUM CHLORIDE, CALCIUM CHLORIDE 600; 310; 30; 20 MG/100ML; MG/100ML; MG/100ML; MG/100ML
50 INJECTION, SOLUTION INTRAVENOUS CONTINUOUS
Status: CANCELLED | OUTPATIENT
Start: 2020-11-17

## 2020-11-17 RX ORDER — ONDANSETRON 2 MG/ML
4 INJECTION INTRAMUSCULAR; INTRAVENOUS ONCE
Status: COMPLETED | OUTPATIENT
Start: 2020-11-17 | End: 2020-11-17

## 2020-11-17 RX ORDER — CEFEPIME HYDROCHLORIDE 2 G/50ML
2000 INJECTION, SOLUTION INTRAVENOUS EVERY 12 HOURS
Status: DISCONTINUED | OUTPATIENT
Start: 2020-11-18 | End: 2020-11-17

## 2020-11-17 RX ADMIN — ONDANSETRON 4 MG: 2 INJECTION INTRAMUSCULAR; INTRAVENOUS at 19:46

## 2020-11-17 RX ADMIN — IBUPROFEN 600 MG: 600 TABLET, FILM COATED ORAL at 19:46

## 2020-11-17 RX ADMIN — SODIUM CHLORIDE, SODIUM LACTATE, POTASSIUM CHLORIDE, AND CALCIUM CHLORIDE 1000 ML: .6; .31; .03; .02 INJECTION, SOLUTION INTRAVENOUS at 19:48

## 2020-11-17 RX ADMIN — AZITHROMYCIN 500 MG: 500 INJECTION, POWDER, LYOPHILIZED, FOR SOLUTION INTRAVENOUS at 21:22

## 2020-11-17 RX ADMIN — SODIUM CHLORIDE, SODIUM LACTATE, POTASSIUM CHLORIDE, AND CALCIUM CHLORIDE 1000 ML: .6; .31; .03; .02 INJECTION, SOLUTION INTRAVENOUS at 21:15

## 2020-11-17 RX ADMIN — CEFEPIME HYDROCHLORIDE 2000 MG: 2 INJECTION, SOLUTION INTRAVENOUS at 19:47

## 2020-11-17 RX ADMIN — IOHEXOL 100 ML: 350 INJECTION, SOLUTION INTRAVENOUS at 20:12

## 2020-11-17 RX ADMIN — SODIUM CHLORIDE, SODIUM LACTATE, POTASSIUM CHLORIDE, AND CALCIUM CHLORIDE 1000 ML: .6; .31; .03; .02 INJECTION, SOLUTION INTRAVENOUS at 19:29

## 2020-11-18 ENCOUNTER — APPOINTMENT (INPATIENT)
Dept: VASCULAR ULTRASOUND | Facility: HOSPITAL | Age: 55
DRG: 872 | End: 2020-11-18
Payer: COMMERCIAL

## 2020-11-18 PROBLEM — R65.10 SIRS (SYSTEMIC INFLAMMATORY RESPONSE SYNDROME) (HCC): Status: RESOLVED | Noted: 2020-06-29 | Resolved: 2020-11-18

## 2020-11-18 PROBLEM — Z86.718 HISTORY OF DVT (DEEP VEIN THROMBOSIS): Status: ACTIVE | Noted: 2020-11-18

## 2020-11-18 PROBLEM — N18.9 CKD (CHRONIC KIDNEY DISEASE): Status: ACTIVE | Noted: 2020-11-18

## 2020-11-18 LAB
ALBUMIN SERPL BCP-MCNC: 3.6 G/DL (ref 3.4–4.8)
ALP SERPL-CCNC: 50.7 U/L (ref 10–129)
ALT SERPL W P-5'-P-CCNC: 23 U/L (ref 5–63)
ANION GAP SERPL CALCULATED.3IONS-SCNC: 10 MMOL/L (ref 4–13)
AST SERPL W P-5'-P-CCNC: 34 U/L (ref 15–41)
BASOPHILS # BLD MANUAL: 0 THOUSAND/UL (ref 0–0.1)
BASOPHILS NFR MAR MANUAL: 0 % (ref 0–1)
BILIRUB SERPL-MCNC: 1.35 MG/DL (ref 0.3–1.2)
BUN SERPL-MCNC: 27 MG/DL (ref 6–20)
CALCIUM SERPL-MCNC: 8.8 MG/DL (ref 8.4–10.2)
CHLORIDE SERPL-SCNC: 100 MMOL/L (ref 96–108)
CO2 SERPL-SCNC: 27 MMOL/L (ref 22–33)
CREAT SERPL-MCNC: 1.63 MG/DL (ref 0.5–1.2)
EOSINOPHIL # BLD MANUAL: 0 THOUSAND/UL (ref 0–0.4)
EOSINOPHIL NFR BLD MANUAL: 0 % (ref 0–6)
ERYTHROCYTE [DISTWIDTH] IN BLOOD BY AUTOMATED COUNT: 14.9 % (ref 11.6–15.1)
GFR SERPL CREATININE-BSD FRML MDRD: 54 ML/MIN/1.73SQ M
GLUCOSE SERPL-MCNC: 93 MG/DL (ref 65–140)
HCT VFR BLD AUTO: 37.3 % (ref 36.5–49.3)
HGB BLD-MCNC: 12.6 G/DL (ref 12–17)
INR PPP: 1.57 (ref 0.84–1.19)
LYMPHOCYTES # BLD AUTO: 0.83 THOUSAND/UL (ref 0.6–4.47)
LYMPHOCYTES # BLD AUTO: 4 % (ref 14–44)
MCH RBC QN AUTO: 29.3 PG (ref 26.8–34.3)
MCHC RBC AUTO-ENTMCNC: 33.8 G/DL (ref 31.4–37.4)
MCV RBC AUTO: 87 FL (ref 82–98)
MONOCYTES # BLD AUTO: 0.41 THOUSAND/UL (ref 0–1.22)
MONOCYTES NFR BLD: 2 % (ref 4–12)
NEUTROPHILS # BLD MANUAL: 19.49 THOUSAND/UL (ref 1.85–7.62)
NEUTS BAND NFR BLD MANUAL: 25 % (ref 0–8)
NEUTS SEG NFR BLD AUTO: 69 % (ref 43–75)
PLATELET # BLD AUTO: 147 THOUSANDS/UL (ref 149–390)
PLATELET BLD QL SMEAR: ABNORMAL
PMV BLD AUTO: 12.5 FL (ref 8.9–12.7)
POTASSIUM SERPL-SCNC: 3.6 MMOL/L (ref 3.5–5)
PROCALCITONIN SERPL-MCNC: 22.69 NG/ML
PROCALCITONIN SERPL-MCNC: 6.88 NG/ML
PROT SERPL-MCNC: 6.9 G/DL (ref 6.4–8.3)
RBC # BLD AUTO: 4.3 MILLION/UL (ref 3.88–5.62)
RBC MORPH BLD: NORMAL
SODIUM SERPL-SCNC: 137 MMOL/L (ref 133–145)
TOTAL CELLS COUNTED SPEC: 100
WBC # BLD AUTO: 20.73 THOUSAND/UL (ref 4.31–10.16)
WBC TOXIC VACUOLES BLD QL SMEAR: PRESENT

## 2020-11-18 PROCEDURE — 87081 CULTURE SCREEN ONLY: CPT | Performed by: INTERNAL MEDICINE

## 2020-11-18 PROCEDURE — 80053 COMPREHEN METABOLIC PANEL: CPT | Performed by: INTERNAL MEDICINE

## 2020-11-18 PROCEDURE — 84145 PROCALCITONIN (PCT): CPT | Performed by: PHYSICIAN ASSISTANT

## 2020-11-18 PROCEDURE — 93971 EXTREMITY STUDY: CPT | Performed by: SURGERY

## 2020-11-18 PROCEDURE — 93971 EXTREMITY STUDY: CPT

## 2020-11-18 PROCEDURE — 85027 COMPLETE CBC AUTOMATED: CPT | Performed by: INTERNAL MEDICINE

## 2020-11-18 PROCEDURE — 99232 SBSQ HOSP IP/OBS MODERATE 35: CPT | Performed by: INTERNAL MEDICINE

## 2020-11-18 PROCEDURE — 85007 BL SMEAR W/DIFF WBC COUNT: CPT | Performed by: INTERNAL MEDICINE

## 2020-11-18 RX ORDER — METRONIDAZOLE 500 MG/1
500 TABLET ORAL EVERY 8 HOURS SCHEDULED
Status: DISCONTINUED | OUTPATIENT
Start: 2020-11-18 | End: 2020-11-19

## 2020-11-18 RX ORDER — ONDANSETRON 2 MG/ML
4 INJECTION INTRAMUSCULAR; INTRAVENOUS EVERY 8 HOURS PRN
Status: DISCONTINUED | OUTPATIENT
Start: 2020-11-18 | End: 2020-11-19

## 2020-11-18 RX ORDER — ACETAMINOPHEN 325 MG/1
650 TABLET ORAL EVERY 6 HOURS PRN
Status: DISCONTINUED | OUTPATIENT
Start: 2020-11-18 | End: 2020-11-20 | Stop reason: HOSPADM

## 2020-11-18 RX ADMIN — METRONIDAZOLE 500 MG: 500 INJECTION, SOLUTION INTRAVENOUS at 01:21

## 2020-11-18 RX ADMIN — CARVEDILOL 25 MG: 12.5 TABLET, FILM COATED ORAL at 08:58

## 2020-11-18 RX ADMIN — METRONIDAZOLE 500 MG: 500 TABLET ORAL at 23:57

## 2020-11-18 RX ADMIN — SODIUM CHLORIDE 75 ML/HR: 0.9 INJECTION, SOLUTION INTRAVENOUS at 18:21

## 2020-11-18 RX ADMIN — ENOXAPARIN SODIUM 80 MG: 80 INJECTION SUBCUTANEOUS at 02:36

## 2020-11-18 RX ADMIN — SPIRONOLACTONE 25 MG: 25 TABLET, FILM COATED ORAL at 08:58

## 2020-11-18 RX ADMIN — ACETAMINOPHEN 650 MG: 325 TABLET, FILM COATED ORAL at 15:50

## 2020-11-18 RX ADMIN — CARVEDILOL 25 MG: 12.5 TABLET, FILM COATED ORAL at 15:50

## 2020-11-18 RX ADMIN — ISOSORBIDE MONONITRATE 30 MG: 30 TABLET, EXTENDED RELEASE ORAL at 01:20

## 2020-11-18 RX ADMIN — CEFAZOLIN SODIUM 2000 MG: 2 SOLUTION INTRAVENOUS at 22:11

## 2020-11-18 RX ADMIN — ONDANSETRON 4 MG: 2 INJECTION INTRAMUSCULAR; INTRAVENOUS at 22:20

## 2020-11-18 RX ADMIN — CEFAZOLIN SODIUM 2000 MG: 2 SOLUTION INTRAVENOUS at 05:08

## 2020-11-18 RX ADMIN — SODIUM CHLORIDE 75 ML/HR: 0.9 INJECTION, SOLUTION INTRAVENOUS at 01:21

## 2020-11-18 RX ADMIN — METRONIDAZOLE 500 MG: 500 INJECTION, SOLUTION INTRAVENOUS at 08:58

## 2020-11-18 RX ADMIN — METRONIDAZOLE 500 MG: 500 TABLET ORAL at 15:50

## 2020-11-18 RX ADMIN — HYDROCHLOROTHIAZIDE 25 MG: 25 TABLET ORAL at 08:58

## 2020-11-18 RX ADMIN — ISOSORBIDE MONONITRATE 30 MG: 30 TABLET, EXTENDED RELEASE ORAL at 18:15

## 2020-11-18 RX ADMIN — ISOSORBIDE MONONITRATE 30 MG: 30 TABLET, EXTENDED RELEASE ORAL at 08:58

## 2020-11-18 RX ADMIN — CEFAZOLIN SODIUM 2000 MG: 2 SOLUTION INTRAVENOUS at 13:55

## 2020-11-19 PROBLEM — R59.1 LYMPHADENOPATHY: Status: ACTIVE | Noted: 2020-11-19

## 2020-11-19 LAB
ANION GAP SERPL CALCULATED.3IONS-SCNC: 7 MMOL/L (ref 4–13)
BUN SERPL-MCNC: 22 MG/DL (ref 6–20)
CALCIUM SERPL-MCNC: 8.8 MG/DL (ref 8.4–10.2)
CHLORIDE SERPL-SCNC: 100 MMOL/L (ref 96–108)
CO2 SERPL-SCNC: 28 MMOL/L (ref 22–33)
CREAT SERPL-MCNC: 1.37 MG/DL (ref 0.5–1.2)
ERYTHROCYTE [DISTWIDTH] IN BLOOD BY AUTOMATED COUNT: 14.9 % (ref 11.6–15.1)
GFR SERPL CREATININE-BSD FRML MDRD: 66 ML/MIN/1.73SQ M
GLUCOSE SERPL-MCNC: 116 MG/DL (ref 65–140)
HCT VFR BLD AUTO: 36.6 % (ref 36.5–49.3)
HGB BLD-MCNC: 12.1 G/DL (ref 12–17)
MCH RBC QN AUTO: 28.7 PG (ref 26.8–34.3)
MCHC RBC AUTO-ENTMCNC: 33.1 G/DL (ref 31.4–37.4)
MCV RBC AUTO: 87 FL (ref 82–98)
PLATELET # BLD AUTO: 153 THOUSANDS/UL (ref 149–390)
PMV BLD AUTO: 11.9 FL (ref 8.9–12.7)
POTASSIUM SERPL-SCNC: 3.7 MMOL/L (ref 3.5–5)
PROCALCITONIN SERPL-MCNC: 14.57 NG/ML
RBC # BLD AUTO: 4.22 MILLION/UL (ref 3.88–5.62)
SODIUM SERPL-SCNC: 135 MMOL/L (ref 133–145)
WBC # BLD AUTO: 16.28 THOUSAND/UL (ref 4.31–10.16)

## 2020-11-19 PROCEDURE — 80048 BASIC METABOLIC PNL TOTAL CA: CPT | Performed by: INTERNAL MEDICINE

## 2020-11-19 PROCEDURE — 99232 SBSQ HOSP IP/OBS MODERATE 35: CPT | Performed by: INTERNAL MEDICINE

## 2020-11-19 PROCEDURE — 85027 COMPLETE CBC AUTOMATED: CPT | Performed by: INTERNAL MEDICINE

## 2020-11-19 PROCEDURE — 99223 1ST HOSP IP/OBS HIGH 75: CPT | Performed by: INTERNAL MEDICINE

## 2020-11-19 PROCEDURE — 87081 CULTURE SCREEN ONLY: CPT | Performed by: INTERNAL MEDICINE

## 2020-11-19 PROCEDURE — 84145 PROCALCITONIN (PCT): CPT | Performed by: INTERNAL MEDICINE

## 2020-11-19 RX ORDER — CEFEPIME HYDROCHLORIDE 2 G/50ML
2000 INJECTION, SOLUTION INTRAVENOUS EVERY 12 HOURS
Status: DISCONTINUED | OUTPATIENT
Start: 2020-11-19 | End: 2020-11-19

## 2020-11-19 RX ORDER — CEFEPIME HYDROCHLORIDE 2 G/50ML
2000 INJECTION, SOLUTION INTRAVENOUS EVERY 8 HOURS
Status: DISCONTINUED | OUTPATIENT
Start: 2020-11-19 | End: 2020-11-19

## 2020-11-19 RX ORDER — ONDANSETRON 2 MG/ML
4 INJECTION INTRAMUSCULAR; INTRAVENOUS EVERY 6 HOURS PRN
Status: DISCONTINUED | OUTPATIENT
Start: 2020-11-19 | End: 2020-11-20 | Stop reason: HOSPADM

## 2020-11-19 RX ORDER — CEFAZOLIN SODIUM 2 G/50ML
2000 SOLUTION INTRAVENOUS EVERY 8 HOURS
Status: DISCONTINUED | OUTPATIENT
Start: 2020-11-19 | End: 2020-11-19

## 2020-11-19 RX ORDER — METOCLOPRAMIDE 10 MG/1
10 TABLET ORAL
Status: DISCONTINUED | OUTPATIENT
Start: 2020-11-19 | End: 2020-11-19

## 2020-11-19 RX ORDER — LANOLIN ALCOHOL/MO/W.PET/CERES
CREAM (GRAM) TOPICAL 2 TIMES DAILY PRN
Status: DISCONTINUED | OUTPATIENT
Start: 2020-11-19 | End: 2020-11-20 | Stop reason: HOSPADM

## 2020-11-19 RX ORDER — CEFAZOLIN SODIUM 2 G/50ML
2000 SOLUTION INTRAVENOUS EVERY 12 HOURS
Status: DISCONTINUED | OUTPATIENT
Start: 2020-11-19 | End: 2020-11-20

## 2020-11-19 RX ADMIN — ACETAMINOPHEN 650 MG: 325 TABLET, FILM COATED ORAL at 05:21

## 2020-11-19 RX ADMIN — SPIRONOLACTONE 25 MG: 25 TABLET, FILM COATED ORAL at 08:45

## 2020-11-19 RX ADMIN — ONDANSETRON 4 MG: 2 INJECTION INTRAMUSCULAR; INTRAVENOUS at 05:21

## 2020-11-19 RX ADMIN — METOCLOPRAMIDE 10 MG: 10 TABLET ORAL at 12:31

## 2020-11-19 RX ADMIN — CEFAZOLIN SODIUM 2000 MG: 2 SOLUTION INTRAVENOUS at 16:52

## 2020-11-19 RX ADMIN — CEFAZOLIN SODIUM 2000 MG: 2 SOLUTION INTRAVENOUS at 05:04

## 2020-11-19 RX ADMIN — METRONIDAZOLE 500 MG: 500 TABLET ORAL at 08:45

## 2020-11-19 RX ADMIN — ISOSORBIDE MONONITRATE 30 MG: 30 TABLET, EXTENDED RELEASE ORAL at 16:52

## 2020-11-19 RX ADMIN — ISOSORBIDE MONONITRATE 30 MG: 30 TABLET, EXTENDED RELEASE ORAL at 08:45

## 2020-11-19 RX ADMIN — VANCOMYCIN HYDROCHLORIDE 1750 MG: 1 INJECTION, POWDER, LYOPHILIZED, FOR SOLUTION INTRAVENOUS at 13:24

## 2020-11-19 RX ADMIN — CEFEPIME HYDROCHLORIDE 2000 MG: 2 INJECTION, SOLUTION INTRAVENOUS at 10:47

## 2020-11-19 RX ADMIN — ONDANSETRON 4 MG: 2 INJECTION INTRAMUSCULAR; INTRAVENOUS at 16:52

## 2020-11-19 RX ADMIN — VANCOMYCIN HYDROCHLORIDE 1750 MG: 1 INJECTION, POWDER, LYOPHILIZED, FOR SOLUTION INTRAVENOUS at 23:42

## 2020-11-19 RX ADMIN — CARVEDILOL 25 MG: 12.5 TABLET, FILM COATED ORAL at 16:41

## 2020-11-19 RX ADMIN — HYDROCHLOROTHIAZIDE 25 MG: 25 TABLET ORAL at 08:45

## 2020-11-19 RX ADMIN — ENOXAPARIN SODIUM 80 MG: 80 INJECTION SUBCUTANEOUS at 05:07

## 2020-11-19 RX ADMIN — CARVEDILOL 25 MG: 12.5 TABLET, FILM COATED ORAL at 08:44

## 2020-11-19 RX ADMIN — METOCLOPRAMIDE 10 MG: 10 TABLET ORAL at 08:48

## 2020-11-20 ENCOUNTER — TRANSITIONAL CARE MANAGEMENT (OUTPATIENT)
Dept: FAMILY MEDICINE CLINIC | Facility: CLINIC | Age: 55
End: 2020-11-20

## 2020-11-20 ENCOUNTER — ANTICOAG VISIT (OUTPATIENT)
Dept: FAMILY MEDICINE CLINIC | Facility: CLINIC | Age: 55
End: 2020-11-20

## 2020-11-20 VITALS
OXYGEN SATURATION: 98 % | WEIGHT: 293 LBS | HEART RATE: 57 BPM | BODY MASS INDEX: 41.02 KG/M2 | DIASTOLIC BLOOD PRESSURE: 79 MMHG | TEMPERATURE: 96.7 F | SYSTOLIC BLOOD PRESSURE: 144 MMHG | RESPIRATION RATE: 20 BRPM | HEIGHT: 71 IN

## 2020-11-20 DIAGNOSIS — I82.501 LEG DVT (DEEP VENOUS THROMBOEMBOLISM), CHRONIC, RIGHT (HCC): Primary | ICD-10-CM

## 2020-11-20 DIAGNOSIS — I82.441 ACUTE DEEP VEIN THROMBOSIS (DVT) OF TIBIAL VEIN OF RIGHT LOWER EXTREMITY (HCC): ICD-10-CM

## 2020-11-20 PROBLEM — A41.9 SEPSIS (HCC): Status: RESOLVED | Noted: 2020-08-13 | Resolved: 2020-11-20

## 2020-11-20 LAB
ANION GAP SERPL CALCULATED.3IONS-SCNC: 7 MMOL/L (ref 4–13)
BUN SERPL-MCNC: 16 MG/DL (ref 6–20)
CALCIUM SERPL-MCNC: 9.2 MG/DL (ref 8.4–10.2)
CHLORIDE SERPL-SCNC: 100 MMOL/L (ref 96–108)
CO2 SERPL-SCNC: 28 MMOL/L (ref 22–33)
CREAT SERPL-MCNC: 1.2 MG/DL (ref 0.5–1.2)
ERYTHROCYTE [DISTWIDTH] IN BLOOD BY AUTOMATED COUNT: 14.9 % (ref 11.6–15.1)
GFR SERPL CREATININE-BSD FRML MDRD: 78 ML/MIN/1.73SQ M
GLUCOSE SERPL-MCNC: 101 MG/DL (ref 65–140)
HCT VFR BLD AUTO: 37.2 % (ref 36.5–49.3)
HGB BLD-MCNC: 12.2 G/DL (ref 12–17)
MCH RBC QN AUTO: 28.5 PG (ref 26.8–34.3)
MCHC RBC AUTO-ENTMCNC: 32.8 G/DL (ref 31.4–37.4)
MCV RBC AUTO: 87 FL (ref 82–98)
MRSA NOSE QL CULT: NORMAL
MRSA NOSE QL CULT: NORMAL
PLATELET # BLD AUTO: 168 THOUSANDS/UL (ref 149–390)
PMV BLD AUTO: 12 FL (ref 8.9–12.7)
POTASSIUM SERPL-SCNC: 3.8 MMOL/L (ref 3.5–5)
PROCALCITONIN SERPL-MCNC: 7.47 NG/ML
RBC # BLD AUTO: 4.28 MILLION/UL (ref 3.88–5.62)
SODIUM SERPL-SCNC: 135 MMOL/L (ref 133–145)
WBC # BLD AUTO: 12.29 THOUSAND/UL (ref 4.31–10.16)

## 2020-11-20 PROCEDURE — 99239 HOSP IP/OBS DSCHRG MGMT >30: CPT | Performed by: INTERNAL MEDICINE

## 2020-11-20 PROCEDURE — 84145 PROCALCITONIN (PCT): CPT | Performed by: INTERNAL MEDICINE

## 2020-11-20 PROCEDURE — 85027 COMPLETE CBC AUTOMATED: CPT | Performed by: INTERNAL MEDICINE

## 2020-11-20 PROCEDURE — 80048 BASIC METABOLIC PNL TOTAL CA: CPT | Performed by: INTERNAL MEDICINE

## 2020-11-20 PROCEDURE — 99232 SBSQ HOSP IP/OBS MODERATE 35: CPT | Performed by: INTERNAL MEDICINE

## 2020-11-20 RX ORDER — LANOLIN ALCOHOL/MO/W.PET/CERES
CREAM (GRAM) TOPICAL 2 TIMES DAILY PRN
Qty: 50 G | Refills: 0 | Status: SHIPPED | OUTPATIENT
Start: 2020-11-20 | End: 2021-10-12 | Stop reason: HOSPADM

## 2020-11-20 RX ORDER — CEPHALEXIN 500 MG/1
500 CAPSULE ORAL EVERY 6 HOURS SCHEDULED
Qty: 28 CAPSULE | Refills: 0 | Status: SHIPPED | OUTPATIENT
Start: 2020-11-20 | End: 2020-11-27

## 2020-11-20 RX ORDER — CEFAZOLIN SODIUM 2 G/50ML
2000 SOLUTION INTRAVENOUS EVERY 8 HOURS
Status: DISCONTINUED | OUTPATIENT
Start: 2020-11-20 | End: 2020-11-20 | Stop reason: HOSPADM

## 2020-11-20 RX ADMIN — CARVEDILOL 25 MG: 12.5 TABLET, FILM COATED ORAL at 08:49

## 2020-11-20 RX ADMIN — SPIRONOLACTONE 25 MG: 25 TABLET, FILM COATED ORAL at 08:49

## 2020-11-20 RX ADMIN — ISOSORBIDE MONONITRATE 30 MG: 30 TABLET, EXTENDED RELEASE ORAL at 08:49

## 2020-11-20 RX ADMIN — VANCOMYCIN HYDROCHLORIDE 1750 MG: 1 INJECTION, POWDER, LYOPHILIZED, FOR SOLUTION INTRAVENOUS at 10:49

## 2020-11-20 RX ADMIN — HYDROCHLOROTHIAZIDE 25 MG: 25 TABLET ORAL at 08:49

## 2020-11-20 RX ADMIN — CEFAZOLIN SODIUM 2000 MG: 2 SOLUTION INTRAVENOUS at 05:28

## 2020-11-23 ENCOUNTER — OFFICE VISIT (OUTPATIENT)
Dept: FAMILY MEDICINE CLINIC | Facility: CLINIC | Age: 55
End: 2020-11-23
Payer: COMMERCIAL

## 2020-11-23 VITALS
HEART RATE: 59 BPM | DIASTOLIC BLOOD PRESSURE: 96 MMHG | BODY MASS INDEX: 40.46 KG/M2 | RESPIRATION RATE: 12 BRPM | WEIGHT: 289 LBS | SYSTOLIC BLOOD PRESSURE: 152 MMHG | OXYGEN SATURATION: 98 % | TEMPERATURE: 98.1 F | HEIGHT: 71 IN

## 2020-11-23 DIAGNOSIS — R60.0 EDEMA OF LEFT LOWER LEG DUE TO PERIPHERAL VENOUS INSUFFICIENCY: Primary | ICD-10-CM

## 2020-11-23 DIAGNOSIS — I82.501 LEG DVT (DEEP VENOUS THROMBOEMBOLISM), CHRONIC, RIGHT (HCC): ICD-10-CM

## 2020-11-23 DIAGNOSIS — I10 ESSENTIAL HYPERTENSION: ICD-10-CM

## 2020-11-23 DIAGNOSIS — I87.2 EDEMA OF LEFT LOWER LEG DUE TO PERIPHERAL VENOUS INSUFFICIENCY: Primary | ICD-10-CM

## 2020-11-23 DIAGNOSIS — K02.9 TOOTH DECAY: ICD-10-CM

## 2020-11-23 LAB
BACTERIA BLD CULT: NORMAL
BACTERIA BLD CULT: NORMAL

## 2020-11-23 PROCEDURE — 1111F DSCHRG MED/CURRENT MED MERGE: CPT | Performed by: NURSE PRACTITIONER

## 2020-11-23 PROCEDURE — 99496 TRANSJ CARE MGMT HIGH F2F 7D: CPT | Performed by: NURSE PRACTITIONER

## 2020-11-23 PROCEDURE — 3008F BODY MASS INDEX DOCD: CPT | Performed by: NURSE PRACTITIONER

## 2020-11-23 RX ORDER — FUROSEMIDE 20 MG/1
20 TABLET ORAL DAILY
Qty: 5 TABLET | Refills: 0 | Status: SHIPPED | OUTPATIENT
Start: 2020-11-23 | End: 2021-10-12 | Stop reason: HOSPADM

## 2020-11-23 RX ORDER — CHLORHEXIDINE GLUCONATE 0.12 MG/ML
RINSE ORAL
COMMUNITY
Start: 2020-11-16 | End: 2021-10-12 | Stop reason: HOSPADM

## 2020-12-04 ENCOUNTER — LAB (OUTPATIENT)
Dept: LAB | Facility: HOSPITAL | Age: 55
End: 2020-12-04
Payer: COMMERCIAL

## 2020-12-04 ENCOUNTER — TRANSCRIBE ORDERS (OUTPATIENT)
Dept: ADMINISTRATIVE | Facility: HOSPITAL | Age: 55
End: 2020-12-04

## 2020-12-04 ENCOUNTER — TELEPHONE (OUTPATIENT)
Dept: FAMILY MEDICINE CLINIC | Facility: CLINIC | Age: 55
End: 2020-12-04

## 2020-12-04 ENCOUNTER — CONSULT (OUTPATIENT)
Dept: FAMILY MEDICINE CLINIC | Facility: CLINIC | Age: 55
End: 2020-12-04
Payer: COMMERCIAL

## 2020-12-04 VITALS
WEIGHT: 287 LBS | SYSTOLIC BLOOD PRESSURE: 140 MMHG | HEIGHT: 71 IN | OXYGEN SATURATION: 98 % | DIASTOLIC BLOOD PRESSURE: 90 MMHG | TEMPERATURE: 98.7 F | HEART RATE: 74 BPM | RESPIRATION RATE: 16 BRPM | BODY MASS INDEX: 40.18 KG/M2

## 2020-12-04 DIAGNOSIS — Z01.818 PREOPERATIVE CLEARANCE: Primary | ICD-10-CM

## 2020-12-04 DIAGNOSIS — I82.501 LEG DVT (DEEP VENOUS THROMBOEMBOLISM), CHRONIC, RIGHT (HCC): ICD-10-CM

## 2020-12-04 DIAGNOSIS — I82.501 CHRONIC THROMBOEMBOLISM OF DEEP VEINS OF LOWER EXTREMITY, RIGHT (HCC): Primary | ICD-10-CM

## 2020-12-04 LAB
INR PPP: 1.96 (ref 0.84–1.19)
PROTHROMBIN TIME: 22 SECONDS (ref 11.6–14.5)

## 2020-12-04 PROCEDURE — 1036F TOBACCO NON-USER: CPT | Performed by: NURSE PRACTITIONER

## 2020-12-04 PROCEDURE — 36415 COLL VENOUS BLD VENIPUNCTURE: CPT

## 2020-12-04 PROCEDURE — 85610 PROTHROMBIN TIME: CPT

## 2020-12-04 PROCEDURE — 3008F BODY MASS INDEX DOCD: CPT | Performed by: NURSE PRACTITIONER

## 2020-12-04 PROCEDURE — 99213 OFFICE O/P EST LOW 20 MIN: CPT | Performed by: NURSE PRACTITIONER

## 2020-12-07 ENCOUNTER — TELEPHONE (OUTPATIENT)
Dept: FAMILY MEDICINE CLINIC | Facility: CLINIC | Age: 55
End: 2020-12-07

## 2020-12-09 ENCOUNTER — TELEPHONE (OUTPATIENT)
Dept: FAMILY MEDICINE CLINIC | Facility: CLINIC | Age: 55
End: 2020-12-09

## 2020-12-19 DIAGNOSIS — I82.501 LEG DVT (DEEP VENOUS THROMBOEMBOLISM), CHRONIC, RIGHT (HCC): ICD-10-CM

## 2020-12-21 RX ORDER — WARFARIN SODIUM 5 MG/1
TABLET ORAL
Qty: 30 TABLET | Refills: 2 | Status: SHIPPED | OUTPATIENT
Start: 2020-12-21 | End: 2021-04-11

## 2020-12-23 ENCOUNTER — TRANSCRIBE ORDERS (OUTPATIENT)
Dept: LAB | Facility: CLINIC | Age: 55
End: 2020-12-23

## 2020-12-23 ENCOUNTER — APPOINTMENT (OUTPATIENT)
Dept: LAB | Facility: CLINIC | Age: 55
End: 2020-12-23
Payer: COMMERCIAL

## 2020-12-23 DIAGNOSIS — E55.9 AVITAMINOSIS D: Primary | ICD-10-CM

## 2020-12-23 DIAGNOSIS — R76.8 POSITIVE ANTINEUTROPHIL CYTOPLASMIC ANTIBODY TEST: ICD-10-CM

## 2020-12-23 DIAGNOSIS — R79.89 OTHER SPECIFIED ABNORMAL FINDINGS OF BLOOD CHEMISTRY: ICD-10-CM

## 2020-12-23 DIAGNOSIS — A41.9 SEPSIS (HCC): ICD-10-CM

## 2020-12-23 DIAGNOSIS — E55.9 AVITAMINOSIS D: ICD-10-CM

## 2020-12-23 DIAGNOSIS — I77.6 VASCULITIS, ANCA POSITIVE (HCC): ICD-10-CM

## 2020-12-23 LAB
25(OH)D3 SERPL-MCNC: 13.1 NG/ML (ref 30–100)
ERYTHROCYTE [SEDIMENTATION RATE] IN BLOOD: 86 MM/HOUR (ref 0–19)
INR PPP: 1.86 (ref 0.84–1.19)
INR PPP: 1.86 (ref 0.84–1.19)
PROTHROMBIN TIME: 21.6 SECONDS (ref 11.6–14.5)

## 2020-12-23 PROCEDURE — 86147 CARDIOLIPIN ANTIBODY EA IG: CPT

## 2020-12-23 PROCEDURE — 36415 COLL VENOUS BLD VENIPUNCTURE: CPT

## 2020-12-23 PROCEDURE — 85652 RBC SED RATE AUTOMATED: CPT

## 2020-12-23 PROCEDURE — 86255 FLUORESCENT ANTIBODY SCREEN: CPT

## 2020-12-23 PROCEDURE — 86146 BETA-2 GLYCOPROTEIN ANTIBODY: CPT

## 2020-12-23 PROCEDURE — 85610 PROTHROMBIN TIME: CPT

## 2020-12-23 PROCEDURE — 82306 VITAMIN D 25 HYDROXY: CPT

## 2020-12-24 LAB
CARDIOLIPIN IGA SER IA-ACNC: <9 APL U/ML (ref 0–11)
CARDIOLIPIN IGG SER IA-ACNC: <9 GPL U/ML (ref 0–14)
CARDIOLIPIN IGM SER IA-ACNC: 16 MPL U/ML (ref 0–12)

## 2020-12-27 LAB
B2 GLYCOPROT1 IGA SER-ACNC: 82 GPI IGA UNITS (ref 0–25)
B2 GLYCOPROT1 IGG SER-ACNC: <9 GPI IGG UNITS (ref 0–20)
B2 GLYCOPROT1 IGM SER-ACNC: <9 GPI IGM UNITS (ref 0–32)

## 2020-12-28 ENCOUNTER — TELEPHONE (OUTPATIENT)
Dept: FAMILY MEDICINE CLINIC | Facility: CLINIC | Age: 55
End: 2020-12-28

## 2020-12-29 ENCOUNTER — TELEPHONE (OUTPATIENT)
Dept: FAMILY MEDICINE CLINIC | Facility: CLINIC | Age: 55
End: 2020-12-29

## 2020-12-29 LAB
C-ANCA TITR SER IF: NORMAL TITER
MYELOPEROXIDASE AB SER IA-ACNC: <9 U/ML (ref 0–9)
P-ANCA ATYPICAL TITR SER IF: NORMAL TITER
P-ANCA TITR SER IF: NORMAL TITER
PROTEINASE3 AB SER IA-ACNC: <3.5 U/ML (ref 0–3.5)

## 2020-12-30 ENCOUNTER — TELEPHONE (OUTPATIENT)
Dept: FAMILY MEDICINE CLINIC | Facility: CLINIC | Age: 55
End: 2020-12-30

## 2020-12-30 ENCOUNTER — OFFICE VISIT (OUTPATIENT)
Dept: FAMILY MEDICINE CLINIC | Facility: CLINIC | Age: 55
End: 2020-12-30
Payer: COMMERCIAL

## 2020-12-30 ENCOUNTER — APPOINTMENT (OUTPATIENT)
Dept: LAB | Facility: CLINIC | Age: 55
End: 2020-12-30
Payer: COMMERCIAL

## 2020-12-30 ENCOUNTER — ANTICOAG VISIT (OUTPATIENT)
Dept: FAMILY MEDICINE CLINIC | Facility: CLINIC | Age: 55
End: 2020-12-30

## 2020-12-30 VITALS
TEMPERATURE: 97.6 F | HEIGHT: 71 IN | SYSTOLIC BLOOD PRESSURE: 160 MMHG | RESPIRATION RATE: 12 BRPM | BODY MASS INDEX: 40.04 KG/M2 | WEIGHT: 286 LBS | HEART RATE: 66 BPM | OXYGEN SATURATION: 98 % | DIASTOLIC BLOOD PRESSURE: 86 MMHG

## 2020-12-30 DIAGNOSIS — Z13.89 SCREENING FOR BLOOD OR PROTEIN IN URINE: ICD-10-CM

## 2020-12-30 DIAGNOSIS — R79.89 LOW TESTOSTERONE: ICD-10-CM

## 2020-12-30 DIAGNOSIS — Z96.651 STATUS POST RIGHT KNEE REPLACEMENT: ICD-10-CM

## 2020-12-30 DIAGNOSIS — L02.416 ABSCESS OF SKIN OF LEFT KNEE: Primary | ICD-10-CM

## 2020-12-30 DIAGNOSIS — I82.501 CHRONIC THROMBOEMBOLISM OF DEEP VEINS OF LOWER EXTREMITY, RIGHT (HCC): ICD-10-CM

## 2020-12-30 LAB
INR PPP: 2.12 (ref 0.84–1.19)
INR PPP: 2.12 (ref 0.84–1.19)
PROTHROMBIN TIME: 23.8 SECONDS (ref 11.6–14.5)

## 2020-12-30 PROCEDURE — 85610 PROTHROMBIN TIME: CPT

## 2020-12-30 PROCEDURE — 99213 OFFICE O/P EST LOW 20 MIN: CPT | Performed by: NURSE PRACTITIONER

## 2020-12-30 PROCEDURE — 36415 COLL VENOUS BLD VENIPUNCTURE: CPT

## 2020-12-30 PROCEDURE — 3008F BODY MASS INDEX DOCD: CPT | Performed by: NURSE PRACTITIONER

## 2020-12-30 RX ORDER — OXYCODONE HYDROCHLORIDE 5 MG/1
TABLET ORAL
COMMUNITY
Start: 2020-12-10 | End: 2021-10-12 | Stop reason: HOSPADM

## 2020-12-30 RX ORDER — AMOXICILLIN AND CLAVULANATE POTASSIUM 875; 125 MG/1; MG/1
1 TABLET, FILM COATED ORAL EVERY 12 HOURS SCHEDULED
Qty: 20 TABLET | Refills: 0 | Status: SHIPPED | OUTPATIENT
Start: 2020-12-30 | End: 2021-01-09

## 2021-01-04 ENCOUNTER — APPOINTMENT (OUTPATIENT)
Dept: LAB | Facility: CLINIC | Age: 56
End: 2021-01-04
Payer: COMMERCIAL

## 2021-01-04 ENCOUNTER — ANTICOAG VISIT (OUTPATIENT)
Dept: FAMILY MEDICINE CLINIC | Facility: CLINIC | Age: 56
End: 2021-01-04

## 2021-01-04 DIAGNOSIS — R79.89 LOW TESTOSTERONE: ICD-10-CM

## 2021-01-04 DIAGNOSIS — Z96.651 STATUS POST RIGHT KNEE REPLACEMENT: ICD-10-CM

## 2021-01-04 LAB
ALBUMIN SERPL BCP-MCNC: 3.7 G/DL (ref 3.5–5)
ALP SERPL-CCNC: 101 U/L (ref 46–116)
ALT SERPL W P-5'-P-CCNC: 22 U/L (ref 12–78)
ANION GAP SERPL CALCULATED.3IONS-SCNC: 9 MMOL/L (ref 4–13)
AST SERPL W P-5'-P-CCNC: 11 U/L (ref 5–45)
BASOPHILS # BLD AUTO: 0.05 THOUSANDS/ΜL (ref 0–0.1)
BASOPHILS NFR BLD AUTO: 1 % (ref 0–1)
BILIRUB SERPL-MCNC: 0.65 MG/DL (ref 0.2–1)
BILIRUB UR QL STRIP: NEGATIVE
BUN SERPL-MCNC: 23 MG/DL (ref 5–25)
CALCIUM SERPL-MCNC: 9.9 MG/DL (ref 8.3–10.1)
CHLORIDE SERPL-SCNC: 101 MMOL/L (ref 100–108)
CLARITY UR: CLEAR
CO2 SERPL-SCNC: 28 MMOL/L (ref 21–32)
COLOR UR: YELLOW
CREAT SERPL-MCNC: 1.36 MG/DL (ref 0.6–1.3)
EOSINOPHIL # BLD AUTO: 0.24 THOUSAND/ΜL (ref 0–0.61)
EOSINOPHIL NFR BLD AUTO: 5 % (ref 0–6)
ERYTHROCYTE [DISTWIDTH] IN BLOOD BY AUTOMATED COUNT: 13.7 % (ref 11.6–15.1)
GFR SERPL CREATININE-BSD FRML MDRD: 66 ML/MIN/1.73SQ M
GLUCOSE P FAST SERPL-MCNC: 101 MG/DL (ref 65–99)
GLUCOSE UR STRIP-MCNC: NEGATIVE MG/DL
HCT VFR BLD AUTO: 37.4 % (ref 36.5–49.3)
HGB BLD-MCNC: 12.2 G/DL (ref 12–17)
HGB UR QL STRIP.AUTO: NEGATIVE
IMM GRANULOCYTES # BLD AUTO: 0.01 THOUSAND/UL (ref 0–0.2)
IMM GRANULOCYTES NFR BLD AUTO: 0 % (ref 0–2)
INR PPP: 1.69 (ref 0.84–1.19)
KETONES UR STRIP-MCNC: NEGATIVE MG/DL
LEUKOCYTE ESTERASE UR QL STRIP: NEGATIVE
LYMPHOCYTES # BLD AUTO: 1.62 THOUSANDS/ΜL (ref 0.6–4.47)
LYMPHOCYTES NFR BLD AUTO: 32 % (ref 14–44)
MCH RBC QN AUTO: 28.7 PG (ref 26.8–34.3)
MCHC RBC AUTO-ENTMCNC: 32.6 G/DL (ref 31.4–37.4)
MCV RBC AUTO: 88 FL (ref 82–98)
MONOCYTES # BLD AUTO: 0.52 THOUSAND/ΜL (ref 0.17–1.22)
MONOCYTES NFR BLD AUTO: 10 % (ref 4–12)
NEUTROPHILS # BLD AUTO: 2.69 THOUSANDS/ΜL (ref 1.85–7.62)
NEUTS SEG NFR BLD AUTO: 52 % (ref 43–75)
NITRITE UR QL STRIP: NEGATIVE
NRBC BLD AUTO-RTO: 0 /100 WBCS
PH UR STRIP.AUTO: 5.5 [PH]
PLATELET # BLD AUTO: 297 THOUSANDS/UL (ref 149–390)
PMV BLD AUTO: 9.9 FL (ref 8.9–12.7)
POTASSIUM SERPL-SCNC: 3.9 MMOL/L (ref 3.5–5.3)
PROT SERPL-MCNC: 8.5 G/DL (ref 6.4–8.2)
PROT UR STRIP-MCNC: NEGATIVE MG/DL
RBC # BLD AUTO: 4.25 MILLION/UL (ref 3.88–5.62)
SODIUM SERPL-SCNC: 138 MMOL/L (ref 136–145)
SP GR UR STRIP.AUTO: 1.02 (ref 1–1.03)
UROBILINOGEN UR QL STRIP.AUTO: 0.2 E.U./DL
WBC # BLD AUTO: 5.13 THOUSAND/UL (ref 4.31–10.16)

## 2021-01-04 PROCEDURE — 81003 URINALYSIS AUTO W/O SCOPE: CPT | Performed by: NURSE PRACTITIONER

## 2021-01-04 PROCEDURE — 85025 COMPLETE CBC W/AUTO DIFF WBC: CPT

## 2021-01-04 PROCEDURE — 80053 COMPREHEN METABOLIC PANEL: CPT

## 2021-01-04 NOTE — PROGRESS NOTES
Spoke with patient and made aware to continue 7 5 mg daily and recheck in 1 week  Also has upcoming appointment same day as INR 1/11/2021 so reminded patient to come in to review labs

## 2021-01-05 ENCOUNTER — APPOINTMENT (OUTPATIENT)
Dept: LAB | Facility: CLINIC | Age: 56
End: 2021-01-05
Payer: COMMERCIAL

## 2021-01-05 ENCOUNTER — HOSPITAL ENCOUNTER (OUTPATIENT)
Dept: SLEEP CENTER | Facility: CLINIC | Age: 56
Discharge: HOME | End: 2021-01-05
Payer: COMMERCIAL

## 2021-01-05 DIAGNOSIS — I10 ESSENTIAL HYPERTENSION: ICD-10-CM

## 2021-01-05 LAB — TESTOST SERPL-MCNC: 443 NG/DL (ref 95–948)

## 2021-01-05 PROCEDURE — 36415 COLL VENOUS BLD VENIPUNCTURE: CPT

## 2021-01-05 PROCEDURE — 95810 POLYSOM 6/> YRS 4/> PARAM: CPT

## 2021-01-05 PROCEDURE — 84403 ASSAY OF TOTAL TESTOSTERONE: CPT

## 2021-01-06 NOTE — PROGRESS NOTES
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 7AM: Alcohol use:NO    Typical day for patient:YES       Study Documentation    Sleep Study Indications: EDS, Snoring, Excessive Daytime Sleepiness, HTN    Sleep Study: Diagnostic   Snore:Mild  Supplemental O2: no    O2 flow rate (L/min) range   O2 flow rate (L/min) final   Minimum SaO2 82%  Baseline SaO2 95%      EKG abnormalities: no     EEG abnormalities: no    Sleep Study Recorded < 2 hours: N/A    Sleep Study Recorded > 2 hours but incomplete study: N/A    Sleep Study Recorded 6 hours but no sleep obtained: NO    Patient classification: employed       Post-Sleep Study    Medication used at bedtime or during sleep study:YES other prescription medications    Patient reports time it took to fall asleep:less than 20 minutes    Patient reports waking up during study:Denied    Patient reports sleeping 4 to 6 hours with dreaming  Patient reports sleep during study:typical    Patient rated sleepiness: Not sleepy or tired    PAP treatment:no

## 2021-01-08 ENCOUNTER — OFFICE VISIT (OUTPATIENT)
Dept: FAMILY MEDICINE CLINIC | Facility: CLINIC | Age: 56
End: 2021-01-08
Payer: COMMERCIAL

## 2021-01-08 VITALS
SYSTOLIC BLOOD PRESSURE: 154 MMHG | WEIGHT: 286 LBS | DIASTOLIC BLOOD PRESSURE: 86 MMHG | HEIGHT: 71 IN | RESPIRATION RATE: 12 BRPM | OXYGEN SATURATION: 98 % | BODY MASS INDEX: 40.04 KG/M2 | HEART RATE: 54 BPM | TEMPERATURE: 97.9 F

## 2021-01-08 DIAGNOSIS — R21 RASH: Primary | ICD-10-CM

## 2021-01-08 DIAGNOSIS — L03.116 CELLULITIS OF LEFT LOWER EXTREMITY: ICD-10-CM

## 2021-01-08 DIAGNOSIS — H61.23 EXCESSIVE CERUMEN IN EAR CANAL, BILATERAL: ICD-10-CM

## 2021-01-08 PROCEDURE — 3077F SYST BP >= 140 MM HG: CPT | Performed by: NURSE PRACTITIONER

## 2021-01-08 PROCEDURE — 1036F TOBACCO NON-USER: CPT | Performed by: NURSE PRACTITIONER

## 2021-01-08 PROCEDURE — 99213 OFFICE O/P EST LOW 20 MIN: CPT | Performed by: NURSE PRACTITIONER

## 2021-01-08 PROCEDURE — 3079F DIAST BP 80-89 MM HG: CPT | Performed by: NURSE PRACTITIONER

## 2021-01-08 PROCEDURE — 3008F BODY MASS INDEX DOCD: CPT | Performed by: NURSE PRACTITIONER

## 2021-01-08 RX ORDER — ERGOCALCIFEROL 1.25 MG/1
CAPSULE ORAL
COMMUNITY
Start: 2020-12-30 | End: 2021-10-12 | Stop reason: HOSPADM

## 2021-01-08 RX ORDER — DIPHENHYDRAMINE HCL 25 MG
25 TABLET ORAL EVERY 6 HOURS PRN
Qty: 30 TABLET | Refills: 0 | Status: SHIPPED | OUTPATIENT
Start: 2021-01-08 | End: 2021-10-12 | Stop reason: HOSPADM

## 2021-01-08 RX ORDER — CLINDAMYCIN HYDROCHLORIDE 150 MG/1
CAPSULE ORAL
COMMUNITY
Start: 2021-01-08 | End: 2021-01-18

## 2021-01-08 RX ORDER — AMOXICILLIN AND CLAVULANATE POTASSIUM 875; 125 MG/1; MG/1
1 TABLET, FILM COATED ORAL EVERY 12 HOURS SCHEDULED
Qty: 8 TABLET | Refills: 0 | Status: SHIPPED | OUTPATIENT
Start: 2021-01-10 | End: 2021-01-14

## 2021-01-08 NOTE — PROGRESS NOTES
BMI Counseling: There is no height or weight on file to calculate BMI  The BMI is above normal  Nutrition recommendations include decreasing portion sizes, encouraging healthy choices of fruits and vegetables, decreasing fast food intake, consuming healthier snacks, limiting drinks that contain sugar, moderation in carbohydrate intake, increasing intake of lean protein, reducing intake of saturated and trans fat and reducing intake of cholesterol  Exercise recommendations include vigorous physical activity 75 minutes/week, exercising 3-5 times per week, obtaining a gym membership and strength training exercises  No pharmacotherapy was ordered  Patient referred to nutritionist due to patient being overweight  Depression Screening and Follow-up Plan: Clincally patient does not have depression  No treatment is required  Assessment/Plan:         Problem List Items Addressed This Visit        Nervous and Auditory    Excessive cerumen in ear canal, bilateral    Relevant Medications    carbamide peroxide (DEBROX) 6 5 % otic solution       Musculoskeletal and Integument    Rash - Primary    Relevant Medications    diphenhydrAMINE (BENADRYL) 25 mg tablet       Other    Cellulitis of left lower extremity    Relevant Medications    amoxicillin-clavulanate (AUGMENTIN) 875-125 mg per tablet (Start on 1/10/2021)            Subjective:      Patient ID: Pj Hooper is a 60 y o  male  Patient is a 60year old male present for review of a rash on his right knee and thigh for past 2 days  Patient stopped augmentin in case its an allergic reaction  States no changes to anything since surgery  Patient used an old topical cream he had for itching of the right knee surgical site  He indicates he just uses ice packs on the skin and has had no change in detergents or soaps  States rash is very itchy, red  No fevers  Also, concern for bacteria under the nails when patient scratches   He has been instructed not to scratch, use benadryl routinely if redness, rash or itching and to drink fluids  No fever, temperature or chills noted  To add 4 days extension to antibiotic therapy  Patient also instructed to reduce coreg to 12 5mg p o b i d due to reduction in pulse and blood pressure  The following portions of the patient's history were reviewed and updated as appropriate:   He has a past medical history of Angioedema, Coronary artery disease, GI bleed, Hypertension, and STEMI (ST elevation myocardial infarction) ([facility] [address] )  ,  does not have any pertinent problems on file  ,   has a past surgical history that includes No past surgeries and Upper gastrointestinal endoscopy ,  He was adopted  Family history is unknown by patient  ,   reports that he has never smoked  He has never used smokeless tobacco  He reports that he does not drink alcohol or use drugs  ,  is allergic to lisinopril; norvasc [amlodipine]; eliquis [apixaban]; and lipitor [atorvastatin]     Current Outpatient Medications   Medication Sig Dispense Refill    acetaminophen (TYLENOL) 500 mg tablet Take 1,000 mg by mouth daily      carvedilol (COREG) 25 mg tablet Take 1 tablet (25 mg total) by mouth 2 (two) times a day with meals 60 tablet 11    chlorhexidine (PERIDEX) 0 12 % solution Take as directed      enoxaparin (LOVENOX) 80 mg/0 8 mL Inject 0 8 mL (80 mg total) under the skin every 24 hours Patient to stop warfarin 7 days prior to procedure   Pt/inr on 11/20/20 7 Syringe 7    ergocalciferol (VITAMIN D2) 50,000 units 1 CAPSULE ONCE A WEEK, FOR 12 WEEKS, THEN RESUME VITAMIN D 2000 UNITS DAILY      furosemide (Lasix) 20 mg tablet Take 1 tablet (20 mg total) by mouth daily 5 tablet 0    hydrochlorothiazide (HYDRODIURIL) 25 mg tablet Take 1 tablet (25 mg total) by mouth daily 90 tablet 3    isosorbide mononitrate (IMDUR) 30 mg 24 hr tablet Take 2 tablets (60 mg total) by mouth daily 30 tablet 11    oxyCODONE (ROXICODONE) 5 mg immediate release tablet PLEASE SEE ATTACHED FOR DETAILED DIRECTIONS      sildenafil (VIAGRA) 100 mg tablet TAKE ONE TABLET PRIOR TO SEX AS NEEDED      spironolactone (ALDACTONE) 25 mg tablet Take 1 tablet (25 mg total) by mouth daily 30 tablet 3    warfarin (COUMADIN) 2 5 mg tablet TAKE 1 TABLET BY MOUTH EVERY DAY 30 tablet 2    warfarin (COUMADIN) 5 mg tablet TAKE 1 TABLET BY MOUTH EVERY DAY WITH 2 5 MG DAILY FOR TOTAL 7 5 MG DAILY DOSE 30 tablet 2    white petrolatum-mineral oil (EUCERIN,HYDROCERIN) cream Apply topically 2 (two) times a day as needed (Please apply over dry skin on legs and feet ) 50 g 0    amoxicillin-clavulanate (Augmentin) 875-125 mg per tablet Take 1 tablet by mouth every 12 (twelve) hours for 10 days (Patient not taking: Reported on 1/8/2021) 20 tablet 0    [START ON 1/10/2021] amoxicillin-clavulanate (AUGMENTIN) 875-125 mg per tablet Take 1 tablet by mouth every 12 (twelve) hours for 4 days 8 tablet 0    carbamide peroxide (DEBROX) 6 5 % otic solution Administer 5 drops into both ears 2 (two) times a day 15 mL 2    clindamycin (CLEOCIN) 150 mg capsule 4 capsules by mouth 1 hour before dentist      diphenhydrAMINE (BENADRYL) 25 mg tablet Take 1 tablet (25 mg total) by mouth every 6 (six) hours as needed for itching 30 tablet 0    mupirocin (BACTROBAN) 2 % ointment APPLY TOPICALLY 2 (TWO) TIMES A DAY  BEGIN 5 DAYS PRIOR TO SURGERY, APPLY TO NOSTRILS       No current facility-administered medications for this visit  Review of Systems   Constitutional: Negative for activity change, appetite change, chills, diaphoresis, fatigue, fever and unexpected weight change  HENT: Negative for congestion, ear pain, postnasal drip, rhinorrhea, sinus pressure, sinus pain and sore throat  Eyes: Negative for pain, redness and visual disturbance  Respiratory: Negative for cough, chest tightness and shortness of breath  Cardiovascular: Negative for chest pain     Gastrointestinal: Negative for abdominal pain, diarrhea, nausea and vomiting  Endocrine: Negative  Genitourinary: Negative for dysuria and hematuria  Musculoskeletal: Positive for myalgias  Negative for arthralgias  Right knee mild swelling without redness  Contact dermatitis rash to right knee up to thigh noted  Skin: Positive for rash  Rash right lower extremity, from knee up to thigh  Allergic/Immunologic: Negative  Neurological: Negative  Hematological: Negative  Psychiatric/Behavioral: Positive for sleep disturbance  Objective:  Vitals:    01/08/21 1230   BP: 154/86   Pulse: (!) 54   Resp: 12   Temp: 97 9 °F (36 6 °C)   TempSrc: Tympanic   SpO2: 98%   Weight: 130 kg (286 lb)   Height: 5' 11" (1 803 m)     Body mass index is 39 89 kg/m²  Physical Exam  Vitals signs and nursing note reviewed  Constitutional:       Appearance: Normal appearance  He is well-developed and normal weight  HENT:      Head: Normocephalic and atraumatic  Right Ear: Tympanic membrane, ear canal and external ear normal       Left Ear: Tympanic membrane, ear canal and external ear normal       Nose: Nose normal       Mouth/Throat:      Mouth: Mucous membranes are moist    Eyes:      Extraocular Movements: Extraocular movements intact  Conjunctiva/sclera: Conjunctivae normal       Pupils: Pupils are equal, round, and reactive to light  Neck:      Musculoskeletal: Normal range of motion  Cardiovascular:      Rate and Rhythm: Normal rate and regular rhythm  Pulses: Normal pulses  Heart sounds: Normal heart sounds  No murmur  Pulmonary:      Effort: Pulmonary effort is normal       Breath sounds: Normal breath sounds  Abdominal:      General: Bowel sounds are normal       Palpations: Abdomen is soft  Musculoskeletal: Normal range of motion  Skin:     General: Skin is warm  Capillary Refill: Capillary refill takes less than 2 seconds  Findings: Rash present  No erythema  Comments: Rash noted to right lower extremity  Neurological:      General: No focal deficit present  Mental Status: He is alert and oriented to person, place, and time     Psychiatric:         Mood and Affect: Mood normal          Behavior: Behavior normal

## 2021-01-08 NOTE — PATIENT INSTRUCTIONS
Diphenhydramine (By mouth)   Diphenhydramine (shaun-fen-MARIA M-zen-meen)  Treats hay fever, allergy, and cold symptoms  Also treats insomnia  Brand Name(s): Aller-G-Time, Allergy, Allergy Medication, Allergy Medicine, Allergy Relief, Allermax, Anti-Hist, Banophen, Benadryl Allergy, CareALL Complete Allergy Relief, Children's Allergy, Children's Allergy Relief, Children's Benadryl Allergy, Children's PediaClear Cough, Children's Wal-Dryl Allergy   There may be other brand names for this medicine  When This Medicine Should Not Be Used: You should not use this medicine if you have ever had an allergic reaction to diphenhydramine  This medicine should not be given to women who are breast feeding  Do not give any over-the-counter (OTC) cough and cold medicine to a baby or child under 3years old  Using these medicines in very young children might cause serious or possibly life-threatening side effects  How to Use This Medicine:   Capsule, Thin Sheet, Dissolving Tablet, Tablet, Liquid, Liquid Filled Capsule, Chewable Tablet  · Your doctor will tell you how much medicine to use  Do not use more than directed  · Follow the instructions on the medicine label if you are using this medicine without a prescription  · Measure the oral liquid medicine with a marked measuring spoon, oral syringe, or medicine cup  · Swallow the capsule, tablet, and liquid filled capsule whole  Do not crush, break, or chew it  · The chewable tablet must be chewed completely before you swallow it  · Make sure your hands are dry before you handle the disintegrating tablet  Peel back the foil from the blister pack, then remove the tablet  Do not push the tablet through the foil  Place the tablet in your mouth  After it has melted, swallow or take a drink of water  If a dose is missed:   · Take a dose as soon as you remember  If it is almost time for your next dose, wait until then and take a regular dose   Do not take extra medicine to make up for a missed dose  How to Store and Dispose of This Medicine:   · Store the medicine in a closed container at room temperature, away from heat, moisture, and direct light  Do not freeze the liquid  · Ask your pharmacist, doctor, or health caregiver about the best way to dispose of any outdated medicine or medicine no longer needed  · Keep all medicine out of the reach of children  Never share your medicine with anyone  Drugs and Foods to Avoid:   Ask your doctor or pharmacist before using any other medicine, including over-the-counter medicines, vitamins, and herbal products  · Make sure your doctor knows if you are using an MAO inhibitor (MAOI) such as Eldepryl®, Marplan®, [place], or Parnate®  · Ask your doctor before you use any other products that have diphenhydramine in it  This includes medicines that you use on your skin  · Tell your doctor if you use anything else that makes you sleepy  Some examples are allergy medicine, narcotic pain medicine, and alcohol  · Do not drink alcohol while you are using this medicine  Warnings While Using This Medicine:   · Make sure your doctor knows if you are pregnant or breastfeeding, or if you have an enlarged prostate, or trouble urinating  Tell your doctor if you have glaucoma, or a breathing problem such as emphysema, bronchitis, or asthma  Make sure your doctor knows if you have any other allergies  · This medicine may make you dizzy or drowsy  Avoid driving, using machines, or doing anything else that could be dangerous if you are not alert  · This medicine might contain phenylalanine (aspartame)  This is a concern if you have a disorder called phenylketonuria (a problem with amino acids)  If you have this condition, talk to your doctor before using this medicine    Possible Side Effects While Using This Medicine:   Call your doctor right away if you notice any of these side effects:  · Allergic reaction: Itching or hives, swelling in your face or hands, swelling or tingling in your mouth or throat, chest tightness, trouble breathing  · Hallucinations (seeing things that are not really there)  · Lightheadedness or fainting  · Pain when urinating, or change in how much or how often you urinate  If you notice these less serious side effects, talk with your doctor:   · Clumsiness  · Constipation, nausea, or stomach upset  · Dry nose, mouth, or throat  · Nervousness or excitability, especially in children  · Upset stomach  · Thick mucus in your nose or throat  If you notice other side effects that you think are caused by this medicine, tell your doctor  Call your doctor for medical advice about side effects  You may report side effects to FDA at 4-082-DBW-8828  © Copyright [address] Information is for End User's use only and may not be sold, redistributed or otherwise used for commercial purposes  The above information is an  only  It is not intended as medical advice for individual conditions or treatments  Talk to your doctor, nurse or pharmacist before following any medical regimen to see if it is safe and effective for you  Contact Dermatitis   WHAT YOU NEED TO KNOW:   Contact dermatitis is a skin rash  It develops when you touch something that irritates your skin or causes an allergic reaction  DISCHARGE INSTRUCTIONS:   Call 911 for any of the following:   · You have sudden trouble breathing  · Your throat swells and you have trouble eating  · Your face is swollen  Contact your healthcare provider if:   · You have a fever  · Your blisters are draining pus  · Your rash spreads or does not get better, even after treatment  · You have questions or concerns about your condition or care  Medicines:   · Medicines  help decrease itching and swelling  They will be given as a topical medicine to apply to your rash or as a pill  · Take your medicine as directed    Contact your healthcare provider if you think your medicine is not helping or if you have side effects  Tell him or her if you are allergic to any medicine  Keep a list of the medicines, vitamins, and herbs you take  Include the amounts, and when and why you take them  Bring the list or the pill bottles to follow-up visits  Carry your medicine list with you in case of an emergency  Manage contact dermatitis:   · Take short baths or showers in cool water  Use mild soap or soap-free cleansers  Add oatmeal, baking soda, or cornstarch to the bath water to help decrease skin irritation  · Avoid skin irritants , such as makeup, hair products, soaps, and cleansers  Use products that do not contain perfume or dye  · Apply a cool compress to your rash  This will help soothe your skin  · Keep your skin moist   Rub unscented cream or lotion on your skin to prevent dryness and itching  Do this right after a bath or shower when your skin is still damp  Follow up with your healthcare provider or dermatologist in 2 to 3 days:  Write down your questions so you remember to ask them during your visits  © Copyright [address] [place] [address] Information is for End User's use only and may not be sold, redistributed or otherwise used for commercial purposes  All illustrations and images included in CareNotes® are the copyrighted property of [employer]  or Stephie Garcia  The above information is an  only  It is not intended as medical advice for individual conditions or treatments  Talk to your doctor, nurse or pharmacist before following any medical regimen to see if it is safe and effective for you

## 2021-01-13 ENCOUNTER — TELEPHONE (OUTPATIENT)
Dept: SLEEP CENTER | Facility: CLINIC | Age: 56
End: 2021-01-13

## 2021-01-13 NOTE — TELEPHONE ENCOUNTER
Spoke to patient to review sleep study results  Mild BRUCE  Scheduled consult with Dr Nakia Richards 2/2/2021

## 2021-01-15 ENCOUNTER — TELEPHONE (OUTPATIENT)
Dept: CARDIOLOGY CLINIC | Facility: CLINIC | Age: 56
End: 2021-01-15

## 2021-01-15 DIAGNOSIS — G47.30 SLEEP APNEA, UNSPECIFIED TYPE: Primary | ICD-10-CM

## 2021-01-15 DIAGNOSIS — I10 ESSENTIAL HYPERTENSION: ICD-10-CM

## 2021-01-15 NOTE — TELEPHONE ENCOUNTER
Spoke with pt, advised pt of sleep study results per Dr Helena Richards  Referral for sleep medicine is in  Pt is scheduled to see Dr Syed Judd on 02/02/21

## 2021-01-15 NOTE — TELEPHONE ENCOUNTER
----- Message -----  From: Francisco Pérez MD  Sent: 1/13/2021   6:42 PM EST  To: [facility]    Sleep study showed mild sleep apnea, weight loss will help, please let him know  If he would like to see a sleep doctor, please put a consult for Dr Morales

## 2021-01-18 ENCOUNTER — LAB (OUTPATIENT)
Dept: LAB | Facility: CLINIC | Age: 56
End: 2021-01-18
Payer: COMMERCIAL

## 2021-01-19 ENCOUNTER — ANTICOAG VISIT (OUTPATIENT)
Dept: FAMILY MEDICINE CLINIC | Facility: CLINIC | Age: 56
End: 2021-01-19

## 2021-01-19 LAB — INR PPP: 1.4 (ref 0.84–1.19)

## 2021-01-19 NOTE — PROGRESS NOTES
Patient was called and made aware to continue 7 5 mg daily and recheck in 1 week  He states he currently has enough tablets at home and no refills needed at this time  Also states he finished amoxicillin and has no current issues

## 2021-01-27 ENCOUNTER — TRANSCRIBE ORDERS (OUTPATIENT)
Dept: LAB | Facility: CLINIC | Age: 56
End: 2021-01-27

## 2021-01-27 ENCOUNTER — APPOINTMENT (OUTPATIENT)
Dept: LAB | Facility: CLINIC | Age: 56
End: 2021-01-27
Payer: COMMERCIAL

## 2021-01-27 ENCOUNTER — ANTICOAG VISIT (OUTPATIENT)
Dept: FAMILY MEDICINE CLINIC | Facility: CLINIC | Age: 56
End: 2021-01-27

## 2021-01-27 LAB — INR PPP: 2.02 (ref 0.84–1.19)

## 2021-01-27 NOTE — PROGRESS NOTES
Patient was called and made aware to continue 7 5 mg daily and recheck in 2 week  He is not in need of refills at this time

## 2021-01-31 NOTE — PROGRESS NOTES
[facility] [phone]  It was my intent to perform this visit via video technology but the patient was not able to do a video connection so the visit was completed via audio telephone only  Virtual Regular Visit      Assessment/Plan:  The patient has mild daytime sleepiness and hypertension which warrant treatment of his mild BRUCE (AHI=6 6)  Will order APAP 4-20 cm through [facility]  Problem List Items Addressed This Visit     None      Visit Diagnoses     Sleep apnea, unspecified type        Relevant Orders    Cpap DME               Reason for visit is No chief complaint on file  Encounter provider Bib Golden MD    Provider located at [address] [id number] [phone]      Recent Visits  No visits were found meeting these conditions  Showing recent visits within past 7 days and meeting all other requirements     Today's Visits  Date Type Provider Dept   02/02/21 Telemedicine Bib Golden MD  Sleep [facility]   Showing today's visits and meeting all other requirements     Future Appointments  No visits were found meeting these conditions  Showing future appointments within next 150 days and meeting all other requirements        The patient was identified by name and date of birth  Delisa Navarrete was informed that this is a telemedicine visit and that the visit is being conducted through telephone  Other methods to assure confidentiality were taken  No one else was in the room  He acknowledged consent and understanding of privacy and security of the video platform  The patient has agreed to participate and understands they can discontinue the visit at any time  Patient is aware this is a billable service  Subjective  Delisa Navarrete is a 60 y o  male with a history of hypertension and daytime sleepiness, who was found to have mild RBUCE on polysomnography  He is unsure of snoring, but awakens with a dry mouth   He denies awakening with a choking or gasping sensation  He is extremely overweight  HPI     Past Medical History:   Diagnosis Date    Angioedema     Coronary artery disease     GI bleed     Hypertension     stopped his meds when ran out several years ago    STEMI (ST elevation myocardial infarction) ([facility] [address] )        Past Surgical History:   Procedure Laterality Date    UPPER GASTROINTESTINAL ENDOSCOPY      Clamped large stomach ulcer       Current Outpatient Medications   Medication Sig Dispense Refill    acetaminophen (TYLENOL) 500 mg tablet Take 1,000 mg by mouth daily      carbamide peroxide (DEBROX) 6 5 % otic solution Administer 5 drops into both ears 2 (two) times a day 15 mL 2    carvedilol (COREG) 25 mg tablet Take 1 tablet (25 mg total) by mouth 2 (two) times a day with meals 60 tablet 11    chlorhexidine (PERIDEX) 0 12 % solution Take as directed      diphenhydrAMINE (BENADRYL) 25 mg tablet Take 1 tablet (25 mg total) by mouth every 6 (six) hours as needed for itching 30 tablet 0    enoxaparin (LOVENOX) 80 mg/0 8 mL Inject 0 8 mL (80 mg total) under the skin every 24 hours Patient to stop warfarin 7 days prior to procedure  Pt/inr on 11/20/20 7 Syringe 7    ergocalciferol (VITAMIN D2) 50,000 units 1 CAPSULE ONCE A WEEK, FOR 12 WEEKS, THEN RESUME VITAMIN D 2000 UNITS DAILY      furosemide (Lasix) 20 mg tablet Take 1 tablet (20 mg total) by mouth daily 5 tablet 0    hydrochlorothiazide (HYDRODIURIL) 25 mg tablet Take 1 tablet (25 mg total) by mouth daily 90 tablet 3    isosorbide mononitrate (IMDUR) 30 mg 24 hr tablet Take 2 tablets (60 mg total) by mouth daily 30 tablet 11    mupirocin (BACTROBAN) 2 % ointment APPLY TOPICALLY 2 (TWO) TIMES A DAY   BEGIN 5 DAYS PRIOR TO SURGERY, APPLY TO NOSTRILS      oxyCODONE (ROXICODONE) 5 mg immediate release tablet PLEASE SEE ATTACHED FOR DETAILED DIRECTIONS      sildenafil (VIAGRA) 100 mg tablet TAKE ONE TABLET PRIOR TO SEX AS NEEDED      spironolactone (ALDACTONE) 25 mg tablet Take 1 tablet (25 mg total) by mouth daily 30 tablet 3    warfarin (COUMADIN) 2 5 mg tablet TAKE 1 TABLET BY MOUTH EVERY DAY 30 tablet 2    warfarin (COUMADIN) 5 mg tablet TAKE 1 TABLET BY MOUTH EVERY DAY WITH 2 5 MG DAILY FOR TOTAL 7 5 MG DAILY DOSE 30 tablet 2    white petrolatum-mineral oil (EUCERIN,HYDROCERIN) cream Apply topically 2 (two) times a day as needed (Please apply over dry skin on legs and feet ) 50 g 0     No current facility-administered medications for this visit  Allergies   Allergen Reactions    Lisinopril Angioedema    Norvasc [Amlodipine] Angioedema    Eliquis [Apixaban] Angioedema    Lipitor [Atorvastatin] Facial Swelling       Review of Systems    Video Exam    There were no vitals filed for this visit  Physical Exam     I spent 10 minutes directly with the patient during this visit      VIRTUAL VISIT [id number] [place] acknowledges that he has consented to an online visit or consultation  He understands that the online visit is based solely on information provided by him, and that, in the absence of a face-to-face physical evaluation by the physician, the diagnosis he receives is both limited and provisional in terms of accuracy and completeness  This is not intended to replace a full medical face-to-face evaluation by the physician  Ketty Sandoval understands and accepts these terms

## 2021-02-02 ENCOUNTER — TELEMEDICINE (OUTPATIENT)
Dept: SLEEP CENTER | Facility: CLINIC | Age: 56
End: 2021-02-02
Payer: COMMERCIAL

## 2021-02-02 DIAGNOSIS — G47.30 SLEEP APNEA, UNSPECIFIED TYPE: ICD-10-CM

## 2021-02-02 PROCEDURE — 1036F TOBACCO NON-USER: CPT | Performed by: INTERNAL MEDICINE

## 2021-02-02 PROCEDURE — 99203 OFFICE O/P NEW LOW 30 MIN: CPT | Performed by: INTERNAL MEDICINE

## 2021-02-11 ENCOUNTER — APPOINTMENT (OUTPATIENT)
Dept: LAB | Facility: CLINIC | Age: 56
End: 2021-02-11
Payer: COMMERCIAL

## 2021-02-11 DIAGNOSIS — I10 ESSENTIAL HYPERTENSION: ICD-10-CM

## 2021-02-12 ENCOUNTER — ANTICOAG VISIT (OUTPATIENT)
Dept: FAMILY MEDICINE CLINIC | Facility: CLINIC | Age: 56
End: 2021-02-12

## 2021-02-12 ENCOUNTER — TELEPHONE (OUTPATIENT)
Dept: FAMILY MEDICINE CLINIC | Facility: CLINIC | Age: 56
End: 2021-02-12

## 2021-02-12 DIAGNOSIS — I21.4 NSTEMI (NON-ST ELEVATED MYOCARDIAL INFARCTION) (HCC): ICD-10-CM

## 2021-02-12 LAB — INR PPP: 2.53 (ref 0.84–1.19)

## 2021-02-12 RX ORDER — ISOSORBIDE MONONITRATE 30 MG/1
60 TABLET, EXTENDED RELEASE ORAL DAILY
Qty: 180 TABLET | Refills: 1 | Status: SHIPPED | OUTPATIENT
Start: 2021-02-12 | End: 2021-09-09

## 2021-02-12 RX ORDER — SPIRONOLACTONE 25 MG/1
25 TABLET ORAL DAILY
Qty: 30 TABLET | Refills: 3 | Status: SHIPPED | OUTPATIENT
Start: 2021-02-12 | End: 2021-06-24

## 2021-02-12 NOTE — TELEPHONE ENCOUNTER
Patient has 11 refills from 07/2020  They were filled at his prior pharmacy [facility] I do not think it was [address] but I think it was all fill it of the road  Please call to verify

## 2021-02-12 NOTE — TELEPHONE ENCOUNTER
Patient called stating he needs refill of Is[facility]itrate 30 [facility]  States he called pharmacy but they need new rx sent in

## 2021-02-16 DIAGNOSIS — I21.4 NSTEMI (NON-ST ELEVATED MYOCARDIAL INFARCTION) (HCC): ICD-10-CM

## 2021-02-17 RX ORDER — CARVEDILOL 25 MG/1
TABLET ORAL
Qty: 60 TABLET | Refills: 11 | Status: SHIPPED | OUTPATIENT
Start: 2021-02-17 | End: 2022-06-07 | Stop reason: ALTCHOICE

## 2021-02-22 ENCOUNTER — TELEPHONE (OUTPATIENT)
Dept: SLEEP CENTER | Facility: CLINIC | Age: 56
End: 2021-02-22

## 2021-03-08 RX ORDER — DIPHENOXYLATE HYDROCHLORIDE AND ATROPINE SULFATE 2.5; .025 MG/1; MG/1
1 TABLET ORAL DAILY
COMMUNITY

## 2021-03-10 ENCOUNTER — TELEPHONE (OUTPATIENT)
Dept: FAMILY MEDICINE CLINIC | Facility: CLINIC | Age: 56
End: 2021-03-10

## 2021-03-10 NOTE — TELEPHONE ENCOUNTER
Spoke with patient who states he missed the appointment due to funds  He is currently waiting on reimbursement from disability and until he has the funds he is unable to come into office to be seen  He did state that he can go for INR so I told him to go asap and we will call with results once completed  He understands and will go as soon as possible

## 2021-03-16 ENCOUNTER — TRANSCRIBE ORDERS (OUTPATIENT)
Dept: FAMILY MEDICINE CLINIC | Facility: HOSPITAL | Age: 56
End: 2021-03-16

## 2021-03-16 ENCOUNTER — APPOINTMENT (OUTPATIENT)
Dept: LAB | Facility: CLINIC | Age: 56
End: 2021-03-16
Payer: COMMERCIAL

## 2021-03-18 ENCOUNTER — OFFICE VISIT (OUTPATIENT)
Dept: FAMILY MEDICINE CLINIC | Facility: CLINIC | Age: 56
End: 2021-03-18
Payer: COMMERCIAL

## 2021-03-18 ENCOUNTER — TELEPHONE (OUTPATIENT)
Dept: FAMILY MEDICINE CLINIC | Facility: CLINIC | Age: 56
End: 2021-03-18

## 2021-03-18 VITALS
TEMPERATURE: 97.6 F | SYSTOLIC BLOOD PRESSURE: 130 MMHG | DIASTOLIC BLOOD PRESSURE: 83 MMHG | HEIGHT: 71 IN | RESPIRATION RATE: 16 BRPM | HEART RATE: 51 BPM | WEIGHT: 286 LBS | BODY MASS INDEX: 40.04 KG/M2 | OXYGEN SATURATION: 97 %

## 2021-03-18 DIAGNOSIS — Z79.01 ANTICOAGULATION GOAL OF INR 2 TO 3: ICD-10-CM

## 2021-03-18 DIAGNOSIS — Z02.4 ENCOUNTER FOR COMMERCIAL DRIVING LICENSE (CDL) EXAM: ICD-10-CM

## 2021-03-18 DIAGNOSIS — Z51.81 ANTICOAGULATION GOAL OF INR 2 TO 3: ICD-10-CM

## 2021-03-18 DIAGNOSIS — Z86.718 HISTORY OF DVT (DEEP VEIN THROMBOSIS): ICD-10-CM

## 2021-03-18 DIAGNOSIS — Z00.00 ANNUAL PHYSICAL EXAM: Primary | ICD-10-CM

## 2021-03-18 DIAGNOSIS — R60.0 EDEMA OF LEFT LOWER LEG DUE TO PERIPHERAL VENOUS INSUFFICIENCY: ICD-10-CM

## 2021-03-18 DIAGNOSIS — I10 ESSENTIAL HYPERTENSION: ICD-10-CM

## 2021-03-18 DIAGNOSIS — I87.2 EDEMA OF LEFT LOWER LEG DUE TO PERIPHERAL VENOUS INSUFFICIENCY: ICD-10-CM

## 2021-03-18 LAB
SL AMB  POCT GLUCOSE, UA: NORMAL
SL AMB LEUKOCYTE ESTERASE,UA: NORMAL
SL AMB POCT BILIRUBIN,UA: NORMAL
SL AMB POCT BLOOD,UA: NORMAL
SL AMB POCT CLARITY,UA: CLEAR
SL AMB POCT COLOR,UA: YELLOW
SL AMB POCT KETONES,UA: NORMAL
SL AMB POCT NITRITE,UA: NORMAL
SL AMB POCT PH,UA: 5
SL AMB POCT SPECIFIC GRAVITY,UA: 1.01
SL AMB POCT URINE PROTEIN: NORMAL
SL AMB POCT UROBILINOGEN: 0.02

## 2021-03-18 PROCEDURE — 3008F BODY MASS INDEX DOCD: CPT | Performed by: INTERNAL MEDICINE

## 2021-03-18 PROCEDURE — 99396 PREV VISIT EST AGE 40-64: CPT | Performed by: NURSE PRACTITIONER

## 2021-03-18 PROCEDURE — 81002 URINALYSIS NONAUTO W/O SCOPE: CPT | Performed by: NURSE PRACTITIONER

## 2021-03-18 NOTE — PROGRESS NOTES
BMI Counseling: Body mass index is 39 89 kg/m²  The BMI is above normal  Nutrition recommendations include decreasing portion sizes, encouraging healthy choices of fruits and vegetables, decreasing fast food intake, consuming healthier snacks, limiting drinks that contain sugar, moderation in carbohydrate intake, increasing intake of lean protein, reducing intake of saturated and trans fat and reducing intake of cholesterol  Exercise recommendations include vigorous physical activity 75 minutes/week, exercising 3-5 times per week, obtaining a gym membership and strength training exercises  No pharmacotherapy was ordered  Patient referred to nutritionist due to patient being overweight  Depression Screening and Follow-up Plan: Clincally patient does not have depression  No treatment is required  Assessment/Plan:         Problem List Items Addressed This Visit        Cardiovascular and Mediastinum    Hypertension    Relevant Orders    Basic metabolic panel    Edema of left lower leg due to peripheral venous insufficiency       Other    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    Relevant Orders    POCT urine dip (Completed)    Anticoagulation goal of INR 2 to 3    History of DVT (deep vein thrombosis)            Subjective:      Patient ID: Jing Patten is a 60 y o  male  Patient is a 60year old male present for follow-up office visit for evaluation of his right knee s/p total knee replacement  Prior history of blood clots, patient is currently on warfartin 7 5mg, which we will be increaseing to 10mg p o  daily  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        The following portions of the patient's history were reviewed and updated as appropriate:   Past Medical History:  He has a past medical history of Angioedema, Coronary artery disease, GI bleed, Hypertension, and STEMI (ST elevation myocardial infarction) [facility]) ,  _______________________________________________________________________  Medical Problems:  does not have any pertinent problems on file ,  _______________________________________________________________________  Past Surgical History:   has a past surgical history that includes Upper gastrointestinal endoscopy  ,  _______________________________________________________________________  Family History:  He was adopted  Family history is unknown by patient  ,  _______________________________________________________________________  Social History:   reports that he has never smoked  He has never used smokeless tobacco  He reports that he does not drink alcohol or use drugs  ,  _______________________________________________________________________  Allergies:  is allergic to lisinopril; norvasc [amlodipine]; amoxicillin; eliquis [apixaban]; and lipitor [atorvastatin]     _______________________________________________________________________  Current Outpatient Medications   Medication Sig Dispense Refill    acetaminophen (TYLENOL) 500 mg tablet Take 1,000 mg by mouth daily      carbamide peroxide (DEBROX) 6 5 % otic solution Administer 5 drops into both ears 2 (two) times a day 15 mL 2    carvedilol (COREG) 25 mg tablet TAKE 1 TABLET BY MOUTH TWICE A DAY WITH FOOD 60 tablet 11    chlorhexidine (PERIDEX) 0 12 % solution Take as directed      diphenhydrAMINE (BENADRYL) 25 mg tablet Take 1 tablet (25 mg total) by mouth every 6 (six) hours as needed for itching 30 tablet 0    enoxaparin (LOVENOX) 80 mg/0 8 mL Inject 0 8 mL (80 mg total) under the skin every 24 hours Patient to stop warfarin 7 days prior to procedure   Pt/inr on 11/20/20 7 Syringe 7    ergocalciferol (VITAMIN D2) 50,000 units 1 CAPSULE ONCE A WEEK, FOR 12 WEEKS, THEN RESUME VITAMIN D 2000 UNITS DAILY      furosemide (Lasix) 20 mg tablet Take 1 tablet (20 mg total) by mouth daily 5 tablet 0    hydrochlorothiazide (HYDRODIURIL) 25 mg tablet Take 1 tablet (25 mg total) by mouth daily 90 tablet 3    isosorbide mononitrate (IMDUR) 30 mg 24 hr tablet Take 2 tablets (60 mg total) by mouth daily 180 tablet 1    multivitamin (THERAGRAN) TABS Take 1 tablet by mouth daily      mupirocin (BACTROBAN) 2 % ointment APPLY TOPICALLY 2 (TWO) TIMES A DAY  BEGIN 5 DAYS PRIOR TO SURGERY, APPLY TO NOSTRILS      oxyCODONE (ROXICODONE) 5 mg immediate release tablet PLEASE SEE ATTACHED FOR DETAILED DIRECTIONS      spironolactone (ALDACTONE) 25 mg tablet Take 1 tablet (25 mg total) by mouth daily 30 tablet 3    warfarin (COUMADIN) 2 5 mg tablet TAKE 1 TABLET BY MOUTH EVERY DAY 30 tablet 2    warfarin (COUMADIN) 5 mg tablet TAKE 1 TABLET BY MOUTH EVERY DAY WITH 2 5 MG DAILY FOR TOTAL 7 5 MG DAILY DOSE 30 tablet 2    white petrolatum-mineral oil (EUCERIN,HYDROCERIN) cream Apply topically 2 (two) times a day as needed (Please apply over dry skin on legs and feet ) 50 g 0     No current facility-administered medications for this visit       _______________________________________________________________________  Review of Systems   Constitutional: Negative for activity change, appetite change, chills, fatigue, fever and unexpected weight change  HENT: Negative for congestion, ear discharge, ear pain, nosebleeds, postnasal drip, rhinorrhea, sinus pressure, sinus pain, sneezing, sore throat and voice change  Eyes: Negative for photophobia, pain, discharge, redness, itching and visual disturbance  Respiratory: Negative for cough, chest tightness, shortness of breath and wheezing  Cardiovascular: Positive for leg swelling  Negative for chest pain and palpitations  Left lower leg intermittent swelling noted  Gastrointestinal: Negative for abdominal distention, abdominal pain, constipation, diarrhea, nausea and vomiting  Genitourinary: Negative for dysuria and hematuria  Musculoskeletal: Negative for arthralgias  Skin: Negative  Objective:  Vitals:    03/18/21 1128 03/18/21 1204   BP: 150/80 130/83   Pulse: (!) 47 (!) 51   Resp: 16    Temp: 97 6 °F (36 4 °C)    TempSrc: Tympanic    SpO2: 98% 97%   Weight: 130 kg (286 lb)    Height: 5' 11" (1 803 m)      Body mass index is 39 89 kg/m²  Physical Exam  Constitutional:       Appearance: Normal appearance  He is obese  HENT:      Head: Normocephalic and atraumatic  Right Ear: Tympanic membrane normal       Left Ear: Tympanic membrane normal       Nose: Nose normal       Mouth/Throat:      Mouth: Mucous membranes are moist    Eyes:      Extraocular Movements: Extraocular movements intact  Conjunctiva/sclera: Conjunctivae normal       Pupils: Pupils are equal, round, and reactive to light  Neck:      Musculoskeletal: Normal range of motion  Cardiovascular:      Rate and Rhythm: Normal rate and regular rhythm  Pulses: Normal pulses  Heart sounds: Normal heart sounds  Pulmonary:      Effort: Pulmonary effort is normal       Breath sounds: Normal breath sounds  Abdominal:      General: Bowel sounds are normal       Palpations: Abdomen is soft  Musculoskeletal: Normal range of motion  General: Swelling present  Comments: Right knee and left lower extremity  Skin:     General: Skin is warm and dry  Capillary Refill: Capillary refill takes 2 to 3 seconds  Neurological:      General: No focal deficit present  Mental Status: He is alert and oriented to person, place, and time  Mental status is at baseline     Psychiatric:         Mood and Affect: Mood normal          Behavior: Behavior normal

## 2021-03-18 NOTE — PATIENT INSTRUCTIONS
Deep Vein Thrombosis   WHAT YOU NEED TO KNOW:   What is a deep vein thrombosis (DVT)? A DVT is a blood clot that forms in a deep vein of the body  The deep veins in the legs, thighs, and hips are the most common sites for DVT  A DVT can also occur in a deep vein within your arms  The clot prevents the normal flow of blood in the vein  The blood backs up and causes pain and swelling  The DVT can break into smaller pieces and travel to your lungs and cause a blockage called a pulmonary embolism (PE)  A PE can become life-threatening  What increases my risk for a DVT? After you have a DVT, your risk for another increases  A DVT can happen to anyone, but any of the following may increase your risk:  · A family history of blood clots    · Limited activity caused by bed rest, a leg cast, or sitting for long periods    · Injury to a deep vein, or surgery    · A blood disorder that makes your blood clot faster than normal, such as factor V Leiden mutation    · Age older than 61 years    · Hormone replacement therapy    · Birth control pills, especially in women who smoke or are older than 35 years    · Pregnancy, and for 6 weeks after childbirth     · Cancer or heart failure     · A catheter placed in a large vein    · Smoking cigarettes    · Obesity or varicose veins    What are the signs and symptoms of a DVT? · Swelling    · Redness    · Warmth, pain, or tenderness     How is a DVT diagnosed? · A D-dimer blood test  may be done to check for signs of a blood clot  · An ultrasound  uses sound waves to show pictures on a monitor  An ultrasound may be done to show a clot in your vein  · Contrast venography  is an x-ray of a vein  Contrast liquid is used to make the vein easier to see on the x-ray  Tell a healthcare provider if you have ever had an allergic reaction to contrast liquid  How is a DVT treated? · Blood thinners  help prevent blood clots  Clots can cause strokes, heart attacks, and death  The following are general safety guidelines to follow while you are taking a blood thinner:    ? Watch for bleeding and bruising while you take blood thinners  Watch for bleeding from your gums or nose  Watch for blood in your urine and bowel movements  Use a soft washcloth on your skin, and a soft toothbrush to brush your teeth  This can keep your skin and gums from bleeding  If you shave, use an electric shaver  Do not play contact sports  ? Tell your dentist and other healthcare providers that you take a blood thinner  Wear a bracelet or necklace that says you take this medicine  ? Do not start or stop any other medicines unless your healthcare provider tells you to  Many medicines cannot be used with blood thinners  ? Take your blood thinner exactly as prescribed by your healthcare provider  Do not skip does or take less than prescribed  Tell your provider right away if you forget to take your blood thinner, or if you take too much  ? Warfarin  is a blood thinner that you may need to take  The following are things you should be aware of if you take warfarin:     § Foods and medicines can affect the amount of warfarin in your blood  Do not make major changes to your diet while you take warfarin  Warfarin works best when you eat about the same amount of vitamin K every day  Vitamin K is found in green leafy vegetables and certain other foods  Ask for more information about what to eat when you are taking warfarin  § You will need to see your healthcare provider for follow-up visits when you are on warfarin  You will need regular blood tests  These tests are used to decide how much medicine you need  · A vena cava filter  may be placed inside your vena cava to treat your DVT  The vena cava is a large vein that brings blood from your lower body up to your heart  The filter may help trap pieces of a blood clot and prevent them from going into your lungs      · Surgery  called a thrombectomy may be done to remove the clot  A procedure called thrombolysis may instead be done to inject a clot buster that helps break the clot apart  What can I do to manage a DVT? · Wear pressure stockings as directed  The stockings put pressure on your legs  This improves blood flow and helps prevent clots  Wear the stockings during the day  Do not wear them when you sleep  · Elevate your legs above the level of your heart  Elevate your legs when you sit or lie down, as often as you can  This will help decrease swelling and pain  Prop your legs on pillows or blankets to keep them elevated comfortably  What can I do to prevent a DVT? · Exercise regularly to help increase your blood flow  Walking is a good low-impact exercise  Talk to your healthcare provider about the best exercise plan for you  · Change your body position or move around often  Move and stretch in your seat several times each hour if you travel by car or work at a desk  In an airplane, get up and walk every hour  Move your legs by tightening and releasing your leg muscles while sitting  You can move your legs while sitting by raising and lowering your heels  Keep your toes on the floor while you do this  You can also raise and lower your toes while keeping your heels on the floor  · Maintain a healthy weight  Ask your healthcare provider how much you should weigh  Ask him or her to help you create a weight loss plan if you are overweight  · Do not smoke  Nicotine and other chemicals in cigarettes and cigars can damage blood vessels and make it more difficult to manage your DVT  Ask your healthcare provider for information if you currently smoke and need help to quit  E-cigarettes or smokeless tobacco still contain nicotine  Talk to your healthcare provider before you use these products  · Ask about birth control if you are a woman who takes the pill    A birth control pill increases the risk for PE in certain women  The risk is higher if you are also older than 35, smoke cigarettes, or have a blood clotting disorder  Talk to your healthcare provider about other ways to prevent pregnancy, such as a cervical cap or intrauterine device (IUD)  Call your local emergency number (911 in the [address]) if:   · You feel lightheaded, short of breath, and have chest pain  · You cough up blood  When should I call my doctor? · Your arm or leg feels warm, tender, and painful  It may look swollen and red  · You have questions or concerns about your condition or care  CARE AGREEMENT:   You have the right to help plan your care  Learn about your health condition and how it may be treated  Discuss treatment options with your healthcare providers to decide what care you want to receive  You always have the right to refuse treatment  The above information is an  only  It is not intended as medical advice for individual conditions or treatments  Talk to your doctor, nurse or pharmacist before following any medical regimen to see if it is safe and effective for you  © Copyright [address] Information is for End User's use only and may not be sold, redistributed or otherwise used for commercial purposes  All illustrations and images included in CareNotes® are the copyrighted property of [employer]  or [facility] Maruqita Elder   Bradycardia   WHAT YOU NEED TO KNOW:   What is bradycardia? Bradycardia is a slow heart rate, usually fewer than 60 beats per minute  A slow heart rate is normal for some people, such as athletes, and needs no treatment  Bradycardia may also be caused by health conditions that do need treatment  Your healthcare provider will tell you what heart rate is too low for you  What causes or increases my risk for bradycardia?    · Heart damage caused by a heart attack or heart disease    · A condition such as amyloidosis or hemochromatosis    · Problems with your heart node (tissue that generates electrical signals)    · Conditions that affect how electrical signals travel between atria and ventricles and cause your heart to beat    · Sleep apnea (pauses in breathing during sleep)    · Certain infections, such as Lyme disease, diphtheria, or endocarditis    · Hypothermia (low body temperature), or health problems that cause low oxygen levels    · Low thyroid hormone levels, low blood sugar levels, or electrolyte balance problems    · Certain medicines, such as heart medicines    · Age older than 79    What other signs and symptoms may occur with bradycardia? · Tiredness and shortness of breath    · Dizziness    · Lightheadedness    · Confusion    · Chest pain    · Cool and pale or bluish skin    How is bradycardia diagnosed? Your healthcare provider will ask about your symptoms  Tell him or her when they started and how often they happen  Tell him or her how severe your symptoms usually are, and how long they last  He or she will ask what triggers your symptoms and if anything makes them worse or better  He or she may ask if you have a heart condition or take any medicines  Tell your provider if symptoms happen after you take certain medicines  Tell him or her if you have a family history of heart conditions  You may also need any of the following:  · Blood tests  may be done to see if you have any heart damage  They may also be used to find the cause of your bradycardia  · Echocardiography  is a type of ultrasound  Sound waves are used to create pictures of your heart as it beats  · An ECG  records the electrical activity of your heart  It may be used to check for heart damage from a heart attack or problems with the way electrical signals travel through your heart  You may be asked to walk on a treadmill during this test to check if exercise triggers symptoms  · CT or MRI pictures  may show problems with your heart that can cause bradycardia   Contrast liquid may be used to help your heart show up better in the pictures  Tell the healthcare provider if you had an allergic reaction to contrast liquid  Do not enter the MRI room with any metal  Metal can cause serious damage  Tell the provider if you have any metal in or on your body  · A heart monitor  will be used to track your heart rate and rhythm if you are in the hospital  You may also need to wear it for several days at home  How is bradycardia treated? You may not need any treatment  Bradycardia is usually treated if it causes symptoms, such as dizziness or fainting  The cause of your bradycardia may need to be treated  For example, you may need treatment for sleep apnea if this is causing your symptoms  Your healthcare provider will talk with you about the benefits and risks of treatment that may be right for you:  · Heart medicines  may be given to increase your heart rate  These medicines are given through an IV  · A temporary pacemaker  is a short-term treatment in the hospital  The pacemaker is applied to your skin with sticky pads or placed into a vein in your neck or chest  A small pacing device helps keep your heartbeat stable  · A permanent pacemaker  is implanted under the skin of your chest or abdomen during surgery  A tiny battery creates electrical impulses that keep your heart rate regular  Call your local emergency number (911 in the [address] [place] [address],[address]) if:   · You have new or worsening dizziness, shortness of breath, chest pain, or confusion  When should I call my doctor? · You feel lightheaded or faint  · Your pulse rate is lower than healthcare providers say it should be, even with treatment  · You are more tired than usual, even with treatment  · You have questions or concerns about your condition or care  CARE AGREEMENT:   You have the right to help plan your care  Learn about your health condition and how it may be treated   Discuss treatment options with your healthcare providers to decide what care you want to receive  You always have the right to refuse treatment  The above information is an  only  It is not intended as medical advice for individual conditions or treatments  Talk to your doctor, nurse or pharmacist before following any medical regimen to see if it is safe and effective for you  © Copyright [address] Information is for End User's use only and may not be sold, redistributed or otherwise used for commercial purposes  All illustrations and images included in CareNotes® are the copyrighted property of [employer]  or [employer]  Visit for Adults   AMBULATORY CARE:   A wellness visit  is when you see your healthcare provider to get screened for health problems  Your healthcare provider will also give you advice on how to stay healthy  Write down your questions so you remember to ask them  Ask your healthcare provider how often you should have a wellness visit  What happens at a wellness visit:  Your healthcare provider will ask about your health, and your family history of health problems  This includes high blood pressure, heart disease, and cancer  He or she will ask if you have symptoms that concern you, if you smoke, and about your mood  You may also be asked about your intake of medicines, supplements, food, and alcohol  Any of the following may be done:  · Your weight  will be checked  Your height may also be checked so your body mass index (BMI) can be calculated  Your BMI shows if you are at a healthy weight  · Your blood pressure  and heart rate will be checked  Your temperature may also be checked  · Blood and urine tests  may be done  Blood tests may be done to check your cholesterol levels  Abnormal cholesterol levels increase your risk for heart disease and stroke  You may also need a blood or urine test to check for diabetes if you are at increased risk   Urine tests may be done to look for signs of an infection or kidney disease  · A physical exam  includes checking your heartbeat and lungs with a stethoscope  Your healthcare provider may also check your skin to look for sun damage  · Screening tests  may be recommended  A screening test is done to check for diseases that may not cause symptoms  The screening tests you may need depend on your age, gender, family history, and lifestyle habits  For example, colorectal screening may be recommended if you are 48years old or older  Screening tests you need if you are a woman:   · A Pap smear  is used to screen for cervical cancer  Pap smears are usually done every 3 to 5 years depending on your age  You may need them more often if you have had abnormal Pap smear test results in the past  Ask your healthcare provider how often you should have a Pap smear  · A mammogram  is an x-ray of your breasts to screen for breast cancer  Experts recommend mammograms every 2 years starting at age 48 years  You may need a mammogram at age 52 years or younger if you have an increased risk for breast cancer  Talk to your healthcare provider about when you should start having mammograms and how often you need them  Vaccines you may need:   · Get an influenza vaccine  every year  The influenza vaccine protects you from the flu  Several types of viruses cause the flu  The viruses change over time, so new vaccines are made each year  · Get a tetanus-diphtheria (Td) booster vaccine  every 10 years  This vaccine protects you against tetanus and diphtheria  Tetanus is a severe infection that may cause painful muscle spasms and lockjaw  Diphtheria is a severe bacterial infection that causes a thick covering in the back of your mouth and throat  · Get a human papillomavirus (HPV) vaccine  if you are female and aged 23 to 32 or male 23 to 24 and never received it  This vaccine protects you from HPV infection  HPV is the most common infection spread by sexual contact   HPV may also cause vaginal, penile, and anal cancers  · Get a pneumococcal vaccine  if you are aged 72 years or older  The pneumococcal vaccine is an injection given to protect you from pneumococcal disease  Pneumococcal disease is an infection caused by pneumococcal bacteria  The infection may cause pneumonia, meningitis, or an ear infection  · Get a shingles vaccine  if you are 60 or older, even if you have had shingles before  The shingles vaccine is an injection to protect you from the varicella-zoster virus  This is the same virus that causes chickenpox  Shingles is a painful rash that develops in people who had chickenpox or have been exposed to the virus  How to eat healthy:  My Plate is a model for planning healthy meals  It shows the types and amounts of foods that should go on your plate  Fruits and vegetables make up about half of your plate, and grains and protein make up the other half  A serving of dairy is included on the side of your plate  The amount of calories and serving sizes you need depends on your age, gender, weight, and height  Examples of healthy foods are listed below:  · Eat a variety of vegetables  such as dark green, red, and orange vegetables  You can also include canned vegetables low in sodium (salt) and frozen vegetables without added butter or sauces  · Eat a variety of fresh fruits , canned fruit in 100% juice, frozen fruit, and dried fruit  · Include whole grains  At least half of the grains you eat should be whole grains  Examples include whole-wheat bread, wheat pasta, brown rice, and whole-grain cereals such as oatmeal     · Eat a variety of protein foods such as seafood (fish and shellfish), lean meat, and poultry without skin (turkey and chicken)  Examples of lean meats include pork leg, shoulder, or tenderloin, and beef round, sirloin, tenderloin, and extra lean ground beef   Other protein foods include eggs and egg substitutes, beans, peas, soy products, nuts, and seeds     · Choose low-fat dairy products such as skim or 1% milk or low-fat yogurt, cheese, and cottage cheese  · Limit unhealthy fats  such as butter, hard margarine, and shortening  Exercise:  Exercise at least 30 minutes per day on most days of the week  Some examples of exercise include walking, biking, dancing, and swimming  You can also fit in more physical activity by taking the stairs instead of the elevator or parking farther away from stores  Include muscle strengthening activities 2 days each week  Regular exercise provides many health benefits  It helps you manage your weight, and decreases your risk for type 2 diabetes, heart disease, stroke, and high blood pressure  Exercise can also help improve your mood  Ask your healthcare provider about the best exercise plan for you  General health and safety guidelines:   · Do not smoke  Nicotine and other chemicals in cigarettes and cigars can cause lung damage  Ask your healthcare provider for information if you currently smoke and need help to quit  E-cigarettes or smokeless tobacco still contain nicotine  Talk to your healthcare provider before you use these products  · Limit alcohol  A drink of alcohol is 12 ounces of beer, 5 ounces of wine, or 1½ ounces of liquor  · Lose weight, if needed  Being overweight increases your risk of certain health conditions  These include heart disease, high blood pressure, type 2 diabetes, and certain types of cancer  · Protect your skin  Do not sunbathe or use tanning beds  Use sunscreen with a SPF 15 or higher  Apply sunscreen at least 15 minutes before you go outside  Reapply sunscreen every 2 hours  Wear protective clothing, hats, and sunglasses when you are outside  · Drive safely  Always wear your seatbelt  Make sure everyone in your car wears a seatbelt  A seatbelt can save your life if you are in an accident  Do not use your cell phone when you are driving   This could distract you and cause an accident  Pull over if you need to make a call or send a text message  · Practice safe sex  Use latex condoms if are sexually active and have more than one partner  Your healthcare provider may recommend screening tests for sexually transmitted infections (STIs)  · Wear helmets, lifejackets, and protective gear  Always wear a helmet when you ride a bike or motorcycle, go skiing, or play sports that could cause a head injury  Wear protective equipment when you play sports  Wear a lifejacket when you are on a boat or doing water sports  © Copyright [address] Information is for End User's use only and may not be sold, redistributed or otherwise used for commercial purposes  All illustrations and images included in CareNotes® are the copyrighted property of [employer]  or [address] [place], [place] [address]   The above information is an  only  It is not intended as medical advice for individual conditions or treatments  Talk to your doctor, nurse or pharmacist before following any medical regimen to see if it is safe and effective for you

## 2021-03-18 NOTE — TELEPHONE ENCOUNTER
----- Message from Edwardo Olivares sent at 3/17/2021  6:10 PM EDT -----  Tell patient to take 10 mg p o  daily and recheck in 1 week

## 2021-03-18 NOTE — PROGRESS NOTES
[address] [facility] PRIMARY CARE [place]    NAME: Shana Shaw  AGE: 60 y o  SEX: male  : 1965     DATE: 3/18/2021     Assessment and Plan:     Problem List Items Addressed This Visit        Cardiovascular and Mediastinum    Hypertension    Relevant Orders    Basic metabolic panel    Edema of left lower leg due to peripheral venous insufficiency       Other    Annual physical exam - Primary    Anticoagulation goal of INR 2 to 3    History of DVT (deep vein thrombosis)          Immunizations and preventive care screenings were discussed with patient today  Appropriate education was printed on patient's after visit summary  Counseling:  Alcohol/drug use: discussed moderation in alcohol intake, the recommendations for healthy alcohol use, and avoidance of illicit drug use  Dental Health: discussed importance of regular tooth brushing, flossing, and dental visits  Injury prevention: discussed safety/seat belts, safety helmets, smoke detectors, carbon dioxide detectors, and smoking near bedding or upholstery  Sexual health: discussed sexually transmitted diseases, partner selection, use of condoms, avoidance of unintended pregnancy, and contraceptive alternatives  · Exercise: the importance of regular exercise/physical activity was discussed  Recommend exercise 3-5 times per week for at least 30 minutes  BMI Counseling: Body mass index is 39 89 kg/m²  The BMI is above normal  Nutrition recommendations include decreasing portion sizes, encouraging healthy choices of fruits and vegetables, decreasing fast food intake, consuming healthier snacks, limiting drinks that contain sugar, moderation in carbohydrate intake, increasing intake of lean protein, reducing intake of saturated and trans fat and reducing intake of cholesterol   Exercise recommendations include vigorous physical activity 75 minutes/week, exercising 3-5 times per week, obtaining a gym membership and strength training exercises  No pharmacotherapy was ordered  Patient referred to nutritionist due to patient being overweight  Depression Screening and Follow-up Plan: Clincally patient does not have depression  No treatment is required  Return in about 3 months (around 6/18/2021), or if symptoms worsen or fail to improve, for Next scheduled follow up, Recheck  Chief Complaint:     Chief Complaint   Patient presents with    Annual Exam     yearly exam and non dot physical      History of Present Illness:     Adult Annual Physical   Patient here for a comprehensive physical exam  The patient reports no problems  Diet and Physical Activity  · Diet/Nutrition: well balanced diet, heart healthy (low sodium) diet, limited junk food, low calorie diet, low fat diet, high fat diet, low carb diet, consuming 3-5 servings of fruits/vegetables daily, adequate fiber intake and adequate whole grain intake  · Exercise: moderate cardiovascular exercise and 5-7 times a week on average  Depression Screening  PHQ-9 Depression Screening    PHQ-9:   Frequency of the following problems over the past two weeks:           General Health  · Sleep: sleeps well, gets 4-6 hours of sleep on average, snores loudly and unrefreshing sleep  · Hearing: normal - bilateral   · Vision: no vision problems, goes for regular eye exams, most recent eye exam >1 year ago and wears glasses  · Dental: regular dental visits, brushes teeth once daily and flosses teeth occasionally   Health  · Symptoms include: erectile dysfunction     Review of Systems:     Review of Systems   Constitutional: Negative for activity change, appetite change, chills, fatigue, fever and unexpected weight change  HENT: Negative for congestion, ear discharge, ear pain, nosebleeds, postnasal drip, rhinorrhea, sinus pain, sneezing, sore throat and voice change  Eyes: Negative  Negative for pain, redness and visual disturbance  Respiratory: Negative  Negative for cough, choking, chest tightness and shortness of breath  Cardiovascular: Negative for chest pain and palpitations  Gastrointestinal: Negative for abdominal distention, abdominal pain, constipation, diarrhea, nausea and vomiting  Endocrine: Negative  Genitourinary: Negative for dysuria, flank pain, frequency, hematuria and urgency  Musculoskeletal: Negative for arthralgias and myalgias  Skin: Negative  Allergic/Immunologic: Negative  Neurological: Negative  Hematological: Negative  Psychiatric/Behavioral: Negative         Past Medical History:     Past Medical History:   Diagnosis Date    Angioedema     Coronary artery disease     GI bleed     Hypertension     stopped his meds when ran out several years ago    STEMI (ST elevation myocardial infarction) ([facility] [address] )       Past Surgical History:     Past Surgical History:   Procedure Laterality Date    UPPER GASTROINTESTINAL ENDOSCOPY      Clamped large stomach ulcer      Family History:     Family History   Adopted: Yes   Family history unknown: Yes      Social History:     E-Cigarette/Vaping    E-Cigarette Use Never User      E-Cigarette/Vaping Substances    Nicotine No     THC No     CBD No     Flavoring No     Other No     Unknown No      Social History     Socioeconomic History    Marital status: Single     Spouse name: None    Number of children: None    Years of education: None    Highest education level: None   Occupational History    None   Social Needs    Financial resource strain: Not hard at all   Lissa-Rickie insecurity     Worry: Never true     Inability: Never true    Transportation needs     Medical: No     Non-medical: No   Tobacco Use    Smoking status: Never Smoker    Smokeless tobacco: Never Used   Substance and Sexual Activity    Alcohol use: Never     Alcohol/week: 0 0 standard drinks     Frequency: Never     Binge frequency: Never    Drug use: Never    Sexual activity: None     Comment: not asked   Lifestyle    Physical activity     Days per week: 3 days     Minutes per session: 120 min    Stress: Not at all   Relationships    Social connections     Talks on phone: More than three times a week     Gets together: More than three times a week     Attends [employer] service: More than 4 times per year     Active member of club or organization: No     Attends meetings of clubs or organizations: Never     Relationship status: None    Intimate partner violence     Fear of current or ex partner: No     Emotionally abused: No     Physically abused: No     Forced sexual activity: No   Other Topics Concern    None   Social History Narrative    Checked Makayla      Current Medications:     Current Outpatient Medications   Medication Sig Dispense Refill    acetaminophen (TYLENOL) 500 mg tablet Take 1,000 mg by mouth daily      carbamide peroxide (DEBROX) 6 5 % otic solution Administer 5 drops into both ears 2 (two) times a day 15 mL 2    carvedilol (COREG) 25 mg tablet TAKE 1 TABLET BY MOUTH TWICE A DAY WITH FOOD 60 tablet 11    chlorhexidine (PERIDEX) 0 12 % solution Take as directed      diphenhydrAMINE (BENADRYL) 25 mg tablet Take 1 tablet (25 mg total) by mouth every 6 (six) hours as needed for itching 30 tablet 0    enoxaparin (LOVENOX) 80 mg/0 8 mL Inject 0 8 mL (80 mg total) under the skin every 24 hours Patient to stop warfarin 7 days prior to procedure   Pt/inr on 11/20/20 7 Syringe 7    ergocalciferol (VITAMIN D2) 50,000 units 1 CAPSULE ONCE A WEEK, FOR 12 WEEKS, THEN RESUME VITAMIN D 2000 UNITS DAILY      furosemide (Lasix) 20 mg tablet Take 1 tablet (20 mg total) by mouth daily 5 tablet 0    hydrochlorothiazide (HYDRODIURIL) 25 mg tablet Take 1 tablet (25 mg total) by mouth daily 90 tablet 3    isosorbide mononitrate (IMDUR) 30 mg 24 hr tablet Take 2 tablets (60 mg total) by mouth daily 180 tablet 1    multivitamin (THERAGRAN) TABS Take 1 tablet by mouth daily      mupirocin (BACTROBAN) 2 % ointment APPLY TOPICALLY 2 (TWO) TIMES A DAY  BEGIN 5 DAYS PRIOR TO SURGERY, APPLY TO NOSTRILS      oxyCODONE (ROXICODONE) 5 mg immediate release tablet PLEASE SEE ATTACHED FOR DETAILED DIRECTIONS      spironolactone (ALDACTONE) 25 mg tablet Take 1 tablet (25 mg total) by mouth daily 30 tablet 3    warfarin (COUMADIN) 2 5 mg tablet TAKE 1 TABLET BY MOUTH EVERY DAY 30 tablet 2    warfarin (COUMADIN) 5 mg tablet TAKE 1 TABLET BY MOUTH EVERY DAY WITH 2 5 MG DAILY FOR TOTAL 7 5 MG DAILY DOSE 30 tablet 2    white petrolatum-mineral oil (EUCERIN,HYDROCERIN) cream Apply topically 2 (two) times a day as needed (Please apply over dry skin on legs and feet ) 50 g 0     No current facility-administered medications for this visit  Allergies: Allergies   Allergen Reactions    Lisinopril Angioedema    Norvasc [Amlodipine] Angioedema    Amoxicillin Itching     Itching little bumps    Eliquis [Apixaban] Angioedema    Lipitor [Atorvastatin] Facial Swelling      Physical Exam:     /83   Pulse (!) 51   Temp 97 6 °F (36 4 °C) (Tympanic)   Resp 16   Ht 5' 11" (1 803 m)   Wt 130 kg (286 lb)   SpO2 97%   BMI 39 89 kg/m²     Physical Exam  Vitals signs and nursing note reviewed  Constitutional:       General: He is not in acute distress  Appearance: Normal appearance  He is well-developed  He is obese  He is not ill-appearing, toxic-appearing or diaphoretic  HENT:      Head: Normocephalic and atraumatic  Right Ear: Tympanic membrane, ear canal and external ear normal       Left Ear: Tympanic membrane, ear canal and external ear normal       Nose: Nose normal       Mouth/Throat:      Mouth: Mucous membranes are dry  Eyes:      Extraocular Movements: Extraocular movements intact  Conjunctiva/sclera: Conjunctivae normal       Pupils: Pupils are equal, round, and reactive to light  Neck:      Musculoskeletal: Normal range of motion and neck supple  Cardiovascular:      Rate and Rhythm: Normal rate and regular rhythm  Pulses: Normal pulses  Heart sounds: Normal heart sounds  No murmur  Pulmonary:      Effort: Pulmonary effort is normal  No respiratory distress  Breath sounds: Normal breath sounds  Abdominal:      General: Bowel sounds are normal       Palpations: Abdomen is soft  Tenderness: There is no abdominal tenderness  Musculoskeletal: Normal range of motion  General: Tenderness present  No swelling, deformity or signs of injury  Right lower leg: No edema  Left lower leg: No edema  Skin:     General: Skin is warm and dry  Capillary Refill: Capillary refill takes 2 to 3 seconds  Neurological:      General: No focal deficit present  Mental Status: He is alert and oriented to person, place, and time     Psychiatric:         Mood and Affect: Mood normal          Behavior: Behavior normal           [facility]  [facility] [place]

## 2021-04-02 ENCOUNTER — ANCILLARY ORDERS (OUTPATIENT)
Dept: ADMINISTRATIVE | Facility: HOSPITAL | Age: 56
End: 2021-04-02

## 2021-04-02 DIAGNOSIS — I82.501 CHRONIC THROMBOEMBOLISM OF DEEP VEINS OF LOWER EXTREMITY, RIGHT (HCC): ICD-10-CM

## 2021-04-09 ENCOUNTER — TELEPHONE (OUTPATIENT)
Dept: INTERNAL MEDICINE CLINIC | Facility: CLINIC | Age: 56
End: 2021-04-09

## 2021-04-09 NOTE — TELEPHONE ENCOUNTER
Left message for patient yesterday 4/8/21 that his INR is overdue and need to know why he has not gone  Orders are in and ready for patient in case he calls back

## 2021-04-10 ENCOUNTER — LAB (OUTPATIENT)
Dept: LAB | Facility: CLINIC | Age: 56
End: 2021-04-10
Payer: COMMERCIAL

## 2021-04-10 DIAGNOSIS — I10 ESSENTIAL HYPERTENSION: ICD-10-CM

## 2021-04-10 DIAGNOSIS — I82.501 LEG DVT (DEEP VENOUS THROMBOEMBOLISM), CHRONIC, RIGHT (HCC): ICD-10-CM

## 2021-04-10 LAB
ANION GAP SERPL CALCULATED.3IONS-SCNC: 9 MMOL/L (ref 4–13)
BUN SERPL-MCNC: 28 MG/DL (ref 5–25)
CALCIUM SERPL-MCNC: 9.4 MG/DL (ref 8.3–10.1)
CHLORIDE SERPL-SCNC: 102 MMOL/L (ref 100–108)
CO2 SERPL-SCNC: 28 MMOL/L (ref 21–32)
CREAT SERPL-MCNC: 1.36 MG/DL (ref 0.6–1.3)
GFR SERPL CREATININE-BSD FRML MDRD: 66 ML/MIN/1.73SQ M
GLUCOSE P FAST SERPL-MCNC: 102 MG/DL (ref 65–99)
POTASSIUM SERPL-SCNC: 4.4 MMOL/L (ref 3.5–5.3)
SODIUM SERPL-SCNC: 139 MMOL/L (ref 136–145)

## 2021-04-10 PROCEDURE — 80048 BASIC METABOLIC PNL TOTAL CA: CPT

## 2021-04-11 RX ORDER — WARFARIN SODIUM 5 MG/1
TABLET ORAL
Qty: 30 TABLET | Refills: 2 | Status: SHIPPED | OUTPATIENT
Start: 2021-04-11 | End: 2021-07-22

## 2021-06-24 DIAGNOSIS — I10 ESSENTIAL HYPERTENSION: ICD-10-CM

## 2021-06-24 RX ORDER — SPIRONOLACTONE 25 MG/1
TABLET ORAL
Qty: 30 TABLET | Refills: 3 | Status: SHIPPED | OUTPATIENT
Start: 2021-06-24 | End: 2021-11-22

## 2021-07-22 DIAGNOSIS — I82.501 LEG DVT (DEEP VENOUS THROMBOEMBOLISM), CHRONIC, RIGHT (HCC): ICD-10-CM

## 2021-07-22 RX ORDER — WARFARIN SODIUM 5 MG/1
TABLET ORAL
Qty: 30 TABLET | Refills: 2 | Status: SHIPPED | OUTPATIENT
Start: 2021-07-22 | End: 2021-08-25 | Stop reason: SDUPTHER

## 2021-08-17 ENCOUNTER — TELEPHONE (OUTPATIENT)
Dept: CARDIOLOGY CLINIC | Facility: CLINIC | Age: 56
End: 2021-08-17

## 2021-08-17 NOTE — TELEPHONE ENCOUNTER
Wong ballesteros on nsg line returning call from this morning, confirming appt tomorrow 8/18 @ 1140 w/ Dr Becky Leger  He did not answer any pre-reg questions on voicemail     C/b # [phone]

## 2021-08-18 PROBLEM — Z79.01 ANTICOAGULATION ADEQUATE WITH ANTICOAGULANT THERAPY: Status: ACTIVE | Noted: 2021-08-18

## 2021-08-18 NOTE — PATIENT INSTRUCTIONS
Warfarin (By mouth)   Warfarin (WAR-far-in)  Prevents and treats blood clots  May lower the risk of serious complications after a heart attack  This medicine is a blood thinner  Brand Name(s): CoumadinFernanda   There may be other brand names for this medicine  When This Medicine Should Not Be Used: This medicine is not right for everyone  Do not use it if you had an allergic reaction to warfarin, if you are pregnant, or if you have health problems that could cause bleeding  How to Use This Medicine:   Tablet  · Take your medicine as directed  Your dose may need to be changed several times to find what works best for you  · This medicine should come with a Medication Guide  Ask your pharmacist for a copy if you do not have one  · Missed dose: Take a dose as soon as you remember  If it is almost time for your next dose, wait until then and take a regular dose  Do not take extra medicine to make up for a missed dose  · Store the medicine in a closed container at room temperature, away from heat, moisture, and direct light  Drugs and Foods to Avoid:   Ask your doctor or pharmacist before using any other medicine, including over-the-counter medicines, vitamins, and herbal products  · Many medicines and foods can affect how warfarin works and may affect the PT/INR test results  Tell your doctor before you start or stop any medicine, especially the following:   ? Co-enzyme D38, echinacea, garlic, ginkgo, ginseng, goldenseal, or Leslee's wort  ? Another blood thinner, including apixaban, argatroban, bivalirudin, cilostazol, clopidogrel, dabigatran, desirudin, dipyridamole, heparin, lepirudin, prasugrel, rivaroxaban, ticlopidine  ? Medicine to treat depression or anxiety, including citalopram, desvenlafaxine, duloxetine, escitalopram, fluoxetine, fluvoxamine, milnacipran, paroxetine, sertraline, venlafaxine, vilazodone  ? Medicine to treat an infection  ?  NSAID pain or arthritis medicine, including aspirin, celecoxib, diclofenac, diflunisal, fenoprofen, ibuprofen, indomethacin, ketoprofen, ketorolac, mefenamic acid, naproxen, oxaprozin, piroxicam, sulindac  Check labels for over-the-counter medicines to find out if they contain an NSAID   ? Steroid medicine, including dexamethasone, hydrocortisone, methylprednisolone, prednisolone, prednisone  · Warfarin works best if you eat about the same amount of vitamin K every day  Foods high in vitamin K include asparagus, broccoli, brussels sprouts, cabbage, green leafy vegetables, plums, rhubarb, and canola oil  Talk to your doctor before you make changes to your normal diet  · Do not eat grapefruit or drink grapefruit juice while you are using this medicine  Warnings While Using This Medicine:   · It is not safe to take this medicine during pregnancy  It could harm an unborn baby  Tell your doctor right away if you become pregnant  Use an effective form of birth control to keep from getting pregnant during treatment and for at least 1 month after your last dose  · Tell your doctor if you are breastfeeding, or if you have kidney disease, liver disease, heart disease, diabetes, heart failure, high blood pressure, an infection, a stomach ulcer, or protein C deficiency  Also tell your doctor if you had recent surgery or an injury, or a history of stroke, anemia, severe bleeding or bruising, or problems caused by heparin use  · This medicine may cause the following problems:   ? Bleeding, which may be life-threatening  ? Gangrene (skin or tissue damage)  ? Calciphylaxis or calcium uremic arteriolopathy  ? Kidney problems, including acute kidney injury  ? Purple toes syndrome  · You must have a PT/INR blood test while you use this medicine to check how well your blood is clotting  Your doctor will use the test results to make sure the medicine is working properly  Keep all appointments for the PT/INR blood tests  · You may bleed or bruise more easily with warfarin   To prevent injury or cuts, do not play rough sports, be careful with sharp objects, and brush and floss your teeth gently  [place] your nose gently, and do not pick your nose  · Carry an ID card or wear a medical alert bracelet to let emergency caregivers know that you use warfarin  · Tell any doctor or dentist who treats you that you are using this medicine  You may need to stop using this medicine several days before you have surgery or medical tests  · Keep all medicine out of the reach of children  Never share your medicine with anyone  Possible Side Effects While Using This Medicine:   Call your doctor right away if you notice any of these side effects:  · Allergic reaction: Itching or hives, swelling in your face or hands, swelling or tingling in your mouth or throat, chest tightness, trouble breathing  · Bleeding from your gums or nose, coughing up blood, heavy monthly periods  · Bleeding that does not stop, bruising, or weakness  · Dizziness, fainting, lightheadedness  · Pain, brown or black skin, or skin that is cool to the touch  · Purple toes or feet, or new pain in your leg, foot, or toes  · Purplish red, net-like, blotchy spots on the skin  · Red or dark brown urine, or red or black, tarry stools  · Vomiting blood or material that looks like coffee grounds  If you notice other side effects that you think are caused by this medicine, tell your doctor  Call your doctor for medical advice about side effects  You may report side effects to FDA at 9-457-FDA-6975  © Copyright [employer] 2021 Information is for End User's use only and may not be sold, redistributed or otherwise used for commercial purposes  The above information is an  only  It is not intended as medical advice for individual conditions or treatments  Talk to your doctor, nurse or pharmacist before following any medical regimen to see if it is safe and effective for you

## 2021-08-18 NOTE — PROGRESS NOTES
BMI Counseling: Body mass index is 40 45 kg/m²  The BMI is above normal  Nutrition recommendations include decreasing portion sizes, encouraging healthy choices of fruits and vegetables, decreasing fast food intake, consuming healthier snacks, limiting drinks that contain sugar, moderation in carbohydrate intake, increasing intake of lean protein, reducing intake of saturated and trans fat and reducing intake of cholesterol  Exercise recommendations include vigorous physical activity 75 minutes/week, exercising 3-5 times per week, obtaining a gym membership and strength training exercises  No pharmacotherapy was ordered  Depression Screening and Follow-up Plan: Patient's depression screening was positive with a PHQ-2 score of 0  Clincally patient does not have depression  No treatment is required  Assessment/Plan:         Problem List Items Addressed This Visit        Cardiovascular and Mediastinum    Hypertension     Patient's /110 on arrival   Heart rate in the 50s  Currently patient is maintained on carvedilol 25 mg p o  B i d , hydrochlorothiazide 25 mg p o  Daily, Imdur 30 mg p o  Daily  Instructed on low sodium, low cholesterol diet  Acute deep vein thrombosis (DVT) of tibial vein (HCC)     Patient currently on warfarin 7 5 mg p o  Daily  He has not had a PT INR recently he will go to have the testing done  Nervous and Auditory    Excessive cerumen in ear canal, bilateral     Patient to use debrox ear drops bilaterally  To set up a appointment to irrigate bilateral ears  Other    Follow-up examination    Hyperlipidemia    Morbid obesity with BMI of 40 0-44 9, adult ([facility] [address] )     Patient counseled on diet, exercise and proper nutrition for weight reduction            Status post right knee replacement    Anticoagulation adequate with anticoagulant therapy      Other Visit Diagnoses     Prostate cancer screening    -  Primary            Subjective:      Patient ID: Sam Vidal is a 60 y o  male  Patient is a 60year old male present for follow-up office visit  He has been feeling well recently  His right knee has been slightly more sore then usual  Slight warmth to prosthetic joint notedjoint noted  The following portions of the patient's history were reviewed and updated as appropriate:   Past Medical History:  He has a past medical history of Angioedema, Coronary artery disease, GI bleed, Hypertension, and STEMI (ST elevation myocardial infarction) ([address] )  ,  _______________________________________________________________________  Medical Problems:  does not have any pertinent problems on file ,  _______________________________________________________________________  Past Surgical History:   has a past surgical history that includes Upper gastrointestinal endoscopy  ,  _______________________________________________________________________  Family History:  He was adopted  Family history is unknown by patient  ,  _______________________________________________________________________  Social History:   reports that he has never smoked  He has never used smokeless tobacco  He reports that he does not drink alcohol and does not use drugs  ,  _______________________________________________________________________  Allergies:  is allergic to lisinopril, norvasc [amlodipine], amoxicillin, eliquis [apixaban], and lipitor [atorvastatin]     _______________________________________________________________________  Current Outpatient Medications   Medication Sig Dispense Refill    acetaminophen (TYLENOL) 500 mg tablet Take 1,000 mg by mouth daily      carbamide peroxide (DEBROX) 6 5 % otic solution Administer 5 drops into both ears 2 (two) times a day 15 mL 2    carvedilol (COREG) 25 mg tablet TAKE 1 TABLET BY MOUTH TWICE A DAY WITH FOOD 60 tablet 11    chlorhexidine (PERIDEX) 0 12 % solution Take as directed      diphenhydrAMINE (BENADRYL) 25 mg tablet Take 1 tablet (25 mg total) by mouth every 6 (six) hours as needed for itching 30 tablet 0    enoxaparin (LOVENOX) 80 mg/0 8 mL Inject 0 8 mL (80 mg total) under the skin every 24 hours Patient to stop warfarin 7 days prior to procedure  Pt/inr on 11/20/20 7 Syringe 7    ergocalciferol (VITAMIN D2) 50,000 units 1 CAPSULE ONCE A WEEK, FOR 12 WEEKS, THEN RESUME VITAMIN D 2000 UNITS DAILY      furosemide (Lasix) 20 mg tablet Take 1 tablet (20 mg total) by mouth daily 5 tablet 0    hydrochlorothiazide (HYDRODIURIL) 25 mg tablet Take 1 tablet (25 mg total) by mouth daily 90 tablet 3    isosorbide mononitrate (IMDUR) 30 mg 24 hr tablet Take 2 tablets (60 mg total) by mouth daily 180 tablet 1    multivitamin (THERAGRAN) TABS Take 1 tablet by mouth daily      mupirocin (BACTROBAN) 2 % ointment APPLY TOPICALLY 2 (TWO) TIMES A DAY  BEGIN 5 DAYS PRIOR TO SURGERY, APPLY TO NOSTRILS      oxyCODONE (ROXICODONE) 5 mg immediate release tablet PLEASE SEE ATTACHED FOR DETAILED DIRECTIONS      spironolactone (ALDACTONE) 25 mg tablet TAKE 1 TABLET BY MOUTH EVERY DAY 30 tablet 3    warfarin (COUMADIN) 5 mg tablet TAKE 1 TABLET BY MOUTH EVERY DAY WITH 2 5 MG DAILY FOR TOTAL 7 5 MG DAILY DOSE 30 tablet 2    white petrolatum-mineral oil (EUCERIN,HYDROCERIN) cream Apply topically 2 (two) times a day as needed (Please apply over dry skin on legs and feet ) 50 g 0    warfarin (COUMADIN) 2 5 mg tablet TAKE 1 TABLET BY MOUTH EVERY DAY (Patient not taking: Reported on 8/19/2021) 30 tablet 2     No current facility-administered medications for this visit      _______________________________________________________________________  Review of Systems   Constitutional: Negative for activity change, appetite change, chills, fatigue, fever and unexpected weight change  HENT: Negative for congestion, ear discharge, ear pain, mouth sores, nosebleeds, postnasal drip, rhinorrhea, sinus pressure, sinus pain, sneezing, sore throat and voice change      Eyes: Negative for pain, redness and visual disturbance  Respiratory: Negative for cough, chest tightness, shortness of breath and wheezing  Cardiovascular: Negative for chest pain and palpitations  Gastrointestinal: Negative for abdominal distention, abdominal pain, constipation, diarrhea, nausea and vomiting  Genitourinary: Negative for dysuria and hematuria  Musculoskeletal: Positive for arthralgias  Negative for myalgias  Right knee mild pain and discomfort with warmth  Prosthetic joint  Skin: Negative  Neurological: Negative  Hematological: Negative  Psychiatric/Behavioral: Negative  Objective:  Vitals:    08/19/21 1551 08/19/21 1608   BP: (!) 162/110 158/98   BP Location:  Right arm   Patient Position:  Sitting   Cuff Size:  Extra-Large   Pulse: (!) 50    Resp: 16    Temp: 97 5 °F (36 4 °C)    TempSrc: Tympanic    SpO2: 97%    Weight: 132 kg (290 lb)    Height: 5' 11" (1 803 m)      Body mass index is 40 45 kg/m²  Physical Exam  Vitals and nursing note reviewed  Constitutional:       Appearance: Normal appearance  He is well-developed  He is obese  HENT:      Head: Normocephalic and atraumatic  Right Ear: Tympanic membrane, ear canal and external ear normal       Left Ear: Tympanic membrane, ear canal and external ear normal       Nose: Nose normal       Mouth/Throat:      Mouth: Mucous membranes are moist    Eyes:      Extraocular Movements: Extraocular movements intact  Conjunctiva/sclera: Conjunctivae normal       Pupils: Pupils are equal, round, and reactive to light  Cardiovascular:      Rate and Rhythm: Normal rate and regular rhythm  Pulses: Normal pulses  Heart sounds: Normal heart sounds  No murmur heard  Pulmonary:      Effort: Pulmonary effort is normal       Breath sounds: Normal breath sounds  Abdominal:      General: Bowel sounds are normal       Palpations: Abdomen is soft  Musculoskeletal:         General: Tenderness present  Normal range of motion  Cervical back: Normal range of motion and neck supple  Right lower leg: Edema present  Left lower leg: Edema present  Comments: Right knee pain, with bilateral ankle swelling  Slight warmth to right knee  Skin:     General: Skin is warm  Capillary Refill: Capillary refill takes less than 2 seconds  Neurological:      General: No focal deficit present  Mental Status: He is alert and oriented to person, place, and time     Psychiatric:         Mood and Affect: Mood normal          Behavior: Behavior normal

## 2021-08-19 ENCOUNTER — OFFICE VISIT (OUTPATIENT)
Dept: FAMILY MEDICINE CLINIC | Facility: CLINIC | Age: 56
End: 2021-08-19
Payer: COMMERCIAL

## 2021-08-19 ENCOUNTER — TELEPHONE (OUTPATIENT)
Dept: OTHER | Facility: OTHER | Age: 56
End: 2021-08-19

## 2021-08-19 VITALS
BODY MASS INDEX: 40.6 KG/M2 | WEIGHT: 290 LBS | SYSTOLIC BLOOD PRESSURE: 158 MMHG | DIASTOLIC BLOOD PRESSURE: 98 MMHG | RESPIRATION RATE: 16 BRPM | HEIGHT: 71 IN | HEART RATE: 50 BPM | OXYGEN SATURATION: 97 % | TEMPERATURE: 97.5 F

## 2021-08-19 DIAGNOSIS — H61.23 EXCESSIVE CERUMEN IN EAR CANAL, BILATERAL: ICD-10-CM

## 2021-08-19 DIAGNOSIS — E78.2 MIXED HYPERLIPIDEMIA: ICD-10-CM

## 2021-08-19 DIAGNOSIS — I82.441 ACUTE DEEP VEIN THROMBOSIS (DVT) OF TIBIAL VEIN OF RIGHT LOWER EXTREMITY (HCC): ICD-10-CM

## 2021-08-19 DIAGNOSIS — E66.01 MORBID OBESITY WITH BMI OF 40.0-44.9, ADULT (HCC): ICD-10-CM

## 2021-08-19 DIAGNOSIS — Z12.5 PROSTATE CANCER SCREENING: ICD-10-CM

## 2021-08-19 DIAGNOSIS — I10 ESSENTIAL HYPERTENSION: ICD-10-CM

## 2021-08-19 DIAGNOSIS — Z96.651 STATUS POST RIGHT KNEE REPLACEMENT: Primary | ICD-10-CM

## 2021-08-19 PROCEDURE — 3725F SCREEN DEPRESSION PERFORMED: CPT | Performed by: NURSE PRACTITIONER

## 2021-08-19 PROCEDURE — 99214 OFFICE O/P EST MOD 30 MIN: CPT | Performed by: NURSE PRACTITIONER

## 2021-08-19 NOTE — ASSESSMENT & PLAN NOTE
Patient currently on warfarin 7 5 mg p o  Daily  He has not had a PT INR recently he will go to have the testing done

## 2021-08-19 NOTE — ASSESSMENT & PLAN NOTE
Right knee slight warmth and discomfort with use  Patient has been working construction again recently  Patient's ortho last 3 months ago  Instructed to follow-up if the discomfort persists

## 2021-08-19 NOTE — TELEPHONE ENCOUNTER
Patient is calling for a new patient appointment  The patient has a referral from Dr Fani Velázquez  Patient stated he can use his phone around 1000

## 2021-08-19 NOTE — ASSESSMENT & PLAN NOTE
Patient's /110 on arrival   Heart rate in the 50s  Currently patient is maintained on carvedilol 25 mg p o  B i d , hydrochlorothiazide 25 mg p o  Daily, Imdur 30 mg p o  Daily  Instructed on low sodium, low cholesterol diet

## 2021-08-20 ENCOUNTER — OFFICE VISIT (OUTPATIENT)
Dept: CARDIOLOGY CLINIC | Facility: CLINIC | Age: 56
End: 2021-08-20
Payer: COMMERCIAL

## 2021-08-20 ENCOUNTER — LAB (OUTPATIENT)
Dept: LAB | Facility: CLINIC | Age: 56
End: 2021-08-20
Payer: COMMERCIAL

## 2021-08-20 VITALS
HEART RATE: 78 BPM | HEIGHT: 71 IN | WEIGHT: 292 LBS | BODY MASS INDEX: 40.88 KG/M2 | OXYGEN SATURATION: 99 % | DIASTOLIC BLOOD PRESSURE: 80 MMHG | SYSTOLIC BLOOD PRESSURE: 150 MMHG

## 2021-08-20 DIAGNOSIS — Z51.81 ANTICOAGULATION GOAL OF INR 2 TO 3: ICD-10-CM

## 2021-08-20 DIAGNOSIS — N18.2 STAGE 2 CHRONIC KIDNEY DISEASE: ICD-10-CM

## 2021-08-20 DIAGNOSIS — I82.501 CHRONIC THROMBOEMBOLISM OF DEEP VEINS OF LOWER EXTREMITY, RIGHT (HCC): ICD-10-CM

## 2021-08-20 DIAGNOSIS — E78.2 MIXED HYPERLIPIDEMIA: ICD-10-CM

## 2021-08-20 DIAGNOSIS — T78.3XXD ANGIOEDEMA, SUBSEQUENT ENCOUNTER: ICD-10-CM

## 2021-08-20 DIAGNOSIS — I10 HYPERTENSION, UNSPECIFIED TYPE: ICD-10-CM

## 2021-08-20 DIAGNOSIS — I25.10 CORONARY ARTERY DISEASE INVOLVING NATIVE HEART WITHOUT ANGINA PECTORIS, UNSPECIFIED VESSEL OR LESION TYPE: Primary | ICD-10-CM

## 2021-08-20 DIAGNOSIS — Z79.01 ANTICOAGULATION GOAL OF INR 2 TO 3: ICD-10-CM

## 2021-08-20 LAB
INR PPP: 0.95 (ref 0.84–1.19)
PROTHROMBIN TIME: 12.8 SECONDS (ref 11.6–14.5)

## 2021-08-20 PROCEDURE — 99214 OFFICE O/P EST MOD 30 MIN: CPT | Performed by: INTERNAL MEDICINE

## 2021-08-20 PROCEDURE — 36415 COLL VENOUS BLD VENIPUNCTURE: CPT

## 2021-08-20 PROCEDURE — 3077F SYST BP >= 140 MM HG: CPT | Performed by: INTERNAL MEDICINE

## 2021-08-20 PROCEDURE — 85610 PROTHROMBIN TIME: CPT

## 2021-08-20 PROCEDURE — 1036F TOBACCO NON-USER: CPT | Performed by: INTERNAL MEDICINE

## 2021-08-20 PROCEDURE — 3079F DIAST BP 80-89 MM HG: CPT | Performed by: INTERNAL MEDICINE

## 2021-08-20 PROCEDURE — 3008F BODY MASS INDEX DOCD: CPT | Performed by: INTERNAL MEDICINE

## 2021-08-20 NOTE — PROGRESS NOTES
Cardiology Follow Up    Andres Orlando  1965  [id number]  [facility] [place]  [address]  [address]  [place] [id number] [phone]    No diagnosis found  There are no diagnoses linked to this encounter  I had the pleasure of seeing Andres Orlando for a follow up visit  INTERVAL HISTORY: none    History of the presenting illness, Discussion/Summary and My Plan are as follows:::    He is a 77-year-old gentleman with history of hypertension, low testosterone, no history of diabetes or tobacco use, not aware of his family history, was physically very active at baseline-without any symptoms, presented to [facility] for 2 episodes of melena with a hemoglobin of 7, compared to a baseline around 13-15, subsequently transferred to [facility] due to troponin elevation, peaking at 22  Underwent an EGD that showed multiple small ulcers in the antrum as well as a clean based ulcer in the antrum that was clipped without any active bleeding  He had been having intermittent chest pains and underwent coronary angiography that did not demonstrate any significant obstructive disease, warranting intervention-November 2019  He was initiated on Plavix and not aspirin due to his gastric ulcers  He was also treated with antibiotics for H pylori      During his hospitalization, also developed angioedema  He was on an ACE-inhibitor at home that had not been continued in the hospital, was started on a calcium channel blocker-amlodipine for hypertension      I had last seen him in January 2020  Subsequently had multiple admissions between June and August 2020, in June admitted with angioedema probably to lisinopril    Subsequently in July diagnosed with a DVT and then went on Eliquis after which he developed angioedema again, in August was admitted with sepsis, treated with doxycycline and Keflex after negative evaluation      He denies any symptoms at this visit, has been physically active, 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 Heriditary angioedema  will not use these meds again     History of NSTEMI type 2 and CAD:  Peak troponin of 22, occurred in the setting of supply demand mismatch from severe anemia,  EGD showed multiple ulcers one of which was large in clip  Coronary angiography showed moderate disease, for medical management-single antiplatelet agent-, off Plavix now due to DVT and being on Coumadin  With a prior history of GI bleeding  Echo with preserved LV systolic function      Dyslipidemia:   he was on a statin, no longer, on his list of allergies is Lipitor-"facial swelling", will recheck lipids  Low threshold to start statin considering CAD     Anemia/GI bleeding:  Had melena, status post EGD with multiple ulcers, 1 of which was clipped, status post treatment for H pylori, on b i d  PPI  - -  End of 2019  last hemoglobin-12-January 2021     Hypertension:  borderline blood pressures today-, now on spironolactone and hydrochlorothiazide-will continue to watch for now  History of angioedema to lisinopril, amlodipine Eliquis - will not use again  130s over 80s in the morning, 140s later in the day at home,  He will send a log  Also has mild CKD with a baseline creatinine of 1 3 (April 2021)  With angioedema will not try Ace/ARB     Mild sleep apnea on testing-January 2021   No renal artery stenosis-CT chest abdomen pelvis-August 2019  Normal potassium levels -   - on spironolactone, cannot check aldosterone/renin ratio at this time     Follow-up in 6 months  Results for Bonnie Robles (MRN [id number]) as of 8/20/2021 16:54   Ref   Range 11/4/2019 05:56 9/12/2020 10:56   Cholesterol Latest Ref Range: 50 - 200 mg/dL 107 174   Triglycerides Latest Ref Range: <=150 mg/dL 139 78   HDL Latest Ref Range: >=40 mg/dL 10 (L) 47   LDL Calculated Latest Ref Range: 0 - 100 mg/dL 69 111 (H)           Cardiac catheterization 11/06/2019:   ---Coronary circulation  -Left main: Normal   -LAD: Angiography showed minor luminal irregularities   -Proximal circumflex: There was a 30 % stenosis  90% occlusion of an OM 1-long vessel but overall small caliber vessel, subtending a small territory  -1st obtuse marginal: There was a 70 % stenosis at the ostium of the vessel segment    -Mid RCA: There was a diffuse 30 % stenosis       Patient Active Problem List   Diagnosis    Elevated troponin    Hypertension    Acute left ankle pain    Acute pain of left shoulder    Left ankle sprain    Acute left-sided low back pain without sciatica    S/P cardiac cath 11/06/19    Stomach ulcer    Iron deficiency anemia due to chronic blood loss    ZAKIA (acute kidney injury) ([address] )    Follow-up examination    Encounter for follow-up    [facility]    Hyperlipidemia    Anemia    Coronary artery disease involving native heart without angina pectoris    Cellulitis of right lower extremity    Prostate cancer screening    ACE inhibitor-aggravated angioedema, sequela    Anticoagulation goal of INR 2 to 3    Acute deep vein thrombosis (DVT) of tibial vein (HCC)    Leg DVT (deep venous thromboembolism), chronic, right (HCC)    Febrile illness, acute    Bilateral lower extremity edema    Morbid obesity with BMI of 40 0-44 9, adult (HCC)    Cellulitis of left lower extremity    Itchy skin    Vasculitis (HCC)    Elevated serum creatinine    Low testosterone    History of DVT (deep vein thrombosis)    CKD (chronic kidney disease)    Lymphadenopathy    Edema of left lower leg due to peripheral venous insufficiency    Tooth decay    Preoperative clearance  Status post right knee replacement    Abscess of skin of left knee    BRUCE (obstructive sleep apnea)    Rash    Excessive cerumen in ear canal, bilateral    Anticoagulation adequate with anticoagulant therapy     Past Medical History:   Diagnosis Date    Angioedema     Coronary artery disease     GI bleed     Hypertension     stopped his meds when ran out several years ago    STEMI (ST elevation myocardial infarction) ([facility] [address] )      Social History     Socioeconomic History    Marital status: Single     Spouse name: Not on file    Number of children: Not on file    Years of education: Not on file    Highest education level: Not on file   Occupational History    Not on file   Tobacco Use    Smoking status: Never Smoker    Smokeless tobacco: Never Used   Vaping Use    Vaping Use: Never used   Substance and Sexual Activity    Alcohol use: Never     Alcohol/week: 0 0 standard drinks    Drug use: Never    Sexual activity: Not on file     Comment: not asked   Other Topics Concern    Not on file   Social History Narrative    Checked [place]     Social Determinants of Health     Financial Resource Strain: Low Risk     Difficulty of Paying Living Expenses: Not hard at all   Food Insecurity: No Food Insecurity    Worried About Running Out of Food in the Last Year: Never true    Little of Food in the Last Year: Never true   Transportation Needs: No Transportation Needs    Lack of Transportation (Medical): No    Lack of Transportation (Non-Medical):  No   Physical Activity: Sufficiently Active    Days of Exercise per Week: 3 days    Minutes of Exercise per Session: 120 min   Stress: No Stress Concern Present    Feeling of Stress : Not at all   Social Connections: Unknown    Frequency of Communication with Friends and Family: More than three times a week    Frequency of Social Gatherings with Friends and Family: More than three times a week    Attends Mosque Services: More than 4 times per year  Active Member of Clubs or Organizations: No    Attends Club or Organization Meetings: Never    Marital Status: Not on file   Intimate Partner Violence: Not At Risk    Fear of Current or Ex-Partner: No    Emotionally Abused: No    Physically Abused: No    Sexually Abused: No      Family History   Adopted: Yes   Family history unknown: Yes     Past Surgical History:   Procedure Laterality Date    REPLACEMENT TOTAL KNEE Right 12/2020    UPPER GASTROINTESTINAL ENDOSCOPY      Clamped large stomach ulcer       Current Outpatient Medications:     acetaminophen (TYLENOL) 500 mg tablet, Take 1,000 mg by mouth daily, Disp: , Rfl:     carbamide peroxide (DEBROX) 6 5 % otic solution, Administer 5 drops into both ears 2 (two) times a day, Disp: 15 mL, Rfl: 2    carvedilol (COREG) 25 mg tablet, TAKE 1 TABLET BY MOUTH TWICE A DAY WITH FOOD, Disp: 60 tablet, Rfl: 11    hydrochlorothiazide (HYDRODIURIL) 25 mg tablet, Take 1 tablet (25 mg total) by mouth daily, Disp: 90 tablet, Rfl: 3    isosorbide mononitrate (IMDUR) 30 mg 24 hr tablet, Take 2 tablets (60 mg total) by mouth daily, Disp: 180 tablet, Rfl: 1    multivitamin (THERAGRAN) TABS, Take 1 tablet by mouth daily, Disp: , Rfl:     spironolactone (ALDACTONE) 25 mg tablet, TAKE 1 TABLET BY MOUTH EVERY DAY, Disp: 30 tablet, Rfl: 3    warfarin (COUMADIN) 2 5 mg tablet, TAKE 1 TABLET BY MOUTH EVERY DAY, Disp: 30 tablet, Rfl: 2    warfarin (COUMADIN) 5 mg tablet, TAKE 1 TABLET BY MOUTH EVERY DAY WITH 2 5 MG DAILY FOR TOTAL 7 5 MG DAILY DOSE, Disp: 30 tablet, Rfl: 2    chlorhexidine (PERIDEX) 0 12 % solution, Take as directed (Patient not taking: Reported on 8/20/2021), Disp: , Rfl:     diphenhydrAMINE (BENADRYL) 25 mg tablet, Take 1 tablet (25 mg total) by mouth every 6 (six) hours as needed for itching (Patient not taking: Reported on 8/20/2021), Disp: 30 tablet, Rfl: 0    enoxaparin (LOVENOX) 80 mg/0 8 mL, Inject 0 8 mL (80 mg total) under the skin every 24 hours Patient to stop warfarin 7 days prior to procedure  Pt/inr on 11/20/20 (Patient not taking: Reported on 8/20/2021), Disp: 7 Syringe, Rfl: 7    ergocalciferol (VITAMIN D2) 50,000 units, 1 CAPSULE ONCE A WEEK, FOR 12 WEEKS, THEN RESUME VITAMIN D 2000 UNITS DAILY (Patient not taking: Reported on 8/20/2021), Disp: , Rfl:     furosemide (Lasix) 20 mg tablet, Take 1 tablet (20 mg total) by mouth daily (Patient not taking: Reported on 8/20/2021), Disp: 5 tablet, Rfl: 0    mupirocin (BACTROBAN) 2 % ointment, APPLY TOPICALLY 2 (TWO) TIMES A DAY  BEGIN 5 DAYS PRIOR TO SURGERY, APPLY TO NOSTRILS (Patient not taking: Reported on 8/20/2021), Disp: , Rfl:     oxyCODONE (ROXICODONE) 5 mg immediate release tablet, PLEASE SEE ATTACHED FOR DETAILED DIRECTIONS (Patient not taking: Reported on 8/20/2021), Disp: , Rfl:     white petrolatum-mineral oil (EUCERIN,HYDROCERIN) cream, Apply topically 2 (two) times a day as needed (Please apply over dry skin on legs and feet ) (Patient not taking: Reported on 8/20/2021), Disp: 50 g, Rfl: 0  Allergies   Allergen Reactions    Lisinopril Angioedema    Norvasc [Amlodipine] Angioedema    Amoxicillin Itching     Itching little bumps    Eliquis [Apixaban] Angioedema    Lipitor [Atorvastatin] Facial Swelling       Imaging: No results found  Review of Systems:  Review of Systems    Physical Exam:  /82 (BP Location: Left arm)   Pulse 78   Ht 5' 11" (1 803 m)   Wt 132 kg (292 lb)   SpO2 99%   BMI 40 73 kg/m²   Physical Exam    This note was completed in part utilizing m-[employer] fluency direct voice recognition software  Grammatical errors, random word insertion, spelling mistakes, occasional wrong word or "sound-alike" substitutions and incomplete sentences may be an occasional consequence of the system secondary to software limitations, ambient noise and hardware issues  At the time of dictation, efforts were made to edit, clarify and /or correct errors    Please read the chart carefully and recognize, using context, where substitutions have occurred  If you have any questions or concerns about the context, text or information contained within the body of this dictation, please contact myself, the provider, for further clarification

## 2021-08-23 ENCOUNTER — TELEPHONE (OUTPATIENT)
Dept: FAMILY MEDICINE CLINIC | Facility: CLINIC | Age: 56
End: 2021-08-23

## 2021-08-23 NOTE — TELEPHONE ENCOUNTER
Unable to leave message, mailbox full  Will try again to reach patient later today however if he calls back please tell him to increase his warfarin to 10 mg daily and recheck INR in 1 week per Arian  Chart has been updated

## 2021-08-23 NOTE — TELEPHONE ENCOUNTER
----- Message from [place], [place] sent at 8/20/2021  6:24 PM EDT -----    Patient to increase dose to 10 mg p o  daily and recheck in 1 week

## 2021-08-24 ENCOUNTER — ANTICOAG VISIT (OUTPATIENT)
Dept: FAMILY MEDICINE CLINIC | Facility: CLINIC | Age: 56
End: 2021-08-24

## 2021-08-24 DIAGNOSIS — I48.91 ATRIAL FIBRILLATION, UNSPECIFIED TYPE (HCC): Primary | ICD-10-CM

## 2021-08-25 DIAGNOSIS — I82.501 LEG DVT (DEEP VENOUS THROMBOEMBOLISM), CHRONIC, RIGHT (HCC): ICD-10-CM

## 2021-08-25 RX ORDER — WARFARIN SODIUM 5 MG/1
10 TABLET ORAL
Qty: 60 TABLET | Refills: 2 | Status: SHIPPED | OUTPATIENT
Start: 2021-08-25 | End: 2021-11-22

## 2021-08-25 NOTE — TELEPHONE ENCOUNTER
Please call the increased dose of Warfarin into the pharmacy     Pharmacy:  [facility]

## 2021-08-31 ENCOUNTER — TELEPHONE (OUTPATIENT)
Dept: FAMILY MEDICINE CLINIC | Facility: CLINIC | Age: 56
End: 2021-08-31

## 2021-08-31 NOTE — TELEPHONE ENCOUNTER
Pt called for a refill on sildenafil   This is not in his chart, he said it has been a while since it has been prescribed  Pt uses [facility] [address] [place]        Pt is aware Arian is out of the office until next week

## 2021-09-01 NOTE — TELEPHONE ENCOUNTER
He had been instructed to discuss with Cardiology if is it is safe for him to take the medication with his cardiac history and hypertension  I will be in the office and can call him at that time to further discuss

## 2021-09-09 DIAGNOSIS — I21.4 NSTEMI (NON-ST ELEVATED MYOCARDIAL INFARCTION) (HCC): ICD-10-CM

## 2021-09-09 RX ORDER — ISOSORBIDE MONONITRATE 30 MG/1
TABLET, EXTENDED RELEASE ORAL
Qty: 60 TABLET | Refills: 5 | Status: SHIPPED | OUTPATIENT
Start: 2021-09-09 | End: 2022-06-07 | Stop reason: ALTCHOICE

## 2021-09-30 ENCOUNTER — TELEPHONE (OUTPATIENT)
Dept: FAMILY MEDICINE CLINIC | Facility: CLINIC | Age: 56
End: 2021-09-30

## 2021-09-30 NOTE — TELEPHONE ENCOUNTER
Patient called he is having pain in his right knee the one he had replaced  He saw the orthopedic and her put him on melocom the anti inflammatory  The pharmacist told the patient he should not take the anti inflammatory with the warfarin   ----he was told to call his pcp   ----can the patient take the anti inflammatory with the warfarin   The patient can only be reached at 12 and after 330  Otherwise you can leave a message on his phone

## 2021-09-30 NOTE — TELEPHONE ENCOUNTER
He probably should call orthopedics and see if he can be placed on an intermittent anti-inflammatory or take an alternative for his knee pain

## 2021-10-01 ENCOUNTER — APPOINTMENT (OUTPATIENT)
Dept: LAB | Facility: CLINIC | Age: 56
End: 2021-10-01
Payer: COMMERCIAL

## 2021-10-01 DIAGNOSIS — I48.91 ATRIAL FIBRILLATION, UNSPECIFIED TYPE (HCC): ICD-10-CM

## 2021-10-01 LAB
INR PPP: 1.19 (ref 0.84–1.19)
PROTHROMBIN TIME: 15.1 SECONDS (ref 11.6–14.5)

## 2021-10-01 PROCEDURE — 85610 PROTHROMBIN TIME: CPT

## 2021-10-01 PROCEDURE — 36415 COLL VENOUS BLD VENIPUNCTURE: CPT

## 2021-10-04 ENCOUNTER — APPOINTMENT (OUTPATIENT)
Dept: LAB | Facility: CLINIC | Age: 56
End: 2021-10-04
Payer: COMMERCIAL

## 2021-10-04 DIAGNOSIS — I77.6 ARTERITIS, UNSPECIFIED (HCC): ICD-10-CM

## 2021-10-04 DIAGNOSIS — E55.9 AVITAMINOSIS D: ICD-10-CM

## 2021-10-04 DIAGNOSIS — R76.8 FALSE POSITIVE SEROLOGICAL TEST FOR SYPHILIS: ICD-10-CM

## 2021-10-04 DIAGNOSIS — M17.11 OSTEOARTHRITIS OF RIGHT KNEE, UNSPECIFIED OSTEOARTHRITIS TYPE: ICD-10-CM

## 2021-10-04 LAB
ALBUMIN SERPL BCP-MCNC: 3.8 G/DL (ref 3.5–5)
ALP SERPL-CCNC: 81 U/L (ref 46–116)
ALT SERPL W P-5'-P-CCNC: 28 U/L (ref 12–78)
ANION GAP SERPL CALCULATED.3IONS-SCNC: 11 MMOL/L (ref 4–13)
AST SERPL W P-5'-P-CCNC: 24 U/L (ref 5–45)
BASOPHILS # BLD AUTO: 0.07 THOUSANDS/ΜL (ref 0–0.1)
BASOPHILS NFR BLD AUTO: 1 % (ref 0–1)
BILIRUB SERPL-MCNC: 0.53 MG/DL (ref 0.2–1)
BUN SERPL-MCNC: 18 MG/DL (ref 5–25)
CALCIUM SERPL-MCNC: 9.3 MG/DL (ref 8.3–10.1)
CHLORIDE SERPL-SCNC: 104 MMOL/L (ref 100–108)
CO2 SERPL-SCNC: 26 MMOL/L (ref 21–32)
CREAT SERPL-MCNC: 1.37 MG/DL (ref 0.6–1.3)
EOSINOPHIL # BLD AUTO: 0.18 THOUSAND/ΜL (ref 0–0.61)
EOSINOPHIL NFR BLD AUTO: 3 % (ref 0–6)
ERYTHROCYTE [DISTWIDTH] IN BLOOD BY AUTOMATED COUNT: 13.7 % (ref 11.6–15.1)
ERYTHROCYTE [SEDIMENTATION RATE] IN BLOOD: 47 MM/HOUR (ref 0–19)
GFR SERPL CREATININE-BSD FRML MDRD: 66 ML/MIN/1.73SQ M
GLUCOSE SERPL-MCNC: 68 MG/DL (ref 65–140)
HCT VFR BLD AUTO: 41.7 % (ref 36.5–49.3)
HGB BLD-MCNC: 13.6 G/DL (ref 12–17)
IMM GRANULOCYTES # BLD AUTO: 0.02 THOUSAND/UL (ref 0–0.2)
IMM GRANULOCYTES NFR BLD AUTO: 0 % (ref 0–2)
LYMPHOCYTES # BLD AUTO: 2.38 THOUSANDS/ΜL (ref 0.6–4.47)
LYMPHOCYTES NFR BLD AUTO: 37 % (ref 14–44)
MCH RBC QN AUTO: 29.8 PG (ref 26.8–34.3)
MCHC RBC AUTO-ENTMCNC: 32.6 G/DL (ref 31.4–37.4)
MCV RBC AUTO: 91 FL (ref 82–98)
MONOCYTES # BLD AUTO: 0.66 THOUSAND/ΜL (ref 0.17–1.22)
MONOCYTES NFR BLD AUTO: 10 % (ref 4–12)
NEUTROPHILS # BLD AUTO: 3.11 THOUSANDS/ΜL (ref 1.85–7.62)
NEUTS SEG NFR BLD AUTO: 49 % (ref 43–75)
NRBC BLD AUTO-RTO: 0 /100 WBCS
PLATELET # BLD AUTO: 182 THOUSANDS/UL (ref 149–390)
PMV BLD AUTO: 11.8 FL (ref 8.9–12.7)
POTASSIUM SERPL-SCNC: 3.7 MMOL/L (ref 3.5–5.3)
PROT SERPL-MCNC: 8 G/DL (ref 6.4–8.2)
RBC # BLD AUTO: 4.57 MILLION/UL (ref 3.88–5.62)
SODIUM SERPL-SCNC: 141 MMOL/L (ref 136–145)
WBC # BLD AUTO: 6.42 THOUSAND/UL (ref 4.31–10.16)

## 2021-10-04 PROCEDURE — 85652 RBC SED RATE AUTOMATED: CPT

## 2021-10-04 PROCEDURE — 36415 COLL VENOUS BLD VENIPUNCTURE: CPT

## 2021-10-04 PROCEDURE — 86618 LYME DISEASE ANTIBODY: CPT

## 2021-10-04 PROCEDURE — 80053 COMPREHEN METABOLIC PANEL: CPT

## 2021-10-04 PROCEDURE — 85025 COMPLETE CBC W/AUTO DIFF WBC: CPT

## 2021-10-04 PROCEDURE — 82306 VITAMIN D 25 HYDROXY: CPT

## 2021-10-05 ENCOUNTER — TELEPHONE (OUTPATIENT)
Dept: FAMILY MEDICINE CLINIC | Facility: CLINIC | Age: 56
End: 2021-10-05

## 2021-10-05 ENCOUNTER — ANTICOAG VISIT (OUTPATIENT)
Dept: FAMILY MEDICINE CLINIC | Facility: CLINIC | Age: 56
End: 2021-10-05

## 2021-10-05 LAB
25(OH)D3 SERPL-MCNC: 21.5 NG/ML (ref 30–100)
B BURGDOR IGG+IGM SER-ACNC: 56

## 2021-10-12 ENCOUNTER — TELEPHONE (OUTPATIENT)
Dept: FAMILY MEDICINE CLINIC | Facility: CLINIC | Age: 56
End: 2021-10-12

## 2021-10-12 DIAGNOSIS — R60.0 EDEMA OF LEFT LOWER LEG DUE TO PERIPHERAL VENOUS INSUFFICIENCY: ICD-10-CM

## 2021-10-12 DIAGNOSIS — I87.2 EDEMA OF LEFT LOWER LEG DUE TO PERIPHERAL VENOUS INSUFFICIENCY: ICD-10-CM

## 2021-10-12 RX ORDER — SILDENAFIL 25 MG/1
25 TABLET, FILM COATED ORAL DAILY PRN
COMMUNITY
End: 2021-11-12 | Stop reason: SDUPTHER

## 2021-10-18 ENCOUNTER — OFFICE VISIT (OUTPATIENT)
Dept: FAMILY MEDICINE CLINIC | Facility: CLINIC | Age: 56
End: 2021-10-18

## 2021-10-18 DIAGNOSIS — Z86.718 HISTORY OF DVT (DEEP VEIN THROMBOSIS): ICD-10-CM

## 2021-10-18 DIAGNOSIS — Z23 NEED FOR INFLUENZA VACCINATION: Primary | ICD-10-CM

## 2021-10-18 DIAGNOSIS — E66.01 MORBID OBESITY WITH BMI OF 40.0-44.9, ADULT (HCC): ICD-10-CM

## 2021-10-18 DIAGNOSIS — Z09 FOLLOW-UP EXAMINATION: ICD-10-CM

## 2021-10-18 DIAGNOSIS — E78.2 MIXED HYPERLIPIDEMIA: ICD-10-CM

## 2021-10-19 RX ORDER — SILDENAFIL 25 MG/1
25 TABLET, FILM COATED ORAL DAILY PRN
Qty: 10 TABLET | OUTPATIENT
Start: 2021-10-19

## 2021-11-02 ENCOUNTER — LAB (OUTPATIENT)
Dept: LAB | Facility: CLINIC | Age: 56
End: 2021-11-02
Payer: COMMERCIAL

## 2021-11-02 ENCOUNTER — OFFICE VISIT (OUTPATIENT)
Dept: FAMILY MEDICINE CLINIC | Facility: CLINIC | Age: 56
End: 2021-11-02
Payer: COMMERCIAL

## 2021-11-02 VITALS
DIASTOLIC BLOOD PRESSURE: 84 MMHG | OXYGEN SATURATION: 98 % | BODY MASS INDEX: 40.68 KG/M2 | HEART RATE: 50 BPM | TEMPERATURE: 98.6 F | HEIGHT: 71 IN | WEIGHT: 290.6 LBS | SYSTOLIC BLOOD PRESSURE: 126 MMHG

## 2021-11-02 DIAGNOSIS — I82.501 LEG DVT (DEEP VENOUS THROMBOEMBOLISM), CHRONIC, RIGHT (HCC): Primary | ICD-10-CM

## 2021-11-02 DIAGNOSIS — I25.10 CORONARY ARTERY DISEASE INVOLVING NATIVE HEART WITHOUT ANGINA PECTORIS, UNSPECIFIED VESSEL OR LESION TYPE: ICD-10-CM

## 2021-11-02 DIAGNOSIS — I82.501 LEG DVT (DEEP VENOUS THROMBOEMBOLISM), CHRONIC, RIGHT (HCC): ICD-10-CM

## 2021-11-02 DIAGNOSIS — N18.2 STAGE 2 CHRONIC KIDNEY DISEASE: ICD-10-CM

## 2021-11-02 DIAGNOSIS — G47.33 OSA (OBSTRUCTIVE SLEEP APNEA): ICD-10-CM

## 2021-11-02 DIAGNOSIS — I73.9 PERIPHERAL VASCULAR DISEASE (HCC): ICD-10-CM

## 2021-11-02 DIAGNOSIS — I21.4 NSTEMI (NON-ST ELEVATED MYOCARDIAL INFARCTION) (HCC): ICD-10-CM

## 2021-11-02 DIAGNOSIS — I10 HYPERTENSION, UNSPECIFIED TYPE: Primary | ICD-10-CM

## 2021-11-02 LAB
INR PPP: 1.45 (ref 0.84–1.19)
INR PPP: 1.45 (ref 0.84–1.19)
PROTHROMBIN TIME: 17.5 SECONDS (ref 11.6–14.5)

## 2021-11-02 PROCEDURE — 3074F SYST BP LT 130 MM HG: CPT | Performed by: FAMILY MEDICINE

## 2021-11-02 PROCEDURE — 36415 COLL VENOUS BLD VENIPUNCTURE: CPT

## 2021-11-02 PROCEDURE — 3008F BODY MASS INDEX DOCD: CPT | Performed by: FAMILY MEDICINE

## 2021-11-02 PROCEDURE — 99214 OFFICE O/P EST MOD 30 MIN: CPT | Performed by: FAMILY MEDICINE

## 2021-11-02 PROCEDURE — 1036F TOBACCO NON-USER: CPT | Performed by: FAMILY MEDICINE

## 2021-11-02 PROCEDURE — 3079F DIAST BP 80-89 MM HG: CPT | Performed by: FAMILY MEDICINE

## 2021-11-02 PROCEDURE — 85610 PROTHROMBIN TIME: CPT

## 2021-11-02 RX ORDER — MELOXICAM 15 MG/1
TABLET ORAL
COMMUNITY
Start: 2021-10-25 | End: 2022-06-25

## 2021-11-03 ENCOUNTER — TELEPHONE (OUTPATIENT)
Dept: CARDIOLOGY CLINIC | Facility: CLINIC | Age: 56
End: 2021-11-03

## 2021-11-03 ENCOUNTER — TELEPHONE (OUTPATIENT)
Dept: FAMILY MEDICINE CLINIC | Facility: CLINIC | Age: 56
End: 2021-11-03

## 2021-11-04 ENCOUNTER — TELEPHONE (OUTPATIENT)
Dept: OTHER | Facility: OTHER | Age: 56
End: 2021-11-04

## 2021-11-05 ENCOUNTER — TELEPHONE (OUTPATIENT)
Dept: FAMILY MEDICINE CLINIC | Facility: CLINIC | Age: 56
End: 2021-11-05

## 2021-11-05 DIAGNOSIS — R00.1 BRADYCARDIA: Primary | ICD-10-CM

## 2021-11-05 DIAGNOSIS — I82.501 CHRONIC EMBOLISM AND THROMBOSIS OF UNSPECIFIED DEEP VEINS OF RIGHT LOWER EXTREMITY (HCC): ICD-10-CM

## 2021-11-05 RX ORDER — WARFARIN SODIUM 2.5 MG/1
2.5 TABLET ORAL DAILY
Qty: 30 TABLET | Refills: 2 | Status: SHIPPED | OUTPATIENT
Start: 2021-11-05 | End: 2022-07-27 | Stop reason: SDUPTHER

## 2021-11-06 ENCOUNTER — APPOINTMENT (OUTPATIENT)
Dept: LAB | Facility: CLINIC | Age: 56
End: 2021-11-06
Payer: COMMERCIAL

## 2021-11-06 DIAGNOSIS — I82.501 LEG DVT (DEEP VENOUS THROMBOEMBOLISM), CHRONIC, RIGHT (HCC): ICD-10-CM

## 2021-11-06 DIAGNOSIS — E78.2 MIXED HYPERLIPIDEMIA: ICD-10-CM

## 2021-11-06 LAB
CHOLEST SERPL-MCNC: 178 MG/DL (ref 50–200)
HDLC SERPL-MCNC: 53 MG/DL
LDLC SERPL CALC-MCNC: 108 MG/DL (ref 0–100)
TRIGL SERPL-MCNC: 87 MG/DL

## 2021-11-06 PROCEDURE — 36415 COLL VENOUS BLD VENIPUNCTURE: CPT

## 2021-11-06 PROCEDURE — 80061 LIPID PANEL: CPT

## 2021-11-08 DIAGNOSIS — E78.2 MIXED HYPERLIPIDEMIA: ICD-10-CM

## 2021-11-08 DIAGNOSIS — I25.10 CORONARY ARTERY DISEASE INVOLVING NATIVE HEART WITHOUT ANGINA PECTORIS, UNSPECIFIED VESSEL OR LESION TYPE: Primary | ICD-10-CM

## 2021-11-08 RX ORDER — ROSUVASTATIN CALCIUM 5 MG/1
2.5 TABLET, COATED ORAL DAILY
Qty: 30 TABLET | Refills: 3 | Status: SHIPPED | OUTPATIENT
Start: 2021-11-08 | End: 2021-11-10 | Stop reason: SDUPTHER

## 2021-11-09 ENCOUNTER — TELEPHONE (OUTPATIENT)
Dept: CARDIOLOGY CLINIC | Facility: CLINIC | Age: 56
End: 2021-11-09

## 2021-11-10 ENCOUNTER — TELEPHONE (OUTPATIENT)
Dept: CARDIOLOGY CLINIC | Facility: CLINIC | Age: 56
End: 2021-11-10

## 2021-11-10 DIAGNOSIS — E78.2 MIXED HYPERLIPIDEMIA: ICD-10-CM

## 2021-11-10 DIAGNOSIS — I25.10 CORONARY ARTERY DISEASE INVOLVING NATIVE HEART WITHOUT ANGINA PECTORIS, UNSPECIFIED VESSEL OR LESION TYPE: ICD-10-CM

## 2021-11-10 RX ORDER — ROSUVASTATIN CALCIUM 5 MG/1
2.5 TABLET, COATED ORAL DAILY
Qty: 30 TABLET | Refills: 3 | Status: SHIPPED | OUTPATIENT
Start: 2021-11-10 | End: 2022-06-07 | Stop reason: SDUPTHER

## 2021-11-11 ENCOUNTER — TELEPHONE (OUTPATIENT)
Dept: CARDIOLOGY CLINIC | Facility: CLINIC | Age: 56
End: 2021-11-11

## 2021-11-12 DIAGNOSIS — N52.1 ERECTILE DISORDER DUE TO MEDICAL CONDITION IN MALE: Primary | ICD-10-CM

## 2021-11-12 RX ORDER — SILDENAFIL 25 MG/1
25 TABLET, FILM COATED ORAL DAILY PRN
Qty: 10 TABLET | Refills: 3 | Status: SHIPPED | OUTPATIENT
Start: 2021-11-12 | End: 2022-02-17 | Stop reason: SDUPTHER

## 2021-11-17 ENCOUNTER — TELEPHONE (OUTPATIENT)
Dept: UROLOGY | Facility: AMBULATORY SURGERY CENTER | Age: 56
End: 2021-11-17

## 2021-11-20 DIAGNOSIS — I10 ESSENTIAL HYPERTENSION: ICD-10-CM

## 2021-11-21 DIAGNOSIS — I82.501 LEG DVT (DEEP VENOUS THROMBOEMBOLISM), CHRONIC, RIGHT (HCC): ICD-10-CM

## 2021-11-22 RX ORDER — WARFARIN SODIUM 5 MG/1
TABLET ORAL
Qty: 60 TABLET | Refills: 2 | Status: SHIPPED | OUTPATIENT
Start: 2021-11-22 | End: 2022-07-27 | Stop reason: SDUPTHER

## 2021-11-22 RX ORDER — SPIRONOLACTONE 25 MG/1
TABLET ORAL
Qty: 30 TABLET | Refills: 3 | Status: SHIPPED | OUTPATIENT
Start: 2021-11-22 | End: 2022-06-07 | Stop reason: ALTCHOICE

## 2021-11-23 ENCOUNTER — HOSPITAL ENCOUNTER (OUTPATIENT)
Dept: NON INVASIVE DIAGNOSTICS | Facility: CLINIC | Age: 56
Discharge: HOME | End: 2021-11-23
Payer: COMMERCIAL

## 2021-11-23 ENCOUNTER — APPOINTMENT (OUTPATIENT)
Dept: LAB | Facility: CLINIC | Age: 56
End: 2021-11-23
Payer: COMMERCIAL

## 2021-11-23 DIAGNOSIS — R00.1 BRADYCARDIA: ICD-10-CM

## 2021-11-23 LAB
INR PPP: 1.12 (ref 0.84–1.19)
PROTHROMBIN TIME: 14.4 SECONDS (ref 11.6–14.5)

## 2021-11-23 PROCEDURE — 85610 PROTHROMBIN TIME: CPT

## 2021-11-23 PROCEDURE — 36415 COLL VENOUS BLD VENIPUNCTURE: CPT

## 2021-11-23 PROCEDURE — 93226 XTRNL ECG REC<48 HR SCAN A/R: CPT

## 2021-11-23 PROCEDURE — 93225 XTRNL ECG REC<48 HRS REC: CPT

## 2021-11-29 ENCOUNTER — TELEPHONE (OUTPATIENT)
Dept: INTERNAL MEDICINE CLINIC | Facility: CLINIC | Age: 56
End: 2021-11-29

## 2021-11-30 PROCEDURE — 93227 XTRNL ECG REC<48 HR R&I: CPT | Performed by: INTERNAL MEDICINE

## 2021-12-03 ENCOUNTER — TELEPHONE (OUTPATIENT)
Dept: OBGYN CLINIC | Facility: HOSPITAL | Age: 56
End: 2021-12-03

## 2021-12-03 ENCOUNTER — TELEPHONE (OUTPATIENT)
Dept: CARDIOLOGY CLINIC | Facility: CLINIC | Age: 56
End: 2021-12-03

## 2021-12-14 ENCOUNTER — TELEPHONE (OUTPATIENT)
Dept: OBGYN CLINIC | Facility: HOSPITAL | Age: 56
End: 2021-12-14

## 2021-12-20 ENCOUNTER — CONSULT (OUTPATIENT)
Dept: UROLOGY | Facility: CLINIC | Age: 56
End: 2021-12-20
Payer: COMMERCIAL

## 2021-12-20 VITALS
DIASTOLIC BLOOD PRESSURE: 74 MMHG | WEIGHT: 292 LBS | BODY MASS INDEX: 40.88 KG/M2 | SYSTOLIC BLOOD PRESSURE: 126 MMHG | HEIGHT: 71 IN | HEART RATE: 60 BPM

## 2021-12-20 DIAGNOSIS — R53.83 OTHER FATIGUE: Primary | ICD-10-CM

## 2021-12-20 DIAGNOSIS — Z12.5 PROSTATE CANCER SCREENING: ICD-10-CM

## 2021-12-20 LAB — POST-VOID RESIDUAL VOLUME, ML POC: 11 ML

## 2021-12-20 PROCEDURE — 3008F BODY MASS INDEX DOCD: CPT | Performed by: PHYSICIAN ASSISTANT

## 2021-12-20 PROCEDURE — 99203 OFFICE O/P NEW LOW 30 MIN: CPT | Performed by: PHYSICIAN ASSISTANT

## 2021-12-20 PROCEDURE — 51798 US URINE CAPACITY MEASURE: CPT | Performed by: PHYSICIAN ASSISTANT

## 2021-12-20 PROCEDURE — 3074F SYST BP LT 130 MM HG: CPT | Performed by: PHYSICIAN ASSISTANT

## 2021-12-20 PROCEDURE — 1036F TOBACCO NON-USER: CPT | Performed by: PHYSICIAN ASSISTANT

## 2021-12-20 PROCEDURE — 3078F DIAST BP <80 MM HG: CPT | Performed by: PHYSICIAN ASSISTANT

## 2021-12-31 ENCOUNTER — APPOINTMENT (OUTPATIENT)
Dept: LAB | Facility: CLINIC | Age: 56
End: 2021-12-31
Payer: COMMERCIAL

## 2021-12-31 DIAGNOSIS — Z12.5 PROSTATE CANCER SCREENING: ICD-10-CM

## 2021-12-31 DIAGNOSIS — R53.83 OTHER FATIGUE: ICD-10-CM

## 2021-12-31 LAB
PSA SERPL-MCNC: 1.1 NG/ML (ref 0–4)
TESTOST SERPL-MCNC: 364 NG/DL (ref 95–948)

## 2021-12-31 PROCEDURE — G0103 PSA SCREENING: HCPCS

## 2021-12-31 PROCEDURE — 36415 COLL VENOUS BLD VENIPUNCTURE: CPT

## 2021-12-31 PROCEDURE — 84403 ASSAY OF TOTAL TESTOSTERONE: CPT

## 2022-01-03 ENCOUNTER — TELEPHONE (OUTPATIENT)
Dept: FAMILY MEDICINE CLINIC | Facility: CLINIC | Age: 57
End: 2022-01-03

## 2022-01-03 DIAGNOSIS — R09.81 NASAL CONGESTION: Primary | ICD-10-CM

## 2022-01-03 DIAGNOSIS — I82.501 LEG DVT (DEEP VENOUS THROMBOEMBOLISM), CHRONIC, RIGHT (HCC): Primary | ICD-10-CM

## 2022-01-03 DIAGNOSIS — R68.83 CHILLS: ICD-10-CM

## 2022-01-03 DIAGNOSIS — J02.9 SORE THROAT: ICD-10-CM

## 2022-01-10 ENCOUNTER — TELEPHONE (OUTPATIENT)
Dept: UROLOGY | Facility: AMBULATORY SURGERY CENTER | Age: 57
End: 2022-01-10

## 2022-01-10 PROCEDURE — U0003 INFECTIOUS AGENT DETECTION BY NUCLEIC ACID (DNA OR RNA); SEVERE ACUTE RESPIRATORY SYNDROME CORONAVIRUS 2 (SARS-COV-2) (CORONAVIRUS DISEASE [COVID-19]), AMPLIFIED PROBE TECHNIQUE, MAKING USE OF HIGH THROUGHPUT TECHNOLOGIES AS DESCRIBED BY CMS-2020-01-R: HCPCS | Performed by: NURSE PRACTITIONER

## 2022-01-10 PROCEDURE — U0005 INFEC AGEN DETEC AMPLI PROBE: HCPCS | Performed by: NURSE PRACTITIONER

## 2022-01-11 ENCOUNTER — NURSE TRIAGE (OUTPATIENT)
Dept: OTHER | Facility: OTHER | Age: 57
End: 2022-01-11

## 2022-01-11 ENCOUNTER — APPOINTMENT (OUTPATIENT)
Dept: LAB | Facility: CLINIC | Age: 57
End: 2022-01-11
Payer: COMMERCIAL

## 2022-01-11 ENCOUNTER — TELEPHONE (OUTPATIENT)
Dept: FAMILY MEDICINE CLINIC | Facility: CLINIC | Age: 57
End: 2022-01-11

## 2022-01-11 DIAGNOSIS — I82.501 LEG DVT (DEEP VENOUS THROMBOEMBOLISM), CHRONIC, RIGHT (HCC): ICD-10-CM

## 2022-01-11 LAB
INR PPP: 1.29 (ref 0.84–1.19)
PROTHROMBIN TIME: 16 SECONDS (ref 11.6–14.5)

## 2022-01-11 PROCEDURE — 85610 PROTHROMBIN TIME: CPT

## 2022-01-11 PROCEDURE — 36415 COLL VENOUS BLD VENIPUNCTURE: CPT

## 2022-01-11 NOTE — TELEPHONE ENCOUNTER
Regarding: Lab results / Urology Sue Guajardo  ----- Message from Rudy Bear sent at 1/11/2022  3:17 PM EST -----  "I received my results via [employer] to a test that I took   I don't understand the results and I don't remember the name of the test  I need some help "

## 2022-01-11 NOTE — TELEPHONE ENCOUNTER
Reason for Disposition   Nursing judgment    Answer Assessment - Initial Assessment Questions  1   REASON FOR CALL or QUESTION: "What is your reason for calling today?" or "How can I best help you?" or "What question do you have that I can help answer?"      "I would like to discuss the results of my PSA and testosterone blood work"    Protocols used: INFORMATION ONLY CALL - NO TRIAGE-ADULT-[place]

## 2022-01-11 NOTE — TELEPHONE ENCOUNTER
Left generic message for patient to call back  Comm consent does not state whether or not a voicemail message can be left

## 2022-01-19 ENCOUNTER — TELEPHONE (OUTPATIENT)
Dept: UROLOGY | Facility: CLINIC | Age: 57
End: 2022-01-19

## 2022-02-16 ENCOUNTER — TELEPHONE (OUTPATIENT)
Dept: FAMILY MEDICINE CLINIC | Facility: CLINIC | Age: 57
End: 2022-02-16

## 2022-02-17 ENCOUNTER — TELEPHONE (OUTPATIENT)
Dept: CARDIOLOGY CLINIC | Facility: CLINIC | Age: 57
End: 2022-02-17

## 2022-02-17 DIAGNOSIS — N52.1 ERECTILE DISORDER DUE TO MEDICAL CONDITION IN MALE: ICD-10-CM

## 2022-02-18 RX ORDER — SILDENAFIL 25 MG/1
25 TABLET, FILM COATED ORAL DAILY PRN
Qty: 10 TABLET | Refills: 3 | Status: SHIPPED | OUTPATIENT
Start: 2022-02-18 | End: 2022-05-06

## 2022-05-05 DIAGNOSIS — N52.1 ERECTILE DISORDER DUE TO MEDICAL CONDITION IN MALE: ICD-10-CM

## 2022-05-05 NOTE — TELEPHONE ENCOUNTER
Requested medication(s) are due for refill today: Yes  Patient has already received a courtesy refill: No  Other reason request has been forwarded to provider: drug to drug interactions

## 2022-05-06 RX ORDER — SILDENAFIL 25 MG/1
TABLET, FILM COATED ORAL
Qty: 10 TABLET | Refills: 3 | Status: SHIPPED | OUTPATIENT
Start: 2022-05-06

## 2022-06-07 ENCOUNTER — OFFICE VISIT (OUTPATIENT)
Dept: CARDIOLOGY CLINIC | Facility: CLINIC | Age: 57
End: 2022-06-07
Payer: COMMERCIAL

## 2022-06-07 VITALS
BODY MASS INDEX: 39.56 KG/M2 | WEIGHT: 282.6 LBS | OXYGEN SATURATION: 98 % | SYSTOLIC BLOOD PRESSURE: 152 MMHG | DIASTOLIC BLOOD PRESSURE: 80 MMHG | HEART RATE: 56 BPM | HEIGHT: 71 IN

## 2022-06-07 DIAGNOSIS — I10 ESSENTIAL HYPERTENSION: Primary | ICD-10-CM

## 2022-06-07 DIAGNOSIS — R60.9 EDEMA, UNSPECIFIED TYPE: ICD-10-CM

## 2022-06-07 DIAGNOSIS — I82.501 LEG DVT (DEEP VENOUS THROMBOEMBOLISM), CHRONIC, RIGHT (HCC): ICD-10-CM

## 2022-06-07 DIAGNOSIS — E78.2 MIXED HYPERLIPIDEMIA: ICD-10-CM

## 2022-06-07 DIAGNOSIS — I10 HYPERTENSION, UNSPECIFIED TYPE: ICD-10-CM

## 2022-06-07 DIAGNOSIS — I25.10 CORONARY ARTERY DISEASE INVOLVING NATIVE HEART WITHOUT ANGINA PECTORIS, UNSPECIFIED VESSEL OR LESION TYPE: ICD-10-CM

## 2022-06-07 DIAGNOSIS — T78.3XXD ANGIOEDEMA, SUBSEQUENT ENCOUNTER: ICD-10-CM

## 2022-06-07 DIAGNOSIS — N18.2 STAGE 2 CHRONIC KIDNEY DISEASE: ICD-10-CM

## 2022-06-07 DIAGNOSIS — I21.4 NSTEMI (NON-ST ELEVATED MYOCARDIAL INFARCTION) (HCC): ICD-10-CM

## 2022-06-07 DIAGNOSIS — Z98.890 S/P CARDIAC CATH: ICD-10-CM

## 2022-06-07 PROCEDURE — 99215 OFFICE O/P EST HI 40 MIN: CPT | Performed by: INTERNAL MEDICINE

## 2022-06-07 PROCEDURE — 93000 ELECTROCARDIOGRAM COMPLETE: CPT | Performed by: INTERNAL MEDICINE

## 2022-06-07 RX ORDER — ROSUVASTATIN CALCIUM 5 MG/1
5 TABLET, COATED ORAL DAILY
Qty: 90 TABLET | Refills: 3 | Status: SHIPPED | OUTPATIENT
Start: 2022-06-07

## 2022-06-07 RX ORDER — FUROSEMIDE 20 MG/1
20 TABLET ORAL DAILY PRN
Qty: 60 TABLET | Refills: 3 | Status: SHIPPED | OUTPATIENT
Start: 2022-06-07

## 2022-06-07 NOTE — PATIENT INSTRUCTIONS
I am starting on the following new medications-     Doxazosin 4 mg daily   Rosuvastatin 5 mg daily   Lasix 20 mg daily as needed for swelling of the legs     I have stop the following medications- carvedilol, hydrochlorothiazide, isosorbide, spironolactone     I have also given you a referral to the allergy specialist-please make an appointment     Continue to check blood pressures at home   Follow-up in 6 months

## 2022-06-07 NOTE — PROGRESS NOTES
Cardiology Follow Up    Remberto Robertson  1965  [id number]  [facility]  [address] [facility] [address]  [phone]    1  Essential hypertension  POCT ECG    doxazosin (CARDURA XL) 4 MG 24 hr tablet   2  Coronary artery disease involving native heart without angina pectoris, unspecified vessel or lesion type  rosuvastatin (CRESTOR) 5 mg tablet   3  Hypertension, unspecified type     4  Leg DVT (deep venous thromboembolism), chronic, right (HCC)     5  Stage 2 chronic kidney disease     6  S/P cardiac cath 11/06/19     7  Mixed hyperlipidemia  rosuvastatin (CRESTOR) 5 mg tablet   8  NSTEMI (non-ST elevated myocardial infarction) ([facility] [address] )     9  Edema, unspecified type  furosemide (LASIX) 20 mg tablet   10  Angioedema, subsequent encounter  Ambulatory Referral to Allergy       Diagnoses and all orders for this visit:    Essential hypertension  -     POCT ECG  -     doxazosin (CARDURA XL) 4 MG 24 hr tablet; Take 1 tablet (4 mg total) by mouth daily with breakfast    Coronary artery disease involving native heart without angina pectoris, unspecified vessel or lesion type  -     rosuvastatin (CRESTOR) 5 mg tablet; Take 1 tablet (5 mg total) by mouth daily    Hypertension, unspecified type    Leg DVT (deep venous thromboembolism), chronic, right (HCC)    Stage 2 chronic kidney disease    S/P cardiac cath 11/06/19    Mixed hyperlipidemia  -     rosuvastatin (CRESTOR) 5 mg tablet; Take 1 tablet (5 mg total) by mouth daily    NSTEMI (non-ST elevated myocardial infarction) (HCC)    Edema, unspecified type  -     furosemide (LASIX) 20 mg tablet; Take 1 tablet (20 mg total) by mouth daily as needed (swelling of the legs)    Angioedema, subsequent encounter  -     Ambulatory Referral to Allergy; Future      I had the pleasure of seeing Remberto Robertson for a follow up visit       INTERVAL HISTORY:  See below    History of the presenting illness, Discussion/Summary and My Plan are as follows:::    He is a 68-year-old gentleman with history of hypertension, low testosterone, no history of diabetes or tobacco use, not aware of his family history, was physically very active at baseline-without any symptoms, presented to [facility] for 2 episodes of melena with a hemoglobin of 7, compared to a baseline around 13-15, subsequently transferred to [facility] due to troponin elevation, peaking at 22  Underwent an EGD that showed multiple small ulcers in the antrum as well as a clean based ulcer in the antrum that was clipped without any active bleeding  He had been having intermittent chest pains and underwent coronary angiography that did not demonstrate any significant obstructive disease, warranting intervention-Moderate noncritical disease-see below,-November 2019  Saman Francois was initiated on Plavix and not aspirin due to his gastric ulcers   He was also treated with antibiotics for H pylori      During his hospitalization, also developed angioedema  Saman Francois was on an ACE-inhibitor at home that had not been continued in the hospital, was started on a calcium channel blocker-amlodipine for hypertension, subsequently had angioedema to amlodipine also and subsequently to Eliquis       2020- June admitted with angioedema probably to lisinopril   Subsequently in July diagnosed with a DVT and then went on Eliquis after which he developed angioedema again      He denies any symptoms at this visit, has been physically active, 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 Heriditary angioedema  will not use these meds again     History of NSTEMI type 2 and CAD:  Peak troponin of 22, occurred in the setting of supply demand mismatch from severe anemia,  EGD showed multiple ulcers one of which was large in clip  Coronary angiography showed moderate disease, for medical management-single antiplatelet agent-, off Plavix now due to DVT and being on Coumadin  with a prior history of GI bleeding  Echo with preserved LV systolic function  Restarting statin, bradycardia precludes beta-blockers     Dyslipidemia:   he was on a statin, no longer, on his list of allergies is Lipitor-"facial swelling",     Last LDL of 108, non  and started rosuvastatin 2 5 mg in November 2021, but he is not on it currently and does not recall peaking get, will represcribe     Anemia/GI bleeding:  Had melena, status post EGD with multiple ulcers, 1 of which was clipped, status post treatment for H pylori, on b i d  PPI  - -  End of 2019    last hemoglobin-  13 6-October 2021     Hypertension:  borderline blood pressures today-, now on spironolactone and hydrochlorothiazide-will continue to watch for now  History of angioedema to lisinopril, amlodipine Eliquis     Also has mild CKD with a baseline creatinine of 1 3 - 1 4- October 2021   At the last visit-he was on carvedilol 25 b i d , spironolactone 25 mg daily, Lasix 20 mg daily, isosorbide 60 mg daily  He is not taking any of these medications consistently  With sinus bradycardia today-heart rate at 56 beats per minute, will not restart carvedilol  With angioedema will not try Ace/ARB, and in his case with angioedema to amlodipine, will not use it      With his CKD, will hold off on using hydrochlorothiazide or Lasix daily, will use it only as needed for edema   he periodically needs Viagra and hence will not use isosorbide, no anginal symptoms at this time anyway   start doxazosin 4 mg daily, Lasix as needed     Mild sleep apnea on testing-January 2021   No renal artery stenosis-CT chest abdomen pelvis-August 2019  Normal potassium levels -   - on spironolactone, cannot check aldosterone/renin ratio at this time     Follow-up in 6 months         Latest Reference Range & Units 01/04/21 09:46 04/10/21 11:08 10/04/21 16:45   BUN 5 - 25 mg/dL 23 28 (H) 18   Creatinine 0 60 - 1 30 mg/dL 1 36 (H) [1] 1 36 (H) [2] 1 37 (H) [3]      Latest Reference Range & Units 11/04/19 05:56 09/12/20 10:56 11/06/21 09:37   Cholesterol 50 - 200 mg/dL 107 [1] 174 [2] 178 [3]   Triglycerides <=150 mg/dL 139 [4] 78 [5] 87 [6]   HDL >=40 mg/dL 10 (L) [7] 47 [8] 53 [9]   LDL Calculated 0 - 100 mg/dL 69 [10] 111 (H) [11] 108 (H) [12]   (L): Data is abnormally low    2019:     NSTEMI - 2019 CORONARY CIRCULATION:  Left main: Normal   LAD: Angiography showed minor luminal irregularities  Proximal circumflex: There was a 30 % stenosis  1st obtuse marginal: There was a 70 % stenosis at the ostium of the vessel segment  Mid RCA: There was a diffuse 30 % stenosis        Patient Active Problem List   Diagnosis    Elevated troponin    Hypertension    Acute left ankle pain    Acute pain of left shoulder    Left ankle sprain    Acute left-sided low back pain without sciatica    S/P cardiac cath 11/06/19    Stomach ulcer    Iron deficiency anemia due to chronic blood loss    ZAKIA (acute kidney injury) ([facility] [address] )    Follow-up examination    Encounter for follow-up    NSTEMI (non-ST elevated myocardial infarction) ([facility] [address] )    Hyperlipidemia    Anemia    Coronary artery disease involving native heart without angina pectoris    Cellulitis of right lower extremity    Prostate cancer screening    ACE inhibitor-aggravated angioedema, sequela    Anticoagulation goal of INR 2 to 3    Acute deep vein thrombosis (DVT) of tibial vein (HCC)    Leg DVT (deep venous thromboembolism), chronic, right (HCC)    Febrile illness, acute    Bilateral lower extremity edema    Morbid obesity with BMI of 40 0-44 9, adult (HCC)    Cellulitis of left lower extremity    Itchy skin    Vasculitis (HCC)    Elevated serum creatinine    Low testosterone    History of DVT (deep vein thrombosis)    CKD (chronic kidney disease)    Lymphadenopathy    Edema of left lower leg due to peripheral venous insufficiency    Tooth decay    Preoperative clearance    Status post right knee replacement    Abscess of skin of left knee    BRUCE (obstructive sleep apnea)    Rash    Excessive cerumen in ear canal, bilateral    Anticoagulation adequate with anticoagulant therapy     Past Medical History:   Diagnosis Date    Angioedema     Coronary artery disease     GI bleed     Hypertension     stopped his meds when ran out several years ago    [facility]      Social History     Socioeconomic History    Marital status: Single     Spouse name: Not on file    Number of children: Not on file    Years of education: Not on file    Highest education level: Not on file   Occupational History    Not on file   Tobacco Use    Smoking status: Never Smoker    Smokeless tobacco: Never Used   Vaping Use    Vaping Use: Never used   Substance and Sexual Activity    Alcohol use: Never     Alcohol/week: 0 0 standard drinks    Drug use: Never    Sexual activity: Not on file     Comment: not asked   Other Topics Concern    Not on file   Social History Narrative    Checked Makayla     Social Determinants of Health     Financial Resource Strain: Not on file   Food Insecurity: Not on file   Transportation Needs: Not on file   Physical Activity: Not on file   Stress: Not on file   Social Connections: Not on file   Intimate Partner Violence: Not on file   Housing Stability: Not on file      Family History   Adopted: Yes   Family history unknown: Yes     Past Surgical History:   Procedure Laterality Date    REPLACEMENT TOTAL KNEE Right 12/2020    UPPER GASTROINTESTINAL ENDOSCOPY Clamped large stomach ulcer       Current Outpatient Medications:     acetaminophen (TYLENOL) 500 mg tablet, Take 1,000 mg by mouth daily, Disp: , Rfl:     carbamide peroxide (DEBROX) 6 5 % otic solution, Administer 5 drops into both ears 2 (two) times a day, Disp: 15 mL, Rfl: 2    doxazosin (CARDURA XL) 4 MG 24 hr tablet, Take 1 tablet (4 mg total) by mouth daily with breakfast, Disp: 90 tablet, Rfl: 3    furosemide (LASIX) 20 mg tablet, Take 1 tablet (20 mg total) by mouth daily as needed (swelling of the legs), Disp: 60 tablet, Rfl: 3    multivitamin (THERAGRAN) TABS, Take 1 tablet by mouth daily, Disp: , Rfl:     rosuvastatin (CRESTOR) 5 mg tablet, Take 1 tablet (5 mg total) by mouth daily, Disp: 90 tablet, Rfl: 3    sildenafil (VIAGRA) 25 MG tablet, TAKE 1 TABLET BY MOUTH DAILY AS NEEDED FOR ERECTILE DYSFUNCTION  , Disp: 10 tablet, Rfl: 3    meloxicam (MOBIC) 15 mg tablet, , Disp: , Rfl:     VITAMIN D PO, 2,000 Units every 24 hours (Patient not taking: Reported on 6/7/2022), Disp: , Rfl:     warfarin (COUMADIN) 2 5 mg tablet, Take 1 tablet (2 5 mg total) by mouth daily (Patient not taking: Reported on 6/7/2022), Disp: 30 tablet, Rfl: 2    warfarin (COUMADIN) 5 mg tablet, TAKE 2 TABLETS BY MOUTH EVERY DAY (Patient not taking: Reported on 6/7/2022), Disp: 60 tablet, Rfl: 2  Allergies   Allergen Reactions    Lisinopril Angioedema    Norvasc [Amlodipine] Angioedema    Amoxicillin Itching     Itching little bumps    Eliquis [Apixaban] Angioedema    Lipitor [Atorvastatin] Facial Swelling       Imaging: No results found  Review of Systems:  Review of Systems   Constitutional: Negative  HENT: Negative  Eyes: Negative  Respiratory: Negative  Cardiovascular: Negative  Endocrine: Negative  Musculoskeletal: Negative  Allergic/Immunologic: Negative          Physical Exam:  /80 (BP Location: Right arm, Patient Position: Sitting, Cuff Size: Large)   Pulse 56   Ht 5' 11" (1 803 m)   Wt 128 kg (282 lb 9 6 oz)   SpO2 98%   BMI 39 41 kg/m²   Physical Exam  Constitutional:       General: He is not in acute distress  Appearance: Normal appearance  He is not ill-appearing  HENT:      Nose: Nose normal  No congestion or rhinorrhea  Mouth/Throat:      Mouth: Mucous membranes are moist       Pharynx: Oropharynx is clear  No oropharyngeal exudate or posterior oropharyngeal erythema  Neck:      Vascular: No carotid bruit  Cardiovascular:      Rate and Rhythm: Normal rate  Heart sounds: No murmur heard  No friction rub  No gallop  Pulmonary:      Effort: Pulmonary effort is normal  No respiratory distress  Breath sounds: No wheezing or rhonchi  Musculoskeletal:         General: Swelling (Mild lower extremity edema bilaterally) and tenderness present  No deformity or signs of injury  Normal range of motion  Cervical back: Normal range of motion  No rigidity or tenderness  Lymphadenopathy:      Cervical: No cervical adenopathy  Skin:     General: Skin is warm  Coloration: Skin is not jaundiced or pale  Findings: No bruising or erythema  Neurological:      Mental Status: He is alert  This note was completed in part utilizing [email or web address] direct voice recognition software  Grammatical errors, random word insertion, spelling mistakes, occasional wrong word or "sound-alike" substitutions and incomplete sentences may be an occasional consequence of the system secondary to software limitations, ambient noise and hardware issues  At the time of dictation, efforts were made to edit, clarify and /or correct errors  Please read the chart carefully and recognize, using context, where substitutions have occurred  If you have any questions or concerns about the context, text or information contained within the body of this dictation, please contact myself, the provider, for further clarification

## 2022-06-20 ENCOUNTER — HOSPITAL ENCOUNTER (EMERGENCY)
Facility: HOSPITAL | Age: 57
Discharge: HOME | End: 2022-06-20
Attending: EMERGENCY MEDICINE
Payer: COMMERCIAL

## 2022-06-20 VITALS
RESPIRATION RATE: 24 BRPM | OXYGEN SATURATION: 100 % | TEMPERATURE: 97 F | DIASTOLIC BLOOD PRESSURE: 121 MMHG | HEART RATE: 55 BPM | SYSTOLIC BLOOD PRESSURE: 245 MMHG

## 2022-06-20 DIAGNOSIS — M54.9 BACK PAIN: Primary | ICD-10-CM

## 2022-06-20 DIAGNOSIS — M62.838 TRAPEZIUS MUSCLE SPASM: ICD-10-CM

## 2022-06-20 PROCEDURE — 96372 THER/PROPH/DIAG INJ SC/IM: CPT

## 2022-06-20 PROCEDURE — 99283 EMERGENCY DEPT VISIT LOW MDM: CPT

## 2022-06-20 PROCEDURE — 99284 EMERGENCY DEPT VISIT MOD MDM: CPT | Performed by: EMERGENCY MEDICINE

## 2022-06-20 RX ORDER — KETOROLAC TROMETHAMINE 30 MG/ML
15 INJECTION, SOLUTION INTRAMUSCULAR; INTRAVENOUS ONCE
Status: COMPLETED | OUTPATIENT
Start: 2022-06-20 | End: 2022-06-20

## 2022-06-20 RX ORDER — GABAPENTIN 300 MG/1
300 CAPSULE ORAL 3 TIMES DAILY
Qty: 42 CAPSULE | Refills: 0 | Status: SHIPPED | OUTPATIENT
Start: 2022-06-20 | End: 2022-08-16

## 2022-06-20 RX ORDER — DIAZEPAM 5 MG/1
5 TABLET ORAL ONCE
Status: COMPLETED | OUTPATIENT
Start: 2022-06-20 | End: 2022-06-20

## 2022-06-20 RX ORDER — GABAPENTIN 300 MG/1
300 CAPSULE ORAL ONCE
Status: COMPLETED | OUTPATIENT
Start: 2022-06-20 | End: 2022-06-20

## 2022-06-20 RX ORDER — LIDOCAINE 50 MG/G
1 PATCH TOPICAL ONCE
Status: DISCONTINUED | OUTPATIENT
Start: 2022-06-20 | End: 2022-06-20 | Stop reason: HOSPADM

## 2022-06-20 RX ADMIN — LIDOCAINE 5% 1 PATCH: 700 PATCH TOPICAL at 13:01

## 2022-06-20 RX ADMIN — DIAZEPAM 5 MG: 5 TABLET ORAL at 13:05

## 2022-06-20 RX ADMIN — KETOROLAC TROMETHAMINE 15 MG: 30 INJECTION, SOLUTION INTRAMUSCULAR at 13:01

## 2022-06-20 RX ADMIN — GABAPENTIN 300 MG: 300 CAPSULE ORAL at 13:01

## 2022-06-20 NOTE — DISCHARGE INSTRUCTIONS
You were evaluated for you back pain in the emergency department and determined appropriate for discharge  Please return to the emergency department immediately if you develop any significant worsening of pain, fevers, chills, urinary or bowel incontinence, urinary retention, numbness in the groin area, significant leg weakness or numbness, or are unable to walk  Continue symptomatic relief at home  Please follow up with your primary care provider in the next 5-7 days for further evaluation  We have provided referral to [facility]  They should call you within the next few days about follow-up  If you do not hear from them, call the number above to schedule follow-up  Use the prescribed gabapentin for nerve related pain  Continue Lidoderm patch, Tylenol, activity as tolerated

## 2022-06-20 NOTE — ED PROVIDER NOTES
History  Chief Complaint   Patient presents with    Back Pain     C/o pain  from back of neck to shoulder and R arm for about 3 weeks, seen by chiropracter, xray done, told he has a pinched nerve and to get eval if not getting better  States pain worse     HPI  Patient is a 58-year-old male history of hypertension, CAD presenting for evaluation neck pain and right-sided shoulder blade pain  Patient works as   Patient has noted progressive pain in the area the course of the past 3 weeks  Patient denies specific inciting event  Patient states that he feels some tingling in the fingertips on his right hand, denies additional weakness or numbness  Patient denies fevers, chills, states that he has had some intentional weight loss denies fatigue  Patient was evaluated by a chiropractor had x-rays performed and was told that symptoms were likely due to nerve impingement  Patient additionally recently follow-up with cardiologist will told him symptoms likely noncardiac  Patient hypertensive at time of arrival in Emergency Department, states that he has been compliant with home medications  Per patient and chart review, patient has had responsive angioedema to multiple antihypertensive agents in the past   Prior to Admission Medications   Prescriptions Last Dose Informant Patient Reported? Taking?    VITAMIN D PO   Yes No   Si,000 Units every 24 hours   Patient not taking: Reported on 2022   acetaminophen (TYLENOL) 500 mg tablet  Self Yes No   Sig: Take 1,000 mg by mouth daily   carbamide peroxide (DEBROX) 6 5 % otic solution  Self No No   Sig: Administer 5 drops into both ears 2 (two) times a day   doxazosin (CARDURA XL) 4 MG 24 hr tablet   No No   Sig: Take 1 tablet (4 mg total) by mouth daily with breakfast   furosemide (LASIX) 20 mg tablet   No No   Sig: Take 1 tablet (20 mg total) by mouth daily as needed (swelling of the legs)   meloxicam (MOBIC) 15 mg tablet   Yes No   Patient not taking: Reported on 6/7/2022   multivitamin (THERAGRAN) TABS  Self Yes No   Sig: Take 1 tablet by mouth daily   rosuvastatin (CRESTOR) 5 mg tablet   No No   Sig: Take 1 tablet (5 mg total) by mouth daily   sildenafil (VIAGRA) 25 MG tablet   No No   Sig: TAKE 1 TABLET BY MOUTH DAILY AS NEEDED FOR ERECTILE DYSFUNCTION  warfarin (COUMADIN) 2 5 mg tablet   No No   Sig: Take 1 tablet (2 5 mg total) by mouth daily   Patient not taking: Reported on 6/7/2022   warfarin (COUMADIN) 5 mg tablet   No No   Sig: TAKE 2 TABLETS BY MOUTH EVERY DAY   Patient not taking: Reported on 6/7/2022      Facility-Administered Medications: None       Past Medical History:   Diagnosis Date    Angioedema     Coronary artery disease     GI bleed     Hypertension     stopped his meds when ran out several years ago    STEMI [facility]ST [facility] infarction) [facility])        Past Surgical History:   Procedure Laterality Date    REPLACEMENT TOTAL KNEE Right 12/2020    UPPER GASTROINTESTINAL ENDOSCOPY      Clamped large stomach ulcer       Family History   Adopted: Yes   Family history unknown: Yes     I have reviewed and agree with the history as documented  E-Cigarette/Vaping    E-Cigarette Use Never User      E-Cigarette/Vaping Substances    Nicotine No     THC No     CBD No     Flavoring No     Other No     Unknown No      Social History     Tobacco Use    Smoking status: Never Smoker    Smokeless tobacco: Never Used   Vaping Use    Vaping Use: Never used   Substance Use Topics    Alcohol use: Never     Alcohol/week: 0 0 standard drinks    Drug use: Never       Review of Systems   Constitutional: Negative for chills, fatigue and fever  HENT: Negative for congestion, rhinorrhea and sore throat  Eyes: Negative for photophobia and visual disturbance  Respiratory: Negative for chest tightness and shortness of breath  Cardiovascular: Negative for chest pain, palpitations and leg swelling     Gastrointestinal: Negative for abdominal distention, abdominal pain, diarrhea, nausea and vomiting  Endocrine: Negative for polydipsia and polyuria  Genitourinary: Negative for dysuria and hematuria  Musculoskeletal: Positive for back pain and neck pain  Negative for arthralgias and myalgias  Skin: Negative for color change, pallor, rash and wound  Neurological: Positive for numbness (Tips of fingers on right hand)  Negative for weakness and headaches  Psychiatric/Behavioral: Negative for confusion  Physical Exam  Physical Exam  Vitals and nursing note reviewed  Constitutional:       General: He is not in acute distress  Appearance: He is well-developed  He is not diaphoretic  Comments: Well-appearing, nondistressed   HENT:      Head: Normocephalic and atraumatic  Comments: Pupils 3 mm bilaterally, reactive to light  Right Ear: External ear normal       Left Ear: External ear normal       Nose: Nose normal       Mouth/Throat:      Pharynx: No oropharyngeal exudate  Eyes:      Conjunctiva/sclera: Conjunctivae normal       Pupils: Pupils are equal, round, and reactive to light  Cardiovascular:      Rate and Rhythm: Normal rate and regular rhythm  Heart sounds: Normal heart sounds  No murmur heard  No friction rub  No gallop  Comments: Sinus bradycardia rate of 55  No murmurs rubs or gallops  Extremities warm and well perfused  2+ radial pulse RUE  Pulmonary:      Effort: Pulmonary effort is normal  No respiratory distress  Breath sounds: Normal breath sounds  No wheezing  Chest:      Chest wall: No tenderness  Abdominal:      General: Bowel sounds are normal  There is no distension  Palpations: Abdomen is soft  There is no mass  Tenderness: There is no abdominal tenderness  There is no guarding or rebound  Musculoskeletal:         General: No deformity  Comments: Right-sided tenderness and muscle spasm specifically around the scapula    No specific trigger point  No overlying skin changes  No midline C/T/L-spine tenderness, step-off, deformity  Skin:     General: Skin is warm and dry  Capillary Refill: Capillary refill takes less than 2 seconds  Neurological:      Mental Status: He is alert and oriented to person, place, and time  Comments: Patient stating subjective diminished sensation of tips of all fingers on right hand without additional area of weakness or numbness  Finger tips appear normal    Psychiatric:         Behavior: Behavior normal          Vital Signs  ED Triage Vitals [06/20/22 1221]   Temperature Pulse Respirations Blood Pressure SpO2   (!) 97 °F (36 1 °C) 55 (!) 24 (!) 245/121 100 %      Temp Source Heart Rate Source Patient Position - Orthostatic VS BP Location FiO2 (%)   Tympanic Monitor Sitting Left arm --      Pain Score       9           Vitals:    06/20/22 1221   BP: (!) 245/121   Pulse: 55   Patient Position - Orthostatic VS: Sitting         Visual Acuity      ED Medications  Medications   lidocaine (LIDODERM) 5 % patch 1 patch (1 patch Topical Medication Applied 6/20/22 1301)   ketorolac (TORADOL) injection 15 mg (15 mg Intramuscular Given 6/20/22 1301)   gabapentin (NEURONTIN) capsule 300 mg (300 mg Oral Given 6/20/22 1301)   diazepam (VALIUM) tablet 5 mg (5 mg Oral Given 6/20/22 1305)       Diagnostic Studies  Results Reviewed     None                 No orders to display              Procedures  Procedures         ED Course                                             MDM  Number of Diagnoses or Management Options  Back pain  Trapezius muscle spasm  Diagnosis management comments: Plan for analgesia with Toradol, Lidoderm, gabapentin for potential neuropathic component of pain, Valium  Patient noted to be hypertensive, discussed fact that this is likely secondary to discomfort and risk versus benefit of trial of additional antihypertensive at this time given patient's multiple episodes of angioedema    Patient agreeable with symptomatic management of pain, close outpatient follow-up with primary care provider for recheck of blood pressure  Patient with improvement of back/neck pain and spasm following treatment Lidoderm, Toradol, gabapentin  Offered Valium however patient not desiring  Provided with ambulatory referral to [facility], discharged with verbal and written return precautions  Disposition  Final diagnoses:   Back pain   Trapezius muscle spasm     Time reflects when diagnosis was documented in both MDM as applicable and the Disposition within this note     Time User Action Codes Description Comment    6/20/2022  1:51 PM Salina Shirley [M54 9] Back pain     6/20/2022  1:51 PM Salina Hsu Add [V37 806] Trapezius muscle spasm       ED Disposition     ED Disposition   Discharge    Condition   Stable    Date/Time   Mon Jun 20, 2022  1:51 PM    Eemrson 51 discharge to home/self care  Follow-up Information     Follow up With Specialties Details Why Contact Info Additional Information    [facility]   [address] [place] [address] Emergency Department Emergency Medicine  If symptoms worsen [address] [address]  [address] [address] Emergency Department, [place], [place], [address]          Patient's Medications   Discharge Prescriptions    GABAPENTIN (NEURONTIN) 300 MG CAPSULE    Take 1 capsule (300 mg total) by mouth 3 (three) times a day for 14 days For post-herpetic neuralgia: Take 1 tablet on day 1,  Then take 2 tablets on day 2, Then take 3 tablets on day 3 and every day after that as instructed by your doctor         Start Date: 6/20/2022 End Date: 7/4/2022       Order Dose: 300 mg       Quantity: 42 capsule    Refills: 0           PDMP Review       Value Time User    PDMP Reviewed  Yes 8/13/2020  5:59 AM Shantal Monroy MD          ED Provider  Electronically Signed by           Yovana Cheng MD  06/20/22 2804

## 2022-06-21 ENCOUNTER — OFFICE VISIT (OUTPATIENT)
Dept: FAMILY MEDICINE CLINIC | Facility: CLINIC | Age: 57
End: 2022-06-21
Payer: COMMERCIAL

## 2022-06-21 ENCOUNTER — TELEPHONE (OUTPATIENT)
Dept: FAMILY MEDICINE CLINIC | Facility: CLINIC | Age: 57
End: 2022-06-21

## 2022-06-21 ENCOUNTER — TELEPHONE (OUTPATIENT)
Dept: PHYSICAL THERAPY | Facility: OTHER | Age: 57
End: 2022-06-21

## 2022-06-21 ENCOUNTER — APPOINTMENT (OUTPATIENT)
Dept: LAB | Facility: CLINIC | Age: 57
End: 2022-06-21
Payer: COMMERCIAL

## 2022-06-21 VITALS
DIASTOLIC BLOOD PRESSURE: 110 MMHG | RESPIRATION RATE: 18 BRPM | BODY MASS INDEX: 39.9 KG/M2 | SYSTOLIC BLOOD PRESSURE: 220 MMHG | TEMPERATURE: 98.3 F | WEIGHT: 285 LBS | OXYGEN SATURATION: 98 % | HEIGHT: 71 IN | HEART RATE: 56 BPM

## 2022-06-21 DIAGNOSIS — Z51.81 ANTICOAGULATION GOAL OF INR 2 TO 3: ICD-10-CM

## 2022-06-21 DIAGNOSIS — M54.2 NECK PAIN ON RIGHT SIDE: Primary | ICD-10-CM

## 2022-06-21 DIAGNOSIS — I82.441 ACUTE DEEP VEIN THROMBOSIS (DVT) OF TIBIAL VEIN OF RIGHT LOWER EXTREMITY (HCC): ICD-10-CM

## 2022-06-21 DIAGNOSIS — Z79.01 ANTICOAGULATION GOAL OF INR 2 TO 3: Primary | ICD-10-CM

## 2022-06-21 DIAGNOSIS — Z79.01 ANTICOAGULATION GOAL OF INR 2 TO 3: ICD-10-CM

## 2022-06-21 DIAGNOSIS — I87.2 EDEMA OF LEFT LOWER LEG DUE TO PERIPHERAL VENOUS INSUFFICIENCY: ICD-10-CM

## 2022-06-21 DIAGNOSIS — I48.91 ATRIAL FIBRILLATION, UNSPECIFIED TYPE (HCC): ICD-10-CM

## 2022-06-21 DIAGNOSIS — Z09 FOLLOW-UP EXAMINATION: ICD-10-CM

## 2022-06-21 DIAGNOSIS — R60.0 EDEMA OF LEFT LOWER LEG DUE TO PERIPHERAL VENOUS INSUFFICIENCY: ICD-10-CM

## 2022-06-21 DIAGNOSIS — I10 HYPERTENSION, UNSPECIFIED TYPE: ICD-10-CM

## 2022-06-21 DIAGNOSIS — Z51.81 ANTICOAGULATION GOAL OF INR 2 TO 3: Primary | ICD-10-CM

## 2022-06-21 LAB
INR PPP: 0.97 (ref 0.84–1.19)
PROTHROMBIN TIME: 12.9 SECONDS (ref 11.6–14.5)

## 2022-06-21 PROCEDURE — 3725F SCREEN DEPRESSION PERFORMED: CPT | Performed by: NURSE PRACTITIONER

## 2022-06-21 PROCEDURE — 99215 OFFICE O/P EST HI 40 MIN: CPT | Performed by: NURSE PRACTITIONER

## 2022-06-21 PROCEDURE — 85610 PROTHROMBIN TIME: CPT

## 2022-06-21 PROCEDURE — 36415 COLL VENOUS BLD VENIPUNCTURE: CPT

## 2022-06-21 RX ORDER — METHYLPREDNISOLONE 4 MG/1
TABLET ORAL
Qty: 21 EACH | Refills: 0 | Status: SHIPPED | OUTPATIENT
Start: 2022-06-21 | End: 2022-06-25

## 2022-06-21 NOTE — TELEPHONE ENCOUNTER
Pt called and left msg stating that he is in need of a script to be in chart for an INR and would like to be called when put in

## 2022-06-21 NOTE — ASSESSMENT & PLAN NOTE
Patient presents for follow-up evaluations a post ER evaluation 06/20/2022  Evaluation of right-sided neck pain  Apparently x-rays from Chiropractic care pending  Patient has been referred to the [address] [place] [address] and is awaiting call back  Patient requires evaluation and screening for treatment

## 2022-06-21 NOTE — ASSESSMENT & PLAN NOTE
Patient presents today has to +1 edema in the left lower extremity  Recently started on Lasix 20 mg p o  Daily  Patient to monitor eyes and nose and daily weight

## 2022-06-21 NOTE — ASSESSMENT & PLAN NOTE
Patient ordered for PT INR today  Pending results  Patient instructed to start back on warfarin 7 5 mg p o  Daily  Recheck PT INR in 2 weeks

## 2022-06-21 NOTE — ASSESSMENT & PLAN NOTE
Patient to maintain a soft cervical collar to the neck  Also to use lighter derm patch as directed, start gabapentin Neurontin 300 mg 3 times daily, start a Medrol Dosepak  Currently x-ray of the cervical spine ordered until x-ray was received from chiropractic care

## 2022-06-21 NOTE — PROGRESS NOTES
BMI Counseling: Body mass index is 39 75 kg/m²  The BMI is above normal  Nutrition recommendations include decreasing portion sizes, encouraging healthy choices of fruits and vegetables, decreasing fast food intake, consuming healthier snacks, limiting drinks that contain sugar, moderation in carbohydrate intake, increasing intake of lean protein, reducing intake of saturated and trans fat and reducing intake of cholesterol  Exercise recommendations include vigorous physical activity 75 minutes/week, exercising 3-5 times per week, obtaining a gym membership and strength training exercises  No pharmacotherapy was ordered  Rationale for BMI follow-up plan is due to patient being overweight or obese  Depression Screening and Follow-up Plan: Patient was screened for depression during today's encounter  They screened negative with a PHQ-2 score of 0  Assessment/Plan:         Problem List Items Addressed This Visit        Cardiovascular and Mediastinum    Hypertension     Patient currently in pain reports pain scale 7/10  Currently adjusted on his recent medications with Dr Eleazar Kussmaul current lead taking Cardura XL 4 mg p o  Daily, furosemide 20 mg p o  Daily, patient admitted on low-sodium diet and exercise 3-5 times per week for at least 75 minutes of cardiovascular activity  Will recheck BP next office visit  Currently /110  Patient indicates he is in pain  Edema of left lower leg due to peripheral venous insufficiency     Patient presents today has to +1 edema in the left lower extremity  Recently started on Lasix 20 mg p o  Daily  Patient to monitor eyes and nose and daily weight  Other    Follow-up examination     Patient presents for follow-up evaluations a post ER evaluation 06/20/2022  Evaluation of right-sided neck pain  Apparently x-rays from Chiropractic care pending  Patient has been referred to the [address] [place] [address] and is awaiting call back    Patient requires evaluation and screening for treatment  Anticoagulation goal of INR 2 to 3    Relevant Orders    Protime-INR    Neck pain on right side - Primary     Patient to maintain a soft cervical collar to the neck  Also to use lighter derm patch as directed, start gabapentin Neurontin 300 mg 3 times daily, start a Medrol Dosepak  Currently x-ray of the cervical spine ordered until x-ray was received from chiropractic care  Relevant Medications    methylPREDNISolone 4 MG tablet therapy pack    Other Relevant Orders    Ambulatory Referral to [facility]    XR spine cervical complete 4 or 5 vw non injury    Cervical Collar            Subjective:      Patient ID: Yobani Jones is a 60 y o  male  Patient is a 60year old male present for evaluation of right side cervical neck pain  Patient is a 57-year-old male history of hypertension, CAD presenting for evaluation neck pain and right-sided shoulder blade pain  Patient works as  for many years  Patient has noted progressive pain in the area the course of the past 3 weeks  Patient denies specific inciting event  Patient states that he feels some tingling in the fingertips on his right hand, denies additional weakness or numbness  Patient denies fevers, chills, states that he has had some intentional weight loss, denies fatigue  Patient was evaluated by a chiropractor had x-rays performed and was told that symptoms were likely due to nerve impingement  Patient additionally recently follow-up with Cardiologist will told him symptoms  noncardiac  Patient hypertensive at time on arrival to office 7/10  Per patient and chart review, patient has had responsive angioedema to multiple antihypertensive agents in the past  Patient had been seen int ER yesterday for sever neck pain   He was to start gabapentin 300mg p o  daily, use a Lidoderm patch, take valium 5mg p o  for muscle spasm and start medrol dose pack at this time  Referral to spine center ordered for evaluation  ,      The following portions of the patient's history were reviewed and updated as appropriate:   Past Medical History:  He has a past medical history of Angioedema, Coronary artery disease, GI bleed, Hypertension, and STEMI (ST elevation myocardial infarction) ([address] )  ,  _______________________________________________________________________  Medical Problems:  does not have any pertinent problems on file ,  _______________________________________________________________________  Past Surgical History:   has a past surgical history that includes Upper gastrointestinal endoscopy and Replacement total knee (Right, 12/2020)  ,  _______________________________________________________________________  Family History:  He was adopted  Family history is unknown by patient  ,  _______________________________________________________________________  Social History:   reports that he has never smoked  He has never used smokeless tobacco  He reports that he does not drink alcohol and does not use drugs  ,  _______________________________________________________________________  Allergies:  is allergic to lisinopril, norvasc [amlodipine], amoxicillin, eliquis [apixaban], and lipitor [atorvastatin]     _______________________________________________________________________  Current Outpatient Medications   Medication Sig Dispense Refill    methylPREDNISolone 4 MG tablet therapy pack Use as directed on package 21 each 0    acetaminophen (TYLENOL) 500 mg tablet Take 1,000 mg by mouth daily      carbamide peroxide (DEBROX) 6 5 % otic solution Administer 5 drops into both ears 2 (two) times a day 15 mL 2    doxazosin (CARDURA XL) 4 MG 24 hr tablet Take 1 tablet (4 mg total) by mouth daily with breakfast 90 tablet 3    furosemide (LASIX) 20 mg tablet Take 1 tablet (20 mg total) by mouth daily as needed (swelling of the legs) 60 tablet 3    gabapentin (Neurontin) 300 mg capsule Take 1 capsule (300 mg total) by mouth 3 (three) times a day for 14 days For post-herpetic neuralgia: Take 1 tablet on day 1,  Then take 2 tablets on day 2, Then take 3 tablets on day 3 and every day after that as instructed by your doctor  42 capsule 0    meloxicam (MOBIC) 15 mg tablet  (Patient not taking: Reported on 6/7/2022)      multivitamin (THERAGRAN) TABS Take 1 tablet by mouth daily      rosuvastatin (CRESTOR) 5 mg tablet Take 1 tablet (5 mg total) by mouth daily 90 tablet 3    sildenafil (VIAGRA) 25 MG tablet TAKE 1 TABLET BY MOUTH DAILY AS NEEDED FOR ERECTILE DYSFUNCTION  10 tablet 3    VITAMIN D PO 2,000 Units every 24 hours (Patient not taking: Reported on 6/7/2022)      warfarin (COUMADIN) 2 5 mg tablet Take 1 tablet (2 5 mg total) by mouth daily (Patient not taking: Reported on 6/7/2022) 30 tablet 2    warfarin (COUMADIN) 5 mg tablet TAKE 2 TABLETS BY MOUTH EVERY DAY (Patient not taking: Reported on 6/7/2022) 60 tablet 2     No current facility-administered medications for this visit      _______________________________________________________________________  Review of Systems   Constitutional: Negative for activity change, appetite change, chills, fatigue, fever and unexpected weight change  HENT: Negative for congestion, ear discharge, ear pain, nosebleeds, postnasal drip, rhinorrhea, sinus pressure, sinus pain, sneezing, sore throat and voice change  Eyes: Negative for photophobia, pain, redness and visual disturbance  Respiratory: Negative for cough, chest tightness, shortness of breath and wheezing  Cardiovascular: Negative for chest pain and palpitations  Gastrointestinal: Negative for abdominal distention, abdominal pain, constipation, diarrhea, nausea and vomiting  Endocrine: Negative  Genitourinary: Negative for decreased urine volume, difficulty urinating, dysuria, flank pain, frequency, hematuria and urgency  Musculoskeletal: Positive for neck pain   Negative for arthralgias, myalgias and neck stiffness  Right side neck pain for the last 3 weeks  Paint radiating form right side neck to right arm finger tips  Skin: Negative  Allergic/Immunologic: Negative  Neurological: Positive for numbness  Negative for dizziness, tremors, seizures, syncope, speech difficulty, weakness, light-headedness and headaches  Numbness and tingling in the right hand fingers, radiating from the right neck  Hematological: Negative  Psychiatric/Behavioral: Negative  Objective:  Vitals:    06/21/22 1551   BP: (!) 220/110   BP Location: Right arm   Patient Position: Sitting   Cuff Size: Standard   Pulse: 56   Resp: 18   Temp: 98 3 °F (36 8 °C)   TempSrc: Temporal   SpO2: 98%   Weight: 129 kg (285 lb)   Height: 5' 11" (1 803 m)     Body mass index is 39 75 kg/m²  Physical Exam  Vitals and nursing note reviewed  Constitutional:       Appearance: Normal appearance  He is well-developed  He is obese  HENT:      Head: Normocephalic and atraumatic  Right Ear: Tympanic membrane, ear canal and external ear normal       Left Ear: Tympanic membrane, ear canal and external ear normal       Nose: Nose normal  No congestion or rhinorrhea  Mouth/Throat:      Mouth: Mucous membranes are moist       Pharynx: No oropharyngeal exudate or posterior oropharyngeal erythema  Eyes:      Extraocular Movements: Extraocular movements intact  Conjunctiva/sclera: Conjunctivae normal       Pupils: Pupils are equal, round, and reactive to light  Neck:      Comments: Right side neck pain  ROM intact  Pain with rotation right and left  No weakness to right arm  Cardiovascular:      Rate and Rhythm: Normal rate and regular rhythm  Pulses: Normal pulses  Heart sounds: Normal heart sounds  No murmur heard  Pulmonary:      Effort: Pulmonary effort is normal       Breath sounds: Normal breath sounds     Abdominal:      General: Bowel sounds are normal  Palpations: Abdomen is soft  Musculoskeletal:         General: Tenderness present  No swelling, deformity or signs of injury  Normal range of motion  Cervical back: Normal range of motion and neck supple  Tenderness present  No rigidity  Right lower leg: Edema present  Left lower leg: Edema present  Comments: Left leg +1 chronic swelling  Right leg trace edema noted  Skin:     General: Skin is warm  Capillary Refill: Capillary refill takes less than 2 seconds  Neurological:      General: No focal deficit present  Mental Status: He is alert and oriented to person, place, and time     Psychiatric:         Mood and Affect: Mood normal          Behavior: Behavior normal

## 2022-06-21 NOTE — PATIENT INSTRUCTIONS
Soft Cervical Collar   WHAT YOU NEED TO KNOW:   What is a soft cervical collar? A soft cervical collar is a device that helps limit the movement of your neck  The collar is made of soft foam, wraps around your neck, and fastens in the back  You may need to use a soft cervical collar if you have a neck injury or arthritis  How do I use a soft cervical collar? Ask how long you should wear your soft cervical collar  As your pain gets better, you may be able to remove the collar for a short time each day  This time will increase until you no longer need the collar  Do the following to put on your soft cervical collar:  Look straight ahead and keep your chin parallel to the floor  Fit the collar snugly around your neck  Attach the fasteners at the back of your neck  Make sure that you can breathe comfortably  If the collar feels too tight, remove it and put it on again  Put cotton padding between the collar and your skin if the collar irritates your skin  How can I prevent more injury and pain? Limit activity  This may help decrease your pain and swelling  Ask your healthcare provider when you can return to your normal daily activities  Support your neck while you sleep  You may need to sleep without a pillow if you sleep with your collar on  Make sure your pillow is comfortable and supports your head and neck if you sleep without a collar  You may need a special neck pillow  You may be more comfortable on a firm mattress  Support your neck and back if you sit most of the day  Keep your computer monitor at eye level  Place documents you are reading into a stahl at eye level  Use a headset when you talk on the telephone  When should I seek immediate care? You have severe pain, or pain that does not get better even with treatment  You cannot move your arm or hand  You feel lightheaded, dizzy, or weak  When should I call my doctor?    Your arm or hand is numb, tingles, or is weak  You have questions or concerns about how to use your soft cervical collar  CARE AGREEMENT:   You have the right to help plan your care  Learn about your health condition and how it may be treated  Discuss treatment options with your healthcare providers to decide what care you want to receive  You always have the right to refuse treatment  The above information is an  only  It is not intended as medical advice for individual conditions or treatments  Talk to your doctor, nurse or pharmacist before following any medical regimen to see if it is safe and effective for you  © Copyright [employer] 2022 Information is for End User's use only and may not be sold, redistributed or otherwise used for commercial purposes  All illustrations and images included in CareNotes® are the copyrighted property of A D A M , Inc  or [facility]  Neck Pain   WHAT YOU NEED TO KNOW:   What do I need to know about neck pain? You may have sudden neck pain that increases quickly  You may instead feel pain build slowly over time  Neck pain may go away in a few days or weeks, or it may continue for months  The pain may come and go, or be worse with certain movements  The pain may be only in your neck, or it may move to your arms, back, or shoulders  You may also have pain that starts in another body area and moves to your neck  Some types of neck pain are permanent  What causes or increases my risk for neck pain?    Stenosis (narrowing) of your spinal column, or degeneration (breakdown) or inflammation of the discs in your neck    Inflammation from a condition such as rheumatoid arthritis, polymyalgia rheumatica, or rotator cuff tendinitis    A condition that affects neck to arm nerves, such as thoracic outlet syndrome or brachial neuritis     Trauma or injury to your neck, such as being hit from behind in a car (whiplash) or sleeping in a bad position    A fracture of a neck bone that causes nerve damage    A medical condition, such as shingles, meningitis, a tumor, or coronary artery disease (CAD)    How is the cause of neck pain diagnosed? Your healthcare provider will ask about your symptoms and when they began  Tell him if you were recently in an accident or had another injury to your neck  He will examine your neck and shoulders  He may also have you move your head and arms in certain ways to see if any position causes or relieves the pain  Blood tests  may be used to measure the amount of inflammation or to check for signs of an infection  X-ray or MRI  pictures may show a neck injury or medical condition  Do not enter the MRI room with anything metal  Metal can cause serious damage  Tell the healthcare provider if you have any metal in or on your body  How is neck pain treated? Treatment will depend on what is causing your pain  Medicines  may be prescribed or recommended by your healthcare provider for pain  You may need medicine to treat nerve pain or to stop muscle spasms  Medicines may also be given to reduce inflammation  Your healthcare provider may inject medicine into a nerve to block pain  Over-the-counter NSAID medicine or acetaminophen may be recommended to help treat minor pain or inflammation  Traction  is used to relieve pressure from nerves  Your head is gently pulled up and away from your neck  This stretches muscles and ligaments and makes more room for the spine  Your healthcare provider will tell you the kind of traction that will help your neck pain  Do not use traction devices at home unless directed by your healthcare provider  Surgery  may be needed if the pain is severe or other treatments do not work  Surgery will not help every kind of neck pain  You may need surgery to stabilize a fractured bone or to remove a tumor  Surgery may also be used to widen a narrowed spinal column or to remove a disc from between neck bones      What can I do to manage or prevent neck pain? Rest your neck as directed  Do not make sudden movements, such as turning your head quickly  Your healthcare provider may recommend you wear a cervical collar for a short time  The collar will prevent you from moving your head  This will give your neck time to heal if an injury is causing your neck pain  Ask your healthcare provider when you can return to sports or other normal daily activities  Apply heat as directed  Heat helps relieve pain and swelling  Use a heat wrap, or soak a small towel in warm water  Wring out the extra water  Apply the heat wrap or towel for 20 minutes every hour, or as directed  Apply ice as directed  Ice helps relieve pain and swelling, and can help prevent tissue damage  Use an ice pack, or put ice in a bag  Cover the ice pack or back with a towel before you apply it to your neck  Apply the ice pack or ice for 15 minutes every hour, or as directed  Your healthcare provider can tell you how often to apply ice  Do neck exercises as directed  Neck exercises help strengthen the muscles and increase range of motion  Your healthcare provider will tell you which exercises are right for you  He may give you instructions, or he may recommend that you work with a physical therapist  Your healthcare provider or therapist can make sure you are doing the exercises correctly  Maintain good posture  Try to keep your head and shoulders lifted when you sit  If you work in front of a computer, make sure the monitor is at the right level  You should not need to look up down to see the screen  You should also not have to lean forward to be able to read what is on the screen  Make sure your keyboard, mouse, and other computer items are placed where you do not have to extend your shoulder to reach them  Get up often if you work in front of a computer or sit for long periods of time  Stretch or walk around to keep your neck muscles loose      When should I seek immediate care?   You have an injury that causes neck pain and shooting pain down your arms or legs  Your neck pain suddenly becomes severe  You have neck pain along with numbness, tingling, or weakness in your arms or legs  You have a stiff neck, a headache, and a fever  When should I contact my healthcare provider? You have new or worsening symptoms  Your symptoms continue even after treatment  You have questions or concerns about your condition or care  CARE AGREEMENT:   You have the right to help plan your care  Learn about your health condition and how it may be treated  Discuss treatment options with your healthcare providers to decide what care you want to receive  You always have the right to refuse treatment  The above information is an  only  It is not intended as medical advice for individual conditions or treatments  Talk to your doctor, nurse or pharmacist before following any medical regimen to see if it is safe and effective for you  © Copyright [employer] 2022 Information is for End User's use only and may not be sold, redistributed or otherwise used for commercial purposes  All illustrations and images included in CareNotes® are the copyrighted property of [employer]  or [facility] Marquita Elder   Warfarin Toxicity   WHAT YOU NEED TO KNOW:   What is warfarin toxicity? Warfarin toxicity happens when you have too much warfarin in your body  Certain changes to foods and medicines can also increase the effect of warfarin  Warfarin is a medicine that is used to prevent or treat the formation of blot clots  It works by making your blood clot more slowly  Warfarin toxicity can cause bleeding that can become life-threatening  What increases my risk for warfarin toxicity? You take higher doses of warfarin than is recommended by your healthcare provider  Your risk of warfarin toxicity increases if you do not have your INR checked as recommended   Your healthcare provider adjusts your dose based on your INR  The INR, or International Normalized Ratio, is a measure of how long it takes your blood to clot  Your risk of bleeding increase over time when your INR is too high  A sudden decrease of vitamin K in your diet can increase your risk of bleeding  Vitamin K changes how your blood clots  Your vitamin K levels affect your INR  You start or stop taking another medicine or supplement that affects the way warfarin works  What are the signs and symptoms of warfarin toxicity? Red spots on your skin that look like a rash    Severe headache or dizziness    Heavy bleeding after an injury    Heavy bleeding during monthly period in women    You have severe stomach pain or you vomit blood    Pink, red, or dark brown urine    Black or bloody bowel movements    How do I safely take warfarin? Go to all your follow-up appointments  Your healthcare provider will need to monitor you closely while you are taking warfarin  He may need to adjust your dose based on your INR results  Take this medicine exactly as directed  Contact your healthcare provider if you miss a dose or you have any questions about how to take warfarin  Do not start or stop taking medicines or herbal supplements that can affect the way that warfarin works  Some examples include NSAIDs, aspirin, acetaminophen, and certain antacids, antibiotics, and medicines used to lower cholesterol  Some herbal supplements that can affect the way warfarin works are ginkgo biloba, garlic, fish oil, vitamin E, and tumeric  Talk to your healthcare provider before you start or stop any medicines  Eat the same amount of vitamin K daily  to help keep your INR stable  Vitamin K is found in green leafy vegetables, broccoli, grapes, and other foods  Ask your healthcare provider for more information about vitamin K  Avoid alcohol  Alcohol increases your INR  What else should I do while I am taking warfarin?   Wear medical alert jewelry or carry a card that says you take warfarin  Ask where to get these items  What should I do if I think I or someone I know took too much warfarin? Call the [facility] at [phone] immediately  When should I seek immediate care? You have a severe headache or dizziness  You have heavy bleeding that does not stop  You have severe stomach pain or you vomit blood  Your urine is pink, red, or dark brown  You have black or bloody bowel movements  When should I contact my healthcare provider? You have fever and chills  Your gums bleed when you brush your teeth  You have frequent nosebleeds  You bruise easily  You have red spots on your skin that look like a rash  You have heavy bleeding during your period if you are a woman  You have questions or concerns about your condition or care  CARE AGREEMENT:   You have the right to help plan your care  Learn about your health condition and how it may be treated  Discuss treatment options with your healthcare providers to decide what care you want to receive  You always have the right to refuse treatment  The above information is an  only  It is not intended as medical advice for individual conditions or treatments  Talk to your doctor, nurse or pharmacist before following any medical regimen to see if it is safe and effective for you  © Copyright [employer] 2022 Information is for End User's use only and may not be sold, redistributed or otherwise used for commercial purposes   All illustrations and images included in CareNotes® are the copyrighted property of A D A M , Inc  or [address] [place] [address]

## 2022-06-21 NOTE — TELEPHONE ENCOUNTER
Call placed to the patient per [facility] referral     After explanation of the program the patient is refusing at this time  States he is already receiving care for his back with another provider  Referral Closed

## 2022-06-21 NOTE — ASSESSMENT & PLAN NOTE
Patient currently in pain reports pain scale 7/10  Currently adjusted on his recent medications with Dr Ayah Hartley current lead taking Cardura XL 4 mg p o  Daily, furosemide 20 mg p o  Daily, patient admitted on low-sodium diet and exercise 3-5 times per week for at least 75 minutes of cardiovascular activity  Will recheck BP next office visit  Currently /110  Patient indicates he is in pain

## 2022-06-22 ENCOUNTER — TELEPHONE (OUTPATIENT)
Dept: FAMILY MEDICINE CLINIC | Facility: CLINIC | Age: 57
End: 2022-06-22

## 2022-06-22 ENCOUNTER — TELEPHONE (OUTPATIENT)
Dept: PHYSICAL THERAPY | Facility: OTHER | Age: 57
End: 2022-06-22

## 2022-06-22 ENCOUNTER — HOSPITAL ENCOUNTER (INPATIENT)
Facility: HOSPITAL | Age: 57
LOS: 3 days | Discharge: HOME | DRG: 305 | End: 2022-06-25
Attending: EMERGENCY MEDICINE | Admitting: INTERNAL MEDICINE
Payer: COMMERCIAL

## 2022-06-22 ENCOUNTER — APPOINTMENT (EMERGENCY)
Dept: CT IMAGING | Facility: HOSPITAL | Age: 57
DRG: 305 | End: 2022-06-22
Payer: COMMERCIAL

## 2022-06-22 DIAGNOSIS — M54.2 NECK PAIN ON RIGHT SIDE: ICD-10-CM

## 2022-06-22 DIAGNOSIS — I10 ESSENTIAL HYPERTENSION: ICD-10-CM

## 2022-06-22 DIAGNOSIS — I16.0 HYPERTENSIVE URGENCY: Primary | ICD-10-CM

## 2022-06-22 PROBLEM — E66.01 MORBID OBESITY WITH BMI OF 40.0-44.9, ADULT (HCC): Chronic | Status: ACTIVE | Noted: 2020-08-14

## 2022-06-22 PROBLEM — R60.0 EDEMA OF LEFT LOWER LEG DUE TO PERIPHERAL VENOUS INSUFFICIENCY: Chronic | Status: ACTIVE | Noted: 2020-11-23

## 2022-06-22 PROBLEM — E78.5 HYPERLIPIDEMIA: Chronic | Status: ACTIVE | Noted: 2020-01-10

## 2022-06-22 PROBLEM — I87.2 EDEMA OF LEFT LOWER LEG DUE TO PERIPHERAL VENOUS INSUFFICIENCY: Chronic | Status: ACTIVE | Noted: 2020-11-23

## 2022-06-22 PROBLEM — Z86.718 HISTORY OF DVT (DEEP VEIN THROMBOSIS): Chronic | Status: ACTIVE | Noted: 2020-11-18

## 2022-06-22 LAB
2HR DELTA HS TROPONIN: 2 NG/L
ALBUMIN SERPL BCP-MCNC: 4.2 G/DL (ref 3.5–5)
ALP SERPL-CCNC: 69 U/L (ref 34–104)
ALT SERPL W P-5'-P-CCNC: 16 U/L (ref 7–52)
ANION GAP SERPL CALCULATED.3IONS-SCNC: 6 MMOL/L (ref 4–13)
APTT PPP: 31 SECONDS (ref 23–37)
AST SERPL W P-5'-P-CCNC: 20 U/L (ref 13–39)
BASOPHILS # BLD AUTO: 0.06 THOUSANDS/ΜL (ref 0–0.1)
BASOPHILS NFR BLD AUTO: 1 % (ref 0–1)
BILIRUB SERPL-MCNC: 0.51 MG/DL (ref 0.2–1)
BUN SERPL-MCNC: 22 MG/DL (ref 5–25)
CALCIUM SERPL-MCNC: 9.9 MG/DL (ref 8.4–10.2)
CARDIAC TROPONIN I PNL SERPL HS: 10 NG/L
CARDIAC TROPONIN I PNL SERPL HS: 12 NG/L
CHLORIDE SERPL-SCNC: 104 MMOL/L (ref 96–108)
CO2 SERPL-SCNC: 29 MMOL/L (ref 21–32)
CREAT SERPL-MCNC: 1.17 MG/DL (ref 0.6–1.3)
EOSINOPHIL # BLD AUTO: 0.05 THOUSAND/ΜL (ref 0–0.61)
EOSINOPHIL NFR BLD AUTO: 1 % (ref 0–6)
ERYTHROCYTE [DISTWIDTH] IN BLOOD BY AUTOMATED COUNT: 13.2 % (ref 11.6–15.1)
GFR SERPL CREATININE-BSD FRML MDRD: 68 ML/MIN/1.73SQ M
GLUCOSE SERPL-MCNC: 92 MG/DL (ref 65–140)
HCT VFR BLD AUTO: 41.8 % (ref 36.5–49.3)
HGB BLD-MCNC: 13.8 G/DL (ref 12–17)
IMM GRANULOCYTES # BLD AUTO: 0.02 THOUSAND/UL (ref 0–0.2)
IMM GRANULOCYTES NFR BLD AUTO: 0 % (ref 0–2)
INR PPP: 0.93 (ref 0.84–1.19)
LYMPHOCYTES # BLD AUTO: 2.46 THOUSANDS/ΜL (ref 0.6–4.47)
LYMPHOCYTES NFR BLD AUTO: 32 % (ref 14–44)
MCH RBC QN AUTO: 29.6 PG (ref 26.8–34.3)
MCHC RBC AUTO-ENTMCNC: 33 G/DL (ref 31.4–37.4)
MCV RBC AUTO: 90 FL (ref 82–98)
MONOCYTES # BLD AUTO: 0.84 THOUSAND/ΜL (ref 0.17–1.22)
MONOCYTES NFR BLD AUTO: 11 % (ref 4–12)
NEUTROPHILS # BLD AUTO: 4.38 THOUSANDS/ΜL (ref 1.85–7.62)
NEUTS SEG NFR BLD AUTO: 55 % (ref 43–75)
NRBC BLD AUTO-RTO: 0 /100 WBCS
PLATELET # BLD AUTO: 160 THOUSANDS/UL (ref 149–390)
PMV BLD AUTO: 11.3 FL (ref 8.9–12.7)
POTASSIUM SERPL-SCNC: 4 MMOL/L (ref 3.5–5.3)
PROT SERPL-MCNC: 7.7 G/DL (ref 6.4–8.4)
PROTHROMBIN TIME: 12.5 SECONDS (ref 11.6–14.5)
RBC # BLD AUTO: 4.67 MILLION/UL (ref 3.88–5.62)
SODIUM SERPL-SCNC: 139 MMOL/L (ref 135–147)
WBC # BLD AUTO: 7.81 THOUSAND/UL (ref 4.31–10.16)

## 2022-06-22 PROCEDURE — 85610 PROTHROMBIN TIME: CPT | Performed by: EMERGENCY MEDICINE

## 2022-06-22 PROCEDURE — 93005 ELECTROCARDIOGRAM TRACING: CPT

## 2022-06-22 PROCEDURE — 36415 COLL VENOUS BLD VENIPUNCTURE: CPT

## 2022-06-22 PROCEDURE — 99285 EMERGENCY DEPT VISIT HI MDM: CPT

## 2022-06-22 PROCEDURE — 70450 CT HEAD/BRAIN W/O DYE: CPT

## 2022-06-22 PROCEDURE — 99285 EMERGENCY DEPT VISIT HI MDM: CPT | Performed by: EMERGENCY MEDICINE

## 2022-06-22 PROCEDURE — 85025 COMPLETE CBC W/AUTO DIFF WBC: CPT | Performed by: EMERGENCY MEDICINE

## 2022-06-22 PROCEDURE — 96375 TX/PRO/DX INJ NEW DRUG ADDON: CPT

## 2022-06-22 PROCEDURE — 85730 THROMBOPLASTIN TIME PARTIAL: CPT | Performed by: EMERGENCY MEDICINE

## 2022-06-22 PROCEDURE — 96374 THER/PROPH/DIAG INJ IV PUSH: CPT

## 2022-06-22 PROCEDURE — 96376 TX/PRO/DX INJ SAME DRUG ADON: CPT

## 2022-06-22 PROCEDURE — 80053 COMPREHEN METABOLIC PANEL: CPT | Performed by: EMERGENCY MEDICINE

## 2022-06-22 PROCEDURE — 84484 ASSAY OF TROPONIN QUANT: CPT | Performed by: EMERGENCY MEDICINE

## 2022-06-22 RX ORDER — HYDRALAZINE HYDROCHLORIDE 20 MG/ML
5 INJECTION INTRAMUSCULAR; INTRAVENOUS ONCE
Status: COMPLETED | OUTPATIENT
Start: 2022-06-22 | End: 2022-06-22

## 2022-06-22 RX ORDER — HYDRALAZINE HYDROCHLORIDE 20 MG/ML
10 INJECTION INTRAMUSCULAR; INTRAVENOUS ONCE
Status: COMPLETED | OUTPATIENT
Start: 2022-06-22 | End: 2022-06-22

## 2022-06-22 RX ADMIN — HYDRALAZINE HYDROCHLORIDE 5 MG: 20 INJECTION, SOLUTION INTRAMUSCULAR; INTRAVENOUS at 19:23

## 2022-06-22 RX ADMIN — SODIUM NITROPRUSSIDE 0.3 MCG/KG/MIN: 50 INJECTION INTRAVENOUS at 22:13

## 2022-06-22 RX ADMIN — HYDRALAZINE HYDROCHLORIDE 10 MG: 20 INJECTION, SOLUTION INTRAMUSCULAR; INTRAVENOUS at 20:22

## 2022-06-22 NOTE — TELEPHONE ENCOUNTER
Patient has recently seen his Cardiologist Dr Geoffrey Aguirre who recently adjusted his hypertension medications  Please have him contact Cardiology and see if they would like to adjust his medications at this time  As well as he is at on prednisone at this time, which may be elevating his blood pressure

## 2022-06-22 NOTE — LETTER
[address] [address]  Dept: [phone]    June 25, 2022     Patient: Estella Do   YOB: 1965   Date of Visit: 6/22/2022       To Whom it May Concern: Tyler Kanner is under my professional care  He was seen in the hospital from 6/22/2022   to 06/25/22  He may return to work on June 27, 2022 without limitations  If you have any questions or concerns, please don't hesitate to call           Sincerely,          Dami Villegas MD

## 2022-06-22 NOTE — TELEPHONE ENCOUNTER
Call placed to the patient per [facility] referral     This is the 2nd referral placed for this same issue  I spoke with the patient 6/21/22 and he stated he was currently seeing a provider for this problem and was not interested in our program     Message left on his VM (he did identify himself on VM) that a 2nd referral was placed and that if he decided to go through our program he may calls us back  Our ph# and business hrs were provided      Referral Closed since it is a duplicate referral

## 2022-06-22 NOTE — TELEPHONE ENCOUNTER
Pt LM, stating that his BP has not gone down at all since yesterday  He wanted to let you know that he has left work and is going to the [employer]

## 2022-06-23 ENCOUNTER — APPOINTMENT (INPATIENT)
Dept: NON INVASIVE DIAGNOSTICS | Facility: HOSPITAL | Age: 57
DRG: 305 | End: 2022-06-23
Payer: COMMERCIAL

## 2022-06-23 ENCOUNTER — TELEPHONE (OUTPATIENT)
Dept: PAIN MEDICINE | Facility: CLINIC | Age: 57
End: 2022-06-23

## 2022-06-23 LAB
ALBUMIN SERPL BCP-MCNC: 3.8 G/DL (ref 3.5–5)
ALP SERPL-CCNC: 61 U/L (ref 34–104)
ALT SERPL W P-5'-P-CCNC: 14 U/L (ref 7–52)
ANION GAP SERPL CALCULATED.3IONS-SCNC: 6 MMOL/L (ref 4–13)
AST SERPL W P-5'-P-CCNC: 19 U/L (ref 13–39)
ATRIAL RATE: 50 BPM
ATRIAL RATE: 56 BPM
BASOPHILS # BLD AUTO: 0.05 THOUSANDS/ΜL (ref 0–0.1)
BASOPHILS NFR BLD AUTO: 1 % (ref 0–1)
BILIRUB SERPL-MCNC: 0.58 MG/DL (ref 0.2–1)
BUN SERPL-MCNC: 19 MG/DL (ref 5–25)
CALCIUM SERPL-MCNC: 9.2 MG/DL (ref 8.4–10.2)
CHLORIDE SERPL-SCNC: 104 MMOL/L (ref 96–108)
CO2 SERPL-SCNC: 29 MMOL/L (ref 21–32)
CREAT SERPL-MCNC: 1.27 MG/DL (ref 0.6–1.3)
EOSINOPHIL # BLD AUTO: 0.12 THOUSAND/ΜL (ref 0–0.61)
EOSINOPHIL NFR BLD AUTO: 2 % (ref 0–6)
ERYTHROCYTE [DISTWIDTH] IN BLOOD BY AUTOMATED COUNT: 13.5 % (ref 11.6–15.1)
GFR SERPL CREATININE-BSD FRML MDRD: 61 ML/MIN/1.73SQ M
GLUCOSE SERPL-MCNC: 112 MG/DL (ref 65–140)
HCT VFR BLD AUTO: 40.8 % (ref 36.5–49.3)
HGB BLD-MCNC: 13.7 G/DL (ref 12–17)
IMM GRANULOCYTES # BLD AUTO: 0.02 THOUSAND/UL (ref 0–0.2)
IMM GRANULOCYTES NFR BLD AUTO: 0 % (ref 0–2)
LYMPHOCYTES # BLD AUTO: 2.05 THOUSANDS/ΜL (ref 0.6–4.47)
LYMPHOCYTES NFR BLD AUTO: 31 % (ref 14–44)
MAGNESIUM SERPL-MCNC: 1.5 MG/DL (ref 1.9–2.7)
MCH RBC QN AUTO: 30.2 PG (ref 26.8–34.3)
MCHC RBC AUTO-ENTMCNC: 33.6 G/DL (ref 31.4–37.4)
MCV RBC AUTO: 90 FL (ref 82–98)
MONOCYTES # BLD AUTO: 0.89 THOUSAND/ΜL (ref 0.17–1.22)
MONOCYTES NFR BLD AUTO: 14 % (ref 4–12)
NEUTROPHILS # BLD AUTO: 3.4 THOUSANDS/ΜL (ref 1.85–7.62)
NEUTS SEG NFR BLD AUTO: 52 % (ref 43–75)
NRBC BLD AUTO-RTO: 0 /100 WBCS
P AXIS: -24 DEGREES
P AXIS: -3 DEGREES
PHOSPHATE SERPL-MCNC: 3.3 MG/DL (ref 2.7–4.5)
PLATELET # BLD AUTO: 149 THOUSANDS/UL (ref 149–390)
PMV BLD AUTO: 11.6 FL (ref 8.9–12.7)
POTASSIUM SERPL-SCNC: 3.3 MMOL/L (ref 3.5–5.3)
PR INTERVAL: 0 MS
PR INTERVAL: 164 MS
PROT SERPL-MCNC: 6.8 G/DL (ref 6.4–8.4)
QRS AXIS: 29 DEGREES
QRS AXIS: 35 DEGREES
QRSD INTERVAL: 88 MS
QRSD INTERVAL: 90 MS
QT INTERVAL: 418 MS
QT INTERVAL: 472 MS
QTC INTERVAL: 403 MS
QTC INTERVAL: 422 MS
RBC # BLD AUTO: 4.54 MILLION/UL (ref 3.88–5.62)
SODIUM SERPL-SCNC: 139 MMOL/L (ref 135–147)
T WAVE AXIS: 18 DEGREES
T WAVE AXIS: 20 DEGREES
VENTRICULAR RATE: 48 BPM
VENTRICULAR RATE: 56 BPM
WBC # BLD AUTO: 6.53 THOUSAND/UL (ref 4.31–10.16)

## 2022-06-23 PROCEDURE — 93010 ELECTROCARDIOGRAM REPORT: CPT | Performed by: INTERNAL MEDICINE

## 2022-06-23 PROCEDURE — 80053 COMPREHEN METABOLIC PANEL: CPT | Performed by: INTERNAL MEDICINE

## 2022-06-23 PROCEDURE — 82088 ASSAY OF ALDOSTERONE: CPT

## 2022-06-23 PROCEDURE — 99223 1ST HOSP IP/OBS HIGH 75: CPT | Performed by: INTERNAL MEDICINE

## 2022-06-23 PROCEDURE — 85025 COMPLETE CBC W/AUTO DIFF WBC: CPT | Performed by: INTERNAL MEDICINE

## 2022-06-23 PROCEDURE — 84244 ASSAY OF RENIN: CPT

## 2022-06-23 PROCEDURE — 93005 ELECTROCARDIOGRAM TRACING: CPT

## 2022-06-23 PROCEDURE — 84100 ASSAY OF PHOSPHORUS: CPT | Performed by: INTERNAL MEDICINE

## 2022-06-23 PROCEDURE — 83735 ASSAY OF MAGNESIUM: CPT | Performed by: INTERNAL MEDICINE

## 2022-06-23 RX ORDER — DIPHENOXYLATE HYDROCHLORIDE AND ATROPINE SULFATE 2.5; .025 MG/1; MG/1
1 TABLET ORAL DAILY
Status: DISCONTINUED | OUTPATIENT
Start: 2022-06-23 | End: 2022-06-23 | Stop reason: SDUPTHER

## 2022-06-23 RX ORDER — DOXAZOSIN MESYLATE 1 MG/1
2 TABLET ORAL ONCE
Status: COMPLETED | OUTPATIENT
Start: 2022-06-23 | End: 2022-06-23

## 2022-06-23 RX ORDER — POTASSIUM CHLORIDE 20 MEQ/1
40 TABLET, EXTENDED RELEASE ORAL ONCE
Status: COMPLETED | OUTPATIENT
Start: 2022-06-23 | End: 2022-06-23

## 2022-06-23 RX ORDER — MAGNESIUM SULFATE HEPTAHYDRATE 40 MG/ML
4 INJECTION, SOLUTION INTRAVENOUS ONCE
Status: COMPLETED | OUTPATIENT
Start: 2022-06-23 | End: 2022-06-23

## 2022-06-23 RX ORDER — ACETAMINOPHEN 325 MG/1
975 TABLET ORAL EVERY 8 HOURS SCHEDULED
Status: DISCONTINUED | OUTPATIENT
Start: 2022-06-23 | End: 2022-06-25 | Stop reason: HOSPADM

## 2022-06-23 RX ORDER — ENOXAPARIN SODIUM 100 MG/ML
40 INJECTION SUBCUTANEOUS DAILY
Status: DISCONTINUED | OUTPATIENT
Start: 2022-06-23 | End: 2022-06-23

## 2022-06-23 RX ORDER — MELOXICAM 7.5 MG/1
15 TABLET ORAL DAILY
Status: DISCONTINUED | OUTPATIENT
Start: 2022-06-23 | End: 2022-06-23

## 2022-06-23 RX ORDER — METHOCARBAMOL 500 MG/1
500 TABLET, FILM COATED ORAL EVERY 6 HOURS PRN
Status: DISCONTINUED | OUTPATIENT
Start: 2022-06-23 | End: 2022-06-25 | Stop reason: HOSPADM

## 2022-06-23 RX ORDER — FUROSEMIDE 10 MG/ML
20 INJECTION INTRAMUSCULAR; INTRAVENOUS ONCE
Status: COMPLETED | OUTPATIENT
Start: 2022-06-23 | End: 2022-06-23

## 2022-06-23 RX ORDER — WARFARIN SODIUM 7.5 MG/1
7.5 TABLET ORAL
Status: COMPLETED | OUTPATIENT
Start: 2022-06-23 | End: 2022-06-23

## 2022-06-23 RX ORDER — ACETAMINOPHEN 325 MG/1
975 TABLET ORAL DAILY
Status: DISCONTINUED | OUTPATIENT
Start: 2022-06-23 | End: 2022-06-23

## 2022-06-23 RX ORDER — GABAPENTIN 300 MG/1
300 CAPSULE ORAL 3 TIMES DAILY
Status: DISCONTINUED | OUTPATIENT
Start: 2022-06-23 | End: 2022-06-25 | Stop reason: HOSPADM

## 2022-06-23 RX ORDER — POTASSIUM CHLORIDE 14.9 MG/ML
20 INJECTION INTRAVENOUS
Status: COMPLETED | OUTPATIENT
Start: 2022-06-23 | End: 2022-06-23

## 2022-06-23 RX ORDER — HYDRALAZINE HYDROCHLORIDE 25 MG/1
50 TABLET, FILM COATED ORAL EVERY 8 HOURS SCHEDULED
Status: DISCONTINUED | OUTPATIENT
Start: 2022-06-23 | End: 2022-06-25 | Stop reason: HOSPADM

## 2022-06-23 RX ADMIN — SODIUM NITROPRUSSIDE 0.4 MCG/KG/MIN: 50 INJECTION INTRAVENOUS at 07:21

## 2022-06-23 RX ADMIN — GABAPENTIN 300 MG: 300 CAPSULE ORAL at 21:33

## 2022-06-23 RX ADMIN — POTASSIUM CHLORIDE 40 MEQ: 1500 TABLET, EXTENDED RELEASE ORAL at 07:21

## 2022-06-23 RX ADMIN — POTASSIUM CHLORIDE 20 MEQ: 14.9 INJECTION, SOLUTION INTRAVENOUS at 09:00

## 2022-06-23 RX ADMIN — GABAPENTIN 300 MG: 300 CAPSULE ORAL at 08:33

## 2022-06-23 RX ADMIN — GABAPENTIN 300 MG: 300 CAPSULE ORAL at 15:05

## 2022-06-23 RX ADMIN — ACETAMINOPHEN 975 MG: 325 TABLET, FILM COATED ORAL at 13:03

## 2022-06-23 RX ADMIN — FUROSEMIDE 20 MG: 10 INJECTION, SOLUTION INTRAMUSCULAR; INTRAVENOUS at 01:44

## 2022-06-23 RX ADMIN — DOXAZOSIN 2 MG: 1 TABLET ORAL at 14:36

## 2022-06-23 RX ADMIN — MULTIPLE VITAMINS W/ MINERALS TAB 1 TABLET: TAB ORAL at 08:33

## 2022-06-23 RX ADMIN — ENOXAPARIN SODIUM 40 MG: 40 INJECTION SUBCUTANEOUS at 08:33

## 2022-06-23 RX ADMIN — HYDRALAZINE HYDROCHLORIDE 50 MG: 25 TABLET ORAL at 17:40

## 2022-06-23 RX ADMIN — WARFARIN SODIUM 7.5 MG: 7.5 TABLET ORAL at 17:03

## 2022-06-23 RX ADMIN — DOXAZOSIN 2 MG: 1 TABLET ORAL at 21:33

## 2022-06-23 RX ADMIN — SODIUM NITROPRUSSIDE 0.4 MCG/KG/MIN: 50 INJECTION INTRAVENOUS at 22:37

## 2022-06-23 RX ADMIN — POTASSIUM CHLORIDE 20 MEQ: 14.9 INJECTION, SOLUTION INTRAVENOUS at 07:22

## 2022-06-23 RX ADMIN — MAGNESIUM SULFATE HEPTAHYDRATE 4 G: 40 INJECTION, SOLUTION INTRAVENOUS at 07:30

## 2022-06-23 NOTE — CASE MANAGEMENT
12 hour chart check completed.   Case Management Assessment & Discharge Planning Note    Patient name Mango Smith  Location ICU 06/ICU 06 MRN [id number]  : 1965 Date 2022       Current Admission Date: 2022  Current Admission Diagnosis:Hypertensive urgency   Patient Active Problem List    Diagnosis Date Noted    Hypertensive urgency 2022    Neck pain on right side 2022    Anticoagulation adequate with anticoagulant therapy 2021    Rash 2021    Excessive cerumen in ear canal, bilateral 2021    BRUCE (obstructive sleep apnea)     Status post right knee replacement 2020    Abscess of skin of left knee 2020    Preoperative clearance 2020    Edema of left lower leg due to peripheral venous insufficiency 2020    Tooth decay 2020    Lymphadenopathy 2020    History of DVT (deep vein thrombosis) 2020    CKD (chronic kidney disease) 2020    Elevated serum creatinine 2020    Low testosterone 2020    Cellulitis of left lower extremity 2020    Itchy skin 2020    Vasculitis ([facility] [address] [facility] ) 2020    Morbid obesity with BMI of 40 0-44 9, adult ([facility] ) 2020    Febrile illness, acute 2020    Bilateral lower extremity edema 2020    Leg DVT (deep venous thromboembolism), chronic, right ([facility] [address] [facility] ) 2020    Prostate cancer screening 2020    ACE inhibitor-aggravated angioedema, sequela 2020    Anticoagulation goal of INR 2 to 3 2020    Acute deep vein thrombosis (DVT) of tibial vein ([facility] ) 2020    Cellulitis of right lower extremity 2020    NSTEMI (non-ST elevated myocardial infarction) ([facility] [facility] ) 01/10/2020    Hyperlipidemia 01/10/2020    Anemia 01/10/2020    Coronary artery disease involving native heart without angina pectoris 01/10/2020    Encounter for follow-up 2019    Follow-up examination 2019    Acute left-sided low back pain without sciatica 2019  S/P cardiac cath 11/06/19 11/08/2019    Stomach ulcer 11/08/2019    Iron deficiency anemia due to chronic blood loss 11/08/2019    ZAKIA (acute kidney injury) ([facility] [address] [facility] ) 11/08/2019    Hypertension 11/06/2019    Acute left ankle pain 11/06/2019    Acute pain of left shoulder 11/06/2019    Left ankle sprain 11/06/2019    Elevated troponin 11/04/2019      LOS (days): 1  Geometric Mean LOS (GMLOS) (days):   Days to GMLOS:     OBJECTIVE:    Risk of Unplanned Readmission Score: 8 93         Current admission status: Inpatient  Referral Reason: Other ([place])    Preferred Pharmacy:   [address] [place] [id number] [facility]  [address] [facility]  [address]  Phone: [phone] Fax: [phone]    [address] [place] [address]  Phone: [phone] Fax: [phone]    Primary Care Provider: MAX Smith    Primary Insurance: AETMARILIN  Secondary Insurance:     ASSESSMENT:  [address] Proxies    There are no active Health Care Proxies on file  Advance Directives  Does patient have a [address] [facility] [address]?: No  Was patient offered paperwork?: Yes (Declined)  Does patient currently have a Health Care decision maker?: Yes, please see Health Care Proxy section Geri Person (Daughter))  Does patient have Advance Directives?: No  Was patient offered paperwork?: Yes (Declined)  Primary Contact: Geri Person (Daughter)         Readmission Root Cause  30 Day Readmission: No    Patient Information  Admitted from[de-identified] Home  Mental Status: Alert  During Assessment patient was accompanied by: Not accompanied during assessment  Assessment information provided by[de-identified] Patient  Primary Caregiver: Self  Support Systems: Self, Family members, Daughter  [place] of Residence: [address] [place] [address] do you live in?: [place] entry access options   Select all that apply : Stairs  Number of steps to enter home : 3  Do the steps have railings?: Yes  Type of Current Residence: Apartment  Floor Level: 1  Upon entering residence, is there a bedroom on the main floor (no further steps)?: Yes  Upon entering residence, is there a bathroom on the main floor (no further steps)?: Yes  In the last 12 months, was there a time when you were not able to pay the mortgage or rent on time?: No  In the last 12 months, how many places have you lived?: 1  In the last 12 months, was there a time when you did not have a steady place to sleep or slept in a shelter (including now)?: No  Homeless/housing insecurity resource given?: N/A  Living Arrangements: Lives Alone  Is patient a ?: No    Activities of Daily Living Prior to Admission  Functional Status: Independent  Completes ADLs independently?: Yes  Ambulates independently?: Yes  Does patient use assisted devices?: No  Does patient currently own DME?: No  Does patient have a history of Outpatient Therapy (PT/OT)?: No  Does the patient have a history of Short-Term Rehab?: No  Does patient have a history of HHC?: Yes  Does patient currently have [facility]?: No         Patient Information Continued  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)  Does patient have prescription coverage?: Yes (No issues getting or affording her medications)  Within the past 12 months, you worried that your food would run out before you got the money to buy more : Never true  Within the past 12 months, the food you bought just didn't last and you didn't have money to get more : Never true  Food insecurity resource given?: N/A  Does patient receive dialysis treatments?: No  Does patient have a history of substance abuse?: No  Does patient have a history of Mental Health Diagnosis?: No         Means of Transportation  Means of Transport to Appts[de-identified] Drives Self  In the past 12 months, has lack of transportation kept you from medical appointments or from getting medications?: No  In the past 12 months, has lack of transportation kept you from meetings, work, or from getting things needed for daily living?: No  Was application for public transport provided?: N/A        DISCHARGE DETAILS:    Discharge planning discussed with[de-identified] Patient  Freedom of Choice: Yes  Comments - Freedom of Choice: No current CM DC recommendation discussed or identifed  CM contacted family/caregiver?: No- see comments (Discussed with patient plan of care and discharge planning, when asked to update friends or family members patient politely declined )             Contacts  Patient Contacts: Patient  Relationship to Patient[de-identified] Other (Comment)  Contact Method: In Person  Reason/Outcome: Discharge Planning, Continuity of Care            No needs are identified at the time of the initial assessment,  care manager will respond upon request or reassess patient for discharge needs as appropriate  CM reviewed discharge planning process including the following: identifying caregivers at home, preference for d/c planning needs, [facility] Meds to Bed program, availability of treatment team to discuss questions or concerns patient and/or family may have regarding diagnosis, plan of care, old or new medications and discharge planning  CM will continue to follow for care coordination and update assessment as necessary

## 2022-06-23 NOTE — UTILIZATION REVIEW
Initial Clinical Review    Admission: Date/Time/Statement:   Admission Orders (From admission, onward)     Ordered        06/22/22 2217  Inpatient Admission  Once                      Orders Placed This Encounter   Procedures    Inpatient Admission     Standing Status:   Standing     Number of Occurrences:   1     Order Specific Question:   Level of Care     Answer:   Critical Care [15]     Order Specific Question:   Estimated length of stay     Answer:   More than 2 Midnights     Order Specific Question:   Certification     Answer:   I certify that inpatient services are medically necessary for this patient for a duration of greater than two midnights  See H&P and MD Progress Notes for additional information about the patient's course of treatment  ED Arrival Information     Expected   -    Arrival   6/22/2022 15:38    Acuity   Urgent            Means of arrival   Walk-In    Escorted by   Self    Service   Critical Care/ICU    Admission type   Urgent            Arrival complaint   Hypertension            Chief Complaint   Patient presents with    Hypertension     Pt to ED with c/o elevated BP, pt complains of a "sore head"       Initial Presentation: 60 y o  male from home to ED admitted inpatient due to Hypertensive Urgency  Medication non compliance  Presented due to hypertension with unknown time frame  Has not been taking antihypertensives for last month  Today tried his old Carvedilol for systolic BP of 421 and did not improve so came to ED  States BP was high at PCP 2 days ago  On exam hypertensive  In the ED given IV apresoline 5 mg and 10 mg, started on Nipride  Plan is continue Nipride, consult cardiology  Check echo  Date: 6/23/22   Day 2:  Admits was started on Cardura as OP but stopped 3 days ago when BP elevated  Is allergic to amlodipine and lisinopril   telemetry sinus bradycardia  On exam Cervical tenderness  Plan is continue Nipride       6/23/22 per Cardiology - patient with hypertensive urgency and etiology could be due to medication non compliance vs resistant hypertension  On Nipride for goal BP of 160 - 180  Plan is restart home Cardura and wean Nipride as able  Date: 6/24/22   Day 3:  Feels ok, neck discomfort  yesterday afternoon started on Cardura and Hydralazine and continued on nipride gtt  On exam: obese  Bradycardic  Trace bilateral LE edema  Plan is wean nipride as able  Continue Cardura and hydralazine, Add Imdur        ED Triage Vitals   Temperature Pulse Respirations Blood Pressure SpO2   06/22/22 1642 06/22/22 1642 06/22/22 1642 06/22/22 1642 06/22/22 1642   97 7 °F (36 5 °C) (!) 50 18 (!) 222/110 100 %      Temp Source Heart Rate Source Patient Position - Orthostatic VS BP Location FiO2 (%)   06/22/22 1642 06/22/22 1642 06/22/22 1642 06/22/22 1642 --   Oral Monitor Sitting Left arm       Pain Score       06/22/22 2343       No Pain          Wt Readings from Last 1 Encounters:   06/24/22 126 kg (278 lb)     Additional Vital Signs:   06/23/22 1400 -- 59 25 Abnormal  144/80 106 97 % -- --   06/23/22 1300 -- 68 30 Abnormal  132/82 101 96 % -- --   06/23/22 1200 -- 56 10 Abnormal  184/93 Abnormal  131 98 % -- --   06/23/22 1130 97 5 °F (36 4 °C) 63 24 Abnormal  171/79 Abnormal  113 99 % -- --   06/23/22 1100 -- 66 23 Abnormal  146/70 101 99 % -- --   06/23/22 1000 -- 59 20 182/88 Abnormal  126 100 % -- --   06/23/22 0900 -- 57 32 Abnormal  165/87 116 99 % -- --   06/23/22 0736 97 6 °F (36 4 °C) 56 22 124/66 80 94 % -- Lying   06/23/22 0700 -- 53 Abnormal  16 145/81 105 97 % -- --   06/23/22 0605 -- -- -- 170/84 120 -- -- --   06/23/22 0525 -- -- -- 164/77 111 -- -- --   06/23/22 0405 -- -- -- 139/70 97 -- -- --   06/23/22 0305 -- 58 14 139/71 97 96 % -- --   06/23/22 0205 -- 63 21 153/116 Abnormal  130 99 % -- --   06/23/22 0035 97 7 °F (36 5 °C) 45 Abnormal  17 169/91 124 96 % None (Room air) Lying   06/23/22 0015 -- 54 Abnormal  -- 159/79 110 97 % None (Room air) Lying   06/22/22 2300 -- 52 Abnormal  16 173/83 Abnormal  118 97 % None (Room air) Lying   06/22/22 2200 -- 54 Abnormal  18 210/102 Abnormal  146 100 % None (Room air) Lying   06/22/22 2100 -- 58 18 195/95 Abnormal  -- 100 % None (Room air) Lying       Pertinent Labs/Diagnostic Test Results:   CT head without contrast   Final Result by Ne Aviles DO (06/22 2140)      No acute intracranial abnormality  Workstation performed: [id number]           6/22/22 ecg Marked sinus bradycardia  Abnormal ECG  When compared with ECG of 18-AUG-2020 20:42,  No significant change was found    6/23/22 ecg Sinus bradycardia  Otherwise normal ECG    6/24/22 echo Left Ventricle: Left ventricular cavity size is normal  Wall thickness is mildly increased  The left ventricular ejection fraction is 70%  Systolic function is vigorous  Wall motion is normal  Diastolic function is mildly abnormal, consistent with grade I (abnormal) relaxation      Results from last 7 days   Lab Units 06/24/22  0527 06/23/22  0508 06/22/22  1651   WBC Thousand/uL 5 68 6 53 7 81   HEMOGLOBIN g/dL 13 8 13 7 13 8   HEMATOCRIT % 41 8 40 8 41 8   PLATELETS Thousands/uL 148* 149 160   NEUTROS ABS Thousands/µL  --  3 40 4 38     Results from last 7 days   Lab Units 06/24/22  0527 06/23/22  0508 06/22/22  1651   SODIUM mmol/L 136 139 139   POTASSIUM mmol/L 4 4 3 3* 4 0   CHLORIDE mmol/L 103 104 104   CO2 mmol/L 29 29 29   ANION GAP mmol/L 4 6 6   BUN mg/dL 18 19 22   CREATININE mg/dL 1 21 1 27 1 17   EGFR ml/min/1 73sq m 65 61 68   CALCIUM mg/dL 9 2 9 2 9 9   MAGNESIUM mg/dL 1 9 1 5*  --    PHOSPHORUS mg/dL 3 0 3 3  --      Results from last 7 days   Lab Units 06/23/22  0508 06/22/22  1651   AST U/L 19 20   ALT U/L 14 16   ALK PHOS U/L 61 69   TOTAL PROTEIN g/dL 6 8 7 7   ALBUMIN g/dL 3 8 4 2   TOTAL BILIRUBIN mg/dL 0 58 0 51     Results from last 7 days   Lab Units 06/24/22  0527 06/23/22  0508 06/22/22  1651   GLUCOSE RANDOM mg/dL 96 112 92 Results from last 7 days   Lab Units 06/22/22  1921 06/22/22  1651   HS TNI 0HR ng/L  --  10   HS TNI 2HR ng/L 12  --    HSTNI D2 ng/L 2  --      Results from last 7 days   Lab Units 06/24/22  0527 06/22/22  1651 06/21/22  1512   PROTIME seconds 13 2 12 5 12 9   INR  1 00 0 93 0 97   PTT seconds  --  31  --        ED Treatment:   Medication Administration from 06/22/2022 1538 to 06/23/2022 0035       Date/Time Order Dose Route Action Comments     06/22/2022 1923 hydrALAZINE (APRESOLINE) injection 5 mg 5 mg Intravenous Given      06/22/2022 2022 hydrALAZINE (APRESOLINE) injection 10 mg 10 mg Intravenous Given      06/22/2022 2345 nitroPRUSside (NIPRIDE) 50,000 mcg in dextrose 5 % 250 mL infusion 0 8 mcg/kg/min Intravenous Rate/Dose Change      06/22/2022 2305 nitroPRUSside (NIPRIDE) 50,000 mcg in dextrose 5 % 250 mL infusion 0 7 mcg/kg/min Intravenous Rate/Dose Change      06/22/2022 2247 nitroPRUSside (NIPRIDE) 50,000 mcg in dextrose 5 % 250 mL infusion 0 6 mcg/kg/min Intravenous Rate/Dose Change      06/22/2022 2235 nitroPRUSside (NIPRIDE) 50,000 mcg in dextrose 5 % 250 mL infusion 0 5 mcg/kg/min Intravenous Rate/Dose Change      06/22/2022 2222 nitroPRUSside (NIPRIDE) 50,000 mcg in dextrose 5 % 250 mL infusion 0 4 mcg/kg/min Intravenous Rate/Dose Change      06/22/2022 2213 nitroPRUSside (NIPRIDE) 50,000 mcg in dextrose 5 % 250 mL infusion 0 3 mcg/kg/min Intravenous New Bag         Past Medical History:   Diagnosis Date    Angioedema     Coronary artery disease     GI bleed     Hypertension     stopped his meds when ran out several years ago    STEMI (ST elevation myocardial infarction) ([facility] [address] )      Present on Admission:   Hyperlipidemia   Edema of left lower leg due to peripheral venous insufficiency   Neck pain on right side      Admitting Diagnosis: Hypertension [I10]  Hypertensive urgency [I16 0]  Age/Sex: 60 y o  male  Admission Orders:  Scheduled Medications:  acetaminophen, 975 mg, Oral, Q8H [address]  gabapentin, 300 mg, Oral, TID  multivitamin-minerals, 1 tablet, Oral, Daily  warfarin, 7 5 mg, Oral, Once (warfarin) 6/23 and 6/24     doxazosin (CARDURA) tablet 2 mg  Dose: 2 mg  Freq: Once Route: PO  Start: 06/23/22 2200 End: 06/23/22 2133    doxazosin (CARDURA) tablet 2 mg  Dose: 2 mg  Freq: Once Route: PO  Start: 06/23/22 1445 End: 06/23/22 1436    doxazosin (CARDURA) tablet 2 mg  Dose: 2 mg  Freq: 2 times daily Route: PO  Start: 06/24/22 0900    hydrALAZINE (APRESOLINE) tablet 50 mg  Dose: 50 mg  Freq: Every 8 hours scheduled Route: PO  Start: 06/23/22 1730    isosorbide mononitrate (IMDUR) 24 hr tablet 30 mg  Dose: 30 mg  Freq: Daily Route: PO  Start: 06/24/22 0915    Continuous IV Infusions:  nitroPRUSside (NIPRIDE) 250 mL infusion, 0 1-2 mcg/kg/min, Intravenous, Titrated      PRN Meds:  methocarbamol, 500 mg, Oral, Q6H PRN - used x 1 6/24      IP CONSULT TO CARDIOLOGY    Network Utilization Review Department  ATTENTION: Please call with any questions or concerns to [phone] and carefully listen to the prompts so that you are directed to the right person  All voicemails are confidential   Pauline Muss all requests for admission clinical reviews, approved or denied determinations and any other requests to dedicated fax number below belonging to the campus where the patient is receiving treatment   List of dedicated fax numbers for the Facilities:  [address] DENIALS (Administrative/Medical Necessity) [phone]   [address] (Maternity/NICU/Pediatrics) [phone]   [address] [address] [place] [address] [place] [address] [id number] [facility] [address] [phone]

## 2022-06-23 NOTE — ED ATTENDING ATTESTATION
6/22/2022  IKenny MD, saw and evaluated the patient  I have discussed the patient with the resident/non-physician practitioner and agree with the resident's/non-physician practitioner's findings, Plan of Care, and MDM as documented in the resident's/non-physician practitioner's note, except where noted  All available labs and Radiology studies were reviewed  I was present for key portions of any procedure(s) performed by the resident/non-physician practitioner and I was immediately available to provide assistance  At this point I agree with the current assessment done in the Emergency Department  I have conducted an independent evaluation of this patient a history and physical is as follows:    69-year-old male presenting to emergency department with elevated blood pressures  Having difficulty controlling them as outpatient  Feels tenderness the top of his head  Patient is on Coumadin  No chest pain shortness of breath  No lightheadedness or dizziness      MDM agree with blood work, CT, EKG, will try to control blood pressure with IV medications    ED Course         Critical Care Time  Procedures

## 2022-06-23 NOTE — ASSESSMENT & PLAN NOTE
· Patient has been on coumadin  · INR 0 93  · Continue to hold [facility] in the setting of hypertensive urgency   · Patient reports non compliance

## 2022-06-23 NOTE — ED PROVIDER NOTES
History  Chief Complaint   Patient presents with    Hypertension     Pt to ED with c/o elevated BP, pt complains of a "sore head"     Markie Stack is a 28-year-old male with PMH of CAD with STEMI that presents to the emergency department with persistently elevated blood pressures that he has been taking at home  He reports that he used to take carvedilol but was asked by his primary care doctor to stop about 1 month ago and was going to be changed to a water pill but states that this did not happen and so he has not been taking his blood pressure medication for over 1 month  He reports presenting to the ER at another institution for a pinched nerve in his back roughly 1 week ago and was told that his blood pressure was around 360 systolic  He then states that he went to his primary care provider 2 days ago where he was told his blood pressure was 520 systolic  He was not started on any new medications at either of these visits  He has been taking his blood pressure at home and has been persistently over 870 systolic and took 2 of his old carvedilol this morning without improvement of blood pressure and so he presented to the ED  Notably the patient also takes Coumadin which he has done since   The patient denies any symptoms with his eye blood pressure today including absence of headaches, blurred vision, lightheadedness, chest pain, abdominal pain  Prior to Admission Medications   Prescriptions Last Dose Informant Patient Reported? Taking?    VITAMIN D PO Not Taking at Unknown time  Yes No   Si,000 Units every 24 hours   Patient not taking: No sig reported   acetaminophen (TYLENOL) 500 mg tablet  Self Yes Yes   Sig: Take 1,000 mg by mouth daily   carbamide peroxide (DEBROX) 6 5 % otic solution  Self No Yes   Sig: Administer 5 drops into both ears 2 (two) times a day   doxazosin (CARDURA XL) 4 MG 24 hr tablet Not Taking at Unknown time  No No   Sig: Take 1 tablet (4 mg total) by mouth daily with breakfast   Patient not taking: Reported on 6/22/2022   furosemide (LASIX) 20 mg tablet 6/22/2022 at Unknown time  No Yes   Sig: Take 1 tablet (20 mg total) by mouth daily as needed (swelling of the legs)   gabapentin (Neurontin) 300 mg capsule Not Taking at Unknown time  No No   Sig: Take 1 capsule (300 mg total) by mouth 3 (three) times a day for 14 days For post-herpetic neuralgia: Take 1 tablet on day 1,  Then take 2 tablets on day 2, Then take 3 tablets on day 3 and every day after that as instructed by your doctor  Patient not taking: Reported on 6/22/2022   meloxicam (MOBIC) 15 mg tablet Not Taking at Unknown time  Yes No   Patient not taking: Reported on 6/22/2022   methylPREDNISolone 4 MG tablet therapy pack 6/21/2022 at Unknown time  No Yes   Sig: Use as directed on package   multivitamin [facility]) TABS 6/22/2022 at Unknown time Self Yes Yes   Sig: Take 1 tablet by mouth daily   rosuvastatin (CRESTOR) 5 mg tablet Past Month at Unknown time  No Yes   Sig: Take 1 tablet (5 mg total) by mouth daily   sildenafil (VIAGRA) 25 MG tablet Past Month at Unknown time  No Yes   Sig: TAKE 1 TABLET BY MOUTH DAILY AS NEEDED FOR ERECTILE DYSFUNCTION     warfarin (COUMADIN) 2 5 mg tablet 6/22/2022 at Unknown time  No Yes   Sig: Take 1 tablet (2 5 mg total) by mouth daily   warfarin (COUMADIN) 5 mg tablet 6/22/2022 at Unknown time  No Yes   Sig: TAKE 2 TABLETS BY MOUTH EVERY DAY      Facility-Administered Medications: None       Past Medical History:   Diagnosis Date    Angioedema     Coronary artery disease     GI bleed     Hypertension     stopped his meds when ran out several years ago    STEMI (ST elevation myocardial infarction) [facility])        Past Surgical History:   Procedure Laterality Date    REPLACEMENT TOTAL KNEE Right 12/2020    UPPER GASTROINTESTINAL ENDOSCOPY      Clamped large stomach ulcer       Family History   Adopted: Yes   Family history unknown: Yes     I have reviewed and agree with the history as documented  E-Cigarette/Vaping    E-Cigarette Use Never User      E-Cigarette/Vaping Substances    Nicotine No     THC No     CBD No     Flavoring No     Other No     Unknown No      Social History     Tobacco Use    Smoking status: Never Smoker    Smokeless tobacco: Never Used   Vaping Use    Vaping Use: Never used   Substance Use Topics    Alcohol use: Never     Alcohol/week: 0 0 standard drinks    Drug use: Never        Review of Systems   Constitutional: Negative for chills and fever  HENT: Negative for ear pain and sore throat  Eyes: Negative for pain and visual disturbance  Respiratory: Negative for cough and shortness of breath  Cardiovascular: Negative for chest pain and palpitations  Gastrointestinal: Negative for abdominal pain, constipation, diarrhea, nausea and vomiting  Genitourinary: Negative for dysuria and hematuria  Musculoskeletal: Negative for arthralgias and back pain  Skin: Negative for color change and rash  Neurological: Negative for dizziness, seizures, syncope, light-headedness and headaches  All other systems reviewed and are negative  Physical Exam  ED Triage Vitals [06/22/22 1642]   Temperature Pulse Respirations Blood Pressure SpO2   97 7 °F (36 5 °C) (!) 50 18 (!) 222/110 100 %      Temp Source Heart Rate Source Patient Position - Orthostatic VS BP Location FiO2 (%)   Oral Monitor Sitting Left arm --      Pain Score       --             Orthostatic Vital Signs  Vitals:    06/22/22 2200 06/22/22 2230 06/22/22 2245 06/22/22 2300   BP: (!) 210/102 (!) 189/92 (!) 190/73 (!) 173/83   Pulse: (!) 54 (!) 50 (!) 52 (!) 52   Patient Position - Orthostatic VS: Lying Lying Lying Lying       Physical Exam  Vitals and nursing note reviewed  Constitutional:       General: He is not in acute distress  Appearance: He is well-developed  He is not ill-appearing  HENT:      Head: Normocephalic and atraumatic        Right Ear: External ear normal  Left Ear: External ear normal    Eyes:      Extraocular Movements: Extraocular movements intact  Conjunctiva/sclera: Conjunctivae normal    Cardiovascular:      Rate and Rhythm: Normal rate and regular rhythm  Pulses: Normal pulses  Heart sounds: Normal heart sounds  No murmur heard  Pulmonary:      Effort: Pulmonary effort is normal  No respiratory distress  Breath sounds: Normal breath sounds  No wheezing, rhonchi or rales  Abdominal:      General: Abdomen is flat  Palpations: Abdomen is soft  Tenderness: There is no abdominal tenderness  There is no guarding or rebound  Musculoskeletal:         General: No swelling, tenderness or deformity  Normal range of motion  Cervical back: Normal range of motion and neck supple  No rigidity  Right lower leg: No edema  Left lower leg: No edema  Skin:     General: Skin is warm and dry  Capillary Refill: Capillary refill takes less than 2 seconds  Neurological:      General: No focal deficit present  Mental Status: He is alert and oriented to person, place, and time           ED Medications  Medications   nitroPRUSside (NIPRIDE) 50,000 mcg in dextrose 5 % 250 mL infusion (0 7 mcg/kg/min × 129 kg Intravenous Rate/Dose Change 6/22/22 2305)   hydrALAZINE (APRESOLINE) injection 5 mg (5 mg Intravenous Given 6/22/22 1923)   hydrALAZINE (APRESOLINE) injection 10 mg (10 mg Intravenous Given 6/22/22 2022)       Diagnostic Studies  Results Reviewed     Procedure Component Value Units Date/Time    HS Troponin I 2hr [[id number]]  (Normal) Collected: 06/22/22 1921    Lab Status: Final result Specimen: Blood from Arm, Right Updated: 06/22/22 1953     hs TnI 2hr 12 ng/L      Delta 2hr hsTnI 2 ng/L     Protime-INR [[id number]]  (Normal) Collected: 06/22/22 1651    Lab Status: Final result Specimen: Blood from Arm, Right Updated: 06/22/22 1933     Protime 12 5 seconds      INR 0 93    APTT [[id number]]  (Normal) Collected: 06/22/22 1651    Lab Status: Final result Specimen: Blood from Arm, Right Updated: 06/22/22 1933     PTT 31 seconds     HS Troponin 0hr (reflex protocol) [[id number]]  (Normal) Collected: 06/22/22 1651    Lab Status: Final result Specimen: Blood from Arm, Right Updated: 06/22/22 1754     hs TnI 0hr 10 ng/L     Comprehensive metabolic panel [[id number]] Collected: 06/22/22 1651    Lab Status: Final result Specimen: Blood from Arm, Right Updated: 06/22/22 1723     Sodium 139 mmol/L      Potassium 4 0 mmol/L      Chloride 104 mmol/L      CO2 29 mmol/L      ANION GAP 6 mmol/L      BUN 22 mg/dL      Creatinine 1 17 mg/dL      Glucose 92 mg/dL      Calcium 9 9 mg/dL      AST 20 U/L      ALT 16 U/L      Alkaline Phosphatase 69 U/L      Total Protein 7 7 g/dL      Albumin 4 2 g/dL      Total Bilirubin 0 51 mg/dL      eGFR 68 ml/min/1 73sq m     Narrative:      [place] guidelines for Chronic Kidney Disease (CKD):     Stage 1 with normal or high GFR (GFR > 90 mL/min/1 73 square meters)    Stage 2 Mild CKD (GFR = 60-89 mL/min/1 73 square meters)    Stage 3A Moderate CKD (GFR = 45-59 mL/min/1 73 square meters)    Stage 3B Moderate CKD (GFR = 30-44 mL/min/1 73 square meters)    Stage 4 Severe CKD (GFR = 15-29 mL/min/1 73 square meters)    Stage 5 End Stage CKD (GFR <15 mL/min/1 73 square meters)  Note: GFR calculation is accurate only with a steady state creatinine    CBC and differential [[id number]] Collected: 06/22/22 1651    Lab Status: Final result Specimen: Blood from Arm, Right Updated: 06/22/22 1700     WBC 7 81 Thousand/uL      RBC 4 67 Million/uL      Hemoglobin 13 8 g/dL      Hematocrit 41 8 %      MCV 90 fL      MCH 29 6 pg      MCHC 33 0 g/dL      RDW 13 2 %      MPV 11 3 fL      Platelets 303 Thousands/uL      nRBC 0 /100 WBCs      Neutrophils Relative 55 %      Immat GRANS % 0 %      Lymphocytes Relative 32 %      Monocytes Relative 11 %      Eosinophils Relative 1 %      Basophils Relative 1 %      Neutrophils Absolute 4 38 Thousands/µL      Immature Grans Absolute 0 02 Thousand/uL      Lymphocytes Absolute 2 46 Thousands/µL      Monocytes Absolute 0 84 Thousand/µL      Eosinophils Absolute 0 05 Thousand/µL      Basophils Absolute 0 06 Thousands/µL                  CT head without contrast   Final Result by Meenakshi Casillas DO (06/22 2140)      No acute intracranial abnormality  Workstation performed: [id number]               Procedures  ECG 12 Lead Documentation Only    Date/Time: 6/22/2022 11:10 PM  Performed by: Ulices Zamudio DO  Authorized by: Ulices Zamudio DO     Indications / Diagnosis:  Hypertension  ECG reviewed by me, the ED Provider: yes    Patient location:  ED  Previous ECG:     Previous ECG:  Compared to current    Similarity:  No change  Interpretation:     Interpretation: abnormal    Rate:     ECG rate:  48    ECG rate assessment: bradycardic    Rhythm:     Rhythm: sinus bradycardia    Ectopy:     Ectopy: none    QRS:     QRS axis:  Normal    QRS intervals:  Normal  Conduction:     Conduction: normal    ST segments:     ST segments:  Non-specific    Elevation:  V2  T waves:     T waves: normal    Comments:      Sinus bradycardia with nonspecific ST changes in lead V2  ED Course                                       MDM  Number of Diagnoses or Management Options  Hypertensive urgency  Diagnosis management comments: 56M presents to the emergency department after persistently elevated blood pressure over the last week  He has not taken any blood pressure medications in a month but reports taking 2 doses of carvedilol this morning without improvement of blood pressure  On physical exam the patient has not her maladies and he reports no symptoms    Laboratory evaluation includes CBC, CMP, HS troponin, PT, PTT all of which are within normal limits other than mildly elevated HS troponin of 10 with delta trop of 2  CT Imaging evaluation of the head reveals no acute intracranial abnormalities  A 5 mg dose of hydralazine is administered to the patient with no improvement in blood pressure which is persistently elevated above 220/110  A 2nd dose of hydralazine, 10 mg is administered with improvement of blood pressure to 189/92 which quickly rises to over 424 systolic  The patient reports a longstanding history of bradycardia and has never been symptomatic for this, however because of this we were unable to start beta-blockers at this time  Cardene is considered as a 2nd treatment option for hypertensive urgency, however the patient has a past medical history of angioedema to related medications and after speaking with the pharmacy they advise not administering this medication due to risk for angioedema  As a 3rd line treatment nitroprusside is considered and discussed with the critical care team who agree that there are no better options for quick improvement of blood pressure at this time although this will require admission to the ICU despite no symptoms at this time  Nitroprusside is administered to the patient as a infusion in the emergency department and the patient is administered under the critical care team to the ICU  Disposition  Final diagnoses:   Hypertensive urgency     Time reflects when diagnosis was documented in both MDM as applicable and the Disposition within this note     Time User Action Codes Description Comment    6/22/2022 10:13 PM Fortino Centeno [I16 0] Hypertensive urgency       ED Disposition     ED Disposition   Admit    Condition   Stable    Date/Time   Wed Jun 22, 2022 10:13 PM    Comment   Case was discussed with critical care AP and the patient's admission status was agreed to be Admission Status: inpatient status to the service of Dr Doyle Ross  Follow-up Information    None         Patient's Medications   Discharge Prescriptions    No medications on file     No discharge procedures on file      PDMP Review       Value Time User    PDMP Reviewed  Yes 8/13/2020  5:59 AM Jerry Hong MD           ED Provider  Attending physically available and evaluated Estella Do  I managed the patient along with the ED Attending      Electronically Signed by         Opal Bedoya DO  06/22/22 0246

## 2022-06-23 NOTE — CONSULTS
Consultation - Cardiology Team 1  Nehemiah Leonard 60 y o  male MRN: [id number]  Unit/Bed#: ICU 06 Encounter: [id number]  06/23/22  12:57 PM    Assessment/ Plan:  1  Hypertensive urgency  - possibly due to medication noncompliance versus resistant hypertension  - presenting /110 > last documented at 133/82  - s/p IV hydralazine, IV lasix x1 without BP improvement  - currently on Nirpride for goal -180  - home antihypertensive regimen (as of last cards visit 6/7/22) - Cardura-XL 4 mg po daily, lasix 20 mg po daily PRN   - previously on carvedilol 25 mg po BID (stopped d/t bradycardia), spironolactone 25 mg po daily + lasix 20 mg po daily (both stopped d/t CKD), isosorbide 60 mg po daily (stopped to avoid concurrent use with Viagra); all of which he was not taking consistently   - allergies to lisinoprol, amlodipine, Eliquis - angioedema  - recommend resuming home Cardura and wean nirpride gtt as able  - r/o secondary causes of resistant hypertension - aldosteronism, renal atherosclerosis, worsening BRUCE, hyperparathyroidism, hypothyroidism    2  CAD  - presenting EKG - sinus bradycardia, HR 48  - telemetry review - inus bradycardia, HR 50s, no significant events noted   - s/p NSTEMI (2019)   - LHC (2019) - 30% stenosis pCx, 70% stenosis OM1, 30% stenosis mRCA; medical management advised  - echo (11/11/2020) - LVEF 60%, no RWMA/valvular abnormalities; new echo ordered and pending  - continue home rosuvastatin 5 mg po daily, no BB as above    3  Dyslipidemia  - lipid panel (11/06/2021) - / triglycerides 87/ HDL 53/   - continue rosuvastatin    4  CKD stage 2  - Cr on arrival 1 17 > 1 27  - baseline 1 2-1 3    5  Hx DVT  - maintained on Coumadin  - INR yesterday 0 93 (goal 2-3) - consistently subtherapeutic     6  Hx of GI bleed  7  Angioedema  8  Medication noncompliance  9   BRUCE - mild    History of Present Illness   Physician Requesting Consult: Judge Willie DO    Reason for Consult / Principal Problem: Hypertensive urgency     HPI: Yoni Blood is a 60y o  year old male with PMH of hypertension, dyslipidemia, GI bleed, DVT, CKD stage 2, and CAD who presents to the ED with uncontrolled BP  Upon arrival the the ED, initial BP was 222/110  Patient was given IV hydralazine and lasix IV without BP improvement  He was subsequently started on a Nirpride drip and Cardiology was consulted for hypertension management  Patient follows with Dr Wilder Breaux and was recently placed on Cardura a few weeks ago  As noted above, patient has allergies to various antihypertensives and his comorbidites preclude the use of other medications  Per chart review, patient has a history of medical noncompliance, and admits to not taking the Cardura as prescribed as he did not pick it up from his pharmacy  Upon examination, patient states that he took his blood pressure at work yesterday and noted his SBP to be elevated around 225 which prompted him to come to the ED  Patient denies any symptoms of dizziness, lightheadedness, blurred vision, headache, nausea, or vomiting  He denies anginal symptoms at rest or with activity  Patient is independent at baseline and is able to complete his ADLs without issue  Inpatient consult to Cardiology  Consult performed by: MAX Ness  Consult ordered by: MAX Ocampo      EKG: sinus bradycardia, HR 48    Review of Systems   Respiratory: Negative for chest tightness and shortness of breath  Cardiovascular: Negative for chest pain and palpitations  Neurological: Negative for dizziness, syncope, light-headedness and headaches  All other systems reviewed and are negative      Historical Information   Past Medical History:   Diagnosis Date    Angioedema     Coronary artery disease     GI bleed     Hypertension     stopped his meds when ran out several years ago    [facility])      Past Surgical History: Procedure Laterality Date    REPLACEMENT TOTAL KNEE Right 12/2020    UPPER GASTROINTESTINAL ENDOSCOPY      Clamped large stomach ulcer     Social History     Substance and Sexual Activity   Alcohol Use Never    Alcohol/week: 0 0 standard drinks     Social History     Substance and Sexual Activity   Drug Use Never     Social History     Tobacco Use   Smoking Status Never Smoker   Smokeless Tobacco Never Used     Family History:   Family History   Adopted: Yes   Family history unknown: Yes     Meds/Allergies   all current active meds have been reviewed  Allergies   Allergen Reactions    Lisinopril Angioedema    Norvasc [Amlodipine] Angioedema    Amoxicillin Itching     Itching little bumps    Eliquis [Apixaban] Angioedema    Lipitor [Atorvastatin] Facial Swelling     Objective   Vitals: Blood pressure (!) 177/97, pulse (!) 50, temperature 97 5 °F (36 4 °C), temperature source Oral, resp  rate (!) 24, height 5' 11" (1 803 m), weight 128 kg (281 lb 8 4 oz), SpO2 99 %  , Body mass index is 39 27 kg/m² ,   Orthostatic Blood Pressures    Flowsheet Row Most Recent Value   Blood Pressure 177/97 filed at 06/23/2022 1230   Patient Position - Orthostatic VS Lying filed at 06/23/2022 6356        Systolic (77EKF), XFM:857 , Min:124 , CSH:755     Diastolic (80EJW), JRO:57, Min:62, Max:116      Intake/Output Summary (Last 24 hours) at 6/23/2022 1257  Last data filed at 6/23/2022 1200  Gross per 24 hour   Intake 1238 14 ml   Output 1225 ml   Net 13 14 ml     Invasive Devices  Report    Peripheral Intravenous Line  Duration           Peripheral IV 06/22/22 Right Antecubital <1 day    Peripheral IV 06/22/22 Right Antecubital <1 day                Physical Exam  Constitutional:       General: He is not in acute distress  Appearance: He is obese  He is not ill-appearing  HENT:      Head: Normocephalic  Nose: Nose normal       Mouth/Throat:      Mouth: Mucous membranes are moist       Pharynx: Oropharynx is clear  Eyes:      Pupils: Pupils are equal, round, and reactive to light  Cardiovascular:      Rate and Rhythm: Regular rhythm  Bradycardia present  Pulses: Normal pulses  Heart sounds: Normal heart sounds  No murmur heard  No friction rub  No gallop  Pulmonary:      Effort: Pulmonary effort is normal       Breath sounds: Normal breath sounds  No wheezing, rhonchi or rales  Abdominal:      General: Bowel sounds are normal  There is no distension  Palpations: Abdomen is soft  Musculoskeletal:         General: Normal range of motion  Cervical back: Normal range of motion  Right lower leg: No edema  Left lower leg: No edema  Skin:     General: Skin is warm and dry  Capillary Refill: Capillary refill takes less than 2 seconds  Neurological:      General: No focal deficit present  Mental Status: He is alert and oriented to person, place, and time     Psychiatric:         Mood and Affect: Mood normal          Behavior: Behavior normal           Lab Results:   Troponins:     CBC with diff:   Results from last 7 days   Lab Units 06/23/22  0508 06/22/22  1651   WBC Thousand/uL 6 53 7 81   HEMOGLOBIN g/dL 13 7 13 8   HEMATOCRIT % 40 8 41 8   MCV fL 90 90   PLATELETS Thousands/uL 149 160   MCH pg 30 2 29 6   MCHC g/dL 33 6 33 0   RDW % 13 5 13 2   MPV fL 11 6 11 3   NRBC AUTO /100 WBCs 0 0     CMP:   Results from last 7 days   Lab Units 06/23/22  0508 06/22/22  1651   POTASSIUM mmol/L 3 3* 4 0   CHLORIDE mmol/L 104 104   CO2 mmol/L 29 29   BUN mg/dL 19 22   CREATININE mg/dL 1 27 1 17   CALCIUM mg/dL 9 2 9 9   AST U/L 19 20   ALT U/L 14 16   ALK PHOS U/L 61 69   EGFR ml/min/1 73sq m 61 68

## 2022-06-23 NOTE — H&P
[facility] H&P- Jing Patten 1965, 60 y o  male MRN: [id number]  Unit/Bed#: ICU 06 Encounter: [id number]  Primary Care Provider: MAX [place]   Date and time admitted to hospital: 6/22/2022  5:33 PM    * Hypertensive urgency  Assessment & Plan  · Patient presented to the ED for SBP >200  · He was recently started on a medrol dose pack for neck pain which may be exacerbating his BP   · He has allergies to amlodipine and lisinopril - Angioedema   · Started on Nipride in the ED  · Goal -180  · Unresponsive to IV hydralazine   · Patient following with cardiology who is adjusting home medications  · Consulted cardiology  · Patient reportedly taking carvedilol - was stopped by cardiologist secondary to bradycardia    · Started on cardura by cardiology but stopped because he states he was nervous that his BP increased   · Start on daily lasix  · Trend creatine  · Will give one dose of IV lasix now     Neck pain on right side  Assessment & Plan  · Seen by PCP and started on neurontin and medrol dose pack- holding secondary to HTN urgency     Edema of left lower leg due to peripheral venous insufficiency  Assessment & Plan  · Patient on lasix prn leg swelling  · Will start daily lasix     History of DVT (deep vein thrombosis)  Assessment & Plan  · Patient has been on coumadin  · INR 0 93  · Continue to hold [facility] in the setting of hypertensive urgency   · Patient reports non compliance     Morbid obesity with BMI of 40 0-44 9, adult ([facility] [address] )  Assessment & Plan  · Nutrition consulted  · Weight loss management   · Life-style modification     Hyperlipidemia  Assessment & Plan  · Continue home statin therapy     -------------------------------------------------------------------------------------------------------------  Chief Complaint: Hypertensino     History of Present Illness   Jing Patten is a 60 y o  male who presented to the ED yesterday with SBP >200   Patient recently started on cadura by cardiologist but stopped when his BP started to go up 3 days ago  He has been taking carvedilol although was recommended to stop secondary to bradycardia  He has allergies to amlodipine and lisinopril- Angioedema  His BP did not respond to hydralazine in the ED  He was started on Nipride gtt and admitted to the ICU     He denies any complaints of headache, N/V, dizziness, BP, blurred vision     History obtained from chart review and the patient   -------------------------------------------------------------------------------------------------------------  Dispo: Admit to Critical Care     Code Status: Prior  --------------------------------------------------------------------------------------------------------------  Review of Systems    A 12-point, complete review of systems was reviewed and negative except as stated above     Physical Exam  Vitals reviewed  Constitutional:       General: He is not in acute distress  HENT:      Head: Normocephalic  Nose: Nose normal       Mouth/Throat:      Mouth: Mucous membranes are moist    Eyes:      Pupils: Pupils are equal, round, and reactive to light  Cardiovascular:      Rate and Rhythm: Normal rate  Pulmonary:      Effort: Pulmonary effort is normal    Abdominal:      Palpations: Abdomen is soft  Musculoskeletal:         General: Normal range of motion  Cervical back: Tenderness present  Skin:     General: Skin is warm  Capillary Refill: Capillary refill takes less than 2 seconds  Neurological:      General: No focal deficit present        Mental Status: He is alert        --------------------------------------------------------------------------------------------------------------  Vitals:   Vitals:    06/22/22 2345 06/22/22 2352 06/23/22 0015 06/23/22 0035   BP: (!) 180/83 150/74 159/79 169/91   BP Location:   Left arm Left arm   Pulse: (!) 48 (!) 52 (!) 54 (!) 45   Resp:    17   Temp:    97 7 °F (36 5 °C) TempSrc:    Oral   SpO2: 98%  97% 96%   Weight:    128 kg (281 lb 8 4 oz)   Height:    5' 11" (1 803 m)     Temp  Min: 97 7 °F (36 5 °C)  Max: 97 7 °F (36 5 °C)  IBW (Ideal Body Weight): 75 3 kg  Height: 5' 11" (180 3 cm)  Body mass index is 39 27 kg/m²  Laboratory and Diagnostics:  Results from last 7 days   Lab Units 06/22/22  1651   WBC Thousand/uL 7 81   HEMOGLOBIN g/dL 13 8   HEMATOCRIT % 41 8   PLATELETS Thousands/uL 160   NEUTROS PCT % 55   MONOS PCT % 11     Results from last 7 days   Lab Units 06/22/22  1651   SODIUM mmol/L 139   POTASSIUM mmol/L 4 0   CHLORIDE mmol/L 104   CO2 mmol/L 29   ANION GAP mmol/L 6   BUN mg/dL 22   CREATININE mg/dL 1 17   CALCIUM mg/dL 9 9   GLUCOSE RANDOM mg/dL 92   ALT U/L 16   AST U/L 20   ALK PHOS U/L 69   ALBUMIN g/dL 4 2   TOTAL BILIRUBIN mg/dL 0 51          Results from last 7 days   Lab Units 06/22/22  1651 06/21/22  1512   INR  0 93 0 97   PTT seconds 31  --               ABG:    VBG:          Micro:        EKG: NSR  Imaging: I have personally reviewed pertinent reports     and I have personally reviewed pertinent films in PACS      Historical Information   Past Medical History:   Diagnosis Date    Angioedema     Coronary artery disease     GI bleed     Hypertension     stopped his meds when ran out several years ago    STEMI (ST elevation myocardial infarction[facility])      Past Surgical History:   Procedure Laterality Date    REPLACEMENT TOTAL KNEE Right 12/2020    UPPER GASTROINTESTINAL ENDOSCOPY      Clamped large stomach ulcer     Social History   Social History     Substance and Sexual Activity   Alcohol Use Never    Alcohol/week: 0 0 standard drinks     Social History     Substance and Sexual Activity   Drug Use Never     Social History     Tobacco Use   Smoking Status Never Smoker   Smokeless Tobacco Never Used     Exercise History: Ambulatory   Family History:   Family History   Adopted: Yes   Family history unknown: Yes     I have reviewed this patient's family history and commented on sigificant items within the HPI      Medications:  Current Facility-Administered Medications   Medication Dose Route Frequency    nitroPRUSside (NIPRIDE) 50,000 mcg in dextrose 5 % 250 mL infusion  0 1-2 mcg/kg/min Intravenous Titrated     Home medications:  Prior to Admission Medications   Prescriptions Last Dose Informant Patient Reported? Taking? VITAMIN D PO Not Taking at Unknown time  Yes No   Si,000 Units every 24 hours   Patient not taking: No sig reported   acetaminophen (TYLENOL) 500 mg tablet  Self Yes Yes   Sig: Take 1,000 mg by mouth daily   carbamide peroxide (DEBROX) 6 5 % otic solution  Self No Yes   Sig: Administer 5 drops into both ears 2 (two) times a day   doxazosin (CARDURA XL) 4 MG 24 hr tablet Not Taking at Unknown time  No No   Sig: Take 1 tablet (4 mg total) by mouth daily with breakfast   Patient not taking: Reported on 2022   furosemide (LASIX) 20 mg tablet 2022 at Unknown time  No Yes   Sig: Take 1 tablet (20 mg total) by mouth daily as needed (swelling of the legs)   gabapentin (Neurontin) 300 mg capsule Not Taking at Unknown time  No No   Sig: Take 1 capsule (300 mg total) by mouth 3 (three) times a day for 14 days For post-herpetic neuralgia: Take 1 tablet on day 1,  Then take 2 tablets on day 2, Then take 3 tablets on day 3 and every day after that as instructed by your doctor     Patient not taking: Reported on 2022   meloxicam (MOBIC) 15 mg tablet Not Taking at Unknown time  Yes No   Patient not taking: Reported on 2022   methylPREDNISolone 4 MG tablet therapy pack 2022 at Unknown time  No Yes   Sig: Use as directed on package   multivitamin [facility]) TABS 2022 at Unknown time Self Yes Yes   Sig: Take 1 tablet by mouth daily   rosuvastatin (CRESTOR) 5 mg tablet Past Month at Unknown time  No Yes   Sig: Take 1 tablet (5 mg total) by mouth daily   sildenafil (VIAGRA) 25 MG tablet Past Month at Unknown time No Yes   Sig: TAKE 1 TABLET BY MOUTH DAILY AS NEEDED FOR ERECTILE DYSFUNCTION  warfarin (COUMADIN) 2 5 mg tablet 6/22/2022 at Unknown time  No Yes   Sig: Take 1 tablet (2 5 mg total) by mouth daily   warfarin (COUMADIN) 5 mg tablet 6/22/2022 at Unknown time  No Yes   Sig: TAKE 2 TABLETS BY MOUTH EVERY DAY      Facility-Administered Medications: None     Allergies: Allergies   Allergen Reactions    Lisinopril Angioedema    Norvasc [Amlodipine] Angioedema    Amoxicillin Itching     Itching little bumps    Eliquis [Apixaban] Angioedema    Lipitor [Atorvastatin] Facial Swelling       ------------------------------------------------------------------------------------------------------------  Advance Directive and Living Will:      Power of :    POLST:    ------------------------------------------------------------------------------------------------------------  Anticipated Length of Stay is > 2 midnights    Care Time Delivered:   No Critical Care time spent       Christie Centeno PA-C        Portions of the record may have been created with voice recognition software  Occasional wrong word or "sound a like" substitutions may have occurred due to the inherent limitations of voice recognition software    Read the chart carefully and recognize, using context, where substitutions have occurred

## 2022-06-23 NOTE — ASSESSMENT & PLAN NOTE
· Patient presented to the ED for SBP >200  · He was recently started on a medrol dose pack for neck pain which may be exacerbating his BP   · He has allergies to amlodipine and lisinopril - Angioedema   · Started on Nipride in the ED  · Goal -180  · Unresponsive to IV hydralazine   · Patient following with cardiology who is adjusting home medications  · Consulted cardiology  · Patient reportedly taking carvedilol - was stopped by cardiologist secondary to bradycardia    · Started on cardura by cardiology but stopped because he states he was nervous that his BP increased   · Start on daily lasix  · Trend creatine  · Will give one dose of IV lasix now

## 2022-06-24 ENCOUNTER — APPOINTMENT (INPATIENT)
Dept: NON INVASIVE DIAGNOSTICS | Facility: HOSPITAL | Age: 57
DRG: 305 | End: 2022-06-24
Payer: COMMERCIAL

## 2022-06-24 LAB
ANION GAP SERPL CALCULATED.3IONS-SCNC: 4 MMOL/L (ref 4–13)
AORTIC ROOT: 3.7 CM
APICAL FOUR CHAMBER EJECTION FRACTION: 59 %
ASCENDING AORTA: 3.6 CM
AV LVOT PEAK GRADIENT: 4 MMHG
AV PEAK GRADIENT: 7 MMHG
BUN SERPL-MCNC: 18 MG/DL (ref 5–25)
CALCIUM SERPL-MCNC: 9.2 MG/DL (ref 8.4–10.2)
CHLORIDE SERPL-SCNC: 103 MMOL/L (ref 96–108)
CO2 SERPL-SCNC: 29 MMOL/L (ref 21–32)
CREAT SERPL-MCNC: 1.21 MG/DL (ref 0.6–1.3)
E WAVE DECELERATION TIME: 297 MS
ERYTHROCYTE [DISTWIDTH] IN BLOOD BY AUTOMATED COUNT: 13.8 % (ref 11.6–15.1)
FRACTIONAL SHORTENING: 39 % (ref 28–44)
GFR SERPL CREATININE-BSD FRML MDRD: 65 ML/MIN/1.73SQ M
GLUCOSE SERPL-MCNC: 96 MG/DL (ref 65–140)
HCT VFR BLD AUTO: 41.8 % (ref 36.5–49.3)
HGB BLD-MCNC: 13.8 G/DL (ref 12–17)
INR PPP: 1 (ref 0.84–1.19)
INTERVENTRICULAR SEPTUM IN DIASTOLE (PARASTERNAL SHORT AXIS VIEW): 1.1 CM
INTERVENTRICULAR SEPTUM: 1.1 CM (ref 0.6–1.1)
LEFT ATRIUM AREA SYSTOLE SINGLE PLANE A4C: 24 CM2
LEFT ATRIUM SIZE: 5.2 CM
LEFT INTERNAL DIMENSION IN SYSTOLE: 2.7 CM (ref 2.1–4)
LEFT VENTRICULAR INTERNAL DIMENSION IN DIASTOLE: 4.4 CM (ref 3.5–6)
LEFT VENTRICULAR POSTERIOR WALL IN END DIASTOLE: 1.1 CM
LEFT VENTRICULAR STROKE VOLUME: 61 ML
LVSV (TEICH): 61 ML
MAGNESIUM SERPL-MCNC: 1.9 MG/DL (ref 1.9–2.7)
MCH RBC QN AUTO: 29.7 PG (ref 26.8–34.3)
MCHC RBC AUTO-ENTMCNC: 33 G/DL (ref 31.4–37.4)
MCV RBC AUTO: 90 FL (ref 82–98)
MV E'TISSUE VEL-SEP: 5 CM/S
MV PEAK A VEL: 0.75 M/S
MV PEAK E VEL: 65 CM/S
MV STENOSIS PRESSURE HALF TIME: 86 MS
MV VALVE AREA P 1/2 METHOD: 2.56 CM2
PHOSPHATE SERPL-MCNC: 3 MG/DL (ref 2.7–4.5)
PLATELET # BLD AUTO: 148 THOUSANDS/UL (ref 149–390)
PMV BLD AUTO: 11.9 FL (ref 8.9–12.7)
POTASSIUM SERPL-SCNC: 4.4 MMOL/L (ref 3.5–5.3)
PROTHROMBIN TIME: 13.2 SECONDS (ref 11.6–14.5)
PV PEAK GRADIENT: 7 MMHG
RBC # BLD AUTO: 4.64 MILLION/UL (ref 3.88–5.62)
RIGHT ATRIUM AREA SYSTOLE A4C: 16.4 CM2
RIGHT VENTRICLE ID DIMENSION: 4.7 CM
SL CV LV EF: 70
SL CV PED ECHO LEFT VENTRICLE DIASTOLIC VOLUME (MOD BIPLANE) 2D: 89 ML
SL CV PED ECHO LEFT VENTRICLE SYSTOLIC VOLUME (MOD BIPLANE) 2D: 28 ML
SODIUM SERPL-SCNC: 136 MMOL/L (ref 135–147)
TR MAX PG: 21 MMHG
TR PEAK VELOCITY: 2.3 M/S
TRICUSPID VALVE PEAK REGURGITATION VELOCITY: 2.27 M/S
WBC # BLD AUTO: 5.68 THOUSAND/UL (ref 4.31–10.16)

## 2022-06-24 PROCEDURE — 85027 COMPLETE CBC AUTOMATED: CPT

## 2022-06-24 PROCEDURE — 80048 BASIC METABOLIC PNL TOTAL CA: CPT

## 2022-06-24 PROCEDURE — 85610 PROTHROMBIN TIME: CPT

## 2022-06-24 PROCEDURE — 93306 TTE W/DOPPLER COMPLETE: CPT | Performed by: INTERNAL MEDICINE

## 2022-06-24 PROCEDURE — 83735 ASSAY OF MAGNESIUM: CPT

## 2022-06-24 PROCEDURE — 99232 SBSQ HOSP IP/OBS MODERATE 35: CPT | Performed by: INTERNAL MEDICINE

## 2022-06-24 PROCEDURE — 93306 TTE W/DOPPLER COMPLETE: CPT

## 2022-06-24 PROCEDURE — 84100 ASSAY OF PHOSPHORUS: CPT

## 2022-06-24 RX ORDER — HEPARIN SODIUM 5000 [USP'U]/ML
5000 INJECTION, SOLUTION INTRAVENOUS; SUBCUTANEOUS EVERY 8 HOURS SCHEDULED
Status: DISCONTINUED | OUTPATIENT
Start: 2022-06-24 | End: 2022-06-25 | Stop reason: HOSPADM

## 2022-06-24 RX ORDER — ISOSORBIDE MONONITRATE 30 MG/1
30 TABLET, EXTENDED RELEASE ORAL DAILY
Status: DISCONTINUED | OUTPATIENT
Start: 2022-06-24 | End: 2022-06-25 | Stop reason: HOSPADM

## 2022-06-24 RX ORDER — DOXAZOSIN MESYLATE 1 MG/1
2 TABLET ORAL 2 TIMES DAILY
Status: DISCONTINUED | OUTPATIENT
Start: 2022-06-24 | End: 2022-06-25

## 2022-06-24 RX ORDER — WARFARIN SODIUM 7.5 MG/1
7.5 TABLET ORAL
Status: COMPLETED | OUTPATIENT
Start: 2022-06-24 | End: 2022-06-24

## 2022-06-24 RX ADMIN — DOXAZOSIN 2 MG: 1 TABLET ORAL at 21:14

## 2022-06-24 RX ADMIN — HEPARIN SODIUM 5000 UNITS: 5000 INJECTION INTRAVENOUS; SUBCUTANEOUS at 21:15

## 2022-06-24 RX ADMIN — GABAPENTIN 300 MG: 300 CAPSULE ORAL at 21:15

## 2022-06-24 RX ADMIN — ISOSORBIDE MONONITRATE 30 MG: 30 TABLET, EXTENDED RELEASE ORAL at 09:21

## 2022-06-24 RX ADMIN — ACETAMINOPHEN 975 MG: 325 TABLET, FILM COATED ORAL at 13:32

## 2022-06-24 RX ADMIN — MULTIPLE VITAMINS W/ MINERALS TAB 1 TABLET: TAB ORAL at 08:09

## 2022-06-24 RX ADMIN — GABAPENTIN 300 MG: 300 CAPSULE ORAL at 15:05

## 2022-06-24 RX ADMIN — HEPARIN SODIUM 5000 UNITS: 5000 INJECTION INTRAVENOUS; SUBCUTANEOUS at 13:33

## 2022-06-24 RX ADMIN — DOXAZOSIN 2 MG: 1 TABLET ORAL at 08:09

## 2022-06-24 RX ADMIN — METHOCARBAMOL 500 MG: 500 TABLET ORAL at 16:39

## 2022-06-24 RX ADMIN — WARFARIN SODIUM 7.5 MG: 7.5 TABLET ORAL at 17:05

## 2022-06-24 RX ADMIN — HYDRALAZINE HYDROCHLORIDE 50 MG: 25 TABLET ORAL at 08:30

## 2022-06-24 RX ADMIN — GABAPENTIN 300 MG: 300 CAPSULE ORAL at 08:09

## 2022-06-24 RX ADMIN — HYDRALAZINE HYDROCHLORIDE 50 MG: 25 TABLET ORAL at 16:39

## 2022-06-24 RX ADMIN — METHOCARBAMOL 500 MG: 500 TABLET ORAL at 08:14

## 2022-06-24 RX ADMIN — HYDRALAZINE HYDROCHLORIDE 50 MG: 25 TABLET ORAL at 01:12

## 2022-06-24 NOTE — QUICK NOTE
Patient seen and evaluated by Critical Care today and deemed to be appropriate for transfer to [facility] with Telemetry   Spoke to Dr Lexus Posadas from [facility] to accept patient transfer  Major changes to treatment plan from the day of transfer include discontinuation of Nipride drip  Initiated 2mg Cardura BID, 30mg Imdur Daily with Good BP control  If needed, could increase 50mg Hydralazine Q8H to 100mg Hydralazine Q8H  Critical care can be contacted via [employer] with any questions or concerns

## 2022-06-24 NOTE — UTILIZATION REVIEW
Inpatient Admission Authorization Request   NOTIFICATION OF INPATIENT ADMISSION/INPATIENT AUTHORIZATION REQUEST   SERVICING FACILITY:   [facility], [address] [place] [address]  Tax ID: [id number]  NPI: [id number]  Place of Service: Inpatient [address] [facility]  [address]  Place of Service Code: 24     ATTENDING PROVIDER:  Attending Name and NPI#: Zak Alicea [[id number]]  Address: [facility] [address] [place] [address]  Phone: [phone]     UTILIZATION REVIEW CONTACT:  Reymundo Romberg, Utilization   Network Utilization Review Department  Phone: [phone]  Fax: [phone]  Email: [email or web address]     PHYSICIAN ADVISORY SERVICES:  FOR [id number] REVIEW - MEDICAL NECESSITY DENIAL  Phone: [phone]  Fax: [phone]  Email: [email or web address]     TYPE OF REQUEST:  Inpatient Status     ADMISSION INFORMATION:  ADMISSION DATE/TIME: 6/22/22 10:17 PM  PATIENT DIAGNOSIS CODE/DESCRIPTION:  Hypertension [I10]  Hypertensive urgency [I16 0]  DISCHARGE DATE/TIME: No discharge date for patient encounter  IMPORTANT INFORMATION:  Please contact Reymundo Romberg directly with any questions or concerns regarding this request  Department voicemails are confidential     Send requests for admission clinical reviews, concurrent reviews, approvals, and administrative denials due to lack of clinical to fax [phone]

## 2022-06-24 NOTE — ASSESSMENT & PLAN NOTE
· Patient presented to the ED for SBP >200  · He was recently started on a medrol dose pack for neck pain which may have exacerbated his BP   · He has allergies to amlodipine and lisinopril: Angioedema   · Started on Nipride in the ED  · Goal -180  · Unresponsive to IV hydralazine   · Patient following with cardiology who has been adjusting home medications  · Consulted cardiology, appreciate recc's  · Patient reportedly taking Carvedilol - was stopped by cardiologist secondary to bradycardia    · Started on Cardura XL by cardiology but patient reports he hasn't been taking it  · Per cardiology: 2mg Cardura BID, 50mg Hydralazine Q8H, to consider Imdur  · Pending Renal US, Aldosterone/Renin Ratio

## 2022-06-24 NOTE — PROGRESS NOTES
Progress Note - Cardiology   Jing Patten 60 y o  male MRN: [id number]  Unit/Bed#: ICU 06 Encounter: [id number]  06/24/22  10:22 AM    Impression:       57-year-old with history of hypertension, no diabetes or tobacco use, physically active at baseline, prior history of GI bleeding-gastric ulcers-status post clipping-2019, moderate coronary artery disease for medical management-coronary angiography in 2019-performed for elevated troponin in the setting of severe anemia, history of DVT-currently on Coumadin, prior angioedema to Eliquis, angioedema to multiple medications-lisinopril, amlodipine and Eliquis, longstanding hypertension with noncompliance with medications  I had last seen him in the office in June 2022  At that time he had gone off all of his antihypertensive medications and had started him on doxazosin 4 mg daily which he picked up but did not start yet  I also referred him to an allergist for his history of angioedema to multiple medications but did not follow through  He is now admitted with hypertensive urgency-checked his blood pressures at work and noted them to be markedly elevated-222/110-247/115 in the ER  Otherwise he is asymptomatic from a cardiac standpoint    Surprisingly troponins are negative    Despite addition of hydralazine and doxazosin, unable to wean off nitroprusside so far      Plan:    Hypertensive urgency:    Has had a history of angioedema to lisinopril and amlodipine precluding Ace inhibitors, ARB and amlodipine   his bradycardia precludes beta-blocker  Also has CKD with baseline creatinine up to 1 4 hence did not use a diuretic, in the past had been on Aldactone  Despite alpha blocker-doxazosin 2 mg-2 doses yesterday, hydralazine 50 t i d , 1 dose yesterday, blood pressures remain elevated  Can start Imdur 30 and increase hydralazine further to 100 t i d  is still unable to wean off nitroprusside  At this time-, he remains on nitroprusside at 20 micrograms/kilogram per minute at this time --goal is to wean it off completely today    As to evaluation for secondary causes:     Will check renal artery Dopplers while is here although a prior CT did not suggest renal artery stenosis-August 2019  Check aldosterone/renin ratio    Mild sleep apnea on testing in January 2021     Prior history of DVT-right lower extremity-June 2020:  Apparently unprovoked, has remained on anticoagulation with Coumadin, in the past was on Eliquis and had angioedema  Needs DVT prophylaxis while INR is still subtherapeutic, I suspect he was not taking his Coumadin, INR was normal at presentation    ===================================================================    Chief Complaint:   Chief Complaint   Patient presents with    Hypertension     Pt to ED with c/o elevated BP, pt complains of a "sore head"         Subjective/Objective     Subjective:  Denies any new complaints or symptoms of hypertensive emergency    Objective:  No distress    Patient Active Problem List   Diagnosis    Elevated troponin    Hypertension    Acute left ankle pain    Acute pain of left shoulder    Left ankle sprain    Acute left-sided low back pain without sciatica    S/P cardiac cath 11/06/19    Stomach ulcer    Iron deficiency anemia due to chronic blood loss    ZAKIA (acute kidney injury) ([facility] [address] )    Follow-up examination    Encounter for follow-up    [facility]    Hyperlipidemia    Anemia    Coronary artery disease involving native heart without angina pectoris    Cellulitis of right lower extremity    Prostate cancer screening    ACE inhibitor-aggravated angioedema, sequela    Anticoagulation goal of INR 2 to 3    Acute deep vein thrombosis (DVT) of tibial vein (HCC)    Leg DVT (deep venous thromboembolism), chronic, right (HCC)    Febrile illness, acute    Bilateral lower extremity edema    Morbid obesity with BMI of 40 0-44 9, adult ([facility] [address] )  Cellulitis of left lower extremity    Itchy skin    Vasculitis (HCC)    Elevated serum creatinine    Low testosterone    History of DVT (deep vein thrombosis)    CKD (chronic kidney disease)    Lymphadenopathy    Edema of left lower leg due to peripheral venous insufficiency    Tooth decay    Preoperative clearance    Status post right knee replacement    Abscess of skin of left knee    BRUCE (obstructive sleep apnea)    Rash    Excessive cerumen in ear canal, bilateral    Anticoagulation adequate with anticoagulant therapy    Neck pain on right side    Hypertensive urgency       Vitals: /74   Pulse 69   Temp 97 8 °F (36 6 °C) (Oral)   Resp (!) 23   Ht 5' 11" (1 803 m)   Wt 126 kg (278 lb)   SpO2 98%   BMI 38 77 kg/m²     I/O this shift:  In: 640 8 [P O :600; I V :40 8]  Out: 200 [Urine:200]  Wt Readings from Last 3 Encounters:   06/24/22 126 kg (278 lb)   06/21/22 129 kg (285 lb)   06/07/22 128 kg (282 lb 9 6 oz)       Intake/Output Summary (Last 24 hours) at 6/24/2022 1022  Last data filed at 6/24/2022 0900  Gross per 24 hour   Intake 958 35 ml   Output 2100 ml   Net -1141 65 ml     I/O last 3 completed shifts: In: 1513 9 [P O :720;  I V :562 2; IV Piggyback:231 7]  Out: 2925 [Urine:2925]    Invasive Devices  Report    Peripheral Intravenous Line  Duration           Peripheral IV 06/22/22 Right Antecubital 1 day    Peripheral IV 06/22/22 Right Antecubital 1 day                  Physical Exam:  GEN: Sri Villanueva appears well, alert and oriented x 3, pleasant and cooperative   HEENT: pupils equal, round, and reactive to light; extraocular muscles intact  NECK: supple, no carotid bruits or JVD  HEART: regular rhythm, normal S1 and S2, no murmur, no clicks, gallops or rubs   LUNGS: clear to auscultation bilaterally; no wheezes or rhonchi, no rales  ABDOMEN/GI: normal bowel sounds, soft, no tenderness, no distention  EXTREMITIES/Musculoskeltal: peripheral pulses normal; no clubbing, cyanosis, minimal left lower extremity-chronic edema  NEURO: no focal motor findings   SKIN: normal without suspicious lesions on exposed skin              Lab Results:       Results from last 7 days   Lab Units 06/24/22  0527 06/23/22  0508 06/22/22  1651   WBC Thousand/uL 5 68 6 53 7 81   HEMOGLOBIN g/dL 13 8 13 7 13 8   HEMATOCRIT % 41 8 40 8 41 8   PLATELETS Thousands/uL 148* 149 160         Results from last 7 days   Lab Units 06/24/22  0527 06/23/22  0508 06/22/22  1651   POTASSIUM mmol/L 4 4 3 3* 4 0   CHLORIDE mmol/L 103 104 104   CO2 mmol/L 29 29 29   BUN mg/dL 18 19 22   CREATININE mg/dL 1 21 1 27 1 17   CALCIUM mg/dL 9 2 9 2 9 9   ALK PHOS U/L  --  61 69   ALT U/L  --  14 16   AST U/L  --  19 20     Results from last 7 days   Lab Units 06/24/22  0527 06/22/22  1651 06/21/22  1512   INR  1 00 0 93 0 97       Imaging: I have personally reviewed pertinent reports  EKG/Telemtry:  No events    Scheduled Meds:  Current Facility-Administered Medications   Medication Dose Route Frequency Provider Last Rate    acetaminophen  975 mg Oral [facility] Godwin Garcia [place], [place]      doxazosin  2 mg Oral BID [facility], NP      gabapentin  300 mg Oral TID Dennie Plume, PA-C      hydrALAZINE  50 mg Oral [facility],  [address]      isosorbide mononitrate  30 mg Oral Daily [facility],  [address]      methocarbamol  500 mg Oral Q6H PRN Dennie Plume, PA-C      multivitamin-minerals  1 tablet Oral Daily Godwin Cartagena [place], [place]      nitroPRUSside (NIPRIDE) 250 mL infusion  0 1-2 mcg/kg/min Intravenous Titrated [facility] Stopped (06/24/22 5027)    warfarin  7 5 mg Oral Once (warfarin) [facility], [facility]       Continuous Infusions:  nitroPRUSside (NIPRIDE) 250 mL infusion, 0 1-2 mcg/kg/min, Last Rate: Stopped (06/24/22 3599)          This note was completed in part utilizing [employer] fluency direct voice recognition software     Grammatical errors, random word insertion, spelling mistakes, occasional wrong word or "sound-alike" substitutions and incomplete sentences may be an occasional consequence of the system secondary to software limitations, ambient noise and hardware issues  At the time of dictation, efforts were made to edit, clarify and /or correct errors  Please read the chart carefully and recognize, using context, where substitutions have occurred  If you have any questions or concerns about the context, text or information contained within the body of this dictation, please contact myself, the provider, for further clarification

## 2022-06-24 NOTE — PROGRESS NOTES
[facility]  Progress Note - Aleja Urena 1965, 60 y o  male MRN: [id number]  Unit/Bed#: ICU 06 Encounter: [id number]  Primary Care Provider: MAX Scott   Date and time admitted to hospital: 6/22/2022  5:33 PM    * Hypertensive urgency  Assessment & Plan  · Patient presented to the ED for SBP >200  · He was recently started on a medrol dose pack for neck pain which may have exacerbated his BP   · He has allergies to amlodipine and lisinopril: Angioedema   · Started on Nipride in the ED  · Goal -180  · Unresponsive to IV hydralazine   · Patient following with cardiology who has been adjusting home medications  · Consulted cardiology, appreciate recc's  · Patient reportedly taking Carvedilol - was stopped by cardiologist secondary to bradycardia    · Started on Cardura XL by cardiology but patient reports he hasn't been taking it  · Per cardiology: 2mg Cardura BID, 50mg Hydralazine Q8H, to consider Imdur  · Pending Renal US, Aldosterone/Renin Ratio    Neck pain on right side  Assessment & Plan  · Seen by PCP and started on Neurontin and Medrol Dose Pack, holding secondary to HTN urgency     Edema of left lower leg due to peripheral venous insufficiency  Assessment & Plan  · Patient on Lasix PRN leg swelling, will continue to dose as needed    History of DVT (deep vein thrombosis)  Assessment & Plan  · Patient has been on coumadin, reports non-compliance  · Admission INR 0 93  · Restarted coumadin 6/23, trend INR     Morbid obesity with BMI of 40 0-44 9, adult ([facility] [address] )  Assessment & Plan  · Nutrition consulted  · Weight loss management   · Life-style modification     Hyperlipidemia  Assessment & Plan  · Continue home statin therapy         ----------------------------------------------------------------------------------------  HPI/24hr events: Continued on Nipride gtt overnight  No acute events  Hemodynamically stable       Patient appropriate for transfer out of the ICU today?: No  Disposition: Continue Critical Care   Code Status: Level 1 - Full Code  ---------------------------------------------------------------------------------------  SUBJECTIVE  "I'm doing well  They just did an ultrasound of my heart "    Review of systems was reviewed and negative unless stated above in /24-hour events   ---------------------------------------------------------------------------------------  OBJECTIVE    Vitals   Vitals:    22 0608 22 0630 22 0645 22 0700   BP: (!) 206/91 156/79 162/86 157/80   BP Location:       Pulse:  56 66 62   Resp:  14 18 13   Temp:    97 8 °F (36 6 °C)   TempSrc:    Oral   SpO2:  97% 97% 96%   Weight:       Height:         Temp (24hrs), Av °F (36 7 °C), Min:97 5 °F (36 4 °C), Max:99 2 °F (37 3 °C)  Current: Temperature: 97 8 °F (36 6 °C)          Respiratory:  SpO2: SpO2: 96 %, SpO2 Device: O2 Device: None (Room air)       Invasive/non-invasive ventilation settings   Respiratory  Report   Lab Data (Last 4 hours)    None         O2/Vent Data (Last 4 hours)    None                Physical Exam  Constitutional:       Appearance: Normal appearance  He is obese  HENT:      Head: Normocephalic  Mouth/Throat:      Mouth: Mucous membranes are moist    Eyes:      Extraocular Movements: Extraocular movements intact  Pupils: Pupils are equal, round, and reactive to light  Cardiovascular:      Rate and Rhythm: Regular rhythm  Bradycardia present  Pulses: Normal pulses  Pulmonary:      Effort: Pulmonary effort is normal       Breath sounds: Normal breath sounds  Abdominal:      General: Bowel sounds are normal       Palpations: Abdomen is soft  Musculoskeletal:         General: Normal range of motion  Right lower leg: Edema (Trace) present  Left lower leg: Edema (Trace) present  Skin:     General: Skin is warm  Capillary Refill: Capillary refill takes less than 2 seconds     Neurological:      Mental Status: He is alert and oriented to person, place, and time  Psychiatric:         Mood and Affect: Mood normal          Behavior: Behavior normal              Laboratory and Diagnostics:  Results from last 7 days   Lab Units 06/24/22  0527 06/23/22  0508 06/22/22  1651   WBC Thousand/uL 5 68 6 53 7 81   HEMOGLOBIN g/dL 13 8 13 7 13 8   HEMATOCRIT % 41 8 40 8 41 8   PLATELETS Thousands/uL 148* 149 160   NEUTROS PCT %  --  52 55   MONOS PCT %  --  14* 11     Results from last 7 days   Lab Units 06/24/22  0527 06/23/22  0508 06/22/22  1651   SODIUM mmol/L 136 139 139   POTASSIUM mmol/L 4 4 3 3* 4 0   CHLORIDE mmol/L 103 104 104   CO2 mmol/L 29 29 29   ANION GAP mmol/L 4 6 6   BUN mg/dL 18 19 22   CREATININE mg/dL 1 21 1 27 1 17   CALCIUM mg/dL 9 2 9 2 9 9   GLUCOSE RANDOM mg/dL 96 112 92   ALT U/L  --  14 16   AST U/L  --  19 20   ALK PHOS U/L  --  61 69   ALBUMIN g/dL  --  3 8 4 2   TOTAL BILIRUBIN mg/dL  --  0 58 0 51     Results from last 7 days   Lab Units 06/24/22  0527 06/23/22  0508   MAGNESIUM mg/dL 1 9 1 5*   PHOSPHORUS mg/dL 3 0 3 3      Results from last 7 days   Lab Units 06/24/22  0527 06/22/22  1651 06/21/22  1512   INR  1 00 0 93 0 97   PTT seconds  --  31  --               ABG:    VBG:          Micro        EKG: SB 50's  Imaging: I have personally reviewed pertinent reports  and I have personally reviewed pertinent films in PACS    Intake and Output  I/O       06/22 0701 06/23 0700 06/23 0701  06/24 0700 06/24 0701 06/25 0700    P  O  480 240     I V  (mL/kg) 160 4 (1 3) 401 9 (3 2)     IV Piggyback  231 7     Total Intake(mL/kg) 640 4 (5) 873 5 (6 9)     Urine (mL/kg/hr) 525 2400 (0 8)     Total Output 525 2400     Net +115 4 -1526 5            Unmeasured Urine Occurrence 1 x            Height and Weights   Height: 5' 11" (180 3 cm)  IBW (Ideal Body Weight): 75 3 kg  Body mass index is 38 9 kg/m²    Weight (last 2 days)     Date/Time Weight    06/24/22 6598 126 (422 30)    06/23/22 0039 534 (617 64)            Nutrition       Diet Orders   (From admission, onward)             Start     Ordered    06/23/22 0105  Diet Cardiovascular; Cardiac  Diet effective now        References:    Nutrtion Support Algorithm Enteral vs  Parenteral   Question Answer Comment   Diet Type Cardiovascular    Cardiac Cardiac    RD to adjust diet per protocol?  Yes        06/23/22 0104                  Active Medications  Scheduled Meds:  Current Facility-Administered Medications   Medication Dose Route Frequency Provider Last Rate    acetaminophen  975 mg Oral Q8H Bonnie Talamantes [place]      doxazosin  2 mg Oral BID MAX Winter      gabapentin  300 mg Oral TID Sterling Jacobo PA-C      hydrALAZINE  50 mg Oral [place], [place]      methocarbamol  500 mg Oral Q6H PRN Sterling Jacobo PA-C      multivitamin-minerals  1 tablet Oral Daily Bernardino Pacheco [place], [place]      nitroPRUSside (NIPRIDE) 250 mL infusion  0 1-2 mcg/kg/min Intravenous Titrated Sterling Jacobo PA-C 0 6 mcg/kg/min (06/24/22 0708)    warfarin  7 5 mg Oral Once (warfarin) MAX Winter       Continuous Infusions:  nitroPRUSside (NIPRIDE) 250 mL infusion, 0 1-2 mcg/kg/min, Last Rate: 0 6 mcg/kg/min (06/24/22 0708)      PRN Meds:   methocarbamol, 500 mg, Q6H PRN        Invasive Devices Review  Invasive Devices  Report    Peripheral Intravenous Line  Duration           Peripheral IV 06/22/22 Right Antecubital 1 day    Peripheral IV 06/22/22 Right Antecubital 1 day                Rationale for remaining devices: Medication Administration  ---------------------------------------------------------------------------------------  Advance Directive and Living Will:      Power of :    POLST:    ---------------------------------------------------------------------------------------  Care Time Delivered:   No Critical Care time spent       AMX Winter      Portions of the record may have been created with voice recognition software  Occasional wrong word or "sound a like" substitutions may have occurred due to the inherent limitations of voice recognition software    Read the chart carefully and recognize, using context, where substitutions have occurred

## 2022-06-24 NOTE — ASSESSMENT & PLAN NOTE
· Patient has been on coumadin, reports non-compliance  · Admission INR 0 93  · Restarted coumadin 6/23, trend INR

## 2022-06-25 VITALS
DIASTOLIC BLOOD PRESSURE: 100 MMHG | HEIGHT: 71 IN | OXYGEN SATURATION: 98 % | TEMPERATURE: 97.6 F | SYSTOLIC BLOOD PRESSURE: 167 MMHG | BODY MASS INDEX: 35.77 KG/M2 | HEART RATE: 63 BPM | RESPIRATION RATE: 12 BRPM | WEIGHT: 255.51 LBS

## 2022-06-25 LAB
ANION GAP SERPL CALCULATED.3IONS-SCNC: 5 MMOL/L (ref 4–13)
BUN SERPL-MCNC: 24 MG/DL (ref 5–25)
CALCIUM SERPL-MCNC: 9.4 MG/DL (ref 8.4–10.2)
CHLORIDE SERPL-SCNC: 104 MMOL/L (ref 96–108)
CO2 SERPL-SCNC: 28 MMOL/L (ref 21–32)
CREAT SERPL-MCNC: 1.32 MG/DL (ref 0.6–1.3)
ERYTHROCYTE [DISTWIDTH] IN BLOOD BY AUTOMATED COUNT: 13.3 % (ref 11.6–15.1)
GFR SERPL CREATININE-BSD FRML MDRD: 59 ML/MIN/1.73SQ M
GLUCOSE SERPL-MCNC: 93 MG/DL (ref 65–140)
HCT VFR BLD AUTO: 42.6 % (ref 36.5–49.3)
HGB BLD-MCNC: 14 G/DL (ref 12–17)
MAGNESIUM SERPL-MCNC: 1.8 MG/DL (ref 1.9–2.7)
MCH RBC QN AUTO: 29.3 PG (ref 26.8–34.3)
MCHC RBC AUTO-ENTMCNC: 32.9 G/DL (ref 31.4–37.4)
MCV RBC AUTO: 89 FL (ref 82–98)
PHOSPHATE SERPL-MCNC: 3.6 MG/DL (ref 2.7–4.5)
PLATELET # BLD AUTO: 154 THOUSANDS/UL (ref 149–390)
PMV BLD AUTO: 11.6 FL (ref 8.9–12.7)
POTASSIUM SERPL-SCNC: 4.4 MMOL/L (ref 3.5–5.3)
RBC # BLD AUTO: 4.78 MILLION/UL (ref 3.88–5.62)
SODIUM SERPL-SCNC: 137 MMOL/L (ref 135–147)
WBC # BLD AUTO: 5.31 THOUSAND/UL (ref 4.31–10.16)

## 2022-06-25 PROCEDURE — 99231 SBSQ HOSP IP/OBS SF/LOW 25: CPT | Performed by: INTERNAL MEDICINE

## 2022-06-25 PROCEDURE — 83735 ASSAY OF MAGNESIUM: CPT

## 2022-06-25 PROCEDURE — 82088 ASSAY OF ALDOSTERONE: CPT | Performed by: INTERNAL MEDICINE

## 2022-06-25 PROCEDURE — 84244 ASSAY OF RENIN: CPT | Performed by: INTERNAL MEDICINE

## 2022-06-25 PROCEDURE — 99239 HOSP IP/OBS DSCHRG MGMT >30: CPT | Performed by: INTERNAL MEDICINE

## 2022-06-25 PROCEDURE — 80048 BASIC METABOLIC PNL TOTAL CA: CPT

## 2022-06-25 PROCEDURE — 85027 COMPLETE CBC AUTOMATED: CPT

## 2022-06-25 PROCEDURE — 84100 ASSAY OF PHOSPHORUS: CPT

## 2022-06-25 RX ORDER — METHOCARBAMOL 500 MG/1
500 TABLET, FILM COATED ORAL EVERY 6 HOURS PRN
Qty: 20 TABLET | Refills: 0 | Status: SHIPPED | OUTPATIENT
Start: 2022-06-25

## 2022-06-25 RX ORDER — ISOSORBIDE MONONITRATE 30 MG/1
30 TABLET, EXTENDED RELEASE ORAL DAILY
Qty: 30 TABLET | Refills: 0 | Status: SHIPPED | OUTPATIENT
Start: 2022-06-26 | End: 2022-07-27 | Stop reason: SDUPTHER

## 2022-06-25 RX ORDER — HYDRALAZINE HYDROCHLORIDE 50 MG/1
50 TABLET, FILM COATED ORAL EVERY 8 HOURS SCHEDULED
Qty: 90 TABLET | Refills: 0 | Status: SHIPPED | OUTPATIENT
Start: 2022-06-25 | End: 2022-07-01 | Stop reason: SDUPTHER

## 2022-06-25 RX ORDER — DOXAZOSIN MESYLATE 4 MG/1
4 TABLET ORAL 2 TIMES DAILY
Status: DISCONTINUED | OUTPATIENT
Start: 2022-06-25 | End: 2022-06-25 | Stop reason: HOSPADM

## 2022-06-25 RX ADMIN — HEPARIN SODIUM 5000 UNITS: 5000 INJECTION INTRAVENOUS; SUBCUTANEOUS at 05:03

## 2022-06-25 RX ADMIN — ACETAMINOPHEN 975 MG: 325 TABLET, FILM COATED ORAL at 05:03

## 2022-06-25 RX ADMIN — ACETAMINOPHEN 975 MG: 325 TABLET, FILM COATED ORAL at 14:05

## 2022-06-25 RX ADMIN — MULTIPLE VITAMINS W/ MINERALS TAB 1 TABLET: TAB ORAL at 09:32

## 2022-06-25 RX ADMIN — ISOSORBIDE MONONITRATE 30 MG: 30 TABLET, EXTENDED RELEASE ORAL at 09:32

## 2022-06-25 RX ADMIN — DOXAZOSIN 4 MG: 4 TABLET ORAL at 09:32

## 2022-06-25 RX ADMIN — HYDRALAZINE HYDROCHLORIDE 50 MG: 25 TABLET ORAL at 09:32

## 2022-06-25 RX ADMIN — HYDRALAZINE HYDROCHLORIDE 50 MG: 25 TABLET ORAL at 01:15

## 2022-06-25 RX ADMIN — GABAPENTIN 300 MG: 300 CAPSULE ORAL at 09:32

## 2022-06-25 RX ADMIN — METHOCARBAMOL 500 MG: 500 TABLET ORAL at 05:05

## 2022-06-25 NOTE — PLAN OF CARE
Problem: MOBILITY - ADULT  Goal: Maintain or return to baseline ADL function  Description: INTERVENTIONS:  -  Assess patient's ability to carry out ADLs; assess patient's baseline for ADL function and identify physical deficits which impact ability to perform ADLs (bathing, care of mouth/teeth, toileting, grooming, dressing, etc )  - Assess/evaluate cause of self-care deficits   - Assess range of motion  - Assess patient's mobility; develop plan if impaired  - Assess patient's need for assistive devices and provide as appropriate  - Encourage maximum independence but intervene and supervise when necessary  - Involve family in performance of ADLs  - Assess for home care needs following discharge   - Consider OT consult to assist with ADL evaluation and planning for discharge  - Provide patient education as appropriate  Outcome: Progressing  Goal: Maintains/Returns to pre admission functional level  Description: INTERVENTIONS:  - Perform BMAT or MOVE assessment daily    - Set and communicate daily mobility goal to care team and patient/family/caregiver  - Collaborate with rehabilitation services on mobility goals if consulted  - Perform Range of Motion  times a day  - Reposition patient every  hours    - Dangle patient  times a day  - Stand patient  times a day  - Ambulate patient times a day  - Out of bed to chair  times a day   - Out of bed for meals  times a day  - Out of bed for toileting  - Record patient progress and toleration of activity level   Outcome: Progressing     Problem: Potential for Falls  Goal: Patient will remain free of falls  Description: INTERVENTIONS:  - Educate patient/family on patient safety including physical limitations  - Instruct patient to call for assistance with activity   - Consult OT/PT to assist with strengthening/mobility   - Keep Call bell within reach  - Keep bed low and locked with side rails adjusted as appropriate  - Keep care items and personal belongings within reach  - Initiate and maintain comfort rounds  - Make Fall Risk Sign visible to staff  - Offer Toileting every  Hours, in advance of need  - Initiate/Maintain alarm  - Obtain necessary fall risk management equipment:   - Apply yellow socks and bracelet for high fall risk patients  - Consider moving patient to room near nurses station  Outcome: Progressing     Problem: Prexisting or High Potential for Compromised Skin Integrity  Goal: Skin integrity is maintained or improved  Description: INTERVENTIONS:  - Identify patients at risk for skin breakdown  - Assess and monitor skin integrity  - Assess and monitor nutrition and hydration status  - Monitor labs   - Assess for incontinence   - Turn and reposition patient  - Assist with mobility/ambulation  - Relieve pressure over bony prominences  - Avoid friction and shearing  - Provide appropriate hygiene as needed including keeping skin clean and dry  - Evaluate need for skin moisturizer/barrier cream  - Collaborate with interdisciplinary team   - Patient/family teaching  - Consider wound care consult   Outcome: Progressing

## 2022-06-25 NOTE — DISCHARGE INSTR - AVS FIRST PAGE
Dear Remberto Robertson,     It was our pleasure to care for you here at [facility]  It is our hope that we were always able to exceed the expected standards for your care during your stay  You were hospitalized due to hypertensive urgency   You were cared for on the *** floor under the service of Elli Urena MD with the [facility] who covers for your primary care physician (PCP), MAX Olivares, while you were hospitalized  If you have any questions or concerns related to this hospitalization, you may contact us at [phone]  For follow up as well as medication refills, we recommend that you follow up with your primary care physician  A registered nurse will reach out to you by phone within a few days after your discharge to answer any additional questions that you may have after going home  However, at this time we provide for you here, the most important instructions / recommendations at discharge:     Notable Medication Adjustments -   Please take Cardura XL 8 mg a day, Imdur 30 mg daily and Hydralazine 50mg three times a day for BP control  You will need to follow with your cardiologist closely for BP control and outpt workup   Please take your medications consistently as prescribed  Testing Required after Discharge -   PT/INR as scheduled   Important follow up information -   PCP  Cardiology  Other Instructions -   Frequent BP check at home   Please review this entire after visit summary as additional general instructions including medication list, appointments, activity, diet, any pertinent wound care, and other additional recommendations from your care team that may be provided for you        Sincerely,     Elli Urena MD

## 2022-06-25 NOTE — PROGRESS NOTES
Progress Note - Cardiology   Yobani Jones 60 y o  male MRN: [id number]  Unit/Bed#: S -01 Encounter: [id number]  06/25/22  11:32 AM    Impression:       57-year-old with history of hypertension, no diabetes or tobacco use, physically active at baseline, prior history of GI bleeding-gastric ulcers-status post clipping-2019, moderate coronary artery disease for medical management-coronary angiography in 2019-performed for elevated troponin in the setting of severe anemia, history of DVT-currently on Coumadin, prior angioedema to Eliquis, angioedema to multiple medications-lisinopril, amlodipine and Eliquis, longstanding hypertension with noncompliance with medications  I had last seen him in the office in June 2022  At that time he had gone off all of his antihypertensive medications and had started him on doxazosin 4 mg daily which he picked up but did not start yet  I also referred him to an allergist for his history of angioedema to multiple medications but did not follow through  He is now admitted with hypertensive urgency-checked his blood pressures at work and noted them to be markedly elevated-222/110-247/115 in the ER  Had recently been started on steroids for neck pain    Otherwise he is asymptomatic from a cardiac standpoint    Surprisingly troponins are negative    Despite addition of hydralazine and doxazosin, unable to wean off nitroprusside so far      Plan:    Hypertensive urgency:  With baseline poorly controlled hypertension as well as In the setting of recent steroid use for neck pain, Off of nitroprusside now    Has had a history of angioedema to lisinopril and amlodipine precluding Ace inhibitors, ARB and amlodipine   his bradycardia precludes beta-blocker  Also has CKD with baseline creatinine up to 1 4 hence did not use a diuretic, in the past had been on Aldactone  Blood pressures have improved although only to the 160-170 range on his current doses of doxazosin 2 mg b i d , hydralazine 50 t i d  And Imdur 30 daily    Will increase doxazosin to 4 mg b i d -he does have long-acting doxazosin XL at home already but please send another prescription (was recommended daily-will run out of it soon)  Hydralazine and Imdur are also new medications  Hold off steroids at DC    As to evaluation for secondary causes:     Will check renal artery Dopplers while is here although a prior CT did not suggest renal artery stenosis-August 2019  Check aldosterone/renin ratio    Mild sleep apnea on testing in January 2021     Prior history of DVT-right lower extremity-June 2020:  Apparently unprovoked, has remained on anticoagulation with Coumadin, in the past was on Eliquis and had angioedema  Coumadin has been restarted, INR is still subtherapeutic    From a cardiac standpoint, okay for discharge with above changes but would send his aldosterone/renin ratio and complete renal artery Dopplers prior to discharge    ===================================================================    Chief Complaint:   Chief Complaint   Patient presents with    Hypertension     Pt to ED with c/o elevated BP, pt complains of a "sore head"         Subjective/Objective     Subjective:  Denies any new complaints or symptoms of hypertensive emergency    Objective:  No distress    Patient Active Problem List   Diagnosis    Elevated troponin    Hypertension    Acute left ankle pain    Acute pain of left shoulder    Left ankle sprain    Acute left-sided low back pain without sciatica    S/P cardiac cath 11/06/19    Stomach ulcer    Iron deficiency anemia due to chronic blood loss    ZAKIA (acute kidney injury) ([facility] [address] )    Follow-up examination    Encounter for follow-up    NSTEMI (non-ST elevated myocardial infarction) ([facility] [address] )    Hyperlipidemia    Anemia    Coronary artery disease involving native heart without angina pectoris    Cellulitis of right lower extremity    Prostate cancer screening    ACE inhibitor-aggravated angioedema, sequela    Anticoagulation goal of INR 2 to 3    Acute deep vein thrombosis (DVT) of tibial vein (HCC)    Leg DVT (deep venous thromboembolism), chronic, right (HCC)    Febrile illness, acute    Bilateral lower extremity edema    Morbid obesity with BMI of 40 0-44 9, adult (HCC)    Cellulitis of left lower extremity    Itchy skin    Vasculitis (HCC)    Elevated serum creatinine    Low testosterone    History of DVT (deep vein thrombosis)    CKD (chronic kidney disease)    Lymphadenopathy    Edema of left lower leg due to peripheral venous insufficiency    Tooth decay    Preoperative clearance    Status post right knee replacement    Abscess of skin of left knee    BRUCE (obstructive sleep apnea)    Rash    Excessive cerumen in ear canal, bilateral    Anticoagulation adequate with anticoagulant therapy    Neck pain on right side    Hypertensive urgency       Vitals: /100   Pulse 63   Temp 97 6 °F (36 4 °C) (Oral)   Resp 12   Ht 5' 11" (1 803 m)   Wt 116 kg (255 lb 8 2 oz)   SpO2 98%   BMI 35 64 kg/m²     No intake/output data recorded  Wt Readings from Last 3 Encounters:   06/25/22 116 kg (255 lb 8 2 oz)   06/21/22 129 kg (285 lb)   06/07/22 128 kg (282 lb 9 6 oz)       Intake/Output Summary (Last 24 hours) at 6/25/2022 1132  Last data filed at 6/25/2022 0000  Gross per 24 hour   Intake 491 85 ml   Output --   Net 491 85 ml     I/O last 3 completed shifts:   In: 1333 2 [P O :1080; I V :253 2]  Out: 1600 [Urine:1600]    Invasive Devices  Report    Peripheral Intravenous Line  Duration           Peripheral IV 06/22/22 Right Antecubital 2 days    Peripheral IV 06/22/22 Right Antecubital 2 days                  Physical Exam:  GEN: Yina Gupta appears well, alert and oriented x 3, pleasant and cooperative   HEENT: pupils equal, round, and reactive to light; extraocular muscles intact  NECK: supple, no carotid bruits or JVD  HEART: regular rhythm, normal S1 and S2, no murmur, no clicks, gallops or rubs   LUNGS: clear to auscultation bilaterally; no wheezes or rhonchi, no rales  ABDOMEN/GI: normal bowel sounds, soft, no tenderness, no distention  EXTREMITIES/Musculoskeltal: peripheral pulses normal; no clubbing, cyanosis, minimal left lower extremity-chronic edema  NEURO: no focal motor findings   SKIN: normal without suspicious lesions on exposed skin              Lab Results:       Results from last 7 days   Lab Units 06/25/22  0509 06/24/22  0527 06/23/22  0508   WBC Thousand/uL 5 31 5 68 6 53   HEMOGLOBIN g/dL 14 0 13 8 13 7   HEMATOCRIT % 42 6 41 8 40 8   PLATELETS Thousands/uL 154 148* 149         Results from last 7 days   Lab Units 06/25/22  0509 06/24/22  0527 06/23/22  0508 06/22/22  1651   POTASSIUM mmol/L 4 4 4 4 3 3* 4 0   CHLORIDE mmol/L 104 103 104 104   CO2 mmol/L 28 29 29 29   BUN mg/dL 24 18 19 22   CREATININE mg/dL 1 32* 1 21 1 27 1 17   CALCIUM mg/dL 9 4 9 2 9 2 9 9   ALK PHOS U/L  --   --  61 69   ALT U/L  --   --  14 16   AST U/L  --   --  19 20     Results from last 7 days   Lab Units 06/24/22  0527 06/22/22  1651 06/21/22  1512   INR  1 00 0 93 0 97       Imaging: I have personally reviewed pertinent reports  EKG/Telemtry:  No events    Scheduled Meds:  Current Facility-Administered Medications   Medication Dose Route Frequency Provider Last Rate    acetaminophen  975 mg Oral Q8H [address] MAX Haines      [facility] Hold] doxazosin  4 mg Oral BID Donnie Gómez MD      gabapentin  300 mg Oral TID MAX Hanies      heparin (porcine)  5,000 Units Subcutaneous [facility] MAX Haines      hydrALAZINE  50 mg Oral [facility] Edwardo Haines [address]      isosorbide mononitrate  30 mg Oral Daily Edwardo Haines [address]      methocarbamol  500 mg Oral Q6H PRN MAX Haines      multivitamin-minerals  1 tablet Oral Daily MAX Haines       Continuous Infusions:          This note was completed in part utilizing m-modal fluency direct voice recognition software  Grammatical errors, random word insertion, spelling mistakes, occasional wrong word or "sound-alike" substitutions and incomplete sentences may be an occasional consequence of the system secondary to software limitations, ambient noise and hardware issues  At the time of dictation, efforts were made to edit, clarify and /or correct errors  Please read the chart carefully and recognize, using context, where substitutions have occurred  If you have any questions or concerns about the context, text or information contained within the body of this dictation, please contact myself, the provider, for further clarification

## 2022-06-25 NOTE — PLAN OF CARE
Problem: MOBILITY - ADULT  Goal: Maintain or return to baseline ADL function  Description: INTERVENTIONS:  -  Assess patient's ability to carry out ADLs; assess patient's baseline for ADL function and identify physical deficits which impact ability to perform ADLs (bathing, care of mouth/teeth, toileting, grooming, dressing, etc )  - Assess/evaluate cause of self-care deficits   - Assess range of motion  - Assess patient's mobility; develop plan if impaired  - Assess patient's need for assistive devices and provide as appropriate  - Encourage maximum independence but intervene and supervise when necessary  - Involve family in performance of ADLs  - Assess for home care needs following discharge   - Consider OT consult to assist with ADL evaluation and planning for discharge  - Provide patient education as appropriate  6/25/2022 1450 by Zbigniew Ochoa RN  Outcome: Adequate for Discharge  6/25/2022 1258 by Zbigniew Ochoa RN  Outcome: Progressing  Goal: Maintains/Returns to pre admission functional level  Description: INTERVENTIONS:  - Perform BMAT or MOVE assessment daily    - Set and communicate daily mobility goal to care team and patient/family/caregiver  - Collaborate with rehabilitation services on mobility goals if consulted  - Perform Range of Motion  times a day  - Reposition patient every  hours    - Dangle patient  times a day  - Stand patient  times a day  - Ambulate patient  times a day  - Out of bed to chair  times a day   - Out of bed for meals  times a day  - Out of bed for toileting  - Record patient progress and toleration of activity level   6/25/2022 1450 by Zbigniew Ochoa RN  Outcome: Adequate for Discharge  6/25/2022 1258 by Zbigniew Ochoa RN  Outcome: Progressing     Problem: Potential for Falls  Goal: Patient will remain free of falls  Description: INTERVENTIONS:  - Educate patient/family on patient safety including physical limitations  - Instruct patient to call for assistance with activity   - Consult OT/PT to assist with strengthening/mobility   - Keep Call bell within reach  - Keep bed low and locked with side rails adjusted as appropriate  - Keep care items and personal belongings within reach  - Initiate and maintain comfort rounds  - Make Fall Risk Sign visible to staff  - Offer Toileting every  Hours, in advance of need  - Initiate/Maintain alarm  - Obtain necessary fall risk management equipment:   - Apply yellow socks and bracelet for high fall risk patients  - Consider moving patient to room near nurses station  6/25/2022 1450 by Randall Dominguez RN  Outcome: Adequate for Discharge  6/25/2022 1258 by Randall Dominguez RN  Outcome: Progressing     Problem: Prexisting or High Potential for Compromised Skin Integrity  Goal: Skin integrity is maintained or improved  Description: INTERVENTIONS:  - Identify patients at risk for skin breakdown  - Assess and monitor skin integrity  - Assess and monitor nutrition and hydration status  - Monitor labs   - Assess for incontinence   - Turn and reposition patient  - Assist with mobility/ambulation  - Relieve pressure over bony prominences  - Avoid friction and shearing  - Provide appropriate hygiene as needed including keeping skin clean and dry  - Evaluate need for skin moisturizer/barrier cream  - Collaborate with interdisciplinary team   - Patient/family teaching  - Consider wound care consult   6/25/2022 1450 by Randall Dominguez RN  Outcome: Adequate for Discharge  6/25/2022 1258 by Randall Dominguez RN  Outcome: Progressing

## 2022-06-25 NOTE — DISCHARGE SUMMARY
Discharge Summary - [facility] Internal Medicine    Patient Information: Jing Patten 60 y o  male MRN: [id number]  Unit/Bed#: S -01 Encounter: [id number]    Discharging Physician / Practitioner: Lan Parkinson MD  [facility]  Admission Date: 6/22/2022  Discharge Date: 06/25/22    Disposition:     Home    Reason for Admission: Home    Discharge Diagnoses:     Principal Problem:    Hypertensive urgency  Active Problems:    Hyperlipidemia    Morbid obesity with BMI of 40 0-44 9, adult ([address] )    History of DVT (deep vein thrombosis)    Edema of left lower leg due to peripheral venous insufficiency    Neck pain on right side  Resolved Problems:    * No resolved hospital problems  *      Consultations During Hospital Stay:  · Cardiology    Procedures Performed:     · Echo EF of 70%  Mildly increased LV wall thickness  Grade 1 abnormal relaxation       Hospital Course: Jing Patten is a 60 y o  male patient with PMHx of HTN, DVT on coumadin, angioedema who originally presented to the hospital on 6/22/2022 due to Hypertensive urgency for which he was initially admitted to ICU for Nitroprusside drip  Pt has been noncompliant with his meds and stopped taking cardura recently  At this point Pt is off of nitroprusside drip  His BP has been much improved after Cardura , Imdur and hydralazine  He will have outpt renal doppler as pt doesn't want to wait inpt for it  He is anxious to go home tonight  Pt will follow with Dr Eleazar Kussmaul closely  Discharge Day Visit / Exam:     Subjective:  No overnight events   Asked to go home   Vitals: Blood Pressure: 167/100 (06/25/22 0959)  Pulse: 63 (06/25/22 0959)  Temperature: 97 6 °F (36 4 °C) (06/25/22 0509)  Temp Source: Oral (06/25/22 0509)  Respirations: 12 (06/25/22 0959)  Height: 5' 11" (180 3 cm) (06/24/22 0700)  Weight - Scale: 116 kg (255 lb 8 2 oz) (06/25/22 0503)  SpO2: 98 % (06/25/22 0959)  Exam:   Physical Exam  Constitutional: General: He is not in acute distress  Appearance: He is not ill-appearing, toxic-appearing or diaphoretic  Eyes:      General:         Right eye: No discharge  Left eye: No discharge  Cardiovascular:      Rate and Rhythm: Normal rate and regular rhythm  Pulses: Normal pulses  Heart sounds: No murmur heard  Pulmonary:      Effort: Pulmonary effort is normal  No respiratory distress  Breath sounds: No wheezing  Abdominal:      General: Abdomen is flat  Bowel sounds are normal  There is no distension  Palpations: Abdomen is soft  Musculoskeletal:         General: No swelling  Neurological:      Mental Status: He is oriented to person, place, and time  Psychiatric:         Mood and Affect: Mood normal          Behavior: Behavior normal        Discharge instructions/Information to patient and family:   See after visit summary for information provided to patient and family  Provisions for Follow-Up Care:  See after visit summary for information related to follow-up care and any pertinent home health orders  Planned Readmission: No      Discharge Statement:  I spent 30  minutes discharging the patient  This time was spent on the day of discharge  I had direct contact with the patient on the day of discharge  Greater than 50% of the total time was spent examining patient, answering all patient questions, arranging and discussing plan of care with patient as well as directly providing post-discharge instructions  Additional time then spent on discharge activities  Discharge Medications:  See after visit summary for reconciled discharge medications provided to patient and family        ** Please Note: This note has been constructed using a voice recognition system **

## 2022-06-25 NOTE — QUICK NOTE
Contacted by [facility]  Pt will need pre authorization for Cardura due to increased dose  I contact Dr Antione Castanon who states that his office will contact insurance for preauth   Pt is notified to take two of  his 4 mg Cardura XL daily while waiting for insurance approval

## 2022-06-27 ENCOUNTER — TELEPHONE (OUTPATIENT)
Dept: CARDIOLOGY CLINIC | Facility: CLINIC | Age: 57
End: 2022-06-27

## 2022-06-27 ENCOUNTER — TRANSITIONAL CARE MANAGEMENT (OUTPATIENT)
Dept: FAMILY MEDICINE CLINIC | Facility: CLINIC | Age: 57
End: 2022-06-27

## 2022-06-28 ENCOUNTER — OFFICE VISIT (OUTPATIENT)
Dept: FAMILY MEDICINE CLINIC | Facility: CLINIC | Age: 57
End: 2022-06-28
Payer: COMMERCIAL

## 2022-06-28 ENCOUNTER — TELEPHONE (OUTPATIENT)
Dept: CARDIOLOGY CLINIC | Facility: CLINIC | Age: 57
End: 2022-06-28

## 2022-06-28 VITALS
OXYGEN SATURATION: 98 % | DIASTOLIC BLOOD PRESSURE: 100 MMHG | HEART RATE: 68 BPM | WEIGHT: 282 LBS | HEIGHT: 71 IN | BODY MASS INDEX: 39.48 KG/M2 | TEMPERATURE: 97 F | SYSTOLIC BLOOD PRESSURE: 172 MMHG

## 2022-06-28 DIAGNOSIS — M54.50 ACUTE LEFT-SIDED LOW BACK PAIN WITHOUT SCIATICA: ICD-10-CM

## 2022-06-28 DIAGNOSIS — I10 HYPERTENSION, UNSPECIFIED TYPE: Primary | ICD-10-CM

## 2022-06-28 DIAGNOSIS — D68.61 ANTIPHOSPHOLIPID SYNDROME (HCC): ICD-10-CM

## 2022-06-28 DIAGNOSIS — I25.10 CORONARY ARTERY DISEASE INVOLVING NATIVE HEART WITHOUT ANGINA PECTORIS, UNSPECIFIED VESSEL OR LESION TYPE: ICD-10-CM

## 2022-06-28 DIAGNOSIS — G47.33 OSA (OBSTRUCTIVE SLEEP APNEA): ICD-10-CM

## 2022-06-28 DIAGNOSIS — I82.501 LEG DVT (DEEP VENOUS THROMBOEMBOLISM), CHRONIC, RIGHT (HCC): ICD-10-CM

## 2022-06-28 PROCEDURE — 99214 OFFICE O/P EST MOD 30 MIN: CPT | Performed by: FAMILY MEDICINE

## 2022-06-28 PROCEDURE — 1111F DSCHRG MED/CURRENT MED MERGE: CPT | Performed by: FAMILY MEDICINE

## 2022-06-28 PROCEDURE — 3080F DIAST BP >= 90 MM HG: CPT | Performed by: FAMILY MEDICINE

## 2022-06-28 PROCEDURE — 3077F SYST BP >= 140 MM HG: CPT | Performed by: FAMILY MEDICINE

## 2022-06-28 PROCEDURE — 1036F TOBACCO NON-USER: CPT | Performed by: FAMILY MEDICINE

## 2022-06-28 PROCEDURE — 3008F BODY MASS INDEX DOCD: CPT | Performed by: FAMILY MEDICINE

## 2022-06-28 NOTE — ASSESSMENT & PLAN NOTE
No  Cp  Or  Sob  Has cardiologist  On  [address] s appointment  With  Cardiologist  shortly

## 2022-06-28 NOTE — ASSESSMENT & PLAN NOTE
HE  Is on  cumudin    His  Pt  inr  Is  Managed  By  His  pcp  ARIAN   He  Is  Here  Because   Arian  Is  On  Vacation  Asked  Him  To  Be  Complaint  And  follow  Up  With  him

## 2022-06-28 NOTE — PROGRESS NOTES
Assessment/Plan:as   below  This  Is  Arian's  patient         Problem List Items Addressed This Visit        Respiratory    BRUCE (obstructive sleep apnea)     He   Is  Going   For   Sleep  Study  He  Did  Not do  It  Last  time           Relevant Orders    Ambulatory Referral to Sleep Medicine       Cardiovascular and Mediastinum    Hypertension - Primary     He  Ws   Hospitalized  3  Weeks  Ago  For  Hypertensive  Urgency  He  Was in  ICU  For   extremely  High  BP   He   Has  been  Placed on   cardura  Furosemide  Isosorbide  And doxazosin  His  Repeat  BP  Today  Is  154/94  He  Says  His  BP  Is  Much  Better   On  The  Home monitoring  A  Well   His   Cardiologist  Notes  Reviewed   EF  70    he   Has been  Non  Complaint  And  Ha d stopped  His  BP  meds  And  Ended  Up  In  The  Hospital     He  Has appointment  With  The cardiologist  Shortly  Return  Parameters  Discussed  Keep  Logging   I  Will  Wait  For  The  Cardiologist to  Adjust  His  meds    As it  Is  Coming  Down  Gradually asked  Him  To  Go  To  The  ER  If  Systolic  Is  Higher  then  180               Coronary artery disease involving native heart without angina pectoris     No  Cp  Or  Sob  Has cardiologist  On  [address] s appointment  With  Cardiologist  shortly           Leg DVT (deep venous thromboembolism), chronic, right ([address] )     HE  Is on  cumudin    His  Pt  inr  Is  Managed  By  His  pcp  ARIAN   He  Is  Here  Because   Arian  Is  On  Vacation  Asked  Him  To  Be  Complaint  And  follow  Up  With  him              Hematopoietic and Hemostatic    Antiphospholipid syndrome ([facility] [address] )       Other    Acute left-sided low back pain without sciatica     Chronic  Is  On  gabapentin                   Subjective:      Patient ID: Naomi Doss is a 60 y o  male  HPI-  Came  In   To  Follow  Up  After  His  Hospital  Admission  For  Hypertensive  Urgency    He  Is  Much  Better  Now  He  Has   ?  Sleep  Apnea  Is  Overweight  H/o   Recurrent  DVT And  antiphospholipid  Ab  Syndrome    His  BP  Is  Much   Better  Then  His  previous  Numbers    It  Is  Coming  Down    Slowly  No  Cp or  Sob     chronic  Back pain  Offered   To  reff  To   neurosurgeon  And  Neurologist  He  Declined   Is  Going  To  Chiropractor and  Getting the  Mri  Shortly      The following portions of the patient's history were reviewed and updated as appropriate:   Past Medical History:  He has a past medical history of Angioedema, Coronary artery disease, GI bleed, Hypertension, and STEMI (ST elevation myocardial infarction) ([address] )  ,  _______________________________________________________________________  Medical Problems:  does not have any pertinent problems on file ,  _______________________________________________________________________  Past Surgical History:   has a past surgical history that includes Upper gastrointestinal endoscopy and Replacement total knee (Right, 12/2020)  ,  _______________________________________________________________________  Family History:  He was adopted  Family history is unknown by patient  ,  _______________________________________________________________________  Social History:   reports that he has never smoked  He has never used smokeless tobacco  He reports that he does not drink alcohol and does not use drugs  ,  _______________________________________________________________________  Allergies:  is allergic to lisinopril, norvasc [amlodipine], amoxicillin, eliquis [apixaban], and lipitor [atorvastatin]     _______________________________________________________________________  Current Outpatient Medications   Medication Sig Dispense Refill    acetaminophen (TYLENOL) 500 mg tablet Take 1,000 mg by mouth daily      doxazosin (CARDURA XL) 4 MG 24 hr tablet Take 2 tablets (8 mg total) by mouth daily with breakfast 60 tablet 0    furosemide (LASIX) 20 mg tablet Take 1 tablet (20 mg total) by mouth daily as needed (swelling of the legs) 60 tablet 3    gabapentin (Neurontin) 300 mg capsule Take 1 capsule (300 mg total) by mouth 3 (three) times a day for 14 days For post-herpetic neuralgia: Take 1 tablet on day 1,  Then take 2 tablets on day 2, Then take 3 tablets on day 3 and every day after that as instructed by your doctor  (Patient taking differently: Take 300 mg by mouth 3 (three) times a day For post-herpetic neuralgia: Take 1 tablet on day 1,  Then take 2 tablets on day 2, Then take 3 tablets on day 3 and every day after that as instructed by your doctor ) 42 capsule 0    hydrALAZINE (APRESOLINE) 50 mg tablet Take 1 tablet (50 mg total) by mouth every 8 (eight) hours 90 tablet 0    isosorbide mononitrate (IMDUR) 30 mg 24 hr tablet Take 1 tablet (30 mg total) by mouth daily 30 tablet 0    methocarbamol (ROBAXIN) 500 mg tablet Take 1 tablet (500 mg total) by mouth every 6 (six) hours as needed for muscle spasms 20 tablet 0    multivitamin (THERAGRAN) TABS Take 1 tablet by mouth daily      rosuvastatin (CRESTOR) 5 mg tablet Take 1 tablet (5 mg total) by mouth daily 90 tablet 3    sildenafil (VIAGRA) 25 MG tablet TAKE 1 TABLET BY MOUTH DAILY AS NEEDED FOR ERECTILE DYSFUNCTION  10 tablet 3    warfarin (COUMADIN) 2 5 mg tablet Take 1 tablet (2 5 mg total) by mouth daily 30 tablet 2    warfarin (COUMADIN) 5 mg tablet TAKE 2 TABLETS BY MOUTH EVERY DAY 60 tablet 2    carbamide peroxide (DEBROX) 6 5 % otic solution Administer 5 drops into both ears 2 (two) times a day 15 mL 2     No current facility-administered medications for this visit      _______________________________________________________________________  Review of Systems   Constitutional: Negative for chills, fatigue and fever  HENT: Negative for congestion and postnasal drip  Eyes: Negative for pain, discharge, redness and itching  Respiratory: Negative for cough, shortness of breath and wheezing  Cardiovascular: Negative for chest pain, palpitations and leg swelling  Htn  hyperlipidemia   Gastrointestinal: Negative for abdominal distention, abdominal pain and constipation  Endocrine: Negative for cold intolerance, heat intolerance, polydipsia and polyphagia  Genitourinary: Negative for dysuria, flank pain and urgency  Musculoskeletal: Positive for arthralgias, back pain and neck pain  Skin: Negative for rash  Neurological: Negative for dizziness and headaches  Psychiatric/Behavioral: Negative for sleep disturbance and suicidal ideas  The patient is not nervous/anxious  Objective:  Vitals:    06/28/22 1725   BP: (!) 172/100   BP Location: Left arm   Patient Position: Sitting   Cuff Size: Large   Pulse: 68   Temp: (!) 97 °F (36 1 °C)   TempSrc: Temporal   SpO2: 98%   Weight: 128 kg (282 lb)   Height: 5' 11" (1 803 m)     Body mass index is 39 33 kg/m²  Physical Exam  Vitals reviewed  Constitutional:       General: He is not in acute distress  Appearance: He is obese  He is not ill-appearing, toxic-appearing or diaphoretic  HENT:      Head: Normocephalic and atraumatic  Nose: Nose normal  No congestion  Mouth/Throat:      Mouth: Mucous membranes are moist    Eyes:      Extraocular Movements: Extraocular movements intact  Conjunctiva/sclera: Conjunctivae normal       Pupils: Pupils are equal, round, and reactive to light  Neck:      Vascular: No carotid bruit  Cardiovascular:      Rate and Rhythm: Normal rate and regular rhythm  Pulses: Normal pulses  Heart sounds: Normal heart sounds  No murmur heard  No gallop  Pulmonary:      Effort: Pulmonary effort is normal       Breath sounds: Normal breath sounds  No wheezing, rhonchi or rales  Abdominal:      General: There is no distension  Palpations: Abdomen is soft  Tenderness: There is no abdominal tenderness  There is no right CVA tenderness or left CVA tenderness  Musculoskeletal:      Cervical back: Normal range of motion and neck supple   No rigidity or tenderness  Right lower leg: No edema  Left lower leg: No edema  Lymphadenopathy:      Cervical: No cervical adenopathy  Skin:     Findings: No erythema or rash  Neurological:      General: No focal deficit present  Mental Status: He is alert and oriented to person, place, and time     Psychiatric:         Mood and Affect: Mood normal          Behavior: Behavior normal

## 2022-06-28 NOTE — UTILIZATION REVIEW
Initial Clinical Review    Admission: Date/Time/Statement:   Admission Orders (From admission, onward)     Ordered        06/22/22 2217  Inpatient Admission  Once                      Orders Placed This Encounter   Procedures    Inpatient Admission     Standing Status:   Standing     Number of Occurrences:   1     Order Specific Question:   Level of Care     Answer:   Critical Care [15]     Order Specific Question:   Estimated length of stay     Answer:   More than 2 Midnights     Order Specific Question:   Certification     Answer:   I certify that inpatient services are medically necessary for this patient for a duration of greater than two midnights  See H&P and MD Progress Notes for additional information about the patient's course of treatment  ED Arrival Information     Expected   -    Arrival   6/22/2022 15:38    Acuity   Urgent            Means of arrival   Walk-In    Escorted by   Self    Service   Critical Care/ICU    Admission type   Urgent            Arrival complaint   Hypertension            Chief Complaint   Patient presents with    Hypertension     Pt to ED with c/o elevated BP, pt complains of a "sore head"       Initial Presentation: 60 y o  male from home to ED admitted inpatient due to Hypertensive Urgency  Medication non compliance  Presented due to hypertension with unknown time frame  Has not been taking antihypertensives for last month  Today tried his old Carvedilol for systolic BP of 453 and did not improve so came to ED  States BP was high at PCP 2 days ago  On exam hypertensive  In the ED given IV apresoline 5 mg and 10 mg, started on Nipride  Plan is continue Nipride, consult cardiology  Check echo  Date: 6/23/22   Day 2:  Admits was started on Cardura as OP but stopped 3 days ago when BP elevated  Is allergic to amlodipine and lisinopril   telemetry sinus bradycardia  On exam Cervical tenderness  Plan is continue Nipride       6/23/22 per Cardiology - patient with hypertensive urgency and etiology could be due to medication non compliance vs resistant hypertension  On Nipride for goal BP of 160 - 180  Plan is restart home Cardura and wean Nipride as able  Date: 6/24/22   Day 3:  Feels ok, neck discomfort  yesterday afternoon started on Cardura and Hydralazine and continued on nipride gtt  On exam: obese  Bradycardic  Trace bilateral LE edema  Plan is wean nipride as able  Continue Cardura and hydralazine, Add Imdur        ED Triage Vitals   Temperature Pulse Respirations Blood Pressure SpO2   06/22/22 1642 06/22/22 1642 06/22/22 1642 06/22/22 1642 06/22/22 1642   97 7 °F (36 5 °C) (!) 50 18 (!) 222/110 100 %      Temp Source Heart Rate Source Patient Position - Orthostatic VS BP Location FiO2 (%)   06/22/22 1642 06/22/22 1642 06/22/22 1642 06/22/22 1642 --   Oral Monitor Sitting Left arm       Pain Score       06/22/22 2343       No Pain          Wt Readings from Last 1 Encounters:   06/25/22 116 kg (255 lb 8 2 oz)     Additional Vital Signs:   06/23/22 1400 -- 59 25 Abnormal  144/80 106 97 % -- --   06/23/22 1300 -- 68 30 Abnormal  132/82 101 96 % -- --   06/23/22 1200 -- 56 10 Abnormal  184/93 Abnormal  131 98 % -- --   06/23/22 1130 97 5 °F (36 4 °C) 63 24 Abnormal  171/79 Abnormal  113 99 % -- --   06/23/22 1100 -- 66 23 Abnormal  146/70 101 99 % -- --   06/23/22 1000 -- 59 20 182/88 Abnormal  126 100 % -- --   06/23/22 0900 -- 57 32 Abnormal  165/87 116 99 % -- --   06/23/22 0736 97 6 °F (36 4 °C) 56 22 124/66 80 94 % -- Lying   06/23/22 0700 -- 53 Abnormal  16 145/81 105 97 % -- --   06/23/22 0605 -- -- -- 170/84 120 -- -- --   06/23/22 0525 -- -- -- 164/77 111 -- -- --   06/23/22 0405 -- -- -- 139/70 97 -- -- --   06/23/22 0305 -- 58 14 139/71 97 96 % -- --   06/23/22 0205 -- 63 21 153/116 Abnormal  130 99 % -- --   06/23/22 0035 97 7 °F (36 5 °C) 45 Abnormal  17 169/91 124 96 % None (Room air) Lying   06/23/22 0015 -- 54 Abnormal  -- 159/79 110 97 % None (Room air) Lying   06/22/22 2300 -- 52 Abnormal  16 173/83 Abnormal  118 97 % None (Room air) Lying   06/22/22 2200 -- 54 Abnormal  18 210/102 Abnormal  146 100 % None (Room air) Lying   06/22/22 2100 -- 58 18 195/95 Abnormal  -- 100 % None (Room air) Lying       Pertinent Labs/Diagnostic Test Results:   CT head without contrast   Final Result by Thiago Mcbride DO (06/22 2140)      No acute intracranial abnormality  Workstation performed: [id number]           6/22/22 ecg Marked sinus bradycardia  Abnormal ECG  When compared with ECG of 18-AUG-2020 20:42,  No significant change was found    6/23/22 ecg Sinus bradycardia  Otherwise normal ECG    6/24/22 echo Left Ventricle: Left ventricular cavity size is normal  Wall thickness is mildly increased  The left ventricular ejection fraction is 70%  Systolic function is vigorous  Wall motion is normal  Diastolic function is mildly abnormal, consistent with grade I (abnormal) relaxation      Results from last 7 days   Lab Units 06/25/22  0509 06/24/22  0527 06/23/22  0508 06/22/22  1651   WBC Thousand/uL 5 31 5 68 6 53 7 81   HEMOGLOBIN g/dL 14 0 13 8 13 7 13 8   HEMATOCRIT % 42 6 41 8 40 8 41 8   PLATELETS Thousands/uL 154 148* 149 160   NEUTROS ABS Thousands/µL  --   --  3 40 4 38     Results from last 7 days   Lab Units 06/25/22  0509 06/24/22  0527 06/23/22  0508 06/22/22  1651   SODIUM mmol/L 137 136 139 139   POTASSIUM mmol/L 4 4 4 4 3 3* 4 0   CHLORIDE mmol/L 104 103 104 104   CO2 mmol/L 28 29 29 29   ANION GAP mmol/L 5 4 6 6   BUN mg/dL 24 18 19 22   CREATININE mg/dL 1 32* 1 21 1 27 1 17   EGFR ml/min/1 73sq m 59 65 61 68   CALCIUM mg/dL 9 4 9 2 9 2 9 9   MAGNESIUM mg/dL 1 8* 1 9 1 5*  --    PHOSPHORUS mg/dL 3 6 3 0 3 3  --      Results from last 7 days   Lab Units 06/23/22  0508 06/22/22  1651   AST U/L 19 20   ALT U/L 14 16   ALK PHOS U/L 61 69   TOTAL PROTEIN g/dL 6 8 7 7   ALBUMIN g/dL 3 8 4 2   TOTAL BILIRUBIN mg/dL 0 58 0 51     Results from last 7 days   Lab Units 06/25/22  0509 06/24/22  0527 06/23/22  0508 06/22/22  1651   GLUCOSE RANDOM mg/dL 93 96 112 92     Results from last 7 days   Lab Units 06/22/22  1921 06/22/22  1651   HS TNI 0HR ng/L  --  10   HS TNI 2HR ng/L 12  --    HSTNI D2 ng/L 2  --      Results from last 7 days   Lab Units 06/24/22  0527 06/22/22  1651 06/21/22  1512   PROTIME seconds 13 2 12 5 12 9   INR  1 00 0 93 0 97   PTT seconds  --  31  --        ED Treatment:   Medication Administration from 06/22/2022 1538 to 06/23/2022 0035       Date/Time Order Dose Route Action Comments     06/22/2022 1923 hydrALAZINE (APRESOLINE) injection 5 mg 5 mg Intravenous Given      06/22/2022 2022 hydrALAZINE (APRESOLINE) injection 10 mg 10 mg Intravenous Given      06/22/2022 2345 nitroPRUSside (NIPRIDE) 50,000 mcg in dextrose 5 % 250 mL infusion 0 8 mcg/kg/min Intravenous Rate/Dose Change      06/22/2022 2305 nitroPRUSside (NIPRIDE) 50,000 mcg in dextrose 5 % 250 mL infusion 0 7 mcg/kg/min Intravenous Rate/Dose Change      06/22/2022 2247 nitroPRUSside (NIPRIDE) 50,000 mcg in dextrose 5 % 250 mL infusion 0 6 mcg/kg/min Intravenous Rate/Dose Change      06/22/2022 2235 nitroPRUSside (NIPRIDE) 50,000 mcg in dextrose 5 % 250 mL infusion 0 5 mcg/kg/min Intravenous Rate/Dose Change      06/22/2022 2222 nitroPRUSside (NIPRIDE) 50,000 mcg in dextrose 5 % 250 mL infusion 0 4 mcg/kg/min Intravenous Rate/Dose Change      06/22/2022 2213 nitroPRUSside (NIPRIDE) 50,000 mcg in dextrose 5 % 250 mL infusion 0 3 mcg/kg/min Intravenous New Bag         Past Medical History:   Diagnosis Date    Angioedema     Coronary artery disease     GI bleed     Hypertension     stopped his meds when ran out several years ago    STEMI (ST elevation myocardial infarction) ([facility] [address] )      Present on Admission:   Hyperlipidemia   Edema of left lower leg due to peripheral venous insufficiency   Neck pain on right side      Admitting Diagnosis: Hypertension [I10]  Hypertensive urgency [I16 0]  Age/Sex: 60 y o  male  Admission Orders:  Scheduled Medications:  acetaminophen, 975 mg, Oral, Q8H [facility]  gabapentin, 300 mg, Oral, TID  multivitamin-minerals, 1 tablet, Oral, Daily  warfarin, 7 5 mg, Oral, Once (warfarin) 6/23 and 6/24     doxazosin (CARDURA) tablet 2 mg  Dose: 2 mg  Freq: Once Route: PO  Start: 06/23/22 2200 End: 06/23/22 2133    doxazosin (CARDURA) tablet 2 mg  Dose: 2 mg  Freq: Once Route: PO  Start: 06/23/22 1445 End: 06/23/22 1436    doxazosin (CARDURA) tablet 2 mg  Dose: 2 mg  Freq: 2 times daily Route: PO  Start: 06/24/22 0900    hydrALAZINE (APRESOLINE) tablet 50 mg  Dose: 50 mg  Freq: Every 8 hours scheduled Route: PO  Start: 06/23/22 1730    isosorbide mononitrate (IMDUR) 24 hr tablet 30 mg  Dose: 30 mg  Freq: Daily Route: PO  Start: 06/24/22 0915    Continuous IV Infusions:  nitroPRUSside (NIPRIDE) 250 mL infusion, 0 1-2 mcg/kg/min, Intravenous, Titrated      PRN Meds:  methocarbamol, 500 mg, Oral, Q6H PRN - used x 1 6/24      IP CONSULT TO CARDIOLOGY    Network Utilization Review Department  ATTENTION: Please call with any questions or concerns to [phone] and carefully listen to the prompts so that you are directed to the right person  All voicemails are confidential   Katelynn Granados all requests for admission clinical reviews, approved or denied determinations and any other requests to dedicated fax number below belonging to the campus where the patient is receiving treatment   List of dedicated fax numbers for the Facilities:  [address] DENIALS (Administrative/Medical Necessity) [phone]   [address] (Maternity/NICU/Pediatrics) [address] [facility] [place] [address] [address] [address] [facility] [address] [address] [place] [address] [address]laid Robb Granados [address] [facility] [place] [address] [phone]

## 2022-06-28 NOTE — ASSESSMENT & PLAN NOTE
He  Ws   Hospitalized  3  Weeks  Ago  For  Hypertensive  Urgency  He  Was in  ICU  For   extremely  High  BP   He   Has  been  Placed on   cardura  Furosemide  Isosorbide  And doxazosin  His  Repeat  BP  Today  Is  154/94  He  Says  His  BP  Is  Much  Better   On  The  Home monitoring  A  Well   His   Cardiologist  Notes  Reviewed   EF  70    he   Has been  Non  Complaint  And  Ha d stopped  His  BP  meds  And  Ended  Up  In  The  Hospital     He  Has appointment  With  The cardiologist  Shortly  Return  Parameters  Discussed  Keep  Logging   I  Will  Wait  For  The  Cardiologist to  Adjust  His  meds    As it  Is  Coming  Down  Gradually asked  Him  To  Go  To  The  ER  If  Systolic  Is  Higher  then  180

## 2022-06-28 NOTE — TELEPHONE ENCOUNTER
P/C questions pinch nerve and ordering testing, called pt and advised that looking like testing/xray was ordered under pcp    Pt is scheuduled to see pcp today    Bp running high still, still having pain    /111 HR 60    Please advise

## 2022-07-01 DIAGNOSIS — I16.0 HYPERTENSIVE URGENCY: ICD-10-CM

## 2022-07-01 RX ORDER — HYDRALAZINE HYDROCHLORIDE 100 MG/1
100 TABLET, FILM COATED ORAL 3 TIMES DAILY
Qty: 270 TABLET | Refills: 3 | Status: SHIPPED | OUTPATIENT
Start: 2022-07-01 | End: 2023-07-07

## 2022-07-01 NOTE — PROGRESS NOTES
Increase hydralazine to 100 tid, sent to the pharmacy    Complete renal art dopplers and Jesus rennin ratio

## 2022-07-01 NOTE — TELEPHONE ENCOUNTER
Called and spoke to pt and advised  increase hydralazine to 100 tid, sent to the pharmacy, also advised Complete renal art dopplers and Jesus rennin ratio - ordered    Pt verbally understood

## 2022-07-02 LAB
ALDOST SERPL-MCNC: 3.3 NG/DL (ref 0–30)
ALDOST/RENIN PLAS-RTO: 10.6 {RATIO} (ref 0–30)
RENIN PLAS-CCNC: 0.31 NG/ML/HR (ref 0.17–5.38)

## 2022-07-03 LAB
ALDOST SERPL-MCNC: 3.8 NG/DL (ref 0–30)
ALDOST/RENIN PLAS-RTO: 1.8 {RATIO} (ref 0–30)
RENIN PLAS-CCNC: 2.15 NG/ML/HR (ref 0.17–5.38)

## 2022-07-07 ENCOUNTER — TELEPHONE (OUTPATIENT)
Dept: FAMILY MEDICINE CLINIC | Facility: CLINIC | Age: 57
End: 2022-07-07

## 2022-07-07 NOTE — TELEPHONE ENCOUNTER
Pt called and left msg stating that he is still having the pinched nerve pain really bad would like to know if you can get him into a pain management specialist he stated he cannot see one until August

## 2022-07-09 LAB — INR PPP: 2 (ref 0.84–1.19)

## 2022-07-10 LAB
INR PPP: 2 (ref 0.9–1.2)
PROTHROMBIN TIME: 20.1 SEC (ref 9.1–12)

## 2022-07-12 ENCOUNTER — ANTICOAG VISIT (OUTPATIENT)
Dept: FAMILY MEDICINE CLINIC | Facility: CLINIC | Age: 57
End: 2022-07-12

## 2022-07-12 ENCOUNTER — TELEPHONE (OUTPATIENT)
Dept: FAMILY MEDICINE CLINIC | Facility: CLINIC | Age: 57
End: 2022-07-12

## 2022-07-12 NOTE — PATIENT INSTRUCTIONS
Hypertension   WHAT YOU NEED TO KNOW:   What is hypertension? Hypertension is high blood pressure  Your blood pressure is the force of your blood moving against the walls of your arteries  Hypertension causes your blood pressure to get so high that your heart has to work much harder than normal  This can damage your heart  The cause of hypertension may not be known  This is called essential or primary hypertension  Hypertension caused by another medical condition, such as kidney disease, is called secondary hypertension  What do I need to know about the stages of hypertension? Normal blood pressure is 119/79 or lower   Your healthcare provider may only check your blood pressure each year if it stays at a normal level  Elevated blood pressure is 120/79 to 129/79   This is sometimes called prehypertension  Your healthcare provider may suggest lifestyle changes to help lower your blood pressure to a normal level  He or she may then check it again in 3 to 6 months  Stage 1 hypertension is 130/80  to 139/89   Your provider may recommend lifestyle changes, medication, and checks every 3 to 6 months until your blood pressure is controlled  Stage 2 hypertension is 140/90 or higher   Your provider will recommend lifestyle changes and have you take 2 kinds of hypertension medicines  You will also need to have your blood pressure checked monthly until it is controlled  What increases my risk for hypertension? Age older than 54 years (men) or 72 (women)    Stress, or a family history of hypertension or heart disease    Obesity, lack of exercise, or too many high-sodium foods    Use of tobacco, alcohol, or illegal drugs    A medical condition, such as diabetes, kidney disease, thyroid disease, or adrenal gland disorder    Certain medicines, such as steroids or birth control pills    What are the signs and symptoms of hypertension?   You may have no signs or symptoms, or you may have any of the following:  Headache    Blurred vision    Chest pain    Dizziness or weakness    Trouble breathing    Nosebleeds    How is hypertension diagnosed? Your healthcare provider will take your blood pressure at several visits  You may also need to check your blood pressure at home  The provider will examine you and ask what medicines you take  He or she will also ask if you have a family history of high blood pressure and about any health conditions you have  He or she will also check your blood pressure and weight and examine your heart, lungs, and eyes  You may need any of the following tests: An ambulatory blood pressure monitor (ABPM)  is a device that you wear  ABPM measures your blood pressure while you do your regular daily activities  It records your blood pressure every 15 to 30 minutes during the day  It also records your blood pressure every 15 minutes to 1 hour at night  The recorded blood pressures help your healthcare provider know if you have hypertension not seen at your appointment  Blood tests  may help healthcare providers find the cause of your hypertension  Blood tests can also help find other health problems caused by hypertension  Urine tests  will be done to check your kidney function  Kidney problems can increase your risk for hypertension  Which medicines are used to treat hypertension? Antihypertensives  may be used to help lower your blood pressure  Several kinds of medicines are available  Your healthcare provider will choose medicines based on the kind of hypertension you have  You may need more than one type of medicine  Take the medicine exactly as directed  Diuretics  help decrease extra fluid that collects in your body  This will help lower your blood pressure  You may urinate more often while you take this medicine  Cholesterol medicine  helps lower your cholesterol level   A low cholesterol level helps prevent heart disease and makes it easier to control your blood pressure  What can I do to manage hypertension? Check your blood pressure at home  Avoid smoking, caffeine, and exercise at least 30 minutes before checking your blood pressure  Sit and rest for 5 minutes before you take your blood pressure  Extend your arm and support it on a flat surface  Your arm should be at the same level as your heart  Follow the directions that came with your blood pressure monitor  Check your blood pressure 2 times, 1 minute apart, before you take your medicine in the morning  Also check your blood pressure before your evening meal  Keep a record of your readings and bring it to your follow-up visits  Ask your healthcare provider what your blood pressure should be  Manage any other health conditions you have  Health conditions such as diabetes can increase your risk for hypertension  Follow your healthcare provider's instructions and take all your medicines as directed  Ask about all medicines  Certain medicines can increase your blood pressure  Examples include oral birth control pills, decongestants, herbal supplements, and NSAIDs, such as ibuprofen  Your healthcare provider can tell you which medicines are safe for you to take  This includes prescription and over-the-counter medicines  What lifestyle changes can I make to manage hypertension? Your healthcare provider may recommend you work with a team to manage hypertension  The team may include medical experts such as a dietitian, an exercise or physical therapist, and a behavior therapist  Your family members may be included in helping you create lifestyle changes  Limit sodium (salt) as directed  Too much sodium can affect your fluid balance  Check labels to find low-sodium or no-salt-added foods  Some low-sodium foods use potassium salts for flavor  Too much potassium can also cause health problems  Your healthcare provider will tell you how much sodium and potassium are safe for you to have in a day   He or she may recommend that you limit sodium to 2,300 mg a day  Follow the meal plan recommended by your healthcare provider  A dietitian or your provider can give you more information on low-sodium plans or the DASH (Dietary Approaches to Stop Hypertension) eating plan  The DASH plan is low in sodium, processed sugar, unhealthy fats, and total fat  It is high in potassium, calcium, and fiber  These can be found in vegetables, fruit, and whole-grain foods  Be physically active throughout the day  Physical activity, such as exercise, can help control your blood pressure and your weight  Be physically active for at least 30 minutes per day, on most days of the week  Include aerobic activity, such as walking or riding a bicycle  Also include strength training at least 2 times each week  Your healthcare providers can help you create a physical activity plan  Decrease stress  This may help lower your blood pressure  Learn ways to relax, such as deep breathing or listening to music  Limit alcohol as directed  Alcohol can increase your blood pressure  A drink of alcohol is 12 ounces of beer, 5 ounces of wine, or 1½ ounces of liquor  Do not smoke  Nicotine and other chemicals in cigarettes and cigars can increase your blood pressure and also cause lung damage  Ask your healthcare provider for information if you currently smoke and need help to quit  E-cigarettes or smokeless tobacco still contain nicotine  Talk to your healthcare provider before you use these products  Call your local emergency number [phone] in the [address] [facility] [facility]) or have someone call if:   You have chest pain      You have any of the following signs of a heart attack:      Squeezing, pressure, or pain in your chest    You may  also have any of the following:     Discomfort or pain in your back, neck, jaw, stomach, or arm    Shortness of breath    Nausea or vomiting    Lightheadedness or a sudden cold sweat    You become confused or have trouble speaking  You suddenly feel lightheaded or have trouble breathing  When should I seek immediate care? You have a severe headache or vision loss  You have weakness in an arm or leg  When should I call my doctor? You feel faint, dizzy, confused, or drowsy  You have been taking your blood pressure medicine but your pressure is higher than your provider says it should be  You have questions or concerns about your condition or care  CARE AGREEMENT:   You have the right to help plan your care  Learn about your health condition and how it may be treated  Discuss treatment options with your healthcare providers to decide what care you want to receive  You always have the right to refuse treatment  The above information is an  only  It is not intended as medical advice for individual conditions or treatments  Talk to your doctor, nurse or pharmacist before following any medical regimen to see if it is safe and effective for you  © Copyright [employer] 2022 Information is for End User's use only and may not be sold, redistributed or otherwise used for commercial purposes   All illustrations and images included in CareNotes® are the copyrighted property of A D A M , Inc  or [address] [place] [address]

## 2022-07-12 NOTE — TELEPHONE ENCOUNTER
----- Message from [employer] sent at 7/12/2022  3:17 PM EDT -----  Maintain same dose recheck 2 weeks  Please call and notify 
Called and spoke to patient gave coumadin instruction and recheck INR in 2 weeks  Chery Duncan
ambulate

## 2022-07-12 NOTE — PROGRESS NOTES
BMI Counseling: Body mass index is 39 33 kg/m²  The BMI is above normal  Nutrition recommendations include decreasing portion sizes, encouraging healthy choices of fruits and vegetables, decreasing fast food intake, consuming healthier snacks, limiting drinks that contain sugar, moderation in carbohydrate intake, increasing intake of lean protein, reducing intake of saturated and trans fat and reducing intake of cholesterol  Exercise recommendations include vigorous physical activity 75 minutes/week, exercising 3-5 times per week, obtaining a gym membership and strength training exercises  No pharmacotherapy was ordered  Rationale for BMI follow-up plan is due to patient being overweight or obese  Depression Screening and Follow-up Plan: Clincally patient does not have depression  No treatment is required  Assessment/Plan:         Problem List Items Addressed This Visit        Cardiovascular and Mediastinum    Hypertension     Patient has history of hypertension currently encouraged to have sleep study completed  Recently  Hospitalized  3  Weeks  Ago  For  Hypertensive  Urgency  and was admitted to the  ICU  For   extremely  High  BP  He  is currently on Cardura, Furosemide,  Isosorbide  and doxazosin  His  Repeat  BP  Today  Is  144/80  He  Says  His  BP  Is  much  better  follow-up appointment in 3 months                    Coronary artery disease involving native heart without angina pectoris - Primary     Patient has no reports chest pain at this time  Leg DVT (deep venous thromboembolism), chronic, right ([address] )     Patient maintained on warfarin 7 5mg and recheck 2 weeks last inr 2 0  Subjective:      Patient ID: Christine Garcia is a 60 y o  male  Patient is a 60year old male present for follow-up office visit, s/p discharge after hypertensive episode with medication adjustment   Last seen in the office 7/      The following portions of the patient's history were reviewed and updated as appropriate:   Past Medical History:  He has a past medical history of Angioedema, Coronary artery disease, GI bleed, Hypertension, and STEMI (ST elevation myocardial infarction) ([facility] )  ,  _______________________________________________________________________  Medical Problems:  does not have any pertinent problems on file ,  _______________________________________________________________________  Past Surgical History:   has a past surgical history that includes Upper gastrointestinal endoscopy and Replacement total knee (Right, 12/2020)  ,  _______________________________________________________________________  Family History:  He was adopted  Family history is unknown by patient  ,  _______________________________________________________________________  Social History:   reports that he has never smoked  He has never used smokeless tobacco  He reports that he does not drink alcohol and does not use drugs  ,  _______________________________________________________________________  Allergies:  is allergic to lisinopril, norvasc [amlodipine], amoxicillin, eliquis [apixaban], and lipitor [atorvastatin]     _______________________________________________________________________  Current Outpatient Medications   Medication Sig Dispense Refill    acetaminophen (TYLENOL) 500 mg tablet Take 1,000 mg by mouth daily      carbamide peroxide (DEBROX) 6 5 % otic solution Administer 5 drops into both ears 2 (two) times a day 15 mL 2    doxazosin (CARDURA XL) 4 MG 24 hr tablet Take 2 tablets (8 mg total) by mouth daily with breakfast 60 tablet 0    furosemide (LASIX) 20 mg tablet Take 1 tablet (20 mg total) by mouth daily as needed (swelling of the legs) 60 tablet 3    gabapentin (Neurontin) 300 mg capsule Take 1 capsule (300 mg total) by mouth 3 (three) times a day for 14 days For post-herpetic neuralgia:  Take 1 tablet on day 1,  Then take 2 tablets on day 2, Then take 3 tablets on day 3 and every day after that as instructed by your doctor  (Patient taking differently: Take 300 mg by mouth 3 (three) times a day For post-herpetic neuralgia: Take 1 tablet on day 1,  Then take 2 tablets on day 2, Then take 3 tablets on day 3 and every day after that as instructed by your doctor ) 42 capsule 0    hydrALAZINE (APRESOLINE) 100 MG tablet Take 1 tablet (100 mg total) by mouth 3 (three) times a day 270 tablet 3    isosorbide mononitrate (IMDUR) 30 mg 24 hr tablet Take 1 tablet (30 mg total) by mouth daily 30 tablet 0    methocarbamol (ROBAXIN) 500 mg tablet Take 1 tablet (500 mg total) by mouth every 6 (six) hours as needed for muscle spasms 20 tablet 0    multivitamin (THERAGRAN) TABS Take 1 tablet by mouth daily      rosuvastatin (CRESTOR) 5 mg tablet Take 1 tablet (5 mg total) by mouth daily 90 tablet 3    sildenafil (VIAGRA) 25 MG tablet TAKE 1 TABLET BY MOUTH DAILY AS NEEDED FOR ERECTILE DYSFUNCTION  10 tablet 3    warfarin (COUMADIN) 2 5 mg tablet Take 1 tablet (2 5 mg total) by mouth daily 30 tablet 2    warfarin (COUMADIN) 5 mg tablet TAKE 2 TABLETS BY MOUTH EVERY DAY 60 tablet 2     No current facility-administered medications for this visit      _______________________________________________________________________  Review of Systems   Constitutional: Negative for activity change, appetite change, chills, fatigue, fever and unexpected weight change  HENT: Negative for congestion, ear discharge, ear pain, nosebleeds, postnasal drip, rhinorrhea, sinus pressure, sinus pain, sneezing, sore throat and voice change  Eyes: Negative for pain, redness and visual disturbance  Respiratory: Negative for cough, chest tightness, shortness of breath and wheezing  Cardiovascular: Negative for chest pain and palpitations  Gastrointestinal: Negative for abdominal distention, abdominal pain, constipation, diarrhea, nausea and vomiting  Endocrine: Negative      Genitourinary: Negative for difficulty urinating, dysuria, flank pain, frequency, hematuria and urgency  Musculoskeletal: Positive for back pain  Negative for arthralgias and myalgias  Chronic lower back pain  Uses patches and ibuprofen  Skin: Negative  Allergic/Immunologic: Negative  Neurological: Negative  Hematological: Negative  Psychiatric/Behavioral: Negative  Objective:  Vitals:    07/13/22 1732   BP: 144/80   BP Location: Right arm   Patient Position: Sitting   Cuff Size: Standard   Pulse: 76   Resp: 19   Temp: (!) 97 2 °F (36 2 °C)   TempSrc: Temporal   SpO2: 97%   Weight: 128 kg (282 lb)   Height: 5' 11" (1 803 m)     Body mass index is 39 33 kg/m²  Physical Exam  Vitals and nursing note reviewed  Constitutional:       Appearance: Normal appearance  He is well-developed  He is obese  HENT:      Head: Normocephalic and atraumatic  Right Ear: Tympanic membrane, ear canal and external ear normal       Left Ear: Tympanic membrane, ear canal and external ear normal       Nose: Nose normal  No congestion or rhinorrhea  Mouth/Throat:      Mouth: Mucous membranes are moist       Pharynx: No oropharyngeal exudate or posterior oropharyngeal erythema  Eyes:      Extraocular Movements: Extraocular movements intact  Conjunctiva/sclera: Conjunctivae normal       Pupils: Pupils are equal, round, and reactive to light  Cardiovascular:      Rate and Rhythm: Normal rate and regular rhythm  Pulses: Normal pulses  Heart sounds: Normal heart sounds  No murmur heard  Pulmonary:      Effort: Pulmonary effort is normal       Breath sounds: Normal breath sounds  Abdominal:      General: Bowel sounds are normal       Palpations: Abdomen is soft  Musculoskeletal:         General: Normal range of motion  Cervical back: Normal range of motion  Comments: Lower lumbar back pain with point tenderness  Skin:     General: Skin is warm        Capillary Refill: Capillary refill takes 2 to 3 seconds  Neurological:      General: No focal deficit present  Mental Status: He is alert and oriented to person, place, and time  Psychiatric:         Mood and Affect: Mood normal          Behavior: Behavior normal            TCM Call (since 6/12/2022)     Date and time call was made  6/27/2022  2:50 PM    Hospital care reviewed  Records reviewed    Patient was hospitialized at  [address] [place] [address]    Date of Admission  06/22/22    Date of discharge  06/25/22    Diagnosis  hypertensive urgency    Disposition  Home      TCM Call (since 6/12/2022)     Scheduled for follow up?   Yes    I have advised the patient to call PCP with any new or worsening symptoms  Zahra Zuñiga CCMA    Living Arrangements  Alone

## 2022-07-12 NOTE — PROGRESS NOTES
Called and spoke to patient gave coumadin instruction remain on same dose and recheck INR in 2 weeks     Saba Black

## 2022-07-13 ENCOUNTER — OFFICE VISIT (OUTPATIENT)
Dept: FAMILY MEDICINE CLINIC | Facility: CLINIC | Age: 57
End: 2022-07-13
Payer: COMMERCIAL

## 2022-07-13 VITALS
HEART RATE: 76 BPM | DIASTOLIC BLOOD PRESSURE: 80 MMHG | BODY MASS INDEX: 39.48 KG/M2 | TEMPERATURE: 97.2 F | OXYGEN SATURATION: 97 % | RESPIRATION RATE: 19 BRPM | WEIGHT: 282 LBS | HEIGHT: 71 IN | SYSTOLIC BLOOD PRESSURE: 144 MMHG

## 2022-07-13 DIAGNOSIS — I25.10 CORONARY ARTERY DISEASE INVOLVING NATIVE HEART WITHOUT ANGINA PECTORIS, UNSPECIFIED VESSEL OR LESION TYPE: Primary | ICD-10-CM

## 2022-07-13 DIAGNOSIS — I10 HYPERTENSION, UNSPECIFIED TYPE: ICD-10-CM

## 2022-07-13 DIAGNOSIS — I82.501 LEG DVT (DEEP VENOUS THROMBOEMBOLISM), CHRONIC, RIGHT (HCC): ICD-10-CM

## 2022-07-13 PROCEDURE — 3079F DIAST BP 80-89 MM HG: CPT | Performed by: NURSE PRACTITIONER

## 2022-07-13 PROCEDURE — 99215 OFFICE O/P EST HI 40 MIN: CPT | Performed by: NURSE PRACTITIONER

## 2022-07-13 PROCEDURE — 1111F DSCHRG MED/CURRENT MED MERGE: CPT | Performed by: NURSE PRACTITIONER

## 2022-07-13 PROCEDURE — 3077F SYST BP >= 140 MM HG: CPT | Performed by: NURSE PRACTITIONER

## 2022-07-13 NOTE — ASSESSMENT & PLAN NOTE
Patient has history of hypertension currently encouraged to have sleep study completed  Recently  Hospitalized  3  Weeks  Ago  For  Hypertensive  Urgency  and was admitted to the  ICU  For   extremely  High  BP  He  is currently on Cardura, Furosemide,  Isosorbide  and doxazosin  His  Repeat  BP  Today  Is  144/80  He  Says  His  BP  Is  much  better      follow-up appointment in 3 months

## 2022-07-21 DIAGNOSIS — N52.1 ERECTILE DISORDER DUE TO MEDICAL CONDITION IN MALE: ICD-10-CM

## 2022-07-21 RX ORDER — SILDENAFIL 25 MG/1
TABLET, FILM COATED ORAL
Qty: 10 TABLET | Refills: 0 | OUTPATIENT
Start: 2022-07-21

## 2022-07-21 NOTE — TELEPHONE ENCOUNTER
Patient is already on Imdur-nitrate for blood pressure control in the setting of multiple allergies to antihypertensive medications, cannot use sildenafil due to potential risk for severe hypotension    This was discussed with him at his last visit

## 2022-07-26 ENCOUNTER — TELEPHONE (OUTPATIENT)
Dept: CARDIOLOGY CLINIC | Facility: CLINIC | Age: 57
End: 2022-07-26

## 2022-07-26 NOTE — TELEPHONE ENCOUNTER
Patient called asking for his Sildenafil-(Viagra -he explained you would fill this   please advise ?

## 2022-07-27 ENCOUNTER — TELEPHONE (OUTPATIENT)
Dept: CARDIOLOGY CLINIC | Facility: CLINIC | Age: 57
End: 2022-07-27

## 2022-07-27 ENCOUNTER — TELEPHONE (OUTPATIENT)
Dept: FAMILY MEDICINE CLINIC | Facility: CLINIC | Age: 57
End: 2022-07-27

## 2022-07-27 DIAGNOSIS — I16.0 HYPERTENSIVE URGENCY: ICD-10-CM

## 2022-07-27 DIAGNOSIS — I82.501 CHRONIC EMBOLISM AND THROMBOSIS OF UNSPECIFIED DEEP VEINS OF RIGHT LOWER EXTREMITY (HCC): ICD-10-CM

## 2022-07-27 DIAGNOSIS — I82.501 LEG DVT (DEEP VENOUS THROMBOEMBOLISM), CHRONIC, RIGHT (HCC): ICD-10-CM

## 2022-07-27 RX ORDER — ISOSORBIDE MONONITRATE 30 MG/1
30 TABLET, EXTENDED RELEASE ORAL DAILY
Qty: 90 TABLET | Refills: 3 | Status: SHIPPED | OUTPATIENT
Start: 2022-07-27

## 2022-07-27 RX ORDER — WARFARIN SODIUM 5 MG/1
10 TABLET ORAL DAILY
Qty: 180 TABLET | Refills: 3 | Status: SHIPPED | OUTPATIENT
Start: 2022-07-27

## 2022-07-27 RX ORDER — WARFARIN SODIUM 2.5 MG/1
2.5 TABLET ORAL DAILY
Qty: 90 TABLET | Refills: 3 | Status: SHIPPED | OUTPATIENT
Start: 2022-07-27

## 2022-07-27 NOTE — TELEPHONE ENCOUNTER
Patient had INR done at [facility] and wants to know if he has a standing order or does he have to get a script every time

## 2022-07-27 NOTE — TELEPHONE ENCOUNTER
Fax received from pharmacy requesting 90 day supply on the following medications  Warfarin 5 mg TAB  Warfarin 2 5 mg TAB and Isosorbide Mononitrate ER TAB 30 mg  Medications sent for refill at this time

## 2022-08-03 ENCOUNTER — TELEPHONE (OUTPATIENT)
Dept: CARDIOLOGY CLINIC | Facility: CLINIC | Age: 57
End: 2022-08-03

## 2022-08-03 NOTE — TELEPHONE ENCOUNTER
Patient is not taken no for a answer-Asking for a c/b   Viagra refill ibuprofen suggested he call pcp

## 2022-08-16 ENCOUNTER — TRANSCRIBE ORDERS (OUTPATIENT)
Dept: PAIN MEDICINE | Facility: CLINIC | Age: 57
End: 2022-08-16

## 2022-08-16 ENCOUNTER — CONSULT (OUTPATIENT)
Dept: PAIN MEDICINE | Facility: CLINIC | Age: 57
End: 2022-08-16
Payer: COMMERCIAL

## 2022-08-16 ENCOUNTER — APPOINTMENT (OUTPATIENT)
Dept: LAB | Facility: CLINIC | Age: 57
End: 2022-08-16
Payer: COMMERCIAL

## 2022-08-16 VITALS
SYSTOLIC BLOOD PRESSURE: 171 MMHG | DIASTOLIC BLOOD PRESSURE: 101 MMHG | HEART RATE: 71 BPM | BODY MASS INDEX: 39.61 KG/M2 | WEIGHT: 284 LBS

## 2022-08-16 DIAGNOSIS — Z79.01 ANTICOAGULATION GOAL OF INR 2 TO 3: ICD-10-CM

## 2022-08-16 DIAGNOSIS — M54.50 ACUTE LEFT-SIDED LOW BACK PAIN WITHOUT SCIATICA: ICD-10-CM

## 2022-08-16 DIAGNOSIS — M50.120 CERVICAL DISC DISORDER WITH RADICULOPATHY OF MID-CERVICAL REGION: ICD-10-CM

## 2022-08-16 DIAGNOSIS — M54.2 NECK PAIN: Primary | ICD-10-CM

## 2022-08-16 DIAGNOSIS — Z51.81 ANTICOAGULATION GOAL OF INR 2 TO 3: ICD-10-CM

## 2022-08-16 DIAGNOSIS — M48.02 NEURAL FORAMINAL STENOSIS OF CERVICAL SPINE: ICD-10-CM

## 2022-08-16 LAB
INR PPP: 1.01 (ref 0.84–1.19)
PROTHROMBIN TIME: 13.5 SECONDS (ref 11.6–14.5)

## 2022-08-16 PROCEDURE — 99204 OFFICE O/P NEW MOD 45 MIN: CPT | Performed by: PHYSICAL MEDICINE & REHABILITATION

## 2022-08-16 PROCEDURE — 85610 PROTHROMBIN TIME: CPT

## 2022-08-16 PROCEDURE — 36415 COLL VENOUS BLD VENIPUNCTURE: CPT

## 2022-08-16 NOTE — PROGRESS NOTES
Assessment  1  Neck pain    2  Acute left-sided low back pain without sciatica    3  Cervical disc disorder with radiculopathy of mid-cervical region    4  Neural foraminal stenosis of cervical spine        Plan  Mr Angela Shook is a pleasant 73-year-old male who presents for initial evaluation regarding several months duration of neck pain with intermittent radiating symptoms into the right upper extremity  During today's evaluation he is demonstrating clinical and diagnostic evidence of cervical radiculopathy likely in relation to the multilevel degenerative disc disease and varying degrees of central and foraminal narrowing with MRI findings significant for mild central stenosis at C3-C4 and C5-C6 as well as mild-to-moderate foraminal narrowing at C5-C6 and C6-C7  At this time interventional approaches would be beneficial and warranted  As such we will   1  Plan for cervical epidural steroid injection under fluoro guidance   2  Complete risks and benefits including bleeding, infection, tissue reaction, nerve injury and allergic reaction were discussed  The approach was demonstrated using models and literature was provided  Verbal and written consent was obtained  My impressions and treatment recommendations were discussed in detail with the patient who verbalized understanding and had no further questions  Discharge instructions were provided  I personally saw and examined the patient and I agree with the above discussed plan of care  Orders Placed This Encounter   Procedures    [facility] procedure     Standing Status:   Future     Standing Expiration Date:   8/16/2026     Order Specific Question:   Reason for Exam:     Answer:   MICHAEL     Order Specific Question:   Anticoagulant hold needed? Answer:   YES warfarin     No orders of the defined types were placed in this encounter        History of Present Illness    Pauline Arvizu is a 60 y o  male presents to [place] [address] [facility] for initial evaluation regarding 3 months duration of neck pain with radiating symptoms into the right upper extremity  Patient denies any significant inciting event or recent trauma  Today he reports moderate to severe pain rated 6/10 and interfering with daily activities  Pain is nearly constant 60-95% of the time that is present throughout the day and night  Describes symptoms as shooting, numbness, throbbing, pins and needles sensation  Denies any significant upper extremity weakness or dropping objects  Does not use any durable medical equipment for ambulation  Symptoms are worse with exercise  Has had moderate relief with heat  Admits to daily alcohol use  Currently using Lidoderm patches which provide some relief  Presents today for initial evaluation  I have personally reviewed and/or updated the patient's past medical history, past surgical history, family history, social history, current medications, allergies, and vital signs today  Review of Systems   Constitutional: Negative for fever and unexpected weight change  HENT: Negative for trouble swallowing  Eyes: Negative for visual disturbance  Respiratory: Negative for shortness of breath and wheezing  Cardiovascular: Negative for chest pain and palpitations  Gastrointestinal: Negative for constipation, diarrhea, nausea and vomiting  Endocrine: Negative for cold intolerance, heat intolerance and polydipsia  Genitourinary: Negative for difficulty urinating and frequency  Musculoskeletal: Positive for back pain, gait problem, joint swelling and neck pain  Negative for arthralgias and myalgias  Skin: Negative for rash  Neurological: Positive for numbness  Negative for dizziness, seizures, syncope and headaches  Hematological: Does not bruise/bleed easily  Psychiatric/Behavioral: Negative for dysphoric mood  All other systems reviewed and are negative        Patient Active Problem List   Diagnosis    Elevated troponin    Hypertension    Acute left ankle pain    Acute pain of left shoulder    Left ankle sprain    Acute left-sided low back pain without sciatica    S/P cardiac cath 11/06/19    Stomach ulcer    Iron deficiency anemia due to chronic blood loss    ZAKIA [facility]) [facility] )    Follow-up examination    Encounter for follow-up    NSTEMI (non-ST elevated myocardial infarction) ([facility] )    Hyperlipidemia    Anemia    Coronary artery disease involving native heart without angina pectoris    Cellulitis of right lower extremity    Encounter for support and coordination of transition of care    ACE inhibitor-aggravated angioedema, sequela    Anticoagulation goal of INR 2 to 3    Acute deep vein thrombosis (DVT) of tibial vein ([facility]    Leg DVT (deep venous thromboembolism), chronic, right ([facility]    Febrile illness, acute    Bilateral lower extremity edema    Morbid obesity with BMI of 40 0-44 9, adult ([facility])    Cellulitis of left lower extremity    Itchy skin    Vasculitis ([facility]    Elevated serum creatinine    Low testosterone    History of DVT (deep vein thrombosis)    CKD (chronic kidney disease)    Lymphadenopathy    Edema of left lower leg due to peripheral venous insufficiency    Tooth decay    Preoperative clearance    Status post right knee replacement    Abscess of skin of left knee    BRUCE (obstructive sleep apnea)    Rash    Excessive cerumen in ear canal, bilateral    Anticoagulation adequate with anticoagulant therapy    Neck pain on right side    Hypertensive urgency    Antiphospholipid syndrome (HCC)       Past Medical History:   Diagnosis Date    Angioedema     Coronary artery disease     GI bleed     Hypertension     stopped his meds when ran out several years ago    STEMI (ST [facility] infarction) [facility])        Past Surgical History:   Procedure Laterality Date    REPLACEMENT TOTAL KNEE Right 12/2020    UPPER GASTROINTESTINAL ENDOSCOPY      Clamped large stomach ulcer       Family History   Adopted: Yes   Family history unknown: Yes       Social History     Occupational History    Not on file   Tobacco Use    Smoking status: Never Smoker    Smokeless tobacco: Never Used   Vaping Use    Vaping Use: Never used   Substance and Sexual Activity    Alcohol use: Never     Alcohol/week: 0 0 standard drinks    Drug use: Never    Sexual activity: Not on file     Comment: not asked       Current Outpatient Medications on File Prior to Visit   Medication Sig    isosorbide mononitrate (IMDUR) 30 mg 24 hr tablet Take 1 tablet (30 mg total) by mouth daily    warfarin (COUMADIN) 2 5 mg tablet Take 1 tablet (2 5 mg total) by mouth daily    warfarin (COUMADIN) 5 mg tablet Take 2 tablets (10 mg total) by mouth daily    acetaminophen (TYLENOL) 500 mg tablet Take 1,000 mg by mouth daily    carbamide peroxide (DEBROX) 6 5 % otic solution Administer 5 drops into both ears 2 (two) times a day    doxazosin (CARDURA XL) 4 MG 24 hr tablet Take 2 tablets (8 mg total) by mouth daily with breakfast    furosemide (LASIX) 20 mg tablet Take 1 tablet (20 mg total) by mouth daily as needed (swelling of the legs)    hydrALAZINE (APRESOLINE) 100 MG tablet Take 1 tablet (100 mg total) by mouth 3 (three) times a day    methocarbamol (ROBAXIN) 500 mg tablet Take 1 tablet (500 mg total) by mouth every 6 (six) hours as needed for muscle spasms    multivitamin (THERAGRAN) TABS Take 1 tablet by mouth daily    rosuvastatin (CRESTOR) 5 mg tablet Take 1 tablet (5 mg total) by mouth daily    sildenafil (VIAGRA) 25 MG tablet TAKE 1 TABLET BY MOUTH DAILY AS NEEDED FOR ERECTILE DYSFUNCTION   [DISCONTINUED] gabapentin (Neurontin) 300 mg capsule Take 1 capsule (300 mg total) by mouth 3 (three) times a day for 14 days For post-herpetic neuralgia:  Take 1 tablet on day 1,  Then take 2 tablets on day 2, Then take 3 tablets on day 3 and every day after that as instructed by your doctor  (Patient taking differently: Take 300 mg by mouth 3 (three) times a day For post-herpetic neuralgia: Take 1 tablet on day 1,  Then take 2 tablets on day 2, Then take 3 tablets on day 3 and every day after that as instructed by your doctor )     No current facility-administered medications on file prior to visit  Allergies   Allergen Reactions    Lisinopril Angioedema    Norvasc [Amlodipine] Angioedema    Amoxicillin Itching     Itching little bumps    Eliquis [Apixaban] Angioedema    Lipitor [Atorvastatin] Facial Swelling       Physical Exam    BP (!) 171/101   Pulse 71   Wt 129 kg (284 lb)   BMI 39 61 kg/m²     Constitutional: normal, well developed, well nourished, alert, in no distress and non-toxic and no overt pain behavior    Eyes: anicteric  HEENT: grossly intact  Neck: supple, symmetric, trachea midline and no masses   Pulmonary:even and unlabored  Cardiovascular:No edema or pitting edema present  Skin:Normal without rashes or lesions and well hydrated  Psychiatric:Mood and affect appropriate  Neurologic:Cranial Nerves II-XII grossly intact  Musculoskeletal:normal gait, tenderness to palpation right-sided cervical paraspinals, decreased active and passive range of motion with cervical flexion and extension limited by pain, MMT 5/5 bilateral upper extremities, sensation decreased to light touch in patchy distribution right upper extremity, DTRs within normal limits, positive Spurling with radicular symptoms into the right arm    Imaging

## 2022-08-22 ENCOUNTER — TELEPHONE (OUTPATIENT)
Dept: PAIN MEDICINE | Facility: MEDICAL CENTER | Age: 57
End: 2022-08-22

## 2022-08-22 NOTE — TELEPHONE ENCOUNTER
Pt called stating that someone was supposed to reach out to his cardiologist to see if he can have there procedure done     Pt would like to know if this was completed     Please advise

## 2022-08-22 NOTE — TELEPHONE ENCOUNTER
S/w pt and advised that a hold request will be sent to his Cardiologist to request approval to hold his Coumadin so that he can be scheduled for his procedure  S/w Dr Angélica Herbert office and was provided a fax # [phone]  Hold request faxed  Pt advised that we will be in touch as soon as we receive his hold request back

## 2022-08-23 ENCOUNTER — TELEPHONE (OUTPATIENT)
Dept: CARDIOLOGY CLINIC | Facility: CLINIC | Age: 57
End: 2022-08-23

## 2022-08-23 NOTE — TELEPHONE ENCOUNTER
Pt needs to have Neuraxial injection  [facility] would like a 5 day hold of coumadin prior to procedure  And a INR goal of < or = to 1 2  Has F/u on 9/27/22 with you        Please advise

## 2022-08-24 NOTE — TELEPHONE ENCOUNTER
Pt called ststing he needs a procedure and is still waiting for a hold from the cardiologist  Pt will again call them  He also ask if we can call them again      Pt # [phone]

## 2022-08-25 ENCOUNTER — OFFICE VISIT (OUTPATIENT)
Dept: UROLOGY | Facility: CLINIC | Age: 57
End: 2022-08-25
Payer: COMMERCIAL

## 2022-08-25 ENCOUNTER — TELEPHONE (OUTPATIENT)
Dept: UROLOGY | Facility: CLINIC | Age: 57
End: 2022-08-25

## 2022-08-25 ENCOUNTER — TELEPHONE (OUTPATIENT)
Dept: PAIN MEDICINE | Facility: CLINIC | Age: 57
End: 2022-08-25

## 2022-08-25 VITALS
DIASTOLIC BLOOD PRESSURE: 100 MMHG | SYSTOLIC BLOOD PRESSURE: 180 MMHG | BODY MASS INDEX: 39.76 KG/M2 | HEIGHT: 71 IN | WEIGHT: 284 LBS

## 2022-08-25 DIAGNOSIS — N40.0 BENIGN PROSTATIC HYPERPLASIA WITHOUT LOWER URINARY TRACT SYMPTOMS: Primary | ICD-10-CM

## 2022-08-25 PROCEDURE — 99213 OFFICE O/P EST LOW 20 MIN: CPT | Performed by: PHYSICIAN ASSISTANT

## 2022-08-25 PROCEDURE — 3077F SYST BP >= 140 MM HG: CPT | Performed by: PHYSICIAN ASSISTANT

## 2022-08-25 PROCEDURE — 3080F DIAST BP >= 90 MM HG: CPT | Performed by: PHYSICIAN ASSISTANT

## 2022-08-25 NOTE — TELEPHONE ENCOUNTER
This patient is being considered for a neuraxial injection for the management of their pain  However, the patient is currently on an anticoagulant per your orders  The [employer] Guideline on neuraxial procedures and anti-coagulation indicates that medication should be held as follows: Coumadin 5 days prior to injection  We will order a PT/INR to be done 1 hr before the procedure  Please advise if you ok the hold of this medication      Thank you,    Celia Le  Procedure   [facility]

## 2022-08-25 NOTE — TELEPHONE ENCOUNTER
PT BROUGHT IN A LETTER FROM [facility] ASKING FOR RECORDS TO APPEAL CHARGES FOR PAT LAB WORK FOR PROCEDURE DONE ON 2/21/22  PT SIGNED RECORD RELEASE AND INFO WAS MAILED TO [facility] AS REQUESTED  LETTER AND RELEASE SCANNED INTO THE CHART

## 2022-08-26 ENCOUNTER — TELEPHONE (OUTPATIENT)
Dept: FAMILY MEDICINE CLINIC | Facility: CLINIC | Age: 57
End: 2022-08-26

## 2022-08-26 NOTE — PROGRESS NOTES
UROLOGY PROGRESS NOTE   Patient Identifiers: Athena Osgood (MRN [id number])  Date of Service: 8/26/2022    Subjective:    47-year-old man history of BPH and erectile dysfunction  PSA 1 1  Testosterone 364  He is on warfarin for DVT  He uses sildenafil p r n     Reason for visit:  BPH and ED follow-up    Objective:     VITALS:    Vitals:    08/25/22 1516   BP: (!) 180/100           LABS:  Lab Results   Component Value Date    HGB 14 0 06/25/2022    HCT 42 6 06/25/2022    WBC 5 31 06/25/2022     06/25/2022   ]    Lab Results   Component Value Date    K 4 4 06/25/2022     06/25/2022    CO2 28 06/25/2022    BUN 24 06/25/2022    CREATININE 1 32 (H) 06/25/2022    CALCIUM 9 4 06/25/2022   ]        INPATIENT MEDS:    Current Outpatient Medications:     acetaminophen (TYLENOL) 500 mg tablet, Take 1,000 mg by mouth daily, Disp: , Rfl:     carbamide peroxide (DEBROX) 6 5 % otic solution, Administer 5 drops into both ears 2 (two) times a day, Disp: 15 mL, Rfl: 2    doxazosin (CARDURA XL) 4 MG 24 hr tablet, Take 2 tablets (8 mg total) by mouth daily with breakfast, Disp: 60 tablet, Rfl: 0    furosemide (LASIX) 20 mg tablet, Take 1 tablet (20 mg total) by mouth daily as needed (swelling of the legs), Disp: 60 tablet, Rfl: 3    isosorbide mononitrate (IMDUR) 30 mg 24 hr tablet, Take 1 tablet (30 mg total) by mouth daily, Disp: 90 tablet, Rfl: 3    methocarbamol (ROBAXIN) 500 mg tablet, Take 1 tablet (500 mg total) by mouth every 6 (six) hours as needed for muscle spasms, Disp: 20 tablet, Rfl: 0    multivitamin (THERAGRAN) TABS, Take 1 tablet by mouth daily, Disp: , Rfl:     rosuvastatin (CRESTOR) 5 mg tablet, Take 1 tablet (5 mg total) by mouth daily, Disp: 90 tablet, Rfl: 3    sildenafil (VIAGRA) 25 MG tablet, TAKE 1 TABLET BY MOUTH DAILY AS NEEDED FOR ERECTILE DYSFUNCTION  , Disp: 10 tablet, Rfl: 3    warfarin (COUMADIN) 2 5 mg tablet, Take 1 tablet (2 5 mg total) by mouth daily, Disp: 90 tablet, Rfl: 3    warfarin (COUMADIN) 5 mg tablet, Take 2 tablets (10 mg total) by mouth daily, Disp: 180 tablet, Rfl: 3    hydrALAZINE (APRESOLINE) 100 MG tablet, Take 1 tablet (100 mg total) by mouth 3 (three) times a day, Disp: 270 tablet, Rfl: 3      Physical Exam:   BP (!) 180/100 (BP Location: Left arm, Patient Position: Sitting, Cuff Size: Adult)   Ht 5' 11" (1 803 m)   Wt 129 kg (284 lb)   BMI 39 61 kg/m²   GEN: no acute distress    RESP: breathing comfortably with no accessory muscle use    ABD: soft, non-tender, non-distended   INCISION:    EXT: no significant peripheral edema   (Male): Penis circumcised, phallus normal, meatus patent  Testicles descended into scrotum bilaterally without masses nor tenderness  No inguinal hernias bilaterally  LESTER: Prostate is enlarged at 35 grams  The prostate is not boggy  The prostate is not tender  No nodules noted      RADIOLOGY:   None     Assessment:   1  BPH  2   Erectile dysfunction     Plan:   -follow-up in 1 year with PSA prior to visit  -  -  -

## 2022-08-26 NOTE — TELEPHONE ENCOUNTER
Patient called in stating that [facility] has been trying to get in touch with you regarding clearance fro the epidural shot and would like that ASAP as he is in a lot of pain and would really appreciate it being done

## 2022-08-26 NOTE — TELEPHONE ENCOUNTER
Spoke with William Durham informed him of INR results and to have his INR rechecked on 8/30/22  Patient verbalized understanding

## 2022-08-29 ENCOUNTER — TELEPHONE (OUTPATIENT)
Dept: PAIN MEDICINE | Facility: MEDICAL CENTER | Age: 57
End: 2022-08-29

## 2022-08-29 NOTE — TELEPHONE ENCOUNTER
----- Message from [employer] sent at 8/29/2022  3:47 PM EDT -----  Contact: [phone]  I scheduled Mr Christine Reid for his MICHAEL on 9/19/22, he is on Warfarin, so he will need a PT/INR, however, the patient is coming after work, he would like to know if he can have the PT/INR done on Saturday or does it have to be done 1 hr prior to the procedure? Please advise      Thank you,  Nicolas Gold

## 2022-08-29 NOTE — TELEPHONE ENCOUNTER
Patient may stop Coumadin 5 days prior however he will have to use Lovenox during this time, prior to procedure and take the last dose the night before

## 2022-08-29 NOTE — TELEPHONE ENCOUNTER
Pt called in to schedule the procedure   The medication has been received   Please be advised thank you    Pt can be reached @ [phone]

## 2022-09-01 DIAGNOSIS — I21.4 NSTEMI (NON-ST ELEVATED MYOCARDIAL INFARCTION) (HCC): Primary | ICD-10-CM

## 2022-09-01 NOTE — TELEPHONE ENCOUNTER
S/w pt and advised of same  Pt verbalized understanding and agreeable to having PT/INR draw on 9/18/22  Please place PT/INR order for pt  Thank you!

## 2022-09-15 ENCOUNTER — TELEPHONE (OUTPATIENT)
Dept: FAMILY MEDICINE CLINIC | Facility: CLINIC | Age: 57
End: 2022-09-15

## 2022-09-15 DIAGNOSIS — Z51.81 ANTICOAGULATION GOAL OF INR 2 TO 3: Primary | ICD-10-CM

## 2022-09-15 DIAGNOSIS — I82.441 ACUTE DEEP VEIN THROMBOSIS (DVT) OF TIBIAL VEIN OF RIGHT LOWER EXTREMITY (HCC): ICD-10-CM

## 2022-09-15 DIAGNOSIS — Z79.01 ANTICOAGULATION GOAL OF INR 2 TO 3: Primary | ICD-10-CM

## 2022-09-15 NOTE — PROGRESS NOTES
PT and INR order placed for weekly draw since patient is on warfarin for DVT prophylaxis  Currently on 10 mg daily

## 2022-09-18 LAB
INR PPP: 1.7 (ref 0.9–1.2)
PROTHROMBIN TIME: 16.9 SEC (ref 9.1–12)
SL AMB POCT INR: 1.7

## 2022-09-19 ENCOUNTER — TELEPHONE (OUTPATIENT)
Dept: RADIOLOGY | Facility: CLINIC | Age: 57
End: 2022-09-19

## 2022-09-19 ENCOUNTER — HOSPITAL ENCOUNTER (OUTPATIENT)
Dept: RADIOLOGY | Facility: CLINIC | Age: 57
Discharge: HOME | End: 2022-09-19

## 2022-09-19 DIAGNOSIS — M48.02 NEURAL FORAMINAL STENOSIS OF CERVICAL SPINE: ICD-10-CM

## 2022-09-19 DIAGNOSIS — M54.50 ACUTE LEFT-SIDED LOW BACK PAIN WITHOUT SCIATICA: ICD-10-CM

## 2022-09-19 DIAGNOSIS — M54.2 NECK PAIN: ICD-10-CM

## 2022-09-19 DIAGNOSIS — M50.120 CERVICAL DISC DISORDER WITH RADICULOPATHY OF MID-CERVICAL REGION: ICD-10-CM

## 2022-09-19 NOTE — TELEPHONE ENCOUNTER
Procedure cancelled per KW  INR 1 7 on 9/17, too high to proceed with injection  KW s/w pt, advised to resume warfarin and will r/s   Please reach out to r/s, thank you

## 2022-09-20 ENCOUNTER — ANTICOAG VISIT (OUTPATIENT)
Dept: FAMILY MEDICINE CLINIC | Facility: CLINIC | Age: 57
End: 2022-09-20
Payer: COMMERCIAL

## 2022-09-20 DIAGNOSIS — Z51.81 ANTICOAGULATION GOAL OF INR 2 TO 3: Primary | ICD-10-CM

## 2022-09-20 DIAGNOSIS — Z79.01 ANTICOAGULATION GOAL OF INR 2 TO 3: Primary | ICD-10-CM

## 2022-09-20 PROCEDURE — 85610 PROTHROMBIN TIME: CPT

## 2022-09-20 PROCEDURE — 99212 OFFICE O/P EST SF 10 MIN: CPT

## 2022-09-20 NOTE — PROGRESS NOTES
Patient made aware of INR 1 7 and instructed to continue same dose of warfarin 12 5 mg and repeat INR in 1 week

## 2022-09-20 NOTE — TELEPHONE ENCOUNTER
Patient returning procedure schedulers call; please reach out to him at # [phone], thx Carpal tunnel syndrome  repair -right 2008  Cataract  b/l 2006  History of lumpectomy of right breast  2016

## 2022-09-21 ENCOUNTER — TELEPHONE (OUTPATIENT)
Dept: RADIOLOGY | Facility: CLINIC | Age: 57
End: 2022-09-21

## 2022-09-21 NOTE — TELEPHONE ENCOUNTER
----- Message from Erica De La Rosa sent at 9/21/2022  8:17 AM EDT -----  Mr  [facility] has been rescheduled for his MICHAEL with Dr Nicho Bear for 10/31/22  I gave him instructions regarding the procedure and when to stop his Warfarin, but told him to expect a call from the clinical staff for further instructions regarding the PT/INR      Aftab Cardoso

## 2022-09-22 ENCOUNTER — TELEPHONE (OUTPATIENT)
Dept: RADIOLOGY | Facility: CLINIC | Age: 57
End: 2022-09-22

## 2022-09-22 DIAGNOSIS — Z79.01 ANTICOAGULATED: Primary | ICD-10-CM

## 2022-09-22 NOTE — TELEPHONE ENCOUNTER
----- Message from Rex Goodman sent at 9/21/2022  8:17 AM EDT -----  Mr Kia Pena has been rescheduled for his MICHAEL with Dr Kathya Small for 10/31/22  I gave him instructions regarding the procedure and when to stop his Warfarin, but told him to expect a call from the clinical staff for further instructions regarding the PT/INR      Zheng Ibrahim

## 2022-09-22 NOTE — TELEPHONE ENCOUNTER
S/w pt, confirmed schedule for MICHAEL 10/31 at 3:20 pm, instructed to have his blood drawn for PT/INR on the day of procedure, approximately 1 hour prior, around 1 pm  Reminded to take last dose of coumadin 10/25  Pt verbalized understanding

## 2022-10-12 PROBLEM — H61.23 EXCESSIVE CERUMEN IN EAR CANAL, BILATERAL: Status: RESOLVED | Noted: 2021-01-08 | Resolved: 2022-10-12

## 2022-10-28 ENCOUNTER — TELEPHONE (OUTPATIENT)
Dept: PAIN MEDICINE | Facility: CLINIC | Age: 57
End: 2022-10-28

## 2022-10-28 NOTE — TELEPHONE ENCOUNTER
Caller:Patient  Doctor: Kecia Webb    Reason for call: has procedure on Monday 10/31/22 and has no pain as of today   Pt symendel he was suppose to get a call back today about it    Call back#: [phone]

## 2022-10-28 NOTE — TELEPHONE ENCOUNTER
LM for patient to call me, if not pain, should not come in  Especially since he has to have bloodwork and has other complications

## 2022-10-28 NOTE — TELEPHONE ENCOUNTER
Caller: patient    Doctor: Saeed Boyer    Reason for call: pt states he's currently not experiencing any pain & isn't sure if he should have his injection done on 10/31/22   Please adviserafat    Call back#: [phone] (Please reach out to him after 2:30 pm today)

## 2022-10-28 NOTE — TELEPHONE ENCOUNTER
If pt is not experiencing pain, should we cancel MICHAEL scheduled 10/31? Pt on coumadin, Pls advise

## 2022-10-28 NOTE — TELEPHONE ENCOUNTER
S/w pt, states he didn't have any pain since his schedule was cancelled  Advised pt to cb Monday for any changes of his condition  Will need INR drawn on Monday procedure need be done  He is scheduled at 3:20pm anyway

## 2022-10-31 NOTE — TELEPHONE ENCOUNTER
Caller: Patient    Doctor:     Reason for call: Called to confirm procedure was cancealed    Call back#:

## 2022-11-10 PROBLEM — E83.42 HYPOMAGNESEMIA: Status: ACTIVE | Noted: 2022-11-10

## 2022-11-10 PROBLEM — E66.812 CLASS 2 SEVERE OBESITY DUE TO EXCESS CALORIES WITH SERIOUS COMORBIDITY AND BODY MASS INDEX (BMI) OF 39.0 TO 39.9 IN ADULT (HCC): Status: ACTIVE | Noted: 2020-08-14

## 2022-11-14 NOTE — PATIENT INSTRUCTIONS

## 2022-11-14 NOTE — PROGRESS NOTES
[address] \[facility]\"" C[facility] [place]    NAME: Jerri Rendon  AGE: 60 y o  SEX: male  : 1965     DATE: 2022     Assessment and Plan:     Problem List Items Addressed This Visit        Respiratory    BRUCE (obstructive sleep apnea)     Needs CPAP fitting with [employer]  Cardiovascular and Mediastinum    Coronary artery disease involving native heart without angina pectoris     Patient has no reports of shortness of breath or chest pain  Currently on Imdur 30 mg p o  Daily  Edema of left lower leg due to peripheral venous insufficiency (Chronic)    NSTEMI (non-ST elevated myocardial infarction) (HCC)    Vasculitis (HCC)       Genitourinary    CKD (chronic kidney disease)       Other    Hyperlipidemia (Chronic)    History of DVT (deep vein thrombosis) (Chronic)    Annual physical exam - Primary    Class 2 severe obesity due to excess calories with serious comorbidity and body mass index (BMI) of 39 0 to 39 9 in [facility])    Anticoagulation adequate with anticoagulant therapy    Relevant Orders    Protime-INR    Hypokalemia    Relevant Orders    Basic metabolic panel       Immunizations and preventive care screenings were discussed with patient today  Appropriate education was printed on patient's after visit summary  Discussed risks and benefits of prostate cancer screening  We discussed the controversial history of PSA screening for prostate cancer in the [place] as well as the risk of over detection and over treatment of prostate cancer by way of PSA screening  The patient understands that PSA blood testing is an imperfect way to screen for prostate cancer and that elevated PSA levels in the blood may also be caused by infection, inflammation, prostatic trauma or manipulation, urological procedures, or by benign prostatic enlargement      The role of the digital rectal examination in prostate cancer screening was also discussed and I discussed with him that there is large interobserver variability in the findings of digital rectal examination  Counseling:  Alcohol/drug use: discussed moderation in alcohol intake, the recommendations for healthy alcohol use, and avoidance of illicit drug use  Dental Health: discussed importance of regular tooth brushing, flossing, and dental visits  Injury prevention: discussed safety/seat belts, safety helmets, smoke detectors, carbon dioxide detectors, and smoking near bedding or upholstery  Sexual health: discussed sexually transmitted diseases, partner selection, use of condoms, avoidance of unintended pregnancy, and contraceptive alternatives  · Exercise: the importance of regular exercise/physical activity was discussed  Recommend exercise 3-5 times per week for at least 30 minutes  BMI Counseling: Body mass index is 39 05 kg/m²  The BMI is above normal  Nutrition recommendations include decreasing portion sizes, encouraging healthy choices of fruits and vegetables, decreasing fast food intake, consuming healthier snacks, limiting drinks that contain sugar, moderation in carbohydrate intake, increasing intake of lean protein, reducing intake of saturated and trans fat and reducing intake of cholesterol  Exercise recommendations include vigorous physical activity 75 minutes/week, exercising 3-5 times per week, obtaining a gym membership and strength training exercises  No pharmacotherapy was ordered  Rationale for BMI follow-up plan is due to patient being overweight or obese  Depression Screening and Follow-up Plan: Patient was screened for depression during today's encounter  They screened negative with a PHQ-2 score of 0  No follow-ups on file       Chief Complaint:     Chief Complaint   Patient presents with   • Annual Exam      History of Present Illness:     Adult Annual Physical   Patient here for a comprehensive physical exam  The patient reports no problems  Diet and Physical Activity  · Diet/Nutrition: well balanced diet, diabetic diet, heart healthy (low sodium) diet, limited junk food, low calorie diet, low fat diet, low carb diet and consuming 3-5 servings of fruits/vegetables daily  · Exercise: moderate cardiovascular exercise, 5-7 times a week on average and less than 30 minutes on average  Depression Screening  PHQ-2/9 Depression Screening    Little interest or pleasure in doing things: 0 - not at all  Feeling down, depressed, or hopeless: 0 - not at all  PHQ-2 Score: 0  PHQ-2 Interpretation: Negative depression screen       General Health  · Sleep: sleeps well and gets 7-8 hours of sleep on average  · Hearing: normal - none   · Vision: no vision problems  · Dental: regular dental visits   Health  · Symptoms include: none     Review of Systems:     Review of Systems   Constitutional: Negative for activity change, appetite change, chills, fatigue, fever and unexpected weight change  HENT: Negative for congestion, ear discharge, ear pain, nosebleeds, postnasal drip, rhinorrhea, sinus pressure, sinus pain, sneezing, sore throat and voice change  Eyes: Negative for pain, redness and visual disturbance  Respiratory: Negative for cough, chest tightness, shortness of breath and wheezing  Cardiovascular: Negative for chest pain and palpitations  Gastrointestinal: Negative for abdominal distention, abdominal pain, constipation, diarrhea, nausea and vomiting  Endocrine: Negative  Genitourinary: Negative for difficulty urinating, dysuria, flank pain, frequency, hematuria and urgency  Musculoskeletal: Negative for arthralgias and myalgias  Skin: Negative  Allergic/Immunologic: Negative  Neurological: Negative  Hematological: Negative  Psychiatric/Behavioral: Negative         Past Medical History:     Past Medical History:   Diagnosis Date   • Angioedema    • Coronary artery disease    • GI bleed    • Hypertension     stopped his meds when ran out several years ago   • STEMI (ST elevation myocardial infarction) [facility])       Past Surgical History:     Past Surgical History:   Procedure Laterality Date   • REPLACEMENT TOTAL KNEE Right 12/2020   • UPPER GASTROINTESTINAL ENDOSCOPY      Clamped large stomach ulcer      Family History:     Family History   Adopted: Yes   Family history unknown: Yes      Social History:     Social History     Socioeconomic History   • Marital status: Single     Spouse name: None   • Number of children: None   • Years of education: None   • Highest education level: None   Occupational History   • None   Tobacco Use   • Smoking status: Never   • Smokeless tobacco: Never   Vaping Use   • Vaping Use: Never used   Substance and Sexual Activity   • Alcohol use: Never     Alcohol/week: 0 0 standard drinks   • Drug use: Never   • Sexual activity: None     Comment: not asked   Other Topics Concern   • None   Social History Narrative    Checked Makayla     Social Determinants of Health     Financial Resource Strain: Not on file   Food Insecurity: No Food Insecurity   • Worried About Running Out of Food in the Last Year: Never true   • Ran Out of Food in the Last Year: Never true   Transportation Needs: No Transportation Needs   • Lack of Transportation (Medical): No   • Lack of Transportation (Non-Medical):  No   Physical Activity: Not on file   Stress: Not on file   Social Connections: Not on file   Intimate Partner Violence: Not on file   Housing Stability: Low Risk    • Unable to Pay for Housing in the Last Year: No   • Number of Places Lived in the Last Year: 1   • Unstable Housing in the Last Year: No      Current Medications:     Current Outpatient Medications   Medication Sig Dispense Refill   • acetaminophen (TYLENOL) 500 mg tablet Take 1,000 mg by mouth if needed     • hydrALAZINE (APRESOLINE) 100 MG tablet Take 1 tablet (100 mg total) by mouth 3 (three) times a day 270 tablet 3   • isosorbide mononitrate (IMDUR) 30 mg 24 hr tablet Take 1 tablet (30 mg total) by mouth daily 90 tablet 3   • methocarbamol (ROBAXIN) 500 mg tablet Take 1 tablet (500 mg total) by mouth every 6 (six) hours as needed for muscle spasms 20 tablet 0   • multivitamin (THERAGRAN) TABS Take 1 tablet by mouth daily     • rosuvastatin (CRESTOR) 5 mg tablet Take 1 tablet (5 mg total) by mouth daily 90 tablet 3   • sildenafil (VIAGRA) 25 MG tablet TAKE 1 TABLET BY MOUTH DAILY AS NEEDED FOR ERECTILE DYSFUNCTION  10 tablet 3   • warfarin (COUMADIN) 2 5 mg tablet Take 1 tablet (2 5 mg total) by mouth daily 90 tablet 3   • warfarin (COUMADIN) 5 mg tablet Take 2 tablets (10 mg total) by mouth daily 180 tablet 3   • carbamide peroxide (DEBROX) 6 5 % otic solution Administer 5 drops into both ears 2 (two) times a day (Patient not taking: Reported on 11/16/2022) 15 mL 2   • doxazosin (CARDURA XL) 4 MG 24 hr tablet Take 2 tablets (8 mg total) by mouth daily with breakfast (Patient not taking: Reported on 11/16/2022) 60 tablet 0   • furosemide (LASIX) 20 mg tablet Take 1 tablet (20 mg total) by mouth daily as needed (swelling of the legs) (Patient not taking: Reported on 11/16/2022) 60 tablet 3     No current facility-administered medications for this visit  Allergies: Allergies   Allergen Reactions   • Lisinopril Angioedema   • Norvasc [Amlodipine] Angioedema   • Amoxicillin Itching     Itching little bumps   • Eliquis [Apixaban] Angioedema   • Lipitor [Atorvastatin] Facial Swelling      Physical Exam:     /70 (BP Location: Right arm, Patient Position: Sitting, Cuff Size: Large)   Pulse 56   Temp (!) 97 2 °F (36 2 °C) (Temporal)   Resp 18   Ht 5' 11" (1 803 m)   Wt 127 kg (280 lb)   SpO2 98%   BMI 39 05 kg/m²     Physical Exam  Vitals and nursing note reviewed  Exam conducted with a chaperone present  Constitutional:       Appearance: Normal appearance  He is obese     HENT:      Head: Normocephalic and atraumatic  Right Ear: Tympanic membrane, ear canal and external ear normal       Left Ear: Tympanic membrane, ear canal and external ear normal       Nose: Nose normal       Mouth/Throat:      Mouth: Mucous membranes are moist    Eyes:      Extraocular Movements: Extraocular movements intact  Conjunctiva/sclera: Conjunctivae normal       Pupils: Pupils are equal, round, and reactive to light  Cardiovascular:      Rate and Rhythm: Normal rate and regular rhythm  Pulses: Normal pulses  Heart sounds: Normal heart sounds  Pulmonary:      Effort: Pulmonary effort is normal       Breath sounds: Normal breath sounds  Abdominal:      General: Abdomen is flat  Bowel sounds are normal       Palpations: Abdomen is soft  Musculoskeletal:         General: Normal range of motion  Cervical back: Normal range of motion and neck supple  Right lower leg: Edema present  Left lower leg: Edema present  Comments: Left lower extremity vasculitis, chronic swelling left leg greater than right  Skin:     General: Skin is warm and dry  Capillary Refill: Capillary refill takes 2 to 3 seconds  Neurological:      General: No focal deficit present  Mental Status: He is alert and oriented to person, place, and time     Psychiatric:         Mood and Affect: Mood normal          Behavior: Behavior normal           MAX Wu  [facility] Kaylin Reina

## 2022-11-16 ENCOUNTER — OFFICE VISIT (OUTPATIENT)
Dept: FAMILY MEDICINE CLINIC | Facility: CLINIC | Age: 57
End: 2022-11-16

## 2022-11-16 VITALS
TEMPERATURE: 97.2 F | BODY MASS INDEX: 39.2 KG/M2 | SYSTOLIC BLOOD PRESSURE: 140 MMHG | HEIGHT: 71 IN | OXYGEN SATURATION: 98 % | HEART RATE: 56 BPM | WEIGHT: 280 LBS | RESPIRATION RATE: 18 BRPM | DIASTOLIC BLOOD PRESSURE: 70 MMHG

## 2022-11-16 DIAGNOSIS — Z00.00 ANNUAL PHYSICAL EXAM: ICD-10-CM

## 2022-11-16 DIAGNOSIS — Z86.718 HISTORY OF DVT (DEEP VEIN THROMBOSIS): Chronic | ICD-10-CM

## 2022-11-16 DIAGNOSIS — N18.2 STAGE 2 CHRONIC KIDNEY DISEASE: ICD-10-CM

## 2022-11-16 DIAGNOSIS — E87.6 HYPOKALEMIA: ICD-10-CM

## 2022-11-16 DIAGNOSIS — G47.33 OSA (OBSTRUCTIVE SLEEP APNEA): ICD-10-CM

## 2022-11-16 DIAGNOSIS — R60.0 EDEMA OF LEFT LOWER LEG DUE TO PERIPHERAL VENOUS INSUFFICIENCY: Chronic | ICD-10-CM

## 2022-11-16 DIAGNOSIS — I87.2 EDEMA OF LEFT LOWER LEG DUE TO PERIPHERAL VENOUS INSUFFICIENCY: Chronic | ICD-10-CM

## 2022-11-16 DIAGNOSIS — I21.4 NSTEMI (NON-ST ELEVATED MYOCARDIAL INFARCTION) (HCC): ICD-10-CM

## 2022-11-16 DIAGNOSIS — E78.2 MIXED HYPERLIPIDEMIA: Primary | Chronic | ICD-10-CM

## 2022-11-16 DIAGNOSIS — Z79.01 ANTICOAGULATION ADEQUATE WITH ANTICOAGULANT THERAPY: ICD-10-CM

## 2022-11-16 DIAGNOSIS — E66.01 CLASS 2 SEVERE OBESITY DUE TO EXCESS CALORIES WITH SERIOUS COMORBIDITY AND BODY MASS INDEX (BMI) OF 39.0 TO 39.9 IN ADULT (HCC): ICD-10-CM

## 2022-11-16 DIAGNOSIS — I77.6 VASCULITIS (HCC): ICD-10-CM

## 2022-11-16 DIAGNOSIS — I25.10 CORONARY ARTERY DISEASE INVOLVING NATIVE HEART WITHOUT ANGINA PECTORIS, UNSPECIFIED VESSEL OR LESION TYPE: ICD-10-CM

## 2022-11-16 NOTE — ASSESSMENT & PLAN NOTE
Lab Results   Component Value Date    EGFR 59 06/25/2022    EGFR 65 06/24/2022    EGFR 61 06/23/2022    CREATININE 1 32 (H) 06/25/2022    CREATININE 1 21 06/24/2022    CREATININE 1 27 06/23/2022   EGFR 59  Patient's creatinine 1 32  Will repeat BMP at this time for evaluation  Patient currently is not on furosemide 20 mg daily  Patient currently is taking Apresoline 100 mg t i d , Cardura 8 mg p o  Daily and  Patient to drink fluids regularly as well as monitor sodium intake

## 2022-11-16 NOTE — ASSESSMENT & PLAN NOTE
Patient has history of hyperlipidemia currently on Crestor 5 mg p o  Daily  LDL cholesterol slightly elevated  Labs reviewed  Contains abnormal data Lipid Panel with Direct LDL reflex  Order: [id number]   Status: Final result      Visible to patient: Yes (not seen)      Next appt: None      Dx: Mixed hyperlipidemia      1 Result Note     1 Follow-up Encounter  Component Ref Range & Units 11/6/21  9:37 AM 9/12/20 10:56 AM 11/4/19  5:56 AM   Cholesterol 50 - 200 mg/dL 178  174 CM  107 CM    Comment: Cholesterol:       Desirable         <200 mg/dl       Borderline         200-239 mg/dl       High              >239 mg/dl          Triglycerides <=150 mg/dL 87  78 CM  139 CM    Comment: Triglyceride:      Normal          <150 mg/dl      Borderline High 150-199 mg/dl      High            200-499 mg/dl        Very High       >499 mg/dl     Specimen collection should occur prior to N-Acetylcysteine or Metamizole administration due to the potential for falsely depressed results  HDL, Direct >=40 mg/dL 53  47 CM  10 Low  CM    Comment: Specimen collection should occur prior to Metamizole administration due to the potential for falsley depressed results  LDL Calculated 0 - 100 mg/dL 108 High   111 High  CM  69 CM    Comment: LDL Cholesterol:     Optimal           <100 mg/dl     Near Optimal      100-129 mg/dl     Above Optimal       Borderline High 130-159 mg/dl       High            160-189 mg/dl       Very High       >189 mg/dl           This screening LDL is a calculated result  It does not have the accuracy of the Direct Measured LDL in the monitoring of patients with hyperlipidemia and/or statin therapy  Direct Measure LDL (OWQ325) must be ordered separately in these patients  Specimen Collected: 11/06/21  9:37 AM Last Resulted: 11/06/21 11:47 AM              Repeat lipid panel

## 2022-11-16 NOTE — ASSESSMENT & PLAN NOTE
No reports of shortness of breath or chest pain at this time patient sees Dr Wilman Lundy routinely  Currently on Imdur 30 mg p o  Daily, Apresoline 100 mg t i d , patient is supposed to take Cardura XL 4 mg 2 tablets daily  Patient to maintain low-sodium, low-cholesterol low-fat diet  Exercise encouraged 3-5 times per week with moderate to increased cardiovascular activity  Patient roller skates daily

## 2022-11-16 NOTE — PROGRESS NOTES
BMI Counseling: Body mass index is 39 05 kg/m²  The BMI is above normal  Nutrition recommendations include decreasing portion sizes, encouraging healthy choices of fruits and vegetables, decreasing fast food intake, consuming healthier snacks, limiting drinks that contain sugar, moderation in carbohydrate intake, increasing intake of lean protein, reducing intake of saturated and trans fat and reducing intake of cholesterol  Exercise recommendations include vigorous physical activity 75 minutes/week, exercising 3-5 times per week, obtaining a gym membership and strength training exercises  No pharmacotherapy was ordered  Rationale for BMI follow-up plan is due to patient being overweight or obese  Depression Screening and Follow-up Plan: Patient was screened for depression during today's encounter  They screened negative with a PHQ-2 score of 0  Assessment/Plan:         Problem List Items Addressed This Visit        Respiratory    BRUCE (obstructive sleep apnea)     Needs CPAP fitting with [employer]  Cardiovascular and Mediastinum    Coronary artery disease involving native heart without angina pectoris     Patient has no reports of shortness of breath or chest pain  Currently on Imdur 30 mg p o  Daily  Edema of left lower leg due to peripheral venous insufficiency (Chronic)     Patient has chronic history of vasculitis to lower extremities  Left leg greater than right leg  Edema noted that is chronic in nature  Patient may elevated in the evenings  Low-sodium encouraged  NSTEMI (non-ST elevated myocardial infarction) ([facility] [address] )     No reports of shortness of breath or chest pain at this time patient sees Dr Syed Crespo routinely  Currently on Imdur 30 mg p o  Daily, Apresoline 100 mg t i d , patient is supposed to take Cardura XL 4 mg 2 tablets daily  Patient to maintain low-sodium, low-cholesterol low-fat diet    Exercise encouraged 3-5 times per week with moderate to increased cardiovascular activity  Patient roller skates daily  Vasculitis ([address] )     Patient to  Follow-up with Rheumatology routinely, if issues with the left lower extremity  Genitourinary    CKD (chronic kidney disease)     Lab Results   Component Value Date    EGFR 59 06/25/2022    EGFR 65 06/24/2022    EGFR 61 06/23/2022    CREATININE 1 32 (H) 06/25/2022    CREATININE 1 21 06/24/2022    CREATININE 1 27 06/23/2022   EGFR 59  Patient's creatinine 1 32  Will repeat BMP at this time for evaluation  Patient currently is not on furosemide 20 mg daily  Patient currently is taking Apresoline 100 mg t i d , Cardura 8 mg p o  Daily and  Patient to drink fluids regularly as well as monitor sodium intake  Other    Hyperlipidemia - Primary (Chronic)     Patient has history of hyperlipidemia currently on Crestor 5 mg p o  Daily  LDL cholesterol slightly elevated  Labs reviewed  Contains abnormal data Lipid Panel with Direct LDL reflex  Order: [id number]   Status: Final result      Visible to patient: Yes (not seen)      Next appt: None      Dx: Mixed hyperlipidemia      1 Result Note     1 Follow-up Encounter  Component Ref Range & Units 11/6/21  9:37 AM 9/12/20 10:56 AM 11/4/19  5:56 AM   Cholesterol 50 - 200 mg/dL 178  174 CM  107 CM    Comment: Cholesterol:       Desirable         <200 mg/dl       Borderline         200-239 mg/dl       High              >239 mg/dl          Triglycerides <=150 mg/dL 87  78 CM  139 CM    Comment: Triglyceride:      Normal          <150 mg/dl      Borderline High 150-199 mg/dl      High            200-499 mg/dl        Very High       >499 mg/dl     Specimen collection should occur prior to N-Acetylcysteine or Metamizole administration due to the potential for falsely depressed results  HDL, Direct >=40 mg/dL 53  47 CM  10 Low  CM    Comment: Specimen collection should occur prior to Metamizole administration due to the potential for falsley depressed results  LDL Calculated 0 - 100 mg/dL 108 High   111 High  CM  69 CM    Comment: LDL Cholesterol:     Optimal           <100 mg/dl     Near Optimal      100-129 mg/dl     Above Optimal       Borderline High 130-159 mg/dl       High            160-189 mg/dl       Very High       >189 mg/dl           This screening LDL is a calculated result  It does not have the accuracy of the Direct Measured LDL in the monitoring of patients with hyperlipidemia and/or statin therapy  Direct Measure LDL ([facility]932) must be ordered separately in these patients  Specimen Collected: 11/06/21  9:37 AM Last Resulted: 11/06/21 11:47 AM              Repeat lipid panel  Relevant Orders    Lipid panel    History of DVT (deep vein thrombosis) (Chronic)     Patient currently on Coumadin 12 5 mg p o  Daily for anticoagulation with history of DVT  PT INR every 2 weeks  Negative Homans signs no signs of DVT at this time  Annual physical exam     Annual physical exam completed at this time  Class 2 severe obesity due to excess calories with serious comorbidity and body mass index (BMI) of 39 0 to 39 9 in adult ([facility] )     BMP currently 39 05 kg/M2  Patient counseled on proper diet nutrition and exercise  Goal BMI 25 kg/M2  Recheck BMI next office visit  Anticoagulation adequate with anticoagulant therapy     Patient currently on Coumadin 12 5 mg p o  Daily for anticoagulation with history of DVT  PT INR every 2 weeks  Relevant Orders    Protime-INR    Hypokalemia     Repeat BMP to evaluate potassium  Prior potassium level low at 3 3 however prior potassium level 4 4 in stable  Will repeat at this time for re-evaluation  Relevant Orders    Basic metabolic panel         Subjective:      Patient ID: Christine Garcia is a 60 y o  male      Patient is a 60year old male present for evaluation       The following portions of the patient's history were reviewed and updated as appropriate:   Past Medical History:  He has a past medical history of Angioedema, Coronary artery disease, GI bleed, Hypertension, and STEMI (ST elevation myocardial infarction) ([address] )  ,  _______________________________________________________________________  Medical Problems:  does not have any pertinent problems on file ,  _______________________________________________________________________  Past Surgical History:   has a past surgical history that includes Upper gastrointestinal endoscopy and Replacement total knee (Right, 12/2020)  ,  _______________________________________________________________________  Family History:  He was adopted  Family history is unknown by patient  ,  _______________________________________________________________________  Social History:   reports that he has never smoked  He has never used smokeless tobacco  He reports that he does not drink alcohol and does not use drugs  ,  _______________________________________________________________________  Allergies:  is allergic to lisinopril, norvasc [amlodipine], amoxicillin, eliquis [apixaban], and lipitor [atorvastatin]     _______________________________________________________________________  Current Outpatient Medications   Medication Sig Dispense Refill   • acetaminophen (TYLENOL) 500 mg tablet Take 1,000 mg by mouth if needed     • hydrALAZINE (APRESOLINE) 100 MG tablet Take 1 tablet (100 mg total) by mouth 3 (three) times a day 270 tablet 3   • isosorbide mononitrate (IMDUR) 30 mg 24 hr tablet Take 1 tablet (30 mg total) by mouth daily 90 tablet 3   • methocarbamol (ROBAXIN) 500 mg tablet Take 1 tablet (500 mg total) by mouth every 6 (six) hours as needed for muscle spasms 20 tablet 0   • multivitamin (THERAGRAN) TABS Take 1 tablet by mouth daily     • rosuvastatin (CRESTOR) 5 mg tablet Take 1 tablet (5 mg total) by mouth daily 90 tablet 3   • sildenafil (VIAGRA) 25 MG tablet TAKE 1 TABLET BY MOUTH DAILY AS NEEDED FOR ERECTILE DYSFUNCTION   10 tablet 3   • warfarin (COUMADIN) 2 5 mg tablet Take 1 tablet (2 5 mg total) by mouth daily 90 tablet 3   • warfarin (COUMADIN) 5 mg tablet Take 2 tablets (10 mg total) by mouth daily 180 tablet 3   • carbamide peroxide (DEBROX) 6 5 % otic solution Administer 5 drops into both ears 2 (two) times a day (Patient not taking: Reported on 11/16/2022) 15 mL 2   • doxazosin (CARDURA XL) 4 MG 24 hr tablet Take 2 tablets (8 mg total) by mouth daily with breakfast (Patient not taking: Reported on 11/16/2022) 60 tablet 0   • furosemide (LASIX) 20 mg tablet Take 1 tablet (20 mg total) by mouth daily as needed (swelling of the legs) (Patient not taking: Reported on 11/16/2022) 60 tablet 3     No current facility-administered medications for this visit      _______________________________________________________________________  Review of Systems   Constitutional: Negative for activity change, appetite change, chills, fatigue, fever and unexpected weight change  HENT: Negative for congestion, ear discharge, ear pain, nosebleeds, postnasal drip, rhinorrhea, sinus pressure, sinus pain, sneezing, sore throat and voice change  Eyes: Negative for pain, redness and visual disturbance  Respiratory: Negative for cough, chest tightness, shortness of breath and wheezing  Cardiovascular: Negative for chest pain and palpitations  Gastrointestinal: Negative for abdominal distention, abdominal pain, constipation, diarrhea, nausea and vomiting  Endocrine: Negative  Genitourinary: Negative for difficulty urinating, dysuria, flank pain, frequency, hematuria and urgency  Musculoskeletal: Negative for arthralgias and myalgias  Skin: Negative  Allergic/Immunologic: Negative  Neurological: Negative  Hematological: Negative  Psychiatric/Behavioral: Negative            Objective:  Vitals:    11/16/22 1702   BP: 140/70   BP Location: Right arm   Patient Position: Sitting   Cuff Size: Large   Pulse: 56   Resp: 18   Temp: (!) 97 2 °F (36 2 °C)   TempSrc: Temporal   SpO2: 98%   Weight: 127 kg (280 lb)   Height: 5' 11" (1 803 m)     Body mass index is 39 05 kg/m²  Physical Exam  Vitals and nursing note reviewed  Constitutional:       Appearance: Normal appearance  He is well-developed  He is obese  HENT:      Head: Normocephalic and atraumatic  Right Ear: Tympanic membrane, ear canal and external ear normal       Left Ear: Tympanic membrane, ear canal and external ear normal       Nose: Nose normal  No congestion or rhinorrhea  Mouth/Throat:      Mouth: Mucous membranes are moist       Pharynx: No oropharyngeal exudate or posterior oropharyngeal erythema  Eyes:      Extraocular Movements: Extraocular movements intact  Conjunctiva/sclera: Conjunctivae normal       Pupils: Pupils are equal, round, and reactive to light  Cardiovascular:      Rate and Rhythm: Normal rate and regular rhythm  Pulses: Normal pulses  Heart sounds: Normal heart sounds  No murmur heard  Pulmonary:      Effort: Pulmonary effort is normal       Breath sounds: Normal breath sounds  Abdominal:      General: Bowel sounds are normal       Palpations: Abdomen is soft  Musculoskeletal:         General: Normal range of motion  Cervical back: Normal range of motion  Right lower leg: Edema present  Left lower leg: Edema present  Comments: Chronic lower extremity edema secondary to vasculitis left leg greater than right leg  Skin:     General: Skin is warm  Capillary Refill: Capillary refill takes less than 2 seconds  Neurological:      General: No focal deficit present  Mental Status: He is alert and oriented to person, place, and time     Psychiatric:         Mood and Affect: Mood normal          Behavior: Behavior normal

## 2022-11-16 NOTE — ASSESSMENT & PLAN NOTE
Patient currently on Coumadin 12 5 mg p o  Daily for anticoagulation with history of DVT  PT INR every 2 weeks

## 2022-11-16 NOTE — ASSESSMENT & PLAN NOTE
Patient has chronic history of vasculitis to lower extremities  Left leg greater than right leg  Edema noted that is chronic in nature  Patient may elevated in the evenings  Low-sodium encouraged

## 2022-11-16 NOTE — ASSESSMENT & PLAN NOTE
Repeat BMP to evaluate potassium  Prior potassium level low at 3 3 however prior potassium level 4 4 in stable  Will repeat at this time for re-evaluation

## 2022-11-16 NOTE — ASSESSMENT & PLAN NOTE
BMP currently 39 05 kg/M2  Patient counseled on proper diet nutrition and exercise  Goal BMI 25 kg/M2  Recheck BMI next office visit

## 2022-11-16 NOTE — ASSESSMENT & PLAN NOTE
Patient currently on Coumadin 12 5 mg p o  Daily for anticoagulation with history of DVT  PT INR every 2 weeks  Negative Homans signs no signs of DVT at this time

## 2022-12-09 ENCOUNTER — TELEPHONE (OUTPATIENT)
Dept: CARDIOLOGY CLINIC | Facility: CLINIC | Age: 57
End: 2022-12-09

## 2023-02-02 ENCOUNTER — TELEPHONE (OUTPATIENT)
Dept: OTHER | Facility: OTHER | Age: 58
End: 2023-02-02

## 2023-05-25 ENCOUNTER — TELEPHONE (OUTPATIENT)
Dept: FAMILY MEDICINE CLINIC | Facility: CLINIC | Age: 58
End: 2023-05-25

## 2023-05-25 NOTE — PROGRESS NOTES
BMI Counseling: Body mass index is 39 55 kg/m²  The BMI is above normal  Nutrition recommendations include decreasing portion sizes, encouraging healthy choices of fruits and vegetables, decreasing fast food intake, consuming healthier snacks, limiting drinks that contain sugar, moderation in carbohydrate intake, increasing intake of lean protein, reducing intake of saturated and trans fat and reducing intake of cholesterol  Exercise recommendations include vigorous physical activity 75 minutes/week, exercising 3-5 times per week, obtaining a gym membership and strength training exercises  No pharmacotherapy was ordered  Rationale for BMI follow-up plan is due to patient being overweight or obese  Depression Screening and Follow-up Plan: Patient was screened for depression during today's encounter  They screened negative with a PHQ-2 score of 0         Assessment/Plan:         Problem List Items Addressed This Visit        Respiratory    BRUCE (obstructive sleep apnea)    Relevant Orders    Ambulatory Referral to Sleep Medicine       Cardiovascular and Mediastinum    Coronary artery disease involving native heart without angina pectoris    Hypertension    Leg DVT (deep venous thromboembolism), chronic, right (HCC)    Vasculitis (HCC)       Genitourinary    CKD (chronic kidney disease)       Other    Hyperlipidemia (Chronic)    Relevant Orders    Lipid panel    Iron deficiency anemia due to chronic blood loss    Anemia    Class 2 severe obesity due to excess calories with serious comorbidity and body mass index (BMI) of 39 0 to 39 9 in adult [facility])    Anticoagulation adequate with anticoagulant therapy    Cervical pain (neck)    Relevant Medications    gabapentin (Neurontin) 100 mg capsule    Other Relevant Orders    XR spine cervical complete 4 or 5 vw non injury    Ambulatory Referral to Comprehensive Spine Program    Cervical Collar   Other Visit Diagnoses     Exam for population survey    -  Primary    Relevant Orders    CBC and differential    Comprehensive metabolic panel    Encounter for immunization        Screening for blood or protein in urine        Relevant Orders    UA w Reflex to Microscopic w Reflex to Culture            Subjective:      Patient ID: Joey Means is a 59 y o  male  Patient is a 59 year of male present for follow-up 6 month appointment  Currently reports cervical neck stabbing pain that radiates down his right arm  Patient has medical history of BRUCE, CAD, Hypertension, Right lower extremity DVT, Vasculitis, Anemia, anticoagulation therapy with coumadin, elevated BMI and iron deficiency  The following portions of the patient's history were reviewed and updated as appropriate:   Past Medical History:  He has a past medical history of Angioedema, Coronary artery disease, GI bleed, Hypertension, and STEMI (ST elevation myocardial infarction) ([address] )  ,  _______________________________________________________________________  Medical Problems:  does not have any pertinent problems on file ,  _______________________________________________________________________  Past Surgical History:   has a past surgical history that includes Upper gastrointestinal endoscopy and Replacement total knee (Right, 12/2020)  ,  _______________________________________________________________________  Family History:  He was adopted  Family history is unknown by patient  ,  _______________________________________________________________________  Social History:   reports that he has never smoked  He has never used smokeless tobacco  He reports that he does not drink alcohol and does not use drugs  ,  _______________________________________________________________________  Allergies:  is allergic to lisinopril, norvasc [amlodipine], amoxicillin, eliquis [apixaban], and lipitor [atorvastatin]     _______________________________________________________________________  Current Outpatient Medications   Medication Sig Dispense Refill   • acetaminophen (TYLENOL) 500 mg tablet Take 1,000 mg by mouth if needed     • gabapentin (Neurontin) 100 mg capsule Take 1 capsule (100 mg total) by mouth 3 (three) times a day 30 capsule 0   • hydrALAZINE (APRESOLINE) 100 MG tablet Take 1 tablet (100 mg total) by mouth 3 (three) times a day 270 tablet 3   • isosorbide mononitrate (IMDUR) 30 mg 24 hr tablet Take 1 tablet (30 mg total) by mouth daily 90 tablet 3   • methocarbamol (ROBAXIN) 500 mg tablet Take 1 tablet (500 mg total) by mouth every 6 (six) hours as needed for muscle spasms 20 tablet 0   • multivitamin (THERAGRAN) TABS Take 1 tablet by mouth daily     • rosuvastatin (CRESTOR) 5 mg tablet Take 1 tablet (5 mg total) by mouth daily 90 tablet 3   • sildenafil (VIAGRA) 25 MG tablet TAKE 1 TABLET BY MOUTH DAILY AS NEEDED FOR ERECTILE DYSFUNCTION  10 tablet 3   • warfarin (COUMADIN) 2 5 mg tablet Take 1 tablet (2 5 mg total) by mouth daily 90 tablet 3   • warfarin (COUMADIN) 5 mg tablet Take 2 tablets (10 mg total) by mouth daily 180 tablet 3   • carbamide peroxide (DEBROX) 6 5 % otic solution Administer 5 drops into both ears 2 (two) times a day (Patient not taking: Reported on 11/16/2022) 15 mL 2   • doxazosin (CARDURA XL) 4 MG 24 hr tablet Take 2 tablets (8 mg total) by mouth daily with breakfast (Patient not taking: Reported on 11/16/2022) 60 tablet 0   • furosemide (LASIX) 20 mg tablet Take 1 tablet (20 mg total) by mouth daily as needed (swelling of the legs) (Patient not taking: Reported on 11/16/2022) 60 tablet 3     No current facility-administered medications for this visit      _______________________________________________________________________  Review of Systems   Constitutional: Negative for activity change, appetite change, chills, fatigue, fever and unexpected weight change     HENT: Negative for congestion, ear discharge, ear pain, nosebleeds, postnasal drip, rhinorrhea, sinus pressure, sinus pain, sneezing, sore throat "and voice change  Eyes: Negative for pain, redness and visual disturbance  Respiratory: Negative for cough, chest tightness, shortness of breath and wheezing  Cardiovascular: Negative for chest pain and palpitations  Gastrointestinal: Negative for abdominal distention, abdominal pain, constipation, diarrhea, nausea and vomiting  Endocrine: Negative  Genitourinary: Negative for difficulty urinating, dysuria, flank pain, frequency, hematuria and urgency  Musculoskeletal: Positive for neck pain  Negative for arthralgias and myalgias  Skin: Negative  Allergic/Immunologic: Negative  Neurological: Positive for numbness  Numbness radiating down right arm  Hematological: Negative  Psychiatric/Behavioral: Negative  Objective:  Vitals:    05/26/23 1709   BP: 136/70   BP Location: Left arm   Patient Position: Sitting   Cuff Size: Large   Pulse: 56   Resp: 17   Temp: 98 1 °F (36 7 °C)   TempSrc: Temporal   SpO2: 98%   Weight: 129 kg (283 lb 9 6 oz)   Height: 5' 11\" (1 803 m)     Body mass index is 39 55 kg/m²  Physical Exam  Vitals and nursing note reviewed  Constitutional:       Appearance: Normal appearance  He is well-developed  He is obese  HENT:      Head: Normocephalic and atraumatic  Right Ear: Tympanic membrane, ear canal and external ear normal       Left Ear: Tympanic membrane, ear canal and external ear normal       Nose: Nose normal  No congestion or rhinorrhea  Mouth/Throat:      Mouth: Mucous membranes are moist       Pharynx: No oropharyngeal exudate or posterior oropharyngeal erythema  Eyes:      Extraocular Movements: Extraocular movements intact  Conjunctiva/sclera: Conjunctivae normal       Pupils: Pupils are equal, round, and reactive to light  Cardiovascular:      Rate and Rhythm: Normal rate and regular rhythm  Pulses: Normal pulses  Heart sounds: Normal heart sounds  No murmur heard    Pulmonary:      Effort: Pulmonary " effort is normal       Breath sounds: Normal breath sounds  Abdominal:      General: Bowel sounds are normal       Palpations: Abdomen is soft  Musculoskeletal:         General: Normal range of motion  Cervical back: Normal range of motion  Skin:     General: Skin is warm  Capillary Refill: Capillary refill takes less than 2 seconds  Neurological:      General: No focal deficit present  Mental Status: He is alert and oriented to person, place, and time     Psychiatric:         Mood and Affect: Mood normal          Behavior: Behavior normal

## 2023-05-25 NOTE — TELEPHONE ENCOUNTER
Pt called today re symptoms and Dxs experienced last year that led to him being AMB REF to pain management  After opening pain management note I repeated listed Dxs to pt as he was writing them down, the list is as follows:         Acute left-sided low back pain without sciatica     1  Cervical disc disorder with radiculopathy of mid-cervical region     2   Neural foraminal stenosis of cervical spine     Neck pain

## 2023-05-25 NOTE — PATIENT INSTRUCTIONS
Gabapentin (By mouth)   Gabapentin (nitin-a-PEN-tin)  Treats seizures and pain caused by shingles  Brand Name(s): FusePaq Fanatrex, Neurontin   There may be other brand names for this medicine  When This Medicine Should Not Be Used: This medicine is not right for everyone  Do not use it if you had an allergic reaction to gabapentin  How to Use This Medicine:   Capsule, Liquid, Tablet  Take your medicine as directed  Your dose may need to be changed several times to find what works best for you  If you have epilepsy, do not allow more than 12 hours to pass between doses  Capsule: Swallow the capsule whole with plenty of water  Do not open, crush, or chew it  Gralise® tablet: Swallow the tablet whole   Do not crush, break, or chew it  Neurontin® tablet: If you break a tablet into 2 pieces, use the second half as your next dose  Do not use the half-tablet if the whole tablet has been cut or broken after 28 days  Oral liquid: Measure the oral liquid medicine with a marked measuring spoon, oral syringe, or medicine cup  This medicine should come with a Medication Guide  Ask your pharmacist for a copy if you do not have one  Missed dose: Take a dose as soon as you remember  If it is almost time for your next dose, wait until then and take a regular dose  Do not take extra medicine to make up for a missed dose  Store the medicine in a closed container at room temperature, away from heat, moisture, and direct light  Store the Neurontin® oral liquid in the refrigerator  Do not freeze  Drugs and Foods to Avoid:   Ask your doctor or pharmacist before using any other medicine, including over-the-counter medicines, vitamins, and herbal products  Some medicines can affect how gabapentin works  Tell your doctor if you also using hydrocodone or morphine  If you take an antacid, wait at least 2 hours before you take gabapentin  Do not drink alcohol while you are using this medicine    Tell your doctor if you use anything else that makes you sleepy  Some examples are allergy medicine, narcotic pain medicine, and alcohol  Tell your doctor if you are also using lorazepam, oxycodone, or zolpidem  Warnings While Using This Medicine:   Tell your doctor if you are pregnant or breastfeeding, or if you have kidney problems (including patients receiving dialysis) or lung problems  Tell your doctor if you have a history of depression or mental health problems  This medicine may cause the following problems:  Drug reaction with eosinophilia and systemic symptoms (DRESS) or multiorgan hypersensitivity, which may damage the liver, kidney, blood, heart, or muscles  Changes in mood or behavior, including suicidal thoughts or behavior  Respiratory depression (serious breathing problem that can be life-threatening), when used with narcotic pain medicines  Do not stop using this medicine suddenly  Your doctor will need to slowly decrease your dose before you stop it completely  This medicine may make you dizzy or drowsy  Do not drive or do anything else that could be dangerous until you know how this medicine affects you  Tell any doctor or dentist who treats you that you are using this medicine  This medicine may affect certain medical test results  Your doctor will check your progress and the effects of this medicine at regular visits  Keep all appointments  Keep all medicine out of the reach of children  Never share your medicine with anyone  Possible Side Effects While Using This Medicine:   Call your doctor right away if you notice any of these side effects:   Allergic reaction: Itching or hives, swelling in your face or hands, swelling or tingling in your mouth or throat, chest tightness, trouble breathing  Behavior problems, aggression, restlessness, trouble concentrating, moodiness (especially in children)  Blistering, peeling, red skin rash  Blue lips, fingernails, or skin, chest pain, fast heartbeat, trouble breathing  Change in how much or how often you urinate, bloody or cloudy urine  Dark urine or pale stools, nausea, vomiting, loss of appetite, stomach pain, yellow skin or eyes  Fever, chills, cough, sore throat, body aches  Problems with coordination, shakiness, unsteadiness, unusual eye movement  Rapid weight gain, swelling in your hands, ankles, or feet  Rash, swollen or tender glands in the neck, armpit, or groin  Unusual moods or behaviors, thoughts of hurting yourself, feeling depressed  If you notice these less serious side effects, talk with your doctor:   Dizziness, drowsiness, sleepiness, tiredness  If you notice other side effects that you think are caused by this medicine, tell your doctor  Call your doctor for medical advice about side effects  You may report side effects to FDA at 2-753-FDA-0776  © Copyright Coretta Seth 2022 Information is for End User's use only and may not be sold, redistributed or otherwise used for commercial purposes  The above information is an  only  It is not intended as medical advice for individual conditions or treatments  Talk to your doctor, nurse or pharmacist before following any medical regimen to see if it is safe and effective for you  Hypertension   WHAT YOU NEED TO KNOW:   What is hypertension? Hypertension is high blood pressure  Your blood pressure is the force of your blood moving against the walls of your arteries  Hypertension causes your blood pressure to get so high that your heart has to work much harder than normal  This can damage your heart  Hypertension that does not respond to medicines and lifestyle changes is called resistant hypertension  Hypertension is considered chronic when it continues for 3 months or longer  What do I need to know about the stages of hypertension? Your healthcare provider will give you a blood pressure goal based on your age, health, and risk for cardiovascular disease   The following are general guidelines on the stages of hypertension:  Normal blood pressure is 119/79 or lower   Your healthcare provider may only check your blood pressure each year if it stays at a normal level  Elevated blood pressure is 120/79 to 129/79   This is sometimes called prehypertension  Your healthcare provider may suggest lifestyle changes to help lower your blood pressure to a normal level  He or she may then check it again in 3 to 6 months  Stage 1 hypertension is 130/80  to 139/89   Your provider may recommend lifestyle changes, medication, and checks every 3 to 6 months until your blood pressure is controlled  Stage 2 hypertension is 140/90 or higher   Your provider will recommend lifestyle changes and have you take 2 kinds of hypertension medicines  You will also need to have your blood pressure checked monthly until it is controlled  What increases my risk for hypertension? The cause of hypertension may not be known  This is called essential or primary hypertension  Hypertension caused by another medical condition, such as kidney disease, is called secondary hypertension  Any of the following can increase your risk:  Age older than 54 years (men) or 72 (women)    Stress, or a family history of hypertension or heart disease    Obesity, lack of exercise, or too many high-sodium foods    Use of tobacco, alcohol, or illegal drugs    A medical condition, such as diabetes, kidney disease, thyroid disease, or adrenal gland disorder    Certain medicines, such as steroids or birth control pills    What are the signs and symptoms of hypertension? You may have no signs or symptoms, or you may have any of the following:  Headache    Blurred vision    Chest pain    Dizziness or weakness    Trouble breathing    Nosebleeds    How is hypertension diagnosed? Your healthcare provider will take your blood pressure at several visits  You may also need to check your blood pressure at home  The provider will examine you and ask about medicines you take  He or she will also ask if you have a family history of high blood pressure and about any health conditions you have  He or she will also check your blood pressure and weight and examine your heart, lungs, and eyes  You may need any of the following tests: An ambulatory blood pressure monitor (ABPM)  is a device that you wear  ABPM measures your blood pressure while you do your regular daily activities  It records your blood pressure every 15 to 30 minutes during the day  It also records your blood pressure every 15 minutes to 1 hour at night  The recorded blood pressures help your healthcare provider know if you have hypertension not seen at your appointment  Blood tests  may help healthcare providers find the cause of your hypertension  Blood tests can also help find other health problems caused by hypertension  Urine tests  will be done to check your kidney function  Kidney problems can increase your risk for hypertension  Which medicines are used to treat hypertension? Antihypertensives  may be used to help lower your blood pressure  Several kinds of medicines are available  Your healthcare provider will choose medicines based on the kind of hypertension you have  You may need more than one type of medicine  Take the medicine exactly as directed  Diuretics  help decrease extra fluid that collects in your body  This will help lower your blood pressure  You may urinate more often while you take this medicine  Cholesterol medicine  helps lower your cholesterol level  A low cholesterol level helps prevent heart disease and makes it easier to control your blood pressure  What can I do to manage hypertension? Check your blood pressure at home  Do not smoke, have caffeine, or exercise for at least 30 minutes before you check your blood pressure  Sit and rest for 5 minutes before you check your blood pressure  Extend your arm and support it on a flat surface   Your arm should be at the same level as your heart  Follow the directions that came with your blood pressure monitor  Check your blood pressure 2 times, 1 minute apart, before you take your medicine in the morning  Also check your blood pressure before your evening meal  Keep a record of your readings and bring it to your follow-up visits  Ask your healthcare provider what your blood pressure should be  Manage any other health conditions you have  Health conditions such as diabetes can increase your risk for hypertension  Follow your healthcare provider's instructions and take all your medicines as directed  Ask about all medicines  Certain medicines can increase your blood pressure  Examples include oral birth control pills, decongestants, herbal supplements, and NSAIDs, such as ibuprofen  Your healthcare provider can tell you which medicines are safe for you to take  This includes prescription and over-the-counter medicines  What lifestyle changes can I make to manage hypertension? Your healthcare provider may recommend you work with a team to manage hypertension  The team may include medical experts such as a dietitian, an exercise or physical therapist, and a behavior therapist  Your family members may be included in helping you create lifestyle changes  Limit sodium (salt) as directed  Too much sodium can affect your fluid balance  Check labels to find low-sodium or no-salt-added foods  Some low-sodium foods use potassium salts for flavor  Too much potassium can also cause health problems  Your healthcare provider will tell you how much sodium and potassium are safe for you to have in a day  He or she may recommend that you limit sodium to 2,300 mg a day  Follow the meal plan recommended by your healthcare provider  A dietitian or your provider can give you more information on low-sodium plans or the DASH (Dietary Approaches to Stop Hypertension) eating plan   The DASH plan is low in sodium, processed sugar, unhealthy fats, and total fat  It is high in potassium, calcium, and fiber  These can be found in vegetables, fruit, and whole-grain foods  Be physically active throughout the day  Physical activity, such as exercise, can help control your blood pressure and your weight  Be physically active for at least 30 minutes per day, on most days of the week  Include aerobic activity, such as walking or riding a bicycle  Also include strength training at least 2 times each week  Your healthcare providers can help you create a physical activity plan  Decrease stress  This may help lower your blood pressure  Learn ways to relax, such as deep breathing or listening to music  Limit alcohol as directed  Alcohol can increase your blood pressure  A drink of alcohol is 12 ounces of beer, 5 ounces of wine, or 1½ ounces of liquor  Do not smoke  Nicotine and other chemicals in cigarettes and cigars can increase your blood pressure and also cause lung damage  Ask your healthcare provider for information if you currently smoke and need help to quit  E-cigarettes or smokeless tobacco still contain nicotine  Talk to your healthcare provider before you use these products  Call your local emergency number [phone] in the [address] [place] [address],[address]) or have someone call if:   You have chest pain  You have any of the following signs of a heart attack:      Squeezing, pressure, or pain in your chest    You may  also have any of the following:     Discomfort or pain in your back, neck, jaw, stomach, or arm    Shortness of breath    Nausea or vomiting    Lightheadedness or a sudden cold sweat    You become confused or have trouble speaking  You suddenly feel lightheaded or have trouble breathing  When should I seek immediate care? You have a severe headache or vision loss  You have weakness in an arm or leg  When should I call my doctor? You feel faint, dizzy, confused, or drowsy      You have been taking your blood pressure medicine but your pressure is higher than your provider says it should be  You have questions or concerns about your condition or care  CARE AGREEMENT:   You have the right to help plan your care  Learn about your health condition and how it may be treated  Discuss treatment options with your healthcare providers to decide what care you want to receive  You always have the right to refuse treatment  The above information is an  only  It is not intended as medical advice for individual conditions or treatments  Talk to your doctor, nurse or pharmacist before following any medical regimen to see if it is safe and effective for you  © [facility] 2022 Information is for End User's use only and may not be sold, redistributed or otherwise used for commercial purposes  Cholesterol and Your Health   WHAT YOU NEED TO KNOW:   What is cholesterol? Cholesterol is a waxy, fat-like substance  Your body uses cholesterol to make hormones and new cells, and to protect nerves  Cholesterol is made by your body  It also comes from certain foods you eat, such as meat and dairy products  Your healthcare provider can help you set goals for your cholesterol levels  He or she can help you create a plan to meet your goals  What are cholesterol level goals? Your cholesterol level goals depend on your risk for heart disease, your age, and your other health conditions  The following are general guidelines: Total cholesterol  includes low-density lipoprotein (LDL), high-density lipoprotein (HDL), and triglyceride levels  The total cholesterol level should be lower than 200 mg/dL and is best at about 150 mg/dL  LDL cholesterol  is called bad cholesterol  because it forms plaque in your arteries  As plaque builds up, your arteries become narrow, and less blood flows through  When plaque decreases blood flow to your heart, you may have chest pain   If plaque completely blocks an artery that brings blood to your heart, you may have a heart attack  Plaque can break off and form blood clots  Blood clots may block arteries in your brain and cause a stroke  The level should be less than 130 mg/dL and is best at about 100 mg/dL  HDL cholesterol  is called good cholesterol  because it helps remove LDL cholesterol from your arteries  It does this by attaching to LDL cholesterol and carrying it to your liver  Your liver breaks down LDL cholesterol so your body can get rid of it  High levels of HDL cholesterol can help prevent a heart attack and stroke  Low levels of HDL cholesterol can increase your risk for heart disease, heart attack, and stroke  The level should be 60 mg/dL or higher  Triglycerides  are a type of fat that store energy from foods you eat  High levels of triglycerides also cause plaque buildup  This can increase your risk for a heart attack or stroke  If your triglyceride level is high, your LDL cholesterol level may also be high  The level should be less than 150 mg/dL  What increases my risk for high cholesterol? Smoking cigarettes    Being overweight or obese, or not getting enough exercise    Drinking large amounts of alcohol    A medical condition such as hypertension (high blood pressure) or diabetes    Certain genes passed from your parents to you    Age older than 72 years    What do I need to know about having my cholesterol levels checked? Adults 21to 39years of age should have their cholesterol levels checked every 4 to 6 years  Adults 45 years or older should have their cholesterol checked every 1 to 2 years  You may need your cholesterol checked more often, or at a younger age, if you have risk factors for heart disease  You may also need to have your cholesterol checked more often if you have other health conditions, such as diabetes  Blood tests are used to check cholesterol levels  Blood tests measure your levels of triglycerides, LDL cholesterol, and HDL cholesterol    How do healthy fats affect my cholesterol levels? Healthy fats, also called unsaturated fats, help lower LDL cholesterol and triglyceride levels  Healthy fats include the following:  Monounsaturated fats  are found in foods such as olive oil, canola oil, avocado, nuts, and olives  Polyunsaturated fats,  such as omega 3 fats, are found in fish, such as salmon, trout, and tuna  They can also be found in plant foods such as flaxseed, walnuts, and soybeans  How do unhealthy fats affect my cholesterol levels? Unhealthy fats increase LDL cholesterol and triglyceride levels  They are found in foods high in cholesterol, saturated fat, and trans fat:  Cholesterol  is found in eggs, dairy, and meat  Saturated fat  is found in butter, cheese, ice cream, whole milk, and coconut oil  Saturated fat is also found in meat, such as sausage, hot dogs, and bologna  Trans fat  is found in liquid oils and is used in fried and baked foods  Foods that contain trans fats include chips, crackers, muffins, sweet rolls, microwave popcorn, and cookies  How is high cholesterol treated? Treatment for high cholesterol will also decrease your risk of heart disease, heart attack, and stroke  Treatment may include any of the following:  Lifestyle changes  may include food, exercise, weight loss, and quitting smoking  You may also need to decrease the amount of alcohol you drink  Your healthcare provider will want you to start with lifestyle changes  Other treatment may be added if lifestyle changes are not enough  Your healthcare provider may recommend you work with a team to manage hyperlipidemia  The team may include medical experts such as a dietitian, an exercise or physical therapist, and a behavior therapist  Your family members may be included in helping you create lifestyle changes  Medicines  may be given to lower your LDL cholesterol, triglyceride levels, or total cholesterol level   You may need medicines to lower your cholesterol if any of the following is true:    You have a history of stroke, TIA, unstable angina, or a heart attack  Your LDL cholesterol level is 190 mg/dL or higher  You are age 36 to 76 years, have diabetes or heart disease risk factors, and your LDL cholesterol is 70 mg/dL or higher  Supplements  include fish oil, red yeast rice, and garlic  Fish oil may help lower your triglyceride and LDL cholesterol levels  It may also increase your HDL cholesterol level  Red yeast rice may help decrease your total cholesterol level and LDL cholesterol level  Garlic may help lower your total cholesterol level  Do not take any supplements without talking to your healthcare provider  What food changes can I make to lower my cholesterol levels? A dietitian can help you create a healthy eating plan  He or she can show you how to read food labels and choose foods low in saturated fat, trans fats, and cholesterol  Decrease the total amount of fat you eat  Choose lean meats, fat-free or 1% fat milk, and low-fat dairy products, such as yogurt and cheese  Try to limit or avoid red meats  Limit or do not eat fried foods or baked goods, such as cookies  Replace unhealthy fats with healthy fats  Cook foods in olive oil or canola oil  Choose soft margarines that are low in saturated fat and trans fat  Seeds, nuts, and avocados are other examples of healthy fats  Eat foods with omega-3 fats  Examples include salmon, tuna, mackerel, walnuts, and flaxseed  Eat fish 2 times per week  Pregnant women should not eat fish that have high levels of mercury, such as shark, swordfish, and marquita mackerel  Increase the amount of high-fiber foods you eat  High-fiber foods can help lower your LDL cholesterol  Aim to get between 20 and 30 grams of fiber each day  Fruits and vegetables are high in fiber  Eat at least 5 servings each day   Other high-fiber foods are whole-grain or whole-wheat breads, pastas, or cereals, and brown rice  Eat 3 ounces of whole-grain foods each day  Increase fiber slowly  You may have abdominal discomfort, bloating, and gas if you add fiber to your diet too quickly  Eat healthy protein foods  Examples include low-fat dairy products, skinless chicken and turkey, fish, and nuts  Limit foods and drinks that are high in sugar  Your dietitian or healthcare provider can help you create daily limits for high-sugar foods and drinks  The limit may be lower if you have diabetes or another health condition  Limits can also help you lose weight if needed  What lifestyle changes can I make to lower my cholesterol levels? Maintain a healthy weight  Ask your healthcare provider what a healthy weight is for you  Ask him or her to help you create a weight loss plan if needed  Weight loss can decrease your total cholesterol and triglyceride levels  Weight loss may also help keep your blood pressure at a healthy level  Be physically active throughout the day  Physical activity, such as exercise, can help lower your total cholesterol level and maintain a healthy weight  Physical activity can also help increase your HDL cholesterol level  Work with your healthcare provider to create an program that is right for you  Get at least 30 to 40 minutes of moderate physical activity most days of the week  Examples of exercise include brisk walking, swimming, or biking  Also include strength training at least 2 times each week  Your healthcare providers can help you create a physical activity plan  Do not smoke  Nicotine and other chemicals in cigarettes and cigars can raise your cholesterol levels  Ask your healthcare provider for information if you currently smoke and need help to quit  E-cigarettes or smokeless tobacco still contain nicotine  Talk to your healthcare provider before you use these products  Limit or do not drink alcohol  Alcohol can increase your triglyceride levels   Ask your healthcare provider before you drink alcohol  Ask how much is okay for you to drink in 24 hours or 1 week  CARE AGREEMENT:   You have the right to help plan your care  Discuss treatment options with your healthcare provider to decide what care you want to receive  You always have the right to refuse treatment  The above information is an  only  It is not intended as medical advice for individual conditions or treatments  Talk to your doctor, nurse or pharmacist before following any medical regimen to see if it is safe and effective for you  © Copyright [employer] 2022 Information is for End User's use only and may not be sold, redistributed or otherwise used for commercial purposes  Chronic Kidney Disease   WHAT YOU NEED TO KNOW:   What is chronic kidney disease (CKD)? CKD is the gradual and permanent loss of kidney function  It is also called chronic kidney failure, or chronic renal insufficiency  Normally, the kidneys remove fluid, chemicals, and waste from your blood  These wastes are turned into urine by your kidneys  CKD may worsen over time and lead to kidney failure  What increases my risk for CKD? Diabetes or obesity    High blood pressure or heart disease    Kidney infections or kidney stones    Autoimmune diseases, such as lupus    An enlarged prostate    NSAIDs, illegal drugs, or smoking    Family history of kidney disease    What are the signs and symptoms of CKD? Signs and symptoms depend on how well your kidneys work  You may not have symptoms, or you may have any of the following:  Changes in how often you need to urinate    Swelling in your arms, legs, or feet    Shortness of breath    Fatigue or weakness    Bad or bitter taste in your mouth    Nausea, vomiting, or loss of appetite    How is CKD diagnosed? CKD has 5 stages   Your healthcare provider will use results from the following tests to find the stage of CKD you have:  Blood and urine tests  show how well your kidneys are working  They may also show the cause of your CKD  Ultrasound, CT scan, or MRI  pictures may be used to check your kidneys  You may be given contrast liquid to help your kidneys show up better in the pictures  Tell the healthcare provider if you have ever had an allergic reaction to contrast liquid  Do not enter the MRI room with anything metal  Metal can cause serious injury  Tell the healthcare provider if you have any metal in or on your body  A biopsy  is a procedure to take tissue from your kidney  It is done to find the cause of your CKD  How is CKD treated? Treatment can help control signs and symptoms, and prevent a worse stage of CKD  Your care team may include specialists, such as a dietitian or a heart specialist  This depends on the stage of your CKD and if you have other health conditions to manage  Healthcare providers will work with you to create a plan based on your decisions for treatment  Your treatment plan may include any of the following:  Medicines  may be given to decrease your blood pressure and get rid of extra fluid  You may also receive medicine to manage health conditions that may occur with CKD, such as anemia, diabetes, and heart disease  Dialysis  is a treatment to remove chemicals and waste from your blood when your kidneys can no longer do this  Surgery  may be needed to create an arteriovenous fistula (AVF) in your arm or insert a catheter into your abdomen  This is done so you can receive dialysis  A kidney transplant  may be done if your CKD becomes severe  What can I do to manage CKD? Management may include making some lifestyle changes  Tell your healthcare provider if you have any concerns about being able to make changes  He or she can help you find solutions, including working with specialists  Ask for help creating a plan to break large goals into smaller steps  Your plan may include any of the following:  Manage other health conditions  Your healthcare provider will work with you to make a care plan that meets your needs  You will be checked regularly for heart disease or other conditions that can make CKD worse, such as diabetes  Your blood pressure will be closely monitored  You will also get a target blood pressure and help making a plan to reach your target  This may include taking your blood pressure at home  Maintain a healthy weight  Your weight and body mass index (BMI) will be checked regularly  BMI helps find if your weight is healthy for your height  Your healthcare provider will use other tests to check your muscle and protein levels  Extra weight can strain your kidneys  A low weight or low muscle mass can make you feel more tired  You may have trouble doing your daily activities  Ask your provider what a healthy weight is for you  He or she can help you create a plan to lose or gain weight safely, if needed  The plan may include keeping a food diary  This is a list of foods and liquids you have each day  Your provider will use the diary to help you make changes, if needed  Changes are based on your health and any other conditions you have, such as diabetes  Create an exercise plan  Regular exercise can help you manage CKD, high blood pressure, and diabetes  Exercise also helps control weight  Your provider can help you create exercise goals and a plan to reach those goals  For example, your goal may be to exercise for 30 minutes in a day  Your plan can include breaking exercise into 10 minute sessions, 3 times during the day  Create a healthy eating plan  Your provider may tell you to eat food low in potassium, phosphorus, or protein  Your provider may also recommend vitamin or mineral supplements  Do not take any supplements without talking to your provider  A dietitian can help you plan meals if needed  Ask how much liquid to drink each day and which liquids are best for you           Limit sodium (salt) as directed  You may need to limit sodium to less than 2,300 milligrams (mg) each day  Ask your dietitian or healthcare provider how much sodium you can have each day  The amount depends on your stage of kidney disease  Table salt, canned foods, soups, salted snacks, and processed meats, like deli meats and sausage, are high in sodium  Your provider or a dietitian can show you how to read food labels for sodium  Limit alcohol as directed  Alcohol can cause fluid retention and can affect your kidneys  Ask how much alcohol is safe for you  A drink of alcohol is 12 ounces of beer, 5 ounces of wine, or 1½ ounces of liquor  Do not smoke  Nicotine and other chemicals in cigarettes and cigars can cause kidney damage  Ask your provider for information if you currently smoke and need help to quit  E-cigarettes or smokeless tobacco still contain nicotine  Talk to your provider before you use these products  Ask about over-the-counter medicines  Medicines such as NSAIDs and laxatives may harm your kidneys  Some cough and cold medicines can raise your blood pressure  Always ask if a medicine is safe before you take it  Ask about vaccines you may need  CKD can increase your risk for infections such as pneumonia, influenza, and hepatitis  Vaccines lower your risk for infection  Your healthcare provider will tell you which vaccines you need and when to get them  Call your local emergency number (911 in the [address] [place] [address],[address]) if:   You have a seizure  You have shortness of breath  When should I seek immediate care? You are confused and very drowsy  When should I call my doctor? You suddenly gain or lose more weight than your healthcare provider has told you is okay  You have itchy skin or a rash  You urinate more or less than you normally do  You have blood in your urine  You have nausea and are vomiting  You have fatigue or muscle weakness  You have hiccups that will not stop      You have questions or concerns about your condition or care  CARE AGREEMENT:   You have the right to help plan your care  Learn about your health condition and how it may be treated  Discuss treatment options with your healthcare providers to decide what care you want to receive  You always have the right to refuse treatment  The above information is an  only  It is not intended as medical advice for individual conditions or treatments  Talk to your doctor, nurse or pharmacist before following any medical regimen to see if it is safe and effective for you  © Copyright [employer] 2022 Information is for End User's use only and may not be sold, redistributed or otherwise used for commercial purposes  Deep Vein Thrombosis   WHAT YOU NEED TO KNOW:   What is a deep vein thrombosis (DVT)? A DVT is a blood clot that forms in a deep vein of the body  The deep veins in the legs, thighs, and hips are the most common sites for DVT  A DVT can also occur in a deep vein within your arms  The clot prevents the normal flow of blood in the vein  The blood backs up and causes pain and swelling  The DVT can break into smaller pieces and travel to your lungs and cause a blockage called a pulmonary embolism (PE)  A PE can become life-threatening  What increases my risk for a DVT? After you have a DVT, your risk for another increases   Anyone can get a DVT, but any of the following increases your risk:  A family history of blood clots    Limited activity caused by bed rest, a leg cast, or sitting for long periods    Injury to a deep vein, or surgery    A blood disorder that makes your blood clot faster than normal, such as factor V Leiden mutation    Age older than 61 years    Hormone replacement therapy    Birth control pills, especially in women who smoke or are older than 35 years    Pregnancy, and for 6 weeks after childbirth    Cancer or heart failure    A catheter placed in a large vein    Smoking cigarettes    Obesity or varicose veins    What are the signs and symptoms of a DVT? Swelling    Redness    Warmth, pain, or tenderness       How is a DVT diagnosed? A D-dimer blood test  may be done to check for signs of a blood clot  An ultrasound  uses sound waves to show pictures on a monitor  An ultrasound may be done to show a clot in your vein  Contrast venography  is an x-ray of a vein  Contrast liquid is used to make the vein easier to see on the x-ray  Tell a healthcare provider if you have ever had an allergic reaction to contrast liquid  How is a DVT treated? Blood thinners  help prevent blood clots  Clots can cause strokes, heart attacks, and death  The following are general safety guidelines to follow while you are taking a blood thinner:    Watch for bleeding and bruising while you take blood thinners  Watch for bleeding from your gums or nose  Watch for blood in your urine and bowel movements  Use a soft washcloth on your skin, and a soft toothbrush to brush your teeth  This can keep your skin and gums from bleeding  If you shave, use an electric shaver  Do not play contact sports  Tell your dentist and other healthcare providers that you take a blood thinner  Wear a bracelet or necklace that says you take this medicine  Do not start or stop any other medicines unless your healthcare provider tells you to  Many medicines cannot be used with blood thinners  Take your blood thinner exactly as prescribed by your healthcare provider  Do not skip does or take less than prescribed  Tell your provider right away if you forget to take your blood thinner, or if you take too much  Warfarin  is a blood thinner that you may need to take  The following are things you should be aware of if you take warfarin:     Foods and medicines can affect the amount of warfarin in your blood  Do not make major changes to your diet while you take warfarin   Warfarin works best when you eat about the same amount of vitamin K every day  Vitamin K is found in green leafy vegetables and certain other foods  Ask for more information about what to eat when you are taking warfarin  You will need to see your healthcare provider for follow-up visits when you are on warfarin  You will need regular blood tests  These tests are used to decide how much medicine you need  Clot busters  are emergency medicines that work to dissolve blood clots  A vena cava filter  may be placed inside your vena cava to treat your DVT  The vena cava is a large vein that brings blood from your lower body up to your heart  The filter may help trap pieces of a blood clot and prevent them from going into your lungs  Surgery  called a thrombectomy may be done to remove the clot  A procedure called thrombolysis may instead be done to inject a clot buster that helps break the clot apart  What can I do to manage a DVT? Wear pressure stockings as directed  The stockings put pressure on your legs  This improves blood flow and helps prevent clots  Wear the stockings during the day  Do not wear them when you sleep  Elevate your legs above the level of your heart  Elevate your legs when you sit or lie down, as often as you can  This will help decrease swelling and pain  Prop your legs on pillows or blankets to keep them elevated comfortably  What can I do to prevent a DVT? Exercise regularly to help increase your blood flow  Walking is a good low-impact exercise  Talk to your healthcare provider about the best exercise plan for you  Change your body position or move around often  Move and stretch in your seat several times each hour if you travel by car or work at a desk  In an airplane, get up and walk every hour  Move your legs by tightening and releasing your leg muscles while sitting  You can move your legs while sitting by raising and lowering your heels  Keep your toes on the floor while you do this   You can also raise and lower your toes while keeping your heels on the floor  Maintain a healthy weight  Ask your healthcare provider what a healthy weight is for you  Ask him or her to help you create a weight loss plan if you are overweight  Do not smoke  Nicotine and other chemicals in cigarettes and cigars can damage blood vessels and make it more difficult to manage your DVT  Ask your healthcare provider for information if you currently smoke and need help to quit  E-cigarettes or smokeless tobacco still contain nicotine  Talk to your healthcare provider before you use these products  Ask about birth control if you are a woman who takes the pill  A birth control pill increases the risk for PE in certain women  The risk is higher if you are also older than 35, smoke cigarettes, or have a blood clotting disorder  Talk to your healthcare provider about other ways to prevent pregnancy, such as a cervical cap or intrauterine device (IUD)  Call your local emergency number (911 in the [address]) if:   You feel lightheaded, short of breath, and have chest pain  You cough up blood  When should I seek immediate care? Your arm or leg feels warm, tender, and painful  It may look swollen and red  When should I call my doctor? You have questions or concerns about your condition or care  CARE AGREEMENT:   You have the right to help plan your care  Learn about your health condition and how it may be treated  Discuss treatment options with your healthcare providers to decide what care you want to receive  You always have the right to refuse treatment  The above information is an  only  It is not intended as medical advice for individual conditions or treatments  Talk to your doctor, nurse or pharmacist before following any medical regimen to see if it is safe and effective for you    © Copyright Hailey Craig 2022 Information is for End User's use only and may not be sold, redistributed or otherwise used for commercial purposes  Coronary Artery Disease   WHAT YOU NEED TO KNOW:   What is coronary artery disease (CAD)? CAD is narrowing of the arteries to your heart caused by a buildup of plaque (cholesterol and other substances)  The narrowing in your arteries decreases the amount of blood that can flow to your heart  This causes your heart to get less oxygen, which may be life-threatening  What increases my risk for CAD? Age 39 years or older    Obesity or lack of exercise    Poor nutrition    A family history of CAD    Smoking or regular exposure to secondhand smoke    A medical condition, such as high blood pressure, high cholesterol, or diabetes    Alcohol use    Stress    What are the signs and symptoms of CAD? You may not have any symptoms of CAD  The most common symptom is chest pain, also called angina  Angina may feel like burning, squeezing, or crushing tightness in your chest  The pain may spread to your neck, jaw, or shoulder blade  You may have other symptoms along with chest pain  These include nausea, vomiting, sweating, fainting, and hands and feet that are cold to the touch  How is CAD diagnosed? Your healthcare provider will ask you if you have a family history of CAD  He or she will also ask about your symptoms and about the medicines you are taking  You may need any of the following:  Blood tests  will check for high cholesterol or other medical conditions that may have led to CAD  An EKG  records the electrical activity of your heart  It is used to check your heart rhythm and it may show if there is damage to your heart  An echocardiogram  is a type of ultrasound  Sound waves are used to show the structure, movement, and blood vessels of your heart  An exercise stress test  helps healthcare providers see the changes that take place in your heart during exercise  An EKG is done while you ride an exercise bike or walk on a treadmill   Healthcare providers will ask if you have chest pain or trouble breathing during the test     A stress test with medicine  may be done if you cannot do the exercise stress test  You are given medicine that makes your heart work harder  You will be connected to a stress test machine  An angiography, chest x-ray, CT scan, or MRI  may be done to take pictures of your blood vessels and arteries  The pictures may show how narrow or blocked your blood vessels are  You may be given contrast liquid to help healthcare providers see the pictures better  Tell the healthcare provider if you have ever had an allergic reaction to contrast liquid  Do not enter the MRI room with anything metal  Metal can cause serious injury  Tell the healthcare provider if you have any metal in or on your body  Which medicines are used to treat CAD? Blood pressure medicines  are given to lower your blood pressure  These medicines may include ACE inhibitors and beta-blockers  ACE inhibitors help keep your blood vessels relaxed and open  This helps keep blood flowing into your heart  Beta-blockers keep your heart pumping strongly and regularly  This helps keep your heart from working too hard to get oxygen  Cholesterol medicines  help lower blood cholesterol levels  Nitrates , such as nitroglycerin, relax the arteries of your heart so it gets more oxygen  Nitrates may also help relieve your chest pain  Diuretics  help your body get rid of extra fluid and protect your heart from more damage  You may urinate more often while you are taking diuretics  Antiplatelets , such as aspirin, help prevent blood clots  Take your antiplatelet medicine exactly as directed  These medicines make it more likely for you to bleed or bruise  If you are told to take aspirin, do not take acetaminophen or ibuprofen instead  Blood thinners  keep clots from forming in your blood  Clots may cause heart attacks, strokes, or death   This medicine makes it more likely for you to bleed or bruise  What are some other treatments for CAD? Your healthcare provider will work with you to create a treatment plan  In addition to medicines, he or she may recommend a procedure or surgery to open your arteries  He or she can explain the benefits and risks of each treatment  The following are commonly used to treat CAD:  An angioplasty  may be done to open an artery blocked by plaque  A tube with a balloon on the end is put into the blocked artery  When the tube is in the artery, the balloon is inflated  As the balloon inflates, it presses the plaque against the artery wall to open the artery  A stent may be placed in your artery to keep it open  Coronary artery bypass surgery (CABG)  is open heart surgery  Healthcare providers take arteries or veins from other areas in your body  These are used to bypass (go around) the blocked arteries of your heart  What is cardiac rehabilitation (rehab)? Rehab is a program run by specialists who will help you safely strengthen your heart and reduce the risk for more heart disease  The plan includes exercise, relaxation, stress management, and heart-healthy nutrition  Healthcare providers will also check to make sure any medicines you are taking are working  What can I do to manage or prevent CAD? Do not take certain medicines without asking your healthcare provider first   These include NSAIDs, herbal or vitamin supplements, or hormones (estrogen or progestin)  Do not smoke  Nicotine and other chemicals in cigarettes and cigars can cause heart and lung damage  Ask your healthcare provider for information if you currently smoke and need help to quit  E-cigarettes or smokeless tobacco still contain nicotine  Talk to your healthcare provider before you use these products  Be physically active  Physical activity, such as exercise, can lower your blood pressure, cholesterol, weight, and blood sugar levels   Healthcare providers will help you create physical activity goals  They can also help you make a plan to reach your goals  For example, you can break activity into 10-minute periods, 3 times in the day  Find activities you enjoy  This will make it easier for you to reach your goals  Maintain a healthy weight  Ask your healthcare provider what a healthy weight is for you  He or she can help you create a safe weight loss plan, if needed  A weight loss of at least 10% can improve your heart health  Eat heart-healthy foods  Include fresh fruits and vegetables in your meal plan  Choose low-fat foods, such as skim or 1% fat milk, low-fat cheese and yogurt, fish, chicken (without skin), and lean meats  Eat two 4-ounce servings of fish high in omega-3 fats each week, such as salmon, fresh tuna, and herring  Limit sodium (salt) as directed  Avoid foods that are high in sodium, such as canned foods, potato chips, salty snacks, and cold cuts  If you add salt when you cook, do not add more salt at the table  Limit or do not drink alcohol  A drink of alcohol is 12 ounces of beer, 5 ounces of wine, or 1½ ounces of liquor  Your healthcare provider can tell you how many drinks are okay within 24 hours and within 1 week  Manage other health conditions  Follow your healthcare provider's advice on how to manage other conditions that can affect your heart health  These include diabetes, high blood pressure, and high cholesterol  You may need to take medicines for these conditions and make other lifestyle changes  Manage stress  Stress can raise your blood pressure  Find new ways to relax, such as deep breathing or listening to music  Ask about vaccines you may need  Your healthcare provider can tell you which vaccines you need, and when to get them  The following vaccines help prevent diseases that can become serious for a person with CAD:    The influenza (flu) vaccine is given each year    Get a flu vaccine as soon as recommended, usually in September or October  The pneumonia vaccine is usually given every 5 years  Your healthcare provider may recommend the pneumonia vaccine if you are 72 or older  COVID-19 vaccines are given to adults as a shot in 1 or 2 doses  Vaccination is recommended for all adults  A booster (additional) dose is also recommended for all adults  A second booster is recommended for all adults 48 or older and for immunocompromised adults 25 or older  The second booster is also recommended for adults who received the 1-dose vaccine for the first and booster doses  Your healthcare provider can tell you when to get one or both boosters  Call your local emergency number (911 in the [address] [place] [address],[address]), or have someone call if:   You have any of the following signs of a heart attack:      Squeezing, pressure, or pain in your chest    You may  also have any of the following:     Discomfort or pain in your back, neck, jaw, stomach, or arm    Shortness of breath    Nausea or vomiting    Lightheadedness or a sudden cold sweat      When should I seek immediate care? You have chest pain that happens often  You have chest pain at rest     When should I call my doctor? You have questions or concerns about your condition or care  CARE AGREEMENT:   You have the right to help plan your care  Learn about your health condition and how it may be treated  Discuss treatment options with your healthcare providers to decide what care you want to receive  You always have the right to refuse treatment  The above information is an  only  It is not intended as medical advice for individual conditions or treatments  Talk to your doctor, nurse or pharmacist before following any medical regimen to see if it is safe and effective for you  © Copyright Hermina Figures 2022 Information is for End User's use only and may not be sold, redistributed or otherwise used for commercial purposes

## 2023-05-26 ENCOUNTER — OFFICE VISIT (OUTPATIENT)
Dept: FAMILY MEDICINE CLINIC | Facility: CLINIC | Age: 58
End: 2023-05-26

## 2023-05-26 VITALS
RESPIRATION RATE: 17 BRPM | WEIGHT: 283.6 LBS | BODY MASS INDEX: 39.7 KG/M2 | HEIGHT: 71 IN | DIASTOLIC BLOOD PRESSURE: 70 MMHG | OXYGEN SATURATION: 98 % | TEMPERATURE: 98.1 F | SYSTOLIC BLOOD PRESSURE: 136 MMHG | HEART RATE: 56 BPM

## 2023-05-26 DIAGNOSIS — Z00.8 EXAM FOR POPULATION SURVEY: ICD-10-CM

## 2023-05-26 DIAGNOSIS — I25.10 CORONARY ARTERY DISEASE INVOLVING NATIVE HEART WITHOUT ANGINA PECTORIS, UNSPECIFIED VESSEL OR LESION TYPE: Primary | ICD-10-CM

## 2023-05-26 DIAGNOSIS — M54.2 CERVICAL PAIN (NECK): ICD-10-CM

## 2023-05-26 DIAGNOSIS — D64.9 ANEMIA, UNSPECIFIED TYPE: ICD-10-CM

## 2023-05-26 DIAGNOSIS — Z13.89 SCREENING FOR BLOOD OR PROTEIN IN URINE: ICD-10-CM

## 2023-05-26 DIAGNOSIS — G47.33 OSA (OBSTRUCTIVE SLEEP APNEA): ICD-10-CM

## 2023-05-26 DIAGNOSIS — E78.2 MIXED HYPERLIPIDEMIA: Chronic | ICD-10-CM

## 2023-05-26 DIAGNOSIS — Z79.01 ANTICOAGULATION ADEQUATE WITH ANTICOAGULANT THERAPY: ICD-10-CM

## 2023-05-26 DIAGNOSIS — E66.01 CLASS 2 SEVERE OBESITY DUE TO EXCESS CALORIES WITH SERIOUS COMORBIDITY AND BODY MASS INDEX (BMI) OF 39.0 TO 39.9 IN ADULT (HCC): ICD-10-CM

## 2023-05-26 DIAGNOSIS — I77.6 VASCULITIS (HCC): ICD-10-CM

## 2023-05-26 DIAGNOSIS — I82.501 LEG DVT (DEEP VENOUS THROMBOEMBOLISM), CHRONIC, RIGHT (HCC): ICD-10-CM

## 2023-05-26 DIAGNOSIS — N18.2 STAGE 2 CHRONIC KIDNEY DISEASE: ICD-10-CM

## 2023-05-26 DIAGNOSIS — I10 HYPERTENSION, UNSPECIFIED TYPE: ICD-10-CM

## 2023-05-26 RX ORDER — GABAPENTIN 100 MG/1
100 CAPSULE ORAL 3 TIMES DAILY
Qty: 30 CAPSULE | Refills: 0 | Status: SHIPPED | OUTPATIENT
Start: 2023-05-26 | End: 2023-05-26

## 2023-05-26 RX ORDER — GABAPENTIN 100 MG/1
100 CAPSULE ORAL 3 TIMES DAILY
Qty: 30 CAPSULE | Refills: 0 | Status: SHIPPED | OUTPATIENT
Start: 2023-05-26

## 2023-05-26 NOTE — ASSESSMENT & PLAN NOTE
BMI currently 39 55 kg/M2  Patient counseled on proper diet, nutrition and exercise  Goal BMI 25 kg/M2  Recheck BMI next office visit

## 2023-05-26 NOTE — ASSESSMENT & PLAN NOTE
Patient noted to have cervical neck pain  Referred back to the spine center for evaluation  Patient ordered for x-rays of the cervical spine at this time  Patient to take Neurontin 100 mg p o  3 times daily as needed for pain and take Tylenol as directed  Soft neck collar ordered for traction  Patient to follow-up if pain persists

## 2023-05-26 NOTE — ASSESSMENT & PLAN NOTE
I was in the office while conducting this encounter. Consent:  She and/or her healthcare decision maker is aware that this patient-initiated Telehealth encounter is a billable service, with coverage as determined by her insurance carrier. She is aware that she may receive a bill and has provided verbal consent to proceed: No - Not billable    This virtual visit was conducted via [employer]. Pursuant to the emergency declaration under the [facility] waiver authority and the [employer] Act, this Virtual  Visit was conducted to reduce the patient's risk of exposure to COVID-19 and provide continuity of care for an established patient. Services were provided through a video synchronous discussion virtually to substitute for in-person clinic visit. Due to this being a TeleHealth evaluation, many elements of the physical examination are unable to be assessed. Total Time: minutes: 21-30 minutes. HISTORY OF PRESENT ILLNESS  Selena Sierra is a 20 y.o. female. HPI   Chief Complaint   Patient presents with    Surgical Follow-up     1wk s/p lap cholecystectomy/ [phone]       Review of Systems   Constitutional: Negative for chills, fever and malaise/fatigue. Was seen in the ER with acute back pain and dx with spasm   Better today   Has been laying around mostly since surgery due discomfort    Respiratory: Negative for cough and shortness of breath. Cardiovascular: Negative for chest pain, palpitations and leg swelling. Gastrointestinal: Positive for abdominal pain (incisional ) and constipation (1st week, better now ). Negative for blood in stool, diarrhea, heartburn, nausea and vomiting. Genitourinary: Negative for dysuria (\"little dark\" yellow ) and flank pain. Musculoskeletal: Positive for back pain and myalgias. Negative for falls.         Back was in \"total spasm\" and \"couldn't move\" was seen in the Lab Results   Component Value Date    CREATININE 1 32 (H) 06/25/2022    CREATININE 1 21 06/24/2022    CREATININE 1 27 06/23/2022    EGFR 59 06/25/2022    EGFR 65 06/24/2022    EGFR 61 06/23/2022   Patient noted to have history of chronic kidney disease  eGFR 59 patient to repeat BMP level  ER and muscle relaxer helped   Past week has been mostly laying flat in the bed   Pain started after picking up 1month old baby     Pain now mostly with activity, but just little sore   No radiating pain   No numbness      Skin: Positive for itching (some at incisions ). Negative for rash. Neurological: Negative for dizziness, tingling, loss of consciousness and headaches. Endo/Heme/Allergies: Does not bruise/bleed easily. Physical Exam  Constitutional:       Appearance: Normal appearance. She is normal weight. Comments: Appears well   Moving about in bedroom    Pulmonary:      Comments: Breathing regular and unlabored   Abdominal:      Comments: Lap sites appear dry and intact, glue in place   No erythema or induration    Musculoskeletal:      Comments: Moving about in room and witness her going supine to sitting up to walking   Appeared to be in no distress and was independent    Skin:     Coloration: Skin is not jaundiced. Neurological:      Mental Status: She is alert and oriented to person, place, and time. Psychiatric:         Thought Content: Thought content normal.         ASSESSMENT and PLAN    ICD-10-CM ICD-9-CM    1. Follow-up examination after abdominal surgery Z09 V67.09    2. Elevated LFTs R65.22 847.3 METABOLIC PANEL, COMPREHENSIVE   3.  Muscle spasm of back M62.830 724.8      1 week s/p laparoscopic cholecystectomy   Reviewed pathology with patient   Back pain seems to be musculoskeletal and relieved with ice and muscle relaxer   Reassured some of her discomfort is normal and as expected 1 week post op and she is post partum   Weak abdominal muscles will affect back   Can use ice, muscle relaxer and lidoderm derm patches for back   Needs to be up and walking throughout the day   Avoid lifting > 15 lbs and avoid straining   Diet low fat as tolerated   Ok to shower and wash as normal   Increase water intake, especially since nursing   Lab order to repeat LFTs next week (was elevated in the ER)   Keep next appt as scheduled   Selena Sierra verbalized understanding and questions were answered to the best of my knowledge and ability. Diet, activity and restriction  educational materials were provided via My Chart . Reviewed medication list, problem list and allergies. 21 minutes spent virtually with patient in directed conversation with the patient to include medical condition, assessment and plan.

## 2023-05-26 NOTE — ASSESSMENT & PLAN NOTE
Patient has history of hyperlipidemia  Instructed to maintain a low-cholesterol low-fat diet  Repeat lipid panel ordered at this time  Patient maintained on Crestor 5 mg p o  daily

## 2023-05-26 NOTE — ASSESSMENT & PLAN NOTE
Patient has prior history of DVT  Currently maintained on Coumadin routinely  Instructed get PT/INR levels for evaluation of anticoagulation therapy

## 2023-05-26 NOTE — ASSESSMENT & PLAN NOTE
BP stable at 136/70  Patient currently maintained on Imdur 30 mg p o  daily  Patient to maintain a low-sodium diet and exercise 3-5 times per week for at least 75 minutes of cardiovascular activity  Recheck BP next office visit

## 2023-05-26 NOTE — ASSESSMENT & PLAN NOTE
Currently maintained on Coumadin routinely  Instructed get PT/INR levels for evaluation of anticoagulation therapy

## 2023-05-27 ENCOUNTER — APPOINTMENT (OUTPATIENT)
Dept: LAB | Facility: CLINIC | Age: 58
End: 2023-05-27

## 2023-05-27 DIAGNOSIS — Z79.01 ANTICOAGULATION ADEQUATE WITH ANTICOAGULANT THERAPY: ICD-10-CM

## 2023-05-27 LAB
BACTERIA UR QL AUTO: NORMAL /HPF
BILIRUB UR QL STRIP: NEGATIVE
CLARITY UR: CLEAR
COLOR UR: ABNORMAL
GLUCOSE UR STRIP-MCNC: NEGATIVE MG/DL
HGB UR QL STRIP.AUTO: NEGATIVE
INR PPP: 1.02 (ref 0.84–1.19)
KETONES UR STRIP-MCNC: NEGATIVE MG/DL
LEUKOCYTE ESTERASE UR QL STRIP: NEGATIVE
NITRITE UR QL STRIP: NEGATIVE
NON-SQ EPI CELLS URNS QL MICRO: NORMAL /HPF
PH UR STRIP.AUTO: 5.5 [PH]
PROT UR STRIP-MCNC: ABNORMAL MG/DL
PROTHROMBIN TIME: 13.6 SECONDS (ref 11.6–14.5)
RBC #/AREA URNS AUTO: NORMAL /HPF
SP GR UR STRIP.AUTO: 1.02 (ref 1–1.03)
UROBILINOGEN UR STRIP-ACNC: <2 MG/DL
WBC #/AREA URNS AUTO: NORMAL /HPF

## 2023-05-30 ENCOUNTER — TELEPHONE (OUTPATIENT)
Dept: PHYSICAL THERAPY | Facility: OTHER | Age: 58
End: 2023-05-30

## 2023-05-30 ENCOUNTER — TELEPHONE (OUTPATIENT)
Dept: FAMILY MEDICINE CLINIC | Facility: CLINIC | Age: 58
End: 2023-05-30

## 2023-05-31 ENCOUNTER — TELEPHONE (OUTPATIENT)
Dept: FAMILY MEDICINE CLINIC | Facility: CLINIC | Age: 58
End: 2023-05-31

## 2023-05-31 NOTE — TELEPHONE ENCOUNTER
Patient was discharged today   They did not give him anything for pain  He has an appointment tomorrow   He was offered pain medication Friday is it possible for him to get something called in   To  [facility] [place]   Call patient when it is called in thank you

## 2023-05-31 NOTE — TELEPHONE ENCOUNTER
Pt called in to let us know he is currently in [facility] and has been admitted  Pt was admitted on Sunday with high blood pressure and cervical neck pain  Patient stated all the things you have ordered for patient have been done while in the hospital and received them on a disc  Pt stated he will call to make a TCM appointment as soon as discharge

## 2023-06-01 ENCOUNTER — OFFICE VISIT (OUTPATIENT)
Dept: FAMILY MEDICINE CLINIC | Facility: CLINIC | Age: 58
End: 2023-06-01

## 2023-06-01 VITALS
BODY MASS INDEX: 38.58 KG/M2 | OXYGEN SATURATION: 96 % | HEIGHT: 71 IN | DIASTOLIC BLOOD PRESSURE: 90 MMHG | RESPIRATION RATE: 17 BRPM | WEIGHT: 275.6 LBS | SYSTOLIC BLOOD PRESSURE: 148 MMHG | TEMPERATURE: 98.1 F | HEART RATE: 78 BPM

## 2023-06-01 DIAGNOSIS — M54.2 CERVICAL PAIN (NECK): Primary | ICD-10-CM

## 2023-06-01 DIAGNOSIS — E66.09 CLASS 2 OBESITY DUE TO EXCESS CALORIES WITH BODY MASS INDEX (BMI) OF 38.0 TO 38.9 IN ADULT, UNSPECIFIED WHETHER SERIOUS COMORBIDITY PRESENT: ICD-10-CM

## 2023-06-01 DIAGNOSIS — I10 HYPERTENSION, UNSPECIFIED TYPE: ICD-10-CM

## 2023-06-01 DIAGNOSIS — I21.4 NSTEMI (NON-ST ELEVATED MYOCARDIAL INFARCTION) (HCC): ICD-10-CM

## 2023-06-01 DIAGNOSIS — E78.2 MIXED HYPERLIPIDEMIA: ICD-10-CM

## 2023-06-01 DIAGNOSIS — M54.2 NECK PAIN ON RIGHT SIDE: ICD-10-CM

## 2023-06-01 RX ORDER — CYCLOBENZAPRINE HCL 5 MG
5 TABLET ORAL 3 TIMES DAILY PRN
Qty: 15 TABLET | Refills: 0 | Status: SHIPPED | OUTPATIENT
Start: 2023-06-01 | End: 2023-06-08 | Stop reason: ALTCHOICE

## 2023-06-01 RX ORDER — METHYLPREDNISOLONE 4 MG/1
TABLET ORAL
Qty: 21 EACH | Refills: 0 | Status: SHIPPED | OUTPATIENT
Start: 2023-06-01

## 2023-06-01 NOTE — PATIENT INSTRUCTIONS
Chest Pain   WHAT YOU NEED TO KNOW:   What do I need to know about chest pain? Chest pain can be caused by a range of conditions, from not serious to life-threatening  It is important to follow up with your healthcare provider to find the cause of your chest pain  What may cause or increase my risk for chest pain? A digestion problem, such as acid reflux or a stomach ulcer    An anxiety attack or a strong emotion, such as anger    Infection, inflammation, or a fracture in the bones or cartilage in your chest    Poor blood flow to your heart (angina)    A life-threatening condition, such as a heart attack or blood clot in your lungs    What other symptoms might I have with chest pain? A burning feeling behind your breastbone    A racing or slow heartbeat    Fever or sweating    Nausea or vomiting    Shortness of breath    Discomfort or pressure that spreads from your chest to your back, jaw, or arm    Feeling weak, tired, or faint    How is the cause of chest pain diagnosed? Your healthcare provider will examine you  Describe your chest pain in as much detail as possible  Tell him or her where your pain is and when it began  Tell the provider if you notice anything that makes the pain worse or better  Tell him or her if it is constant or comes and goes  Also include any other symptoms you have with the chest pain, such as sweating or nausea  Your provider will ask about any medicines you use and medical conditions you have  Tell him or her if you have a family history of heart disease  You may also need any of the following tests: An EKG  is a test that records your heart's electrical activity  Blood tests  check for heart damage and signs of a heart attack  An echocardiogram  uses sound waves to see if blood is flowing normally through your heart  An ultrasound, x-ray, CT, or MRI scan  may show the cause of your chest pain   You may be given contrast liquid to help your heart show up better in the pictures  Tell the healthcare provider if you have ever had an allergic reaction to contrast liquid  Do not enter the MRI room with anything metal  Metal can cause serious injury  Tell the provider if you have any metal in or on your body  An endoscopy  may be done to check for ulcers or problem with your esophagus  How is chest pain treated? Medicines  may be given to treat the cause of your chest pain  Examples include pain medicine, anxiety medicine, or medicines to increase blood flow to your heart  Do not  take certain medicines without asking your healthcare provider first  These include NSAIDs, herbal or vitamin supplements, and hormones, such as estrogen or progestin  A stent  may be placed if your chest pain is caused by blockage in your heart  A stent is a wire mesh tube that helps hold your artery open  You may need more than 1 stent  What are some healthy living tips? If the cause of your chest pain is known, your healthcare provider will give you specific guidelines to follow  The following are general healthy guidelines:  Do not smoke  Nicotine and other chemicals in cigarettes and cigars can cause lung and heart damage  Ask your healthcare provider for information if you currently smoke and need help to quit  E-cigarettes or smokeless tobacco still contain nicotine  Talk to your healthcare provider before you use these products  Choose a variety of healthy foods as often as possible  Include fresh, frozen, or canned fruits and vegetables  Also include low-fat dairy products, fish, chicken (without skin), and lean meats  Your healthcare provider or a dietitian can help you create meal plans  You may need to avoid certain foods or drinks if your pain is caused by a digestion problem  Lower your sodium (salt) intake  Limit foods that are high in sodium, such as canned foods, salty snacks, and cold cuts  If you add salt when you cook food, do not add more at the table  Choose low-sodium canned foods as much as possible  Drink plenty of water every day  Water helps your body to control your temperature and blood pressure  Ask your healthcare provider how much water you should drink every day  Ask about activity  Your healthcare provider will tell you which activities to limit or avoid  Ask when you can drive, return to work, and have sex  Ask about the best exercise plan for you  Maintain a healthy weight  Ask your healthcare provider what a healthy weight is for you  Ask him or her to help you create a weight loss plan if you are overweight  Ask about vaccines you may need  Your healthcare provider can tell you which vaccines you need, and when to get them  The following vaccines help prevent diseases that can become serious for a person with a heart condition:    The influenza (flu) vaccine is given each year  Get a flu vaccine as soon as recommended, usually in September or October  The pneumonia vaccine is usually given every 5 years  Your healthcare provider may recommend the pneumonia vaccine if you are 72 or older  COVID-19 vaccines are given to adults as a shot in 1 or 2 doses  Vaccination is recommended for all adults  A booster (additional) dose is also recommended for all adults  A second booster is recommended for all adults 48 or older and for immunocompromised adults 25 or older  The second booster is also recommended for adults who received the 1-dose vaccine for the first and booster doses  Your healthcare provider can tell you when to get one or both boosters         Call your local emergency number (911 in the [address] [place] [address],[address]) or have someone call if:   You have any of the following signs of a heart attack:      Squeezing, pressure, or pain in your chest    You may  also have any of the following:     Discomfort or pain in your back, neck, jaw, stomach, or arm    Shortness of breath    Nausea or vomiting    Lightheadedness or a sudden cold sweat      When should I seek immediate care? You have chest discomfort that gets worse, even with medicine  You cough or vomit blood  Your bowel movements are black or bloody  You cannot stop vomiting, or it hurts to swallow  When should I call my doctor? You have questions or concerns about your condition or care  CARE AGREEMENT:   You have the right to help plan your care  Learn about your health condition and how it may be treated  Discuss treatment options with your healthcare providers to decide what care you want to receive  You always have the right to refuse treatment  The above information is an  only  It is not intended as medical advice for individual conditions or treatments  Talk to your doctor, nurse or pharmacist before following any medical regimen to see if it is safe and effective for you  © Copyright [employer] 2022 Information is for End User's use only and may not be sold, redistributed or otherwise used for commercial purposes

## 2023-06-01 NOTE — ASSESSMENT & PLAN NOTE
BMI 38 44 kg/M2  Patient counseled on proper diet, nutrition and exercise  Recheck BMI next office visit goal BMI 25 kg/M2

## 2023-06-01 NOTE — ASSESSMENT & PLAN NOTE
BP currently on hydralazine 100 mg 3 times daily,cardura XL 4mg (2) tablets p o  daily, and to maintain a low sodium diet  Patient heart rate has been running below 60 noted on prior office visits  Possibly secondary to beta-blocker  Patient to follow-up with Dr Yohana Leyva to discuss current medication regimen for hypertension

## 2023-06-01 NOTE — ASSESSMENT & PLAN NOTE
CT cervical spine without contrast  Order: [id number]  Impression      No acute fracture or dislocation  Enlarged thyroid can be further evaluated with ultrasound  Narrative    EXAMINATION:  CT CERVICAL SPINE WO CONTRAST   EXAM DATE AND TIME: 5/29/2023 10:45 AM EDT   INDICATION: Neck pain, chronic, degenerative changes on xray; Cervical radiculopathy, no red flags   COMPARISON: None available  TECHNIQUE: CT imaging of the cervical spine with sagittal and coronal reformats was obtained without contrast      FINDINGS: There is straightening of the cervical lordosis  No subluxation  Vertebral body heights are maintained  No fracture  Craniocervical junction is normal in appearance  Atlantodental distance is not widened  No prevertebral soft tissue swelling  There are mild degenerative changes which result in mild neural foraminal narrowing at C5-C6 and moderate neural foraminal narrowing at C6-C7 and C7-T1       Dose: Dose 1 : CT  DLP Total : 739 32 mGycm  Maximum CTDI Vol : 36 03 mGy   Exam End: 05/29/23 11:34 AM    Specimen Collected: 05/29/23 11:57 AM Last Resulted: 05/29/23 12:03 PM   [facility] From: [facility]  [facility]  Result Received: 06/01/23  4:33 PM     Patient to use cervical neck collar, follow-up Estella NEGRON at [facility]  [phone]  EKG reviewed from [facility]   Repeat EKG ordered in office  Patient to follow-up with cardiology

## 2023-06-01 NOTE — ASSESSMENT & PLAN NOTE
Patient takes pravastatin 5 mg p o  daily  To maintain a low-cholesterol low-fat diet  Contains abnormal data Lipid Panel with Direct LDL reflex  Order: [id number]   Status: Final result      Visible to patient: Yes (not seen)      Next appt: 11/27/2023 at 05:45 PM in Family Medicine (Luis A Yeboah MD)      Dx: Mixed hyperlipidemia      1 Result Note     1 Follow-up Encounter         Component Ref Range & Units 11/6/21  9:37 AM 9/12/20 10:56 AM 11/4/19  5:56 AM   Cholesterol 50 - 200 mg/dL 178  174 CM  107 CM    Comment: Cholesterol:       Desirable         <200 mg/dl       Borderline         200-239 mg/dl       High              >239 mg/dl          Triglycerides <=150 mg/dL 87  78 CM  139 CM    Comment: Triglyceride:      Normal          <150 mg/dl      Borderline High 150-199 mg/dl      High            200-499 mg/dl        Very High       >499 mg/dl     Specimen collection should occur prior to N-Acetylcysteine or Metamizole administration due to the potential for falsely depressed results  HDL, Direct >=40 mg/dL 53  47 CM  10 Low  CM    Comment: Specimen collection should occur prior to Metamizole administration due to the potential for falsley depressed results  LDL Calculated 0 - 100 mg/dL 108 High   111 High  CM  69 CM    Comment: LDL Cholesterol:     Optimal           <100 mg/dl     Near Optimal      100-129 mg/dl     Above Optimal       Borderline High 130-159 mg/dl       High            160-189 mg/dl       Very High       >189 mg/dl           This screening LDL is a calculated result  It does not have the accuracy of the Direct Measured LDL in the monitoring of patients with hyperlipidemia and/or statin therapy  Direct Measure LDL ([id number]) must be ordered separately in these patients                Specimen Collected: 11/06/21  9:37 AM Last Resulted: 11/06/21 11:47 AM

## 2023-06-01 NOTE — PROGRESS NOTES
BMI Counseling: Body mass index is 38 44 kg/m²  The BMI is above normal  Nutrition recommendations include decreasing portion sizes, encouraging healthy choices of fruits and vegetables, decreasing fast food intake, consuming healthier snacks, limiting drinks that contain sugar, moderation in carbohydrate intake, increasing intake of lean protein, reducing intake of saturated and trans fat and reducing intake of cholesterol  Exercise recommendations include vigorous physical activity 75 minutes/week, exercising 3-5 times per week, obtaining a gym membership and strength training exercises  No pharmacotherapy was ordered  Rationale for BMI follow-up plan is due to patient being overweight or obese  Depression Screening and Follow-up Plan: Patient was screened for depression during today's encounter  They screened negative with a PHQ-2 score of 0  Assessment/Plan:         Problem List Items Addressed This Visit        Cardiovascular and Mediastinum    Hypertension     BP currently on hydralazine 100 mg 3 times daily,cardura XL 4mg (2) tablets p o  daily, and to maintain a low sodium diet  Patient heart rate has been running below 60 noted on prior office visits  Possibly secondary to beta-blocker  Patient to follow-up with Dr Jina Gonzalez to discuss current medication regimen for hypertension  Other    Hyperlipidemia (Chronic)     Patient takes pravastatin 5 mg p o  daily  To maintain a low-cholesterol low-fat diet  Contains abnormal data Lipid Panel with Direct LDL reflex  Order: [id number]   Status: Final result      Visible to patient: Yes (not seen)      Next appt: 11/27/2023 at 05:45 PM in Family Medicine (Chauncey Frankel, MD)      Dx:  Mixed hyperlipidemia      1 Result Note     1 Follow-up Encounter         Component Ref Range & Units 11/6/21  9:37 AM 9/12/20 10:56 AM 11/4/19  5:56 AM   Cholesterol 50 - 200 mg/dL 178  174 CM  107 CM    Comment: Cholesterol:       Desirable         <200 mg/dl       Borderline         200-239 mg/dl       High              >239 mg/dl          Triglycerides <=150 mg/dL 87  78 CM  139 CM    Comment: Triglyceride:      Normal          <150 mg/dl      Borderline High 150-199 mg/dl      High            200-499 mg/dl        Very High       >499 mg/dl     Specimen collection should occur prior to N-Acetylcysteine or Metamizole administration due to the potential for falsely depressed results  HDL, Direct >=40 mg/dL 53  47 CM  10 Low  CM    Comment: Specimen collection should occur prior to Metamizole administration due to the potential for falsley depressed results  LDL Calculated 0 - 100 mg/dL 108 High   111 High  CM  69 CM    Comment: LDL Cholesterol:     Optimal           <100 mg/dl     Near Optimal      100-129 mg/dl     Above Optimal       Borderline High 130-159 mg/dl       High            160-189 mg/dl       Very High       >189 mg/dl           This screening LDL is a calculated result  It does not have the accuracy of the Direct Measured LDL in the monitoring of patients with hyperlipidemia and/or statin therapy  Direct Measure LDL (KBD307) must be ordered separately in these patients  Specimen Collected: 11/06/21  9:37 AM Last Resulted: 11/06/21 11:47 AM                    Cervical pain (neck) - Primary     CT cervical spine without contrast  Order: [id number]  Impression      No acute fracture or dislocation  Enlarged thyroid can be further evaluated with ultrasound  Narrative    EXAMINATION:  CT CERVICAL SPINE WO CONTRAST   EXAM DATE AND TIME: 5/29/2023 10:45 AM EDT   INDICATION: Neck pain, chronic, degenerative changes on xray; Cervical radiculopathy, no red flags   COMPARISON: None available  TECHNIQUE: CT imaging of the cervical spine with sagittal and coronal reformats was obtained without contrast      FINDINGS: There is straightening of the cervical lordosis  No subluxation  Vertebral body heights are maintained  No fracture  Craniocervical junction is normal in appearance  Atlantodental distance is not widened  No prevertebral soft tissue swelling  There are mild degenerative changes which result in mild neural foraminal narrowing at C5-C6 and moderate neural foraminal narrowing at C6-C7 and C7-T1       Dose: Dose 1 : CT  DLP Total : 739 32 mGycm  Maximum CTDI Vol : 36 03 mGy   Exam End: 05/29/23 11:34 AM    Specimen Collected: 05/29/23 11:57 AM Last Resulted: 05/29/23 12:03 PM   [facility], [facility]  [facility]  Result Received: 06/01/23  4:33 PM     Patient to use cervical neck collar, follow-up Malu NEGRON at the [facility]  [phone]  EKG reviewed from [facility]REA| [facility]   Repeat EKG ordered in office  Patient to follow-up with cardiology  Relevant Medications    methylPREDNISolone 4 MG tablet therapy pack         Subjective:      Patient ID: Nura Alba is a 59 y o  male  Patient is a 59year old male present for evaluation of of cervical neck pain, hypertension, and anticoagulation therapy  The following portions of the patient's history were reviewed and updated as appropriate:   Past Medical History:  He has a past medical history of Angioedema, Coronary artery disease, GI bleed, Hypertension, and STEMI (ST elevation myocardial infarction) ([address] )  ,  _______________________________________________________________________  Medical Problems:  does not have any pertinent problems on file ,  _______________________________________________________________________  Past Surgical History:   has a past surgical history that includes Upper gastrointestinal endoscopy and Replacement total knee (Right, 12/2020)  ,  _______________________________________________________________________  Family History:  He was adopted  Family history is unknown by patient  ,  _______________________________________________________________________  Social History:   reports that he has never smoked  He has never used smokeless tobacco  He reports that he does not drink alcohol and does not use drugs  ,  _______________________________________________________________________  Allergies:  is allergic to lisinopril, norvasc [amlodipine], amoxicillin, eliquis [apixaban], and lipitor [atorvastatin]     _______________________________________________________________________  Current Outpatient Medications   Medication Sig Dispense Refill   • acetaminophen (TYLENOL) 500 mg tablet Take 1,000 mg by mouth if needed     • gabapentin (Neurontin) 100 mg capsule Take 1 capsule (100 mg total) by mouth 3 (three) times a day 30 capsule 0   • hydrALAZINE (APRESOLINE) 100 MG tablet Take 1 tablet (100 mg total) by mouth 3 (three) times a day 270 tablet 3   • isosorbide mononitrate (IMDUR) 30 mg 24 hr tablet Take 1 tablet (30 mg total) by mouth daily 90 tablet 3   • methocarbamol (ROBAXIN) 500 mg tablet Take 1 tablet (500 mg total) by mouth every 6 (six) hours as needed for muscle spasms 20 tablet 0   • methylPREDNISolone 4 MG tablet therapy pack Use as directed on package 21 each 0   • multivitamin (THERAGRAN) TABS Take 1 tablet by mouth daily     • rosuvastatin (CRESTOR) 5 mg tablet Take 1 tablet (5 mg total) by mouth daily 90 tablet 3   • sildenafil (VIAGRA) 25 MG tablet TAKE 1 TABLET BY MOUTH DAILY AS NEEDED FOR ERECTILE DYSFUNCTION   10 tablet 3   • warfarin (COUMADIN) 2 5 mg tablet Take 1 tablet (2 5 mg total) by mouth daily 90 tablet 3   • warfarin (COUMADIN) 5 mg tablet Take 2 tablets (10 mg total) by mouth daily 180 tablet 3   • carbamide peroxide (DEBROX) 6 5 % otic solution Administer 5 drops into both ears 2 (two) times a day (Patient not taking: Reported on 11/16/2022) 15 mL 2   • doxazosin (CARDURA XL) 4 MG 24 hr tablet Take 2 tablets (8 mg total) by mouth daily with breakfast (Patient not taking: Reported on 11/16/2022) 60 tablet 0   • furosemide (LASIX) 20 mg tablet Take 1 tablet (20 mg total) by mouth daily as needed "(swelling of the legs) (Patient not taking: Reported on 11/16/2022) 60 tablet 3     No current facility-administered medications for this visit      _______________________________________________________________________  Review of Systems   Constitutional: Negative for activity change, appetite change, chills, fatigue, fever and unexpected weight change  HENT: Negative for congestion, ear discharge, ear pain, nosebleeds, postnasal drip, rhinorrhea, sinus pressure, sinus pain, sneezing, sore throat and voice change  Eyes: Negative for pain, redness and visual disturbance  Respiratory: Negative for cough, chest tightness, shortness of breath and wheezing  Cardiovascular: Positive for chest pain  Negative for palpitations  Gastrointestinal: Positive for nausea  Negative for abdominal distention, abdominal pain, constipation, diarrhea and vomiting  Endocrine: Negative  Genitourinary: Negative for difficulty urinating, dysuria, flank pain, frequency, hematuria and urgency  Musculoskeletal: Positive for neck pain  Negative for arthralgias, myalgias and neck stiffness  Skin: Negative  Allergic/Immunologic: Negative  Neurological: Positive for numbness  Bilateral arm numbness  Hematological: Negative  Psychiatric/Behavioral: Negative  Objective:  Vitals:    06/01/23 1639   BP: 148/90   BP Location: Left arm   Patient Position: Sitting   Cuff Size: Large   Pulse: 78   Resp: 17   Temp: 98 1 °F (36 7 °C)   TempSrc: Temporal   SpO2: 96%   Weight: 125 kg (275 lb 9 6 oz)   Height: 5' 11\" (1 803 m)     Body mass index is 38 44 kg/m²  Physical Exam  Vitals and nursing note reviewed  Constitutional:       Appearance: Normal appearance  He is well-developed  He is obese  HENT:      Head: Normocephalic and atraumatic        Right Ear: Tympanic membrane, ear canal and external ear normal       Left Ear: Tympanic membrane, ear canal and external ear normal       Nose: Nose normal  " No congestion or rhinorrhea  Mouth/Throat:      Mouth: Mucous membranes are moist       Pharynx: No oropharyngeal exudate or posterior oropharyngeal erythema  Eyes:      Extraocular Movements: Extraocular movements intact  Conjunctiva/sclera: Conjunctivae normal       Pupils: Pupils are equal, round, and reactive to light  Neck:      Vascular: No carotid bruit  Cardiovascular:      Rate and Rhythm: Normal rate and regular rhythm  Pulses: Normal pulses  Heart sounds: Normal heart sounds  No murmur heard  Pulmonary:      Effort: Pulmonary effort is normal       Breath sounds: Normal breath sounds  Abdominal:      General: Bowel sounds are normal       Palpations: Abdomen is soft  Musculoskeletal:         General: Normal range of motion  Cervical back: Normal range of motion and neck supple  Tenderness present  No rigidity  Lymphadenopathy:      Cervical: No cervical adenopathy  Skin:     General: Skin is warm  Capillary Refill: Capillary refill takes less than 2 seconds  Neurological:      General: No focal deficit present  Mental Status: He is alert and oriented to person, place, and time     Psychiatric:         Mood and Affect: Mood normal          Behavior: Behavior normal

## 2023-06-01 NOTE — ASSESSMENT & PLAN NOTE
Patient had prior history of non-STEMI MI  Recent EKG repeated today and is bradycardia septal infarct age-indeterminate  EKG stable  Patient to follow-up with Dr Case Six

## 2023-06-02 DIAGNOSIS — I82.501 CHRONIC EMBOLISM AND THROMBOSIS OF UNSPECIFIED DEEP VEINS OF RIGHT LOWER EXTREMITY (HCC): ICD-10-CM

## 2023-06-02 NOTE — TELEPHONE ENCOUNTER
Call placed to the patient per [facility] referral     Voice message left for patient to call back  Phone number and hours of business provided  This the 2nd attempt to reach the patient  Will defer per protocol      Pt is already established with PM, Dr Theron negron

## 2023-06-04 RX ORDER — WARFARIN SODIUM 2.5 MG/1
TABLET ORAL
Qty: 30 TABLET | Refills: 2 | Status: SHIPPED | OUTPATIENT
Start: 2023-06-04

## 2023-06-07 ENCOUNTER — TELEPHONE (OUTPATIENT)
Dept: OTHER | Facility: OTHER | Age: 58
End: 2023-06-07

## 2023-06-08 ENCOUNTER — TELEPHONE (OUTPATIENT)
Dept: FAMILY MEDICINE CLINIC | Facility: CLINIC | Age: 58
End: 2023-06-08

## 2023-06-08 ENCOUNTER — TELEMEDICINE (OUTPATIENT)
Dept: FAMILY MEDICINE CLINIC | Facility: CLINIC | Age: 58
End: 2023-06-08
Payer: COMMERCIAL

## 2023-06-08 DIAGNOSIS — I82.501 LEG DVT (DEEP VENOUS THROMBOEMBOLISM), CHRONIC, RIGHT (HCC): ICD-10-CM

## 2023-06-08 DIAGNOSIS — M54.2 CERVICAL PAIN (NECK): ICD-10-CM

## 2023-06-08 DIAGNOSIS — M54.2 NECK PAIN ON RIGHT SIDE: ICD-10-CM

## 2023-06-08 DIAGNOSIS — Z51.81 ANTICOAGULATION GOAL OF INR 2 TO 3: ICD-10-CM

## 2023-06-08 DIAGNOSIS — I10 HYPERTENSION, UNSPECIFIED TYPE: Primary | ICD-10-CM

## 2023-06-08 DIAGNOSIS — E66.01 CLASS 2 SEVERE OBESITY DUE TO EXCESS CALORIES WITH SERIOUS COMORBIDITY AND BODY MASS INDEX (BMI) OF 38.0 TO 38.9 IN ADULT (HCC): ICD-10-CM

## 2023-06-08 DIAGNOSIS — Z79.01 ANTICOAGULATION GOAL OF INR 2 TO 3: ICD-10-CM

## 2023-06-08 PROCEDURE — 99443 PR PHYS/QHP TELEPHONE EVALUATION 21-30 MIN: CPT | Performed by: NURSE PRACTITIONER

## 2023-06-08 RX ORDER — METHOCARBAMOL 500 MG/1
500 TABLET, FILM COATED ORAL EVERY 6 HOURS PRN
Qty: 20 TABLET | Refills: 0 | Status: SHIPPED | OUTPATIENT
Start: 2023-06-08

## 2023-06-08 NOTE — PATIENT INSTRUCTIONS
Hypertension   WHAT YOU NEED TO KNOW:   What is hypertension? Hypertension is high blood pressure  Your blood pressure is the force of your blood moving against the walls of your arteries  Hypertension causes your blood pressure to get so high that your heart has to work much harder than normal  This can damage your heart  Hypertension that does not respond to medicines and lifestyle changes is called resistant hypertension  Hypertension is considered chronic when it continues for 3 months or longer  What do I need to know about the stages of hypertension? Your healthcare provider will give you a blood pressure goal based on your age, health, and risk for cardiovascular disease  The following are general guidelines on the stages of hypertension:  Normal blood pressure is 119/79 or lower   Your healthcare provider may only check your blood pressure each year if it stays at a normal level  Elevated blood pressure is 120/79 to 129/79   This is sometimes called prehypertension  Your healthcare provider may suggest lifestyle changes to help lower your blood pressure to a normal level  He or she may then check it again in 3 to 6 months  Stage 1 hypertension is 130/80  to 139/89   Your provider may recommend lifestyle changes, medication, and checks every 3 to 6 months until your blood pressure is controlled  Stage 2 hypertension is 140/90 or higher   Your provider will recommend lifestyle changes and have you take 2 kinds of hypertension medicines  You will also need to have your blood pressure checked monthly until it is controlled  What increases my risk for hypertension? The cause of hypertension may not be known  This is called essential or primary hypertension  Hypertension caused by another medical condition, such as kidney disease, is called secondary hypertension   Any of the following can increase your risk:  Age older than 54 years (men) or 72 (women)    Stress, or a family history of hypertension or heart disease    Obesity, lack of exercise, or too many high-sodium foods    Use of tobacco, alcohol, or illegal drugs    A medical condition, such as diabetes, kidney disease, thyroid disease, or adrenal gland disorder    Certain medicines, such as steroids or birth control pills    What are the signs and symptoms of hypertension? You may have no signs or symptoms, or you may have any of the following:  Headache    Blurred vision    Chest pain    Dizziness or weakness    Trouble breathing    Nosebleeds    How is hypertension diagnosed? Your healthcare provider will take your blood pressure at several visits  You may also need to check your blood pressure at home  The provider will examine you and ask about medicines you take  He or she will also ask if you have a family history of high blood pressure and about any health conditions you have  He or she will also check your blood pressure and weight and examine your heart, lungs, and eyes  You may need any of the following tests: An ambulatory blood pressure monitor (ABPM)  is a device that you wear  ABPM measures your blood pressure while you do your regular daily activities  It records your blood pressure every 15 to 30 minutes during the day  It also records your blood pressure every 15 minutes to 1 hour at night  The recorded blood pressures help your healthcare provider know if you have hypertension not seen at your appointment  Blood tests  may help healthcare providers find the cause of your hypertension  Blood tests can also help find other health problems caused by hypertension  Urine tests  will be done to check your kidney function  Kidney problems can increase your risk for hypertension  Which medicines are used to treat hypertension? Antihypertensives  may be used to help lower your blood pressure  Several kinds of medicines are available   Your healthcare provider will choose medicines based on the kind of hypertension you have  You may need more than one type of medicine  Take the medicine exactly as directed  Diuretics  help decrease extra fluid that collects in your body  This will help lower your blood pressure  You may urinate more often while you take this medicine  Cholesterol medicine  helps lower your cholesterol level  A low cholesterol level helps prevent heart disease and makes it easier to control your blood pressure  What can I do to manage hypertension? Check your blood pressure at home  Do not smoke, have caffeine, or exercise for at least 30 minutes before you check your blood pressure  Sit and rest for 5 minutes before you check your blood pressure  Extend your arm and support it on a flat surface  Your arm should be at the same level as your heart  Follow the directions that came with your blood pressure monitor  Check your blood pressure 2 times, 1 minute apart, before you take your medicine in the morning  Also check your blood pressure before your evening meal  Keep a record of your readings and bring it to your follow-up visits  Ask your healthcare provider what your blood pressure should be  Manage any other health conditions you have  Health conditions such as diabetes can increase your risk for hypertension  Follow your healthcare provider's instructions and take all your medicines as directed  Ask about all medicines  Certain medicines can increase your blood pressure  Examples include oral birth control pills, decongestants, herbal supplements, and NSAIDs, such as ibuprofen  Your healthcare provider can tell you which medicines are safe for you to take  This includes prescription and over-the-counter medicines  What lifestyle changes can I make to manage hypertension? Your healthcare provider may recommend you work with a team to manage hypertension   The team may include medical experts such as a dietitian, an exercise or physical therapist, and a behavior therapist  Your family members may be included in helping you create lifestyle changes  Limit sodium (salt) as directed  Too much sodium can affect your fluid balance  Check labels to find low-sodium or no-salt-added foods  Some low-sodium foods use potassium salts for flavor  Too much potassium can also cause health problems  Your healthcare provider will tell you how much sodium and potassium are safe for you to have in a day  He or she may recommend that you limit sodium to 2,300 mg a day  Follow the meal plan recommended by your healthcare provider  A dietitian or your provider can give you more information on low-sodium plans or the DASH (Dietary Approaches to Stop Hypertension) eating plan  The DASH plan is low in sodium, processed sugar, unhealthy fats, and total fat  It is high in potassium, calcium, and fiber  These can be found in vegetables, fruit, and whole-grain foods  Be physically active throughout the day  Physical activity, such as exercise, can help control your blood pressure and your weight  Be physically active for at least 30 minutes per day, on most days of the week  Include aerobic activity, such as walking or riding a bicycle  Also include strength training at least 2 times each week  Your healthcare providers can help you create a physical activity plan  Decrease stress  This may help lower your blood pressure  Learn ways to relax, such as deep breathing or listening to music  Limit alcohol as directed  Alcohol can increase your blood pressure  A drink of alcohol is 12 ounces of beer, 5 ounces of wine, or 1½ ounces of liquor  Do not smoke  Nicotine and other chemicals in cigarettes and cigars can increase your blood pressure and also cause lung damage  Ask your healthcare provider for information if you currently smoke and need help to quit  E-cigarettes or smokeless tobacco still contain nicotine  Talk to your healthcare provider before you use these products  Call your local emergency number [phone] in the [address]) or have someone call if:   You have chest pain  You have any of the following signs of a heart attack:      Squeezing, pressure, or pain in your chest    You may  also have any of the following:     Discomfort or pain in your back, neck, jaw, stomach, or arm    Shortness of breath    Nausea or vomiting    Lightheadedness or a sudden cold sweat    You become confused or have trouble speaking  You suddenly feel lightheaded or have trouble breathing  When should I seek immediate care? You have a severe headache or vision loss  You have weakness in an arm or leg  When should I call my doctor? You feel faint, dizzy, confused, or drowsy  You have been taking your blood pressure medicine but your pressure is higher than your provider says it should be  You have questions or concerns about your condition or care  CARE AGREEMENT:   You have the right to help plan your care  Learn about your health condition and how it may be treated  Discuss treatment options with your healthcare providers to decide what care you want to receive  You always have the right to refuse treatment  The above information is an  only  It is not intended as medical advice for individual conditions or treatments  Talk to your doctor, nurse or pharmacist before following any medical regimen to see if it is safe and effective for you  © Copyright J[employer] 2022 Information is for End User's use only and may not be sold, redistributed or otherwise used for commercial purposes  Neck Pain   WHAT YOU NEED TO KNOW:   What do I need to know about neck pain? You may have sudden neck pain that increases quickly  You may instead feel pain build slowly over time  Neck pain may go away in a few days or weeks, or it may continue for months  The pain may come and go, or be worse with certain movements   The pain may be only in your neck, or it may move to your arms, back, or shoulders  You may also have pain that starts in another body area and moves to your neck  Some types of neck pain are permanent  What causes or increases my risk for neck pain? Stenosis (narrowing) of your spinal column, or degeneration (breakdown) or inflammation of the discs in your neck    Inflammation from a condition such as rheumatoid arthritis, polymyalgia rheumatica, or rotator cuff tendinitis    A condition that affects neck to arm nerves, such as thoracic outlet syndrome or brachial neuritis     Trauma or injury to your neck, such as being hit from behind in a car (whiplash) or sleeping in a bad position    A fracture of a neck bone that causes nerve damage    A medical condition, such as shingles, meningitis, a tumor, or coronary artery disease (CAD)    How is the cause of neck pain diagnosed? Your healthcare provider will ask about your symptoms and when they began  Tell your provider if you were recently in an accident or had another injury to your neck  Your provider will examine your neck and shoulders  You may move your head and arms in certain ways to see if any position causes or relieves the pain  Blood tests  may be used to measure the amount of inflammation or to check for signs of an infection  X-ray or MRI  pictures may show a neck injury or medical condition  Do not enter the MRI room with anything metal  Metal can cause serious damage  Tell the healthcare provider if you have any metal in or on your body  How is neck pain treated? Treatment will depend on what is causing your pain  Medicines  may be prescribed or recommended by your healthcare provider for pain  You may need medicine to treat nerve pain or to stop muscle spasms  Medicines may also be given to reduce inflammation  Your healthcare provider may inject medicine into a nerve to block pain   Over-the-counter NSAID medicine or acetaminophen may be recommended to help treat minor pain or inflammation  Traction  is used to relieve pressure from nerves  Your head is gently pulled up and away from your neck  This stretches muscles and ligaments and makes more room for the spine  Your healthcare provider will tell you the kind of traction that will help your neck pain  Do not use traction devices at home unless directed by your healthcare provider  Surgery  may be needed if the pain is severe or other treatments do not work  Surgery will not help every kind of neck pain  You may need surgery to stabilize a fractured bone or to remove a tumor  Surgery may also be used to widen a narrowed spinal column or to remove a disc from between neck bones  What can I do to manage or prevent neck pain? Rest your neck as directed  Do not make sudden movements, such as turning your head quickly  Your healthcare provider may recommend you wear a cervical collar for a short time  The collar will prevent you from moving your head  This will give your neck time to heal if an injury is causing your neck pain  Ask your provider when you can return to sports or other normal daily activities  Apply heat as directed  Heat helps relieve pain and swelling  Use a heat wrap, or soak a small towel in warm water  Wring out the extra water  Apply the heat wrap or towel for 20 minutes every hour, or as directed  Apply ice as directed  Ice helps relieve pain and swelling, and can help prevent tissue damage  Use an ice pack, or put ice in a bag  Cover the ice pack or back with a towel before you apply it to your neck  Apply the ice pack or ice for 15 minutes every hour, or as directed  Your provider can tell you how often to apply ice  Do neck exercises as directed  Neck exercises help strengthen the muscles and increase range of motion  Your provider will tell you which exercises are right for you   The provider may give you instructions or may recommend that you work with a physical therapist  Your provider or therapist can make sure you are doing the exercises correctly  Maintain good posture  Try to keep your head and shoulders lifted when you sit  If you work in front of a computer, make sure the monitor is at the right level  You should not need to look up down to see the screen  You should also not have to lean forward to be able to read what is on the screen  Make sure your keyboard, mouse, and other computer items are placed where you do not have to extend your shoulder to reach them  Get up often if you work in front of a computer or sit for long periods of time  Stretch or walk around to keep your neck muscles loose  When should I seek immediate care? You have an injury that causes neck pain and shooting pain down your arms or legs  Your neck pain suddenly becomes severe  You have neck pain along with numbness, tingling, or weakness in your arms or legs  You have a stiff neck, a headache, and a fever  When should I contact my healthcare provider? You have new or worsening symptoms  Your symptoms continue even after treatment  You have questions or concerns about your condition or care  CARE AGREEMENT:   You have the right to help plan your care  Learn about your health condition and how it may be treated  Discuss treatment options with your healthcare providers to decide what care you want to receive  You always have the right to refuse treatment  The above information is an  only  It is not intended as medical advice for individual conditions or treatments  Talk to your doctor, nurse or pharmacist before following any medical regimen to see if it is safe and effective for you  © Copyright [facility] 2022 Information is for End User's use only and may not be sold, redistributed or otherwise used for commercial purposes

## 2023-06-08 NOTE — ASSESSMENT & PLAN NOTE
BMI 38 44 kg/M2  Goal BMI 25 kg/M2  Patient counseled on proper diet, nutrition and exercise  Recheck BMI next office visit

## 2023-06-08 NOTE — ASSESSMENT & PLAN NOTE
Patient reports that his blood pressure has been stable in the 180s over 90s, 160s over 80s, 150s over 70s  However he has not contacted his cardiologist to verify his current dose of Cardura XL 4 mg  Patient to follow-up to find out if he is to be on 4 mg or 8 mg at this time and if he is to continue on his hydralazine 100 mg 3 times daily  Also he is to find out if he is to be placed back on Imdur 30 mg p o  daily  These medications had been discontinued upon his last hospital admission and patient is uncertain to have current doses prescribed by cardiology

## 2023-06-08 NOTE — ASSESSMENT & PLAN NOTE
Patient indicates that his neck pain is decreased  Currently completed the Medrol Dosepak  Currently indicates that the muscle relaxer has helped his pain  Will reorder for Robaxin also relaxant to be taken as directed  Patient to follow-up with the spine center

## 2023-06-08 NOTE — ASSESSMENT & PLAN NOTE
Patient instructed to use warfarin 12 5 mg p o  daily and recheck PT/INR  Patient needs to be within therapeutic range between 2 and 3  Instructed to monitor his PT/INRs biweekly

## 2023-06-08 NOTE — PROGRESS NOTES
tVirtual Brief Visit    This Visit is being completed by telephone  The Patient is located at Home and in the following state in which I hold an active license [place]    The patient was identified by name and date of birth  Anna Shepherd was informed that this is a telemedicine visit and that the visit is being conducted through the [address] [facility] platform  He agrees to proceed     My office door was closed  No one else was in the room  He acknowledged consent and understanding of privacy and security of the video platform  The patient has agreed to participate and understands they can discontinue the visit at any time  Patient is aware this is a billable service  Assessment/Plan:    Problem List Items Addressed This Visit        Cardiovascular and Mediastinum    Hypertension - Primary     Patient reports that his blood pressure has been stable in the 180s over 90s, 160s over 80s, 150s over 70s  However he has not contacted his cardiologist to verify his current dose of Cardura XL 4 mg  Patient to follow-up to find out if he is to be on 4 mg or 8 mg at this time and if he is to continue on his hydralazine 100 mg 3 times daily  Also he is to find out if he is to be placed back on Imdur 30 mg p o  daily  These medications had been discontinued upon his last hospital admission and patient is uncertain to have current doses prescribed by cardiology  Leg DVT (deep venous thromboembolism), chronic, right ([address] )     Patient instructed to use warfarin 12 5 mg p o  daily and recheck PT/INR  Patient needs to be within therapeutic range between 2 and 3  Instructed to monitor his PT/INRs biweekly  Other    Anticoagulation goal of INR 2 to 3     Patiently maintained on warfarin 12 5 mg p o  daily and to have repeat PT/INR  He has been instructed not to miss any doses and repeat every 2 weeks to PT/INR levels           Class 2 obesity due to excess calories with body mass index (BMI) of 38 0 to 38 9 in adult     BMI 38 44 kg/M2  Goal BMI 25 kg/M2  Patient counseled on proper diet, nutrition and exercise  Recheck BMI next office visit  Neck pain on right side     Patient indicates that his neck pain is decreased  Currently completed the Medrol Dosepak  Currently indicates that the muscle relaxer has helped his pain  Will reorder for Robaxin also relaxant to be taken as directed  Patient to follow-up with the spine center  Relevant Medications    methocarbamol (ROBAXIN) 500 mg tablet    Cervical pain (neck)     Patient indicates that his has diminished since he completed the Medrol Dosepak  Currently requesting refill of his muscle relaxer because that seems to be helping  To work on 6/12/2023  Recent Visits  Date Type Provider Dept   06/01/23 Office Visit Jesse Steen, [address] Josafat Talamantes   Showing recent visits within past 7 days and meeting all other requirements  Today's Visits  Date Type Provider Dept   06/08/23 Telemedicine Aníbal MATHEWS, [address] [place] [address]   06/08/23 Telephone SY[address] today's visits and meeting all other requirements  Future Appointments  No visits were found meeting these conditions    Showing future appointments within next 150 days and meeting all other requirements         Visit Time  Total Visit Duration: 25

## 2023-06-08 NOTE — ASSESSMENT & PLAN NOTE
Patient indicates that his has diminished since he completed the Medrol Dosepak  Currently requesting refill of his muscle relaxer because that seems to be helping  To work on 6/12/2023

## 2023-06-08 NOTE — ASSESSMENT & PLAN NOTE
Patiently maintained on warfarin 12 5 mg p o  daily and to have repeat PT/INR  He has been instructed not to miss any doses and repeat every 2 weeks to PT/INR levels

## 2023-07-03 ENCOUNTER — TELEPHONE (OUTPATIENT)
Dept: CARDIOLOGY CLINIC | Facility: CLINIC | Age: 58
End: 2023-07-03

## 2023-07-03 NOTE — TELEPHONE ENCOUNTER
Patient called in stating that he has a procedure coming up and needs clearance to come off of his coumadin medication.  Patient states that the best time to call him is after 3pm.

## 2023-07-04 DIAGNOSIS — I82.501 LEG DVT (DEEP VENOUS THROMBOEMBOLISM), CHRONIC, RIGHT (HCC): ICD-10-CM

## 2023-07-05 RX ORDER — WARFARIN SODIUM 5 MG/1
TABLET ORAL
Qty: 180 TABLET | Refills: 3 | Status: SHIPPED | OUTPATIENT
Start: 2023-07-05

## 2023-07-06 DIAGNOSIS — I16.0 HYPERTENSIVE URGENCY: ICD-10-CM

## 2023-07-06 NOTE — TELEPHONE ENCOUNTER
Requested medication(s) are due for refill today: Yes  Patient has already received a courtesy refill: No  Other reason request has been forwarded to provider: zo

## 2023-07-07 RX ORDER — HYDRALAZINE HYDROCHLORIDE 100 MG/1
TABLET, FILM COATED ORAL
Qty: 90 TABLET | Refills: 1 | Status: SHIPPED | OUTPATIENT
Start: 2023-07-07

## 2023-07-07 NOTE — TELEPHONE ENCOUNTER
Hydralazine script sent to pharmacy and left a 4th v/m message to call the PCP office regarding the coumadin hold.

## 2023-07-07 NOTE — TELEPHONE ENCOUNTER
Hello, I'm supposed to have a procedure done with an injection in my spine for the cervical stenosis on August 2nd. And I'm on Coumadin Warfarin. And I need for you all to the [facility] is the facility that's going to be pro providing the procedure and I need to be cleared off of the warfarin Coumadin before they can do the procedure. So they're going to reach out, or they're supposed to at least reach out to you all. But they asked me to reach out as well to make you all aware that I will have to come off the Coumadin. To have this procedure done, narendra, area code [phone]. Also as I'm out of hydralazine, Alright. Thank you very kindly. Look forward to hearing from somebody.

## 2023-07-13 ENCOUNTER — TELEPHONE (OUTPATIENT)
Dept: UROLOGY | Facility: MEDICAL CENTER | Age: 58
End: 2023-07-13

## 2023-07-13 ENCOUNTER — TELEPHONE (OUTPATIENT)
Dept: UROLOGY | Facility: AMBULATORY SURGERY CENTER | Age: 58
End: 2023-07-13

## 2023-07-13 DIAGNOSIS — N40.0 BPH WITHOUT OBSTRUCTION/LOWER URINARY TRACT SYMPTOMS: Primary | ICD-10-CM

## 2023-07-13 NOTE — TELEPHONE ENCOUNTER
PT under care of: Greg Jo last seen: 8/25/22    PT calling today because: Pt is seeing blood in semen     PT symptoms are patient is blood in semen for over a week. No other issues.         PT can be reached at: [phone]

## 2023-07-13 NOTE — TELEPHONE ENCOUNTER
Attempted to contact patient . No answer. Unable to leave a message on voice mail. Voice mail is full.

## 2023-07-13 NOTE — TELEPHONE ENCOUNTER
Call transferred by [facility]. Patient reports no fever chills or pain , No blood in urine . Is scheduled for an appt on 23 . Advised patient to monitor , he can discuss with provider during his upcoming visit. Patient has PSA lab that he will need to get before his appt. Requesting to have faxed to [facility].    Previous order . New order placed. Found address for [facility].  When patient calls back let him know we did located the address and order was faxed.

## 2023-07-13 NOTE — TELEPHONE ENCOUNTER
Patient was told to call and give information for [facility] location of: [address], [address]

## 2023-07-19 ENCOUNTER — APPOINTMENT (OUTPATIENT)
Dept: LAB | Facility: CLINIC | Age: 58
End: 2023-07-19
Payer: COMMERCIAL

## 2023-07-19 DIAGNOSIS — E87.6 HYPOKALEMIA: ICD-10-CM

## 2023-07-19 DIAGNOSIS — N40.0 BPH WITHOUT OBSTRUCTION/LOWER URINARY TRACT SYMPTOMS: ICD-10-CM

## 2023-07-19 DIAGNOSIS — Z79.01 ANTICOAGULATION ADEQUATE WITH ANTICOAGULANT THERAPY: ICD-10-CM

## 2023-07-19 DIAGNOSIS — Z00.8 EXAM FOR POPULATION SURVEY: ICD-10-CM

## 2023-07-19 LAB
ALBUMIN SERPL BCP-MCNC: 4.2 G/DL (ref 3.5–5)
ALP SERPL-CCNC: 56 U/L (ref 34–104)
ALT SERPL W P-5'-P-CCNC: 20 U/L (ref 7–52)
ANION GAP SERPL CALCULATED.3IONS-SCNC: 5 MMOL/L
AST SERPL W P-5'-P-CCNC: 25 U/L (ref 13–39)
BASOPHILS # BLD AUTO: 0.03 THOUSANDS/ÂΜL (ref 0–0.1)
BASOPHILS NFR BLD AUTO: 1 % (ref 0–1)
BILIRUB SERPL-MCNC: 0.61 MG/DL (ref 0.2–1)
BUN SERPL-MCNC: 17 MG/DL (ref 5–25)
CALCIUM SERPL-MCNC: 9 MG/DL (ref 8.4–10.2)
CHLORIDE SERPL-SCNC: 105 MMOL/L (ref 96–108)
CO2 SERPL-SCNC: 29 MMOL/L (ref 21–32)
CREAT SERPL-MCNC: 1.18 MG/DL (ref 0.6–1.3)
EOSINOPHIL # BLD AUTO: 0.14 THOUSAND/ÂΜL (ref 0–0.61)
EOSINOPHIL NFR BLD AUTO: 3 % (ref 0–6)
ERYTHROCYTE [DISTWIDTH] IN BLOOD BY AUTOMATED COUNT: 13.3 % (ref 11.6–15.1)
GFR SERPL CREATININE-BSD FRML MDRD: 67 ML/MIN/1.73SQ M
GLUCOSE SERPL-MCNC: 83 MG/DL (ref 65–140)
HCT VFR BLD AUTO: 38.7 % (ref 36.5–49.3)
HGB BLD-MCNC: 12.8 G/DL (ref 12–17)
IMM GRANULOCYTES # BLD AUTO: 0.01 THOUSAND/UL (ref 0–0.2)
IMM GRANULOCYTES NFR BLD AUTO: 0 % (ref 0–2)
INR PPP: 5.92 (ref 0.84–1.19)
LYMPHOCYTES # BLD AUTO: 2.29 THOUSANDS/ÂΜL (ref 0.6–4.47)
LYMPHOCYTES NFR BLD AUTO: 47 % (ref 14–44)
MCH RBC QN AUTO: 29.7 PG (ref 26.8–34.3)
MCHC RBC AUTO-ENTMCNC: 33.1 G/DL (ref 31.4–37.4)
MCV RBC AUTO: 90 FL (ref 82–98)
MONOCYTES # BLD AUTO: 0.46 THOUSAND/ÂΜL (ref 0.17–1.22)
MONOCYTES NFR BLD AUTO: 10 % (ref 4–12)
NEUTROPHILS # BLD AUTO: 1.87 THOUSANDS/ÂΜL (ref 1.85–7.62)
NEUTS SEG NFR BLD AUTO: 39 % (ref 43–75)
NRBC BLD AUTO-RTO: 0 /100 WBCS
PLATELET # BLD AUTO: 156 THOUSANDS/UL (ref 149–390)
PMV BLD AUTO: 10.8 FL (ref 8.9–12.7)
POTASSIUM SERPL-SCNC: 3.9 MMOL/L (ref 3.5–5.3)
PROT SERPL-MCNC: 7.2 G/DL (ref 6.4–8.4)
PROTHROMBIN TIME: 53.2 SECONDS (ref 11.6–14.5)
PSA SERPL-MCNC: 0.83 NG/ML (ref 0–4)
RBC # BLD AUTO: 4.31 MILLION/UL (ref 3.88–5.62)
SODIUM SERPL-SCNC: 139 MMOL/L (ref 135–147)
WBC # BLD AUTO: 4.8 THOUSAND/UL (ref 4.31–10.16)

## 2023-07-19 PROCEDURE — 85025 COMPLETE CBC W/AUTO DIFF WBC: CPT

## 2023-07-19 PROCEDURE — 80053 COMPREHEN METABOLIC PANEL: CPT

## 2023-07-19 PROCEDURE — 84153 ASSAY OF PSA TOTAL: CPT

## 2023-07-19 PROCEDURE — 85610 PROTHROMBIN TIME: CPT

## 2023-07-19 PROCEDURE — 36415 COLL VENOUS BLD VENIPUNCTURE: CPT

## 2023-07-20 ENCOUNTER — TELEPHONE (OUTPATIENT)
Dept: CARDIOLOGY CLINIC | Facility: CLINIC | Age: 58
End: 2023-07-20

## 2023-07-20 ENCOUNTER — TELEPHONE (OUTPATIENT)
Dept: FAMILY MEDICINE CLINIC | Facility: CLINIC | Age: 58
End: 2023-07-20

## 2023-07-20 DIAGNOSIS — I82.501 LEG DVT (DEEP VENOUS THROMBOEMBOLISM), CHRONIC, RIGHT (HCC): Primary | ICD-10-CM

## 2023-07-20 NOTE — TELEPHONE ENCOUNTER
Patient called stating he had psa test done and he would like to know the results.  Patient stated he is available after 3pm    Patient can be reached at [phone]

## 2023-07-20 NOTE — TELEPHONE ENCOUNTER
Patient calling back was made aware of note below. He is understanding. Crystal Braun Practical NurseSigned  2:46 PM                           Attempted to call patient.  Mailbox full unable to leave VM.     When/if patient calls back please advise PSA looks excellent at 0.83. will need repeat PSA in 1 year

## 2023-07-20 NOTE — TELEPHONE ENCOUNTER
P/C about his coumadin and need to know how to adjust.    Called pt back and advised PCP is following your coumadin because your on for a DVT. Pt will contact PCP to discuss.

## 2023-07-20 NOTE — TELEPHONE ENCOUNTER
Cholo this is Linford Parent 1965. I am Arian Vasquez's patient     . I'm calling in reference to a recent INR result which is 5.97 excuse me 9 point 5.97 and I need to know should that be adjusted? I'm currently taking 12.0 milligrams of Coumadin Warfarin, 25 point O's and 1/2 point O. Pao Kurtz again if someone can get back to me and let me know if I should begin to wean down on that Coumadin. I know the number seems to be high and I know that you are probably should be aware of it already because it should have gone into my chart and I had it done at the at [facility]. So if someone could give me a call back please with some instructions on what should I do with the dosage of Coumadin. Jazzyht, thank you very kindly. Also while I'm at it, I'm sorry. I'm having a procedure done a injection in. I have cervical stenosis which is also in my chart. I'm going to have [employer] to administer the shot. The injection for pain into the neck area or the cervical area, but I do need to be adjusted for the Coumadin prior to that. I cannot have the procedure done until the Coumadin situation is dealt with, so if someone could really call me back. [phone] Again, that's Jabier Parent sit besides and Doctor Pennie Sweeney scars patient. Thank you.  Héctor.

## 2023-07-20 NOTE — TELEPHONE ENCOUNTER
Spoke with patients wife in regards pt inr results and instructions as per PCP and pts wife verbalized understanding. Called patient and daughter twice no answer until I called pt wife and reported pcp instructions.

## 2023-07-20 NOTE — TELEPHONE ENCOUNTER
Lab Results   Component Value Date    PSA 0.83 07/19/2023    PSA 1.1 12/31/2021       Can let Corby Edith Matute know the PSA looks excellent at 0.83 this year, good for a year on labwork

## 2023-07-20 NOTE — TELEPHONE ENCOUNTER
Attempted to call patient. Mailbox full unable to leave VM.     When/if patient calls back please advise PSA looks excellent at 0.83. will need repeat PSA in 1 year

## 2023-07-20 NOTE — PROGRESS NOTES
Spoke with patient as per PCP to hold warfarin medication for 5 days and recheck on Tuesday. Patients wife was understanding and would like it faxed to [facility] in [address] [facility]. Scripts were faxed.

## 2023-07-25 PROBLEM — Z00.8 EXAM FOR POPULATION SURVEY: Status: RESOLVED | Noted: 2023-05-26 | Resolved: 2023-07-25

## 2023-07-29 DIAGNOSIS — I16.0 HYPERTENSIVE URGENCY: ICD-10-CM

## 2023-07-31 ENCOUNTER — TELEPHONE (OUTPATIENT)
Dept: FAMILY MEDICINE CLINIC | Facility: CLINIC | Age: 58
End: 2023-07-31

## 2023-07-31 NOTE — TELEPHONE ENCOUNTER
Hello,I am calling 1965. Sit besides patient I'm calling in reference to the procedure I'm supposed to have an injection procedure for cervical stenosis and I'm on warfarin and they're waiting to get cleared from you all. They did say that they did you have they have they sent you all a clearance form but it has not been returned to them and I think the. I think the procedure is scheduled for the 20th, I do believe, so if we can get back to them, I'd be very grateful. Alrighty again, that's Hayley Thorpe. Sit besides patient calling in reference, you know, for the clearance off the Coumadin so that I can get this procedure done. Alright, thank you.  Bye, bye.

## 2023-08-01 RX ORDER — HYDRALAZINE HYDROCHLORIDE 100 MG/1
100 TABLET, FILM COATED ORAL 3 TIMES DAILY
Qty: 90 TABLET | Refills: 1 | Status: SHIPPED | OUTPATIENT
Start: 2023-08-01

## 2023-08-01 NOTE — TELEPHONE ENCOUNTER
Patient called the office and stated that he had an INR done on 07/24/2023 in [place] through [facility]. He reports that he stopped his Warfarin on 07/21/2023. I will call [facility] and have them fax the results to us. I informed him that we never got a clearance form and asked if they could refax to us. I told him once you review the INR I would give him a call.

## 2023-08-02 NOTE — TELEPHONE ENCOUNTER
Pt called office -- was informed of most recent INR value. Advised pt to restart 10 mg Warfarin daily and ck WEEKLY until procedure date. Also advised pt to stop medication 7 days prior to procedure and to keith INR 3 days prior to procedure. Pt acknowledged and reiterated verbally to show understanding.

## 2023-08-06 DIAGNOSIS — I82.501 CHRONIC EMBOLISM AND THROMBOSIS OF UNSPECIFIED DEEP VEINS OF RIGHT LOWER EXTREMITY (HCC): ICD-10-CM

## 2023-08-07 RX ORDER — WARFARIN SODIUM 2.5 MG/1
TABLET ORAL
Qty: 30 TABLET | Refills: 2 | OUTPATIENT
Start: 2023-08-07

## 2023-08-09 ENCOUNTER — TELEPHONE (OUTPATIENT)
Dept: FAMILY MEDICINE CLINIC | Facility: CLINIC | Age: 58
End: 2023-08-09

## 2023-08-11 ENCOUNTER — TELEPHONE (OUTPATIENT)
Dept: FAMILY MEDICINE CLINIC | Facility: CLINIC | Age: 58
End: 2023-08-11

## 2023-08-11 NOTE — TELEPHONE ENCOUNTER
----- Message from Roxann Payton, [facility] sent at 8/10/2023  5:11 PM EDT -----  Please call patient and notify test results for INR are low at 1.4. Patient has been elevated in the past can restart his medication until his procedure date is scheduled. To stop warfarin 5 days prior and have a PT/INR 3 days after stopping warfarin before his procedure.

## 2023-08-11 NOTE — TELEPHONE ENCOUNTER
RECEIVED FAX FROM [address] [place] [address] FOR CLEARANCE FOR COUMADIN.  COMPLETED, FAXED AND SCANNED TO CHART

## 2023-08-31 DIAGNOSIS — I16.0 HYPERTENSIVE URGENCY: ICD-10-CM

## 2023-08-31 RX ORDER — HYDRALAZINE HYDROCHLORIDE 100 MG/1
100 TABLET, FILM COATED ORAL 3 TIMES DAILY
Qty: 90 TABLET | Refills: 1 | OUTPATIENT
Start: 2023-08-31

## 2023-09-04 DIAGNOSIS — I82.501 CHRONIC EMBOLISM AND THROMBOSIS OF UNSPECIFIED DEEP VEINS OF RIGHT LOWER EXTREMITY (HCC): ICD-10-CM

## 2023-09-05 RX ORDER — WARFARIN SODIUM 2.5 MG/1
TABLET ORAL
Qty: 30 TABLET | Refills: 2 | Status: SHIPPED | OUTPATIENT
Start: 2023-09-05

## 2023-10-03 DIAGNOSIS — I16.0 HYPERTENSIVE URGENCY: ICD-10-CM

## 2023-10-03 RX ORDER — HYDRALAZINE HYDROCHLORIDE 100 MG/1
100 TABLET, FILM COATED ORAL 3 TIMES DAILY
Qty: 90 TABLET | Refills: 1 | Status: SHIPPED | OUTPATIENT
Start: 2023-10-03

## 2023-10-16 DIAGNOSIS — I16.0 HYPERTENSIVE URGENCY: ICD-10-CM

## 2023-10-17 ENCOUNTER — TELEPHONE (OUTPATIENT)
Dept: FAMILY MEDICINE CLINIC | Facility: CLINIC | Age: 58
End: 2023-10-17

## 2023-10-17 ENCOUNTER — APPOINTMENT (OUTPATIENT)
Dept: LAB | Facility: CLINIC | Age: 58
End: 2023-10-17
Payer: COMMERCIAL

## 2023-10-17 ENCOUNTER — OFFICE VISIT (OUTPATIENT)
Dept: FAMILY MEDICINE CLINIC | Facility: CLINIC | Age: 58
End: 2023-10-17
Payer: COMMERCIAL

## 2023-10-17 VITALS
WEIGHT: 290 LBS | DIASTOLIC BLOOD PRESSURE: 100 MMHG | SYSTOLIC BLOOD PRESSURE: 178 MMHG | HEART RATE: 50 BPM | OXYGEN SATURATION: 97 % | BODY MASS INDEX: 40.45 KG/M2 | TEMPERATURE: 97.2 F

## 2023-10-17 DIAGNOSIS — I1A.0 RESISTANT HYPERTENSION: ICD-10-CM

## 2023-10-17 DIAGNOSIS — I77.6 VASCULITIS (HCC): ICD-10-CM

## 2023-10-17 DIAGNOSIS — I21.4 NSTEMI (NON-ST ELEVATED MYOCARDIAL INFARCTION) (HCC): ICD-10-CM

## 2023-10-17 DIAGNOSIS — I10 PRIMARY HYPERTENSION: ICD-10-CM

## 2023-10-17 DIAGNOSIS — D68.61 ANTIPHOSPHOLIPID SYNDROME (HCC): ICD-10-CM

## 2023-10-17 DIAGNOSIS — I82.501 LEG DVT (DEEP VENOUS THROMBOEMBOLISM), CHRONIC, RIGHT (HCC): ICD-10-CM

## 2023-10-17 DIAGNOSIS — E78.2 MIXED HYPERLIPIDEMIA: Chronic | ICD-10-CM

## 2023-10-17 DIAGNOSIS — G47.33 OSA (OBSTRUCTIVE SLEEP APNEA): Primary | ICD-10-CM

## 2023-10-17 DIAGNOSIS — R53.83 OTHER FATIGUE: ICD-10-CM

## 2023-10-17 DIAGNOSIS — N18.2 STAGE 2 CHRONIC KIDNEY DISEASE: ICD-10-CM

## 2023-10-17 DIAGNOSIS — E78.2 MIXED HYPERLIPIDEMIA: ICD-10-CM

## 2023-10-17 DIAGNOSIS — I25.10 CORONARY ARTERY DISEASE INVOLVING NATIVE HEART WITHOUT ANGINA PECTORIS, UNSPECIFIED VESSEL OR LESION TYPE: ICD-10-CM

## 2023-10-17 PROBLEM — K02.9 TOOTH DECAY: Status: RESOLVED | Noted: 2020-11-23 | Resolved: 2023-10-17

## 2023-10-17 PROBLEM — K25.9 STOMACH ULCER: Status: RESOLVED | Noted: 2019-11-08 | Resolved: 2023-10-17

## 2023-10-17 PROCEDURE — 99215 OFFICE O/P EST HI 40 MIN: CPT | Performed by: FAMILY MEDICINE

## 2023-10-17 RX ORDER — HYDROCHLOROTHIAZIDE 25 MG/1
25 TABLET ORAL DAILY
COMMUNITY
Start: 2023-06-27 | End: 2024-06-26

## 2023-10-17 RX ORDER — SPIRONOLACTONE 25 MG/1
25 TABLET ORAL DAILY
Qty: 30 TABLET | Refills: 5 | Status: SHIPPED | OUTPATIENT
Start: 2023-10-17

## 2023-10-17 RX ORDER — SPIRONOLACTONE 25 MG/1
25 TABLET ORAL DAILY
COMMUNITY
Start: 2023-08-22 | End: 2023-10-17

## 2023-10-17 RX ORDER — SPIRONOLACTONE 50 MG/1
50 TABLET, FILM COATED ORAL DAILY
Qty: 90 TABLET | Refills: 3 | Status: SHIPPED | OUTPATIENT
Start: 2023-10-17 | End: 2023-10-17

## 2023-10-17 RX ORDER — HYDRALAZINE HYDROCHLORIDE 100 MG/1
100 TABLET, FILM COATED ORAL 3 TIMES DAILY
Qty: 270 TABLET | Refills: 1 | Status: SHIPPED | OUTPATIENT
Start: 2023-10-17

## 2023-10-17 NOTE — PROGRESS NOTES
Assessment/Plan:  As  below   His  PT INR is low  as  he  was  asked  to  hold  coumadin by [address]  who did  his  pre op  clearence  for  c spine  surgery  however  he  says  he  is  been  rescheduled   he  will  get the guidance  when to  start  the  coumadin   based  on  his  date of  surgery in  near future  asked  him  to  make  sure  he  does not  wait  for long   get the  directions  tomorrow  morning and  coordinate  with  Misti Betancur and  Dr. Cain Cameron      Problem List Items Addressed This Visit          Respiratory    BRUCE (obstructive sleep apnea) - Primary     Uses  Cpap   continue  the  current  management            Cardiovascular and Mediastinum    Hypertension    Relevant Medications    hydrochlorothiazide (HYDRODIURIL) 25 mg tablet    spironolactone (ALDACTONE) 50 mg tablet    Other Relevant Orders    CBC and differential    Lipid panel    Comprehensive metabolic panel    UA (URINE) with reflex to Scope    NSTEMI (non-ST elevated myocardial infarction) ([address])     In  care of  cardiologist  follows  up  regularly  with them         Leg DVT (deep venous thromboembolism), chronic, right ([address])     Is in care of cardiologist he  has not seen  the  vascular  he  says   has  not ever seen  a  vascular or  hematologist   discussed  with him  that  he  should  see a  hematologist to  discuss  the options  of  going on   DOACs   because he  often forgets  his   coumadin doses     No sob or  cp  follow  up  with  the  cardiologist  he  has  appointment  coming up  shortly          Relevant Orders    Ambulatory Referral to Hematology / Oncology    Vasculitis [facility])     He has h/o vasculitis  was  released  a s  he  wa s told  he  does  not  need  to  see  them  return parameters  discussed            Hematopoietic and Hemostatic    Antiphospholipid syndrome ([address])    Relevant Orders    Ambulatory Referral to Hematology / Oncology       Other    Hyperlipidemia (Chronic)    Relevant Orders    CBC and differential Lipid panel    Comprehensive metabolic panel     Other Visit Diagnoses       Other fatigue        Relevant Orders    TSH, 3rd generation              Subjective:      Patient ID: Nura Gonzales is a 59 y.o. male. HPI-  came in  to  follow up on  his  multiple  chronic  health  conditions  listed  above    he  is  going to  a pcp  Dr. Shasta Mims in  [place]  and  here  he  saw  Chevy Jose   this  may  create  medication  errors    I  explained to him     He  has no  complaints  today  except  for  his  high  BP  which  is  baseline  for  him  he  ha sno  cp  sob or  calve pain or  edema     The following portions of the patient's history were reviewed and updated as appropriate:   Past Medical History:  He has a past medical history of Angioedema, Coronary artery disease, GI bleed, Hypertension, and STEMI (ST elevation myocardial infarction) ([address]). ,  _______________________________________________________________________  Medical Problems:  does not have any pertinent problems on file.,  _______________________________________________________________________  Past Surgical History:   has a past surgical history that includes Upper gastrointestinal endoscopy and Replacement total knee (Right, 12/2020). ,  _______________________________________________________________________  Family History:  He was adopted. Family history is unknown by patient. ,  _______________________________________________________________________  Social History:   reports that he has never smoked. He has never used smokeless tobacco. He reports that he does not drink alcohol and does not use drugs. ,  _______________________________________________________________________  Allergies:  is allergic to lisinopril, norvasc [amlodipine], amoxicillin, eliquis [apixaban], and lipitor [atorvastatin]. .  _______________________________________________________________________  Current Outpatient Medications   Medication Sig Dispense Refill    acetaminophen (TYLENOL) 500 mg tablet Take 1,000 mg by mouth if needed      carbamide peroxide (DEBROX) 6.5 % otic solution Administer 5 drops into both ears 2 (two) times a day 15 mL 2    doxazosin (CARDURA XL) 4 MG 24 hr tablet Take 2 tablets (8 mg total) by mouth daily with breakfast 60 tablet 0    furosemide (LASIX) 20 mg tablet Take 1 tablet (20 mg total) by mouth daily as needed (swelling of the legs) 60 tablet 3    hydrALAZINE (APRESOLINE) 100 MG tablet TAKE 1 TABLET BY MOUTH THREE TIMES A  tablet 1    hydrochlorothiazide (HYDRODIURIL) 25 mg tablet Take 25 mg by mouth daily      multivitamin (THERAGRAN) TABS Take 1 tablet by mouth daily      rosuvastatin (CRESTOR) 5 mg tablet Take 1 tablet (5 mg total) by mouth daily 90 tablet 3    sildenafil (VIAGRA) 25 MG tablet TAKE 1 TABLET BY MOUTH DAILY AS NEEDED FOR ERECTILE DYSFUNCTION. 10 tablet 3    spironolactone (ALDACTONE) 50 mg tablet Take 1 tablet (50 mg total) by mouth daily 90 tablet 3    warfarin (COUMADIN) 2.5 mg tablet TAKE 1 TABLET BY MOUTH EVERY DAY 30 tablet 2    warfarin (COUMADIN) 5 mg tablet TAKE 2 TABLETS DAILY (Patient taking differently: Take 5 mg by mouth once) 180 tablet 3    methocarbamol (ROBAXIN) 500 mg tablet Take 1 tablet (500 mg total) by mouth every 6 (six) hours as needed for muscle spasms (Patient not taking: Reported on 10/17/2023) 20 tablet 0     No current facility-administered medications for this visit.     _______________________________________________________________________  Review of Systems   Constitutional:  Negative for fatigue and fever. HENT:  Negative for congestion and postnasal drip. Eyes:  Negative for pain, discharge and itching. Respiratory:  Negative for cough and shortness of breath. Sleep apnea   Cardiovascular:  Negative for chest pain, palpitations and leg swelling. Cad  htn hyperlipidemia and h/o DVT   Gastrointestinal:  Negative for abdominal distention, abdominal pain and diarrhea.    Endocrine: Negative for cold intolerance, heat intolerance and polydipsia. Genitourinary:  Negative for dysuria and flank pain. Ckd   Musculoskeletal:  Positive for arthralgias and neck pain. Neurological:  Negative for dizziness, numbness and headaches. Psychiatric/Behavioral:  Negative for agitation and confusion. The patient is not nervous/anxious. Objective:  Vitals:    10/17/23 1837   BP: (!) 178/100   BP Location: Left arm   Patient Position: Sitting   Cuff Size: Standard   Pulse: (!) 50   Temp: (!) 97.2 °F (36.2 °C)   TempSrc: Temporal   SpO2: 97%   Weight: 132 kg (290 lb)     Body mass index is 40.45 kg/m². Physical Exam  Vitals and nursing note reviewed. Constitutional:       Appearance: Normal appearance. HENT:      Head: Normocephalic and atraumatic. Nose: Nose normal. No congestion. Mouth/Throat:      Mouth: Mucous membranes are moist.      Pharynx: No oropharyngeal exudate or posterior oropharyngeal erythema. Eyes:      Extraocular Movements: Extraocular movements intact. Conjunctiva/sclera: Conjunctivae normal.      Pupils: Pupils are equal, round, and reactive to light. Cardiovascular:      Rate and Rhythm: Normal rate and regular rhythm. Pulses: Normal pulses. Heart sounds: Normal heart sounds. No murmur heard. No gallop. Pulmonary:      Effort: Pulmonary effort is normal.      Breath sounds: Normal breath sounds. No rhonchi. Chest:      Chest wall: No tenderness. Abdominal:      General: There is no distension. Palpations: There is no mass. Tenderness: There is no abdominal tenderness. There is no guarding. Musculoskeletal:      Cervical back: Normal range of motion and neck supple. No rigidity. Right lower leg: No edema. Left lower leg: No edema. Lymphadenopathy:      Cervical: No cervical adenopathy. Skin:     Findings: No erythema or rash. Neurological:      General: No focal deficit present.       Mental Status: He is alert and oriented to person, place, and time. Mental status is at baseline. Psychiatric:         Mood and Affect: Mood normal.         Behavior: Behavior normal.         Thought Content:  Thought content normal.

## 2023-10-17 NOTE — ASSESSMENT & PLAN NOTE
Lab Results   Component Value Date    EGFR 67 07/19/2023    EGFR 59 06/25/2022    EGFR 65 06/24/2022    CREATININE 1.18 07/19/2023    CREATININE 1.32 (H) 06/25/2022    CREATININE 1.21 06/24/2022

## 2023-10-17 NOTE — ASSESSMENT & PLAN NOTE
His  BP  has  been  high  always   baseline 401 to 377  systolic  and  diastolic 90 to 045  he  is a ready on  5  BP  meds   repeat BP today is 160/94  asked  him  to  keep  the  BP log  and  call  with  numbers  and  will a djust the   dose of  spironolactone to 50 as  this  is  started  in  last  June  by his  PCP  in  [place]  Dr. Charyl Dancer

## 2023-10-17 NOTE — ASSESSMENT & PLAN NOTE
Is in care of cardiologist he  has not seen  the  vascular  he  says   has  not ever seen  a  vascular or  hematologist   discussed  with him  that  he  should  see a  hematologist to  discuss  the options  of  going on   DOACs   because he  often forgets  his   coumadin doses     No sob or  cp  follow  up  with  the  cardiologist  he  has  appointment  coming up  shortly

## 2023-10-17 NOTE — ASSESSMENT & PLAN NOTE
He has h/o vasculitis  was  released  a s  he  wa s told  he  does  not  need  to  see  them  return parameters  discussed

## 2023-10-17 NOTE — ASSESSMENT & PLAN NOTE
In  care of  cardiologist no cp or  sob  he  also  consults  Dr. Yuli Forbes  at  [address]  as  his  pcp  I  advised  him  that  he  should  stick  with one  pcp  to  avoid  any  confusion  in  medication a nd  treatment plans  due to his  complex medical  history and  coumadin  doses  adjustments

## 2023-10-18 ENCOUNTER — TELEPHONE (OUTPATIENT)
Dept: NEPHROLOGY | Facility: CLINIC | Age: 58
End: 2023-10-18

## 2023-10-18 NOTE — TELEPHONE ENCOUNTER
Called pt to schedule consult - pt states he is not interested at this time as he is already following with a different practice.

## 2023-10-19 ENCOUNTER — TELEPHONE (OUTPATIENT)
Dept: HEMATOLOGY ONCOLOGY | Facility: CLINIC | Age: 58
End: 2023-10-19

## 2023-10-19 NOTE — TELEPHONE ENCOUNTER
I called Mk Bryan in response to a referral that was received for patient to establish care with Hematology. Outreach was made to schedule a consultation. Patient states that he already has a hematologist that he is scheduled with and will give us a call back in the future if he needs an appointment. The referral has been closed.

## 2023-11-06 NOTE — PLAN OF CARE
Problem: PHYSICAL THERAPY ADULT  Goal: Performs mobility at highest level of function for planned discharge setting  See evaluation for individualized goals  Description  Treatment/Interventions: Functional transfer training, Elevations, Endurance training, Patient/family training, Equipment eval/education, Bed mobility, Gait training, OT, Spoke to nursing  Equipment Recommended: Walker(WILL REQUIRE RW FOR D/C HOME)       See flowsheet documentation for full assessment, interventions and recommendations  Note:   Prognosis: Good  Problem List: Decreased mobility, Impaired balance, Pain  Assessment: PT COMPLETED EVALUATION OF 52YEAR OLD MALE ADMITTED TO hospitals ON 11/3/19 AS TRANSFER FROM [facility] WITH 2 EPISODES OF DARK/TARRY STOOL, CHEST PAIN, AND SYNCOPAL EPISODE  DIAGNOSIS INCLUDES GIB, TYPE II MI, L SHOULDER PAIN (XRAY L SHOULDER 11/6 NEGATIVE FOR ACUTE ABNORMALITIES), AND L ANKLE SPRAIN (XRAY 10/27/10 NEGATIVE FOR ACUTE ABNORMALITIES, PROVIDED WITH SPLINT THIS ADMISSION, FOLLOW UP OUTPT WITH ORTHOPEDICS)  PATIENT REPORTS HE IS AVID ROLLERSCATING AND LIKELY INJURED ANKLE WHILE SKATING  HE IS S/P CARDIAC CATH ON 11/6/19  CURRENT STATUS INSTABILITIES INCLUDE PAIN, MULTIPLE LINES, FALLS RISK, AND A REGRESSION IN FUNCTIONAL STATUS FROM BASELINE  PMH IS SIGNIFICANT FOR HTN, LE EDEMA, AND BACK PAIN  PRIOR TO THIS ADMISSION PATIENT RESIDED ALONE IN ONE LEVEL HOME (2 BURAK, 2 STEPS INSIDE HOUSE) WHERE HE REPORTS PRIOR I WITH MOBILITY (WAS USING CRUTCHES MOST RECENTLY S/P ANKLE INJURY HOWEVER NORMALLY IS I), ADLS, AND IADLS  CURRENT IMPAIRMENTS INCLUDE PAIN, DECREASED ACTIVITY TOLERANCE, FALLS RISK, AND GAIT DEVIATIONS  DURING PT EVALUATION PATIENT REQUIRED S FOR BED MOBILITY, SIT<-->STAND TRANSFER AND AMBULATION  HE AMBULATED 30 FEET X 2 W/ RW PRESENTING WITH MILDLY ANTALGIC GAIT AND REDUCED SPEED   PT D/C RECOMMENDATION IS FOR HOME WITH FAMILY ASSISTANCE PRN (REPORTS DGHT WILL BE STAYING WITH HIM UPON D/C), USE OF RW PRN, AND FOLLOW UP WITH OUTPATIENT PT TO ADDRESS BACK, L SHOULDER, AND ANKLE PAIN  PATIENT WILL BENEFIT FROM CONTINUED SKILLED PT THIS ADMISSION TO ACHIEVE MAXIMAL FUNCTION AND SAFETY  Recommendation: (S) Home with family support, Outpatient PT     PT - OK to Discharge: (S) Yes(HOME W/ USE OF RW PRN; FAMILY ASSIST PRN; OUTPT PT )    See flowsheet documentation for full assessment  Benefits, risks, and possible complications of procedure explained to patient/caregiver who verbalized understanding and gave verbal consent.

## 2023-11-14 ENCOUNTER — APPOINTMENT (OUTPATIENT)
Dept: LAB | Facility: CLINIC | Age: 58
End: 2023-11-14
Payer: COMMERCIAL

## 2023-11-14 DIAGNOSIS — R53.83 OTHER FATIGUE: ICD-10-CM

## 2023-11-14 DIAGNOSIS — I10 PRIMARY HYPERTENSION: ICD-10-CM

## 2023-11-14 DIAGNOSIS — Z79.01 ANTICOAGULATION ADEQUATE WITH ANTICOAGULANT THERAPY: ICD-10-CM

## 2023-11-14 DIAGNOSIS — E78.2 MIXED HYPERLIPIDEMIA: ICD-10-CM

## 2023-11-14 LAB
ALBUMIN SERPL BCP-MCNC: 4.6 G/DL (ref 3.5–5)
ALP SERPL-CCNC: 59 U/L (ref 34–104)
ALT SERPL W P-5'-P-CCNC: 19 U/L (ref 7–52)
ANION GAP SERPL CALCULATED.3IONS-SCNC: 3 MMOL/L
AST SERPL W P-5'-P-CCNC: 21 U/L (ref 13–39)
BACTERIA UR QL AUTO: NORMAL /HPF
BASOPHILS # BLD AUTO: 0.07 THOUSANDS/ÂΜL (ref 0–0.1)
BASOPHILS NFR BLD AUTO: 1 % (ref 0–1)
BILIRUB SERPL-MCNC: 0.59 MG/DL (ref 0.2–1)
BILIRUB UR QL STRIP: NEGATIVE
BUN SERPL-MCNC: 18 MG/DL (ref 5–25)
CALCIUM SERPL-MCNC: 9.9 MG/DL (ref 8.4–10.2)
CHLORIDE SERPL-SCNC: 105 MMOL/L (ref 96–108)
CHOLEST SERPL-MCNC: 149 MG/DL
CLARITY UR: CLEAR
CO2 SERPL-SCNC: 31 MMOL/L (ref 21–32)
COLOR UR: ABNORMAL
CREAT SERPL-MCNC: 1.2 MG/DL (ref 0.6–1.3)
EOSINOPHIL # BLD AUTO: 0.15 THOUSAND/ÂΜL (ref 0–0.61)
EOSINOPHIL NFR BLD AUTO: 3 % (ref 0–6)
ERYTHROCYTE [DISTWIDTH] IN BLOOD BY AUTOMATED COUNT: 13.3 % (ref 11.6–15.1)
GFR SERPL CREATININE-BSD FRML MDRD: 65 ML/MIN/1.73SQ M
GLUCOSE P FAST SERPL-MCNC: 98 MG/DL (ref 65–99)
GLUCOSE UR STRIP-MCNC: NEGATIVE MG/DL
HCT VFR BLD AUTO: 43.2 % (ref 36.5–49.3)
HDLC SERPL-MCNC: 59 MG/DL
HGB BLD-MCNC: 14.4 G/DL (ref 12–17)
HGB UR QL STRIP.AUTO: NEGATIVE
IMM GRANULOCYTES # BLD AUTO: 0.01 THOUSAND/UL (ref 0–0.2)
IMM GRANULOCYTES NFR BLD AUTO: 0 % (ref 0–2)
INR PPP: 0.92 (ref 0.84–1.19)
KETONES UR STRIP-MCNC: NEGATIVE MG/DL
LDLC SERPL CALC-MCNC: 81 MG/DL (ref 0–100)
LEUKOCYTE ESTERASE UR QL STRIP: NEGATIVE
LYMPHOCYTES # BLD AUTO: 2.06 THOUSANDS/ÂΜL (ref 0.6–4.47)
LYMPHOCYTES NFR BLD AUTO: 38 % (ref 14–44)
MCH RBC QN AUTO: 30.3 PG (ref 26.8–34.3)
MCHC RBC AUTO-ENTMCNC: 33.3 G/DL (ref 31.4–37.4)
MCV RBC AUTO: 91 FL (ref 82–98)
MONOCYTES # BLD AUTO: 0.64 THOUSAND/ÂΜL (ref 0.17–1.22)
MONOCYTES NFR BLD AUTO: 12 % (ref 4–12)
NEUTROPHILS # BLD AUTO: 2.57 THOUSANDS/ÂΜL (ref 1.85–7.62)
NEUTS SEG NFR BLD AUTO: 46 % (ref 43–75)
NITRITE UR QL STRIP: NEGATIVE
NON-SQ EPI CELLS URNS QL MICRO: NORMAL /HPF
NONHDLC SERPL-MCNC: 90 MG/DL
NRBC BLD AUTO-RTO: 0 /100 WBCS
PH UR STRIP.AUTO: 5.5 [PH]
PLATELET # BLD AUTO: 172 THOUSANDS/UL (ref 149–390)
PMV BLD AUTO: 11.9 FL (ref 8.9–12.7)
POTASSIUM SERPL-SCNC: 4.6 MMOL/L (ref 3.5–5.3)
PROT SERPL-MCNC: 7.6 G/DL (ref 6.4–8.4)
PROT UR STRIP-MCNC: ABNORMAL MG/DL
PROTHROMBIN TIME: 13 SECONDS (ref 11.6–14.5)
RBC # BLD AUTO: 4.75 MILLION/UL (ref 3.88–5.62)
RBC #/AREA URNS AUTO: NORMAL /HPF
SODIUM SERPL-SCNC: 139 MMOL/L (ref 135–147)
SP GR UR STRIP.AUTO: 1.02 (ref 1–1.03)
TRIGL SERPL-MCNC: 47 MG/DL
TSH SERPL DL<=0.05 MIU/L-ACNC: 0.53 UIU/ML (ref 0.45–4.5)
UROBILINOGEN UR STRIP-ACNC: <2 MG/DL
WBC # BLD AUTO: 5.5 THOUSAND/UL (ref 4.31–10.16)
WBC #/AREA URNS AUTO: NORMAL /HPF

## 2023-11-14 PROCEDURE — 85025 COMPLETE CBC W/AUTO DIFF WBC: CPT

## 2023-11-14 PROCEDURE — 84443 ASSAY THYROID STIM HORMONE: CPT

## 2023-11-14 PROCEDURE — 85610 PROTHROMBIN TIME: CPT

## 2023-11-14 PROCEDURE — 36415 COLL VENOUS BLD VENIPUNCTURE: CPT

## 2023-11-14 PROCEDURE — 80061 LIPID PANEL: CPT

## 2023-11-14 PROCEDURE — 80053 COMPREHEN METABOLIC PANEL: CPT

## 2024-01-05 ENCOUNTER — OFFICE VISIT (OUTPATIENT)
Dept: CARDIOLOGY CLINIC | Facility: CLINIC | Age: 59
End: 2024-01-05
Payer: COMMERCIAL

## 2024-01-05 VITALS
DIASTOLIC BLOOD PRESSURE: 90 MMHG | HEART RATE: 70 BPM | WEIGHT: 298.4 LBS | SYSTOLIC BLOOD PRESSURE: 166 MMHG | HEIGHT: 71 IN | BODY MASS INDEX: 41.77 KG/M2 | OXYGEN SATURATION: 97 %

## 2024-01-05 DIAGNOSIS — Z86.718 HISTORY OF DVT (DEEP VEIN THROMBOSIS): Chronic | ICD-10-CM

## 2024-01-05 DIAGNOSIS — I21.4 NSTEMI (NON-ST ELEVATED MYOCARDIAL INFARCTION) (HCC): ICD-10-CM

## 2024-01-05 DIAGNOSIS — I10 ESSENTIAL HYPERTENSION: Primary | ICD-10-CM

## 2024-01-05 DIAGNOSIS — I82.501 LEG DVT (DEEP VENOUS THROMBOEMBOLISM), CHRONIC, RIGHT (HCC): ICD-10-CM

## 2024-01-05 DIAGNOSIS — I1A.0 RESISTANT HYPERTENSION: ICD-10-CM

## 2024-01-05 DIAGNOSIS — I10 HYPERTENSION, UNSPECIFIED TYPE: ICD-10-CM

## 2024-01-05 DIAGNOSIS — I25.10 CORONARY ARTERY DISEASE INVOLVING NATIVE HEART WITHOUT ANGINA PECTORIS, UNSPECIFIED VESSEL OR LESION TYPE: ICD-10-CM

## 2024-01-05 DIAGNOSIS — E78.2 MIXED HYPERLIPIDEMIA: ICD-10-CM

## 2024-01-05 PROCEDURE — 99214 OFFICE O/P EST MOD 30 MIN: CPT | Performed by: INTERNAL MEDICINE

## 2024-01-05 PROCEDURE — 93000 ELECTROCARDIOGRAM COMPLETE: CPT | Performed by: INTERNAL MEDICINE

## 2024-01-05 RX ORDER — SPIRONOLACTONE 50 MG/1
50 TABLET, FILM COATED ORAL DAILY
Qty: 90 TABLET | Refills: 3 | Status: SHIPPED | OUTPATIENT
Start: 2024-01-05

## 2024-01-05 RX ORDER — CHLORTHALIDONE 25 MG/1
25 TABLET ORAL DAILY
Qty: 90 TABLET | Refills: 3 | Status: SHIPPED | OUTPATIENT
Start: 2024-01-05

## 2024-01-05 RX ORDER — ROSUVASTATIN CALCIUM 5 MG/1
5 TABLET, COATED ORAL DAILY
Qty: 90 TABLET | Refills: 3 | Status: SHIPPED | OUTPATIENT
Start: 2024-01-05

## 2024-01-05 NOTE — PATIENT INSTRUCTIONS
Stop hydrochlorothiazide-replace with chlorthalidone 25 mg daily    Increase spironolactone/Aldactone to 50 mg daily, currently you are taking 25 mg daily    Continue hydralazine 100 mg 3 times daily and continue doxazosin XL 8 mg/day    In 1 week, call me with blood pressures taken twice daily with these instructions-    Please check your blood pressures-in a seated posture, empty bladder; while you are relaxed, for at least for 5-10 minutes, preferably arm blood pressures-please check it twice daily for 1 week and send me the blood pressure log.    If you have started a new medication or there has been a change in the dose of a current medication, wait 1 week for the medication to take effect/BP to stablize, prior to assessing your blood pressures  Continue dietary and lifestyle changes, limiting salt intake    Stop Coumadin-if you develop any swelling in the legs or pain in the legs, let me know, might need repeat Dopplers to rule out recurrence of blood clots  Follow-up in 6 months

## 2024-01-05 NOTE — PROGRESS NOTES
Cardiology Follow Up    Wong Cummings  1965  [id number]  [facility]  [address]  [address]  [facility] [address] [phone]    1. Essential hypertension  POCT ECG      2. Hypertension, unspecified type        3. NSTEMI (non-ST elevated myocardial infarction) (HCC)        4. Leg DVT (deep venous thromboembolism), chronic, right (HCC)        5. History of DVT (deep vein thrombosis)            Diagnoses and all orders for this visit:    Essential hypertension  -     POCT ECG    Hypertension, unspecified type    NSTEMI (non-ST elevated myocardial infarction) (HCC)    Leg DVT (deep venous thromboembolism), chronic, right (HCC)    History of DVT (deep vein thrombosis)      I had the pleasure of seeing Wong Cummings for a follow up visit.     INTERVAL HISTORY: none    History of the presenting illness, Discussion/Summary and My Plan are as follows:::    Wong is a pleasant 58-year-old gentleman with hypertension, no diabetes or tobacco use, physically active at baseline-roller skates, prior history of GI bleeding-gastric ulcer status post clipping in 2019, moderate coronary artery disease for medical management-coronary angiography 2019-performed for elevated troponin in the setting of severe anemia, history of DVT-posterior tibial veins-June 2020-had been admitted around that time but would presume this to be unprovoked.    He also has a history of angioedema to Eliquis-previously while being used for DVT, angioedema to lisinopril, amlodipine as well and longstanding resistant hypertension with noncompliance with medications.    The last time I saw him was in the hospital in June 2022 when he had presented with hypertensive urgency, ultimately was discharged on doxazosin XL 8 mg/day, hydralazine 50 3 times daily and Imdur 30 mg daily.    He is currently on doxazosin XL 8 mg/day, hydralazine 50 mg 3 times daily,  hydrochlorothiazide 25 mg daily and spironolactone 25 mg daily with blood pressures still elevated-consistently at home as well as on manual readings to me in both arms.    He denies any cardiac symptoms.    Plan:    Resistant hypertension:  Continue hydralazine 100 mg 3 times daily and continue doxazosin XL 8 mg daily  Switch hydrochlorothiazide to chlorthalidone 25 mg daily  Increase spironolactone to 50 mg daily  As to evaluation for secondary causes-  Renal artery Dopplers are still pending-to complete although prior CT did not suggest renal artery stenosis from August 2019  Aldosterone/renin ratio have been negative on multiple occasions most recently-2022, performed off spironolactone  Has had mild sleep apnea on testing  in 2021  I did strongly reiterate the need for compliance with medications and follow-up and that uncontrolled hypertension is the single biggest cause for renal replacement therapy among -Americans    History of angioedema to lisinopril and amlodipine and Eliquis    Prior history of DVT-right lower extremity-posterior tibial veins in June 2020: Apparently this was unprovoked and has remained on anticoagulation since then but with spotty compliance, Eliquis has caused angioedema in the past.  Since his  presumably unprovoked DVT was in the posterior tibial vein-would stop anticoagulation which comes with its own risks, especially in the setting of prior upper GI bleeding    Prior history of NSTEMI type II and coronary artery disease: Moderate disease on coronary angiography-for medical management.  Continue aspirin, heart rates were often bradycardic in the hospital and hence did not start beta-blocker.  Has had angioedema to ACE inhibitors as well  Resume rosuvastatin-needs this for decreasing progression of CAD    Follow-up in 6 months     Latest Reference Range & Units 11/14/23 09:22   Cholesterol See Comment mg/dL 149   Triglycerides See Comment mg/dL 47   HDL >=40 mg/dL 59    Non-HDL Cholesterol mg/dl 90   LDL Calculated 0 - 100 mg/dL 81      Latest Reference Range & Units 07/19/23 17:15 11/14/23 09:22   BUN 5 - 25 mg/dL 17 18   Creatinine 0.60 - 1.30 mg/dL 1.18 1.20      Latest Reference Range & Units 11/14/23 09:22   TSH 3RD GENERATON 0.450 - 4.500 uIU/mL 0.525      Latest Reference Range & Units 06/23/22 17:40 06/25/22 12:20 performed off spironolactone   Aldosterone 0.0 - 30.0 ng/dL 3.3 3.8   Renin 0.167 - 5.380 ng/mL/hr 0.312 2.151       Patient Active Problem List   Diagnosis    Elevated troponin    Hypertension    Acute left ankle pain    Acute pain of left shoulder    Left ankle sprain    Acute left-sided low back pain without sciatica    S/P cardiac cath 11/06/19    Iron deficiency anemia due to chronic blood loss    ZAKIA (acute kidney injury) ([facility])    Follow-up examination    Encounter for follow-up    NSTEMI (non-ST elevated myocardial infarction) ([facility])    Hyperlipidemia    Anemia    Coronary artery disease involving native heart without angina pectoris    Cellulitis of right lower extremity    Screening for blood or protein in urine    ACE inhibitor-aggravated angioedema, sequela    Anticoagulation goal of INR 2 to 3    Acute deep vein thrombosis (DVT) of tibial vein ([facility])    Leg DVT (deep venous thromboembolism), chronic, right ([facility])    Febrile illness, acute    Bilateral lower extremity edema    Class 2 obesity due to excess calories with body mass index (BMI) of 38.0 to 38.9 in adult    Cellulitis of left lower extremity    Itchy skin    Vasculitis ([facility])    Elevated serum creatinine    Low testosterone    History of DVT (deep vein thrombosis)    CKD (chronic kidney disease)    Lymphadenopathy    Edema of left lower leg due to peripheral venous insufficiency    Preoperative clearance    Status post right knee replacement    Abscess of skin of left knee    BRUCE (obstructive sleep apnea)    Rash    Anticoagulation adequate with anticoagulant therapy    Neck pain on right side     Hypertensive urgency    Antiphospholipid syndrome (HCC)    Hypomagnesemia    Hypokalemia    Cervical pain (neck)     Past Medical History:   Diagnosis Date    Angioedema     Coronary artery disease     GI bleed     Hypertension     stopped his meds when ran out several years ago    STEMI (ST elevation myocardial infarction) (HCC)      Social History     Socioeconomic History    Marital status: Single     Spouse name: Not on file    Number of children: Not on file    Years of education: Not on file    Highest education level: Not on file   Occupational History    Not on file   Tobacco Use    Smoking status: Never    Smokeless tobacco: Never   Vaping Use    Vaping status: Never Used   Substance and Sexual Activity    Alcohol use: Never     Alcohol/week: 0.0 standard drinks of alcohol    Drug use: Never    Sexual activity: Not on file     Comment: not asked   Other Topics Concern    Not on file   Social History Narrative    Checked Makayla     Social Determinants of Health     Financial Resource Strain: Low Risk  (3/18/2021)    Overall Financial Resource Strain (CARDIA)     Difficulty of Paying Living Expenses: Not hard at all   Food Insecurity: No Food Insecurity (6/23/2022)    Hunger Vital Sign     Worried About Running Out of Food in the Last Year: Never true     Ran Out of Food in the Last Year: Never true   Transportation Needs: No Transportation Needs (6/23/2022)    PRAPARE - Transportation     Lack of Transportation (Medical): No     Lack of Transportation (Non-Medical): No   Physical Activity: Sufficiently Active (12/4/2020)    Exercise Vital Sign     Days of Exercise per Week: 3 days     Minutes of Exercise per Session: 120 min   Stress: No Stress Concern Present (12/4/2020)    [employer] [place] - Occupational Stress Questionnaire     Feeling of Stress : Not at all   Social Connections: Unknown (12/4/2020)    Social Connection and Isolation Panel [NHANES]     Frequency of Communication  with Friends and Family: More than three times a week     Frequency of Social Gatherings with Friends and Family: More than three times a week     Attends Yazidi Services: More than 4 times per year     Active Member of Clubs or Organizations: No     Attends Club or Organization Meetings: Never     Marital Status: Not on file   Intimate Partner Violence: Not At Risk (12/4/2020)    Humiliation, Afraid, Rape, and Kick questionnaire     Fear of Current or Ex-Partner: No     Emotionally Abused: No     Physically Abused: No     Sexually Abused: No   Housing Stability: Low Risk  (6/23/2022)    Housing Stability Vital Sign     Unable to Pay for Housing in the Last Year: No     Number of Places Lived in the Last Year: 1     Unstable Housing in the Last Year: No      Family History   Adopted: Yes   Family history unknown: Yes     Past Surgical History:   Procedure Laterality Date    REPLACEMENT TOTAL KNEE Right 12/2020    UPPER GASTROINTESTINAL ENDOSCOPY      Clamped large stomach ulcer       Current Outpatient Medications:     acetaminophen (TYLENOL) 500 mg tablet, Take 1,000 mg by mouth if needed, Disp: , Rfl:     doxazosin (CARDURA XL) 4 MG 24 hr tablet, Take 2 tablets (8 mg total) by mouth daily with breakfast, Disp: 60 tablet, Rfl: 0    hydrALAZINE (APRESOLINE) 100 MG tablet, TAKE 1 TABLET BY MOUTH THREE TIMES A DAY, Disp: 270 tablet, Rfl: 1    hydrochlorothiazide (HYDRODIURIL) 25 mg tablet, Take 25 mg by mouth daily, Disp: , Rfl:     multivitamin (THERAGRAN) TABS, Take 1 tablet by mouth daily, Disp: , Rfl:     sildenafil (VIAGRA) 25 MG tablet, TAKE 1 TABLET BY MOUTH DAILY AS NEEDED FOR ERECTILE DYSFUNCTION., Disp: 10 tablet, Rfl: 3    spironolactone (ALDACTONE) 25 mg tablet, Take 1 tablet (25 mg total) by mouth daily, Disp: 30 tablet, Rfl: 5    warfarin (COUMADIN) 5 mg tablet, TAKE 2 TABLETS DAILY (Patient taking differently: Take 5 mg by mouth once), Disp: 180 tablet, Rfl: 3    carbamide peroxide (DEBROX) 6.5 %  "otic solution, Administer 5 drops into both ears 2 (two) times a day (Patient not taking: Reported on 1/5/2024), Disp: 15 mL, Rfl: 2    furosemide (LASIX) 20 mg tablet, Take 1 tablet (20 mg total) by mouth daily as needed (swelling of the legs) (Patient not taking: Reported on 1/5/2024), Disp: 60 tablet, Rfl: 3    methocarbamol (ROBAXIN) 500 mg tablet, Take 1 tablet (500 mg total) by mouth every 6 (six) hours as needed for muscle spasms (Patient not taking: Reported on 10/17/2023), Disp: 20 tablet, Rfl: 0    rosuvastatin (CRESTOR) 5 mg tablet, Take 1 tablet (5 mg total) by mouth daily (Patient not taking: Reported on 1/5/2024), Disp: 90 tablet, Rfl: 3    warfarin (COUMADIN) 2.5 mg tablet, TAKE 1 TABLET BY MOUTH EVERY DAY, Disp: 30 tablet, Rfl: 2  Allergies   Allergen Reactions    Lisinopril Angioedema    Norvasc [Amlodipine] Angioedema    Amoxicillin Itching     Itching little bumps    Eliquis [Apixaban] Angioedema    Lipitor [Atorvastatin] Facial Swelling       Imaging: No results found.    Review of Systems:  Review of Systems   Constitutional: Negative.    HENT: Negative.     Eyes: Negative.    Respiratory: Negative.     Cardiovascular: Negative.    Endocrine: Negative.    Musculoskeletal: Negative.        Physical Exam:  /90 (BP Location: Left arm, Patient Position: Sitting, Cuff Size: Standard)   Pulse 70   Ht 5' 11\" (1.803 m)   Wt 135 kg (298 lb 6.4 oz)   SpO2 97%   BMI 41.62 kg/m²   Physical Exam  Constitutional:       General: He is not in acute distress.     Appearance: He is not ill-appearing, toxic-appearing or diaphoretic.   HENT:      Nose: Nose normal. No congestion or rhinorrhea.   Neck:      Vascular: No carotid bruit.   Pulmonary:      Effort: Pulmonary effort is normal. No respiratory distress.      Breath sounds: No stridor. No wheezing or rhonchi.   Abdominal:      General: Abdomen is flat. Bowel sounds are normal. There is no distension.      Palpations: There is no mass.      " "Tenderness: There is no abdominal tenderness.      Hernia: No hernia is present.   Musculoskeletal:         General: No swelling, tenderness, deformity or signs of injury. Normal range of motion.      Cervical back: Normal range of motion. No rigidity or tenderness.   Lymphadenopathy:      Cervical: No cervical adenopathy.   Skin:     General: Skin is warm.      Coloration: Skin is not jaundiced or pale.      Findings: No bruising or erythema.   Neurological:      Mental Status: He is alert.         This note was completed in part utilizing [employer] direct voice recognition software.   Grammatical errors, random word insertion, spelling mistakes, occasional wrong word or \"sound-alike\" substitutions and incomplete sentences may be an occasional consequence of the system secondary to software limitations, ambient noise and hardware issues. At the time of dictation, efforts were made to edit, clarify and /or correct errors.  Please read the chart carefully and recognize, using context, where substitutions have occurred.  If you have any questions or concerns about the context, text or information contained within the body of this dictation, please contact myself, the provider, for further clarification.  "

## 2024-01-31 ENCOUNTER — TELEPHONE (OUTPATIENT)
Age: 59
End: 2024-01-31

## 2024-01-31 ENCOUNTER — NURSE TRIAGE (OUTPATIENT)
Dept: OTHER | Facility: OTHER | Age: 59
End: 2024-01-31

## 2024-01-31 ENCOUNTER — APPOINTMENT (OUTPATIENT)
Dept: LAB | Facility: CLINIC | Age: 59
End: 2024-01-31
Payer: COMMERCIAL

## 2024-01-31 ENCOUNTER — TELEPHONE (OUTPATIENT)
Dept: CARDIOLOGY CLINIC | Facility: CLINIC | Age: 59
End: 2024-01-31

## 2024-01-31 DIAGNOSIS — Z51.81 ANTICOAGULATION GOAL OF INR 2 TO 3: ICD-10-CM

## 2024-01-31 DIAGNOSIS — I82.501 LEG DVT (DEEP VENOUS THROMBOEMBOLISM), CHRONIC, RIGHT (HCC): ICD-10-CM

## 2024-01-31 DIAGNOSIS — I82.501 LEG DVT (DEEP VENOUS THROMBOEMBOLISM), CHRONIC, RIGHT (HCC): Primary | ICD-10-CM

## 2024-01-31 DIAGNOSIS — Z79.01 ANTICOAGULATION GOAL OF INR 2 TO 3: ICD-10-CM

## 2024-01-31 LAB
INR PPP: 0.92 (ref 0.84–1.19)
PROTHROMBIN TIME: 13 SECONDS (ref 11.6–14.5)

## 2024-01-31 PROCEDURE — 36415 COLL VENOUS BLD VENIPUNCTURE: CPT

## 2024-01-31 PROCEDURE — 85610 PROTHROMBIN TIME: CPT

## 2024-01-31 NOTE — LETTER
January 31, 2024     Wong NEGRONCorby Emiliano    Patient: Wong Cummings   YOB: 1965   Date of Visit: 1/31/2024       Dear Dr. Cummings:    Thank you for referring Wong Cummings to me for evaluation. He states  that  he  has  not  taken coumadin last 2 months  he  will follow up  with  his  cardiologist for further guidance. Currently  he is not on coumadin last 60 days , he  stopped on his own    If you have questions, please do not hesitate to call me. I look forward to following your patient along with you.         Sincerely,        Xiomy Chris MD        CC: No Recipients    Xiomy Chris MD  1/31/2024  7:21 PM  Incomplete  I spoke with the  and his  wife   he  said  he  saw  his  cardiologist  2 weeks  ago  and  he  is  aware  that  patient  has not  taken coumadin last 2 months  since  he  ha d his  last  epidural shot . So currently  he is not on coumadin however  going forward  I advised him to take  guidance from his  cardiologist regarding management and continuation of coumadin   he  says  his  cardiologist is planning to wean him off . He  will  follow up  with  his  cardiologist regarding this  matter after his injection

## 2024-01-31 NOTE — TELEPHONE ENCOUNTER
Received a call about Wong having an injection in his back tomorrow and needs a letter about his coumadin.     Called spoke to  Mrs Cummings and she will call office back unavailable to talk

## 2024-01-31 NOTE — TELEPHONE ENCOUNTER
Called and spoke to Tracey Flowers, and she advised pt needs a letter about coumadin and medication for his procedure tomorrow.     Advised that pt is being monitor by pcp. Would need to call there office to discuss. Verbally understood

## 2024-01-31 NOTE — TELEPHONE ENCOUNTER
Pt's lakshmi Webb called and stated the patient is set to have an injection in his back david morning at 7:30 am at [facility] in [place].  They are requesting a letter stating it is okay to stop his coumadin.  Letter can be faxed to attn:Annie at [phone]

## 2024-01-31 NOTE — TELEPHONE ENCOUNTER
"Reason for Disposition   [1] Caller requests to speak ONLY to PCP AND [2] URGENT question    Answer Assessment - Initial Assessment Questions  1. REASON FOR CALL or QUESTION: \"What is your reason for calling today?\" or \"How can I best  help you?\" or \"What question do you have that I can help answer?\"      Patient is at the lab and needs order for INR    2. CALLER: Document the source of call. (e.g., laboratory, patient).      Patient    Protocols used: PCP Call - No Triage-ADULT-    "

## 2024-01-31 NOTE — TELEPHONE ENCOUNTER
Please advise pt a one time order placed. He can go at anytime. He should message Dr. GUERRA for a new standing order

## 2024-01-31 NOTE — TELEPHONE ENCOUNTER
Patient is calling office back per patient he was talking to someone and the call was disconnected patient is requesting call back

## 2024-01-31 NOTE — TELEPHONE ENCOUNTER
Patient called and states is at lab to get INR done and needs a new script, tried calling office and no answer. Advised patient a urgent message for order placement will be sent. Would like a call at [phone] when placed.

## 2024-02-01 ENCOUNTER — TELEPHONE (OUTPATIENT)
Dept: FAMILY MEDICINE CLINIC | Facility: CLINIC | Age: 59
End: 2024-02-01

## 2024-02-01 NOTE — TELEPHONE ENCOUNTER
Patient called, to verify and confirm letter being faxed over to [facility], stating that he is not taking Coumadin. Patient also mentioned that his procedure is tomorrow, 02/01 at 7:30 am. Since office opens after 8am, the letter will not  be sent in time, prior to procedure. Patient was informed to try and print out a physical copy of the letter that is in his Mychart to take to the Orthopedic office tomorrow and office will also fax the letter to office once office id opened. Patient requested a callback from office at best convenience, to confirm that letter was sent to office....Per patient, fax is for Attention: Annie   [facility]   Fax: [phone]

## 2024-02-01 NOTE — TELEPHONE ENCOUNTER
----- Message from Ellen Almanza LPN sent at 2/1/2024  7:08 AM EST -----  Please see below and call patient and let him know that he needs to see his Doctor in [place] full time and no longer see Dr. Chris. Thanks!  ----- Message -----  From: Xiomy Chris MD  Sent: 1/31/2024   6:57 PM EST  To: Flaco Duvall; [facility]; #    This  patient is following up with his  physician in [place]  I  had  explained to  Ludmila and to the  patient  that  his  INR  management  is  a critical issue  since I  am not in  the office every day  I  can not  be his  PCP   he  needs  to  be  with  someone  who can review  his  INR  timely and  manage  his  coumadin dosing  he  probably  also  consults a  physician in  [place]  who  does his  INR  management as well  please   let the patient know that  he  needs  to stick  with  his one  full time doctor  so  that  there  is  no  gap in  care   ----- Message -----  From: Lab, Background User  Sent: 1/31/2024   9:54 AM EST  To: Xiomy Chris MD

## 2024-02-01 NOTE — PROGRESS NOTES
I spoke with the  and his  wife   he  said  he  saw  his  cardiologist  2 weeks  ago  and  he  is  aware  that  patient  has not  taken coumadin last 2 months  since  he  ha d his  last  epidural shot . So currently  he is not on coumadin however  going forward  I advised him to take  guidance from his  cardiologist regarding management and continuation of coumadin   he  says  his  cardiologist is planning to wean him off . He  will  follow up  with  his  cardiologist regarding this  matter after his injection

## 2024-02-05 ENCOUNTER — TELEPHONE (OUTPATIENT)
Dept: UROLOGY | Facility: CLINIC | Age: 59
End: 2024-02-05

## 2024-02-21 PROBLEM — R50.9 FEBRILE ILLNESS, ACUTE: Status: RESOLVED | Noted: 2020-08-13 | Resolved: 2024-02-21

## 2024-03-29 ENCOUNTER — TELEPHONE (OUTPATIENT)
Dept: FAMILY MEDICINE CLINIC | Facility: CLINIC | Age: 59
End: 2024-03-29

## 2024-03-29 NOTE — TELEPHONE ENCOUNTER
Second note re this issue -- pt is on Warfarin and has two PCPs. Dr. Chris would like this pt to f/u w other PCP as she is only in office two nights per wk and does not believe this is suitable to manage INR results and advised I call pt to make aware again and verify that he will be f/u w other PCP. Pt is actively seeking out new PCP as he states at time of call he is in [facility]. He will keep us up to date w him seeking out new PCP and will call if there are any questions or concerns. Recent INR were lower as he was switched to Xarelto for a surgery he had but does want to transition back to warfarin once he has fully recovered.

## 2024-04-19 ENCOUNTER — TELEPHONE (OUTPATIENT)
Dept: FAMILY MEDICINE CLINIC | Facility: CLINIC | Age: 59
End: 2024-04-19

## 2024-10-14 ENCOUNTER — TELEPHONE (OUTPATIENT)
Age: 59
End: 2024-10-14

## 2024-10-18 ENCOUNTER — OFFICE VISIT (OUTPATIENT)
Age: 59
End: 2024-10-18
Payer: COMMERCIAL

## 2024-10-18 VITALS
HEART RATE: 52 BPM | SYSTOLIC BLOOD PRESSURE: 150 MMHG | BODY MASS INDEX: 41.02 KG/M2 | DIASTOLIC BLOOD PRESSURE: 100 MMHG | HEIGHT: 71 IN | WEIGHT: 293 LBS | OXYGEN SATURATION: 99 %

## 2024-10-18 DIAGNOSIS — N40.0 BPH WITHOUT OBSTRUCTION/LOWER URINARY TRACT SYMPTOMS: Primary | ICD-10-CM

## 2024-10-18 PROCEDURE — 99213 OFFICE O/P EST LOW 20 MIN: CPT | Performed by: PHYSICIAN ASSISTANT

## 2024-10-18 NOTE — PROGRESS NOTES
UROLOGY PROGRESS NOTE   Patient Identifiers: Wong Cummings (MRN [id number])  Date of Service: 10/18/2024    Subjective:   58-year-old man history of BPH and ED.  He uses sildenafil as needed.  PSA last year was 0.83.  No voiding complaints.  Unaware of family history.  Not taking any prescription medications from urology.    Reason for visit: BPH follow-up    Objective:     VITALS:    There were no vitals filed for this visit.        LABS:  Lab Results   Component Value Date    HGB 14.4 11/14/2023    HCT 43.2 11/14/2023    WBC 5.50 11/14/2023     11/14/2023   ]    Lab Results   Component Value Date    K 4.6 11/14/2023     11/14/2023    CO2 31 11/14/2023    BUN 18 11/14/2023    CREATININE 1.20 11/14/2023    CALCIUM 9.9 11/14/2023   ]        INPATIENT MEDS:    Current Outpatient Medications:     acetaminophen (TYLENOL) 500 mg tablet, Take 1,000 mg by mouth if needed, Disp: , Rfl:     carbamide peroxide (DEBROX) 6.5 % otic solution, Administer 5 drops into both ears 2 (two) times a day (Patient not taking: Reported on 1/5/2024), Disp: 15 mL, Rfl: 2    chlorthalidone 25 mg tablet, Take 1 tablet (25 mg total) by mouth daily, Disp: 90 tablet, Rfl: 3    docusate sodium (Colace) 100 mg capsule, Take 100 mg by mouth 2 (two) times a day, Disp: , Rfl:     doxazosin (CARDURA XL) 4 MG 24 hr tablet, Take 2 tablets (8 mg total) by mouth daily with breakfast, Disp: 60 tablet, Rfl: 0    furosemide (LASIX) 20 mg tablet, Take 1 tablet (20 mg total) by mouth daily as needed (swelling of the legs) (Patient not taking: Reported on 1/5/2024), Disp: 60 tablet, Rfl: 3    hydrALAZINE (APRESOLINE) 100 MG tablet, TAKE 1 TABLET BY MOUTH THREE TIMES A DAY, Disp: 270 tablet, Rfl: 1    hydroCHLOROthiazide 25 mg tablet, Take 25 mg by mouth, Disp: , Rfl:     methocarbamol (ROBAXIN) 500 mg tablet, Take 1 tablet (500 mg total) by mouth every 6 (six) hours as needed for muscle spasms (Patient not taking: Reported on 10/17/2023), Disp:  20 tablet, Rfl: 0    Multiple Vitamin (MULTIVITAMIN ADULT PO), Take 1 tablet by mouth daily, Disp: , Rfl:     multivitamin (THERAGRAN) TABS, Take 1 tablet by mouth daily, Disp: , Rfl:     pregabalin (LYRICA) 75 mg capsule, TAKE 1 CAPSULE BY MOUTH TWICE A DAY FOR 30 DAYS, Disp: , Rfl:     rosuvastatin (CRESTOR) 5 mg tablet, Take 1 tablet (5 mg total) by mouth daily, Disp: 90 tablet, Rfl: 3    sildenafil (VIAGRA) 25 MG tablet, TAKE 1 TABLET BY MOUTH DAILY AS NEEDED FOR ERECTILE DYSFUNCTION., Disp: 10 tablet, Rfl: 3    spironolactone (ALDACTONE) 50 mg tablet, Take 1 tablet (50 mg total) by mouth daily, Disp: 90 tablet, Rfl: 3    warfarin (COUMADIN) 2.5 mg tablet, TAKE 1 TABLET BY MOUTH EVERY DAY, Disp: 30 tablet, Rfl: 2    warfarin (COUMADIN) 5 mg tablet, TAKE 2 TABLETS DAILY (Patient taking differently: Take 5 mg by mouth once), Disp: 180 tablet, Rfl: 3      Physical Exam:   There were no vitals taken for this visit.  GEN: no acute distress    RESP: breathing comfortably with no accessory muscle use    ABD: soft, non-tender, non-distended   INCISION:    EXT: no significant peripheral edema   (Male): Penis circumcised, phallus normal, meatus patent.  Testicles descended into scrotum bilaterally without masses nor tenderness.  No inguinal hernias bilaterally.  LESTER: Prostate is enlarged at 35 grams. The prostate is not boggy. The prostate is not tender.  No nodules noted      RADIOLOGY:   none    Assessment:   #1.  BPH  #2.  Erectile dysfunction    Plan:   -Since he has no voiding complaints and is not using any urology medications  -He may follow-up with urology on an as-needed basis  -Encouraged to continue annual prostate cancer screening  -Should call with any further questions or concerns

## 2025-01-21 ENCOUNTER — TELEPHONE (OUTPATIENT)
Dept: GASTROENTEROLOGY | Facility: CLINIC | Age: 60
End: 2025-01-21

## 2025-01-21 NOTE — TELEPHONE ENCOUNTER
Called and left a message to call back to schedule 5 year repeat colonoscopy from recall with Dr. Cordero. Sent a letter to the home address.

## 2025-02-01 DIAGNOSIS — I1A.0 RESISTANT HYPERTENSION: ICD-10-CM

## 2025-02-03 ENCOUNTER — TELEPHONE (OUTPATIENT)
Dept: FAMILY MEDICINE CLINIC | Facility: CLINIC | Age: 60
End: 2025-02-03

## 2025-02-03 NOTE — TELEPHONE ENCOUNTER
Called and left message for pt to call and let us know if he is going to continue with one of our providers here in our office or if he has a new PCP

## 2025-02-03 NOTE — TELEPHONE ENCOUNTER
Patient returned call and RN reviewed if patient would continue in this practice. RN advised of website to review the two providers. Patient [place] in [place] and [place]'t make [place]. RN advised of several offices in [place]  for patient to check if convenient to his employer. Patient will call back to advise.

## 2025-02-06 RX ORDER — SPIRONOLACTONE 50 MG/1
50 TABLET, FILM COATED ORAL DAILY
Qty: 30 TABLET | Refills: 11 | Status: SHIPPED | OUTPATIENT
Start: 2025-02-06

## 2025-02-19 ENCOUNTER — TELEPHONE (OUTPATIENT)
Dept: GASTROENTEROLOGY | Facility: CLINIC | Age: 60
End: 2025-02-19

## 2025-02-19 NOTE — TELEPHONE ENCOUNTER
02/19/25  Screened by: Rosio Hadley    Referring Provider Consuelo    Pre- Screening:     There is no height or weight on file to calculate BMI.  Has patient been referred for a routine screening Colonoscopy? yes  Is the patient between 45-75 years old? yes      Previous Colonoscopy yes   If yes:    Date: 1/20/25    [facility]: [facility]    Reason:       SCHEDULING STAFF: If the patient is between 45yrs-49yrs, please advise patient to confirm benefits/coverage with their insurance company for a routine screening colonoscopy, some insurance carriers will only cover at 50yrs or older. If the patient is over 75years old, please schedule an office visit.     Does the patient want to see a Gastroenterologist prior to their procedure OR are they having any GI symptoms? no    Has the patient been hospitalized or had abdominal surgery in the past 6 months? no    Does the patient use supplemental oxygen? no    Does the patient take Coumadin, Lovenox, Plavix, Elliquis, Xarelto, or other blood thinning medication? no    Has the patient had a stroke, cardiac event, or stent placed in the past year? no    SCHEDULING STAFF: If patient answers NO to above questions, then schedule procedure. If patient answers YES to above questions, then schedule office appointment.     If patient is between 45yrs - 49yrs, please advise patient that we will have to confirm benefits & coverage with their insurance company for a routine screening colonoscopy.

## 2025-03-04 ENCOUNTER — TELEPHONE (OUTPATIENT)
Dept: GASTROENTEROLOGY | Facility: CLINIC | Age: 60
End: 2025-03-04

## 2025-03-04 NOTE — TELEPHONE ENCOUNTER
Our mutual patient is scheduled for procedure: Colonoscopy     On: 4/10/25    With: Dr. Cordero    He/She is taking the following blood thinner:  Coumadin      Can this be stopped  5  days prior to the procedure?       Physician Approving clearance: ________________________

## 2025-04-07 ENCOUNTER — TELEPHONE (OUTPATIENT)
Dept: GASTROENTEROLOGY | Facility: CLINIC | Age: 60
End: 2025-04-07

## 2025-04-07 NOTE — TELEPHONE ENCOUNTER
Pt has a colonoscopy scheduled 04/10 and is aware he will be receiving a call the day before to inform him of the appointment time. Pt states he will have a 2 hour commute and is requesting a call with the appointment information as early as possible on 04/09. Informed pt I will place his request into his appointment appointment details so that the  is aware. Pt verbalized appreciation.

## 2025-04-10 ENCOUNTER — ANESTHESIA EVENT (OUTPATIENT)
Dept: GASTROENTEROLOGY | Facility: AMBULARY SURGERY CENTER | Age: 60
End: 2025-04-10
Payer: COMMERCIAL

## 2025-04-10 ENCOUNTER — HOSPITAL ENCOUNTER (OUTPATIENT)
Dept: GASTROENTEROLOGY | Facility: AMBULARY SURGERY CENTER | Age: 60
Setting detail: OUTPATIENT SURGERY
End: 2025-04-10
Attending: INTERNAL MEDICINE
Payer: COMMERCIAL

## 2025-04-10 VITALS
HEART RATE: 58 BPM | OXYGEN SATURATION: 100 % | DIASTOLIC BLOOD PRESSURE: 90 MMHG | TEMPERATURE: 97.6 F | SYSTOLIC BLOOD PRESSURE: 172 MMHG | RESPIRATION RATE: 18 BRPM

## 2025-04-10 DIAGNOSIS — Z85.038 PERSONAL HISTORY OF COLON CANCER: ICD-10-CM

## 2025-04-10 PROCEDURE — 88305 TISSUE EXAM BY PATHOLOGIST: CPT | Performed by: PATHOLOGY

## 2025-04-10 RX ORDER — PROPOFOL 10 MG/ML
INJECTION, EMULSION INTRAVENOUS AS NEEDED
Status: DISCONTINUED | OUTPATIENT
Start: 2025-04-10 | End: 2025-04-10

## 2025-04-10 RX ORDER — GLYCOPYRROLATE 0.2 MG/ML
INJECTION INTRAMUSCULAR; INTRAVENOUS AS NEEDED
Status: DISCONTINUED | OUTPATIENT
Start: 2025-04-10 | End: 2025-04-10

## 2025-04-10 RX ORDER — SODIUM CHLORIDE, SODIUM LACTATE, POTASSIUM CHLORIDE, CALCIUM CHLORIDE 600; 310; 30; 20 MG/100ML; MG/100ML; MG/100ML; MG/100ML
INJECTION, SOLUTION INTRAVENOUS CONTINUOUS PRN
Status: DISCONTINUED | OUTPATIENT
Start: 2025-04-10 | End: 2025-04-10

## 2025-04-10 RX ADMIN — PROPOFOL 50 MG: 10 INJECTION, EMULSION INTRAVENOUS at 09:12

## 2025-04-10 RX ADMIN — PROPOFOL 150 MG: 10 INJECTION, EMULSION INTRAVENOUS at 09:06

## 2025-04-10 RX ADMIN — PROPOFOL 50 MG: 10 INJECTION, EMULSION INTRAVENOUS at 09:16

## 2025-04-10 RX ADMIN — PROPOFOL 30 MG: 10 INJECTION, EMULSION INTRAVENOUS at 09:23

## 2025-04-10 RX ADMIN — SODIUM CHLORIDE, SODIUM LACTATE, POTASSIUM CHLORIDE, AND CALCIUM CHLORIDE: .6; .31; .03; .02 INJECTION, SOLUTION INTRAVENOUS at 08:24

## 2025-04-10 RX ADMIN — GLYCOPYRROLATE 0.2 MCG: 0.2 INJECTION INTRAMUSCULAR; INTRAVENOUS at 09:04

## 2025-04-10 RX ADMIN — PROPOFOL 50 MG: 10 INJECTION, EMULSION INTRAVENOUS at 09:09

## 2025-04-10 NOTE — ANESTHESIA POSTPROCEDURE EVALUATION
Post-Op Assessment Note    CV Status:  Stable    Pain management: adequate       Mental Status:  Alert and awake   Hydration Status:  Euvolemic   PONV Controlled:  Controlled   Airway Patency:  Patent     Post Op Vitals Reviewed: Yes    No anethesia notable event occurred.    Staff: CRNA           Last Filed PACU Vitals:  Vitals Value Taken Time   Temp 97.6 °F (36.4 °C) 04/10/25 0930   Pulse 54 04/10/25 0930   /65 04/10/25 0930   Resp 20 04/10/25 0930   SpO2 100 % 04/10/25 0930       Modified Jamison:     Vitals Value Taken Time   Activity 2 04/10/25 0930   Respiration 2 04/10/25 0930   Circulation 2 04/10/25 0930   Consciousness 1 04/10/25 0930   Oxygen Saturation 1 04/10/25 0930     Modified Jamison Score: 8

## 2025-04-10 NOTE — H&P
History and Physical -  [facility]  Wong Cummings 59 y.o. male MRN: [id number]    HPI: Wong Cummings is a 59 y.o. year old male who presents for surveillance colonoscopy for personal history of colon polyps      Review of Systems    Historical Information   Past Medical History:   Diagnosis Date    Angioedema     Coronary artery disease     GI bleed     Hypertension     stopped his meds when ran out several years ago    STEMI (ST elevation myocardial infarction) (HCC)      Past Surgical History:   Procedure Laterality Date    REPLACEMENT TOTAL KNEE Right 12/2020    SPINAL FUSION  03/2024    UPPER GASTROINTESTINAL ENDOSCOPY      Clamped large stomach ulcer     Social History   Social History     Substance and Sexual Activity   Alcohol Use Never    Alcohol/week: 0.0 standard drinks of alcohol     Social History     Substance and Sexual Activity   Drug Use Never     Social History     Tobacco Use   Smoking Status Never   Smokeless Tobacco Never     Family History   Adopted: Yes   Family history unknown: Yes       Meds/Allergies     Not in a hospital admission.    Allergies   Allergen Reactions    Lisinopril Angioedema    Norvasc [Amlodipine] Angioedema    Amoxicillin Itching     Itching little bumps    Eliquis [Apixaban] Angioedema    Lipitor [Atorvastatin] Facial Swelling       Objective     BP (!) 189/87   Pulse 57   Temp (!) 96.9 °F (36.1 °C) (Temporal)   Resp 16   SpO2 100%       PHYSICAL EXAM    Gen: NAD  CV: RRR  CHEST: Clear  ABD: soft, NT/ND  EXT: no edema  Neuro: AAO      ASSESSMENT/PLAN:  This is a 59 y.o. year old male here for surveillance colonoscopy for personal history of colon polyps    PLAN:   Procedure: Colonoscopy with biopsy and possible polypectomy

## 2025-04-10 NOTE — ANESTHESIA POSTPROCEDURE EVALUATION
Post-Op Assessment Note    CV Status:  Stable    Pain management: adequate       Mental Status:  Alert and awake   Hydration Status:  Euvolemic   PONV Controlled:  Controlled   Airway Patency:  Patent     Post Op Vitals Reviewed: Yes    No anethesia notable event occurred.    Staff: Anesthesiologist           Last Filed PACU Vitals:  Vitals Value Taken Time   Temp 97.6 °F (36.4 °C) 04/10/25 0930   Pulse 58 04/10/25 0945   /90 04/10/25 0945   Resp 18 04/10/25 0945   SpO2 100 % 04/10/25 0945       Modified Jamison:     Vitals Value Taken Time   Activity 2 04/10/25 0945   Respiration 2 04/10/25 0945   Circulation 2 04/10/25 0945   Consciousness 1 04/10/25 0945   Oxygen Saturation 2 04/10/25 0945     Modified Jamison Score: 9

## 2025-04-10 NOTE — ANESTHESIA PREPROCEDURE EVALUATION
Procedure:  COLONOSCOPY    Relevant Problems   CARDIO   (+) Acute deep vein thrombosis (DVT) of tibial vein ([facility])   (+) Coronary artery disease involving native heart without angina pectoris   (+) Edema of left lower leg due to peripheral venous insufficiency   (+) Hyperlipidemia   (+) Hypertension   (+) Hypertensive urgency   (+) Leg DVT (deep venous thromboembolism), chronic, right (HCC)   (+) NSTEMI (non-ST elevated myocardial infarction) ([facility])   (+) Vasculitis (HCC)      /RENAL   (+) ZAKIA (acute kidney injury) ([facility])   (+) CKD (chronic kidney disease)      HEMATOLOGY   (+) Anemia   (+) Iron deficiency anemia due to chronic blood loss      MUSCULOSKELETAL   (+) Acute left-sided low back pain without sciatica      PULMONARY   (+) BRUCE (obstructive sleep apnea)          Left Ventricle: Left ventricular cavity size is normal. Wall thickness is mildly increased. The left ventricular ejection fraction is 70%. Systolic function is vigorous. Wall motion is normal. Diastolic function is mildly abnormal, consistent with grade I (abnormal) relaxation.       Physical Exam    Airway    Mallampati score: II  TM Distance: >3 FB  Neck ROM: full     Dental   No notable dental hx     Cardiovascular  Rhythm: regular, No weak pulses    Pulmonary   No stridor    Other Findings        Anesthesia Plan  ASA Score- 3     Anesthesia Type- IV sedation with anesthesia with ASA Monitors.         Additional Monitors:     Airway Plan:            Plan Factors-    Chart reviewed.   Existing labs reviewed. Patient summary reviewed.                  Induction- intravenous.    Postoperative Plan-         Informed Consent- Anesthetic plan and risks discussed with patient.  I personally reviewed this patient with the CRNA. Discussed and agreed on the Anesthesia Plan with the CRNA..      NPO Status:  No vitals data found for the desired time range.

## 2025-04-22 ENCOUNTER — RESULTS FOLLOW-UP (OUTPATIENT)
Dept: GASTROENTEROLOGY | Facility: CLINIC | Age: 60
End: 2025-04-22

## 2025-08-14 ENCOUNTER — TELEPHONE (OUTPATIENT)
Age: 60
End: 2025-08-14